# Patient Record
Sex: FEMALE | Race: WHITE | NOT HISPANIC OR LATINO
[De-identification: names, ages, dates, MRNs, and addresses within clinical notes are randomized per-mention and may not be internally consistent; named-entity substitution may affect disease eponyms.]

---

## 2017-01-05 ENCOUNTER — RX RENEWAL (OUTPATIENT)
Age: 72
End: 2017-01-05

## 2017-03-24 ENCOUNTER — APPOINTMENT (OUTPATIENT)
Dept: DERMATOLOGY | Facility: CLINIC | Age: 72
End: 2017-03-24
Payer: MEDICARE

## 2017-03-24 DIAGNOSIS — Z85.828 PERSONAL HISTORY OF OTHER MALIGNANT NEOPLASM OF SKIN: ICD-10-CM

## 2017-03-24 PROCEDURE — 99213 OFFICE O/P EST LOW 20 MIN: CPT | Mod: 25

## 2017-03-24 PROCEDURE — 17000 DESTRUCT PREMALG LESION: CPT | Mod: 59

## 2017-03-24 PROCEDURE — 11101 BIOPSY SKIN SUBQ&/MUCOUS MEMBRANE EA ADDL LESN: CPT

## 2017-03-24 PROCEDURE — 11100 BX SKIN SUBCUTANEOUS&/MUCOUS MEMBRANE 1 LESION: CPT | Mod: 59

## 2017-03-28 LAB — CORE LAB BIOPSY: NORMAL

## 2017-03-29 ENCOUNTER — APPOINTMENT (OUTPATIENT)
Dept: DERMATOLOGY | Facility: CLINIC | Age: 72
End: 2017-03-29

## 2017-03-30 ENCOUNTER — RECORD ABSTRACTING (OUTPATIENT)
Age: 72
End: 2017-03-30

## 2017-03-30 DIAGNOSIS — Z84.1 FAMILY HISTORY OF DISORDERS OF KIDNEY AND URETER: ICD-10-CM

## 2017-03-30 DIAGNOSIS — Z83.3 FAMILY HISTORY OF DIABETES MELLITUS: ICD-10-CM

## 2017-03-30 DIAGNOSIS — Z82.49 FAMILY HISTORY OF ISCHEMIC HEART DISEASE AND OTHER DISEASES OF THE CIRCULATORY SYSTEM: ICD-10-CM

## 2017-03-30 DIAGNOSIS — Z80.0 FAMILY HISTORY OF MALIGNANT NEOPLASM OF DIGESTIVE ORGANS: ICD-10-CM

## 2017-03-30 DIAGNOSIS — Z82.0 FAMILY HISTORY OF EPILEPSY AND OTHER DISEASES OF THE NERVOUS SYSTEM: ICD-10-CM

## 2017-04-12 ENCOUNTER — APPOINTMENT (OUTPATIENT)
Dept: INTERNAL MEDICINE | Facility: CLINIC | Age: 72
End: 2017-04-12

## 2017-04-25 ENCOUNTER — APPOINTMENT (OUTPATIENT)
Dept: DERMATOLOGY | Facility: CLINIC | Age: 72
End: 2017-04-25

## 2017-04-25 RX ORDER — CONJUGATED ESTROGENS 0.62 MG/G
0.62 CREAM VAGINAL
Refills: 0 | Status: DISCONTINUED | COMMUNITY
End: 2017-04-25

## 2017-04-28 ENCOUNTER — RX RENEWAL (OUTPATIENT)
Age: 72
End: 2017-04-28

## 2017-05-02 ENCOUNTER — APPOINTMENT (OUTPATIENT)
Dept: DERMATOLOGY | Facility: CLINIC | Age: 72
End: 2017-05-02
Payer: MEDICARE

## 2017-05-02 PROCEDURE — 17272 DSTR MAL LES S/N/H/F/G 1.1-2: CPT

## 2017-05-05 ENCOUNTER — NON-APPOINTMENT (OUTPATIENT)
Age: 72
End: 2017-05-05

## 2017-05-05 ENCOUNTER — APPOINTMENT (OUTPATIENT)
Dept: INTERNAL MEDICINE | Facility: CLINIC | Age: 72
End: 2017-05-05

## 2017-05-05 VITALS
TEMPERATURE: 97.7 F | RESPIRATION RATE: 18 BRPM | WEIGHT: 220 LBS | OXYGEN SATURATION: 94 % | BODY MASS INDEX: 33.34 KG/M2 | DIASTOLIC BLOOD PRESSURE: 86 MMHG | HEART RATE: 76 BPM | SYSTOLIC BLOOD PRESSURE: 138 MMHG | HEIGHT: 68 IN

## 2017-06-02 ENCOUNTER — MEDICATION RENEWAL (OUTPATIENT)
Age: 72
End: 2017-06-02

## 2017-06-28 ENCOUNTER — APPOINTMENT (OUTPATIENT)
Dept: UROLOGY | Facility: CLINIC | Age: 72
End: 2017-06-28

## 2017-06-28 RX ORDER — FLUTICASONE PROPIONATE 44 UG/1
44 AEROSOL, METERED RESPIRATORY (INHALATION)
Refills: 0 | Status: DISCONTINUED | COMMUNITY
End: 2017-06-28

## 2017-06-29 LAB
BILIRUB UR QL STRIP: NORMAL
GLUCOSE UR-MCNC: NORMAL
HCG UR QL: 0.2 EU/DL
HGB UR QL STRIP.AUTO: NORMAL
KETONES UR-MCNC: NORMAL
LEUKOCYTE ESTERASE UR QL STRIP: NORMAL
NITRITE UR QL STRIP: NORMAL
PH UR STRIP: 5.5
PROT UR STRIP-MCNC: NORMAL
SP GR UR STRIP: 1.01

## 2017-07-18 ENCOUNTER — APPOINTMENT (OUTPATIENT)
Dept: DERMATOLOGY | Facility: CLINIC | Age: 72
End: 2017-07-18

## 2017-07-24 ENCOUNTER — MESSAGE (OUTPATIENT)
Age: 72
End: 2017-07-24

## 2017-07-24 LAB — BACTERIA UR CULT: ABNORMAL

## 2017-07-25 ENCOUNTER — OUTPATIENT (OUTPATIENT)
Dept: OUTPATIENT SERVICES | Facility: HOSPITAL | Age: 72
LOS: 1 days | Discharge: HOME | End: 2017-07-25
Payer: MEDICARE

## 2017-07-25 VITALS
OXYGEN SATURATION: 97 % | HEART RATE: 90 BPM | TEMPERATURE: 98 F | DIASTOLIC BLOOD PRESSURE: 64 MMHG | RESPIRATION RATE: 18 BRPM | HEIGHT: 67 IN | WEIGHT: 218.04 LBS | SYSTOLIC BLOOD PRESSURE: 139 MMHG

## 2017-07-25 DIAGNOSIS — M48.06 SPINAL STENOSIS, LUMBAR REGION: ICD-10-CM

## 2017-07-25 DIAGNOSIS — M43.16 SPONDYLOLISTHESIS, LUMBAR REGION: ICD-10-CM

## 2017-07-25 DIAGNOSIS — E11.65 TYPE 2 DIABETES MELLITUS WITH HYPERGLYCEMIA: ICD-10-CM

## 2017-07-25 DIAGNOSIS — N39.0 URINARY TRACT INFECTION, SITE NOT SPECIFIED: ICD-10-CM

## 2017-07-25 DIAGNOSIS — Z01.818 ENCOUNTER FOR OTHER PREPROCEDURAL EXAMINATION: ICD-10-CM

## 2017-07-25 DIAGNOSIS — M51.36 OTHER INTERVERTEBRAL DISC DEGENERATION, LUMBAR REGION: ICD-10-CM

## 2017-07-25 DIAGNOSIS — M41.86 OTHER FORMS OF SCOLIOSIS, LUMBAR REGION: ICD-10-CM

## 2017-07-25 DIAGNOSIS — M54.32 SCIATICA, LEFT SIDE: ICD-10-CM

## 2017-07-25 DIAGNOSIS — J98.11 ATELECTASIS: ICD-10-CM

## 2017-07-25 DIAGNOSIS — D62 ACUTE POSTHEMORRHAGIC ANEMIA: ICD-10-CM

## 2017-07-25 LAB
ANION GAP SERPL CALC-SCNC: 9 MMOL/L — SIGNIFICANT CHANGE UP (ref 5–17)
APPEARANCE UR: CLEAR — SIGNIFICANT CHANGE UP
APTT BLD: 26.8 SEC — LOW (ref 27.5–37.4)
BACTERIA # UR AUTO: (no result)
BASOPHILS # BLD AUTO: 0.1 K/UL — SIGNIFICANT CHANGE UP (ref 0–0.2)
BASOPHILS NFR BLD AUTO: 1 % — SIGNIFICANT CHANGE UP (ref 0–2)
BILIRUB UR-MCNC: NEGATIVE — SIGNIFICANT CHANGE UP
BLD GP AB SCN SERPL QL: SIGNIFICANT CHANGE UP
BUN SERPL-MCNC: 11 MG/DL — SIGNIFICANT CHANGE UP (ref 7–23)
CALCIUM SERPL-MCNC: 9.1 MG/DL — SIGNIFICANT CHANGE UP (ref 8.5–10.1)
CHLORIDE SERPL-SCNC: 102 MMOL/L — SIGNIFICANT CHANGE UP (ref 96–108)
CO2 SERPL-SCNC: 26 MMOL/L — SIGNIFICANT CHANGE UP (ref 22–31)
COLOR SPEC: YELLOW — SIGNIFICANT CHANGE UP
CREAT SERPL-MCNC: 0.62 MG/DL — SIGNIFICANT CHANGE UP (ref 0.5–1.3)
DIFF PNL FLD: NEGATIVE — SIGNIFICANT CHANGE UP
EOSINOPHIL # BLD AUTO: 0.2 K/UL — SIGNIFICANT CHANGE UP (ref 0–0.5)
EOSINOPHIL NFR BLD AUTO: 1.5 % — SIGNIFICANT CHANGE UP (ref 0–6)
EPI CELLS # UR: (no result)
GLUCOSE SERPL-MCNC: 188 MG/DL — HIGH (ref 70–99)
GLUCOSE UR QL: NEGATIVE MG/DL — SIGNIFICANT CHANGE UP
HCT VFR BLD CALC: 40.1 % — SIGNIFICANT CHANGE UP (ref 34.5–45)
HGB BLD-MCNC: 13.8 G/DL — SIGNIFICANT CHANGE UP (ref 11.5–15.5)
INR BLD: 0.96 RATIO — SIGNIFICANT CHANGE UP (ref 0.88–1.16)
KETONES UR-MCNC: NEGATIVE — SIGNIFICANT CHANGE UP
LEUKOCYTE ESTERASE UR-ACNC: (no result)
LYMPHOCYTES # BLD AUTO: 3.8 K/UL — HIGH (ref 1–3.3)
LYMPHOCYTES # BLD AUTO: 37.4 % — SIGNIFICANT CHANGE UP (ref 13–44)
MCHC RBC-ENTMCNC: 30.6 PG — SIGNIFICANT CHANGE UP (ref 27–34)
MCHC RBC-ENTMCNC: 34.5 GM/DL — SIGNIFICANT CHANGE UP (ref 32–36)
MCV RBC AUTO: 88.5 FL — SIGNIFICANT CHANGE UP (ref 80–100)
MONOCYTES # BLD AUTO: 1 K/UL — HIGH (ref 0–0.9)
MONOCYTES NFR BLD AUTO: 9.8 % — SIGNIFICANT CHANGE UP (ref 2–14)
NEUTROPHILS # BLD AUTO: 5.1 K/UL — SIGNIFICANT CHANGE UP (ref 1.8–7.4)
NEUTROPHILS NFR BLD AUTO: 50.3 % — SIGNIFICANT CHANGE UP (ref 43–77)
NITRITE UR-MCNC: NEGATIVE — SIGNIFICANT CHANGE UP
PH UR: 6 — SIGNIFICANT CHANGE UP (ref 5–8)
PLATELET # BLD AUTO: 199 K/UL — SIGNIFICANT CHANGE UP (ref 150–400)
POTASSIUM SERPL-MCNC: 4 MMOL/L — SIGNIFICANT CHANGE UP (ref 3.5–5.3)
POTASSIUM SERPL-SCNC: 4 MMOL/L — SIGNIFICANT CHANGE UP (ref 3.5–5.3)
PROT UR-MCNC: NEGATIVE MG/DL — SIGNIFICANT CHANGE UP
PROTHROM AB SERPL-ACNC: 10.4 SEC — SIGNIFICANT CHANGE UP (ref 9.8–12.7)
RBC # BLD: 4.53 M/UL — SIGNIFICANT CHANGE UP (ref 3.8–5.2)
RBC # FLD: 12.1 % — SIGNIFICANT CHANGE UP (ref 10.3–14.5)
RBC CASTS # UR COMP ASSIST: SIGNIFICANT CHANGE UP /HPF (ref 0–4)
SODIUM SERPL-SCNC: 137 MMOL/L — SIGNIFICANT CHANGE UP (ref 135–145)
SP GR SPEC: 1.01 — SIGNIFICANT CHANGE UP (ref 1.01–1.02)
TYPE + AB SCN PNL BLD: SIGNIFICANT CHANGE UP
UROBILINOGEN FLD QL: NEGATIVE MG/DL — SIGNIFICANT CHANGE UP
WBC # BLD: 10.2 K/UL — SIGNIFICANT CHANGE UP (ref 3.8–10.5)
WBC # FLD AUTO: 10.2 K/UL — SIGNIFICANT CHANGE UP (ref 3.8–10.5)
WBC UR QL: SIGNIFICANT CHANGE UP

## 2017-07-25 PROCEDURE — 93010 ELECTROCARDIOGRAM REPORT: CPT

## 2017-07-25 PROCEDURE — 71020: CPT | Mod: 26

## 2017-07-25 NOTE — H&P PST ADULT - PSH
Dental disorder  Dental surgery 12/2012, patient reports that upper portion is glued in at this time, plan is to have dental implants placed.  S/P bladder repair  bladder lift 2/2013  S/P knee surgery  arthroscopy bilateral 2007 and 2010  Skin cancer  to right temple 2012, removed, history of basal cell x 2

## 2017-07-25 NOTE — H&P PST ADULT - HISTORY OF PRESENT ILLNESS
72 years old female with a history of lower back pain. "for more than 20 years". She had several spinal injections. Constant aching pain to lower back across entire lower back but worst to the left. Admits to radiation of pain to left lower extremity. Admits to paresthesia to left lower extremity.  Patient had a fall 7/25/2017. Now admits to right lower extremity pain. Denies swelling. Denies changes in bowel or urinary habits.

## 2017-07-25 NOTE — H&P PST ADULT - ASSESSMENT
72 years old female present to PST prior to L3- 72 years old female present to PST prior to L3- S1, possible L2-S1, posterior fusion, instrumentation L5-S1 PLIF with Dr. Roshni Christianson.  Plan   1. NPO after midnight  2. Take the following medications with sips of water on the day of procedure: Nitrofurantoin, Levothyroxine. Use Spiriva  3. Use E-Z sponge as directed  4. Use Mupirocin as directed.  5. Drink a quart of extra  fluids the day before your surgery.  6 Medical clearance with Dr. Amaya,Cardiac clearance with Dr. Ibrahim and pulmonary clearance with DR. Dowd  7. CBC, BMP, PT/PTT/INR, Urinalysis, Type and Screen, MRSA sent to lab  8. EKG and CXR done

## 2017-07-25 NOTE — H&P PST ADULT - PMH
Acid reflux    Asthma  history of x 20 years ago, resolved does not use inhaler  Back pain    Basal cell carcinoma  removed from face  DDD (degenerative disc disease), lumbar    Diabetes mellitus  diet controlled  Diverticulosis of intestine without bleeding, unspecified intestinal tract location    Elevated pancreatic enzyme  patient reports elevated lab 6/2013. Gastroenterologist aware  Gall stones  gall bladder removed  History of cataract  extracted from right and left  Hypothyroidism    Pulmonary emphysema, unspecified emphysema type    Spinal stenosis of lumbar region    Urinary tract infection with hematuria, site unspecified  Chronic. Presently on Nitrofurantoin. Started 7/24/2017  Urinary urgency    Varicose veins of both lower extremities  surgery Acid reflux    Asthma  history of x 20 years ago, resolved does not use inhaler  Back pain    Basal cell carcinoma  removed from face  DDD (degenerative disc disease), lumbar    Diabetes mellitus  diet controlled  Diverticulosis of intestine without bleeding, unspecified intestinal tract location    Elevated pancreatic enzyme  patient reports elevated lab 6/2013. Gastroenterologist aware  Fall, initial encounter  7/24/2017.  right lower extremity pain  Gall stones  gall bladder removed  History of cataract  extracted from right and left  Hypothyroidism    Pulmonary emphysema, unspecified emphysema type    Spinal stenosis of lumbar region    Urinary tract infection with hematuria, site unspecified  Chronic. Presently on Nitrofurantoin. Started 7/24/2017  Urinary urgency    Varicose veins of both lower extremities  surgery

## 2017-07-26 LAB
MRSA PCR RESULT.: SIGNIFICANT CHANGE UP
S AUREUS DNA NOSE QL NAA+PROBE: SIGNIFICANT CHANGE UP

## 2017-07-27 ENCOUNTER — APPOINTMENT (OUTPATIENT)
Dept: INTERNAL MEDICINE | Facility: CLINIC | Age: 72
End: 2017-07-27

## 2017-07-28 ENCOUNTER — OTHER (OUTPATIENT)
Age: 72
End: 2017-07-28

## 2017-07-28 LAB
GLUCOSE SERPL-MCNC: 188
INR PPP: 0.96
PT BLD: 10.4

## 2017-08-01 ENCOUNTER — APPOINTMENT (OUTPATIENT)
Dept: INTERNAL MEDICINE | Facility: CLINIC | Age: 72
End: 2017-08-01
Payer: MEDICARE

## 2017-08-01 ENCOUNTER — NON-APPOINTMENT (OUTPATIENT)
Age: 72
End: 2017-08-01

## 2017-08-01 VITALS
HEIGHT: 68 IN | HEART RATE: 98 BPM | TEMPERATURE: 98.7 F | RESPIRATION RATE: 16 BRPM | DIASTOLIC BLOOD PRESSURE: 70 MMHG | BODY MASS INDEX: 33.34 KG/M2 | OXYGEN SATURATION: 96 % | WEIGHT: 220 LBS | SYSTOLIC BLOOD PRESSURE: 120 MMHG

## 2017-08-01 PROCEDURE — 94729 DIFFUSING CAPACITY: CPT

## 2017-08-01 PROCEDURE — 94060 EVALUATION OF WHEEZING: CPT

## 2017-08-01 PROCEDURE — 94727 GAS DIL/WSHOT DETER LNG VOL: CPT

## 2017-08-01 PROCEDURE — 99214 OFFICE O/P EST MOD 30 MIN: CPT | Mod: 25

## 2017-08-01 RX ORDER — HYDROMORPHONE HYDROCHLORIDE 2 MG/ML
0.5 INJECTION INTRAMUSCULAR; INTRAVENOUS; SUBCUTANEOUS
Qty: 0 | Refills: 0 | Status: DISCONTINUED | OUTPATIENT
Start: 2017-08-02 | End: 2017-08-04

## 2017-08-01 RX ORDER — HYDROMORPHONE HYDROCHLORIDE 2 MG/ML
30 INJECTION INTRAMUSCULAR; INTRAVENOUS; SUBCUTANEOUS
Qty: 0 | Refills: 0 | Status: DISCONTINUED | OUTPATIENT
Start: 2017-08-02 | End: 2017-08-04

## 2017-08-01 RX ORDER — ONDANSETRON 8 MG/1
4 TABLET, FILM COATED ORAL EVERY 6 HOURS
Qty: 0 | Refills: 0 | Status: DISCONTINUED | OUTPATIENT
Start: 2017-08-02 | End: 2017-08-07

## 2017-08-01 RX ORDER — NALOXONE HYDROCHLORIDE 4 MG/.1ML
0.1 SPRAY NASAL
Qty: 0 | Refills: 0 | Status: DISCONTINUED | OUTPATIENT
Start: 2017-08-02 | End: 2017-08-07

## 2017-08-02 ENCOUNTER — INPATIENT (INPATIENT)
Facility: HOSPITAL | Age: 72
LOS: 4 days | Discharge: NURSING, REHAB OR LONG-TERM CARE | End: 2017-08-07
Attending: ORTHOPAEDIC SURGERY | Admitting: ORTHOPAEDIC SURGERY
Payer: MEDICARE

## 2017-08-02 VITALS
SYSTOLIC BLOOD PRESSURE: 146 MMHG | HEART RATE: 86 BPM | TEMPERATURE: 99 F | DIASTOLIC BLOOD PRESSURE: 76 MMHG | OXYGEN SATURATION: 96 % | WEIGHT: 220.46 LBS | HEIGHT: 68 IN | RESPIRATION RATE: 16 BRPM

## 2017-08-02 DIAGNOSIS — E11.9 TYPE 2 DIABETES MELLITUS WITHOUT COMPLICATIONS: ICD-10-CM

## 2017-08-02 DIAGNOSIS — R09.02 HYPOXEMIA: ICD-10-CM

## 2017-08-02 DIAGNOSIS — J43.9 EMPHYSEMA, UNSPECIFIED: ICD-10-CM

## 2017-08-02 DIAGNOSIS — Z98.890 OTHER SPECIFIED POSTPROCEDURAL STATES: ICD-10-CM

## 2017-08-02 DIAGNOSIS — E03.9 HYPOTHYROIDISM, UNSPECIFIED: ICD-10-CM

## 2017-08-02 LAB
ANION GAP SERPL CALC-SCNC: 10 MMOL/L — SIGNIFICANT CHANGE UP (ref 5–17)
BASOPHILS # BLD AUTO: 0.1 K/UL — SIGNIFICANT CHANGE UP (ref 0–0.2)
BASOPHILS NFR BLD AUTO: 0.6 % — SIGNIFICANT CHANGE UP (ref 0–2)
BUN SERPL-MCNC: 12 MG/DL — SIGNIFICANT CHANGE UP (ref 7–23)
CALCIUM SERPL-MCNC: 8.2 MG/DL — LOW (ref 8.5–10.1)
CHLORIDE SERPL-SCNC: 109 MMOL/L — HIGH (ref 96–108)
CO2 SERPL-SCNC: 21 MMOL/L — LOW (ref 22–31)
CREAT SERPL-MCNC: 0.63 MG/DL — SIGNIFICANT CHANGE UP (ref 0.5–1.3)
EOSINOPHIL # BLD AUTO: 0 K/UL — SIGNIFICANT CHANGE UP (ref 0–0.5)
EOSINOPHIL NFR BLD AUTO: 0.2 % — SIGNIFICANT CHANGE UP (ref 0–6)
GLUCOSE SERPL-MCNC: 174 MG/DL — HIGH (ref 70–99)
HCT VFR BLD CALC: 37.9 % — SIGNIFICANT CHANGE UP (ref 34.5–45)
HGB BLD-MCNC: 12.8 G/DL — SIGNIFICANT CHANGE UP (ref 11.5–15.5)
LYMPHOCYTES # BLD AUTO: 1.7 K/UL — SIGNIFICANT CHANGE UP (ref 1–3.3)
LYMPHOCYTES # BLD AUTO: 17.6 % — SIGNIFICANT CHANGE UP (ref 13–44)
MCHC RBC-ENTMCNC: 30.5 PG — SIGNIFICANT CHANGE UP (ref 27–34)
MCHC RBC-ENTMCNC: 33.8 GM/DL — SIGNIFICANT CHANGE UP (ref 32–36)
MCV RBC AUTO: 90.1 FL — SIGNIFICANT CHANGE UP (ref 80–100)
MONOCYTES # BLD AUTO: 0.2 K/UL — SIGNIFICANT CHANGE UP (ref 0–0.9)
MONOCYTES NFR BLD AUTO: 2.2 % — SIGNIFICANT CHANGE UP (ref 2–14)
NEUTROPHILS # BLD AUTO: 7.5 K/UL — HIGH (ref 1.8–7.4)
NEUTROPHILS NFR BLD AUTO: 79.4 % — HIGH (ref 43–77)
PLATELET # BLD AUTO: 266 K/UL — SIGNIFICANT CHANGE UP (ref 150–400)
POTASSIUM SERPL-MCNC: 4.2 MMOL/L — SIGNIFICANT CHANGE UP (ref 3.5–5.3)
POTASSIUM SERPL-SCNC: 4.2 MMOL/L — SIGNIFICANT CHANGE UP (ref 3.5–5.3)
RBC # BLD: 4.21 M/UL — SIGNIFICANT CHANGE UP (ref 3.8–5.2)
RBC # FLD: 13.7 % — SIGNIFICANT CHANGE UP (ref 10.3–14.5)
SODIUM SERPL-SCNC: 140 MMOL/L — SIGNIFICANT CHANGE UP (ref 135–145)
WBC # BLD: 9.4 K/UL — SIGNIFICANT CHANGE UP (ref 3.8–10.5)
WBC # FLD AUTO: 9.4 K/UL — SIGNIFICANT CHANGE UP (ref 3.8–10.5)

## 2017-08-02 RX ORDER — DOCUSATE SODIUM 100 MG
100 CAPSULE ORAL THREE TIMES A DAY
Qty: 0 | Refills: 0 | Status: DISCONTINUED | OUTPATIENT
Start: 2017-08-02 | End: 2017-08-07

## 2017-08-02 RX ORDER — LEVOTHYROXINE SODIUM 125 MCG
112 TABLET ORAL DAILY
Qty: 0 | Refills: 0 | Status: DISCONTINUED | OUTPATIENT
Start: 2017-08-02 | End: 2017-08-07

## 2017-08-02 RX ORDER — SODIUM CHLORIDE 9 MG/ML
1000 INJECTION, SOLUTION INTRAVENOUS
Qty: 0 | Refills: 0 | Status: DISCONTINUED | OUTPATIENT
Start: 2017-08-02 | End: 2017-08-07

## 2017-08-02 RX ORDER — MAGNESIUM HYDROXIDE 400 MG/1
30 TABLET, CHEWABLE ORAL EVERY 12 HOURS
Qty: 0 | Refills: 0 | Status: DISCONTINUED | OUTPATIENT
Start: 2017-08-02 | End: 2017-08-07

## 2017-08-02 RX ORDER — PANTOPRAZOLE SODIUM 20 MG/1
40 TABLET, DELAYED RELEASE ORAL
Qty: 0 | Refills: 0 | Status: DISCONTINUED | OUTPATIENT
Start: 2017-08-02 | End: 2017-08-07

## 2017-08-02 RX ORDER — ONDANSETRON 8 MG/1
4 TABLET, FILM COATED ORAL EVERY 6 HOURS
Qty: 0 | Refills: 0 | Status: DISCONTINUED | OUTPATIENT
Start: 2017-08-02 | End: 2017-08-07

## 2017-08-02 RX ORDER — DEXTROSE 50 % IN WATER 50 %
1 SYRINGE (ML) INTRAVENOUS ONCE
Qty: 0 | Refills: 0 | Status: DISCONTINUED | OUTPATIENT
Start: 2017-08-02 | End: 2017-08-07

## 2017-08-02 RX ORDER — TIOTROPIUM BROMIDE 18 UG/1
1 CAPSULE ORAL; RESPIRATORY (INHALATION) DAILY
Qty: 0 | Refills: 0 | Status: DISCONTINUED | OUTPATIENT
Start: 2017-08-02 | End: 2017-08-07

## 2017-08-02 RX ORDER — INSULIN LISPRO 100/ML
VIAL (ML) SUBCUTANEOUS AT BEDTIME
Qty: 0 | Refills: 0 | Status: DISCONTINUED | OUTPATIENT
Start: 2017-08-02 | End: 2017-08-07

## 2017-08-02 RX ORDER — DEXTROSE 50 % IN WATER 50 %
25 SYRINGE (ML) INTRAVENOUS ONCE
Qty: 0 | Refills: 0 | Status: DISCONTINUED | OUTPATIENT
Start: 2017-08-02 | End: 2017-08-07

## 2017-08-02 RX ORDER — SENNA PLUS 8.6 MG/1
2 TABLET ORAL AT BEDTIME
Qty: 0 | Refills: 0 | Status: DISCONTINUED | OUTPATIENT
Start: 2017-08-02 | End: 2017-08-07

## 2017-08-02 RX ORDER — GLUCAGON INJECTION, SOLUTION 0.5 MG/.1ML
1 INJECTION, SOLUTION SUBCUTANEOUS ONCE
Qty: 0 | Refills: 0 | Status: DISCONTINUED | OUTPATIENT
Start: 2017-08-02 | End: 2017-08-07

## 2017-08-02 RX ORDER — SODIUM CHLORIDE 9 MG/ML
1000 INJECTION, SOLUTION INTRAVENOUS
Qty: 0 | Refills: 0 | Status: DISCONTINUED | OUTPATIENT
Start: 2017-08-02 | End: 2017-08-05

## 2017-08-02 RX ORDER — SODIUM CHLORIDE 9 MG/ML
1000 INJECTION, SOLUTION INTRAVENOUS
Qty: 0 | Refills: 0 | Status: DISCONTINUED | OUTPATIENT
Start: 2017-08-02 | End: 2017-08-02

## 2017-08-02 RX ORDER — CEFAZOLIN SODIUM 1 G
2000 VIAL (EA) INJECTION ONCE
Qty: 0 | Refills: 0 | Status: COMPLETED | OUTPATIENT
Start: 2017-08-02 | End: 2017-08-02

## 2017-08-02 RX ORDER — PROCHLORPERAZINE MALEATE 5 MG
10 TABLET ORAL ONCE
Qty: 0 | Refills: 0 | Status: COMPLETED | OUTPATIENT
Start: 2017-08-02 | End: 2017-08-02

## 2017-08-02 RX ORDER — INSULIN LISPRO 100/ML
VIAL (ML) SUBCUTANEOUS
Qty: 0 | Refills: 0 | Status: DISCONTINUED | OUTPATIENT
Start: 2017-08-02 | End: 2017-08-07

## 2017-08-02 RX ORDER — DEXTROSE 50 % IN WATER 50 %
12.5 SYRINGE (ML) INTRAVENOUS ONCE
Qty: 0 | Refills: 0 | Status: DISCONTINUED | OUTPATIENT
Start: 2017-08-02 | End: 2017-08-07

## 2017-08-02 RX ADMIN — Medication 100 MILLIGRAM(S): at 23:57

## 2017-08-02 RX ADMIN — Medication 10 MILLIGRAM(S): at 22:12

## 2017-08-02 RX ADMIN — HYDROMORPHONE HYDROCHLORIDE 30 MILLILITER(S): 2 INJECTION INTRAMUSCULAR; INTRAVENOUS; SUBCUTANEOUS at 17:32

## 2017-08-02 RX ADMIN — SODIUM CHLORIDE 100 MILLILITER(S): 9 INJECTION, SOLUTION INTRAVENOUS at 17:35

## 2017-08-02 RX ADMIN — ONDANSETRON 4 MILLIGRAM(S): 8 TABLET, FILM COATED ORAL at 21:08

## 2017-08-02 NOTE — PATIENT PROFILE ADULT. - PMH
Acid reflux    Asthma  history of x 20 years ago, resolved does not use inhaler  Back pain    Basal cell carcinoma  removed from face  DDD (degenerative disc disease), lumbar    Diabetes mellitus  diet controlled  Diverticulosis of intestine without bleeding, unspecified intestinal tract location    Elevated pancreatic enzyme  patient reports elevated lab 6/2013. Gastroenterologist aware  Fall, initial encounter  7/24/2017.  right lower extremity pain  Gall stones  gall bladder removed  History of cataract  extracted from right and left  Hypothyroidism    Pulmonary emphysema, unspecified emphysema type    Spinal stenosis of lumbar region    Urinary tract infection with hematuria, site unspecified  Chronic. Presently on Nitrofurantoin. Started 7/24/2017  Urinary urgency    Varicose veins of both lower extremities  surgery

## 2017-08-02 NOTE — BRIEF OPERATIVE NOTE - POST-OP DX
Lumbar stenosis with neurogenic claudication  08/02/2017  L2-S1 with degen scoliosis, spondylolisthesis  Active  Roshni Christianson

## 2017-08-02 NOTE — CONSULT NOTE ADULT - SUBJECTIVE AND OBJECTIVE BOX
HPI: 71 y/o female with COPD, Diabetes, GERD, hypothyroidism s/p lumbar spine surgery for lumbar stenosis. Medicine consult requested for post op medical management.  Of note, pt had a slip and fall in her kitchen a few days ago -- is bruised on her right LE.    17: out of surgery in PACU; having issues with desaturations earlier but now better; seems to desat once she dozes off to sleep. No cp,sob, n/v/f/c; reports some back pain      PAST MEDICAL & SURGICAL HISTORY:  Fall, initial encounter: 2017.  right lower extremity pain  Pulmonary emphysema, unspecified emphysema type  Basal cell carcinoma: removed from face  DDD (degenerative disc disease), lumbar  Spinal stenosis of lumbar region  Urinary urgency  Urinary tract infection with hematuria, site unspecified: Chronic. Presently on Nitrofurantoin. Started 2017  Gall stones: gall bladder removed  Diverticulosis of intestine without bleeding, unspecified intestinal tract location  Varicose veins of both lower extremities: surgery  History of cataract: extracted from right and left  Elevated pancreatic enzyme: patient reports elevated lab 2013. Gastroenterologist aware  Asthma: history of x 20 years ago, resolved does not use inhaler  Back pain  Diabetes mellitus: diet controlled  Hypothyroidism  Acid reflux  Skin cancer: to right temple , removed, history of basal cell x 2  S/P knee surgery: arthroscopy bilateral  and   Dental disorder: Dental surgery 2012, patient reports that upper portion is glued in at this time, plan is to have dental implants placed.  S/P bladder repair: bladder lift 2013      FAMILY HISTORY:     Family history of stroke (Mother)  Family history of heart disease (Mother)  Family history of pancreatic cancer (Father)      SOCIAL HISTORY:  former  smoker, occasional alcohol, no drugs    REVIEW OF SYSTEMS:   All 10 systems reviewed in detailed and found to be negative with the exception of what has already been described above    MEDICATIONS  (STANDING):  HYDROmorphone PCA (1 mG/mL) 30 milliLiter(s) PCA Continuous PCA Continuous  lactated ringers. 1000 milliLiter(s) (100 mL/Hr) IV Continuous <Continuous>  insulin lispro (HumaLOG) corrective regimen sliding scale   SubCutaneous three times a day before meals  insulin lispro (HumaLOG) corrective regimen sliding scale   SubCutaneous at bedtime  dextrose 5%. 1000 milliLiter(s) (50 mL/Hr) IV Continuous <Continuous>  dextrose 50% Injectable 12.5 Gram(s) IV Push once  dextrose 50% Injectable 25 Gram(s) IV Push once  dextrose 50% Injectable 25 Gram(s) IV Push once    MEDICATIONS  (PRN):  HYDROmorphone PCA (1 mG/mL) Rescue Clinician Bolus 0.5 milliGRAM(s) IV Push every 15 minutes PRN for Pain Scale GREATER THAN 6  naloxone Injectable 0.1 milliGRAM(s) IV Push every 3 minutes PRN For ANY of the following changes in patient status:  A. RR LESS THAN 10 breaths per minute, B. Oxygen saturation LESS THAN 90%, C. Sedation score of 6  ondansetron Injectable 4 milliGRAM(s) IV Push every 6 hours PRN Nausea  dextrose Gel 1 Dose(s) Oral once PRN Blood Glucose LESS THAN 70 milliGRAM(s)/deciliter  glucagon  Injectable 1 milliGRAM(s) IntraMuscular once PRN Glucose LESS THAN 70 milligrams/deciliter      Allergies    No Known Allergies    Intolerances          PHYSICAL EXAM:    Vital Signs Last 24 Hrs  T(C): 36.8 (02 Aug 2017 17:05), Max: 37.1 (02 Aug 2017 08:57)  T(F): 98.2 (02 Aug 2017 17:05), Max: 98.7 (02 Aug 2017 08:57)  HR: 79 (02 Aug 2017 19:15) (79 - 95)  BP: 111/55 (02 Aug 2017 19:15) (99/61 - 146/76)  BP(mean): --  RR: 15 (02 Aug 2017 19:15) (10 - 20)  SpO2: 96% (02 Aug 2017 19:15) (88% - 98%)    GEN: A and O, NAD, mood stable  HEENT:  NC/AT, EOMI, no oropharyngeal lesions    NECK:   supple    CV:  +S1, +S2, regular, no murmurs or rubs    RESP:   lungs clear to auscultation bilaterally, no wheezing, rales, rhonchi, good air entry bilaterally, DECREASED BS ANA    GI:  abdomen soft, non-tender, non-distended, normal BS, no abdominal masses, no palpable masses    BACK: DRESSING C/D/I WITH HEMOVAC IN PLACE  RLE POSTERIOR REGION WITH ECCHYMOSIS    RECTAL:  not examined    :  POS GUADARRAMA   MSK:   normal muscle tone, no atrophy, no rigidity, no contractions    EXT:   no clubbing, no cyanosis, no edema, no calf pain, swelling or erythema, MOVING ALL EXTREMITIES    VASCULAR:  pulses equal and symmetric in the upper and lower extremities    NEURO:  AAOX3, no focal neurological deficits, follows all commands, able to move extremities spontaneously    SKIN:  no ulcers, lesions or rashes    LABS/IMAGIN.8   9.4   )-----------( 266      ( 02 Aug 2017 17:51 )             37.9     08-02    140  |  109<H>  |  12  ----------------------------<  174<H>  4.2   |  21<L>  |  0.63    Ca    8.2<L>      02 Aug 2017 17:51        EKG: JONH@86BPM

## 2017-08-02 NOTE — CONSULT NOTE ADULT - PROBLEM SELECTOR RECOMMENDATION 2
LIKELY PAIN MEDICATION RELATED VERSUS POSSIBLE SLEEP APNEA  WILL BE ON A MONITORED UNIT  WEANED OFF VENTIMASK AND STABLE ON NASAL CANNULA FOR NOW

## 2017-08-02 NOTE — PATIENT PROFILE ADULT. - FAMILY HISTORY
Father  Still living? No  Family history of pancreatic cancer, Age at diagnosis: Age Unknown     Mother  Still living? No  Family history of heart disease, Age at diagnosis: Age Unknown  Family history of stroke, Age at diagnosis: Age Unknown

## 2017-08-02 NOTE — CONSULT NOTE ADULT - PROBLEM SELECTOR RECOMMENDATION 9
POD# 0  PAIN CONTROL WITH PCA  MONITOR CLOSELY IN STEPDOWN  MONITOR HEMOVAC OUTPUT  INCENTIVE SPIROMETRY  DVT PROPHY - VENODYNES ONLY IN THE SETTING OF RECENT SPINE SURGERY AND DRAIN IN PLACE

## 2017-08-02 NOTE — BRIEF OPERATIVE NOTE - PROCEDURE
Fusion or laminectomy of lumbar spine by posterior approach using [employer] system  08/02/2017  L2-S1 laminectomy, posterior lateral fusion with instrumentation, local bone, BMP x2  Active  LMERMEL

## 2017-08-03 DIAGNOSIS — D50.0 IRON DEFICIENCY ANEMIA SECONDARY TO BLOOD LOSS (CHRONIC): ICD-10-CM

## 2017-08-03 LAB
ANION GAP SERPL CALC-SCNC: 8 MMOL/L — SIGNIFICANT CHANGE UP (ref 5–17)
BASOPHILS # BLD AUTO: 0 K/UL — SIGNIFICANT CHANGE UP (ref 0–0.2)
BASOPHILS NFR BLD AUTO: 0.3 % — SIGNIFICANT CHANGE UP (ref 0–2)
BUN SERPL-MCNC: 12 MG/DL — SIGNIFICANT CHANGE UP (ref 7–23)
CALCIUM SERPL-MCNC: 8 MG/DL — LOW (ref 8.5–10.1)
CHLORIDE SERPL-SCNC: 107 MMOL/L — SIGNIFICANT CHANGE UP (ref 96–108)
CO2 SERPL-SCNC: 27 MMOL/L — SIGNIFICANT CHANGE UP (ref 22–31)
CREAT SERPL-MCNC: 0.55 MG/DL — SIGNIFICANT CHANGE UP (ref 0.5–1.3)
EOSINOPHIL # BLD AUTO: 0 K/UL — SIGNIFICANT CHANGE UP (ref 0–0.5)
EOSINOPHIL NFR BLD AUTO: 0.1 % — SIGNIFICANT CHANGE UP (ref 0–6)
GLUCOSE SERPL-MCNC: 170 MG/DL — HIGH (ref 70–99)
HBA1C BLD-MCNC: 7.3 % — HIGH (ref 4–5.6)
HCT VFR BLD CALC: 31.6 % — LOW (ref 34.5–45)
HGB BLD-MCNC: 11 G/DL — LOW (ref 11.5–15.5)
LYMPHOCYTES # BLD AUTO: 2.3 K/UL — SIGNIFICANT CHANGE UP (ref 1–3.3)
LYMPHOCYTES # BLD AUTO: 25.5 % — SIGNIFICANT CHANGE UP (ref 13–44)
MCHC RBC-ENTMCNC: 31.2 PG — SIGNIFICANT CHANGE UP (ref 27–34)
MCHC RBC-ENTMCNC: 34.8 GM/DL — SIGNIFICANT CHANGE UP (ref 32–36)
MCV RBC AUTO: 89.5 FL — SIGNIFICANT CHANGE UP (ref 80–100)
MONOCYTES # BLD AUTO: 1 K/UL — HIGH (ref 0–0.9)
MONOCYTES NFR BLD AUTO: 11.5 % — SIGNIFICANT CHANGE UP (ref 2–14)
NEUTROPHILS # BLD AUTO: 5.7 K/UL — SIGNIFICANT CHANGE UP (ref 1.8–7.4)
NEUTROPHILS NFR BLD AUTO: 62.6 % — SIGNIFICANT CHANGE UP (ref 43–77)
PLATELET # BLD AUTO: 296 K/UL — SIGNIFICANT CHANGE UP (ref 150–400)
POTASSIUM SERPL-MCNC: 4.2 MMOL/L — SIGNIFICANT CHANGE UP (ref 3.5–5.3)
POTASSIUM SERPL-SCNC: 4.2 MMOL/L — SIGNIFICANT CHANGE UP (ref 3.5–5.3)
RBC # BLD: 3.52 M/UL — LOW (ref 3.8–5.2)
RBC # FLD: 13.2 % — SIGNIFICANT CHANGE UP (ref 10.3–14.5)
SODIUM SERPL-SCNC: 142 MMOL/L — SIGNIFICANT CHANGE UP (ref 135–145)
WBC # BLD: 9.1 K/UL — SIGNIFICANT CHANGE UP (ref 3.8–10.5)
WBC # FLD AUTO: 9.1 K/UL — SIGNIFICANT CHANGE UP (ref 3.8–10.5)

## 2017-08-03 RX ADMIN — Medication 100 MILLIGRAM(S): at 22:27

## 2017-08-03 RX ADMIN — Medication 2: at 12:22

## 2017-08-03 RX ADMIN — Medication 100 MILLIGRAM(S): at 05:23

## 2017-08-03 RX ADMIN — Medication 2: at 17:25

## 2017-08-03 RX ADMIN — SENNA PLUS 2 TABLET(S): 8.6 TABLET ORAL at 22:27

## 2017-08-03 RX ADMIN — PANTOPRAZOLE SODIUM 40 MILLIGRAM(S): 20 TABLET, DELAYED RELEASE ORAL at 05:23

## 2017-08-03 RX ADMIN — SODIUM CHLORIDE 125 MILLILITER(S): 9 INJECTION, SOLUTION INTRAVENOUS at 03:01

## 2017-08-03 RX ADMIN — Medication 112 MICROGRAM(S): at 05:23

## 2017-08-03 NOTE — PHYSICAL THERAPY INITIAL EVALUATION ADULT - ADDITIONAL COMMENTS
Has not ANA LILIA. ~ 14 steps in home with railing. Has no ANA LILIA. ~ 14 steps in home with railing.

## 2017-08-03 NOTE — PROGRESS NOTE ADULT - PROBLEM SELECTOR PLAN 2
LIKELY PAIN MEDICATION RELATED VERSUS POSSIBLE SLEEP APNEA  SATS STABLE OVERNIGHT  CONT TO MONITOR  CONT NC FOR NOW

## 2017-08-03 NOTE — PHYSICAL THERAPY INITIAL EVALUATION ADULT - PERTINENT HX OF CURRENT PROBLEM, REHAB EVAL
Pt s/p sx as above but has been desatting following. Pt now in SD/ICU. Pt s/p sx as above but has been desatting following. Pt now in SD/ICU. Had a recent fall prior to sx with bruising to right LE.

## 2017-08-03 NOTE — PROVIDER CONTACT NOTE (CHANGE IN STATUS NOTIFICATION) - SITUATION
Pt c/o right leg posterior pain down leg and requests that someone come to " check it out" to see what is causing this pain. Daughter and boyfriend want to know why she is having this pain

## 2017-08-03 NOTE — PHYSICAL THERAPY INITIAL EVALUATION ADULT - ACTIVE RANGE OF MOTION EXAMINATION, REHAB EVAL
Right hip flexion ~ 90 degrees. Right knee flex/ext ~ 30-~WFL. ROM assessed in seated position./no Active ROM deficits were identified/deficits as listed below

## 2017-08-03 NOTE — PROGRESS NOTE ADULT - SUBJECTIVE AND OBJECTIVE BOX
POD#1. Pt seen resting on chair with family at bedside. Pt c/o incisional site pain and pain of the RLE. Pt s/p fall at kitchen x few days ago with bruising and swelling noted of the RLE. She was seen in office and sent to her PCP/Cardiologist where she had chest pain at the time. Pt had echocardiogram (wnl) and US of b/l lower extremities (negative). She states she has no new changes in regards to RLE pain. She denies LLE pain. PCA is helpful with tolerating pain. Mendoza noted. Tolerating current diet.    PE  Gen appearance: NAD  Motor strength: 5/5 of the LLE. 5-/5 of right hamstring and quad 2/2 pain of the right hamstring.   Sensation:intact  No calf tenderness bilaterally  Incisional site with dry and clean dressing. drain 305cc over 24 hours.   RLE bruising noted.     Plan  Afebrile, BP:109/59  RR:10 managed per Medicine.  Keep PCA, mendoza, and drain POD#1. Pt seen resting on chair with family at bedside. Pt c/o incisional site pain and pain of the RLE. Pt s/p fall at kitchen x few days ago with bruising and swelling noted of the RLE. She was seen in office and sent to her PCP/Cardiologist where she had chest pain at the time. Pt had echocardiogram (wnl) and US of b/l lower extremities (negative). She states she has no new changes in regards to RLE pain. She denies LLE pain. PCA is helpful with tolerating pain. Mendoza noted. Tolerating current diet.    PE  Gen appearance: NAD  Motor strength: 5/5 of the LLE. 5-/5 of right hamstring due to pain of the RLE and quad 2/2 pain of the right hamstring.   Sensation: intact  No calf tenderness bilaterally  Incisional site with dry and clean dressing. Drain 305cc over 24 hours.   RLE bruising noted.     Plan  Afebrile, BP:109/59  RR:10 evaluate and managed per Medicine.  Keep PCA, mendoza, and drain  Mobilize with physical therapy and incentive spirometer encouraged.   Discussed with Dr. Fontenot.

## 2017-08-03 NOTE — PROGRESS NOTE ADULT - SUBJECTIVE AND OBJECTIVE BOX
HPI: 71 y/o female with COPD, Diabetes, GERD, hypothyroidism s/p lumbar spine surgery for lumbar stenosis. Medicine consult requested for post op medical management.  Of note, pt had a slip and fall in her kitchen a few days ago -- is bruised on her right LE.    17: out of surgery in PACU; having issues with desaturations earlier but now better; seems to desat once she dozes off to sleep. No cp,sob, n/v/f/c; reports some back pain  8/3/17: Did well overnight, no desaturations, slept well, pain is controlled with PCA; no cp, sob, n/v/f/c; had nausea las tnight now resolved        REVIEW OF SYSTEMS:   All 10 systems reviewed in detailed and found to be negative with the exception of what has already been described above      PHYSICAL EXAM:    Vital Signs Last 24 Hrs  T(C): 35.9 (03 Aug 2017 09:00), Max: 36.9 (02 Aug 2017 19:30)  T(F): 96.7 (03 Aug 2017 09:00), Max: 98.5 (02 Aug 2017 19:30)  HR: 71 (03 Aug 2017 06:00) (61 - 95)  BP: 109/59 (03 Aug 2017 06:00) (95/61 - 142/58)  BP(mean): 68 (03 Aug 2017 06:00) (67 - 79)  RR: 10 (03 Aug 2017 06:00) (7 - 20)  SpO2: 98% (03 Aug 2017 06:00) (88% - 99%)      GEN: A and O, NAD, mood stable  HEENT:  NC/AT, EOMI    NECK:   supple    CV:  +S1, +S2, regular, no murmurs or rubs    RESP:   lungs clear to auscultation bilaterally, no wheezing, rales, rhonchi, good air entry bilaterally, DECREASED BS ANA    GI:  abdomen soft, non-tender, non-distended, normal BS, no abdominal masses, no palpable masses    BACK: DRESSING C/D/I WITH HEMOVAC IN PLACE  RLE POSTERIOR REGION WITH ECCHYMOSIS    RECTAL:  not examined    :  POS GUADARRAMA   MSK:   normal muscle tone, no atrophy, no rigidity, no contractions    EXT:   no clubbing, no cyanosis, no edema, no calf pain, swelling or erythema, MOVING ALL EXTREMITIES    VASCULAR:  pulses equal and symmetric in the upper and lower extremities    NEURO:  AAOX3, no focal neurological deficits, follows all commands, able to move extremities spontaneously    SKIN:  no ulcers, lesions or rashes    LABS/IMAGIN.0   9.1   )-----------( 296      ( 03 Aug 2017 06:26 )             31.6     08-03    142  |  107  |  12  ----------------------------<  170<H>  4.2   |  27  |  0.55    Ca    8.0<L>      03 Aug 2017 06:26        MEDICATIONS  (STANDING):  HYDROmorphone PCA (1 mG/mL) 30 milliLiter(s) PCA Continuous PCA Continuous  tiotropium 18 MICROgram(s) Capsule 1 Capsule(s) Inhalation daily  pantoprazole    Tablet 40 milliGRAM(s) Oral before breakfast  levothyroxine 112 MICROGram(s) Oral daily  lactated ringers. 1000 milliLiter(s) (125 mL/Hr) IV Continuous <Continuous>  docusate sodium 100 milliGRAM(s) Oral three times a day  senna 2 Tablet(s) Oral at bedtime  insulin lispro (HumaLOG) corrective regimen sliding scale   SubCutaneous three times a day before meals  insulin lispro (HumaLOG) corrective regimen sliding scale   SubCutaneous at bedtime  dextrose 5%. 1000 milliLiter(s) (50 mL/Hr) IV Continuous <Continuous>  dextrose 50% Injectable 12.5 Gram(s) IV Push once  dextrose 50% Injectable 25 Gram(s) IV Push once  dextrose 50% Injectable 25 Gram(s) IV Push once    MEDICATIONS  (PRN):  HYDROmorphone PCA (1 mG/mL) Rescue Clinician Bolus 0.5 milliGRAM(s) IV Push every 15 minutes PRN for Pain Scale GREATER THAN 6  naloxone Injectable 0.1 milliGRAM(s) IV Push every 3 minutes PRN For ANY of the following changes in patient status:  A. RR LESS THAN 10 breaths per minute, B. Oxygen saturation LESS THAN 90%, C. Sedation score of 6  ondansetron Injectable 4 milliGRAM(s) IV Push every 6 hours PRN Nausea  aluminum hydroxide/magnesium hydroxide/simethicone Suspension 30 milliLiter(s) Oral every 12 hours PRN Indigestion  ondansetron Injectable 4 milliGRAM(s) IV Push every 6 hours PRN Nausea  magnesium hydroxide Suspension 30 milliLiter(s) Oral every 12 hours PRN Constipation  dextrose Gel 1 Dose(s) Oral once PRN Blood Glucose LESS THAN 70 milliGRAM(s)/deciliter  glucagon  Injectable 1 milliGRAM(s) IntraMuscular once PRN Glucose LESS THAN 70 milligrams/deciliter

## 2017-08-04 DIAGNOSIS — R50.82 POSTPROCEDURAL FEVER: ICD-10-CM

## 2017-08-04 LAB
ANION GAP SERPL CALC-SCNC: 6 MMOL/L — SIGNIFICANT CHANGE UP (ref 5–17)
BASOPHILS # BLD AUTO: 0.1 K/UL — SIGNIFICANT CHANGE UP (ref 0–0.2)
BASOPHILS NFR BLD AUTO: 0.9 % — SIGNIFICANT CHANGE UP (ref 0–2)
BUN SERPL-MCNC: 6 MG/DL — LOW (ref 7–23)
CALCIUM SERPL-MCNC: 8.1 MG/DL — LOW (ref 8.5–10.1)
CHLORIDE SERPL-SCNC: 100 MMOL/L — SIGNIFICANT CHANGE UP (ref 96–108)
CO2 SERPL-SCNC: 29 MMOL/L — SIGNIFICANT CHANGE UP (ref 22–31)
CREAT SERPL-MCNC: 0.56 MG/DL — SIGNIFICANT CHANGE UP (ref 0.5–1.3)
EOSINOPHIL # BLD AUTO: 0.1 K/UL — SIGNIFICANT CHANGE UP (ref 0–0.5)
EOSINOPHIL NFR BLD AUTO: 0.7 % — SIGNIFICANT CHANGE UP (ref 0–6)
GLUCOSE SERPL-MCNC: 156 MG/DL — HIGH (ref 70–99)
HCT VFR BLD CALC: 29.9 % — LOW (ref 34.5–45)
HGB BLD-MCNC: 10.1 G/DL — LOW (ref 11.5–15.5)
LYMPHOCYTES # BLD AUTO: 3.6 K/UL — HIGH (ref 1–3.3)
LYMPHOCYTES # BLD AUTO: 36 % — SIGNIFICANT CHANGE UP (ref 13–44)
MCHC RBC-ENTMCNC: 30.5 PG — SIGNIFICANT CHANGE UP (ref 27–34)
MCHC RBC-ENTMCNC: 33.9 GM/DL — SIGNIFICANT CHANGE UP (ref 32–36)
MCV RBC AUTO: 90.1 FL — SIGNIFICANT CHANGE UP (ref 80–100)
MONOCYTES # BLD AUTO: 1.1 K/UL — HIGH (ref 0–0.9)
MONOCYTES NFR BLD AUTO: 10.5 % — SIGNIFICANT CHANGE UP (ref 2–14)
NEUTROPHILS # BLD AUTO: 5.2 K/UL — SIGNIFICANT CHANGE UP (ref 1.8–7.4)
NEUTROPHILS NFR BLD AUTO: 51.9 % — SIGNIFICANT CHANGE UP (ref 43–77)
PLATELET # BLD AUTO: 270 K/UL — SIGNIFICANT CHANGE UP (ref 150–400)
POTASSIUM SERPL-MCNC: 3.9 MMOL/L — SIGNIFICANT CHANGE UP (ref 3.5–5.3)
POTASSIUM SERPL-SCNC: 3.9 MMOL/L — SIGNIFICANT CHANGE UP (ref 3.5–5.3)
RBC # BLD: 3.32 M/UL — LOW (ref 3.8–5.2)
RBC # FLD: 13.9 % — SIGNIFICANT CHANGE UP (ref 10.3–14.5)
SODIUM SERPL-SCNC: 135 MMOL/L — SIGNIFICANT CHANGE UP (ref 135–145)
WBC # BLD: 10 K/UL — SIGNIFICANT CHANGE UP (ref 3.8–10.5)
WBC # FLD AUTO: 10 K/UL — SIGNIFICANT CHANGE UP (ref 3.8–10.5)

## 2017-08-04 PROCEDURE — 93970 EXTREMITY STUDY: CPT | Mod: 26

## 2017-08-04 RX ORDER — ACETAMINOPHEN 500 MG
650 TABLET ORAL EVERY 6 HOURS
Qty: 0 | Refills: 0 | Status: DISCONTINUED | OUTPATIENT
Start: 2017-08-04 | End: 2017-08-07

## 2017-08-04 RX ORDER — OXYCODONE HYDROCHLORIDE 5 MG/1
5 TABLET ORAL EVERY 4 HOURS
Qty: 0 | Refills: 0 | Status: DISCONTINUED | OUTPATIENT
Start: 2017-08-04 | End: 2017-08-07

## 2017-08-04 RX ORDER — OXYCODONE HYDROCHLORIDE 5 MG/1
10 TABLET ORAL EVERY 6 HOURS
Qty: 0 | Refills: 0 | Status: DISCONTINUED | OUTPATIENT
Start: 2017-08-04 | End: 2017-08-07

## 2017-08-04 RX ORDER — HYDROMORPHONE HYDROCHLORIDE 2 MG/ML
2 INJECTION INTRAMUSCULAR; INTRAVENOUS; SUBCUTANEOUS EVERY 4 HOURS
Qty: 0 | Refills: 0 | Status: DISCONTINUED | OUTPATIENT
Start: 2017-08-04 | End: 2017-08-07

## 2017-08-04 RX ADMIN — Medication 1: at 14:26

## 2017-08-04 RX ADMIN — ONDANSETRON 4 MILLIGRAM(S): 8 TABLET, FILM COATED ORAL at 21:53

## 2017-08-04 RX ADMIN — SODIUM CHLORIDE 125 MILLILITER(S): 9 INJECTION, SOLUTION INTRAVENOUS at 22:38

## 2017-08-04 RX ADMIN — SODIUM CHLORIDE 125 MILLILITER(S): 9 INJECTION, SOLUTION INTRAVENOUS at 03:01

## 2017-08-04 RX ADMIN — Medication 112 MICROGRAM(S): at 06:23

## 2017-08-04 RX ADMIN — Medication 650 MILLIGRAM(S): at 07:12

## 2017-08-04 RX ADMIN — HYDROMORPHONE HYDROCHLORIDE 2 MILLIGRAM(S): 2 INJECTION INTRAMUSCULAR; INTRAVENOUS; SUBCUTANEOUS at 22:59

## 2017-08-04 RX ADMIN — Medication: at 07:45

## 2017-08-04 RX ADMIN — PANTOPRAZOLE SODIUM 40 MILLIGRAM(S): 20 TABLET, DELAYED RELEASE ORAL at 06:22

## 2017-08-04 RX ADMIN — HYDROMORPHONE HYDROCHLORIDE 2 MILLIGRAM(S): 2 INJECTION INTRAMUSCULAR; INTRAVENOUS; SUBCUTANEOUS at 23:30

## 2017-08-04 RX ADMIN — TIOTROPIUM BROMIDE 1 CAPSULE(S): 18 CAPSULE ORAL; RESPIRATORY (INHALATION) at 09:22

## 2017-08-04 RX ADMIN — Medication 100 MILLIGRAM(S): at 06:22

## 2017-08-04 RX ADMIN — SODIUM CHLORIDE 125 MILLILITER(S): 9 INJECTION, SOLUTION INTRAVENOUS at 13:09

## 2017-08-04 RX ADMIN — Medication 100 MILLIGRAM(S): at 18:02

## 2017-08-04 NOTE — PROGRESS NOTE ADULT - PROBLEM SELECTOR PLAN 5
STABLE NO ACUTE BRONCHOSPASM  WILL MONITOR AND ADD NEBS IF NEEDED.
STABLE NO ACUTE BRONCHOSPASM  WILL MONITOR AND ADD NEBS IF NEEDED.

## 2017-08-04 NOTE — PROGRESS NOTE ADULT - PROBLEM SELECTOR PLAN 2
LIKELY PAIN MEDICATION RELATED VERSUS POSSIBLE SLEEP APNEA  SATS STABLE OVERNIGHT  CONT TO MONITOR  MAY BE RELATED  CONT NC FOR NOW

## 2017-08-04 NOTE — PROGRESS NOTE ADULT - SUBJECTIVE AND OBJECTIVE BOX
POD#2. Pt seen resting in bed. Pt states she feels "slightly" better in comparison to yesterday. She has incisional site pain and pain of the RLE (unchanged). [place] in place. PCA helpful with management of pain.     PE  Gen appearance: NAD  Motor strength: 5/5 of the LLE. 5-5 of the right quad otherwise 5/5 of b/l EHL, ant tib, gastroc.  Sensation: intact  Bruising noted on the RLE.  Incisional site with mild serous drainage on dressing drain:465cc  Mild calf tenderness bilaterally    Plan  Tmax:102.4, HR:104  WBC:10, H/H: 10.1/29.9  Mobilize with physical therapy and encouraged incentive spirometer. POD#2. Pt seen resting in bed. Pt states she feels "slightly" better in comparison to yesterday. She has incisional site pain and pain of the RLE (unchanged). Mendoza in place. PCA helpful with management of pain.     PE  Gen appearance: NAD  Motor strength: 5/5 of the LLE. 5-5 of the right quad otherwise 5/5 of b/l EHL, ant tib, gastroc.  Sensation: intact  Bruising noted on the RLE.  Incisional site with mild serous drainage on dressing drain:465cc  Mild calf tenderness bilaterally    Plan  Tmax over 24 hours:102.4---continue to monitor  HR:104--manage per Medicine  WBC:10, H/H: 10.1/29.9  Mobilize with physical therapy and encouraged incentive spirometer.   DC PCA and mendoza. Transition to oral and SubQ meds.   B/l dopplers of b/l lower extremities.   Episode of SPO2: 88. Evaluate per Medicine.   Discussed with Dr. Christianson.

## 2017-08-04 NOTE — PROGRESS NOTE ADULT - PROBLEM SELECTOR PLAN 3
HOLD ORAL AGENTS AND PLACE ON LOW DOSE ISS  WILL MONITOR BGMS.  HYPERGLYCEMIC EARLIER BUT BETTER NOW - CONT ISS
HOLD ORAL AGENTS AND PLACE ON LOW DOSE ISS  WILL MONITOR BGMS.  HYPERGLYCEMIC BUT NOT FEDE FULL PO YET - JAYAA GEORGE ANGUIANO, WILL NOT INCREASE SCALE JUST YET TO AVOID HYPOGLYCEMIA

## 2017-08-04 NOTE — PROGRESS NOTE ADULT - SUBJECTIVE AND OBJECTIVE BOX
HPI: 73 y/o female with COPD, Diabetes, GERD, hypothyroidism s/p lumbar spine surgery for lumbar stenosis. Medicine consult requested for post op medical management.  Of note, pt had a slip and fall in her kitchen a few days ago -- is bruised on her right LE.    8/2/17: out of surgery in PACU; having issues with desaturations earlier but now better; seems to desat once she dozes off to sleep. No cp,sob, n/v/f/c; reports some back pain  8/3/17: Did well overnight, no desaturations, slept well, pain is controlled with PCA; no cp, sob, n/v/f/c; had nausea las tnight now resolved  8/4/17: Fever early this am -- went down with tylenol; no cp, sob, denies any cough; mendoza still in; not doing IS as much; ambulated yest and pain is controlled with PCA      REVIEW OF SYSTEMS:   All 10 systems reviewed in detailed and found to be negative with the exception of what has already been described above      PHYSICAL EXAM:    Vital Signs Last 24 Hrs  T(C): 38.1 (04 Aug 2017 05:46), Max: 39.1 (04 Aug 2017 05:23)  T(F): 100.5 (04 Aug 2017 05:46), Max: 102.4 (04 Aug 2017 05:23)  HR: 104 (04 Aug 2017 06:00) (76 - 107)  BP: 119/53 (04 Aug 2017 06:00) (92/45 - 122/52)  BP(mean): 68 (04 Aug 2017 06:00) (53 - 92)  RR: 18 (04 Aug 2017 06:00) (11 - 21)  SpO2: 94% (04 Aug 2017 06:00) (88% - 99%)    GEN: A and O, NAD, mood stable  HEENT:  NC/AT, EOMI    NECK:   supple    CV:  +S1, +S2, regular, no murmurs or rubs    RESP:   lungs clear to auscultation bilaterally, no wheezing, rales, rhonchi, good air entry bilaterally, DECREASED BS ANA, NO WHEEZES    GI:  abdomen soft, non-tender, non-distended, normal BS, no abdominal masses, no palpable masses    BACK: DRESSING C/D/I WITH HEMOVAC IN PLACE  RLE POSTERIOR REGION WITH ECCHYMOSIS    RECTAL:  not examined    :  POS MENDOZA   MSK:   normal muscle tone, no atrophy, no rigidity, no contractions    EXT:   no clubbing, no cyanosis, no edema, no calf pain, swelling or erythema, MOVING ALL EXTREMITIES    VASCULAR:  pulses equal and symmetric in the upper and lower extremities    NEURO:  AAOX3, no focal neurological deficits, follows all commands, able to move extremities spontaneously    SKIN:  no ulcers, lesions or rashes    LABS/IMAGING:                              10.1   10.0  )-----------( 270      ( 04 Aug 2017 05:48 )             29.9     08-04    135  |  100  |  6<L>  ----------------------------<  156<H>  3.9   |  29  |  0.56    Ca    8.1<L>      04 Aug 2017 05:48        MEDICATIONS  (STANDING):  HYDROmorphone PCA (1 mG/mL) 30 milliLiter(s) PCA Continuous PCA Continuous  tiotropium 18 MICROgram(s) Capsule 1 Capsule(s) Inhalation daily  pantoprazole    Tablet 40 milliGRAM(s) Oral before breakfast  levothyroxine 112 MICROGram(s) Oral daily  lactated ringers. 1000 milliLiter(s) (125 mL/Hr) IV Continuous <Continuous>  docusate sodium 100 milliGRAM(s) Oral three times a day  senna 2 Tablet(s) Oral at bedtime  insulin lispro (HumaLOG) corrective regimen sliding scale   SubCutaneous three times a day before meals  insulin lispro (HumaLOG) corrective regimen sliding scale   SubCutaneous at bedtime  dextrose 5%. 1000 milliLiter(s) (50 mL/Hr) IV Continuous <Continuous>  dextrose 50% Injectable 12.5 Gram(s) IV Push once  dextrose 50% Injectable 25 Gram(s) IV Push once  dextrose 50% Injectable 25 Gram(s) IV Push once    MEDICATIONS  (PRN):  HYDROmorphone PCA (1 mG/mL) Rescue Clinician Bolus 0.5 milliGRAM(s) IV Push every 15 minutes PRN for Pain Scale GREATER THAN 6  naloxone Injectable 0.1 milliGRAM(s) IV Push every 3 minutes PRN For ANY of the following changes in patient status:  A. RR LESS THAN 10 breaths per minute, B. Oxygen saturation LESS THAN 90%, C. Sedation score of 6  ondansetron Injectable 4 milliGRAM(s) IV Push every 6 hours PRN Nausea  aluminum hydroxide/magnesium hydroxide/simethicone Suspension 30 milliLiter(s) Oral every 12 hours PRN Indigestion  ondansetron Injectable 4 milliGRAM(s) IV Push every 6 hours PRN Nausea  magnesium hydroxide Suspension 30 milliLiter(s) Oral every 12 hours PRN Constipation  dextrose Gel 1 Dose(s) Oral once PRN Blood Glucose LESS THAN 70 milliGRAM(s)/deciliter  glucagon  Injectable 1 milliGRAM(s) IntraMuscular once PRN Glucose LESS THAN 70 milligrams/deciliter  acetaminophen   Tablet 650 milliGRAM(s) Oral every 6 hours PRN For Temp greater than 38.5 C (101.3 F)

## 2017-08-04 NOTE — PROGRESS NOTE ADULT - PROBLEM SELECTOR PLAN 1
POD# 2  DOING WELL  PAIN CONTROL WITH PCA  MONITOR CLOSELY IN STEPDOWN  MONITOR HEMOVAC OUTPUT  CARRIE AVILES  PHYSICAL THERAPY  INCENTIVE SPIROMETRY  DVT PROPHY - VENODYNES ONLY IN THE SETTING OF RECENT SPINE SURGERY AND DRAIN IN PLACE.
POD# 1  DOING WELL  PAIN CONTROL WITH PCA  MONITOR CLOSELY IN STEPDOWN  MONITOR HEMOVAC OUTPUT  CARRIE GUADARRAMA WHEN OK WITH SPINE  PHYSICAL THERAPY  INCENTIVE SPIROMETRY  DVT PROPHY - VENODYNES ONLY IN THE SETTING OF RECENT SPINE SURGERY AND DRAIN IN PLACE.

## 2017-08-04 NOTE — CDI QUERY NOTE - NSCDIOTHERTXTBX_GEN_ALL_CORE_HH
Patient presents with Hgb 12.8, and decreases to 10.1.  As per your documentation "anemia due to blood loss."  Please specify the acuity of anemia due to blood loss in your notes:  A. acute blood loss anemia  B. acute on chronic blood loss anemia  C. other

## 2017-08-05 LAB
ANION GAP SERPL CALC-SCNC: 7 MMOL/L — SIGNIFICANT CHANGE UP (ref 5–17)
APPEARANCE UR: (no result)
BACTERIA # UR AUTO: (no result)
BASOPHILS # BLD AUTO: 0.1 K/UL — SIGNIFICANT CHANGE UP (ref 0–0.2)
BASOPHILS NFR BLD AUTO: 0.9 % — SIGNIFICANT CHANGE UP (ref 0–2)
BILIRUB UR-MCNC: NEGATIVE — SIGNIFICANT CHANGE UP
BUN SERPL-MCNC: 4 MG/DL — LOW (ref 7–23)
CALCIUM SERPL-MCNC: 8 MG/DL — LOW (ref 8.5–10.1)
CHLORIDE SERPL-SCNC: 102 MMOL/L — SIGNIFICANT CHANGE UP (ref 96–108)
CO2 SERPL-SCNC: 27 MMOL/L — SIGNIFICANT CHANGE UP (ref 22–31)
COLOR SPEC: YELLOW — SIGNIFICANT CHANGE UP
CREAT SERPL-MCNC: 0.29 MG/DL — LOW (ref 0.5–1.3)
DIFF PNL FLD: (no result)
EOSINOPHIL # BLD AUTO: 0.1 K/UL — SIGNIFICANT CHANGE UP (ref 0–0.5)
EOSINOPHIL NFR BLD AUTO: 0.7 % — SIGNIFICANT CHANGE UP (ref 0–6)
EPI CELLS # UR: SIGNIFICANT CHANGE UP
GLUCOSE SERPL-MCNC: 149 MG/DL — HIGH (ref 70–99)
GLUCOSE UR QL: NEGATIVE MG/DL — SIGNIFICANT CHANGE UP
HCT VFR BLD CALC: 27.2 % — LOW (ref 34.5–45)
HGB BLD-MCNC: 9.5 G/DL — LOW (ref 11.5–15.5)
KETONES UR-MCNC: NEGATIVE — SIGNIFICANT CHANGE UP
LEUKOCYTE ESTERASE UR-ACNC: (no result)
LYMPHOCYTES # BLD AUTO: 2.8 K/UL — SIGNIFICANT CHANGE UP (ref 1–3.3)
LYMPHOCYTES # BLD AUTO: 33.3 % — SIGNIFICANT CHANGE UP (ref 13–44)
MCHC RBC-ENTMCNC: 30.8 PG — SIGNIFICANT CHANGE UP (ref 27–34)
MCHC RBC-ENTMCNC: 34.8 GM/DL — SIGNIFICANT CHANGE UP (ref 32–36)
MCV RBC AUTO: 88.4 FL — SIGNIFICANT CHANGE UP (ref 80–100)
MONOCYTES # BLD AUTO: 0.8 K/UL — SIGNIFICANT CHANGE UP (ref 0–0.9)
MONOCYTES NFR BLD AUTO: 9.8 % — SIGNIFICANT CHANGE UP (ref 2–14)
NEUTROPHILS # BLD AUTO: 4.6 K/UL — SIGNIFICANT CHANGE UP (ref 1.8–7.4)
NEUTROPHILS NFR BLD AUTO: 55.2 % — SIGNIFICANT CHANGE UP (ref 43–77)
NITRITE UR-MCNC: POSITIVE
PH UR: 6.5 — SIGNIFICANT CHANGE UP (ref 5–8)
PLATELET # BLD AUTO: 273 K/UL — SIGNIFICANT CHANGE UP (ref 150–400)
POTASSIUM SERPL-MCNC: 3.4 MMOL/L — LOW (ref 3.5–5.3)
POTASSIUM SERPL-SCNC: 3.4 MMOL/L — LOW (ref 3.5–5.3)
PROT UR-MCNC: 30 MG/DL
RBC # BLD: 3.08 M/UL — LOW (ref 3.8–5.2)
RBC # FLD: 13.1 % — SIGNIFICANT CHANGE UP (ref 10.3–14.5)
RBC CASTS # UR COMP ASSIST: (no result) /HPF (ref 0–4)
SODIUM SERPL-SCNC: 136 MMOL/L — SIGNIFICANT CHANGE UP (ref 135–145)
SP GR SPEC: 1 — LOW (ref 1.01–1.02)
UROBILINOGEN FLD QL: NEGATIVE MG/DL — SIGNIFICANT CHANGE UP
WBC # BLD: 8.4 K/UL — SIGNIFICANT CHANGE UP (ref 3.8–10.5)
WBC # FLD AUTO: 8.4 K/UL — SIGNIFICANT CHANGE UP (ref 3.8–10.5)
WBC UR QL: >50

## 2017-08-05 PROCEDURE — 71275 CT ANGIOGRAPHY CHEST: CPT | Mod: 26

## 2017-08-05 RX ORDER — PROCHLORPERAZINE MALEATE 5 MG
10 TABLET ORAL EVERY 8 HOURS
Qty: 0 | Refills: 0 | Status: DISCONTINUED | OUTPATIENT
Start: 2017-08-05 | End: 2017-08-07

## 2017-08-05 RX ORDER — POTASSIUM CHLORIDE 20 MEQ
40 PACKET (EA) ORAL ONCE
Qty: 0 | Refills: 0 | Status: COMPLETED | OUTPATIENT
Start: 2017-08-05 | End: 2017-08-05

## 2017-08-05 RX ADMIN — HYDROMORPHONE HYDROCHLORIDE 2 MILLIGRAM(S): 2 INJECTION INTRAMUSCULAR; INTRAVENOUS; SUBCUTANEOUS at 21:00

## 2017-08-05 RX ADMIN — Medication 40 MILLIEQUIVALENT(S): at 15:10

## 2017-08-05 RX ADMIN — Medication 112 MICROGRAM(S): at 06:02

## 2017-08-05 RX ADMIN — HYDROMORPHONE HYDROCHLORIDE 2 MILLIGRAM(S): 2 INJECTION INTRAMUSCULAR; INTRAVENOUS; SUBCUTANEOUS at 10:45

## 2017-08-05 RX ADMIN — Medication 10 MILLIGRAM(S): at 20:06

## 2017-08-05 RX ADMIN — OXYCODONE HYDROCHLORIDE 10 MILLIGRAM(S): 5 TABLET ORAL at 16:00

## 2017-08-05 RX ADMIN — OXYCODONE HYDROCHLORIDE 10 MILLIGRAM(S): 5 TABLET ORAL at 15:11

## 2017-08-05 RX ADMIN — Medication 100 MILLIGRAM(S): at 06:01

## 2017-08-05 RX ADMIN — HYDROMORPHONE HYDROCHLORIDE 2 MILLIGRAM(S): 2 INJECTION INTRAMUSCULAR; INTRAVENOUS; SUBCUTANEOUS at 09:50

## 2017-08-05 RX ADMIN — Medication 1: at 14:33

## 2017-08-05 RX ADMIN — OXYCODONE HYDROCHLORIDE 5 MILLIGRAM(S): 5 TABLET ORAL at 09:30

## 2017-08-05 RX ADMIN — OXYCODONE HYDROCHLORIDE 5 MILLIGRAM(S): 5 TABLET ORAL at 05:00

## 2017-08-05 RX ADMIN — Medication 100 MILLIGRAM(S): at 15:10

## 2017-08-05 RX ADMIN — ONDANSETRON 4 MILLIGRAM(S): 8 TABLET, FILM COATED ORAL at 18:04

## 2017-08-05 RX ADMIN — TIOTROPIUM BROMIDE 1 CAPSULE(S): 18 CAPSULE ORAL; RESPIRATORY (INHALATION) at 15:05

## 2017-08-05 RX ADMIN — SENNA PLUS 2 TABLET(S): 8.6 TABLET ORAL at 22:10

## 2017-08-05 RX ADMIN — Medication 1: at 18:01

## 2017-08-05 RX ADMIN — SODIUM CHLORIDE 125 MILLILITER(S): 9 INJECTION, SOLUTION INTRAVENOUS at 05:33

## 2017-08-05 RX ADMIN — OXYCODONE HYDROCHLORIDE 5 MILLIGRAM(S): 5 TABLET ORAL at 04:13

## 2017-08-05 RX ADMIN — Medication 100 MILLIGRAM(S): at 22:10

## 2017-08-05 RX ADMIN — OXYCODONE HYDROCHLORIDE 5 MILLIGRAM(S): 5 TABLET ORAL at 07:59

## 2017-08-05 RX ADMIN — HYDROMORPHONE HYDROCHLORIDE 2 MILLIGRAM(S): 2 INJECTION INTRAMUSCULAR; INTRAVENOUS; SUBCUTANEOUS at 20:32

## 2017-08-05 RX ADMIN — PANTOPRAZOLE SODIUM 40 MILLIGRAM(S): 20 TABLET, DELAYED RELEASE ORAL at 06:01

## 2017-08-05 NOTE — PROGRESS NOTE ADULT - SUBJECTIVE AND OBJECTIVE BOX
Pt seen and examined, chart reviewed.  Pt still having intermittent fevers and transient hypoxia.  No CP or SOB, c/o LBP from surgery.    Vital Signs Last 24 Hrs  T(C): 36.3 (05 Aug 2017 08:52), Max: 38.6 (04 Aug 2017 19:17)  T(F): 97.4 (05 Aug 2017 08:52), Max: 101.4 (04 Aug 2017 19:17)  HR: 89 (05 Aug 2017 09:00) (79 - 107)  BP: 111/51 (05 Aug 2017 09:00) (104/55 - 137/65)  BP(mean): 65 (05 Aug 2017 09:00) (55 - 91)  RR: 19 (05 Aug 2017 09:00) (12 - 22)  SpO2: 97% (05 Aug 2017 09:00) (81% - 98%)    Daily     Daily     I&O's Detail    04 Aug 2017 07:01  -  05 Aug 2017 07:00  --------------------------------------------------------  IN:    lactated ringers.: 1500 mL  Total IN: 1500 mL    OUT:    Accordian: 300 mL    Indwelling Catheter - Urethral: 700 mL    Intermittent Catheterization - Urethral: 1425 mL    Voided: 500 mL  Total OUT: 2925 mL    Total NET: -1425 mL      05 Aug 2017 07:01  -  05 Aug 2017 10:34  --------------------------------------------------------  IN:  Total IN: 0 mL    OUT:    Voided: 600 mL  Total OUT: 600 mL    Total NET: -600 mL          CAPILLARY BLOOD GLUCOSE  180 (04 Aug 2017 23:20)  139 (04 Aug 2017 17:54)                                          9.5    8.4   )-----------( 273      ( 05 Aug 2017 06:08 )             27.2       08-05    136  |  102  |  4<L>  ----------------------------<  149<H>  3.4<L>   |  27  |  0.29<L>    Ca    8.0<L>      05 Aug 2017 06:08                        MEDICATIONS  (STANDING):  tiotropium 18 MICROgram(s) Capsule 1 Capsule(s) Inhalation daily  pantoprazole    Tablet 40 milliGRAM(s) Oral before breakfast  levothyroxine 112 MICROGram(s) Oral daily  docusate sodium 100 milliGRAM(s) Oral three times a day  senna 2 Tablet(s) Oral at bedtime  insulin lispro (HumaLOG) corrective regimen sliding scale   SubCutaneous three times a day before meals  insulin lispro (HumaLOG) corrective regimen sliding scale   SubCutaneous at bedtime  dextrose 5%. 1000 milliLiter(s) (50 mL/Hr) IV Continuous <Continuous>  dextrose 50% Injectable 12.5 Gram(s) IV Push once  dextrose 50% Injectable 25 Gram(s) IV Push once  dextrose 50% Injectable 25 Gram(s) IV Push once    MEDICATIONS  (PRN):  naloxone Injectable 0.1 milliGRAM(s) IV Push every 3 minutes PRN For ANY of the following changes in patient status:  A. RR LESS THAN 10 breaths per minute, B. Oxygen saturation LESS THAN 90%, C. Sedation score of 6  ondansetron Injectable 4 milliGRAM(s) IV Push every 6 hours PRN Nausea  aluminum hydroxide/magnesium hydroxide/simethicone Suspension 30 milliLiter(s) Oral every 12 hours PRN Indigestion  ondansetron Injectable 4 milliGRAM(s) IV Push every 6 hours PRN Nausea  magnesium hydroxide Suspension 30 milliLiter(s) Oral every 12 hours PRN Constipation  dextrose Gel 1 Dose(s) Oral once PRN Blood Glucose LESS THAN 70 milliGRAM(s)/deciliter  glucagon  Injectable 1 milliGRAM(s) IntraMuscular once PRN Glucose LESS THAN 70 milligrams/deciliter  acetaminophen   Tablet 650 milliGRAM(s) Oral every 6 hours PRN For Temp greater than 38.5 C (101.3 F)  oxyCODONE    IR 5 milliGRAM(s) Oral every 4 hours PRN Mild Pain (1 - 3)  oxyCODONE    IR 10 milliGRAM(s) Oral every 6 hours PRN Moderate Pain (4 - 6)  HYDROmorphone  Injectable 2 milliGRAM(s) SubCutaneous every 4 hours PRN Breakthrough pain

## 2017-08-05 NOTE — PROGRESS NOTE ADULT - SUBJECTIVE AND OBJECTIVE BOX
[facility]                                                           Orthopedic Spine Progress Note      POST OPERATIVE DAY #: 3  STATUS POST: s/p L2-S1 laminectomy/fusion    Pre-Op Dx: Lumbar stenosis with neurogenic claudication    Post-Op Dx:  Lumbar stenosis with neurogenic claudication    SUBJECTIVE: Patient seen and examined, awake/alert, was OOB in chair and took few steps around room w/PT, Dopplers neg b/l, still with intermittent fevers (Tmax 101.4) and transient hypoxia - seen By Dr. Fountain earlier who ordered CTA, blood cultures, was straight-cath'd yesterday, voided minimally today and is straight-cath'd again now, K+ 3.4 - replaced    Current Pain Management:  [ ] PCA   [x] Po Analgesics [x] IM /IV Anagesics     Vital Signs Last 24 Hrs  T(C): 36.3 (05 Aug 2017 08:52), Max: 38.6 (04 Aug 2017 19:17)  T(F): 97.4 (05 Aug 2017 08:52), Max: 101.4 (04 Aug 2017 19:17)  HR: 88 (05 Aug 2017 12:00) (79 - 107)  BP: 119/65 (05 Aug 2017 12:00) (94/41 - 137/65)  BP(mean): 75 (05 Aug 2017 12:00) (54 - 91)  RR: 15 (05 Aug 2017 12:00) (11 - 21)  SpO2: 94% (05 Aug 2017 12:00) (90% - 98%)  I&O's Detail    04 Aug 2017 07:01  -  05 Aug 2017 07:00  --------------------------------------------------------  IN:    lactated ringers.: 1500 mL  Total IN: 1500 mL    OUT:    Accordian: 300 mL    Indwelling Catheter - Urethral: 700 mL    Intermittent Catheterization - Urethral: 1425 mL    Voided: 500 mL  Total OUT: 2925 mL    Total NET: -1425 mL      05 Aug 2017 07:01  -  05 Aug 2017 13:30  --------------------------------------------------------  IN:  Total IN: 0 mL    OUT:    Accordian: 5 mL    Voided: 650 mL  Total OUT: 655 mL    Total NET: -655 mL          OBJECTIVE:       Wound /Dressing: wound clean, dry, intact - dressing changed, drain 40cc - removed  Cervical ROM: normal  Lumbar: ROM: not tested  Neurological: A/O x 3              Sensation: [x] intact to light touch  [ ] decreased:          Motor exam: [x]                 [x] Lower ext.     Hip Flx  Hip Ext   Hip Abd  Hip Add   Quad   Hamstrg   TA       EHL      GS                                 R        5/5        5/5          5/5         5/5        5-/5        5/5        5/5       5/5      5/5                                 L         5/5        5/5          5/5         5/5        5/5        5/5        5/5       5/5      5/5                                                        [x] Vascular: normal           Tension Signs: none          Long Tract Findings: none    RADIOLOGY & ADDITIONAL STUDIES:                                               LABS:                        9.5    8.4   )-----------( 273      ( 05 Aug 2017 06:08 )             27.2     08-05    136  |  102  |  4<L>  ----------------------------<  149<H>  3.4<L>   |  27  |  0.29<L>    Ca    8.0<L>      05 Aug 2017 06:08          Blood Culture: pending  Wound Culture: n/a

## 2017-08-06 RX ADMIN — OXYCODONE HYDROCHLORIDE 10 MILLIGRAM(S): 5 TABLET ORAL at 02:23

## 2017-08-06 RX ADMIN — TIOTROPIUM BROMIDE 1 CAPSULE(S): 18 CAPSULE ORAL; RESPIRATORY (INHALATION) at 08:09

## 2017-08-06 RX ADMIN — OXYCODONE HYDROCHLORIDE 10 MILLIGRAM(S): 5 TABLET ORAL at 03:00

## 2017-08-06 RX ADMIN — PANTOPRAZOLE SODIUM 40 MILLIGRAM(S): 20 TABLET, DELAYED RELEASE ORAL at 06:18

## 2017-08-06 RX ADMIN — HYDROMORPHONE HYDROCHLORIDE 2 MILLIGRAM(S): 2 INJECTION INTRAMUSCULAR; INTRAVENOUS; SUBCUTANEOUS at 13:46

## 2017-08-06 RX ADMIN — HYDROMORPHONE HYDROCHLORIDE 2 MILLIGRAM(S): 2 INJECTION INTRAMUSCULAR; INTRAVENOUS; SUBCUTANEOUS at 07:00

## 2017-08-06 RX ADMIN — Medication 112 MICROGRAM(S): at 05:26

## 2017-08-06 RX ADMIN — HYDROMORPHONE HYDROCHLORIDE 2 MILLIGRAM(S): 2 INJECTION INTRAMUSCULAR; INTRAVENOUS; SUBCUTANEOUS at 17:35

## 2017-08-06 RX ADMIN — Medication 100 MILLIGRAM(S): at 05:27

## 2017-08-06 RX ADMIN — Medication 1: at 12:10

## 2017-08-06 RX ADMIN — Medication 100 MILLIGRAM(S): at 13:46

## 2017-08-06 RX ADMIN — Medication 100 MILLIGRAM(S): at 21:59

## 2017-08-06 RX ADMIN — HYDROMORPHONE HYDROCHLORIDE 2 MILLIGRAM(S): 2 INJECTION INTRAMUSCULAR; INTRAVENOUS; SUBCUTANEOUS at 21:59

## 2017-08-06 RX ADMIN — MAGNESIUM HYDROXIDE 30 MILLILITER(S): 400 TABLET, CHEWABLE ORAL at 10:50

## 2017-08-06 RX ADMIN — Medication 650 MILLIGRAM(S): at 16:48

## 2017-08-06 RX ADMIN — HYDROMORPHONE HYDROCHLORIDE 2 MILLIGRAM(S): 2 INJECTION INTRAMUSCULAR; INTRAVENOUS; SUBCUTANEOUS at 06:18

## 2017-08-06 RX ADMIN — SENNA PLUS 2 TABLET(S): 8.6 TABLET ORAL at 21:58

## 2017-08-06 RX ADMIN — HYDROMORPHONE HYDROCHLORIDE 2 MILLIGRAM(S): 2 INJECTION INTRAMUSCULAR; INTRAVENOUS; SUBCUTANEOUS at 12:09

## 2017-08-06 RX ADMIN — Medication 1: at 16:49

## 2017-08-06 NOTE — PROGRESS NOTE ADULT - SUBJECTIVE AND OBJECTIVE BOX
Pt looks and feels better, no further fevers.  No CP or SOB.    Vital Signs Last 24 Hrs  T(C): 36.9 (06 Aug 2017 06:09), Max: 37.9 (05 Aug 2017 19:29)  T(F): 98.4 (06 Aug 2017 06:09), Max: 100.2 (05 Aug 2017 19:29)  HR: 97 (06 Aug 2017 09:01) (72 - 100)  BP: 118/61 (06 Aug 2017 09:01) (94/55 - 136/55)  BP(mean): 74 (06 Aug 2017 09:) (55 - 82)  RR: 15 (06 Aug 2017 09:) (11 - 22)  SpO2: 96% (06 Aug 2017 09:) (85% - 98%)    Daily     Daily Weight in k (06 Aug 2017 06:09)    I&O's Detail    05 Aug 2017 07:01  -  06 Aug 2017 07:00  --------------------------------------------------------  IN:  Total IN: 0 mL    OUT:    Accordian: 5 mL    Intermittent Catheterization - Urethral: 300 mL    Voided: 2975 mL  Total OUT: 3280 mL    Total NET: -3280 mL          CAPILLARY BLOOD GLUCOSE  138 (06 Aug 2017 08:00)  158 (05 Aug 2017 22:23)  159 (05 Aug 2017 17:30)                                          9.5    8.4   )-----------( 273      ( 05 Aug 2017 06:08 )             27.2       -    136  |  102  |  4<L>  ----------------------------<  149<H>  3.4<L>   |  27  |  0.29<L>    Ca    8.0<L>      05 Aug 2017 06:08                Urinalysis Basic - ( 05 Aug 2017 13:30 )    Color: Yellow / Appearance: x / S.005 / pH: x  Gluc: x / Ketone: Negative  / Bili: Negative / Urobili: Negative mg/dL   Blood: x / Protein: 30 mg/dL / Nitrite: Positive   Leuk Esterase: Moderate / RBC: 11-25 /HPF / WBC >50   Sq Epi: x / Non Sq Epi: Few / Bacteria: Many            MEDICATIONS  (STANDING):  tiotropium 18 MICROgram(s) Capsule 1 Capsule(s) Inhalation daily  pantoprazole    Tablet 40 milliGRAM(s) Oral before breakfast  levothyroxine 112 MICROGram(s) Oral daily  docusate sodium 100 milliGRAM(s) Oral three times a day  senna 2 Tablet(s) Oral at bedtime  insulin lispro (HumaLOG) corrective regimen sliding scale   SubCutaneous three times a day before meals  insulin lispro (HumaLOG) corrective regimen sliding scale   SubCutaneous at bedtime  dextrose 5%. 1000 milliLiter(s) (50 mL/Hr) IV Continuous <Continuous>  dextrose 50% Injectable 12.5 Gram(s) IV Push once  dextrose 50% Injectable 25 Gram(s) IV Push once  dextrose 50% Injectable 25 Gram(s) IV Push once    MEDICATIONS  (PRN):  naloxone Injectable 0.1 milliGRAM(s) IV Push every 3 minutes PRN For ANY of the following changes in patient status:  A. RR LESS THAN 10 breaths per minute, B. Oxygen saturation LESS THAN 90%, C. Sedation score of 6  ondansetron Injectable 4 milliGRAM(s) IV Push every 6 hours PRN Nausea  aluminum hydroxide/magnesium hydroxide/simethicone Suspension 30 milliLiter(s) Oral every 12 hours PRN Indigestion  ondansetron Injectable 4 milliGRAM(s) IV Push every 6 hours PRN Nausea  magnesium hydroxide Suspension 30 milliLiter(s) Oral every 12 hours PRN Constipation  dextrose Gel 1 Dose(s) Oral once PRN Blood Glucose LESS THAN 70 milliGRAM(s)/deciliter  glucagon  Injectable 1 milliGRAM(s) IntraMuscular once PRN Glucose LESS THAN 70 milligrams/deciliter  acetaminophen   Tablet 650 milliGRAM(s) Oral every 6 hours PRN For Temp greater than 38.5 C (101.3 F)  oxyCODONE    IR 5 milliGRAM(s) Oral every 4 hours PRN Mild Pain (1 - 3)  oxyCODONE    IR 10 milliGRAM(s) Oral every 6 hours PRN Moderate Pain (4 - 6)  HYDROmorphone  Injectable 2 milliGRAM(s) SubCutaneous every 4 hours PRN Breakthrough pain  prochlorperazine   Injectable 10 milliGRAM(s) IV Push every 8 hours PRN nausea

## 2017-08-06 NOTE — PROGRESS NOTE ADULT - RS GEN PE MLT RESP DETAILS PC
diminished breath sounds, L/diminished breath sounds, R/breath sounds equal
diminished breath sounds, L/breath sounds equal/diminished breath sounds, R

## 2017-08-06 NOTE — PROGRESS NOTE ADULT - SUBJECTIVE AND OBJECTIVE BOX
[facility]                                                           Orthopedic Spine Progress Note      POST OPERATIVE DAY #: 4  STATUS POST: L2-S1 laminectomy/fusion    Pre-Op Dx: Lumbar stenosis with neurogenic claudication    Post-Op Dx:  Lumbar stenosis with neurogenic claudication    SUBJECTIVE: Patient seen and examined, awake/alert, OOB in chair, ambulated w/PT earlier, CTA was negative, feeling better today    Current Pain Management:  [ ] PCA   [x] Po Analgesics [x] IM /IV Anagesics     Vital Signs Last 24 Hrs  T(C): 36.7 (06 Aug 2017 08:13), Max: 37.9 (05 Aug 2017 19:29)  T(F): 98 (06 Aug 2017 08:13), Max: 100.2 (05 Aug 2017 19:29)  HR: 97 (06 Aug 2017 09:01) (72 - 100)  BP: 118/61 (06 Aug 2017 09:01) (94/55 - 136/55)  BP(mean): 74 (06 Aug 2017 09:01) (55 - 82)  RR: 15 (06 Aug 2017 09:01) (12 - 22)  SpO2: 96% (06 Aug 2017 09:01) (85% - 98%)  I&O's Detail    05 Aug 2017 07:01  -  06 Aug 2017 07:00  --------------------------------------------------------  IN:  Total IN: 0 mL    OUT:    Accordian: 5 mL    Intermittent Catheterization - Urethral: 300 mL    Voided: 2975 mL  Total OUT: 3280 mL    Total NET: -3280 mL          OBJECTIVE:       Wound /Dressing: mild to moderate drainage on dressing - changed  Cervical ROM: normal  Lumbar: ROM: not tested  Neurological: A/O x 3              Sensation: [x] intact to light touch  [ ] decreased:          Motor exam: [x]                 [x] Lower ext.     Hip Flx  Hip Ext   Hip Abd  Hip Add Quad   Hamstrg   TA       EHL      GS                                 R        5/5        5/5        5/5           5/5      5-/5       5/5        5/5       5/5       5/5                                 L         5/5        5/5        5/5           5/5      5/5        5/5        5/5       5/5       5/5                                                        [x] Vascular: intact           Tension Signs: none          Long Tract Findings: none     RADIOLOGY & ADDITIONAL STUDIES:                                               LABS:                        9.5    8.4   )-----------( 273      ( 05 Aug 2017 06:08 )             27.2     08-    136  |  102  |  4<L>  ----------------------------<  149<H>  3.4<L>   |  27  |  0.29<L>    Ca    8.0<L>      05 Aug 2017 06:08        Urinalysis Basic - ( 05 Aug 2017 13:30 )    Color: Yellow / Appearance: x / S.005 / pH: x  Gluc: x / Ketone: Negative  / Bili: Negative / Urobili: Negative mg/dL   Blood: x / Protein: 30 mg/dL / Nitrite: Positive   Leuk Esterase: Moderate / RBC: 11-25 /HPF / WBC >50   Sq Epi: x / Non Sq Epi: Few / Bacteria: Many      Blood Culture: NGTD  Wound Culture: n/a [facility]                                                           Orthopedic Spine Progress Note      POST OPERATIVE DAY #: 4  STATUS POST: L2-S1 laminectomy/fusion    Pre-Op Dx: Lumbar stenosis with neurogenic claudication    Post-Op Dx:  Lumbar stenosis with neurogenic claudication    SUBJECTIVE: Patient seen and examined, awake/alert, OOB in chair, ambulated w/PT earlier, CTA was negative, feeling better today, voiding (no longer needing straight cath), +flatus, no BM, +leuk esterase on UTI but WBC wnl, labs already ordered for tomorrow am    Current Pain Management:  [ ] PCA   [x] Po Analgesics [x] IM /IV Anagesics     Vital Signs Last 24 Hrs  T(C): 36.7 (06 Aug 2017 08:13), Max: 37.9 (05 Aug 2017 19:29)  T(F): 98 (06 Aug 2017 08:13), Max: 100.2 (05 Aug 2017 19:29)  HR: 97 (06 Aug 2017 09:01) (72 - 100)  BP: 118/61 (06 Aug 2017 09:01) (94/55 - 136/55)  BP(mean): 74 (06 Aug 2017 09:01) (55 - 82)  RR: 15 (06 Aug 2017 09:01) (12 - 22)  SpO2: 96% (06 Aug 2017 09:01) (85% - 98%)  I&O's Detail    05 Aug 2017 07:01  -  06 Aug 2017 07:00  --------------------------------------------------------  IN:  Total IN: 0 mL    OUT:    Accordian: 5 mL    Intermittent Catheterization - Urethral: 300 mL    Voided: 2975 mL  Total OUT: 3280 mL    Total NET: -3280 mL          OBJECTIVE:       Wound /Dressing: mild to moderate drainage on dressing - changed  Cervical ROM: normal  Lumbar: ROM: not tested  Neurological: A/O x 3              Sensation: [x] intact to light touch  [ ] decreased:          Motor exam: [x]                 [x] Lower ext.     Hip Flx  Hip Ext   Hip Abd  Hip Add Quad   Hamstrg   TA       EHL      GS                                 R        5/5        5/5        5/5           5/5      5-/5       5/5        5/5       5/5       5/5                                 L         5/5        5/5        5/5           5/5      5/5        5/5        5/5       5/5       5/5                                                        [x] Vascular: intact           Tension Signs: none          Long Tract Findings: none     RADIOLOGY & ADDITIONAL STUDIES:                                               LABS:                        9.5    8.4   )-----------( 273      ( 05 Aug 2017 06:08 )             27.2     08-05    136  |  102  |  4<L>  ----------------------------<  149<H>  3.4<L>   |  27  |  0.29<L>    Ca    8.0<L>      05 Aug 2017 06:08        Urinalysis Basic - ( 05 Aug 2017 13:30 )    Color: Yellow / Appearance: x / S.005 / pH: x  Gluc: x / Ketone: Negative  / Bili: Negative / Urobili: Negative mg/dL   Blood: x / Protein: 30 mg/dL / Nitrite: Positive   Leuk Esterase: Moderate / RBC: 11-25 /HPF / WBC >50   Sq Epi: x / Non Sq Epi: Few / Bacteria: Many      Blood Culture: NGTD  Wound Culture: n/a

## 2017-08-07 ENCOUNTER — TRANSCRIPTION ENCOUNTER (OUTPATIENT)
Age: 72
End: 2017-08-07

## 2017-08-07 VITALS — TEMPERATURE: 99 F

## 2017-08-07 LAB
ANION GAP SERPL CALC-SCNC: 5 MMOL/L — SIGNIFICANT CHANGE UP (ref 5–17)
BUN SERPL-MCNC: 6 MG/DL — LOW (ref 7–23)
CALCIUM SERPL-MCNC: 8.2 MG/DL — LOW (ref 8.5–10.1)
CHLORIDE SERPL-SCNC: 102 MMOL/L — SIGNIFICANT CHANGE UP (ref 96–108)
CO2 SERPL-SCNC: 31 MMOL/L — SIGNIFICANT CHANGE UP (ref 22–31)
CREAT SERPL-MCNC: 0.33 MG/DL — LOW (ref 0.5–1.3)
GLUCOSE SERPL-MCNC: 133 MG/DL — HIGH (ref 70–99)
HCT VFR BLD CALC: 26.7 % — LOW (ref 34.5–45)
HGB BLD-MCNC: 9.1 G/DL — LOW (ref 11.5–15.5)
MCHC RBC-ENTMCNC: 30.3 PG — SIGNIFICANT CHANGE UP (ref 27–34)
MCHC RBC-ENTMCNC: 34 GM/DL — SIGNIFICANT CHANGE UP (ref 32–36)
MCV RBC AUTO: 89.1 FL — SIGNIFICANT CHANGE UP (ref 80–100)
PLATELET # BLD AUTO: 346 K/UL — SIGNIFICANT CHANGE UP (ref 150–400)
POTASSIUM SERPL-MCNC: 3.9 MMOL/L — SIGNIFICANT CHANGE UP (ref 3.5–5.3)
POTASSIUM SERPL-SCNC: 3.9 MMOL/L — SIGNIFICANT CHANGE UP (ref 3.5–5.3)
RBC # BLD: 3 M/UL — LOW (ref 3.8–5.2)
RBC # FLD: 13.6 % — SIGNIFICANT CHANGE UP (ref 10.3–14.5)
SODIUM SERPL-SCNC: 138 MMOL/L — SIGNIFICANT CHANGE UP (ref 135–145)
WBC # BLD: 7.1 K/UL — SIGNIFICANT CHANGE UP (ref 3.8–10.5)
WBC # FLD AUTO: 7.1 K/UL — SIGNIFICANT CHANGE UP (ref 3.8–10.5)

## 2017-08-07 RX ORDER — ACETAMINOPHEN 500 MG
2 TABLET ORAL
Qty: 0 | Refills: 0 | COMMUNITY
Start: 2017-08-07

## 2017-08-07 RX ORDER — CEFUROXIME AXETIL 250 MG
1 TABLET ORAL
Qty: 0 | Refills: 0 | COMMUNITY
Start: 2017-08-07

## 2017-08-07 RX ORDER — CEFUROXIME AXETIL 250 MG
250 TABLET ORAL EVERY 12 HOURS
Qty: 0 | Refills: 0 | Status: DISCONTINUED | OUTPATIENT
Start: 2017-08-07 | End: 2017-08-07

## 2017-08-07 RX ORDER — METHENAMINE MANDELATE 1 G
1 TABLET ORAL
Qty: 0 | Refills: 0 | COMMUNITY

## 2017-08-07 RX ORDER — DOCUSATE SODIUM 100 MG
1 CAPSULE ORAL
Qty: 0 | Refills: 0 | COMMUNITY
Start: 2017-08-07

## 2017-08-07 RX ADMIN — HYDROMORPHONE HYDROCHLORIDE 2 MILLIGRAM(S): 2 INJECTION INTRAMUSCULAR; INTRAVENOUS; SUBCUTANEOUS at 02:08

## 2017-08-07 RX ADMIN — OXYCODONE HYDROCHLORIDE 10 MILLIGRAM(S): 5 TABLET ORAL at 04:54

## 2017-08-07 RX ADMIN — TIOTROPIUM BROMIDE 1 CAPSULE(S): 18 CAPSULE ORAL; RESPIRATORY (INHALATION) at 07:39

## 2017-08-07 RX ADMIN — HYDROMORPHONE HYDROCHLORIDE 2 MILLIGRAM(S): 2 INJECTION INTRAMUSCULAR; INTRAVENOUS; SUBCUTANEOUS at 09:45

## 2017-08-07 RX ADMIN — HYDROMORPHONE HYDROCHLORIDE 2 MILLIGRAM(S): 2 INJECTION INTRAMUSCULAR; INTRAVENOUS; SUBCUTANEOUS at 04:56

## 2017-08-07 RX ADMIN — HYDROMORPHONE HYDROCHLORIDE 2 MILLIGRAM(S): 2 INJECTION INTRAMUSCULAR; INTRAVENOUS; SUBCUTANEOUS at 14:11

## 2017-08-07 RX ADMIN — PANTOPRAZOLE SODIUM 40 MILLIGRAM(S): 20 TABLET, DELAYED RELEASE ORAL at 09:30

## 2017-08-07 RX ADMIN — Medication 112 MICROGRAM(S): at 05:12

## 2017-08-07 RX ADMIN — OXYCODONE HYDROCHLORIDE 10 MILLIGRAM(S): 5 TABLET ORAL at 06:22

## 2017-08-07 RX ADMIN — Medication 250 MILLIGRAM(S): at 09:31

## 2017-08-07 RX ADMIN — Medication 100 MILLIGRAM(S): at 05:12

## 2017-08-07 RX ADMIN — Medication 10 MILLIGRAM(S): at 09:17

## 2017-08-07 RX ADMIN — HYDROMORPHONE HYDROCHLORIDE 2 MILLIGRAM(S): 2 INJECTION INTRAMUSCULAR; INTRAVENOUS; SUBCUTANEOUS at 09:17

## 2017-08-07 NOTE — DISCHARGE NOTE ADULT - CARE PROVIDER_API CALL
Roshni Christianson), Orthopaedic Surgery  [address] [place] [address]  Phone: [phone]  Fax: [phone]

## 2017-08-07 NOTE — DIETITIAN INITIAL EVALUATION ADULT. - PERTINENT MEDS FT
Ceftin, Compazine, Tylenol, Dilaudid, Oxycodone, Colace, Humalog, synthroiod, Narcan, Zofran, Protonix, Senna, Spiriva,

## 2017-08-07 NOTE — PROGRESS NOTE ADULT - SUBJECTIVE AND OBJECTIVE BOX
Patient resting comfortably. NAD    PE dressing c/d/i  neuro exam unchanged   no calf tenderness b/l LE

## 2017-08-07 NOTE — PROGRESS NOTE ADULT - SUBJECTIVE AND OBJECTIVE BOX
17: No cp, sob n/v/f/c; pos back pain -- doesnt want to do physical therapy today.        ROS: as per HPI otherwise all other systems reviewed and are negative    PHYSICAL EXAM  Vital Signs Last 24 Hrs  T(C): 36.8 (07 Aug 2017 09:16), Max: 37.3 (06 Aug 2017 14:34)  T(F): 98.3 (07 Aug 2017 09:16), Max: 99.1 (06 Aug 2017 14:34)  HR: 72 (07 Aug 2017 08:00) (68 - 96)  BP: 114/67 (07 Aug 2017 08:00) (89/42 - 132/66)  BP(mean): 77 (07 Aug 2017 08:00) (52 - 108)  RR: 14 (07 Aug 2017 08:00) (12 - 22)  SpO2: 96% (07 Aug 2017 08:00) (73% - 98%)    GEN: A and O, NAD, mood stable  HEENT:   nc/at, EOMI, no oropharyngeal lesions, erythema, exudates, oral thrush    NECK:   supple    CV:  +S1, +S2, regular, no murmurs or rubs    RESP:   lungs clear to auscultation bilaterally, no wheezing, rales, rhonchi, good air entry bilaterally, decreased bs mimi    GI:  abdomen soft, non-tender, non-distended, normal BS,  no abdominal masses, no palpable masses    BACK: DRESSING C/D/I, HEMOVAC OUT    RECTAL:  not examined    :  not examined    MSK:   normal muscle tone, no atrophy, no rigidity, no contractions    EXT:   no clubbing, no cyanosis, no edema, no calf pain, swelling or erythema, MOVING ALL EXTREMITIES    VASCULAR:  pulses equal and symmetric in the upper and lower extremities    NEURO:  AAOX3, no focal neurological deficits, follows all commands, able to move extremities spontaneously    SKIN:  no ulcers, lesions or rashes    LABS                            9.1    7.1   )-----------( 346      ( 07 Aug 2017 06:16 )             26.7     08-07    138  |  102  |  6<L>  ----------------------------<  133<H>  3.9   |  31  |  0.33<L>    Ca    8.2<L>      07 Aug 2017 06:16              Urinalysis Basic - ( 05 Aug 2017 13:30 )    Color: Yellow / Appearance: x / S.005 / pH: x  Gluc: x / Ketone: Negative  / Bili: Negative / Urobili: Negative mg/dL   Blood: x / Protein: 30 mg/dL / Nitrite: Positive   Leuk Esterase: Moderate / RBC: 11-25 /HPF / WBC >50   Sq Epi: x / Non Sq Epi: Few / Bacteria: Many        MEDICATIONS  (STANDING):  tiotropium 18 MICROgram(s) Capsule 1 Capsule(s) Inhalation daily  pantoprazole    Tablet 40 milliGRAM(s) Oral before breakfast  levothyroxine 112 MICROGram(s) Oral daily  docusate sodium 100 milliGRAM(s) Oral three times a day  senna 2 Tablet(s) Oral at bedtime  insulin lispro (HumaLOG) corrective regimen sliding scale   SubCutaneous three times a day before meals  insulin lispro (HumaLOG) corrective regimen sliding scale   SubCutaneous at bedtime  dextrose 5%. 1000 milliLiter(s) (50 mL/Hr) IV Continuous <Continuous>  dextrose 50% Injectable 12.5 Gram(s) IV Push once  dextrose 50% Injectable 25 Gram(s) IV Push once  dextrose 50% Injectable 25 Gram(s) IV Push once  cefuroxime   Tablet 250 milliGRAM(s) Oral every 12 hours    MEDICATIONS  (PRN):  naloxone Injectable 0.1 milliGRAM(s) IV Push every 3 minutes PRN For ANY of the following changes in patient status:  A. RR LESS THAN 10 breaths per minute, B. Oxygen saturation LESS THAN 90%, C. Sedation score of 6  ondansetron Injectable 4 milliGRAM(s) IV Push every 6 hours PRN Nausea  aluminum hydroxide/magnesium hydroxide/simethicone Suspension 30 milliLiter(s) Oral every 12 hours PRN Indigestion  ondansetron Injectable 4 milliGRAM(s) IV Push every 6 hours PRN Nausea  magnesium hydroxide Suspension 30 milliLiter(s) Oral every 12 hours PRN Constipation  dextrose Gel 1 Dose(s) Oral once PRN Blood Glucose LESS THAN 70 milliGRAM(s)/deciliter  glucagon  Injectable 1 milliGRAM(s) IntraMuscular once PRN Glucose LESS THAN 70 milligrams/deciliter  acetaminophen   Tablet 650 milliGRAM(s) Oral every 6 hours PRN For Temp greater than 38.5 C (101.3 F)  oxyCODONE    IR 5 milliGRAM(s) Oral every 4 hours PRN Mild Pain (1 - 3)  oxyCODONE    IR 10 milliGRAM(s) Oral every 6 hours PRN Moderate Pain (4 - 6)  HYDROmorphone  Injectable 2 milliGRAM(s) SubCutaneous every 4 hours PRN Breakthrough pain  prochlorperazine   Injectable 10 milliGRAM(s) IV Push every 8 hours PRN nausea

## 2017-08-07 NOTE — DISCHARGE NOTE ADULT - MEDICATION SUMMARY - MEDICATIONS TO STOP TAKING
I will STOP taking the medications listed below when I get home from the hospital:    nitrofurantoin macrocrystals 100 mg oral capsule  -- 1 cap(s) by mouth 2 times a day

## 2017-08-07 NOTE — PROGRESS NOTE ADULT - ASSESSMENT
Pt is a 71 y/o obese female with COPD, type II diabetes, GERD, hypothyroidism, lumbar spinal stenosis s/p L2-S1 laminectomy with fusion.  Post-op hospital course complicated by acute blood loss anemia, fever and intermittent hypoxia.     * Lumbar stenosis-s/p L2-S1 spinal fusion with laminectomy.  Continue pain control, PT, encourage use of incentive spirometry.  * Hypoxia-? underlying YULY in setting of COPD and atelectasis? fluid overload.  However given recent fall at home PTA need to r/o PE.  Dopplers negative, order CTA chest for further eval and to r/o pneumonia or other parenchymal disease.  Continue O2 and monitor in SDU, stop IVF.  * Acute Blood Loss Anemia-surgical loss, monitor  * Fever-? atelectasis ? PE, infection less likely but cannot r/o.  Order cults, f/u CT chest, encourage use of incentive spirometry and monitor for now since she is non-toxic.  * Type II Diabetes for now  * Hypokalemia-supplement po potassium.  * Disp-OOB, PT, d/c IVF  * Comm- d/w pt, RN staff
71 Y/O FEMALE WITH THE ABOVE MED HX S/P LUMBAR LAMI/FUSION
73 Y/O FEMALE WITH THE ABOVE MED HX S/P LUMBAR LAMI/FUSION
A/P: s/p L2-S1 laminectomy/fusion - stable  1.  Continue PT/mobilization   2.  Transfer to   3.  Care management for discharge planning/rehab placement
A/P: s/p L2-S1 laminectomy/fusion - stable  1.  Continue PT/mobilization  2.  Medical care as per Dr. Fountain
Pt is a 71 y/o obese female with COPD, type II diabetes, GERD, hypothyroidism, lumbar spinal stenosis s/p L2-S1 laminectomy with fusion.  Post-op hospital course complicated by acute blood loss anemia, fever and intermittent hypoxia.     * Lumbar stenosis-s/p L2-S1 spinal fusion with laminectomy.  Continue pain control, PT, encourage use of incentive spirometry.  * Hypoxia-likely due to underlying YULY in setting of COPD and atelectasis.  CT chest negative for PE, dpplers also negative.  Continue O2 as needed, encouraged use of incentive spirometry. Pt advised to  have an outpt sleep study  * Acute Blood Loss Anemia-surgical loss, monitor, h/h stable  * Fever-most likely atelectasis, no PE, CHF or pneumonia.   Continue to encourage use of incentive spirometry and monitor for now since she is non-toxic. UA pos for wbc, bnitrite and leuk esterase; started on ceftin for treatment of probably UTI. urine culture pending still  * Type II Diabetes for now, bgms stable  * Disp-OOB, PT, may transfer to floors.  Likely rehab today  * Comm- d/w pt and  RN staff
s/p laminectomy/fusion: stable. continue to mobilize.
Pt is a 73 y/o obese female with COPD, type II diabetes, GERD, hypothyroidism, lumbar spinal stenosis s/p L2-S1 laminectomy with fusion.  Post-op hospital course complicated by acute blood loss anemia, fever and intermittent hypoxia.     * Lumbar stenosis-s/p L2-S1 spinal fusion with laminectomy.  Continue pain control, PT, encourage use of incentive spirometry.  * Hypoxia-likely due to underlying YULY in setting of COPD and atelectasis.  CT chest negative for PE, dpplers also negative.  Continue O2 as needed, encouraged use of incentive spirometry.  * Acute Blood Loss Anemia-surgical loss, monitor, repeat labs in am  * Fever-most likely atelectasis, no PE, CHF or pneumonia.   Continue to encourage use of incentive spirometry and monitor for now since she is non-toxic.  * Type II Diabetes for now  * Disp-OOB, PT, may transfer to floors.  D/c planning for possible rehab  * Comm- d/w pt and family in details, RN staff

## 2017-08-07 NOTE — PROGRESS NOTE ADULT - PROVIDER SPECIALTY LIST ADULT
Internal Medicine
Orthopedics
Internal Medicine
Orthopedics

## 2017-08-07 NOTE — DISCHARGE NOTE ADULT - MEDICATION SUMMARY - MEDICATIONS TO TAKE
I will START or STAY ON the medications listed below when I get home from the hospital:    acetaminophen 325 mg oral tablet  -- 2 tab(s) by mouth every 6 hours, As needed, For Temp greater than 38.5 C (101.3 F)  -- Indication: For mild althea    aluminum hydroxide-magnesium hydroxide 200 mg-200 mg/5 mL oral suspension  -- 30 milliliter(s) by mouth every 12 hours, As needed, Indigestion  -- Indication: For Dyspepsia    Spiriva 18 mcg inhalation capsule  -- 1 cap(s) inhaled once a day  -- Indication: For on home therapy    cefuroxime 250 mg oral tablet  -- 1 tab(s) by mouth every 12 hours  -- Indication: For UTI    docusate sodium 100 mg oral capsule  -- 1 cap(s) by mouth 3 times a day  -- Indication: For constipation    pantoprazole 40 mg oral delayed release tablet  -- 1 tab(s) by mouth once a day  -- Indication: For Prophylaxis    levothyroxine 112 mcg (0.112 mg) oral tablet  -- 1 tab(s) by mouth once a day  -- Indication: For Home therapy

## 2017-08-07 NOTE — DISCHARGE NOTE ADULT - PATIENT PORTAL LINK FT
“You can access the Follow[employer] Patient Portal, offered by [facility], by registering with the following website: [email or web address]”

## 2017-08-07 NOTE — DIETITIAN INITIAL EVALUATION ADULT. - OTHER INFO
Pt seen for length of stay. Pt c/o +n/C no v/d. Pt reports no c/s issues. Pt wt has been stable over the past months. Pt reports no BM since admission.

## 2017-08-07 NOTE — DISCHARGE NOTE ADULT - HOSPITAL COURSE
patient underwent surgery. medicine consult obtained. progressive mobilization. cleared for d/c on 8/7/17. will be d/c'd on abx for possible UTI. continue pain medication as outpatient to rehab

## 2017-08-07 NOTE — DISCHARGE NOTE ADULT - CARE PLAN
Principal Discharge DX:	Spinal stenosis of lumbar region  Goal:	increase activity and ambulation training  Instructions for follow-up, activity and diet:	f/u with Dr. Christianson in 1 week

## 2017-08-10 DIAGNOSIS — Z87.891 PERSONAL HISTORY OF NICOTINE DEPENDENCE: ICD-10-CM

## 2017-08-10 DIAGNOSIS — M43.16 SPONDYLOLISTHESIS, LUMBAR REGION: ICD-10-CM

## 2017-08-10 DIAGNOSIS — Z85.828 PERSONAL HISTORY OF OTHER MALIGNANT NEOPLASM OF SKIN: ICD-10-CM

## 2017-08-10 DIAGNOSIS — E11.65 TYPE 2 DIABETES MELLITUS WITH HYPERGLYCEMIA: ICD-10-CM

## 2017-08-10 DIAGNOSIS — M54.10 RADICULOPATHY, SITE UNSPECIFIED: ICD-10-CM

## 2017-08-10 DIAGNOSIS — E66.9 OBESITY, UNSPECIFIED: ICD-10-CM

## 2017-08-10 DIAGNOSIS — E03.9 HYPOTHYROIDISM, UNSPECIFIED: ICD-10-CM

## 2017-08-10 DIAGNOSIS — E87.6 HYPOKALEMIA: ICD-10-CM

## 2017-08-10 DIAGNOSIS — N39.0 URINARY TRACT INFECTION, SITE NOT SPECIFIED: ICD-10-CM

## 2017-08-10 DIAGNOSIS — T39.95XA ADVERSE EFFECT OF UNSPECIFIED NONOPIOID ANALGESIC, ANTIPYRETIC AND ANTIRHEUMATIC, INITIAL ENCOUNTER: ICD-10-CM

## 2017-08-10 DIAGNOSIS — M41.86 OTHER FORMS OF SCOLIOSIS, LUMBAR REGION: ICD-10-CM

## 2017-08-10 DIAGNOSIS — M51.36 OTHER INTERVERTEBRAL DISC DEGENERATION, LUMBAR REGION: ICD-10-CM

## 2017-08-10 DIAGNOSIS — K21.9 GASTRO-ESOPHAGEAL REFLUX DISEASE WITHOUT ESOPHAGITIS: ICD-10-CM

## 2017-08-10 DIAGNOSIS — J43.9 EMPHYSEMA, UNSPECIFIED: ICD-10-CM

## 2017-08-10 DIAGNOSIS — M48.06 SPINAL STENOSIS, LUMBAR REGION: ICD-10-CM

## 2017-08-10 DIAGNOSIS — J98.11 ATELECTASIS: ICD-10-CM

## 2017-08-10 DIAGNOSIS — G47.33 OBSTRUCTIVE SLEEP APNEA (ADULT) (PEDIATRIC): ICD-10-CM

## 2017-08-10 DIAGNOSIS — Z90.49 ACQUIRED ABSENCE OF OTHER SPECIFIED PARTS OF DIGESTIVE TRACT: ICD-10-CM

## 2017-08-10 DIAGNOSIS — D62 ACUTE POSTHEMORRHAGIC ANEMIA: ICD-10-CM

## 2017-08-10 DIAGNOSIS — G89.29 OTHER CHRONIC PAIN: ICD-10-CM

## 2017-08-10 DIAGNOSIS — R09.02 HYPOXEMIA: ICD-10-CM

## 2017-08-10 LAB
CULTURE RESULTS: SIGNIFICANT CHANGE UP
SPECIMEN SOURCE: SIGNIFICANT CHANGE UP

## 2017-08-30 ENCOUNTER — INPATIENT (INPATIENT)
Facility: HOSPITAL | Age: 72
LOS: 0 days | Discharge: HOME HEALTH | End: 2017-08-31
Attending: FAMILY MEDICINE | Admitting: FAMILY MEDICINE
Payer: MEDICARE

## 2017-08-30 VITALS — WEIGHT: 214.95 LBS | HEIGHT: 69 IN

## 2017-08-30 DIAGNOSIS — J93.9 PNEUMOTHORAX, UNSPECIFIED: ICD-10-CM

## 2017-08-30 DIAGNOSIS — Z98.890 OTHER SPECIFIED POSTPROCEDURAL STATES: Chronic | ICD-10-CM

## 2017-08-30 DIAGNOSIS — J44.9 CHRONIC OBSTRUCTIVE PULMONARY DISEASE, UNSPECIFIED: ICD-10-CM

## 2017-08-30 DIAGNOSIS — E03.9 HYPOTHYROIDISM, UNSPECIFIED: ICD-10-CM

## 2017-08-30 LAB
ALBUMIN SERPL ELPH-MCNC: 3.2 G/DL — LOW (ref 3.3–5)
ALP SERPL-CCNC: 102 U/L — SIGNIFICANT CHANGE UP (ref 40–120)
ALT FLD-CCNC: 17 U/L — SIGNIFICANT CHANGE UP (ref 12–78)
ANION GAP SERPL CALC-SCNC: 9 MMOL/L — SIGNIFICANT CHANGE UP (ref 5–17)
APTT BLD: 27.7 SEC — SIGNIFICANT CHANGE UP (ref 27.5–37.4)
AST SERPL-CCNC: 29 U/L — SIGNIFICANT CHANGE UP (ref 15–37)
BASOPHILS # BLD AUTO: 0.1 K/UL — SIGNIFICANT CHANGE UP (ref 0–0.2)
BILIRUB SERPL-MCNC: 0.3 MG/DL — SIGNIFICANT CHANGE UP (ref 0.2–1.2)
BLD GP AB SCN SERPL QL: SIGNIFICANT CHANGE UP
BUN SERPL-MCNC: 9 MG/DL — SIGNIFICANT CHANGE UP (ref 7–23)
CALCIUM SERPL-MCNC: 9.3 MG/DL — SIGNIFICANT CHANGE UP (ref 8.5–10.1)
CHLORIDE SERPL-SCNC: 105 MMOL/L — SIGNIFICANT CHANGE UP (ref 96–108)
CO2 SERPL-SCNC: 24 MMOL/L — SIGNIFICANT CHANGE UP (ref 22–31)
CREAT SERPL-MCNC: 0.58 MG/DL — SIGNIFICANT CHANGE UP (ref 0.5–1.3)
EOSINOPHIL # BLD AUTO: 0.2 K/UL — SIGNIFICANT CHANGE UP (ref 0–0.5)
EOSINOPHIL NFR BLD AUTO: 1 % — SIGNIFICANT CHANGE UP (ref 0–6)
GLUCOSE SERPL-MCNC: 94 MG/DL — SIGNIFICANT CHANGE UP (ref 70–99)
HCT VFR BLD CALC: 37.3 % — SIGNIFICANT CHANGE UP (ref 34.5–45)
HGB BLD-MCNC: 12.4 G/DL — SIGNIFICANT CHANGE UP (ref 11.5–15.5)
INR BLD: 1.11 RATIO — SIGNIFICANT CHANGE UP (ref 0.88–1.16)
LYMPHOCYTES # BLD AUTO: 49 % — HIGH (ref 13–44)
LYMPHOCYTES # BLD AUTO: 5.2 K/UL — HIGH (ref 1–3.3)
MACROCYTES BLD QL: SLIGHT — SIGNIFICANT CHANGE UP
MANUAL DIF COMMENT BLD-IMP: SIGNIFICANT CHANGE UP
MCHC RBC-ENTMCNC: 29.7 PG — SIGNIFICANT CHANGE UP (ref 27–34)
MCHC RBC-ENTMCNC: 33.3 GM/DL — SIGNIFICANT CHANGE UP (ref 32–36)
MCV RBC AUTO: 89.1 FL — SIGNIFICANT CHANGE UP (ref 80–100)
METAMYELOCYTES # FLD: 1 % — HIGH (ref 0–0)
MONOCYTES # BLD AUTO: 1 K/UL — HIGH (ref 0–0.9)
MONOCYTES NFR BLD AUTO: 3 % — SIGNIFICANT CHANGE UP (ref 2–14)
NEUTROPHILS # BLD AUTO: 5.2 K/UL — SIGNIFICANT CHANGE UP (ref 1.8–7.4)
NEUTROPHILS NFR BLD AUTO: 45 % — SIGNIFICANT CHANGE UP (ref 43–77)
NEUTS BAND # BLD: 1 % — SIGNIFICANT CHANGE UP (ref 0–8)
PLAT MORPH BLD: NORMAL — SIGNIFICANT CHANGE UP
PLATELET # BLD AUTO: 421 K/UL — HIGH (ref 150–400)
POIKILOCYTOSIS BLD QL AUTO: SLIGHT — SIGNIFICANT CHANGE UP
POTASSIUM SERPL-MCNC: 4.1 MMOL/L — SIGNIFICANT CHANGE UP (ref 3.5–5.3)
POTASSIUM SERPL-SCNC: 4.1 MMOL/L — SIGNIFICANT CHANGE UP (ref 3.5–5.3)
PROT SERPL-MCNC: 7.5 GM/DL — SIGNIFICANT CHANGE UP (ref 6–8.3)
PROTHROM AB SERPL-ACNC: 12 SEC — SIGNIFICANT CHANGE UP (ref 9.8–12.7)
RBC # BLD: 4.19 M/UL — SIGNIFICANT CHANGE UP (ref 3.8–5.2)
RBC # FLD: 13.7 % — SIGNIFICANT CHANGE UP (ref 10.3–14.5)
RBC BLD AUTO: SIGNIFICANT CHANGE UP
SODIUM SERPL-SCNC: 138 MMOL/L — SIGNIFICANT CHANGE UP (ref 135–145)
TYPE + AB SCN PNL BLD: SIGNIFICANT CHANGE UP
WBC # BLD: 11.7 K/UL — HIGH (ref 3.8–10.5)
WBC # FLD AUTO: 11.7 K/UL — HIGH (ref 3.8–10.5)

## 2017-08-30 PROCEDURE — 71010: CPT | Mod: 26,59

## 2017-08-30 PROCEDURE — 99232 SBSQ HOSP IP/OBS MODERATE 35: CPT

## 2017-08-30 PROCEDURE — 93010 ELECTROCARDIOGRAM REPORT: CPT

## 2017-08-30 PROCEDURE — 71020: CPT | Mod: 26

## 2017-08-30 PROCEDURE — 99285 EMERGENCY DEPT VISIT HI MDM: CPT

## 2017-08-30 RX ORDER — ONDANSETRON 8 MG/1
4 TABLET, FILM COATED ORAL ONCE
Qty: 0 | Refills: 0 | Status: COMPLETED | OUTPATIENT
Start: 2017-08-30 | End: 2017-08-30

## 2017-08-30 RX ORDER — LEVOTHYROXINE SODIUM 125 MCG
112 TABLET ORAL DAILY
Qty: 0 | Refills: 0 | Status: DISCONTINUED | OUTPATIENT
Start: 2017-08-30 | End: 2017-08-31

## 2017-08-30 RX ORDER — ACETAMINOPHEN 500 MG
650 TABLET ORAL EVERY 6 HOURS
Qty: 0 | Refills: 0 | Status: DISCONTINUED | OUTPATIENT
Start: 2017-08-30 | End: 2017-08-31

## 2017-08-30 RX ORDER — HEPARIN SODIUM 5000 [USP'U]/ML
5000 INJECTION INTRAVENOUS; SUBCUTANEOUS EVERY 8 HOURS
Qty: 0 | Refills: 0 | Status: DISCONTINUED | OUTPATIENT
Start: 2017-08-30 | End: 2017-08-31

## 2017-08-30 RX ORDER — PANTOPRAZOLE SODIUM 20 MG/1
40 TABLET, DELAYED RELEASE ORAL
Qty: 0 | Refills: 0 | Status: DISCONTINUED | OUTPATIENT
Start: 2017-08-30 | End: 2017-08-31

## 2017-08-30 RX ORDER — SODIUM CHLORIDE 9 MG/ML
3 INJECTION INTRAMUSCULAR; INTRAVENOUS; SUBCUTANEOUS EVERY 8 HOURS
Qty: 0 | Refills: 0 | Status: DISCONTINUED | OUTPATIENT
Start: 2017-08-30 | End: 2017-08-31

## 2017-08-30 RX ORDER — ONDANSETRON 8 MG/1
4 TABLET, FILM COATED ORAL EVERY 6 HOURS
Qty: 0 | Refills: 0 | Status: DISCONTINUED | OUTPATIENT
Start: 2017-08-30 | End: 2017-08-31

## 2017-08-30 RX ORDER — DOCUSATE SODIUM 100 MG
100 CAPSULE ORAL THREE TIMES A DAY
Qty: 0 | Refills: 0 | Status: DISCONTINUED | OUTPATIENT
Start: 2017-08-30 | End: 2017-08-31

## 2017-08-30 RX ORDER — TIOTROPIUM BROMIDE 18 UG/1
1 CAPSULE ORAL; RESPIRATORY (INHALATION) DAILY
Qty: 0 | Refills: 0 | Status: DISCONTINUED | OUTPATIENT
Start: 2017-08-30 | End: 2017-08-31

## 2017-08-30 RX ORDER — MORPHINE SULFATE 50 MG/1
2 CAPSULE, EXTENDED RELEASE ORAL ONCE
Qty: 0 | Refills: 0 | Status: DISCONTINUED | OUTPATIENT
Start: 2017-08-30 | End: 2017-08-30

## 2017-08-30 RX ORDER — GABAPENTIN 400 MG/1
300 CAPSULE ORAL THREE TIMES A DAY
Qty: 0 | Refills: 0 | Status: DISCONTINUED | OUTPATIENT
Start: 2017-08-30 | End: 2017-08-31

## 2017-08-30 RX ORDER — OXYCODONE AND ACETAMINOPHEN 5; 325 MG/1; MG/1
1 TABLET ORAL EVERY 6 HOURS
Qty: 0 | Refills: 0 | Status: DISCONTINUED | OUTPATIENT
Start: 2017-08-30 | End: 2017-08-31

## 2017-08-30 RX ADMIN — SODIUM CHLORIDE 3 MILLILITER(S): 9 INJECTION INTRAMUSCULAR; INTRAVENOUS; SUBCUTANEOUS at 21:50

## 2017-08-30 RX ADMIN — HEPARIN SODIUM 5000 UNIT(S): 5000 INJECTION INTRAVENOUS; SUBCUTANEOUS at 23:01

## 2017-08-30 RX ADMIN — MORPHINE SULFATE 2 MILLIGRAM(S): 50 CAPSULE, EXTENDED RELEASE ORAL at 19:22

## 2017-08-30 RX ADMIN — MORPHINE SULFATE 2 MILLIGRAM(S): 50 CAPSULE, EXTENDED RELEASE ORAL at 19:55

## 2017-08-30 RX ADMIN — ONDANSETRON 4 MILLIGRAM(S): 8 TABLET, FILM COATED ORAL at 19:22

## 2017-08-30 RX ADMIN — GABAPENTIN 300 MILLIGRAM(S): 400 CAPSULE ORAL at 23:51

## 2017-08-30 NOTE — ED PROVIDER NOTE - MUSCULOSKELETAL, MLM
Spine appears normal, range of motion is not limited, no muscle or joint tenderness. Bilat SLR 30 degrees.

## 2017-08-30 NOTE — ED PROVIDER NOTE - MEDICAL DECISION MAKING DETAILS
71 y/o F c/o gradual onset SOB, being sent in due to PTX with plans to receive CXR, labs, CBC, [facility] 71 y/o F c/o gradual onset SOB, being sent in due to PTX with plans to receive CXR, labs, CBC, consult [facility] office for further eval and tx.   Radiology to review today's outpt CTA chest to confirm Dx.  IR or Thoracic Sx if needed for chest tube/catheter.

## 2017-08-30 NOTE — H&P ADULT - PSH
Dental disorder  Dental surgery 12/2012, patient reports that upper portion is glued in at this time, plan is to have dental implants placed.  H/O lumbosacral spine surgery    S/P bladder repair  bladder lift 2/2013  S/P knee surgery  arthroscopy bilateral 2007 and 2010  Skin cancer  to right temple 2012, removed, history of basal cell x 2

## 2017-08-30 NOTE — H&P ADULT - NSHPLABSRESULTS_GEN_ALL_CORE
12.4   11.7  )-----------( 421      ( 30 Aug 2017 15:13 )             37.3     08-30    138  |  105  |  9   ----------------------------<  94  4.1   |  24  |  0.58    Ca    9.3      30 Aug 2017 15:13    TPro  7.5  /  Alb  3.2<L>  /  TBili  0.3  /  DBili  x   /  AST  29  /  ALT  17  /  AlkPhos  102  08-30        LIVER FUNCTIONS - ( 30 Aug 2017 15:13 )  Alb: 3.2 g/dL / Pro: 7.5 gm/dL / ALK PHOS: 102 U/L / ALT: 17 U/L / AST: 29 U/L / GGT: x           PT/INR - ( 30 Aug 2017 15:13 )   PT: 12.0 sec;   INR: 1.11 ratio         PTT - ( 30 Aug 2017 15:13 )  PTT:27.7 sec none

## 2017-08-30 NOTE — ED PROVIDER NOTE - OBJECTIVE STATEMENT
71 y/o F with a PMHx of lumbar spinal fusion and laminectomy by Meghan presents to the ED being sent in by PMD to be admitted to [facility] for possible PTX. Pt states that she has been experiencing SOB for the past x2 nights. Pt states that she received imaging with found PTX at 10-15%. Pt currently calm, experiencing mild SOB here in ED and denies CP, HA, fever, cough, chills or any other acute c/o at this time. PMD Love. mAandeep Dowd. 71 y/o F with a PMHx of lumbar spinal fusion and laminectomy by Meghan presents to the ED being sent in by PMD to be admitted to [facility] for possible PTX. Pt states that she has been experiencing SOB for the past x2 nights. Pt states that she received imaging with found R PTX at 10-15%. Pt currently calm, experiencing mild SOB here in ED and denies CP, HA, fever, cough, chills or any other acute c/o at this time. PMD Love. Amandeep Dowd.

## 2017-08-30 NOTE — ED PROVIDER NOTE - PROGRESS NOTE DETAILS
ED attending Contino spoke with Noemí who is aware of pt case and agrees to have outpatient CTchest read by radiology to confirm PTX. Dr. Israel:  No official report on CTA chest;  Radiology to upload CD & review, contact me w/ reading. ED attending contacted by [facility] radiology confirming R PTX at approx 25% with plans to contact IR. ED attending contacted by Dr Wooten confirming R posterior lateral PTX at approx 25% with plans to contact IR. Dr. Israel:  IR closed for the day, on call IR attdg advised 1st to d/w Thoracic attdg on call.  Case d/w Thoracic: he will review CTA chest & call back. ED attending Dr. Israel updated pt of test and lab results. Reevaluated pt. SOB improving. Informed pt of admission, pt agrees. Pt states she has been on pain meds Q6H for recent back surgery. I, Laureen Anderson, am scribing for and in the presence of Dr. Israel. Dr. Israel:  Received callback from DR. Gastelum/ Thoracic Sx, who reviewed outside CTA chest images:  he does NOT advise any chest tube/ catheter placed at this time, agrees pt TBA Med., observed/ monitored, suppl. O2, repeat CXR in AM, will follow.

## 2017-08-30 NOTE — ED ADULT NURSE NOTE - OBJECTIVE STATEMENT
Pt c/o SOB, pt to ED post CT scan showed pneumothorax, pt states she is post op laminectomy from Aug 2nd.

## 2017-08-30 NOTE — H&P ADULT - PROBLEM SELECTOR PLAN 1
Dr Gastelum Thoracic surgeon contacted in ED as per Dr Israel.   Advised NO chest tube at this time.   Monitor is Surgical step down. Continuous pulse oximetry  Repeat CXR in AM (PA/Lat)  Reconsult thoracic overnight for any clinical status change   CTA chest from [facility] awaiting official report from them and from [facility] radiology who reviewed the images in house and confirmed pneumothorax.

## 2017-08-30 NOTE — ED PROVIDER NOTE - DIAGNOSIS COUNSELING, MDM
conducted a detailed discussion... I had a detailed discussion with the patient and daughter regarding the historical points, exam findings, and any diagnostic results supporting the admit diagnosis.

## 2017-08-30 NOTE — ED ADULT TRIAGE NOTE - WEIGHT IN LBS
Post-Care Instructions: I reviewed with the patient in detail post-care instructions. Patient is to wear sunprotection, and avoid picking at any of the treated lesions. Pt may apply Vaseline to crusted or scabbing areas. Duration Of Freeze Thaw-Cycle (Seconds): 15 Consent: The patient's consent was obtained including but not limited to risks of crusting, scabbing, blistering, scarring, darker or lighter pigmentary change, recurrence, incomplete removal and infection. Number Of Freeze-Thaw Cycles: 1 freeze-thaw cycle Detail Level: Detailed Render Post-Care Instructions In Note?: no 214.9

## 2017-08-30 NOTE — PATIENT PROFILE ADULT. - HARM RISK FACTORS
Refill Request Filled:     Metoprolol 50 mg tablets  #180 with 0 refills   Take 1 tablet by mouth twice daily.   Last Refill: 7/29/16  Last Office Visit: 1/31/17  Labs: BMP 1/31/17    Prescription sent to the pharmacy.          yes

## 2017-08-30 NOTE — ED PROVIDER NOTE - CARE PLAN
Principal Discharge DX:	Pneumothorax on right  Secondary Diagnosis:	COPD (chronic obstructive pulmonary disease)

## 2017-08-30 NOTE — ED ADULT NURSE NOTE - CHPI ED SYMPTOMS NEG
no hemoptysis/no cough/no edema/no headache/no fever/no chest pain/no body aches/no diaphoresis/no chills

## 2017-08-30 NOTE — ED STATDOCS - PROGRESS NOTE DETAILS
Nghia Chin on behalf of Attending Dr. Tejada. 73 y/o F with PTX sent in to the ED for admission to see Dr. Ryan.

## 2017-08-31 ENCOUNTER — TRANSCRIPTION ENCOUNTER (OUTPATIENT)
Age: 72
End: 2017-08-31

## 2017-08-31 VITALS — OXYGEN SATURATION: 93 %

## 2017-08-31 DIAGNOSIS — Z29.9 ENCOUNTER FOR PROPHYLACTIC MEASURES, UNSPECIFIED: ICD-10-CM

## 2017-08-31 DIAGNOSIS — J44.9 CHRONIC OBSTRUCTIVE PULMONARY DISEASE, UNSPECIFIED: ICD-10-CM

## 2017-08-31 DIAGNOSIS — J45.909 UNSPECIFIED ASTHMA, UNCOMPLICATED: ICD-10-CM

## 2017-08-31 DIAGNOSIS — M51.36 OTHER INTERVERTEBRAL DISC DEGENERATION, LUMBAR REGION: ICD-10-CM

## 2017-08-31 PROCEDURE — 99222 1ST HOSP IP/OBS MODERATE 55: CPT

## 2017-08-31 PROCEDURE — 71010: CPT | Mod: 26

## 2017-08-31 RX ADMIN — Medication 100 MILLIGRAM(S): at 15:04

## 2017-08-31 RX ADMIN — OXYCODONE AND ACETAMINOPHEN 1 TABLET(S): 5; 325 TABLET ORAL at 01:09

## 2017-08-31 RX ADMIN — SODIUM CHLORIDE 3 MILLILITER(S): 9 INJECTION INTRAMUSCULAR; INTRAVENOUS; SUBCUTANEOUS at 14:11

## 2017-08-31 RX ADMIN — HEPARIN SODIUM 5000 UNIT(S): 5000 INJECTION INTRAVENOUS; SUBCUTANEOUS at 15:04

## 2017-08-31 RX ADMIN — OXYCODONE AND ACETAMINOPHEN 1 TABLET(S): 5; 325 TABLET ORAL at 02:11

## 2017-08-31 RX ADMIN — PANTOPRAZOLE SODIUM 40 MILLIGRAM(S): 20 TABLET, DELAYED RELEASE ORAL at 05:20

## 2017-08-31 RX ADMIN — HEPARIN SODIUM 5000 UNIT(S): 5000 INJECTION INTRAVENOUS; SUBCUTANEOUS at 05:17

## 2017-08-31 RX ADMIN — OXYCODONE AND ACETAMINOPHEN 1 TABLET(S): 5; 325 TABLET ORAL at 15:03

## 2017-08-31 RX ADMIN — Medication 100 MILLIGRAM(S): at 05:18

## 2017-08-31 RX ADMIN — OXYCODONE AND ACETAMINOPHEN 1 TABLET(S): 5; 325 TABLET ORAL at 16:00

## 2017-08-31 RX ADMIN — GABAPENTIN 300 MILLIGRAM(S): 400 CAPSULE ORAL at 05:20

## 2017-08-31 RX ADMIN — Medication 112 MICROGRAM(S): at 05:18

## 2017-08-31 RX ADMIN — GABAPENTIN 300 MILLIGRAM(S): 400 CAPSULE ORAL at 15:03

## 2017-08-31 RX ADMIN — SODIUM CHLORIDE 3 MILLILITER(S): 9 INJECTION INTRAMUSCULAR; INTRAVENOUS; SUBCUTANEOUS at 05:18

## 2017-08-31 NOTE — DISCHARGE NOTE ADULT - NS TRANSFER PATIENT BELONGINGS
Cell Phone/PDA (specify)/Clothing/2 pocketbooks, earrings (patient wearing), 2 lumbar back braces/Other belongings

## 2017-08-31 NOTE — DISCHARGE NOTE ADULT - HOSPITAL COURSE
73 yo female with PMH of DM2, Hypothyroidism, COPD and recent spine surgery 8/2 presents to ED with SOB. Pt was found to have a RUL PTX on CT scan on 8/5. She then developed increasing SOB 3 days ago and had subsequent CT chest which showed an increasing right PTX.    Pt was seen by pulmonary Dr. Dowd and also by cardio thoracic Surgery Kamille Tsai. No interventions were necessary. Pt is stable to discharge home. If develops SOB or increasing pain advised to return to ED.

## 2017-08-31 NOTE — DISCHARGE NOTE ADULT - PLAN OF CARE
normal breathing continue incentive spirometry, f/u with PCP in 3-4 days, if develops SOB or increasing pain return to ED immediately continue spiriva continue synthroid

## 2017-08-31 NOTE — PHYSICAL THERAPY INITIAL EVALUATION ADULT - GENERAL OBSERVATIONS, REHAB EVAL
O2 2L/min nc; IV; flowtrons; HM; BP cuff; pulse oxym; pt rec'd in bed supine; denied pain; HR 67; /55; O2 Sat 95%; RR 17

## 2017-08-31 NOTE — CONSULT NOTE ADULT - SUBJECTIVE AND OBJECTIVE BOX
HPI: 71 yo female w hx COPD, DM, hypothyroid. Patient had spine surgery on 8/2. Postop Mrs. Leon developed sob and had a CT scan chest which showed a RUL "bullae" which was a loculated RUL pneumothorax. Patient was sent to rehab and developed sob 3 days ago. She was seen in PMD's office yesterday and sent for repeat CTA which showed increase in size of RUL loculated pneumothorax. Currently she is in no distress. She denies chest pain or palpitations.        PAST MEDICAL & SURGICAL HISTORY:  Pulmonary emphysema, unspecified emphysema type  Basal cell carcinoma: removed from face  DDD (degenerative disc disease), lumbar  Spinal stenosis of lumbar region  Urinary urgency  Gall stones: gall bladder removed  Diverticulosis of intestine without bleeding, unspecified intestinal tract location  Varicose veins of both lower extremities: surgery  History of cataract: extracted from right and left  Elevated pancreatic enzyme: patient reports elevated lab 6/2013. Gastroenterologist aware  Asthma: history of x 20 years ago, resolved does not use inhaler  Back pain  Diabetes mellitus: diet controlled  Hypothyroidism  Acid reflux  H/O lumbosacral spine surgery  Skin cancer: to right temple 2012, removed, history of basal cell x 2  S/P knee surgery: arthroscopy bilateral 2007 and 2010  Dental disorder: Dental surgery 12/2012, patient reports that upper portion is glued in at this time, plan is to have dental implants placed.  S/P bladder repair: bladder lift 2/2013      MEDICATIONS  (STANDING):  heparin  Injectable 5000 Unit(s) SubCutaneous every 8 hours  sodium chloride 0.9% lock flush 3 milliLiter(s) IV Push every 8 hours  tiotropium 18 MICROgram(s) Capsule 1 Capsule(s) Inhalation daily  levothyroxine 112 MICROGram(s) Oral daily  pantoprazole    Tablet 40 milliGRAM(s) Oral before breakfast  docusate sodium 100 milliGRAM(s) Oral three times a day  gabapentin 300 milliGRAM(s) Oral three times a day    MEDICATIONS  (PRN):  ondansetron Injectable 4 milliGRAM(s) IV Push every 6 hours PRN Nausea  acetaminophen   Tablet 650 milliGRAM(s) Oral every 6 hours PRN For Temp greater than 38.5 C (101.3 F)  oxyCODONE    5 mG/acetaminophen 325 mG 1 Tablet(s) Oral every 6 hours PRN Severe Pain (7 - 10)      Allergies    No Known Allergies    Intolerances        SOCIAL HISTORY: Denies tobacco, etoh abuse or illicit drug use    FAMILY HISTORY:  Family history of stroke (Mother)  Family history of heart disease (Mother)  Family history of pancreatic cancer (Father)      Vital Signs Last 24 Hrs  T(C): 36.4 (31 Aug 2017 05:45), Max: 36.8 (30 Aug 2017 14:55)  T(F): 97.5 (31 Aug 2017 05:45), Max: 98.2 (30 Aug 2017 14:55)  HR: 59 (31 Aug 2017 08:00) (59 - 96)  BP: 107/56 (31 Aug 2017 08:00) (104/44 - 163/70)  BP(mean): 68 (31 Aug 2017 08:00) (58 - 88)  RR: 17 (31 Aug 2017 08:00) (10 - 22)  SpO2: 96% (31 Aug 2017 08:00) (89% - 98%)    REVIEW OF SYSTEMS:    CONSTITUTIONAL:  As per HPI.  SKIN: no rashes  HEENT:  Eyes:  No diplopia or blurred vision. ENT:  No earache, sore throat or runny nose.  CARDIOVASCULAR:  No pressure, squeezing, tightness, heaviness or aching about the chest, neck, axilla or epigastrium.  RESPIRATORY:  No cough, shortness of breath, PND or orthopnea.  GASTROINTESTINAL:  No nausea, vomiting or diarrhea.  GENITOURINARY:  No dysuria, frequency or urgency.  MUSCULOSKELETAL:  As per HPI.  SKIN:  No change in skin, hair or nails.  NEUROLOGIC:  No paresthesias, fasciculations, seizures or weakness.  PSYCHIATRIC:  No disorder of thought or mood.  ENDOCRINE:  No heat or cold intolerance, polyuria or polydipsia.  HEMATOLOGICAL:  No easy bruising or bleedings:  .     PHYSICAL EXAMINATION:    GENERAL APPEARANCE:  Pt. is not currently dyspneic, in no distress. Pt. is alert, oriented, and pleasant.  HEENT:  Pupils are normal and react normally. No icterus. Mucous membranes well colored.  NECK:  Supple. No lymphadenopathy. Jugular venous pressure not elevated. Carotids equal.   HEART:   The cardiac impulse has a normal quality. Regular. Normal S1 and S2. There are no murmurs, rubs or gallops noted  CHEST:  mildly decreased BS right side.  ABDOMEN:  Soft and nontender.   EXTREMITIES:  There is no cyanosis, clubbing or edema.   SKIN:  No rash or significant lesions are noted.  CNS: AAO x 3    LABS:                        11.4   6.6   )-----------( 365      ( 31 Aug 2017 06:24 )             34.7     08-31    139  |  105  |  9   ----------------------------<  127<H>  3.6   |  26  |  0.56    Ca    8.9      31 Aug 2017 06:24    TPro  7.5  /  Alb  3.2<L>  /  TBili  0.3  /  DBili  x   /  AST  29  /  ALT  17  /  AlkPhos  102  08-30    LIVER FUNCTIONS - ( 30 Aug 2017 15:13 )  Alb: 3.2 g/dL / Pro: 7.5 gm/dL / ALK PHOS: 102 U/L / ALT: 17 U/L / AST: 29 U/L / GGT: x           PT/INR - ( 30 Aug 2017 15:13 )   PT: 12.0 sec;   INR: 1.11 ratio         PTT - ( 30 Aug 2017 15:13 )  PTT:27.7 sec            RADIOLOGY & ADDITIONAL STUDIES:

## 2017-08-31 NOTE — PROGRESS NOTE ADULT - PROBLEM SELECTOR PLAN 1
- CTA chest reviewed  - pulm consult appreciated  - pending CT surgery consult  - O2 supplementation  - monitor pulse ox - CTA chest reviewed  - repeat CXR now  - pulm consult appreciated  - pending CT surgery consult  - O2 supplementation  - monitor pulse ox

## 2017-08-31 NOTE — DISCHARGE NOTE ADULT - PATIENT PORTAL LINK FT
“You can access the Follow[employer] Patient Portal, offered by [facility], by registering with the following website: [email or web address]”

## 2017-08-31 NOTE — DISCHARGE NOTE ADULT - CARE PROVIDERS DIRECT ADDRESSES
[email or web address]

## 2017-08-31 NOTE — DISCHARGE NOTE ADULT - CARE PROVIDER_API CALL
Romero Aleman (), Internal Medicine  [address]  [address]  [place] [address]  Phone: [phone]  Fax: [phone]    James Dowd), Internal Medicine; Pulmonary Disease  [address] [place] [address]  Phone: [phone]  Fax: [phone]

## 2017-08-31 NOTE — CONSULT NOTE ADULT - SUBJECTIVE AND OBJECTIVE BOX
71 yo female w hx COPD, DM, hypothyroid. Patient had spine surgery on 8/2. Postop Mrs. Leon developed sob and had a CT scan chest which showed bullous disease, and question of right pneumothorax, repeat Ct showed slight increase in pneumothorax so pt was admitted for evaluation.  She is seen at [facility], sitting in chair, comfortable.  O2sats 92% on RA.  She is conversing comfortable, denies SOB, denies dyspnea.        PAST MEDICAL & SURGICAL HISTORY:  Pulmonary emphysema, unspecified emphysema type  Basal cell carcinoma: removed from face  DDD (degenerative disc disease), lumbar  Spinal stenosis of lumbar region  Urinary urgency  Gall stones: gall bladder removed  Diverticulosis of intestine without bleeding, unspecified intestinal tract location  Varicose veins of both lower extremities: surgery  History of cataract: extracted from right and left  Elevated pancreatic enzyme: patient reports elevated lab 6/2013. Gastroenterologist aware  Asthma: history of x 20 years ago, resolved does not use inhaler  Back pain  Diabetes mellitus: diet controlled  Hypothyroidism  Acid reflux  H/O lumbosacral spine surgery  Skin cancer: to right temple 2012, removed, history of basal cell x 2  S/P knee surgery: arthroscopy bilateral 2007 and 2010  Dental disorder: Dental surgery 12/2012, patient reports that upper portion is glued in at this time, plan is to have dental implants placed.  S/P bladder repair: bladder lift 2/2013      REVIEW OF SYSTEMS  Negative other than those stated in HPI      MEDICATIONS  (STANDING):  heparin  Injectable 5000 Unit(s) SubCutaneous every 8 hours  sodium chloride 0.9% lock flush 3 milliLiter(s) IV Push every 8 hours  tiotropium 18 MICROgram(s) Capsule 1 Capsule(s) Inhalation daily  levothyroxine 112 MICROGram(s) Oral daily  pantoprazole    Tablet 40 milliGRAM(s) Oral before breakfast  docusate sodium 100 milliGRAM(s) Oral three times a day  gabapentin 300 milliGRAM(s) Oral three times a day    MEDICATIONS  (PRN):  ondansetron Injectable 4 milliGRAM(s) IV Push every 6 hours PRN Nausea  acetaminophen   Tablet 650 milliGRAM(s) Oral every 6 hours PRN For Temp greater than 38.5 C (101.3 F)  oxyCODONE    5 mG/acetaminophen 325 mG 1 Tablet(s) Oral every 6 hours PRN Severe Pain (7 - 10)      Allergies    No Known Allergies    SOCIAL HISTORY:Previous 18packe year smoker quit 30 years ago    FAMILY HISTORY:  Family history of stroke (Mother)  Family history of heart disease (Mother)  Family history of pancreatic cancer (Father)      Vital Signs Last 24 Hrs  T(C): 37.1 (31 Aug 2017 08:22), Max: 37.1 (31 Aug 2017 08:22)  T(F): 98.8 (31 Aug 2017 08:22), Max: 98.8 (31 Aug 2017 08:22)  HR: 71 (31 Aug 2017 14:00) (59 - 96)  BP: 127/64 (31 Aug 2017 14:00) (104/44 - 163/70)  BP(mean): 78 (31 Aug 2017 14:00) (58 - 99)  RR: 15 (31 Aug 2017 14:00) (10 - 22)  SpO2: 91% (31 Aug 2017 14:00) (89% - 98%)    PHYSICAL EXAM:  Constitutional:NAD  Eyes:EOMI  ENMT:Nares patent, throat clear  Neck:No LAD  Respiratory:CTA  Cardiovascular:S1, S2  Gastrointestinal:Soft, NT  Extremities:No edema  Vascular:Peripheral pulses palable  Neurological:a&Ox3  Skin:No rashes  Lymph Nodes:No LAD  Musculoskeletal:FROM BLUE and BLLE            LABS:                        11.4   6.6   )-----------( 365      ( 31 Aug 2017 06:24 )             34.7     08-31    139  |  105  |  9   ----------------------------<  127<H>  3.6   |  26  |  0.56    Ca    8.9      31 Aug 2017 06:24    TPro  7.5  /  Alb  3.2<L>  /  TBili  0.3  /  DBili  x   /  AST  29  /  ALT  17  /  AlkPhos  102  08-30    PT/INR - ( 30 Aug 2017 15:13 )   PT: 12.0 sec;   INR: 1.11 ratio         PTT - ( 30 Aug 2017 15:13 )  PTT:27.7 sec      RADIOLOGY & ADDITIONAL STUDIES:  < from: Xray Chest 1 View AP/PA. (08.31.17 @ 10:34) >  EXAM:  CHEST SINGLE VIEW FRONTAL                            PROCEDURE DATE:  08/31/2017          INTERPRETATION:  History: Follow-up loculated pneumothorax    Chest:  one view.      Comparison: CT dated 8/30/2017 and chest x-ray dated 8/30/2017    AP radiograph of the chest demonstrates multiple bullae in the lung   apices. The loculated RIGHT-sided pneumothorax along the major fissure is   not well-visualized on this AP radiograph. The cardiac silhouette is   normal in size. Osseous structuresare intact.    Impression:multiple bullae in the lung apices. The loculated RIGHT-sided   pneumothorax along the major fissure is not well-visualized on this AP   radiograph.                   BETY SANTILLAN M.D., ATTENDING RADIOLOGIST  This documenthas been electronically signed. Aug 31 2017 11:13AM        < end of copied text >  < from: CT Angio Chest PE Protocol w/ IV Cont (08.05.17 @ 12:30) >  EXAM:  CT ANGIO CHEST PE PROTOCOL IC                            PROCEDURE DATE:  08/05/2017          INTERPRETATION:  .    CLINICAL INFORMATION: Fever and hypoxia.    TECHNIQUE: Helical axial images were obtained of the chest and upper   abdomen using CT angiographic protocol. 90 mls of Omnipaque-350  was   administered intravenously without complication and 10 mls were   discarded. Sagittal and coronal reformatted images were obtained from the   source data. Axial MIP reconstructed images wereobtained from the source   data and were also reviewed.    COMPARISON: No prior CT angiogram examinations of the chest are available   for comparison.    FINDINGS: Examination of the pulmonary arteries demonstrates an optimal   contrast bolus. No filling defects are notable within the pulmonary   arterial vessels to suggest pulmonary embolism. The main pulmonary artery   is normal in caliber.    The heart size is borderline enlarged. The pericardium appears   unremarkable. There are atherosclerotic calcifications of the aorta and   branch arterial vessels. The imaged portions of the aorta are normal in   caliber.    There is no axillary, mediastinal, or hilar lymphadenopathy.    The central airways are patent. Multiple bullae are notable throughout   the bilateral upper lobes along with blebs. A fairly large air cyst/bulla   is notable within the right upper lobe with mild wall thickening   posteriorly. There are trace bilateral pleural effusions with adjacent   atelectasis.    The patient is status post cholecystectomy. The other visualized upper   abdominal organs appear unremarkable.    There are mild multilevel degenerative changes of the thoracic spine.    IMPRESSION: No pulmonary embolism.    Other findings, as detailed in the body the report.                ELLIOTT CARMICHAEL   This document has been electronically signed. Aug  5 2017 12:41PM        < end of copied text >

## 2017-08-31 NOTE — DISCHARGE NOTE ADULT - MEDICATION SUMMARY - MEDICATIONS TO TAKE
I will START or STAY ON the medications listed below when I get home from the hospital:    acetaminophen 325 mg oral tablet  -- 2 tab(s) by mouth every 6 hours, As needed, For Temp greater than 38.5 C (101.3 F)  -- Indication: For Pain    Spiriva 18 mcg inhalation capsule  -- 1 cap(s) inhaled once a day  -- Indication: For COPD (chronic obstructive pulmonary disease)    docusate sodium 100 mg oral capsule  -- 1 cap(s) by mouth 3 times a day  -- Indication: For COnstipation    pantoprazole 40 mg oral delayed release tablet  -- 1 tab(s) by mouth once a day  -- Indication: For gerd    levothyroxine 112 mcg (0.112 mg) oral tablet  -- 1 tab(s) by mouth once a day  -- Indication: For Hypothyroidism, unspecified type

## 2017-08-31 NOTE — DISCHARGE NOTE ADULT - CARE PLAN
Principal Discharge DX:	Pneumothorax on right  Goal:	normal breathing  Instructions for follow-up, activity and diet:	continue incentive spirometry, f/u with PCP in 3-4 days, if develops SOB or increasing pain return to ED immediately  Secondary Diagnosis:	COPD (chronic obstructive pulmonary disease)  Instructions for follow-up, activity and diet:	continue spiriva  Secondary Diagnosis:	Hypothyroidism, unspecified type  Instructions for follow-up, activity and diet:	continue synthroid

## 2017-08-31 NOTE — CONSULT NOTE ADULT - ASSESSMENT
BL bullous lung disease, small right loculated pneumothorax, VSS, CXR stable.  No intervention is recommended.  Cleared from thoracic standpoint for discharge home  If acute SOB or chest pain develop got to ER, risk of development of large pneumo secondary to bullous disease discussed with patient.  Follow up with Dr. Dowd as well as Dr. Álvarez as outpt.    Case discussed with dr. Álvarez and Dr. Gastelum
Cont O2  on spiriva  thoracic eval re chest tube placement  no steroids  dvt proph

## 2017-08-31 NOTE — PROGRESS NOTE ADULT - SUBJECTIVE AND OBJECTIVE BOX
HPI:  73 yo female with PMH of DM2, Hypothyroidism, COPD and recent spine surgery 8/2 presents to ED with SOB. Pt was found to have a RUL PTX on CT scan on 8/5. She then developed increasing SOB 3 days ago and had subsequent CT chest which showed an increasing right PTX.     8/31: Currently pt resting comfortably. Denies SOB or chest pain. Wants to eat. No complaints.     REVIEW OF SYSTEMS:  General: NAD, hemodynamically stable, (-)  fever, (-) chills, (-) weakness  HEENT:  Eyes:  No visual loss, blurred vision, double vision or yellow sclerae. Ears, Nose, Throat:  No hearing loss, sneezing, congestion, runny nose or sore throat.  SKIN:  No rash or itching.  CARDIOVASCULAR:  No chest pain, chest pressure or chest discomfort. No palpitations or edema.  RESPIRATORY:  (+)SOB  GASTROINTESTINAL:  No anorexia, nausea, vomiting or diarrhea. No abdominal pain or blood.  NEUROLOGICAL:  No headache, dizziness, syncope, paralysis, ataxia, numbness or tingling in the extremities. No change in bowel or bladder control.  MUSCULOSKELETAL:  No muscle, back pain, joint pain or stiffness.  HEMATOLOGIC:  No anemia, bleeding or bruising.  LYMPHATICS:  No enlarged nodes. No history of splenectomy.  ENDOCRINOLOGIC:  No reports of sweating, cold or heat intolerance. No polyuria or polydipsia.  ALLERGIES:  No history of asthma, hives, eczema or rhinitis.    Vital Signs Last 24 Hrs  T(C): 36.4 (31 Aug 2017 05:45), Max: 36.8 (30 Aug 2017 14:55)  T(F): 97.5 (31 Aug 2017 05:45), Max: 98.2 (30 Aug 2017 14:55)  HR: 70 (31 Aug 2017 09:00) (59 - 96)  BP: 114/64 (31 Aug 2017 09:00) (104/44 - 163/70)  BP(mean): 76 (31 Aug 2017 09:00) (58 - 88)  RR: 12 (31 Aug 2017 09:00) (10 - 22)  SpO2: 90% (31 Aug 2017 09:00) (89% - 98%)    PHYSICAL EXAM:      Constitutional: NAD, well-groomed, well-developed  HEENT: PERRLA, EOMI, Normal Hearing  Neck: No LAD, No JVD  Back: Normal spine flexure, No CVA tenderness  Cardiovascular: S1 and S2, RRR, no M/G/R  Respiratory: decreased on right side  Gastrointestinal: BS+, soft, NT/ND  Extremities: No peripheral edema  Vascular: 2+ peripheral pulses  Neurological: A/O x 3, no focal deficits  Psychiatric: Normal mood, normal affect  Skin: No rashes    Labs                              11.4   6.6   )-----------( 365      ( 31 Aug 2017 06:24 )             34.7     31 Aug 2017 06:24    139    |  105    |  9      ----------------------------<  127    3.6     |  26     |  0.56     Ca    8.9        31 Aug 2017 06:24    TPro  7.5    /  Alb  3.2    /  TBili  0.3    /  DBili  x      /  AST  29     /  ALT  17     /  AlkPhos  102    30 Aug 2017 15:13    PT/INR - ( 30 Aug 2017 15:13 )   PT: 12.0 sec;   INR: 1.11 ratio         PTT - ( 30 Aug 2017 15:13 )  PTT:27.7 sec  CAPILLARY BLOOD GLUCOSE        LIVER FUNCTIONS - ( 30 Aug 2017 15:13 )  Alb: 3.2 g/dL / Pro: 7.5 gm/dL / ALK PHOS: 102 U/L / ALT: 17 U/L / AST: 29 U/L / GGT: x               MEDICATIONS  (STANDING):  heparin  Injectable 5000 Unit(s) SubCutaneous every 8 hours  sodium chloride 0.9% lock flush 3 milliLiter(s) IV Push every 8 hours  tiotropium 18 MICROgram(s) Capsule 1 Capsule(s) Inhalation daily  levothyroxine 112 MICROGram(s) Oral daily  pantoprazole    Tablet 40 milliGRAM(s) Oral before breakfast  docusate sodium 100 milliGRAM(s) Oral three times a day  gabapentin 300 milliGRAM(s) Oral three times a day    MEDICATIONS  (PRN):  ondansetron Injectable 4 milliGRAM(s) IV Push every 6 hours PRN Nausea  acetaminophen   Tablet 650 milliGRAM(s) Oral every 6 hours PRN For Temp greater than 38.5 C (101.3 F)  oxyCODONE    5 mG/acetaminophen 325 mG 1 Tablet(s) Oral every 6 hours PRN Severe Pain (7 - 10)

## 2017-09-01 ENCOUNTER — APPOINTMENT (OUTPATIENT)
Dept: INTERNAL MEDICINE | Facility: CLINIC | Age: 72
End: 2017-09-01
Payer: MEDICARE

## 2017-09-01 ENCOUNTER — NON-APPOINTMENT (OUTPATIENT)
Age: 72
End: 2017-09-01

## 2017-09-01 VITALS
HEART RATE: 92 BPM | TEMPERATURE: 98.2 F | WEIGHT: 214.99 LBS | DIASTOLIC BLOOD PRESSURE: 80 MMHG | SYSTOLIC BLOOD PRESSURE: 130 MMHG | HEIGHT: 68 IN | BODY MASS INDEX: 32.58 KG/M2 | OXYGEN SATURATION: 95 % | RESPIRATION RATE: 18 BRPM

## 2017-09-01 DIAGNOSIS — R39.9 UNSPECIFIED SYMPTOMS AND SIGNS INVOLVING THE GENITOURINARY SYSTEM: ICD-10-CM

## 2017-09-01 PROCEDURE — 99214 OFFICE O/P EST MOD 30 MIN: CPT | Mod: 25

## 2017-09-01 PROCEDURE — 94060 EVALUATION OF WHEEZING: CPT
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
## 2017-09-08 DIAGNOSIS — E11.9 TYPE 2 DIABETES MELLITUS WITHOUT COMPLICATIONS: ICD-10-CM

## 2017-09-08 DIAGNOSIS — E03.9 HYPOTHYROIDISM, UNSPECIFIED: ICD-10-CM

## 2017-09-08 DIAGNOSIS — R55 SYNCOPE AND COLLAPSE: ICD-10-CM

## 2017-09-08 DIAGNOSIS — J45.909 UNSPECIFIED ASTHMA, UNCOMPLICATED: ICD-10-CM

## 2017-09-08 DIAGNOSIS — Z85.828 PERSONAL HISTORY OF OTHER MALIGNANT NEOPLASM OF SKIN: ICD-10-CM

## 2017-09-08 DIAGNOSIS — K21.9 GASTRO-ESOPHAGEAL REFLUX DISEASE WITHOUT ESOPHAGITIS: ICD-10-CM

## 2017-09-08 DIAGNOSIS — J44.9 CHRONIC OBSTRUCTIVE PULMONARY DISEASE, UNSPECIFIED: ICD-10-CM

## 2017-09-08 DIAGNOSIS — N39.0 URINARY TRACT INFECTION, SITE NOT SPECIFIED: ICD-10-CM
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
## 2017-09-08 DIAGNOSIS — J95.811 POSTPROCEDURAL PNEUMOTHORAX: ICD-10-CM

## 2017-09-08 DIAGNOSIS — M51.36 OTHER INTERVERTEBRAL DISC DEGENERATION, LUMBAR REGION: ICD-10-CM

## 2017-09-14 ENCOUNTER — FORM ENCOUNTER (OUTPATIENT)
Age: 72
End: 2017-09-14

## 2017-09-15 ENCOUNTER — OUTPATIENT (OUTPATIENT)
Dept: OUTPATIENT SERVICES | Facility: HOSPITAL | Age: 72
LOS: 1 days | End: 2017-09-15
Payer: MEDICARE

## 2017-09-15 ENCOUNTER — APPOINTMENT (OUTPATIENT)
Dept: CT IMAGING | Facility: CLINIC | Age: 72
End: 2017-09-15
Payer: MEDICARE

## 2017-09-15 DIAGNOSIS — Z98.890 OTHER SPECIFIED POSTPROCEDURAL STATES: Chronic | ICD-10-CM

## 2017-09-15 DIAGNOSIS — Z00.8 ENCOUNTER FOR OTHER GENERAL EXAMINATION: ICD-10-CM

## 2017-09-15 PROCEDURE — 71250 CT THORAX DX C-: CPT | Mod: 26

## 2017-09-15 PROCEDURE — 71250 CT THORAX DX C-: CPT

## 2017-09-20 ENCOUNTER — INPATIENT (INPATIENT)
Facility: HOSPITAL | Age: 72
LOS: 1 days | Discharge: HOME HEALTH | End: 2017-09-22
Attending: INTERNAL MEDICINE | Admitting: INTERNAL MEDICINE
Payer: MEDICARE

## 2017-09-20 VITALS — WEIGHT: 212.08 LBS

## 2017-09-20 DIAGNOSIS — Z98.890 OTHER SPECIFIED POSTPROCEDURAL STATES: Chronic | ICD-10-CM

## 2017-09-20 LAB
ALBUMIN SERPL ELPH-MCNC: 3.2 G/DL — LOW (ref 3.3–5)
ALP SERPL-CCNC: 107 U/L — SIGNIFICANT CHANGE UP (ref 40–120)
ALT FLD-CCNC: 17 U/L — SIGNIFICANT CHANGE UP (ref 12–78)
ANION GAP SERPL CALC-SCNC: 8 MMOL/L — SIGNIFICANT CHANGE UP (ref 5–17)
APTT BLD: 26.9 SEC — LOW (ref 27.5–37.4)
AST SERPL-CCNC: 17 U/L — SIGNIFICANT CHANGE UP (ref 15–37)
BASOPHILS # BLD AUTO: 0.1 K/UL — SIGNIFICANT CHANGE UP (ref 0–0.2)
BASOPHILS NFR BLD AUTO: 1.3 % — SIGNIFICANT CHANGE UP (ref 0–2)
BILIRUB SERPL-MCNC: 0.4 MG/DL — SIGNIFICANT CHANGE UP (ref 0.2–1.2)
BLD GP AB SCN SERPL QL: SIGNIFICANT CHANGE UP
BUN SERPL-MCNC: 10 MG/DL — SIGNIFICANT CHANGE UP (ref 7–23)
CALCIUM SERPL-MCNC: 9.1 MG/DL — SIGNIFICANT CHANGE UP (ref 8.5–10.1)
CHLORIDE SERPL-SCNC: 108 MMOL/L — SIGNIFICANT CHANGE UP (ref 96–108)
CO2 SERPL-SCNC: 24 MMOL/L — SIGNIFICANT CHANGE UP (ref 22–31)
CREAT SERPL-MCNC: 0.71 MG/DL — SIGNIFICANT CHANGE UP (ref 0.5–1.3)
EOSINOPHIL # BLD AUTO: 0.2 K/UL — SIGNIFICANT CHANGE UP (ref 0–0.5)
EOSINOPHIL NFR BLD AUTO: 2.3 % — SIGNIFICANT CHANGE UP (ref 0–6)
GLUCOSE SERPL-MCNC: 138 MG/DL — HIGH (ref 70–99)
HCT VFR BLD CALC: 37.1 % — SIGNIFICANT CHANGE UP (ref 34.5–45)
HGB BLD-MCNC: 12.2 G/DL — SIGNIFICANT CHANGE UP (ref 11.5–15.5)
INR BLD: 1.05 RATIO — SIGNIFICANT CHANGE UP (ref 0.88–1.16)
LYMPHOCYTES # BLD AUTO: 3 K/UL — SIGNIFICANT CHANGE UP (ref 1–3.3)
LYMPHOCYTES # BLD AUTO: 37.8 % — SIGNIFICANT CHANGE UP (ref 13–44)
MCHC RBC-ENTMCNC: 28.9 PG — SIGNIFICANT CHANGE UP (ref 27–34)
MCHC RBC-ENTMCNC: 32.8 GM/DL — SIGNIFICANT CHANGE UP (ref 32–36)
MCV RBC AUTO: 88 FL — SIGNIFICANT CHANGE UP (ref 80–100)
MONOCYTES # BLD AUTO: 0.7 K/UL — SIGNIFICANT CHANGE UP (ref 0–0.9)
MONOCYTES NFR BLD AUTO: 9.5 % — SIGNIFICANT CHANGE UP (ref 2–14)
NEUTROPHILS # BLD AUTO: 3.9 K/UL — SIGNIFICANT CHANGE UP (ref 1.8–7.4)
NEUTROPHILS NFR BLD AUTO: 49 % — SIGNIFICANT CHANGE UP (ref 43–77)
PLATELET # BLD AUTO: 280 K/UL — SIGNIFICANT CHANGE UP (ref 150–400)
POTASSIUM SERPL-MCNC: 3.8 MMOL/L — SIGNIFICANT CHANGE UP (ref 3.5–5.3)
POTASSIUM SERPL-SCNC: 3.8 MMOL/L — SIGNIFICANT CHANGE UP (ref 3.5–5.3)
PROT SERPL-MCNC: 7.2 GM/DL — SIGNIFICANT CHANGE UP (ref 6–8.3)
PROTHROM AB SERPL-ACNC: 11.4 SEC — SIGNIFICANT CHANGE UP (ref 9.8–12.7)
RBC # BLD: 4.22 M/UL — SIGNIFICANT CHANGE UP (ref 3.8–5.2)
RBC # FLD: 13.4 % — SIGNIFICANT CHANGE UP (ref 10.3–14.5)
SODIUM SERPL-SCNC: 140 MMOL/L — SIGNIFICANT CHANGE UP (ref 135–145)
TYPE + AB SCN PNL BLD: SIGNIFICANT CHANGE UP
WBC # BLD: 7.9 K/UL — SIGNIFICANT CHANGE UP (ref 3.8–10.5)
WBC # FLD AUTO: 7.9 K/UL — SIGNIFICANT CHANGE UP (ref 3.8–10.5)

## 2017-09-20 PROCEDURE — 71250 CT THORAX DX C-: CPT | Mod: 26

## 2017-09-20 PROCEDURE — 99285 EMERGENCY DEPT VISIT HI MDM: CPT

## 2017-09-20 PROCEDURE — 71020: CPT | Mod: 26

## 2017-09-20 PROCEDURE — 93010 ELECTROCARDIOGRAM REPORT: CPT

## 2017-09-20 PROCEDURE — 99233 SBSQ HOSP IP/OBS HIGH 50: CPT

## 2017-09-20 RX ORDER — TRAZODONE HCL 50 MG
50 TABLET ORAL AT BEDTIME
Qty: 0 | Refills: 0 | Status: DISCONTINUED | OUTPATIENT
Start: 2017-09-20 | End: 2017-09-22

## 2017-09-20 RX ORDER — TIOTROPIUM BROMIDE 18 UG/1
1 CAPSULE ORAL; RESPIRATORY (INHALATION) DAILY
Qty: 0 | Refills: 0 | Status: DISCONTINUED | OUTPATIENT
Start: 2017-09-20 | End: 2017-09-22

## 2017-09-20 RX ORDER — GABAPENTIN 400 MG/1
100 CAPSULE ORAL THREE TIMES A DAY
Qty: 0 | Refills: 0 | Status: DISCONTINUED | OUTPATIENT
Start: 2017-09-20 | End: 2017-09-22

## 2017-09-20 RX ORDER — DOCUSATE SODIUM 100 MG
100 CAPSULE ORAL THREE TIMES A DAY
Qty: 0 | Refills: 0 | Status: DISCONTINUED | OUTPATIENT
Start: 2017-09-20 | End: 2017-09-22

## 2017-09-20 RX ORDER — OXYCODONE AND ACETAMINOPHEN 5; 325 MG/1; MG/1
1 TABLET ORAL EVERY 6 HOURS
Qty: 0 | Refills: 0 | Status: DISCONTINUED | OUTPATIENT
Start: 2017-09-20 | End: 2017-09-22

## 2017-09-20 RX ORDER — INFLUENZA VIRUS VACCINE 15; 15; 15; 15 UG/.5ML; UG/.5ML; UG/.5ML; UG/.5ML
0.5 SUSPENSION INTRAMUSCULAR ONCE
Qty: 0 | Refills: 0 | Status: DISCONTINUED | OUTPATIENT
Start: 2017-09-20 | End: 2017-09-22

## 2017-09-20 RX ORDER — LEVOTHYROXINE SODIUM 125 MCG
112 TABLET ORAL DAILY
Qty: 0 | Refills: 0 | Status: DISCONTINUED | OUTPATIENT
Start: 2017-09-20 | End: 2017-09-22

## 2017-09-20 RX ADMIN — OXYCODONE AND ACETAMINOPHEN 1 TABLET(S): 5; 325 TABLET ORAL at 17:38

## 2017-09-20 RX ADMIN — GABAPENTIN 100 MILLIGRAM(S): 400 CAPSULE ORAL at 21:36

## 2017-09-20 RX ADMIN — Medication 100 MILLIGRAM(S): at 21:36

## 2017-09-20 RX ADMIN — Medication 100 MILLIGRAM(S): at 15:46

## 2017-09-20 RX ADMIN — Medication 112 MICROGRAM(S): at 15:46

## 2017-09-20 NOTE — CONSULT NOTE ADULT - SUBJECTIVE AND OBJECTIVE BOX
72 o female w/ PMHx Bolus emphysema who is recently s/p lower spine fusion (8/17) who had incidental finding of R PTX shortly after surgery. Pt had PTX managed conservatively and was readmitted for worsening SOB 2 weeks ago, she discharged without interventions to follow up as an outpatient. Pt presents today with worsening SOB and possible increase size in PTX.   Currently pt states that SOB is worse while ambulating. She denies any chest pain, productive cough, nausea, vomiting, syncope.     ICU Vital Signs Last 24 Hrs  T(C): 37.6 (20 Sep 2017 08:10), Max: 37.6 (20 Sep 2017 08:10)  T(F): 99.7 (20 Sep 2017 08:10), Max: 99.7 (20 Sep 2017 08:10)  HR: --  BP: 116/63 (20 Sep 2017 08:10) (116/63 - 116/63)  BP(mean): --  ABP: --  ABP(mean): --  RR: 18 (20 Sep 2017 08:10) (18 - 18)  SpO2: 94% (20 Sep 2017 08:10) (94% - 94%)                          12.2   7.9   )-----------( 280      ( 20 Sep 2017 09:05 )             37.1   09-20    140  |  108  |  10  ----------------------------<  138<H>  3.8   |  24  |  0.71    Ca    9.1      20 Sep 2017 09:05    TPro  7.2  /  Alb  3.2<L>  /  TBili  0.4  /  DBili  x   /  AST  17  /  ALT  17  /  AlkPhos  107  09-20    a/p:  71 yo female w/ extensive bullous disease previously admitted for PTX monitoring  - Pt is hemodynamically stable, sat 94% on 2l  - CT w/ mod/loculated PTX vs. large bolus   - Cont w/ conservative management  - Medicine consults   - CXR tomorrow  - ambulate as tolerated   - Will continue to follow

## 2017-09-20 NOTE — ED ADULT TRIAGE NOTE - CHIEF COMPLAINT QUOTE
Pt presents to ED c/o difficulty breathing. Pt reports hx of Emphesema sent by Dr Dowd. Pt reports sx on 8/2/17 where "they may have collapsed a lung during anesthesia".

## 2017-09-20 NOTE — ED PROVIDER NOTE - PROGRESS NOTE DETAILS
MD Viky PGY-4: Discussed case with Dr. Dickinson of IR, Dr. Álvarez of CT surgery and PMD Dr. Garcia. Patient will be admitted. Attending Yovany: PE: AAOx3 NAD, Cardiac S1S2 no mrg, Resp CTAB, Abd soft NTND, Neuro no focal deficits  Agree with resident plan - adm for IR vs CT surgery palcement of pigtail/chest tube. Pt stable in ED, sat at baseline, no resp distress, speaking full sentences

## 2017-09-20 NOTE — H&P ADULT - HISTORY OF PRESENT ILLNESS
73 yo female with PMH of DM2, Hypothyroidism, COPD and recent spine surgery 8/2 presents to ED with SOB. Pt was found to have a RUL PTX on CT scan on 8/5. She then developed increasing SOB 3 days ago and had subsequent CT chest which showed an increasing right PTX.    Pt was seen by pulmonary Dr. Dowd and also by cardio thoracic Surgery Kamille Tsai. No interventions were necessary. Pt is stable to discharge home. If develops SOB or increasing pain advised to return to ED.     · HPI Objective Statement: 73 yo F w/ hx of emphysema and known right apical pneumothorax presents for SOB. Patient is s/p laminectomy 1 month prior and at that time developed apical ptx which was being observed, repeat CT scan 5 days prior showed increase in size. Patient reports +SOB consistent with her ptx. Denies chest pain, fevers, productive cough, numbness/tingling, weakness. Baseline saturations 92-94% 73 yo female with PMH of DM2, Hypothyroidism, COPD and recent spine surgery 8/2 presents to ED with SOB. Pt was found to have a RUL PTX on CT scan on 8/5 after prolonged anesthesia/intubation for spinal surgery. She was discharged home with conservative instructions, but represented to the ED 2 weeks ago after outpatient CT chest showed increasing PTX. She was hospitalized and evaluated by pulmonology and cardiothoracic surgery and was found not to need further intervention and was discharged. She presents today with persistent shortness of breath. Dyspnea unchanged with activity and occurs at rest, worse at night and lying flat. Of note, she did see her spine surgeon yesterday and was given soma to help with sleep. She reports no chest pain, but baseline back pain for which she takes a percocet daily.     Upon ED evaluation, pt was able to maintain O2 saturation at rest. CT chest showed stable loculated PTX (per CTS read, official read pending). Medicine was consulted for evaluation and admission. 71 yo female with PMH of DM2, Hypothyroidism, COPD and recent spine surgery 8/2 presents to ED with SOB. Pt was found to have a RUL PTX on CT scan on 8/5 after prolonged anesthesia/intubation for spinal surgery. She was discharged home with conservative instructions, but represented to the ED 2 weeks ago after outpatient CT chest showed increasing PTX. She was hospitalized and evaluated by pulmonology and cardiothoracic surgery and was found not to need further intervention and was discharged. She presents today with persistent shortness of breath. Dyspnea unchanged with activity and occurs at rest, worse at night and lying flat. Of note, she did see her spine surgeon yesterday and was given soma to help with sleep. She reports no chest pain, but baseline back pain for which she takes a percocet daily.     Upon ED evaluation, pt was able to maintain O2 saturation at rest on supplemental O2, but did desaturate to 86% on room air per nursing. CT chest showed stable loculated PTX (per CTS read, official read pending). Medicine was consulted for evaluation and admission.

## 2017-09-20 NOTE — ED PROVIDER NOTE - OBJECTIVE STATEMENT
73 yo F w/ hx of emphysema and known right apical pneumothorax presents for SOB. Patient is s/p laminectomy 1 month prior and at that time developed apical ptx which was being observed, repeat CT scan 5 days prior showed increase in size. Patient reports +SOB consistent with her ptx. Denies chest pain, fevers, productive cough, numbness/tingling, weakness. Baseline saturations 92-94%

## 2017-09-20 NOTE — H&P ADULT - ASSESSMENT
73 yo female with PMH of DM2, Hypothyroidism, COPD and recent spine surgery 8/2 presents to ED with SOB. Pt was found to have a RUL PTX on CT scan on 8/5 after prolonged anesthesia/intubation for spinal surgery. She was discharged home with conservative instructions, but represented to the ED 2 weeks ago after outpatient CT chest showed increasing PTX. She was hospitalized and evaluated by pulmonology and cardiothoracic surgery and was found not to need further intervention and was discharged. She presents today with persistent shortness of breath. Dyspnea unchanged with activity and occurs at rest, worse at night and lying flat. Of note, she did see her spine surgeon yesterday and was given soma to help with sleep. She reports no chest pain, but baseline back pain for which she takes a percocet daily.     Upon ED evaluation, pt was able to maintain O2 saturation at rest. CT chest showed stable loculated PTX (per CTS read, official read pending). Medicine was consulted for evaluation and admission.     *nonresolving PTX due to ruptured bleb with intubation s/p spine surgery with known emphysematous COPD  -Await final CT chest read, but per PA for CTS, loculated and stable  -Pt stable on RA  -Continue spiriva  -Remote tobacco  -Await CTS, pulm consults, but likely need intervention as this is patients 3rd encounter for PTX    *Spinal stenosis s/p intervention 1 month ago  -OOB  -Continue home percocet  -soma PTA but concern given age and dependence risk, will hold for now    *DM type II without complication  -A1C 7.3 in August  -diet controlled PTA  -add sliding scale insulin if BMP glucose >200    *hypothyroidism  -continue synthroid    Await CTS definitive evaluation for discharge planning

## 2017-09-20 NOTE — ED PROVIDER NOTE - MEDICAL DECISION MAKING DETAILS
71 yo F w/ emphysema and known ptx reported to be increased on outpatient CT, currently in NAD, breathing comfortably, ambulatory, O2 at baseline, labs, xray, ekg, admit for IR pigtail placement.

## 2017-09-20 NOTE — CONSULT NOTE ADULT - ADDITIONAL PE
Gen: Lying in bed, no acute distress, Aox3  Pulm: Decreased b/s on R vs. L  Card: s1/s2 rrr no murmur  Abd: large body habitus, + bs, non tender  LE: Evidence of venous stasis, non-tender

## 2017-09-20 NOTE — ED PROVIDER NOTE - ATTENDING CONTRIBUTION TO CARE
I, James Pedroza MD, personally saw the patient with resident.  I have personally performed a face to face diagnostic evaluation on this patient.  I have reviewed the resident note and agree with the history, exam, and plan of care, except as noted.

## 2017-09-20 NOTE — ED PROVIDER NOTE - PMH
Acid reflux    Asthma  history of x 20 years ago, resolved does not use inhaler  Back pain    Basal cell carcinoma  removed from face  DDD (degenerative disc disease), lumbar    Diabetes mellitus  diet controlled  Diverticulosis of intestine without bleeding, unspecified intestinal tract location    Elevated pancreatic enzyme  patient reports elevated lab 6/2013. Gastroenterologist aware  Gall stones  gall bladder removed  History of cataract  extracted from right and left  Hypothyroidism    Pulmonary emphysema, unspecified emphysema type    Spinal stenosis of lumbar region    Urinary urgency    Varicose veins of both lower extremities  surgery

## 2017-09-20 NOTE — H&P ADULT - NSHPPHYSICALEXAM_GEN_ALL_CORE
PHYSICAL EXAM:    Constitutional: NAD, awake and alert, well-developed  HEENT: PERR, EOMI, Normal Hearing, MMM  Neck: Soft and supple, No LAD, No JVD  Respiratory: Breath sounds are clear bilaterally, decreased R  Cardiovascular: S1 and S2, regular rate and rhythm, no Murmurs, gallops or rubs  Gastrointestinal: Bowel Sounds present, soft, nontender, nondistended, no guarding, no rebound  Extremities: No peripheral edema  Vascular: 2+ peripheral pulses  Neurological: A/O x 3, no focal deficits  Musculoskeletal: moves all extrem  Skin: No rashes

## 2017-09-21 LAB
ANION GAP SERPL CALC-SCNC: 9 MMOL/L — SIGNIFICANT CHANGE UP (ref 5–17)
BUN SERPL-MCNC: 10 MG/DL — SIGNIFICANT CHANGE UP (ref 7–23)
CALCIUM SERPL-MCNC: 9.1 MG/DL — SIGNIFICANT CHANGE UP (ref 8.5–10.1)
CHLORIDE SERPL-SCNC: 106 MMOL/L — SIGNIFICANT CHANGE UP (ref 96–108)
CO2 SERPL-SCNC: 24 MMOL/L — SIGNIFICANT CHANGE UP (ref 22–31)
CREAT SERPL-MCNC: 0.63 MG/DL — SIGNIFICANT CHANGE UP (ref 0.5–1.3)
GLUCOSE SERPL-MCNC: 120 MG/DL — HIGH (ref 70–99)
HCT VFR BLD CALC: 37.7 % — SIGNIFICANT CHANGE UP (ref 34.5–45)
HGB BLD-MCNC: 12.4 G/DL — SIGNIFICANT CHANGE UP (ref 11.5–15.5)
MCHC RBC-ENTMCNC: 29 PG — SIGNIFICANT CHANGE UP (ref 27–34)
MCHC RBC-ENTMCNC: 32.8 GM/DL — SIGNIFICANT CHANGE UP (ref 32–36)
MCV RBC AUTO: 88.6 FL — SIGNIFICANT CHANGE UP (ref 80–100)
PLATELET # BLD AUTO: 284 K/UL — SIGNIFICANT CHANGE UP (ref 150–400)
POTASSIUM SERPL-MCNC: 4.5 MMOL/L — SIGNIFICANT CHANGE UP (ref 3.5–5.3)
POTASSIUM SERPL-SCNC: 4.5 MMOL/L — SIGNIFICANT CHANGE UP (ref 3.5–5.3)
RBC # BLD: 4.25 M/UL — SIGNIFICANT CHANGE UP (ref 3.8–5.2)
RBC # FLD: 13.7 % — SIGNIFICANT CHANGE UP (ref 10.3–14.5)
SODIUM SERPL-SCNC: 139 MMOL/L — SIGNIFICANT CHANGE UP (ref 135–145)
WBC # BLD: 9.3 K/UL — SIGNIFICANT CHANGE UP (ref 3.8–10.5)
WBC # FLD AUTO: 9.3 K/UL — SIGNIFICANT CHANGE UP (ref 3.8–10.5)

## 2017-09-21 PROCEDURE — 71010: CPT | Mod: 26

## 2017-09-21 PROCEDURE — 99232 SBSQ HOSP IP/OBS MODERATE 35: CPT

## 2017-09-21 RX ADMIN — Medication 112 MICROGRAM(S): at 05:28

## 2017-09-21 RX ADMIN — GABAPENTIN 100 MILLIGRAM(S): 400 CAPSULE ORAL at 21:02

## 2017-09-21 RX ADMIN — GABAPENTIN 100 MILLIGRAM(S): 400 CAPSULE ORAL at 13:15

## 2017-09-21 RX ADMIN — OXYCODONE AND ACETAMINOPHEN 1 TABLET(S): 5; 325 TABLET ORAL at 11:17

## 2017-09-21 RX ADMIN — GABAPENTIN 100 MILLIGRAM(S): 400 CAPSULE ORAL at 05:28

## 2017-09-21 RX ADMIN — OXYCODONE AND ACETAMINOPHEN 1 TABLET(S): 5; 325 TABLET ORAL at 19:37

## 2017-09-21 RX ADMIN — Medication 100 MILLIGRAM(S): at 05:28

## 2017-09-21 NOTE — PROGRESS NOTE ADULT - SUBJECTIVE AND OBJECTIVE BOX
PCP- DR Dowd    CC- SOB    HPI:  71 yo female with PMH of DM2, Hypothyroidism, COPD and recent spine surgery 8/2 presents to ED with SOB. Pt was found to have a RUL PTX on CT scan on 8/5 after prolonged anesthesia/intubation for spinal surgery. She was discharged home with conservative instructions, but represented to the ED 2 weeks ago after outpatient CT chest showed increasing PTX. She was hospitalized and evaluated by pulmonology and cardiothoracic surgery and was found not to need further intervention and was discharged. She presents today with persistent shortness of breath. Dyspnea unchanged with activity and occurs at rest, worse at night and lying flat. Of note, she did see her spine surgeon yesterday and was given soma to help with sleep. She reports no chest pain, but baseline back pain for which she takes a percocet daily.   Upon ED evaluation, pt was able to maintain O2 saturation at rest on supplemental O2, but did desaturate to 86% on room air per nursing. CT chest showed stable loculated PTX (per CTS read, official read pending). Medicine was consulted for evaluation and admission. (20 Sep 2017 10:40)    9/21/17- feels comfortable today    Review of system- All 10 systems reviewed and is as per HPI otherwise negative.     T(C): 36.8 (09-21-17 @ 11:01), Max: 36.9 (09-20-17 @ 17:49)  HR: 76 (09-21-17 @ 11:01) (76 - 85)  BP: 135/68 (09-21-17 @ 11:01) (116/60 - 137/60)  RR: 16 (09-21-17 @ 11:01) (16 - 18)  SpO2: 96% (09-21-17 @ 11:01) (95% - 97%)  Wt(kg): --    LABS:                        12.4   9.3   )-----------( 284      ( 21 Sep 2017 06:22 )             37.7     09-20    140  |  108  |  10  ----------------------------<  138<H>  3.8   |  24  |  0.71    Ca    9.1      20 Sep 2017 09:05    TPro  7.2  /  Alb  3.2<L>  /  TBili  0.4  /  DBili  x   /  AST  17  /  ALT  17  /  AlkPhos  107  09-20  PT/INR - ( 20 Sep 2017 09:05 )   PT: 11.4 sec;   INR: 1.05 ratio    PTT - ( 20 Sep 2017 09:05 )  PTT:26.9 sec    RADIOLOGY & ADDITIONAL TESTS:  EXAM:  CHEST SINGLE VIEW FRONTAL                        PROCEDURE DATE:  09/21/2017    INTERPRETATION:  Clinical information: Bullous disease. Shortness of   breath. History of collapsed right lung.    AP view of the chest from 0922 hours:     COMPARISON:  September 20, 2017    The cardiac, hilar and mediastinal contours are unremarkable. A bleb   measuring approximately 5.3 x 3.2 cm is noted at the right apex,   unchanged. There is additional lucency in the right upper lung compatible   with emphysema. There is no focal lung consolidation. The pneumothorax   described on CT from one day ago could not be visualized.    IMPRESSION:  No change as compared with September 20, 2017. Bullous emphysematous   changes.    PHYSICAL EXAM:  GENERAL: NAD, well-groomed, well-developed  HEAD:  Atraumatic, Normocephalic  EYES: EOMI, PERRLA, conjunctiva and sclera clear  HEENT: Moist mucous membranes  NECK: Supple, No JVD  NERVOUS SYSTEM:  Alert & Oriented X3, Motor Strength 5/5 B/L upper and lower extremities; DTRs 2+ intact and symmetric  CHEST/LUNG: Decrease air movement  HEART: Regular rate and rhythm; No murmurs, rubs, or gallops  ABDOMEN: Soft, Nontender, Nondistended; Bowel sounds present  GENITOURINARY- Voiding, no palpable bladder  EXTREMITIES:  2+ Peripheral Pulses, No clubbing, cyanosis, or edema  MUSCULOSKELTAL- No muscle tenderness, Muscle tone normal, No joint tenderness, no Joint swelling, Joint range of motion-normal  SKIN-no rash, no lesion  CNS- alert, oriented X3, non focal       influenza   Vaccine 0.5 milliLiter(s) IntraMuscular once  tiotropium 18 MICROgram(s) Capsule 1 Capsule(s) Inhalation daily  docusate sodium 100 milliGRAM(s) Oral three times a day  levothyroxine 112 MICROGram(s) Oral daily  oxyCODONE    5 mG/acetaminophen 325 mG 1 Tablet(s) Oral every 6 hours PRN  traZODone 50 milliGRAM(s) Oral at bedtime PRN  gabapentin 100 milliGRAM(s) Oral three times a day    Assessment/Plan  71 yo female with PMH of DM2, Hypothyroidism, COPD and recent spine surgery 8/2 presents to ED with SOB. Pt was found to have a RUL PTX on CT scan on 8/5 after prolonged anesthesia/intubation for spinal surgery. She was discharged home with conservative instructions, but represented to the ED 2 weeks ago after outpatient CT chest showed increasing PTX. She was hospitalized and evaluated by pulmonology and cardiothoracic surgery and was found not to need further intervention and was discharged. She presents today with persistent shortness of breath. Dyspnea unchanged with activity and occurs at rest, worse at night and lying flat. Of note, she did see her spine surgeon yesterday and was given soma to help with sleep. She reports no chest pain, but baseline back pain for which she takes a percocet daily.   Upon ED evaluation, pt was able to maintain O2 saturation at rest. CT chest showed stable loculated PTX (per CTS read, official read pending). Medicine was consulted for evaluation and admission.     #nonresolving PTX vs bullous disease  CTS eval appreciated  Serial CXR's  No surgical intervention recommended  Frederic Dowd    #Spinal stenosis s/p intervention 1 month ago  -OOB  -Continue home percocet  -soma PTA but concern given age and dependence risk, will hold for now    #DM type II without complication  -A1C 7.3 in August  -diet controlled PTA  -add sliding scale insulin if BMP glucose >200    #hypothyroidism  -continue synthroid    #Dispo- pulm eval, poss home in am
Pt seen, says she feel better since admission, RODRIGUEZ has improved.    MEDICATIONS  (STANDING):  influenza   Vaccine 0.5 milliLiter(s) IntraMuscular once  tiotropium 18 MICROgram(s) Capsule 1 Capsule(s) Inhalation daily  docusate sodium 100 milliGRAM(s) Oral three times a day  levothyroxine 112 MICROGram(s) Oral daily  gabapentin 100 milliGRAM(s) Oral three times a day    MEDICATIONS  (PRN):  oxyCODONE    5 mG/acetaminophen 325 mG 1 Tablet(s) Oral every 6 hours PRN Moderate Pain (4 - 6)  traZODone 50 milliGRAM(s) Oral at bedtime PRN insomnia      Allergies    No Known Allergies      Vital Signs Last 24 Hrs  T(C): 36.8 (21 Sep 2017 11:01), Max: 36.9 (20 Sep 2017 17:49)  T(F): 98.3 (21 Sep 2017 11:01), Max: 98.5 (20 Sep 2017 23:47)  HR: 76 (21 Sep 2017 11:01) (76 - 85)  BP: 135/68 (21 Sep 2017 11:01) (116/60 - 137/60)  BP(mean): --  RR: 16 (21 Sep 2017 11:01) (16 - 18)  SpO2: 96% (21 Sep 2017 11:01) (95% - 97%)    General: Comfortable  Cardiovascular: S1, S2  Respiratory: CTA      LABS:                        12.4   9.3   )-----------( 284      ( 21 Sep 2017 06:22 )             37.7     09-20    140  |  108  |  10  ----------------------------<  138<H>  3.8   |  24  |  0.71    Ca    9.1      20 Sep 2017 09:05    TPro  7.2  /  Alb  3.2<L>  /  TBili  0.4  /  DBili  x   /  AST  17  /  ALT  17  /  AlkPhos  107  09-20    PT/INR - ( 20 Sep 2017 09:05 )   PT: 11.4 sec;   INR: 1.05 ratio         PTT - ( 20 Sep 2017 09:05 )  PTT:26.9 sec      RADIOLOGY & ADDITIONAL TESTS:  < from: Xray Chest 1 View AP/PA. (09.21.17 @ 10:06) >  EXAM:  CHEST SINGLE VIEW FRONTAL                            PROCEDURE DATE:  09/21/2017          INTERPRETATION:  Clinical information: Bullous disease. Shortness of   breath. History of collapsed right lung.    AP view of the chest from 0922 hours:     COMPARISON:  September 20, 2017    The cardiac, hilar and mediastinal contours are unremarkable. A bleb   measuring approximately 5.3 x 3.2 cm is noted at the right apex,   unchanged. There is additional lucency in the right upper lung compatible   with emphysema. There is no focal lung consolidation. The pneumothorax   described on CT from one day ago could not be visualized.    IMPRESSION:    No change as compared with September 20, 2017. Bullous emphysematous   changes.                JUSTYN RG   This document has been electronically signed. Sep 21 2017 10:34AM        < end of copied text >

## 2017-09-22 ENCOUNTER — TRANSCRIPTION ENCOUNTER (OUTPATIENT)
Age: 72
End: 2017-09-22

## 2017-09-22 VITALS
RESPIRATION RATE: 16 BRPM | HEART RATE: 68 BPM | TEMPERATURE: 98 F | DIASTOLIC BLOOD PRESSURE: 62 MMHG | SYSTOLIC BLOOD PRESSURE: 123 MMHG | OXYGEN SATURATION: 93 %

## 2017-09-22 DIAGNOSIS — E11.9 TYPE 2 DIABETES MELLITUS WITHOUT COMPLICATIONS: ICD-10-CM

## 2017-09-22 DIAGNOSIS — I83.93 ASYMPTOMATIC VARICOSE VEINS OF BILATERAL LOWER EXTREMITIES: ICD-10-CM

## 2017-09-22 DIAGNOSIS — J43.9 EMPHYSEMA, UNSPECIFIED: ICD-10-CM

## 2017-09-22 DIAGNOSIS — J45.909 UNSPECIFIED ASTHMA, UNCOMPLICATED: ICD-10-CM

## 2017-09-22 PROCEDURE — 71010: CPT | Mod: 26

## 2017-09-22 RX ORDER — LEVOTHYROXINE SODIUM 125 MCG
1 TABLET ORAL
Qty: 0 | Refills: 0 | DISCHARGE
Start: 2017-09-22

## 2017-09-22 RX ORDER — DOCUSATE SODIUM 100 MG
1 CAPSULE ORAL
Qty: 0 | Refills: 0 | DISCHARGE
Start: 2017-09-22

## 2017-09-22 RX ORDER — CARISOPRODOL 250 MG
400 TABLET ORAL
Qty: 0 | Refills: 0 | COMMUNITY

## 2017-09-22 RX ORDER — TIOTROPIUM BROMIDE 18 UG/1
1 CAPSULE ORAL; RESPIRATORY (INHALATION)
Qty: 0 | Refills: 0 | COMMUNITY

## 2017-09-22 RX ORDER — TIOTROPIUM BROMIDE 18 UG/1
1 CAPSULE ORAL; RESPIRATORY (INHALATION)
Qty: 0 | Refills: 0 | DISCHARGE
Start: 2017-09-22

## 2017-09-22 RX ORDER — LEVOTHYROXINE SODIUM 125 MCG
1 TABLET ORAL
Qty: 0 | Refills: 0 | COMMUNITY

## 2017-09-22 RX ORDER — GABAPENTIN 400 MG/1
1 CAPSULE ORAL
Qty: 60 | Refills: 0
Start: 2017-09-22 | End: 2017-10-12

## 2017-09-22 RX ADMIN — GABAPENTIN 100 MILLIGRAM(S): 400 CAPSULE ORAL at 05:58

## 2017-09-22 RX ADMIN — GABAPENTIN 100 MILLIGRAM(S): 400 CAPSULE ORAL at 14:12

## 2017-09-22 RX ADMIN — OXYCODONE AND ACETAMINOPHEN 1 TABLET(S): 5; 325 TABLET ORAL at 11:30

## 2017-09-22 RX ADMIN — OXYCODONE AND ACETAMINOPHEN 1 TABLET(S): 5; 325 TABLET ORAL at 10:25

## 2017-09-22 RX ADMIN — TIOTROPIUM BROMIDE 1 CAPSULE(S): 18 CAPSULE ORAL; RESPIRATORY (INHALATION) at 07:52

## 2017-09-22 RX ADMIN — Medication 112 MICROGRAM(S): at 05:58

## 2017-09-22 NOTE — DISCHARGE NOTE ADULT - PATIENT PORTAL LINK FT
“You can access the [facility] Patient Portal, offered by [facility], by registering with the following website: [email or web address]”

## 2017-09-22 NOTE — DISCHARGE NOTE ADULT - CARE PLAN
Principal Discharge DX:	COPD (chronic obstructive pulmonary disease) with emphysema  Goal:	outpatient f/u  Instructions for follow-up, activity and diet:	as above

## 2017-09-22 NOTE — CONSULT NOTE ADULT - ASSESSMENT
discussed with Dr. Álvarez from Thoracic surgery. Most likely has enlarged bullae and not PTX. Can discharge if no change in CXR. Will f/u as outpatient.

## 2017-09-22 NOTE — CONSULT NOTE ADULT - SUBJECTIVE AND OBJECTIVE BOX
HPI:  71 yo female with PMH of DM2, Hypothyroidism, COPD and recent spine surgery 8/2 presents to ED with SOB. Pt was found to have a RUL PTX on CT scan on 8/5 after prolonged anesthesia/intubation for spinal surgery. She was discharged home with conservative instructions, but represented to the ED 2 weeks ago after outpatient CT chest showed increasing PTX. She was hospitalized and evaluated by pulmonology and cardiothoracic surgery and was found not to need further intervention and was discharged. She presents today with persistent shortness of breath. Dyspnea unchanged with activity and occurs at rest, worse at night and lying flat. CT scan reviewed with Dr. Álvarez. Most likely enlarged bullae and not PTX. Patient is asymptomatic now.           PAST MEDICAL & SURGICAL HISTORY:  Pulmonary emphysema, unspecified emphysema type  Basal cell carcinoma: removed from face  DDD (degenerative disc disease), lumbar  Spinal stenosis of lumbar region  Urinary urgency  Gall stones: gall bladder removed  Diverticulosis of intestine without bleeding, unspecified intestinal tract location  Varicose veins of both lower extremities: surgery  History of cataract: extracted from right and left  Elevated pancreatic enzyme: patient reports elevated lab 6/2013. Gastroenterologist aware  Asthma: history of x 20 years ago, resolved does not use inhaler  Back pain  Diabetes mellitus: diet controlled  Hypothyroidism  Acid reflux  H/O lumbosacral spine surgery  Skin cancer: to right temple 2012, removed, history of basal cell x 2  S/P knee surgery: arthroscopy bilateral 2007 and 2010  Dental disorder: Dental surgery 12/2012, patient reports that upper portion is glued in at this time, plan is to have dental implants placed.  S/P bladder repair: bladder lift 2/2013      MEDICATIONS  (STANDING):  influenza   Vaccine 0.5 milliLiter(s) IntraMuscular once  tiotropium 18 MICROgram(s) Capsule 1 Capsule(s) Inhalation daily  docusate sodium 100 milliGRAM(s) Oral three times a day  levothyroxine 112 MICROGram(s) Oral daily  gabapentin 100 milliGRAM(s) Oral three times a day    MEDICATIONS  (PRN):  oxyCODONE    5 mG/acetaminophen 325 mG 1 Tablet(s) Oral every 6 hours PRN Moderate Pain (4 - 6)  traZODone 50 milliGRAM(s) Oral at bedtime PRN insomnia      Allergies    No Known Allergies    Intolerances        SOCIAL HISTORY: Denies tobacco, etoh abuse or illicit drug use    FAMILY HISTORY:  Family history of stroke (Mother)  Family history of heart disease (Mother)  Family history of pancreatic cancer (Father)      Vital Signs Last 24 Hrs  T(C): 36.5 (22 Sep 2017 06:02), Max: 37.2 (21 Sep 2017 23:32)  T(F): 97.7 (22 Sep 2017 06:02), Max: 98.9 (21 Sep 2017 23:32)  HR: 80 (22 Sep 2017 07:54) (70 - 81)  BP: 136/66 (22 Sep 2017 06:02) (123/58 - 136/66)  BP(mean): --  RR: 16 (22 Sep 2017 06:02) (16 - 16)  SpO2: 97% (22 Sep 2017 07:54) (94% - 99%)    REVIEW OF SYSTEMS:    CONSTITUTIONAL:  As per HPI.  SKIN: no rashes  HEENT:  Eyes:  No diplopia or blurred vision. ENT:  No earache, sore throat or runny nose.  CARDIOVASCULAR:  No pressure, squeezing, tightness, heaviness or aching about the chest, neck, axilla or epigastrium.  RESPIRATORY:  No cough, shortness of breath, PND or orthopnea.  GASTROINTESTINAL:  No nausea, vomiting or diarrhea.  GENITOURINARY:  No dysuria, frequency or urgency.  MUSCULOSKELETAL:  As per HPI.  SKIN:  No change in skin, hair or nails.  NEUROLOGIC:  No paresthesias, fasciculations, seizures or weakness.  PSYCHIATRIC:  No disorder of thought or mood.  ENDOCRINE:  No heat or cold intolerance, polyuria or polydipsia.  HEMATOLOGICAL:  No easy bruising or bleedings:  .     PHYSICAL EXAMINATION:    GENERAL APPEARANCE:  Pt. is not currently dyspneic, in no distress. Pt. is alert, oriented, and pleasant.  HEENT:  Pupils are normal and react normally. No icterus. Mucous membranes well colored.  NECK:  Supple. No lymphadenopathy. Jugular venous pressure not elevated. Carotids equal.   HEART:   The cardiac impulse has a normal quality. Regular. Normal S1 and S2. There are no murmurs, rubs or gallops noted  CHEST:  Chest is clear to auscultation. Normal respiratory effort.  ABDOMEN:  Soft and nontender.   EXTREMITIES:  There is no cyanosis, clubbing or edema.   SKIN:  No rash or significant lesions are noted.    LABS:                        12.4   9.3   )-----------( 284      ( 21 Sep 2017 06:22 )             37.7     09-21    139  |  106  |  10  ----------------------------<  120<H>  4.5   |  24  |  0.63    Ca    9.1      21 Sep 2017 06:22                    RADIOLOGY & ADDITIONAL STUDIES:

## 2017-09-22 NOTE — DISCHARGE NOTE ADULT - HOSPITAL COURSE
PCP- DR Dowd  CC- Patient is a 72y old  Female who presents with a chief complaint of increasing PTX (20 Sep 2017 10:40)  HPI:  71 yo female with PMH of DM2, Hypothyroidism, COPD and recent spine surgery 8/2 presents to ED with SOB. Pt was found to have a RUL PTX on CT scan on 8/5 after prolonged anesthesia/intubation for spinal surgery. She was discharged home with conservative instructions, but represented to the ED 2 weeks ago after outpatient CT chest showed increasing PTX. She was hospitalized and evaluated by pulmonology and cardiothoracic surgery and was found not to need further intervention and was discharged. She presents today with persistent shortness of breath. Dyspnea unchanged with activity and occurs at rest, worse at night and lying flat. Of note, she did see her spine surgeon yesterday and was given soma to help with sleep. She reports no chest pain, but baseline back pain for which she takes a percocet daily.   Upon ED evaluation, pt was able to maintain O2 saturation at rest on supplemental O2, but did desaturate to 86% on room air per nursing. CT chest showed stable loculated PTX (per CTS read, official read pending). Medicine was consulted for evaluation and admission. (20 Sep 2017 10:40)  Hospital course- uneventful. Seen by CTS team and pulmonologist- stable for DC home  Review of system- All 10 systems reviewed and is as per HPI otherwise negative.   T(C): 36.6 (09-22-17 @ 11:29), Max: 37.2 (09-21-17 @ 23:32)  HR: 68 (09-22-17 @ 11:29) (68 - 81)  BP: 123/62 (09-22-17 @ 11:29) (123/58 - 136/66)  RR: 16 (09-22-17 @ 11:29) (16 - 16)  SpO2: 93% (09-22-17 @ 11:29) (93% - 99%)  Wt(kg): --  LABS:                        12.4   9.3   )-----------( 284      ( 21 Sep 2017 06:22 )             37.7   09-21    139  |  106  |  10  ----------------------------<  120<H>  4.5   |  24  |  0.63    Ca    9.1      21 Sep 2017 06:22    PHYSICAL EXAM:  GENERAL: NAD, well-groomed, well-developed  HEAD:  Atraumatic, Normocephalic  EYES: EOMI, PERRLA, conjunctiva and sclera clear  HEENT: Moist mucous membranes  NECK: Supple, No JVD  NERVOUS SYSTEM:  Alert & Oriented X3, Motor Strength 5/5 B/L upper and lower extremities; DTRs 2+ intact and symmetric  CHEST/LUNG: Clear to auscultation bilaterally; No rales, rhonchi, wheezing, or rubs  HEART: Regular rate and rhythm; No murmurs, rubs, or gallops  ABDOMEN: Soft, Nontender, Nondistended; Bowel sounds present  GENITOURINARY- Voiding, no palpable bladder  EXTREMITIES:  2+ Peripheral Pulses, No clubbing, cyanosis, or edema  MUSCULOSKELTAL- No muscle tenderness, Muscle tone normal, No joint tenderness, no Joint swelling, Joint range of motion-normal  SKIN-no rash, no lesion  CNS- alert, oriented X3, non focal     Assessment/Plan  #SOB likely 2 to emphysema, no exacerbation  CXR findings likely 2 to stable bulla there. Does not meet criteria for home O2 as pulse ox 93% on RA  Outpatient f/u with DR Dowd- d/w on the phone, cleared for discharge  #COPD/emphysema- stable, chronic  #Hypothyroidism- stable  #Dispo- home today

## 2017-09-22 NOTE — DISCHARGE NOTE ADULT - MEDICATION SUMMARY - MEDICATIONS TO STOP TAKING
I will STOP taking the medications listed below when I get home from the hospital:    Soma  -- 400 milligram(s) by mouth once a day (at bedtime)

## 2017-09-22 NOTE — DISCHARGE NOTE ADULT - CARE PROVIDER_API CALL
James Dowd), Internal Medicine; Pulmonary Disease  [address] [place] [address]  Phone: [phone]  Fax: [phone]    Saravanan Álvarez), Thoracic Surgery  [address] [place] [address]  Phone: [phone]  Fax: [phone]

## 2017-09-22 NOTE — DISCHARGE NOTE ADULT - CARE PROVIDERS DIRECT ADDRESSES
[email or web address]

## 2017-09-22 NOTE — DISCHARGE NOTE ADULT - MEDICATION SUMMARY - MEDICATIONS TO TAKE
I will START or STAY ON the medications listed below when I get home from the hospital:    gabapentin 100 mg oral capsule  -- 1 cap(s) by mouth 3 times a day  -- Indication: For neuropathy    tiotropium 18 mcg inhalation capsule  -- 1 cap(s) inhaled once a day  -- Indication: For COPD (chronic obstructive pulmonary disease) with emphysema    docusate sodium 100 mg oral capsule  -- 1 cap(s) by mouth 3 times a day  -- Indication: For Bowel regimen    pantoprazole 40 mg oral delayed release tablet  -- 1 tab(s) by mouth once a day  -- Indication: For GERD    levothyroxine 112 mcg (0.112 mg) oral tablet  -- 1 tab(s) by mouth once a day  -- Indication: For hypothyroidism

## 2017-09-26 DIAGNOSIS — J45.909 UNSPECIFIED ASTHMA, UNCOMPLICATED: ICD-10-CM

## 2017-09-26 DIAGNOSIS — E11.9 TYPE 2 DIABETES MELLITUS WITHOUT COMPLICATIONS: ICD-10-CM

## 2017-09-26 DIAGNOSIS — J93.9 PNEUMOTHORAX, UNSPECIFIED: ICD-10-CM

## 2017-09-26 DIAGNOSIS — Z87.891 PERSONAL HISTORY OF NICOTINE DEPENDENCE: ICD-10-CM

## 2017-09-26 DIAGNOSIS — J43.9 EMPHYSEMA, UNSPECIFIED: ICD-10-CM

## 2017-09-26 DIAGNOSIS — K21.9 GASTRO-ESOPHAGEAL REFLUX DISEASE WITHOUT ESOPHAGITIS: ICD-10-CM

## 2017-09-26 DIAGNOSIS — E03.9 HYPOTHYROIDISM, UNSPECIFIED: ICD-10-CM

## 2017-09-29 ENCOUNTER — APPOINTMENT (OUTPATIENT)
Dept: INTERNAL MEDICINE | Facility: CLINIC | Age: 72
End: 2017-09-29
Payer: MEDICARE

## 2017-09-29 VITALS
BODY MASS INDEX: 32.43 KG/M2 | TEMPERATURE: 98.3 F | SYSTOLIC BLOOD PRESSURE: 136 MMHG | HEIGHT: 68 IN | WEIGHT: 214 LBS | OXYGEN SATURATION: 95 % | HEART RATE: 86 BPM | DIASTOLIC BLOOD PRESSURE: 80 MMHG | RESPIRATION RATE: 18 BRPM

## 2017-09-29 PROCEDURE — 94727 GAS DIL/WSHOT DETER LNG VOL: CPT

## 2017-09-29 PROCEDURE — 94060 EVALUATION OF WHEEZING: CPT

## 2017-09-29 PROCEDURE — 94729 DIFFUSING CAPACITY: CPT

## 2017-09-29 PROCEDURE — ZZZZZ: CPT

## 2017-09-29 PROCEDURE — 99214 OFFICE O/P EST MOD 30 MIN: CPT | Mod: 25

## 2017-11-01 ENCOUNTER — APPOINTMENT (OUTPATIENT)
Dept: INTERNAL MEDICINE | Facility: CLINIC | Age: 72
End: 2017-11-01

## 2017-11-02 ENCOUNTER — APPOINTMENT (OUTPATIENT)
Dept: INTERNAL MEDICINE | Facility: CLINIC | Age: 72
End: 2017-11-02

## 2017-11-27 ENCOUNTER — RX RENEWAL (OUTPATIENT)
Age: 72
End: 2017-11-27

## 2017-11-29 ENCOUNTER — APPOINTMENT (OUTPATIENT)
Dept: DERMATOLOGY | Facility: CLINIC | Age: 72
End: 2017-11-29

## 2017-12-04 ENCOUNTER — FORM ENCOUNTER (OUTPATIENT)
Age: 72
End: 2017-12-04

## 2017-12-05 ENCOUNTER — APPOINTMENT (OUTPATIENT)
Dept: CT IMAGING | Facility: CLINIC | Age: 72
End: 2017-12-05
Payer: MEDICARE

## 2017-12-05 ENCOUNTER — OUTPATIENT (OUTPATIENT)
Dept: OUTPATIENT SERVICES | Facility: HOSPITAL | Age: 72
LOS: 1 days | End: 2017-12-05
Payer: MEDICARE

## 2017-12-05 DIAGNOSIS — J93.9 PNEUMOTHORAX, UNSPECIFIED: ICD-10-CM

## 2017-12-05 DIAGNOSIS — Z98.890 OTHER SPECIFIED POSTPROCEDURAL STATES: Chronic | ICD-10-CM

## 2017-12-05 PROCEDURE — 71250 CT THORAX DX C-: CPT

## 2017-12-05 PROCEDURE — 71250 CT THORAX DX C-: CPT | Mod: 26

## 2017-12-11 ENCOUNTER — APPOINTMENT (OUTPATIENT)
Dept: INTERNAL MEDICINE | Facility: CLINIC | Age: 72
End: 2017-12-11
Payer: MEDICARE

## 2017-12-11 VITALS
BODY MASS INDEX: 31.83 KG/M2 | TEMPERATURE: 97.7 F | RESPIRATION RATE: 16 BRPM | OXYGEN SATURATION: 95 % | SYSTOLIC BLOOD PRESSURE: 120 MMHG | HEIGHT: 68 IN | HEART RATE: 77 BPM | WEIGHT: 210 LBS | DIASTOLIC BLOOD PRESSURE: 80 MMHG

## 2017-12-11 PROCEDURE — 94060 EVALUATION OF WHEEZING: CPT

## 2017-12-11 PROCEDURE — 99214 OFFICE O/P EST MOD 30 MIN: CPT | Mod: 25

## 2017-12-11 PROCEDURE — 94727 GAS DIL/WSHOT DETER LNG VOL: CPT

## 2017-12-11 PROCEDURE — 94729 DIFFUSING CAPACITY: CPT

## 2017-12-13 ENCOUNTER — APPOINTMENT (OUTPATIENT)
Dept: UROLOGY | Facility: CLINIC | Age: 72
End: 2017-12-13

## 2018-05-24 ENCOUNTER — RX RENEWAL (OUTPATIENT)
Age: 73
End: 2018-05-24

## 2018-06-21 ENCOUNTER — NON-APPOINTMENT (OUTPATIENT)
Age: 73
End: 2018-06-21

## 2018-06-21 ENCOUNTER — APPOINTMENT (OUTPATIENT)
Dept: INTERNAL MEDICINE | Facility: CLINIC | Age: 73
End: 2018-06-21
Payer: MEDICARE

## 2018-06-21 VITALS
WEIGHT: 204 LBS | BODY MASS INDEX: 30.92 KG/M2 | HEART RATE: 76 BPM | HEIGHT: 68 IN | OXYGEN SATURATION: 97 % | RESPIRATION RATE: 16 BRPM | SYSTOLIC BLOOD PRESSURE: 132 MMHG | TEMPERATURE: 98.3 F | DIASTOLIC BLOOD PRESSURE: 74 MMHG

## 2018-06-21 DIAGNOSIS — J93.9 PNEUMOTHORAX, UNSPECIFIED: ICD-10-CM

## 2018-06-21 PROCEDURE — 94060 EVALUATION OF WHEEZING: CPT

## 2018-06-21 PROCEDURE — 99214 OFFICE O/P EST MOD 30 MIN: CPT | Mod: 25

## 2018-06-21 RX ORDER — CARISOPRODOL 350 MG/1
350 TABLET ORAL
Qty: 30 | Refills: 0 | Status: DISCONTINUED | COMMUNITY
Start: 2017-09-19 | End: 2018-06-21

## 2018-06-21 RX ORDER — METHENAMINE HIPPURATE 1 G/1
1 TABLET ORAL
Qty: 60 | Refills: 6 | Status: DISCONTINUED | COMMUNITY
Start: 2018-05-24 | End: 2018-06-21

## 2018-06-21 RX ORDER — VALACYCLOVIR 1 G/1
1 TABLET, FILM COATED ORAL
Qty: 21 | Refills: 0 | Status: DISCONTINUED | COMMUNITY
Start: 2017-12-22 | End: 2018-06-21

## 2018-06-21 RX ORDER — METHYLPREDNISOLONE 4 MG/1
4 TABLET ORAL
Qty: 21 | Refills: 0 | Status: DISCONTINUED | COMMUNITY
Start: 2017-12-22 | End: 2018-06-21

## 2018-06-21 RX ORDER — CIPROFLOXACIN HYDROCHLORIDE 250 MG/1
250 TABLET, FILM COATED ORAL
Qty: 10 | Refills: 0 | Status: DISCONTINUED | COMMUNITY
Start: 2018-01-19 | End: 2018-06-21
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
## 2018-07-16 PROBLEM — N39.0 URINARY TRACT INFECTION, SITE NOT SPECIFIED: Chronic | Status: INACTIVE | Noted: 2017-07-25 | Resolved: 2017-08-30

## 2018-07-27 PROBLEM — Z85.828 HISTORY OF BASAL CELL CARCINOMA: Status: RESOLVED | Noted: 2017-03-24 | Resolved: 2018-07-27

## 2018-11-30 ENCOUNTER — RX RENEWAL (OUTPATIENT)
Age: 73
End: 2018-11-30

## 2018-12-14 ENCOUNTER — MEDICATION RENEWAL (OUTPATIENT)
Age: 73
End: 2018-12-14

## 2018-12-14 PROBLEM — I83.93 ASYMPTOMATIC VARICOSE VEINS OF BILATERAL LOWER EXTREMITIES: Chronic | Status: ACTIVE | Noted: 2017-07-25

## 2018-12-14 PROBLEM — M48.06 SPINAL STENOSIS, LUMBAR REGION: Chronic | Status: ACTIVE | Noted: 2017-07-25

## 2018-12-14 PROBLEM — M51.36 OTHER INTERVERTEBRAL DISC DEGENERATION, LUMBAR REGION: Chronic | Status: ACTIVE | Noted: 2017-07-25

## 2018-12-14 PROBLEM — K80.20 CALCULUS OF GALLBLADDER WITHOUT CHOLECYSTITIS WITHOUT OBSTRUCTION: Chronic | Status: ACTIVE | Noted: 2017-07-25

## 2018-12-14 PROBLEM — K57.90 DIVERTICULOSIS OF INTESTINE, PART UNSPECIFIED, WITHOUT PERFORATION OR ABSCESS WITHOUT BLEEDING: Chronic | Status: ACTIVE | Noted: 2017-07-25

## 2018-12-14 PROBLEM — C44.91 BASAL CELL CARCINOMA OF SKIN, UNSPECIFIED: Chronic | Status: ACTIVE | Noted: 2017-07-25

## 2018-12-14 PROBLEM — R39.15 URGENCY OF URINATION: Chronic | Status: ACTIVE | Noted: 2017-07-25

## 2018-12-14 PROBLEM — Z86.69 PERSONAL HISTORY OF OTHER DISEASES OF THE NERVOUS SYSTEM AND SENSE ORGANS: Chronic | Status: ACTIVE | Noted: 2017-07-25

## 2018-12-27 ENCOUNTER — APPOINTMENT (OUTPATIENT)
Dept: INTERNAL MEDICINE | Facility: CLINIC | Age: 73
End: 2018-12-27
Payer: MEDICARE

## 2018-12-27 VITALS
OXYGEN SATURATION: 95 % | BODY MASS INDEX: 31.22 KG/M2 | RESPIRATION RATE: 16 BRPM | HEART RATE: 87 BPM | WEIGHT: 206 LBS | HEIGHT: 68 IN | TEMPERATURE: 98.1 F | SYSTOLIC BLOOD PRESSURE: 122 MMHG | DIASTOLIC BLOOD PRESSURE: 70 MMHG

## 2018-12-27 PROCEDURE — 94729 DIFFUSING CAPACITY: CPT

## 2018-12-27 PROCEDURE — 94727 GAS DIL/WSHOT DETER LNG VOL: CPT

## 2018-12-27 PROCEDURE — 94060 EVALUATION OF WHEEZING: CPT

## 2018-12-27 PROCEDURE — 99214 OFFICE O/P EST MOD 30 MIN: CPT | Mod: 25

## 2019-03-26 ENCOUNTER — APPOINTMENT (OUTPATIENT)
Dept: UROLOGY | Facility: CLINIC | Age: 74
End: 2019-03-26
Payer: MEDICARE

## 2019-03-26 PROCEDURE — 51798 US URINE CAPACITY MEASURE: CPT

## 2019-03-26 PROCEDURE — 99214 OFFICE O/P EST MOD 30 MIN: CPT | Mod: 25

## 2019-03-26 NOTE — PHYSICAL EXAM
[General Appearance - Well Developed] : well developed [General Appearance - Well Nourished] : well nourished [Normal Appearance] : normal appearance [Well Groomed] : well groomed [General Appearance - In No Acute Distress] : no acute distress [] : no respiratory distress [Oriented To Time, Place, And Person] : oriented to person, place, and time [FreeTextEntry1] : urethral hypermobility,cystocele styage 1-2 , neg CST, no rectocele .

## 2019-03-26 NOTE — HISTORY OF PRESENT ILLNESS
[FreeTextEntry1] : \par 74 yo woman with PSH "bladder lift" on 2013 with hx   recurrent UTIs, 3 last year and 1 this year.She had stopped Hiprex since May 2018 , continued   with Premarin and probiotics  and reports 2  UTI since  may 2018 .Reports foul odor, increased frequency and increased urgency .She recently took Bactrim X 3 weeks in mid feb for a UTI, while in [place] ,no labs with pt, will fax us same .UDS done at [facility] demonstrated normal custodial with normal compliance. Voiding phase did not demonstrate SAMPSON.Last Renal US 2016 - no stones or mass or hydronephrosis ,  ml.\par Voids q 3-4 h day time, frequently double voids  and nocturia  2 , rare UUI , more at night .Fluids : 48 oz water, 1 cup of coffee.  Denies any irritative or obstructive  voiding symptoms. Denies prolapse sensation. Denies constipation.\par Today we will send UA and cx .\par PVR -174 ml\par \par \par \par

## 2019-03-26 NOTE — ASSESSMENT
[FreeTextEntry1] : \par \par Impression/plan: 73   y.o woman with PMH of recurrent UTI ,incomplete bladder emptying , 2 UTI since stopping Hipprex , doing well with Premarin and probiotics.Cystocele stage 1-2 , neg CST , recommend pessary \par 1.Trial of Pessary for cystocele stage 1-2 .\par 2.Continue ,Premarin and probiotics.\par 3.Urine for UA and cx -call  for results. \par 4.RTO in 6  months.

## 2019-03-27 LAB
APPEARANCE: CLEAR
BACTERIA: NEGATIVE
BILIRUBIN URINE: NEGATIVE
BLOOD URINE: NEGATIVE
COLOR: NORMAL
GLUCOSE QUALITATIVE U: NEGATIVE
HYALINE CASTS: 0 /LPF
KETONES URINE: NEGATIVE
LEUKOCYTE ESTERASE URINE: NEGATIVE
MICROSCOPIC-UA: NORMAL
NITRITE URINE: NEGATIVE
PH URINE: 6.5
PROTEIN URINE: NEGATIVE
RED BLOOD CELLS URINE: 1 /HPF
SPECIFIC GRAVITY URINE: 1.01
SQUAMOUS EPITHELIAL CELLS: 1 /HPF
UROBILINOGEN URINE: NORMAL
WHITE BLOOD CELLS URINE: 0 /HPF

## 2019-03-28 LAB — BACTERIA UR CULT: NORMAL

## 2019-04-03 ENCOUNTER — APPOINTMENT (OUTPATIENT)
Dept: ORTHOPEDIC SURGERY | Facility: CLINIC | Age: 74
End: 2019-04-03

## 2019-04-22 ENCOUNTER — APPOINTMENT (OUTPATIENT)
Dept: DERMATOLOGY | Facility: CLINIC | Age: 74
End: 2019-04-22
Payer: MEDICARE

## 2019-04-22 VITALS — HEIGHT: 68 IN | WEIGHT: 205 LBS | BODY MASS INDEX: 31.07 KG/M2

## 2019-04-22 PROCEDURE — 17000 DESTRUCT PREMALG LESION: CPT | Mod: 59

## 2019-04-22 PROCEDURE — 99213 OFFICE O/P EST LOW 20 MIN: CPT | Mod: 25

## 2019-04-22 PROCEDURE — 17110 DESTRUCTION B9 LES UP TO 14: CPT

## 2019-04-22 NOTE — HISTORY OF PRESENT ILLNESS
[FreeTextEntry1] : Patient presents for skin examination. [de-identified] : Notes lesions of the left lateral breast and bilateral neck.  Irritated, growth and itching.  No self tx.

## 2019-04-22 NOTE — ASSESSMENT
[FreeTextEntry1] : A complete skin examination was performed.  There is no evidence of skin cancer.  We discussed the importance of photoprotection, including the use of hats, protective clothing and sunscreens with an SPF of at least 30.  Sun avoidance was also discussed.\par \par Discussed IPL for the face.\par Cosmetic at [facility]/tx.\par Risks/benefits discussed.

## 2019-04-22 NOTE — PHYSICAL EXAM
[Alert] : alert [Well Nourished] : well nourished [Oriented x 3] : ~L oriented x 3 [Eyelids] : Eyelids [Full Body Skin Exam Performed] : performed [Ears] : Ears [Lips] : Lips [FreeTextEntry3] : A full skin exam was performed including the scalp, face, neck, chest, abdomen, back, buttocks, upper extremities and lower extremities.  The patient declined examination of the breasts and genitalia.  \par The exam did not reveal any evidence of skin cancer, showing only the following benign growths:\par [employer] pigmented nevi.\par Seborrheic keratoses.\par Lentigines.\par \par Keratotic papule of the left upper lip.\par \par Greasy tan papules with erythematous base - left lateral breast, left and right neck.\par \par  [Neck] : Neck

## 2019-05-07 ENCOUNTER — RX RENEWAL (OUTPATIENT)
Age: 74
End: 2019-05-07

## 2019-05-07 ENCOUNTER — APPOINTMENT (OUTPATIENT)
Dept: ORTHOPEDIC SURGERY | Facility: CLINIC | Age: 74
End: 2019-05-07
Payer: MEDICARE

## 2019-05-07 VITALS
BODY MASS INDEX: 32.65 KG/M2 | WEIGHT: 208 LBS | SYSTOLIC BLOOD PRESSURE: 105 MMHG | DIASTOLIC BLOOD PRESSURE: 69 MMHG | HEIGHT: 67 IN | TEMPERATURE: 98.6 F | HEART RATE: 81 BPM

## 2019-05-07 PROCEDURE — 73560 X-RAY EXAM OF KNEE 1 OR 2: CPT | Mod: 50,TC

## 2019-05-07 PROCEDURE — 73565 X-RAY EXAM OF KNEES: CPT | Mod: TC

## 2019-05-07 PROCEDURE — 99214 OFFICE O/P EST MOD 30 MIN: CPT

## 2019-05-10 NOTE — REVIEW OF SYSTEMS
[Joint Pain] : joint pain [Joint Stiffness] : joint stiffness [Joint Swelling] : joint swelling [Depression] : depression [Negative] : Heme/Lymph

## 2019-05-10 NOTE — HISTORY OF PRESENT ILLNESS
[6] : the relief from treatment is 6/10 [8] : the ailment interference is 8/10 [10  (interferes completely)] : the ailment interference is10/10 (interferes completely) [9] : the ailment interference is 9/10 [de-identified] : Maya comes in today with complaints to her bilateral knees.  The patient states the onset/injury occurred 10 years ago.  This injury is not due to an automobile accident.  The patient states the pain is radiating.  The patient describes the pain as dull and achy. The patient states rest makes the symptoms better while stairs make the symptoms worse.    [] : No [de-identified] : Myofascial release [de-identified] : None

## 2019-05-10 NOTE — PHYSICAL EXAM
[de-identified] : Right knee:\par Knee: Range of Motion in Degrees	\par 	                  Claimant:	Normal:	\par Flexion Active	  135 	                135-degrees	\par Flexion Passive	  135	                135-degrees	\par Extension Active	  0-5	                0-5-degrees	\par Extension Passive	  0-5	                0-5-degrees	\par \par No weakness to flexion/extension. No evidence of instability in the AP plane or varus or valgus stress.  Negative  Lachman.  Negative pivot shift.  Negative anterior drawer test.  Negative posterior drawer test.  Negative Marissa.  Negative Apley grind.  No medial or lateral joint line tenderness.  Positive tenderness over the medial and lateral facet of the patella.  Positive patellofemoral crepitations.  No lateral tilting patella.  No patella apprehension.  Positive crepitation in the medial and lateral femoral condyle.  No proximal or distal swelling, edema or tenderness.  No gross motor or sensory deficits. Mild intra-articular swelling.  2+ DP and PT pulses. No varus or valgus malalignment.  Skin is intact.  No rashes, scars or lesions. \par \par \par Left knee:\par Knee: Range of Motion in Degrees	\par 	                  Claimant:	Normal:	\par Flexion Active	  135 	                135-degrees	\par Flexion Passive	  135	                135-degrees	\par Extension Active	  0-5	                0-5-degrees	\par Extension Passive	  0-5	                0-5-degrees	\par \par No weakness to flexion/extension. No evidence of instability in the AP plane or varus or valgus stress.  Negative  Lachman.  Negative pivot shift.  Negative anterior drawer test.  Negative posterior drawer test.  Negative Marissa.  Negative Apley grind.  No medial or lateral joint line tenderness.  Positive tenderness over the medial and lateral facet of the patella.  Positive patellofemoral crepitations.  No lateral tilting patella.  No patella apprehension.  Positive crepitation in the medial and lateral femoral condyle.  No proximal or distal swelling, edema or tenderness.  No gross motor or sensory deficits. Mild intra-articular swelling.  2+ DP and PT pulses. No varus or valgus malalignment.  Skin is intact.  No rashes, scars or lesions.  [de-identified] : Gait: Slightly antalgic to antalgic gait.  Station: Normal  [de-identified] : Appearance: Well-developed, well-nourished female  in no acute distress.  [de-identified] : X-ray examination, one to two views of the right knee, one to two views of the left knee and one view of bilateral knees, reveals bilateral knee OA, bone on bone osteoarthritis, especially in the right compared to the left.

## 2019-05-10 NOTE — DISCUSSION/SUMMARY
[de-identified] : The patient presents with OA of bilateral knees.  We are going to order Durolane for her bilateral knees.  She is advised to return possibly next week to get the injection.  A discussion of total knee replacement was had with the patient.  She is thinking of possibly August.  She will return back to the office to discuss this with Dr. Brink.

## 2019-05-10 NOTE — ADDENDUM
[FreeTextEntry1] : This note was written by Anai Alvarez on 05/10/2019 acting as scribe for Brunilda Burger, SHAYANR/JONATHON, PA.\par

## 2019-05-13 ENCOUNTER — APPOINTMENT (OUTPATIENT)
Dept: ORTHOPEDIC SURGERY | Facility: CLINIC | Age: 74
End: 2019-05-13
Payer: MEDICARE

## 2019-05-13 PROCEDURE — 20610 DRAIN/INJ JOINT/BURSA W/O US: CPT | Mod: 50

## 2019-05-15 NOTE — PROCEDURE
[de-identified] : Reason for Visit: \par MITA RAE comes in today for a Durolane injection to the right and left knee.  \par \par Physical Examination:\par Right Knee:\par Knee: Range of Motion in Degrees  	\par    	                 Claimant:  	Normal:  	\par Flexion Active  	  135     	               135-degrees  	\par Flexion Passive  	  135  	               135-degrees  	\par Extension Active    	  0-5  	               0-5-degrees  	\par Extension Passive    0-5  	               0-5-degrees  \par \par No evidence of instability in the AP plane or varus or valgus stress.  Negative  Lachman. Negative pivot shift.  Negative anterior drawer test.  Negative posterior drawer test. Negative Marissa.  Negative Apley grind.  No medial or lateral joint line tenderness. Positive tenderness over the medial and lateral facet of the patella.  Positive patellofemoral crepitations.  No lateral tilting patella.  No patellar apprehension. Positive crepitation in the medial and lateral femoral condyle.  No proximal or distal swelling, edema or tenderness.  No gross motor or sensory deficits.  Mild intra-articular swelling.  No varus or valgus malalignment.  2+ DP and PT pulses.  Skin is intact.  No rashes, scars or lesions.  \par \par Left Knee:\par Knee: Range of Motion in Degrees  	\par    	                 Claimant:  	Normal:  	\par Flexion Active  	  135     	               135-degrees  	\par Flexion Passive  	  135  	               135-degrees  	\par Extension Active    	  0-5  	               0-5-degrees  	\par Extension Passive    0-5  	               0-5-degrees  \par \par No evidence of instability in the AP plane or varus or valgus stress.  Negative  Lachman. Negative pivot shift.  Negative anterior drawer test.  Negative posterior drawer test. Negative Marissa.  Negative Apley grind.  No medial or lateral joint line tenderness. Positive tenderness over the medial and lateral facet of the patella.  Positive patellofemoral crepitations.  No lateral tilting patella.  No patellar apprehension. Positive crepitation in the medial and lateral femoral condyle.  No proximal or distal swelling, edema or tenderness.  No gross motor or sensory deficits.  Mild intra-articular swelling.  No varus or valgus malalignment.  2+ DP and PT pulses.  Skin is intact.  No rashes, scars or lesions.  \par \par Impression: \par Osteoarthritis of the right knee\par Osteoarthritis of the left knee\par \par Consent:\par The risks and benefits of the procedure were discussed with the patient in detail.  Upon verbal consent of the patient, we proceeded with the Durolane injections as noted below.  \par \par Procedure:\par After sterile prep, the patient underwent a Durolane injection of 60 mg of Sodium Hyaluronate in a 3.0 mL syringe into the right and left knee.  The patient tolerated the procedures well.  There were no complications.  \par \par NDC#:  [address]-9 \par Lot#:    [id number]\par Exp Date:  05/31/21\par \par Plan:\par I have recommended ice and elevation.  The patient will be reassessed in six to eight weeks following this Durolane injection for the right and left knee osteoarthritis.  I had a long discussion with the patient and she wants to proceed with scheduling a right total knee arthroplasty.  All the risks and benefits of the surgery, including, but not limited to, anesthesia, infection, nerve and/or vascular injury, need for further surgery, DVT, PE and perioperative death, were all discussed with the patient at length.  In light of these, the patient wishes to proceed.  The patient will be scheduled at this time. \par

## 2019-05-15 NOTE — ADDENDUM
[FreeTextEntry1] : This note was written by Anibal Phipps on 05/14/2019, acting as a scribe for Klever Brink III, MD

## 2019-05-24 ENCOUNTER — RESULT REVIEW (OUTPATIENT)
Age: 74
End: 2019-05-24

## 2019-05-29 ENCOUNTER — OUTPATIENT (OUTPATIENT)
Dept: OUTPATIENT SERVICES | Facility: HOSPITAL | Age: 74
LOS: 1 days | Discharge: HOME | End: 2019-05-29
Payer: MEDICARE

## 2019-05-29 VITALS
DIASTOLIC BLOOD PRESSURE: 66 MMHG | TEMPERATURE: 98 F | SYSTOLIC BLOOD PRESSURE: 120 MMHG | WEIGHT: 212.08 LBS | HEART RATE: 97 BPM | HEIGHT: 67 IN | OXYGEN SATURATION: 96 % | RESPIRATION RATE: 16 BRPM

## 2019-05-29 DIAGNOSIS — M17.11 UNILATERAL PRIMARY OSTEOARTHRITIS, RIGHT KNEE: ICD-10-CM

## 2019-05-29 DIAGNOSIS — Z98.890 OTHER SPECIFIED POSTPROCEDURAL STATES: Chronic | ICD-10-CM

## 2019-05-29 DIAGNOSIS — Z29.9 ENCOUNTER FOR PROPHYLACTIC MEASURES, UNSPECIFIED: ICD-10-CM

## 2019-05-29 DIAGNOSIS — Z90.49 ACQUIRED ABSENCE OF OTHER SPECIFIED PARTS OF DIGESTIVE TRACT: Chronic | ICD-10-CM

## 2019-05-29 LAB
ANION GAP SERPL CALC-SCNC: 6 MMOL/L — SIGNIFICANT CHANGE UP (ref 5–17)
APPEARANCE UR: CLEAR — SIGNIFICANT CHANGE UP
BASOPHILS # BLD AUTO: 0.07 K/UL — SIGNIFICANT CHANGE UP (ref 0–0.2)
BASOPHILS NFR BLD AUTO: 0.6 % — SIGNIFICANT CHANGE UP (ref 0–2)
BILIRUB UR-MCNC: NEGATIVE — SIGNIFICANT CHANGE UP
BLD GP AB SCN SERPL QL: SIGNIFICANT CHANGE UP
BUN SERPL-MCNC: 16 MG/DL — SIGNIFICANT CHANGE UP (ref 7–23)
CALCIUM SERPL-MCNC: 8.8 MG/DL — SIGNIFICANT CHANGE UP (ref 8.5–10.1)
CHLORIDE SERPL-SCNC: 106 MMOL/L — SIGNIFICANT CHANGE UP (ref 96–108)
CO2 SERPL-SCNC: 25 MMOL/L — SIGNIFICANT CHANGE UP (ref 22–31)
COLOR SPEC: YELLOW — SIGNIFICANT CHANGE UP
CREAT SERPL-MCNC: 0.63 MG/DL — SIGNIFICANT CHANGE UP (ref 0.5–1.3)
DIFF PNL FLD: NEGATIVE — SIGNIFICANT CHANGE UP
EOSINOPHIL # BLD AUTO: 0.19 K/UL — SIGNIFICANT CHANGE UP (ref 0–0.5)
EOSINOPHIL NFR BLD AUTO: 1.5 % — SIGNIFICANT CHANGE UP (ref 0–6)
GLUCOSE SERPL-MCNC: 101 MG/DL — HIGH (ref 70–99)
GLUCOSE UR QL: NEGATIVE MG/DL — SIGNIFICANT CHANGE UP
HCT VFR BLD CALC: 41.7 % — SIGNIFICANT CHANGE UP (ref 34.5–45)
HGB BLD-MCNC: 14.2 G/DL — SIGNIFICANT CHANGE UP (ref 11.5–15.5)
IMM GRANULOCYTES NFR BLD AUTO: 0.2 % — SIGNIFICANT CHANGE UP (ref 0–1.5)
KETONES UR-MCNC: NEGATIVE — SIGNIFICANT CHANGE UP
LEUKOCYTE ESTERASE UR-ACNC: NEGATIVE — SIGNIFICANT CHANGE UP
LYMPHOCYTES # BLD AUTO: 42.9 % — SIGNIFICANT CHANGE UP (ref 13–44)
LYMPHOCYTES # BLD AUTO: 5.28 K/UL — HIGH (ref 1–3.3)
MANUAL SMEAR VERIFICATION: SIGNIFICANT CHANGE UP
MCHC RBC-ENTMCNC: 30.1 PG — SIGNIFICANT CHANGE UP (ref 27–34)
MCHC RBC-ENTMCNC: 34.1 GM/DL — SIGNIFICANT CHANGE UP (ref 32–36)
MCV RBC AUTO: 88.5 FL — SIGNIFICANT CHANGE UP (ref 80–100)
MONOCYTES # BLD AUTO: 0.97 K/UL — HIGH (ref 0–0.9)
MONOCYTES NFR BLD AUTO: 7.9 % — SIGNIFICANT CHANGE UP (ref 2–14)
NEUTROPHILS # BLD AUTO: 5.78 K/UL — SIGNIFICANT CHANGE UP (ref 1.8–7.4)
NEUTROPHILS NFR BLD AUTO: 46.9 % — SIGNIFICANT CHANGE UP (ref 43–77)
NITRITE UR-MCNC: NEGATIVE — SIGNIFICANT CHANGE UP
PH UR: 5 — SIGNIFICANT CHANGE UP (ref 5–8)
PLAT MORPH BLD: NORMAL — SIGNIFICANT CHANGE UP
PLATELET # BLD AUTO: 303 K/UL — SIGNIFICANT CHANGE UP (ref 150–400)
POTASSIUM SERPL-MCNC: 4.1 MMOL/L — SIGNIFICANT CHANGE UP (ref 3.5–5.3)
POTASSIUM SERPL-SCNC: 4.1 MMOL/L — SIGNIFICANT CHANGE UP (ref 3.5–5.3)
PROT UR-MCNC: NEGATIVE MG/DL — SIGNIFICANT CHANGE UP
RBC # BLD: 4.71 M/UL — SIGNIFICANT CHANGE UP (ref 3.8–5.2)
RBC # FLD: 12.9 % — SIGNIFICANT CHANGE UP (ref 10.3–14.5)
RBC BLD AUTO: NORMAL — SIGNIFICANT CHANGE UP
SODIUM SERPL-SCNC: 137 MMOL/L — SIGNIFICANT CHANGE UP (ref 135–145)
SP GR SPEC: 1.01 — SIGNIFICANT CHANGE UP (ref 1.01–1.02)
TYPE + AB SCN PNL BLD: SIGNIFICANT CHANGE UP
UROBILINOGEN FLD QL: NEGATIVE MG/DL — SIGNIFICANT CHANGE UP
WBC # BLD: 12.32 K/UL — HIGH (ref 3.8–10.5)
WBC # FLD AUTO: 12.32 K/UL — HIGH (ref 3.8–10.5)

## 2019-05-29 PROCEDURE — 93010 ELECTROCARDIOGRAM REPORT: CPT

## 2019-05-29 PROCEDURE — 71046 X-RAY EXAM CHEST 2 VIEWS: CPT | Mod: 26

## 2019-05-29 PROCEDURE — 73562 X-RAY EXAM OF KNEE 3: CPT | Mod: 26,RT

## 2019-05-29 NOTE — H&P PST ADULT - NSANTHOSAYNRD_GEN_A_CORE
No. YULY screening performed.  STOP BANG Legend: 0-2 = LOW Risk; 3-4 = INTERMEDIATE Risk; 5-8 = HIGH Risk

## 2019-05-29 NOTE — H&P PST ADULT - HISTORY OF PRESENT ILLNESS
73 year old female with OA bilateral knee c/o knee pain on ROM she presents to PST for Right TKR 73 year old female with OA bilateral knee c/o knee pain on ROM and joint swelling  ; Pt has tried gel injections however pain still persist ; she presents to PST for Right TKR

## 2019-05-29 NOTE — H&P PST ADULT - NSICDXPASTSURGICALHX_GEN_ALL_CORE_FT
PAST SURGICAL HISTORY:  Dental disorder Dental surgery 12/2012, patient reports that upper portion is glued in at this time, plan is to have dental implants placed.    H/O lumbosacral spine surgery fusion    H/O umbilical hernia repair 5/24/19    S/P bladder repair bladder lift 2/2013    S/P cholecystectomy     S/P knee surgery arthroscopy bilateral 2007 and 2010    Skin cancer to right temple 2012, removed, history of basal cell x 2

## 2019-05-29 NOTE — H&P PST ADULT - ANESTHESIA, PREVIOUS REACTION, PROFILE
Pt said " I had air in my lungs a few days  after  back surgery 2017 and they were thinking of putting a tube in my lung"    Pt developed SOB after discharge and was readmitted;/respiratory complications

## 2019-05-29 NOTE — H&P PST ADULT - ASSESSMENT
73 year old female   presents to PST for Right TKR  Plan:  1. NPO post midnight  2. Pt instructed to take the following meds with sip of water: levothyroxine pantoprazole inhaler proair   3. Labs EKG CXR ordered as per surgeon request  4. Medical Evaluation with Dr Ronald Aleman   5. Coag ordered on day of surgery  6. EZ wash instructions given  7. Mupirocin Ointment to be used only with positive C/S      CAPRINI SCORE [CLOT]    AGE RELATED RISK FACTORS                                                       MOBILITY RELATED FACTORS  [ ] Age 41-60 years                                            (1 Point)                  [ ] Bed rest                                                        (1 Point)  [ ] Age: 61-74 years                                           (2 Points)                 [ ] Plaster cast                                                   (2 Points)  [x ] Age= 75 years                                              (3 Points)                 [ ] Bed bound for more than 72 hours                 (2 Points)    DISEASE RELATED RISK FACTORS                                               GENDER SPECIFIC FACTORS  [ ] Edema in the lower extremities                       (1 Point)                  [ ] Pregnancy                                                     (1 Point)  [ ] Varicose veins                                               (1 Point)                  [ ] Post-partum < 6 weeks                                   (1 Point)             [x ] BMI > 25 Kg/m2                                            (1 Point)                  [ ] Hormonal therapy  or oral contraception          (1 Point)                 [ ] Sepsis (in the previous month)                        (1 Point)                  [ ] History of pregnancy complications                 (1 point)  [ ] Pneumonia or serious lung disease                                               [ ] Unexplained or recurrent                     (1 Point)           (in the previous month)                               (1 Point)  [ ] Abnormal pulmonary function test                     (1 Point)                 SURGERY RELATED RISK FACTORS  [ ] Acute myocardial infarction                              (1 Point)                 [ ]  Section                                             (1 Point)  [ ] Congestive heart failure (in the previous month)  (1 Point)               [ ] Minor surgery                                                  (1 Point)   [ ] Inflammatory bowel disease                             (1 Point)                 [ ] Arthroscopic surgery                                        (2 Points)  [ ] Central venous access                                      (2 Points)                [ ] General surgery lasting more than 45 minutes   (2 Points)       [ ] Stroke (in the previous month)                          (5 Points)               [x ] Elective arthroplasty                                         (5 Points)            ( ) presents and past malignancy                           (2 points)                                                                                                         HEMATOLOGY RELATED FACTORS                                                 TRAUMA RELATED RISK FACTORS  [ ] Prior episodes of VTE                                     (3 Points)                 [ ] Fracture of the hip, pelvis, or leg                       (5 Points)  [ ] Positive family history for VTE                         (3 Points)                 [ ] Acute spinal cord injury (in the previous month)  (5 Points)  [ ] Prothrombin 82359 A                                     (3 Points)                 [ ] Paralysis  (less than 1 month)                             (5 Points)  [ ] Factor V Leiden                                             (3 Points)                  [ ] Multiple Trauma within 1 month                        (5 Points)  [ ] Lupus anticoagulants                                     (3 Points)                                                           [ ] Anticardiolipin antibodies                               (3 Points)                                                       [ ] High homocysteine in the blood                      (3 Points)                                             [ ] Other congenital or acquired thrombophilia      (3 Points)                                                [ ] Heparin induced thrombocytopenia                  (3 Points)                                          Total Score [      9    ] 73 year old female   presents to PST for Right TKR  Plan:  1. NPO post midnight  2. Pt instructed to take the following meds with sip of water: levothyroxine pantoprazole inhaler proair   3. Labs EKG CXR ordered as per surgeon request  4. Medical Evaluation with Dr Ronald Aleman   5. Coag ordered on day of surgery  6. EZ wash instructions given  7. Mupirocin Ointment to be used only with positive C/S      CAPRINI SCORE [CLOT]    AGE RELATED RISK FACTORS                                                       MOBILITY RELATED FACTORS  [ ] Age 41-60 years                                            (1 Point)                  [ ] Bed rest                                                        (1 Point)  [ ] Age: 61-74 years                                           (2 Points)                 [ ] Plaster cast                                                   (2 Points)  [x ] Age= 75 years                                              (3 Points)                 [ ] Bed bound for more than 72 hours                 (2 Points)    DISEASE RELATED RISK FACTORS                                               GENDER SPECIFIC FACTORS  [ ] Edema in the lower extremities                       (1 Point)                  [ ] Pregnancy                                                     (1 Point)  [x ] Varicose veins                                               (1 Point)                  [ ] Post-partum < 6 weeks                                   (1 Point)             [x ] BMI > 25 Kg/m2                                            (1 Point)                  [ ] Hormonal therapy  or oral contraception          (1 Point)                 [ ] Sepsis (in the previous month)                        (1 Point)                  [ ] History of pregnancy complications                 (1 point)  [ ] Pneumonia or serious lung disease                                               [ ] Unexplained or recurrent                     (1 Point)           (in the previous month)                               (1 Point)  [ ] Abnormal pulmonary function test                     (1 Point)                 SURGERY RELATED RISK FACTORS  [ ] Acute myocardial infarction                              (1 Point)                 [ ]  Section                                             (1 Point)  [ ] Congestive heart failure (in the previous month)  (1 Point)               [ ] Minor surgery                                                  (1 Point)   [ ] Inflammatory bowel disease                             (1 Point)                 [ ] Arthroscopic surgery                                        (2 Points)  [ ] Central venous access                                      (2 Points)                [ ] General surgery lasting more than 45 minutes   (2 Points)       [ ] Stroke (in the previous month)                          (5 Points)               [x ] Elective arthroplasty                                         (5 Points)            ( ) presents and past malignancy                           (2 points)                                                                                                         HEMATOLOGY RELATED FACTORS                                                 TRAUMA RELATED RISK FACTORS  [ ] Prior episodes of VTE                                     (3 Points)                 [ ] Fracture of the hip, pelvis, or leg                       (5 Points)  [ ] Positive family history for VTE                         (3 Points)                 [ ] Acute spinal cord injury (in the previous month)  (5 Points)  [ ] Prothrombin 94936 A                                     (3 Points)                 [ ] Paralysis  (less than 1 month)                             (5 Points)  [ ] Factor V Leiden                                             (3 Points)                  [ ] Multiple Trauma within 1 month                        (5 Points)  [ ] Lupus anticoagulants                                     (3 Points)                                                           [ ] Anticardiolipin antibodies                               (3 Points)                                                       [ ] High homocysteine in the blood                      (3 Points)                                             [ ] Other congenital or acquired thrombophilia      (3 Points)                                                [ ] Heparin induced thrombocytopenia                  (3 Points)                                          Total Score [      10     ]

## 2019-05-29 NOTE — H&P PST ADULT - NSICDXPASTMEDICALHX_GEN_ALL_CORE_FT
PAST MEDICAL HISTORY:  Acid reflux     Asthma history of x 20 years ago, resolved does not use inhaler    Back pain     Basal cell carcinoma removed from face    DDD (degenerative disc disease), lumbar     Diabetes mellitus diet controlled    Diverticulosis of intestine without bleeding, unspecified intestinal tract location     Elevated pancreatic enzyme patient reports elevated lab 6/2013. Gastroenterologist aware    Frequent UTI 2-3x/year    Gall stones gall bladder removed    History of cataract extracted from right and left    Hypothyroidism     OA (osteoarthritis)     Pulmonary emphysema, unspecified emphysema type 2017 last exacerbation; never intubated    Spinal stenosis of lumbar region     Urinary urgency     Varicose veins of both lower extremities surgery

## 2019-05-29 NOTE — H&P PST ADULT - NSICDXFAMILYHX_GEN_ALL_CORE_FT
FAMILY HISTORY:  Father  Still living? No  Family history of pancreatic cancer, Age at diagnosis: Age Unknown    Mother  Still living? No  Family history of heart disease, Age at diagnosis: Age Unknown  Family history of stroke, Age at diagnosis: Age Unknown

## 2019-05-30 LAB
HBA1C BLD-MCNC: 6.6 % — HIGH (ref 4–5.6)
MRSA PCR RESULT.: SIGNIFICANT CHANGE UP
S AUREUS DNA NOSE QL NAA+PROBE: SIGNIFICANT CHANGE UP

## 2019-05-31 LAB
CULTURE RESULTS: SIGNIFICANT CHANGE UP
SPECIMEN SOURCE: SIGNIFICANT CHANGE UP

## 2019-06-04 ENCOUNTER — OUTPATIENT (OUTPATIENT)
Dept: OUTPATIENT SERVICES | Facility: HOSPITAL | Age: 74
LOS: 1 days | Discharge: HOME | End: 2019-06-04

## 2019-06-04 DIAGNOSIS — Z98.890 OTHER SPECIFIED POSTPROCEDURAL STATES: Chronic | ICD-10-CM

## 2019-06-04 DIAGNOSIS — Z90.49 ACQUIRED ABSENCE OF OTHER SPECIFIED PARTS OF DIGESTIVE TRACT: Chronic | ICD-10-CM

## 2019-06-04 PROBLEM — M19.90 UNSPECIFIED OSTEOARTHRITIS, UNSPECIFIED SITE: Chronic | Status: ACTIVE | Noted: 2019-05-29

## 2019-06-04 PROBLEM — J43.9 EMPHYSEMA, UNSPECIFIED: Chronic | Status: ACTIVE | Noted: 2017-07-25

## 2019-06-04 LAB
ANION GAP SERPL CALC-SCNC: 8 MMOL/L — SIGNIFICANT CHANGE UP (ref 5–17)
APTT BLD: 25.7 SEC — LOW (ref 27.5–36.3)
BASOPHILS # BLD AUTO: 0.06 K/UL — SIGNIFICANT CHANGE UP (ref 0–0.2)
BASOPHILS NFR BLD AUTO: 0.7 % — SIGNIFICANT CHANGE UP (ref 0–2)
BLD GP AB SCN SERPL QL: SIGNIFICANT CHANGE UP
BUN SERPL-MCNC: 14 MG/DL — SIGNIFICANT CHANGE UP (ref 7–23)
CALCIUM SERPL-MCNC: 8.9 MG/DL — SIGNIFICANT CHANGE UP (ref 8.5–10.1)
CHLORIDE SERPL-SCNC: 108 MMOL/L — SIGNIFICANT CHANGE UP (ref 96–108)
CO2 SERPL-SCNC: 23 MMOL/L — SIGNIFICANT CHANGE UP (ref 22–31)
CREAT SERPL-MCNC: 0.75 MG/DL — SIGNIFICANT CHANGE UP (ref 0.5–1.3)
EOSINOPHIL # BLD AUTO: 0.18 K/UL — SIGNIFICANT CHANGE UP (ref 0–0.5)
EOSINOPHIL NFR BLD AUTO: 2 % — SIGNIFICANT CHANGE UP (ref 0–6)
GLUCOSE SERPL-MCNC: 104 MG/DL — HIGH (ref 70–99)
HCT VFR BLD CALC: 39.1 % — SIGNIFICANT CHANGE UP (ref 34.5–45)
HGB BLD-MCNC: 13.1 G/DL — SIGNIFICANT CHANGE UP (ref 11.5–15.5)
IMM GRANULOCYTES NFR BLD AUTO: 0.2 % — SIGNIFICANT CHANGE UP (ref 0–1.5)
INR BLD: 1.03 RATIO — SIGNIFICANT CHANGE UP (ref 0.88–1.16)
LYMPHOCYTES # BLD AUTO: 4.66 K/UL — HIGH (ref 1–3.3)
LYMPHOCYTES # BLD AUTO: 51.5 % — HIGH (ref 13–44)
MCHC RBC-ENTMCNC: 29.9 PG — SIGNIFICANT CHANGE UP (ref 27–34)
MCHC RBC-ENTMCNC: 33.5 GM/DL — SIGNIFICANT CHANGE UP (ref 32–36)
MCV RBC AUTO: 89.3 FL — SIGNIFICANT CHANGE UP (ref 80–100)
MONOCYTES # BLD AUTO: 0.79 K/UL — SIGNIFICANT CHANGE UP (ref 0–0.9)
MONOCYTES NFR BLD AUTO: 8.7 % — SIGNIFICANT CHANGE UP (ref 2–14)
NEUTROPHILS # BLD AUTO: 3.34 K/UL — SIGNIFICANT CHANGE UP (ref 1.8–7.4)
NEUTROPHILS NFR BLD AUTO: 36.9 % — LOW (ref 43–77)
PLATELET # BLD AUTO: 276 K/UL — SIGNIFICANT CHANGE UP (ref 150–400)
POTASSIUM SERPL-MCNC: 3.8 MMOL/L — SIGNIFICANT CHANGE UP (ref 3.5–5.3)
POTASSIUM SERPL-SCNC: 3.8 MMOL/L — SIGNIFICANT CHANGE UP (ref 3.5–5.3)
PROTHROM AB SERPL-ACNC: 11.5 SEC — SIGNIFICANT CHANGE UP (ref 10–12.9)
RBC # BLD: 4.38 M/UL — SIGNIFICANT CHANGE UP (ref 3.8–5.2)
RBC # FLD: 12.7 % — SIGNIFICANT CHANGE UP (ref 10.3–14.5)
SODIUM SERPL-SCNC: 139 MMOL/L — SIGNIFICANT CHANGE UP (ref 135–145)
TYPE + AB SCN PNL BLD: SIGNIFICANT CHANGE UP
WBC # BLD: 9.05 K/UL — SIGNIFICANT CHANGE UP (ref 3.8–10.5)
WBC # FLD AUTO: 9.05 K/UL — SIGNIFICANT CHANGE UP (ref 3.8–10.5)

## 2019-06-05 ENCOUNTER — APPOINTMENT (OUTPATIENT)
Dept: INTERNAL MEDICINE | Facility: CLINIC | Age: 74
End: 2019-06-05
Payer: MEDICARE

## 2019-06-05 ENCOUNTER — NON-APPOINTMENT (OUTPATIENT)
Age: 74
End: 2019-06-05

## 2019-06-05 VITALS
WEIGHT: 212 LBS | HEIGHT: 67 IN | OXYGEN SATURATION: 94 % | SYSTOLIC BLOOD PRESSURE: 126 MMHG | RESPIRATION RATE: 22 BRPM | HEART RATE: 90 BPM | BODY MASS INDEX: 33.27 KG/M2 | DIASTOLIC BLOOD PRESSURE: 88 MMHG | TEMPERATURE: 98.2 F

## 2019-06-05 PROCEDURE — 94060 EVALUATION OF WHEEZING: CPT

## 2019-06-05 PROCEDURE — 99214 OFFICE O/P EST MOD 30 MIN: CPT | Mod: 25

## 2019-06-05 RX ORDER — GABAPENTIN 100 MG/1
100 CAPSULE ORAL
Qty: 60 | Refills: 0 | Status: DISCONTINUED | COMMUNITY
Start: 2017-09-22 | End: 2019-06-05

## 2019-06-05 NOTE — HISTORY OF PRESENT ILLNESS
[FreeTextEntry1] : Mrs. Leon presents for a preoperative pulmonary evaluation. She is accompanied by her daughter. The patient had a ventral hernia repair approximately 2 weeks ago. She is scheduled for right total knee replacement by Dr. Brink in approximately 10 days. She's concerned about undergoing general anesthesia again so soon after her ventral hernia repair. Mrs. Leon does have a previous history of postoperative pneumothorax. She does have mild obstructive lung disease, however, she has bullous disease/blebs on a CT scan of the chest. Patient does have some shortness of breath with exertion. She continues on Spiriva one puff daily.

## 2019-06-05 NOTE — ASSESSMENT
[FreeTextEntry1] : Mrs. Leon presents for a preoperative evaluation for a scheduled right total knee replacement. She recent underwent a ventral hernia repair. Spirometry shows mild restrictive defect. There is no significant change from previous pulmonary function testing. At present, there is no absolute contraindication to knee replacement surgery. She is concerned about undergoing repeat general anesthesia so soon after her previous surgery.

## 2019-06-05 NOTE — PHYSICAL EXAM
[General Appearance - Well Developed] : well developed [Normal Appearance] : normal appearance [Well Groomed] : well groomed [General Appearance - Well Nourished] : well nourished [General Appearance - In No Acute Distress] : no acute distress [No Deformities] : no deformities [Normal Conjunctiva] : the conjunctiva exhibited no abnormalities [Eyelids - No Xanthelasma] : the eyelids demonstrated no xanthelasmas [Normal Oropharynx] : normal oropharynx [Neck Appearance] : the appearance of the neck was normal [Neck Cervical Mass (___cm)] : no neck mass was observed [Thyroid Diffuse Enlargement] : the thyroid was not enlarged [Jugular Venous Distention Increased] : there was no jugular-venous distention [Thyroid Nodule] : there were no palpable thyroid nodules [Heart Rate And Rhythm] : heart rate and rhythm were normal [Murmurs] : no murmurs present [Heart Sounds] : normal S1 and S2 [Respiration, Rhythm And Depth] : normal respiratory rhythm and effort [Auscultation Breath Sounds / Voice Sounds] : lungs were clear to auscultation bilaterally [Exaggerated Use Of Accessory Muscles For Inspiration] : no accessory muscle use [Abdomen Soft] : soft [Abdomen Tenderness] : non-tender [Abnormal Walk] : normal gait [Abdomen Mass (___ Cm)] : no abdominal mass palpated [Gait - Sufficient For Exercise Testing] : the gait was sufficient for exercise testing [Nail Clubbing] : no clubbing of the fingernails [Cyanosis, Localized] : no localized cyanosis [Petechial Hemorrhages (___cm)] : no petechial hemorrhages [Skin Color & Pigmentation] : normal skin color and pigmentation [No Venous Stasis] : no venous stasis [] : no rash [Skin Lesions] : no skin lesions [No Skin Ulcers] : no skin ulcer [No Xanthoma] : no  xanthoma was observed [Deep Tendon Reflexes (DTR)] : deep tendon reflexes were 2+ and symmetric [Sensation] : the sensory exam was normal to light touch and pinprick [No Focal Deficits] : no focal deficits [Oriented To Time, Place, And Person] : oriented to person, place, and time [Impaired Insight] : insight and judgment were intact [Affect] : the affect was normal

## 2019-06-05 NOTE — PROCEDURE
[FreeTextEntry1] : Spirometry pre-and postbronchodilator therapy shows mild to moderate restrictive defect. FEV1 is 1.67 L which is 68% predicted. FEV1 percent is 76%. There is no significant bronchodilator response.

## 2019-06-10 RX ORDER — FENTANYL CITRATE 50 UG/ML
50 INJECTION INTRAVENOUS
Refills: 0 | Status: DISCONTINUED | OUTPATIENT
Start: 2019-06-11 | End: 2019-06-11

## 2019-06-10 RX ORDER — SODIUM CHLORIDE 9 MG/ML
1000 INJECTION, SOLUTION INTRAVENOUS
Refills: 0 | Status: DISCONTINUED | OUTPATIENT
Start: 2019-06-11 | End: 2019-06-11

## 2019-06-10 RX ORDER — ONDANSETRON 8 MG/1
4 TABLET, FILM COATED ORAL ONCE
Refills: 0 | Status: DISCONTINUED | OUTPATIENT
Start: 2019-06-11 | End: 2019-06-11

## 2019-06-10 RX ORDER — SODIUM CHLORIDE 9 MG/ML
3 INJECTION INTRAMUSCULAR; INTRAVENOUS; SUBCUTANEOUS EVERY 8 HOURS
Refills: 0 | Status: DISCONTINUED | OUTPATIENT
Start: 2019-06-11 | End: 2019-06-14

## 2019-06-11 ENCOUNTER — RESULT REVIEW (OUTPATIENT)
Age: 74
End: 2019-06-11

## 2019-06-11 ENCOUNTER — APPOINTMENT (OUTPATIENT)
Dept: ORTHOPEDIC SURGERY | Facility: HOSPITAL | Age: 74
End: 2019-06-11

## 2019-06-11 ENCOUNTER — INPATIENT (INPATIENT)
Facility: HOSPITAL | Age: 74
LOS: 2 days | Discharge: HOME HEALTH | End: 2019-06-14
Attending: ORTHOPAEDIC SURGERY | Admitting: ORTHOPAEDIC SURGERY
Payer: MEDICARE

## 2019-06-11 ENCOUNTER — APPOINTMENT (OUTPATIENT)
Dept: DERMATOLOGY | Facility: CLINIC | Age: 74
End: 2019-06-11

## 2019-06-11 ENCOUNTER — TRANSCRIPTION ENCOUNTER (OUTPATIENT)
Age: 74
End: 2019-06-11

## 2019-06-11 VITALS
HEIGHT: 67 IN | HEART RATE: 78 BPM | TEMPERATURE: 99 F | SYSTOLIC BLOOD PRESSURE: 139 MMHG | OXYGEN SATURATION: 95 % | WEIGHT: 212.08 LBS | RESPIRATION RATE: 16 BRPM | DIASTOLIC BLOOD PRESSURE: 70 MMHG

## 2019-06-11 DIAGNOSIS — Z00.00 ENCOUNTER FOR GENERAL ADULT MEDICAL EXAMINATION WITHOUT ABNORMAL FINDINGS: ICD-10-CM

## 2019-06-11 DIAGNOSIS — Z98.890 OTHER SPECIFIED POSTPROCEDURAL STATES: Chronic | ICD-10-CM

## 2019-06-11 DIAGNOSIS — Z90.49 ACQUIRED ABSENCE OF OTHER SPECIFIED PARTS OF DIGESTIVE TRACT: Chronic | ICD-10-CM

## 2019-06-11 LAB
ANION GAP SERPL CALC-SCNC: 7 MMOL/L — SIGNIFICANT CHANGE UP (ref 5–17)
APTT BLD: 30.3 SEC — SIGNIFICANT CHANGE UP (ref 27.5–36.3)
BUN SERPL-MCNC: 11 MG/DL — SIGNIFICANT CHANGE UP (ref 7–23)
CALCIUM SERPL-MCNC: 8.6 MG/DL — SIGNIFICANT CHANGE UP (ref 8.5–10.1)
CHLORIDE SERPL-SCNC: 110 MMOL/L — HIGH (ref 96–108)
CO2 SERPL-SCNC: 26 MMOL/L — SIGNIFICANT CHANGE UP (ref 22–31)
CREAT SERPL-MCNC: 0.77 MG/DL — SIGNIFICANT CHANGE UP (ref 0.5–1.3)
GLUCOSE BLDC GLUCOMTR-MCNC: 176 MG/DL — HIGH (ref 70–99)
GLUCOSE BLDC GLUCOMTR-MCNC: 178 MG/DL — HIGH (ref 70–99)
GLUCOSE SERPL-MCNC: 137 MG/DL — HIGH (ref 70–99)
HCT VFR BLD CALC: 37.9 % — SIGNIFICANT CHANGE UP (ref 34.5–45)
HGB BLD-MCNC: 12.4 G/DL — SIGNIFICANT CHANGE UP (ref 11.5–15.5)
INR BLD: 1.04 RATIO — SIGNIFICANT CHANGE UP (ref 0.88–1.16)
INR BLD: 1.05 RATIO — SIGNIFICANT CHANGE UP (ref 0.88–1.16)
MCHC RBC-ENTMCNC: 29.7 PG — SIGNIFICANT CHANGE UP (ref 27–34)
MCHC RBC-ENTMCNC: 32.7 GM/DL — SIGNIFICANT CHANGE UP (ref 32–36)
MCV RBC AUTO: 90.9 FL — SIGNIFICANT CHANGE UP (ref 80–100)
PLATELET # BLD AUTO: 261 K/UL — SIGNIFICANT CHANGE UP (ref 150–400)
POTASSIUM SERPL-MCNC: 4 MMOL/L — SIGNIFICANT CHANGE UP (ref 3.5–5.3)
POTASSIUM SERPL-SCNC: 4 MMOL/L — SIGNIFICANT CHANGE UP (ref 3.5–5.3)
PROTHROM AB SERPL-ACNC: 11.6 SEC — SIGNIFICANT CHANGE UP (ref 10–12.9)
PROTHROM AB SERPL-ACNC: 11.7 SEC — SIGNIFICANT CHANGE UP (ref 10–12.9)
RBC # BLD: 4.17 M/UL — SIGNIFICANT CHANGE UP (ref 3.8–5.2)
RBC # FLD: 12.9 % — SIGNIFICANT CHANGE UP (ref 10.3–14.5)
SODIUM SERPL-SCNC: 143 MMOL/L — SIGNIFICANT CHANGE UP (ref 135–145)
WBC # BLD: 8.54 K/UL — SIGNIFICANT CHANGE UP (ref 3.8–10.5)
WBC # FLD AUTO: 8.54 K/UL — SIGNIFICANT CHANGE UP (ref 3.8–10.5)

## 2019-06-11 PROCEDURE — 27447 TOTAL KNEE ARTHROPLASTY: CPT | Mod: RT

## 2019-06-11 PROCEDURE — 99223 1ST HOSP IP/OBS HIGH 75: CPT

## 2019-06-11 PROCEDURE — 27447 TOTAL KNEE ARTHROPLASTY: CPT | Mod: AS,RT

## 2019-06-11 PROCEDURE — 20985 CPTR-ASST DIR MS PX: CPT

## 2019-06-11 PROCEDURE — 73560 X-RAY EXAM OF KNEE 1 OR 2: CPT | Mod: 26,RT

## 2019-06-11 PROCEDURE — 88305 TISSUE EXAM BY PATHOLOGIST: CPT | Mod: 26

## 2019-06-11 PROCEDURE — 20985 CPTR-ASST DIR MS PX: CPT | Mod: AS

## 2019-06-11 RX ORDER — FOLIC ACID 0.8 MG
1 TABLET ORAL DAILY
Refills: 0 | Status: DISCONTINUED | OUTPATIENT
Start: 2019-06-11 | End: 2019-06-14

## 2019-06-11 RX ORDER — PANTOPRAZOLE SODIUM 20 MG/1
40 TABLET, DELAYED RELEASE ORAL ONCE
Refills: 0 | Status: COMPLETED | OUTPATIENT
Start: 2019-06-11 | End: 2019-06-11

## 2019-06-11 RX ORDER — HYDROMORPHONE HYDROCHLORIDE 2 MG/ML
0.5 INJECTION INTRAMUSCULAR; INTRAVENOUS; SUBCUTANEOUS
Refills: 0 | Status: DISCONTINUED | OUTPATIENT
Start: 2019-06-11 | End: 2019-06-14

## 2019-06-11 RX ORDER — FERROUS SULFATE 325(65) MG
325 TABLET ORAL
Refills: 0 | Status: DISCONTINUED | OUTPATIENT
Start: 2019-06-11 | End: 2019-06-14

## 2019-06-11 RX ORDER — DOCUSATE SODIUM 100 MG
100 CAPSULE ORAL THREE TIMES A DAY
Refills: 0 | Status: DISCONTINUED | OUTPATIENT
Start: 2019-06-11 | End: 2019-06-14

## 2019-06-11 RX ORDER — DEXTROSE 50 % IN WATER 50 %
25 SYRINGE (ML) INTRAVENOUS ONCE
Refills: 0 | Status: DISCONTINUED | OUTPATIENT
Start: 2019-06-11 | End: 2019-06-14

## 2019-06-11 RX ORDER — INSULIN LISPRO 100/ML
VIAL (ML) SUBCUTANEOUS
Refills: 0 | Status: DISCONTINUED | OUTPATIENT
Start: 2019-06-11 | End: 2019-06-14

## 2019-06-11 RX ORDER — CEFAZOLIN SODIUM 1 G
2000 VIAL (EA) INJECTION EVERY 8 HOURS
Refills: 0 | Status: COMPLETED | OUTPATIENT
Start: 2019-06-11 | End: 2019-06-12

## 2019-06-11 RX ORDER — PANTOPRAZOLE SODIUM 20 MG/1
40 TABLET, DELAYED RELEASE ORAL
Refills: 0 | Status: DISCONTINUED | OUTPATIENT
Start: 2019-06-11 | End: 2019-06-14

## 2019-06-11 RX ORDER — PANTOPRAZOLE SODIUM 20 MG/1
1 TABLET, DELAYED RELEASE ORAL
Qty: 0 | Refills: 0 | DISCHARGE
Start: 2019-06-11 | End: 2019-07-10

## 2019-06-11 RX ORDER — ACETAMINOPHEN 500 MG
975 TABLET ORAL ONCE
Refills: 0 | Status: COMPLETED | OUTPATIENT
Start: 2019-06-11 | End: 2019-06-11

## 2019-06-11 RX ORDER — WARFARIN SODIUM 2.5 MG/1
5 TABLET ORAL DAILY
Refills: 0 | Status: DISCONTINUED | OUTPATIENT
Start: 2019-06-11 | End: 2019-06-13

## 2019-06-11 RX ORDER — MILK THISTLE 150 MG
0 CAPSULE ORAL
Qty: 0 | Refills: 0 | DISCHARGE

## 2019-06-11 RX ORDER — POLYETHYLENE GLYCOL 3350 17 G/17G
17 POWDER, FOR SOLUTION ORAL DAILY
Refills: 0 | Status: DISCONTINUED | OUTPATIENT
Start: 2019-06-11 | End: 2019-06-14

## 2019-06-11 RX ORDER — DEXTROSE 50 % IN WATER 50 %
12.5 SYRINGE (ML) INTRAVENOUS ONCE
Refills: 0 | Status: DISCONTINUED | OUTPATIENT
Start: 2019-06-11 | End: 2019-06-14

## 2019-06-11 RX ORDER — ALBUTEROL 90 UG/1
2 AEROSOL, METERED ORAL EVERY 6 HOURS
Refills: 0 | Status: DISCONTINUED | OUTPATIENT
Start: 2019-06-11 | End: 2019-06-14

## 2019-06-11 RX ORDER — ACETAMINOPHEN 500 MG
650 TABLET ORAL EVERY 6 HOURS
Refills: 0 | Status: COMPLETED | OUTPATIENT
Start: 2019-06-11 | End: 2019-06-14

## 2019-06-11 RX ORDER — OXYCODONE HYDROCHLORIDE 5 MG/1
5 TABLET ORAL EVERY 4 HOURS
Refills: 0 | Status: DISCONTINUED | OUTPATIENT
Start: 2019-06-11 | End: 2019-06-14

## 2019-06-11 RX ORDER — OXYCODONE HYDROCHLORIDE 5 MG/1
10 TABLET ORAL ONCE
Refills: 0 | Status: DISCONTINUED | OUTPATIENT
Start: 2019-06-11 | End: 2019-06-11

## 2019-06-11 RX ORDER — TIOTROPIUM BROMIDE 18 UG/1
1 CAPSULE ORAL; RESPIRATORY (INHALATION) DAILY
Refills: 0 | Status: DISCONTINUED | OUTPATIENT
Start: 2019-06-11 | End: 2019-06-14

## 2019-06-11 RX ORDER — PANTOPRAZOLE SODIUM 20 MG/1
1 TABLET, DELAYED RELEASE ORAL
Qty: 30 | Refills: 0
Start: 2019-06-11 | End: 2019-07-10

## 2019-06-11 RX ORDER — OXYCODONE HYDROCHLORIDE 5 MG/1
10 TABLET ORAL EVERY 4 HOURS
Refills: 0 | Status: DISCONTINUED | OUTPATIENT
Start: 2019-06-11 | End: 2019-06-14

## 2019-06-11 RX ORDER — GLUCAGON INJECTION, SOLUTION 0.5 MG/.1ML
1 INJECTION, SOLUTION SUBCUTANEOUS ONCE
Refills: 0 | Status: DISCONTINUED | OUTPATIENT
Start: 2019-06-11 | End: 2019-06-14

## 2019-06-11 RX ORDER — SENNA PLUS 8.6 MG/1
2 TABLET ORAL AT BEDTIME
Refills: 0 | Status: DISCONTINUED | OUTPATIENT
Start: 2019-06-11 | End: 2019-06-14

## 2019-06-11 RX ORDER — HYDROMORPHONE HYDROCHLORIDE 2 MG/ML
0.5 INJECTION INTRAMUSCULAR; INTRAVENOUS; SUBCUTANEOUS
Refills: 0 | Status: DISCONTINUED | OUTPATIENT
Start: 2019-06-11 | End: 2019-06-11

## 2019-06-11 RX ORDER — SODIUM CHLORIDE 9 MG/ML
1000 INJECTION, SOLUTION INTRAVENOUS
Refills: 0 | Status: DISCONTINUED | OUTPATIENT
Start: 2019-06-11 | End: 2019-06-14

## 2019-06-11 RX ORDER — ACETAMINOPHEN 500 MG
650 TABLET ORAL EVERY 6 HOURS
Refills: 0 | Status: DISCONTINUED | OUTPATIENT
Start: 2019-06-11 | End: 2019-06-14

## 2019-06-11 RX ORDER — ONDANSETRON 8 MG/1
4 TABLET, FILM COATED ORAL EVERY 6 HOURS
Refills: 0 | Status: DISCONTINUED | OUTPATIENT
Start: 2019-06-11 | End: 2019-06-14

## 2019-06-11 RX ORDER — CELECOXIB 200 MG/1
200 CAPSULE ORAL ONCE
Refills: 0 | Status: COMPLETED | OUTPATIENT
Start: 2019-06-11 | End: 2019-06-11

## 2019-06-11 RX ORDER — ASCORBIC ACID 60 MG
500 TABLET,CHEWABLE ORAL
Refills: 0 | Status: DISCONTINUED | OUTPATIENT
Start: 2019-06-11 | End: 2019-06-14

## 2019-06-11 RX ORDER — HEPARIN SODIUM 5000 [USP'U]/ML
5000 INJECTION INTRAVENOUS; SUBCUTANEOUS EVERY 8 HOURS
Refills: 0 | Status: DISCONTINUED | OUTPATIENT
Start: 2019-06-11 | End: 2019-06-12

## 2019-06-11 RX ORDER — LEVOTHYROXINE SODIUM 125 MCG
125 TABLET ORAL DAILY
Refills: 0 | Status: DISCONTINUED | OUTPATIENT
Start: 2019-06-11 | End: 2019-06-14

## 2019-06-11 RX ORDER — DOCUSATE SODIUM 100 MG
1 CAPSULE ORAL
Qty: 14 | Refills: 0
Start: 2019-06-11 | End: 2019-06-17

## 2019-06-11 RX ORDER — DEXTROSE 50 % IN WATER 50 %
15 SYRINGE (ML) INTRAVENOUS ONCE
Refills: 0 | Status: DISCONTINUED | OUTPATIENT
Start: 2019-06-11 | End: 2019-06-14

## 2019-06-11 RX ORDER — INSULIN LISPRO 100/ML
VIAL (ML) SUBCUTANEOUS AT BEDTIME
Refills: 0 | Status: DISCONTINUED | OUTPATIENT
Start: 2019-06-11 | End: 2019-06-14

## 2019-06-11 RX ADMIN — SODIUM CHLORIDE 75 MILLILITER(S): 9 INJECTION, SOLUTION INTRAVENOUS at 12:10

## 2019-06-11 RX ADMIN — CELECOXIB 200 MILLIGRAM(S): 200 CAPSULE ORAL at 09:05

## 2019-06-11 RX ADMIN — OXYCODONE HYDROCHLORIDE 10 MILLIGRAM(S): 5 TABLET ORAL at 13:20

## 2019-06-11 RX ADMIN — Medication 975 MILLIGRAM(S): at 09:05

## 2019-06-11 RX ADMIN — SODIUM CHLORIDE 3 MILLILITER(S): 9 INJECTION INTRAMUSCULAR; INTRAVENOUS; SUBCUTANEOUS at 21:22

## 2019-06-11 RX ADMIN — Medication 100 MILLIGRAM(S): at 17:19

## 2019-06-11 RX ADMIN — OXYCODONE HYDROCHLORIDE 10 MILLIGRAM(S): 5 TABLET ORAL at 12:50

## 2019-06-11 RX ADMIN — Medication 650 MILLIGRAM(S): at 16:27

## 2019-06-11 RX ADMIN — Medication 1 TABLET(S): at 16:25

## 2019-06-11 RX ADMIN — WARFARIN SODIUM 5 MILLIGRAM(S): 2.5 TABLET ORAL at 21:22

## 2019-06-11 RX ADMIN — OXYCODONE HYDROCHLORIDE 10 MILLIGRAM(S): 5 TABLET ORAL at 20:17

## 2019-06-11 RX ADMIN — Medication 325 MILLIGRAM(S): at 16:25

## 2019-06-11 RX ADMIN — Medication 975 MILLIGRAM(S): at 09:04

## 2019-06-11 RX ADMIN — HEPARIN SODIUM 5000 UNIT(S): 5000 INJECTION INTRAVENOUS; SUBCUTANEOUS at 21:21

## 2019-06-11 RX ADMIN — OXYCODONE HYDROCHLORIDE 10 MILLIGRAM(S): 5 TABLET ORAL at 17:24

## 2019-06-11 RX ADMIN — HYDROMORPHONE HYDROCHLORIDE 0.5 MILLIGRAM(S): 2 INJECTION INTRAMUSCULAR; INTRAVENOUS; SUBCUTANEOUS at 13:18

## 2019-06-11 RX ADMIN — HYDROMORPHONE HYDROCHLORIDE 0.5 MILLIGRAM(S): 2 INJECTION INTRAMUSCULAR; INTRAVENOUS; SUBCUTANEOUS at 12:08

## 2019-06-11 RX ADMIN — Medication 500 MILLIGRAM(S): at 16:26

## 2019-06-11 RX ADMIN — PANTOPRAZOLE SODIUM 40 MILLIGRAM(S): 20 TABLET, DELAYED RELEASE ORAL at 09:05

## 2019-06-11 RX ADMIN — ONDANSETRON 4 MILLIGRAM(S): 8 TABLET, FILM COATED ORAL at 18:25

## 2019-06-11 RX ADMIN — HYDROMORPHONE HYDROCHLORIDE 0.5 MILLIGRAM(S): 2 INJECTION INTRAMUSCULAR; INTRAVENOUS; SUBCUTANEOUS at 12:48

## 2019-06-11 RX ADMIN — Medication 100 MILLIGRAM(S): at 21:22

## 2019-06-11 RX ADMIN — HYDROMORPHONE HYDROCHLORIDE 0.5 MILLIGRAM(S): 2 INJECTION INTRAMUSCULAR; INTRAVENOUS; SUBCUTANEOUS at 13:20

## 2019-06-11 RX ADMIN — OXYCODONE HYDROCHLORIDE 10 MILLIGRAM(S): 5 TABLET ORAL at 21:17

## 2019-06-11 RX ADMIN — OXYCODONE HYDROCHLORIDE 10 MILLIGRAM(S): 5 TABLET ORAL at 16:26

## 2019-06-11 RX ADMIN — Medication 1 MILLIGRAM(S): at 16:25

## 2019-06-11 NOTE — CONSULT NOTE ADULT - SUBJECTIVE AND OBJECTIVE BOX
HPI:      Patient is a 74y old  Female who presents with a chief complaint of total knee (2019 11:26)      Consulted by Dr. Dr. Klever Brink III  for VTE prophylaxis, risk stratification, and anticoagulation management.    PAST MEDICAL & SURGICAL HISTORY:  Frequent UTI: 2-3x/year  OA (osteoarthritis)  Pulmonary emphysema, unspecified emphysema type: 2017 last exacerbation; never intubated  Basal cell carcinoma: removed from face  DDD (degenerative disc disease), lumbar  Spinal stenosis of lumbar region  Urinary urgency  Gall stones: gall bladder removed  Diverticulosis of intestine without bleeding, unspecified intestinal tract location  Varicose veins of both lower extremities: surgery  History of cataract: extracted from right and left  Elevated pancreatic enzyme: patient reports elevated lab 2013. Gastroenterologist aware  Asthma: history of x 20 years ago, resolved does not use inhaler  Back pain  Diabetes mellitus: diet controlled  Hypothyroidism  Acid reflux  S/P cholecystectomy  H/O umbilical hernia repair: 19  H/O lumbosacral spine surgery: fusion  Skin cancer: to right temple , removed, history of basal cell x 2  S/P knee surgery: arthroscopy bilateral  and   Dental disorder: Dental surgery 2012, patient reports that upper portion is glued in at this time, plan is to have dental implants placed.  S/P bladder repair: bladder lift 2013    CAPRINI SCORE  AGE RELATED RISK FACTORS                                                       MOBILITY RELATED FACTORS  [ ] Age 41-60 years                                            (1 Point)                  [ ] Bed rest                                                        (1 Point)  [ ] Age: 61-74 years                                           (2 Points)                [ ] Plaster cast                                                   (2 Points)  [X ] Age= 75 years                                              (3 Points)                 [ ] Bed bound for more than 72 hours                   (2 Points)    DISEASE RELATED RISK FACTORS                                               GENDER SPECIFIC FACTORS  [ ] Edema in the lower extremities                       (1 Point)           [ ] Pregnancy                                                            (1 Point)  [ X] Varicose veins                                               (1 Point)                  [ ] Post-partum < 6 weeks                                      (1 Point)             [X ] BMI > 25 Kg/m2                                            (1 Point)                  [ ] Hormonal therapy or oral contraception       (1 Point)                 [ ] Sepsis (in the previous month)                        (1 Point)             [ ] History of pregnancy complications                (1Point)  [ ] Pneumonia or serious lung disease                                             [ ] Unexplained or recurrent  (=/>3), premature                                 (In the previous month)                               (1 Point)                birth with toxemia or growth-restricted infant (1 Point)  [ ] Abnormal pulmonary function test            (1 Point)                                   SURGERY RELATED RISK FACTORS  [ ] Acute myocardial infarction                       (1 Point)                  [ ]  Section                                         (1 Point)  [ ] Congestive heart failure (in the previous month) (1 Point)   [ ] Minor surgery   lasting <45 minutes       (1 Point)   [ ] Inflammatory bowel disease                             (1 Point)          [ ] Arthroscopic surgery                                  (2 Points)  [ ] Central venous access                                    (2 Points)            [ ] General surgery lasting >45 minutes      (2 Points)       [ ] Stroke (in the previous month)                  (5 Points)            [X ] Elective major lower extremity arthroplasty (5 Points)                                   [  ] Malignancy (present or past include skin melanoma                                          but exclude  basal skin cell)    (2 points)                                      TRAUMA RELATED RISK FACTORS                HEMATOLOGY RELATED FACTORS                                  [ ] Fracture of the hip, pelvis, or leg                       (5 Points)  [ ] Prior episodes of VTE                                     (3 Points)          [ ] Acute spinal cord injury (in the previous month)  (5 Points)  [ ] Positive family history for VTE                         (3 Points)       [ ] Paralysis (less than 1 month)                          (5 Points)  [ ] Prothrombin 19186 A                                      (3 Points)         [ ] Multiple Trauma (within 1month)                 (5Points)                                                                                                                                                                [ ] Factor V Leiden                                          (3 Points)                                OTHER RISK FACTORS                          [ ] Lupus anticoagulants                                     (3 Points)                       [ ] BMI > 40                          (1 Point)                                                         [ ] Anticardiolipin antibodies                                (3 Points)                   [ ] Smoking                              (1Point)                                                [ ] High homocysteine in the blood                      (3 Points)                [  ] Diabetes requiring insulin (1point)                         [ ] Other congenital or acquired thrombophilia       (3 Points)          [  ] Chemotherapy                   (1 Point)  [ ] Heparin induced thrombocytopenia                  (3 Points)             [  ] Blood Transfusion                (1 point)                                                                                                             Total Score [10]                                                                                                                                                                                                                                    IMPROVE Bleeding Risk Score: 1.5    Falls Risk:   High ( X )  Mod (  )  Low (  )    EBL: 150  ml  CR CL: 94.4  6--19 Pt seen in PACU with significant other present.  Discussed her anticoagulation with coumadin over lapping with heparin.  Questions answered.  Risks and benefits discussed.  Will reinforce as needed.      Vital Signs Last 24 Hrs  T(C): 36.7 (2019 14:49), Max: 37.1 (2019 08:26)  T(F): 98.1 (2019 14:49), Max: 98.7 (2019 08:26)  HR: 72 (2019 14:49) (62 - 78)  BP: 124/67 (2019 14:49) (118/60 - 139/73)  BP(mean): --  RR: 13 (2019 14:49) (12 - 16)  SpO2: 96% (2019 14:49) (95% - 99%)    FAMILY HISTORY:  Family history of stroke (Mother)  Family history of heart disease (Mother)  Family history of pancreatic cancer (Father)    Denies any personal or familial history of clotting or bleeding disorders.    Allergies    No Known Allergies    Intolerances        REVIEW OF SYSTEMS    (  )Fever	     (  )Constipation	(  )SOB				(  )Headache	(  )Dysuria  (  )Chills	     (  )Melena	(  )Dyspnea present on exertion	                    (  )Dizziness                    (  )Polyuria  (  )Nausea	     (  )Hematochezia	(  )Cough			                    (  )Syncope   	(  )Hematuria  (  )Vomiting    (  )Chest Pain	(  )Wheezing			( x )Weakness   (x) pain rt knee  (  )Diarrhea     (  )Palpitations	(  )Anorexia			(  )Myalgia    Pertinent positives in HPI and daily subjective.  All other ROS negative.      PHYSICAL EXAM:    Constitutional: Appears Well    Neurological: A& O x 3    Skin: Warm    Respiratory and Thorax: normal effort; Breath sounds: normal; No rales/wheezing/rhonchi  	  Cardiovascular: S1, S2, regular, NMBR	    Gastrointestinal: BS + x 4Q, nontender	    Genitourinary:  Bladder nondistended, nontender    Musculoskeletal:   General Right:   no muscle/joint tenderness,   normal tone, no joint swelling,   ROM: limited	    General Left:   no muscle/joint tenderness,   normal tone, no joint swelling,   ROM: limited      Knee:  Right: Incision: ; Dressing CDI;   Lower extrems:   Right: no calf tenderness              negative rita's sign               + pedal pulses    Left:   no calf tenderness              negative rita's sign               + pedal pulses                          12.4   8.54  )-----------( 261      ( 2019 12:27 )             37.9       06-11    143  |  110<H>  |  11  ----------------------------<  137<H>  4.0   |  26  |  0.77    Ca    8.6      2019 12:27        PT/INR - ( 2019 12:27 )   PT: 11.7 sec;   INR: 1.05 ratio         PTT - ( 2019 08:42 )  PTT:30.3 sec				    MEDICATIONS  (STANDING):  acetaminophen   Tablet .. 650 milliGRAM(s) Oral every 6 hours  ascorbic acid 500 milliGRAM(s) Oral two times a day  ceFAZolin   IVPB 2000 milliGRAM(s) IV Intermittent every 8 hours  dextrose 5%. 1000 milliLiter(s) IV Continuous <Continuous>  dextrose 50% Injectable 12.5 Gram(s) IV Push once  dextrose 50% Injectable 25 Gram(s) IV Push once  dextrose 50% Injectable 25 Gram(s) IV Push once  docusate sodium 100 milliGRAM(s) Oral three times a day  ferrous    sulfate 325 milliGRAM(s) Oral three times a day with meals  folic acid 1 milliGRAM(s) Oral daily  heparin  Injectable 5000 Unit(s) SubCutaneous every 8 hours  insulin lispro (HumaLOG) corrective regimen sliding scale   SubCutaneous three times a day before meals  insulin lispro (HumaLOG) corrective regimen sliding scale   SubCutaneous at bedtime  lactated ringers. 1000 milliLiter(s) IV Continuous <Continuous>  levothyroxine 125 MICROGram(s) Oral daily  multivitamin 1 Tablet(s) Oral daily  pantoprazole    Tablet 40 milliGRAM(s) Oral before breakfast  polyethylene glycol 3350 17 Gram(s) Oral daily  sodium chloride 0.9% lock flush 3 milliLiter(s) IV Push every 8 hours  tiotropium 18 MICROgram(s) Capsule 1 Capsule(s) Inhalation daily  warfarin 5 milliGRAM(s) Oral daily            DVT Prophylaxis:  LMWH                   (  )  Heparin SQ           (x  )  Coumadin             (x  )  Xarelto                  (  )  Eliquis                   (  )  Venodynes           ( x )  Ambulation          (x  )  UFH                      x (  )  Contraindicated  (  )  EC Aspirin             (  )

## 2019-06-11 NOTE — CONSULT NOTE ADULT - SUBJECTIVE AND OBJECTIVE BOX
c/c: right knee pain    HPI: 74F, pmh of OA, hypothyroidism, Asthma, diverticulosis, dm-diet controlled, emphysema who is admitted for right knee replacement after failed conservative measures. Hospitalist service consulted for medical comangement. Pt seen and examined on 2N. Feels well. Able to void. pain controlled. no sob/chest pain.    ROS; all 10 systems reviewed and is as above otherwise negative.    PMH; as above  PSH; lumbosacral fusion, umbilical hernia repair, bladder lift , cholecystectomy  F/H: father-pancreatic ca  Social; occasona etoh, TOB: 26 pack year history, quit 1977  Allergies: NKDA  Home meds: see med rec.     Vital Signs Last 24 Hrs  T(C): 36.7 (11 Jun 2019 15:48), Max: 37.1 (11 Jun 2019 08:26)  T(F): 98 (11 Jun 2019 15:48), Max: 98.7 (11 Jun 2019 08:26)  HR: 78 (11 Jun 2019 15:48) (62 - 78)  BP: 112/53 (11 Jun 2019 15:48) (112/53 - 139/73)  RR: 16 (11 Jun 2019 15:48) (12 - 16)  SpO2: 96% (11 Jun 2019 15:48) (95% - 99%)    PHYSICAL EXAM:    GENERAL: Comfortable, no acute distress   HEAD:  Normocephalic, atraumatic  EYES: EOMI, PERRLA  HEENT: Moist mucous membranes  NECK: Supple, No JVD  NERVOUS SYSTEM:  Alert & Oriented X3, non focal  CHEST/LUNG: Clear to auscultation bilaterally  HEART: Regular rate and rhythm  ABDOMEN: Soft, Nontender, Nondistended, Bowel sounds present  GENITOURINARY: Voiding, no palpable bladder  EXTREMITIES:   No clubbing, cyanosis, or edema  MUSCULOSKELETAL-right knee in ace wrap  SKIN-no rash      LABS:                        12.4   8.54  )-----------( 261      ( 11 Jun 2019 12:27 )             37.9     06-11    143  |  110<H>  |  11  ----------------------------<  137<H>  4.0   |  26  |  0.77    Ca    8.6      11 Jun 2019 12:27      PT/INR - ( 11 Jun 2019 12:27 )   PT: 11.7 sec;   INR: 1.05 ratio         PTT - ( 11 Jun 2019 08:42 )  PTT:30.3 sec      MEDICATIONS  (STANDING):  acetaminophen   Tablet .. 650 milliGRAM(s) Oral every 6 hours  ascorbic acid 500 milliGRAM(s) Oral two times a day  ceFAZolin   IVPB 2000 milliGRAM(s) IV Intermittent every 8 hours  dextrose 5%. 1000 milliLiter(s) (50 mL/Hr) IV Continuous <Continuous>  dextrose 50% Injectable 12.5 Gram(s) IV Push once  dextrose 50% Injectable 25 Gram(s) IV Push once  dextrose 50% Injectable 25 Gram(s) IV Push once  docusate sodium 100 milliGRAM(s) Oral three times a day  ferrous    sulfate 325 milliGRAM(s) Oral three times a day with meals  folic acid 1 milliGRAM(s) Oral daily  heparin  Injectable 5000 Unit(s) SubCutaneous every 8 hours  insulin lispro (HumaLOG) corrective regimen sliding scale   SubCutaneous three times a day before meals  insulin lispro (HumaLOG) corrective regimen sliding scale   SubCutaneous at bedtime  lactated ringers. 1000 milliLiter(s) (75 mL/Hr) IV Continuous <Continuous>  levothyroxine 125 MICROGram(s) Oral daily  multivitamin 1 Tablet(s) Oral daily  pantoprazole    Tablet 40 milliGRAM(s) Oral before breakfast  polyethylene glycol 3350 17 Gram(s) Oral daily  sodium chloride 0.9% lock flush 3 milliLiter(s) IV Push every 8 hours  tiotropium 18 MICROgram(s) Capsule 1 Capsule(s) Inhalation daily  warfarin 5 milliGRAM(s) Oral daily    MEDICATIONS  (PRN):  acetaminophen   Tablet .. 650 milliGRAM(s) Oral every 6 hours PRN Temp greater or equal to 38C (100.4F)  ALBUTerol    90 MICROgram(s) HFA Inhaler 2 Puff(s) Inhalation every 6 hours PRN Shortness of Breath and/or Wheezing  aluminum hydroxide/magnesium hydroxide/simethicone Suspension 30 milliLiter(s) Oral four times a day PRN Indigestion  dextrose 40% Gel 15 Gram(s) Oral once PRN Blood Glucose LESS THAN 70 milliGRAM(s)/deciliter  glucagon  Injectable 1 milliGRAM(s) IntraMuscular once PRN Glucose LESS THAN 70 milligrams/deciliter  HYDROmorphone  Injectable 0.5 milliGRAM(s) IV Push every 3 hours PRN Severe Pain (7 - 10)  ondansetron Injectable 4 milliGRAM(s) IV Push every 6 hours PRN Nausea and/or Vomiting  oxyCODONE    IR 5 milliGRAM(s) Oral every 4 hours PRN Mild Pain (1 - 3)  oxyCODONE    IR 10 milliGRAM(s) Oral every 4 hours PRN Moderate Pain (4 - 6)  senna 2 Tablet(s) Oral at bedtime PRN Constipation    ASSESSMENT AND PLAN:  74f PMH AS ABOVE A/W:    1. OA right knee s/p elective right knee replacement:  -POD#0  -pain control  -physical therapy  -incentive spirometery  -bowel regimen  -monitor h/h    2. Asthma:  -stable.   -continue home meds.    3. Hypothyroid:  -synthroid    4. DVT px    Thank you, will follow c/c: right knee pain    HPI: 74F, pmh of OA, hypothyroidism, Asthma, diverticulosis, dm-diet controlled, emphysema who is admitted for right knee replacement after failed conservative measures. Hospitalist service consulted for medical comangement. Pt seen and examined on 2N.  ambulated with physical therapy and sat in chair for a while. Ate dinner. Now feeling nauseous. No pain right knee at present. was able to void.    ROS; all 10 systems reviewed and is as above otherwise negative.    PMH; as above  PSH; lumbosacral fusion, umbilical hernia repair, bladder lift , cholecystectomy  F/H: father-pancreatic ca  Social; occasona etoh, TOB: 26 pack year history, quit 1977  Allergies: NKDA  Home meds: see med rec.     Vital Signs Last 24 Hrs  T(C): 36.7 (11 Jun 2019 15:48), Max: 37.1 (11 Jun 2019 08:26)  T(F): 98 (11 Jun 2019 15:48), Max: 98.7 (11 Jun 2019 08:26)  HR: 78 (11 Jun 2019 15:48) (62 - 78)  BP: 112/53 (11 Jun 2019 15:48) (112/53 - 139/73)  RR: 16 (11 Jun 2019 15:48) (12 - 16)  SpO2: 96% (11 Jun 2019 15:48) (95% - 99%)    PHYSICAL EXAM:    GENERAL: Comfortable, no acute distress   HEAD:  Normocephalic, atraumatic  EYES: EOMI, PERRLA  HEENT: Moist mucous membranes  NECK: Supple, No JVD  NERVOUS SYSTEM:  Alert & Oriented X3, non focal  CHEST/LUNG: Clear to auscultation bilaterally  HEART: Regular rate and rhythm  ABDOMEN: Soft, Nontender, Nondistended, Bowel sounds present  GENITOURINARY: Voiding, no palpable bladder  EXTREMITIES:   No clubbing, cyanosis, or edema  MUSCULOSKELETAL-right knee aquaceal+  SKIN-no rash      LABS:                        12.4   8.54  )-----------( 261      ( 11 Jun 2019 12:27 )             37.9     06-11    143  |  110<H>  |  11  ----------------------------<  137<H>  4.0   |  26  |  0.77    Ca    8.6      11 Jun 2019 12:27      PT/INR - ( 11 Jun 2019 12:27 )   PT: 11.7 sec;   INR: 1.05 ratio         PTT - ( 11 Jun 2019 08:42 )  PTT:30.3 sec      MEDICATIONS  (STANDING):  acetaminophen   Tablet .. 650 milliGRAM(s) Oral every 6 hours  ascorbic acid 500 milliGRAM(s) Oral two times a day  ceFAZolin   IVPB 2000 milliGRAM(s) IV Intermittent every 8 hours  dextrose 5%. 1000 milliLiter(s) (50 mL/Hr) IV Continuous <Continuous>  dextrose 50% Injectable 12.5 Gram(s) IV Push once  dextrose 50% Injectable 25 Gram(s) IV Push once  dextrose 50% Injectable 25 Gram(s) IV Push once  docusate sodium 100 milliGRAM(s) Oral three times a day  ferrous    sulfate 325 milliGRAM(s) Oral three times a day with meals  folic acid 1 milliGRAM(s) Oral daily  heparin  Injectable 5000 Unit(s) SubCutaneous every 8 hours  insulin lispro (HumaLOG) corrective regimen sliding scale   SubCutaneous three times a day before meals  insulin lispro (HumaLOG) corrective regimen sliding scale   SubCutaneous at bedtime  lactated ringers. 1000 milliLiter(s) (75 mL/Hr) IV Continuous <Continuous>  levothyroxine 125 MICROGram(s) Oral daily  multivitamin 1 Tablet(s) Oral daily  pantoprazole    Tablet 40 milliGRAM(s) Oral before breakfast  polyethylene glycol 3350 17 Gram(s) Oral daily  sodium chloride 0.9% lock flush 3 milliLiter(s) IV Push every 8 hours  tiotropium 18 MICROgram(s) Capsule 1 Capsule(s) Inhalation daily  warfarin 5 milliGRAM(s) Oral daily    MEDICATIONS  (PRN):  acetaminophen   Tablet .. 650 milliGRAM(s) Oral every 6 hours PRN Temp greater or equal to 38C (100.4F)  ALBUTerol    90 MICROgram(s) HFA Inhaler 2 Puff(s) Inhalation every 6 hours PRN Shortness of Breath and/or Wheezing  aluminum hydroxide/magnesium hydroxide/simethicone Suspension 30 milliLiter(s) Oral four times a day PRN Indigestion  dextrose 40% Gel 15 Gram(s) Oral once PRN Blood Glucose LESS THAN 70 milliGRAM(s)/deciliter  glucagon  Injectable 1 milliGRAM(s) IntraMuscular once PRN Glucose LESS THAN 70 milligrams/deciliter  HYDROmorphone  Injectable 0.5 milliGRAM(s) IV Push every 3 hours PRN Severe Pain (7 - 10)  ondansetron Injectable 4 milliGRAM(s) IV Push every 6 hours PRN Nausea and/or Vomiting  oxyCODONE    IR 5 milliGRAM(s) Oral every 4 hours PRN Mild Pain (1 - 3)  oxyCODONE    IR 10 milliGRAM(s) Oral every 4 hours PRN Moderate Pain (4 - 6)  senna 2 Tablet(s) Oral at bedtime PRN Constipation    ASSESSMENT AND PLAN:  74f PMH AS ABOVE A/W:    1. OA right knee s/p elective right knee replacement:  -POD#0  -pain control  -physical therapy  -incentive spirometery  -bowel regimen  -monitor h/h    2. Asthma:  -stable.   -continue home meds.    3. Hypothyroid:  -synthroid    4. DVT px    Thank you, will follow c/c: right knee pain    HPI: 74F, pmh of OA, hypothyroidism, Asthma, diverticulosis, dm-diet controlled, emphysema who is admitted for right knee replacement after failed conservative measures. Hospitalist service consulted for medical comangement. Pt seen and examined on 2N.  ambulated with physical therapy and sat in chair for a while. Ate dinner. Now feeling nauseous. No pain right knee at present. was able to void.    ROS; all 10 systems reviewed and is as above otherwise negative.    PMH; as above  PSH; lumbosacral fusion, umbilical hernia repair, bladder lift , cholecystectomy  F/H: father-pancreatic ca  Social; occasona etoh, TOB: 26 pack year history, quit 1977  Allergies: NKDA  Home meds: see med rec.     Vital Signs Last 24 Hrs  T(C): 36.7 (11 Jun 2019 15:48), Max: 37.1 (11 Jun 2019 08:26)  T(F): 98 (11 Jun 2019 15:48), Max: 98.7 (11 Jun 2019 08:26)  HR: 78 (11 Jun 2019 15:48) (62 - 78)  BP: 112/53 (11 Jun 2019 15:48) (112/53 - 139/73)  RR: 16 (11 Jun 2019 15:48) (12 - 16)  SpO2: 96% (11 Jun 2019 15:48) (95% - 99%)    PHYSICAL EXAM:    GENERAL: Comfortable, no acute distress   HEAD:  Normocephalic, atraumatic  EYES: EOMI, PERRLA  HEENT: Moist mucous membranes  NECK: Supple, No JVD  NERVOUS SYSTEM:  Alert & Oriented X3, non focal  CHEST/LUNG: Clear to auscultation bilaterally  HEART: Regular rate and rhythm  ABDOMEN: Soft, Nontender, Nondistended, Bowel sounds present  GENITOURINARY: Voiding, no palpable bladder  EXTREMITIES:   No clubbing, cyanosis, or edema  MUSCULOSKELETAL-right knee aquaceal+  SKIN-no rash      LABS:                        12.4   8.54  )-----------( 261      ( 11 Jun 2019 12:27 )             37.9     06-11    143  |  110<H>  |  11  ----------------------------<  137<H>  4.0   |  26  |  0.77    Ca    8.6      11 Jun 2019 12:27      PT/INR - ( 11 Jun 2019 12:27 )   PT: 11.7 sec;   INR: 1.05 ratio         PTT - ( 11 Jun 2019 08:42 )  PTT:30.3 sec      MEDICATIONS  (STANDING):  acetaminophen   Tablet .. 650 milliGRAM(s) Oral every 6 hours  ascorbic acid 500 milliGRAM(s) Oral two times a day  ceFAZolin   IVPB 2000 milliGRAM(s) IV Intermittent every 8 hours  dextrose 5%. 1000 milliLiter(s) (50 mL/Hr) IV Continuous <Continuous>  dextrose 50% Injectable 12.5 Gram(s) IV Push once  dextrose 50% Injectable 25 Gram(s) IV Push once  dextrose 50% Injectable 25 Gram(s) IV Push once  docusate sodium 100 milliGRAM(s) Oral three times a day  ferrous    sulfate 325 milliGRAM(s) Oral three times a day with meals  folic acid 1 milliGRAM(s) Oral daily  heparin  Injectable 5000 Unit(s) SubCutaneous every 8 hours  insulin lispro (HumaLOG) corrective regimen sliding scale   SubCutaneous three times a day before meals  insulin lispro (HumaLOG) corrective regimen sliding scale   SubCutaneous at bedtime  lactated ringers. 1000 milliLiter(s) (75 mL/Hr) IV Continuous <Continuous>  levothyroxine 125 MICROGram(s) Oral daily  multivitamin 1 Tablet(s) Oral daily  pantoprazole    Tablet 40 milliGRAM(s) Oral before breakfast  polyethylene glycol 3350 17 Gram(s) Oral daily  sodium chloride 0.9% lock flush 3 milliLiter(s) IV Push every 8 hours  tiotropium 18 MICROgram(s) Capsule 1 Capsule(s) Inhalation daily  warfarin 5 milliGRAM(s) Oral daily    MEDICATIONS  (PRN):  acetaminophen   Tablet .. 650 milliGRAM(s) Oral every 6 hours PRN Temp greater or equal to 38C (100.4F)  ALBUTerol    90 MICROgram(s) HFA Inhaler 2 Puff(s) Inhalation every 6 hours PRN Shortness of Breath and/or Wheezing  aluminum hydroxide/magnesium hydroxide/simethicone Suspension 30 milliLiter(s) Oral four times a day PRN Indigestion  dextrose 40% Gel 15 Gram(s) Oral once PRN Blood Glucose LESS THAN 70 milliGRAM(s)/deciliter  glucagon  Injectable 1 milliGRAM(s) IntraMuscular once PRN Glucose LESS THAN 70 milligrams/deciliter  HYDROmorphone  Injectable 0.5 milliGRAM(s) IV Push every 3 hours PRN Severe Pain (7 - 10)  ondansetron Injectable 4 milliGRAM(s) IV Push every 6 hours PRN Nausea and/or Vomiting  oxyCODONE    IR 5 milliGRAM(s) Oral every 4 hours PRN Mild Pain (1 - 3)  oxyCODONE    IR 10 milliGRAM(s) Oral every 4 hours PRN Moderate Pain (4 - 6)  senna 2 Tablet(s) Oral at bedtime PRN Constipation    ASSESSMENT AND PLAN:  74f PMH AS ABOVE A/W:    1. OA right knee s/p elective right knee replacement:  -POD#0  -pain control  -physical therapy  -incentive spirometery  -bowel regimen  -monitor h/h    2. Asthma:  -stable.   -continue home meds.    3. Hypothyroid:  -synthroid    4. DMII:  -ss    5. Emphysema:  -stable    6. DVT px    Thank you, will follow

## 2019-06-11 NOTE — PHYSICAL THERAPY INITIAL EVALUATION ADULT - DIAGNOSIS, PT EVAL
s/p R TKA - RLE foot drop: ACE removed, Knee flexed up. Possible etiologies including periarticular vs stretch neuropraxia discussed w pt and dtr at bedside; Will order AFO in AM if persists

## 2019-06-11 NOTE — PHYSICAL THERAPY INITIAL EVALUATION ADULT - GENERAL OBSERVATIONS, REHAB EVAL
Pt rec'd supine in bed with right knee flexed 2/2 post sx drop foot, pt with no complaints, cooperative with PT.

## 2019-06-11 NOTE — PROGRESS NOTE ADULT - ASSESSMENT
A/P:  Stable POD 0 RIGHT Total Knee Arthroplasty for valgus knee  -RLE foot drop: ACE removed, Knee flexed up. Possible etiologies including periarticular vs stretch neuropraxia discussed w pt and dtr at bedside; Will order AFO in AM if persists  -Analgesia  -Ppx ABX  -DVT PE ppx  -Re-assess   -As discussed with and directed by Dr. Brink at 0469

## 2019-06-11 NOTE — DISCHARGE NOTE PROVIDER - NSDCHHASSISTDEVIC_GEN_ALL_CORE_FT
Ataxic gait attributed to acute pain after lower extremity total joint arthroplasty. Ataxic gait secondary to recent Right Total Knee Arthroplasty

## 2019-06-11 NOTE — CONSULT NOTE ADULT - ASSESSMENT
This is a 74 year old female with hx of osteoarthritis, now s/p right tkr.  Pt has high thrombosis risk and requires anticoagulation prophylaxis.    Plan:  Coumadin 5 mg PO daily startin-11-19  x 4 weeks total adjust dose per INR  Heparin 5,000 units SQ Q8hour  Daily PT/INR  Daily CBC/BMP  Enc ambulation  Venodynes  thanks for consult will f/u

## 2019-06-11 NOTE — DISCHARGE NOTE PROVIDER - NSDCACTIVITY_GEN_ALL_CORE
Walking - Indoors allowed/Showering allowed/Stairs allowed/No heavy lifting/straining/Walking - Outdoors allowed

## 2019-06-11 NOTE — DISCHARGE NOTE PROVIDER - HOSPITAL COURSE
MD at bedside.   H&P:    Pt is a74y Female PAST MEDICAL & SURGICAL HISTORY:    Frequent UTI: 2-3x/year    OA (osteoarthritis)    Pulmonary emphysema, unspecified emphysema type: 2017 last exacerbation; never intubated    Basal cell carcinoma: removed from face    DDD (degenerative disc disease), lumbar    Spinal stenosis of lumbar region    Urinary urgency    Gall stones: gall bladder removed    Diverticulosis of intestine without bleeding, unspecified intestinal tract location    Varicose veins of both lower extremities: surgery    History of cataract: extracted from right and left    Elevated pancreatic enzyme: patient reports elevated lab 6/2013. Gastroenterologist aware    Asthma: history of x 20 years ago, resolved does not use inhaler    Back pain    Diabetes mellitus: diet controlled    Hypothyroidism    Acid reflux    S/P cholecystectomy    H/O umbilical hernia repair: 5/24/19    H/O lumbosacral spine surgery: fusion    Skin cancer: to right temple 2012, removed, history of basal cell x 2    S/P knee surgery: arthroscopy bilateral 2007 and 2010    Dental disorder: Dental surgery 12/2012, patient reports that upper portion is glued in at this time, plan is to have dental implants placed.    S/P bladder repair: bladder lift 2/2013             Now s/p Total Knee Arthroplasty. Pt is afebrile with stable vital signs. Pain is controlled. Alert and Oriented. Exam reveals intact EHL FHL TA GS, +DP. Dressing is clean and dry with a new bandage on.        Hospital Course:    Patient presented to [facility] medically cleared for elective Knee Replacement Surgery, having failed outpatient conservative management. Prophylactic antibiotics were started before the procedure and continued for 24 hours. They were admitted after surgery to the orthopedic floor.   There were no complications during the hospital stay. All home medications were continued.         Routine consults were obtained from the Anticoagulation Team for DVT/PE prophylaxis, from Physical Therapy for twice daily PT, and from the Hospitalist for Medical Co-management. Patient was placed on anticoagulation.  Pertinent home medications were continued.  Daily labs were followed.          On POD 0 pt was stable overnight. No major event POD1-2. Pt received twice daily PT and a new dressing was applied prior to discharge. The plan is for DC to home with home PT** or to Rehab for ongoing PT**.  The orthopedic Attending is aware and agrees. H&P:    Pt is a 74y Female     PAST MEDICAL & SURGICAL HISTORY:    Frequent UTI: 2-3x/year    OA (osteoarthritis)    Pulmonary emphysema, unspecified emphysema type: 2017 last exacerbation; never intubated    Basal cell carcinoma: removed from face    DDD (degenerative disc disease), lumbar    Spinal stenosis of lumbar region    Urinary urgency    Gall stones: gall bladder removed    Diverticulosis of intestine without bleeding, unspecified intestinal tract location    Varicose veins of both lower extremities: surgery    History of cataract: extracted from right and left    Elevated pancreatic enzyme: patient reports elevated lab 6/2013. Gastroenterologist aware    Asthma: history of x 20 years ago, resolved does not use inhaler    Back pain    Diabetes mellitus: diet controlled    Hypothyroidism    Acid reflux    S/P cholecystectomy    H/O umbilical hernia repair: 5/24/19    H/O lumbosacral spine surgery: fusion    Skin cancer: to right temple 2012, removed, history of basal cell x 2    S/P knee surgery: arthroscopy bilateral 2007 and 2010    Dental disorder: Dental surgery 12/2012, patient reports that upper portion is glued in at this time, plan is to have dental implants placed.    S/P bladder repair: bladder lift 2/2013         Now s/p Right Total Knee Arthroplasty. Pt is afebrile with stable vital signs. Pain is controlled. Alert and Oriented. Exam reveals intact EHL FHL TA GS, +DP. Dressing is clean and dry with a new bandage on.        Vital Signs ****    Labs ****        Hospital Course:    Patient presented to [facility] medically cleared for elective Right Total Knee Replacement Surgery, having failed outpatient conservative management. Prophylactic antibiotics were started before the procedure and continued for 24 hours. They were admitted after surgery to the orthopedic floor. There were no complications during the hospital stay. All home medications were continued.         Routine consults were obtained from the Anticoagulation Team for DVT/PE prophylaxis, from Physical Therapy for twice daily PT, and from the Hospitalist for Medical Co-management. Patient was placed on anticoagulation.  Pertinent home medications were continued.  Daily labs were followed.          On POD 0 pt was stable overnight. No major event POD1-2. Pt received twice daily PT and a new dressing was applied prior to discharge. The plan is for DC to home with home PT.  The orthopedic Attending is aware and agrees. H&P:    Pt is a 74y Female     PAST MEDICAL & SURGICAL HISTORY:    Frequent UTI: 2-3x/year    OA (osteoarthritis)    Pulmonary emphysema, unspecified emphysema type: 2017 last exacerbation; never intubated    Basal cell carcinoma: removed from face    DDD (degenerative disc disease), lumbar    Spinal stenosis of lumbar region    Urinary urgency    Gall stones: gall bladder removed    Diverticulosis of intestine without bleeding, unspecified intestinal tract location    Varicose veins of both lower extremities: surgery    History of cataract: extracted from right and left    Elevated pancreatic enzyme: patient reports elevated lab 6/2013. Gastroenterologist aware    Asthma: history of x 20 years ago, resolved does not use inhaler    Back pain    Diabetes mellitus: diet controlled    Hypothyroidism    Acid reflux    S/P cholecystectomy    H/O umbilical hernia repair: 5/24/19    H/O lumbosacral spine surgery: fusion    Skin cancer: to right temple 2012, removed, history of basal cell x 2    S/P knee surgery: arthroscopy bilateral 2007 and 2010    Dental disorder: Dental surgery 12/2012, patient reports that upper portion is glued in at this time, plan is to have dental implants placed.    S/P bladder repair: bladder lift 2/2013         Now s/p Right Total Knee Arthroplasty. Pt is afebrile with stable vital signs. Pain is controlled. Alert and Oriented. Exam reveals intact EHL FHL TA GS, +DP. Dressing is clean and dry with a new bandage on.        Vital Signs Vital Signs Last 24 Hrs    T(C): 37.3 (14 Jun 2019 05:30), Max: 37.3 (14 Jun 2019 05:30)    T(F): 99.1 (14 Jun 2019 05:30), Max: 99.1 (14 Jun 2019 05:30)    HR: 80 (14 Jun 2019 05:30) (66 - 84)    BP: 110/50 (14 Jun 2019 05:30) (110/50 - 135/48)    BP(mean): --    RR: 18 (14 Jun 2019 05:30) (18 - 18)    SpO2: 92% (14 Jun 2019 05:30) (92% - 98%)        LABS:                            10.8     8.45  )-----------( 230      ( 13 Jun 2019 08:12 )               32.8         13 Jun 2019 08:12        137    |  105    |  12       ----------------------------<  148      4.0     |  24     |  0.72         Ca    8.6        13 Jun 2019 08:12            PT/INR - ( 13 Jun 2019 08:12 )   PT: 19.2 sec;   INR: 1.70 ratio                       Hospital Course:    Patient presented to [facility] medically cleared for elective Right Total Knee Replacement Surgery, having failed outpatient conservative management. Prophylactic antibiotics were started before the procedure and continued for 24 hours. They were admitted after surgery to the orthopedic floor. There were no complications during the hospital stay. All home medications were continued.         Routine consults were obtained from the Anticoagulation Team for DVT/PE prophylaxis, from Physical Therapy for twice daily PT, and from the Hospitalist for Medical Co-management. Patient was placed on anticoagulation.  Pertinent home medications were continued.  Daily labs were followed.          On POD 0 pt was stable overnight. No major event POD1-2. Pt received twice daily PT and a new dressing was applied prior to discharge. The plan is for DC to home with home PT.  The orthopedic Attending is aware and agrees.

## 2019-06-11 NOTE — DISCHARGE NOTE PROVIDER - CARE PROVIDER_API CALL
Klever Brink)  Orthopaedic Surgery  [address] [place] [address]  Phone: [phone]  Fax: [phone]  Follow Up Time:

## 2019-06-11 NOTE — PHYSICAL THERAPY INITIAL EVALUATION ADULT - RANGE OF MOTION EXAMINATION, REHAB EVAL
bilateral upper extremity ROM was WFL (within functional limits)/bilateral lower extremity ROM was WFL (within functional limits)/R knee 0-90 deg

## 2019-06-11 NOTE — DISCHARGE NOTE PROVIDER - NSDCFUADDINST_GEN_ALL_CORE_FT
Discharge Instructions for Total Knee Arthroplasty    1.  Diet: Resume previous diet  2. Activity: WBAT, Rolling walker, Daily PT. Gentle ROM 0-full as tolerated. Walk plenty.  Keep a bump (rolled towel or blanket) under your heel to keep leg straight while in bed or chair for 2 weeks.  3. Call with: fever over 101, wound redness, drainage or open area, calf pain/calf swelling  4. Wound Care: Remove old and place new Aquacel  bandage to Knee in 7 days. RN to Remove Staples Post Op Day #14 (6/25/19) so long as wound is healed, no drainage or open area. OK to Shower with Aquacel; Must be and Aquacel bandage to shower.  Avoid direct water beating on bandage.  Continue ICE packs to knee. No bandage needed after staple removal.  5. DVT PE Prophylaxis: Managed by Anticoag Team. See Anticoagulation Instructions. See Med Rec.  6. Continue Protonix daily while on Anticoagulant. an eRx has been sent to your pharmacy.  7. Labs: Check H&H weekly while on Anticoagulation. Check INR if on Coumadin.  8. Follow Up: Dr. Brink in 21 days (1 week after staples removed);  Call to schedule.   9. Pain medications:  If going home, eRX sent to your pharmacy for .   10.***Please Call the office if any of you medications are not covered under your insurance , especially if it is a BLOOD CLOT PREVENTION/anticoagulant medication. Discharge Instructions for Right Total Knee Arthroplasty    1.  Diet: Resume previous diet  2.  Activity: WBAT, Rolling walker, Daily PT. Gentle ROM 0-full as tolerated. Walk plenty. Keep a bump (rolled towel or blanket) under your heel to keep leg straight while in bed or chair for 2 weeks.  3. Call with: fever over 101, wound redness, drainage or open area, calf pain/calf swelling  4. Wound Care: Remove old and place new Aquacel  bandage to Knee in 7 days. RN to Remove Staples Post Op Day #14 (6/25/19) so long as wound is healed, no drainage or open area. OK to Shower with Aquacel; Must be and Aquacel bandage to shower.  Avoid direct water beating on bandage.  Continue ICE packs to knee. No bandage needed after staple removal.  5. DVT PE Prophylaxis: Managed by Anticoag Team. See Anticoagulation Instructions. See Med Rec.  6. Continue Protonix daily while on Anticoagulant. an eRx has been sent to your pharmacy.  7. Labs: Check H&H weekly while on Anticoagulation. Check INR if on Coumadin.  8. Follow Up: Dr. Brink in 21 days (1 week after staples removed);  Call to schedule.   9. Pain medications:  If going home, eRX sent to your pharmacy for .   10.***Please Call the office if any of you medications are not covered under your insurance, especially if it is a BLOOD CLOT PREVENTION/anticoagulant medication. Discharge Instructions for Right Total Knee Arthroplasty    1.  Diet: Resume previous diet  2.  Activity: WBAT, Rolling walker, Daily PT. Gentle ROM 0-full as tolerated. Walk plenty. Keep a bump (rolled towel or blanket) under your heel to keep leg straight while in bed or chair for 2 weeks.  3. Call with: fever over 101, wound redness, drainage or open area, calf pain/calf swelling  4. Wound Care: Remove old and place new Aquacel  bandage to Knee in 7 days. RN to Remove Staples Post Op Day #14 (6/25/19) so long as wound is healed, no drainage or open area. OK to Shower with Aquacel; Must be and Aquacel bandage to shower.  Avoid direct water beating on bandage.  Continue ICE packs to knee. No bandage needed after staple removal.  5. DVT PE Prophylaxis: Managed by Anticoag Team. See Anticoagulation Instructions. See Med Rec.  6. Continue Protonix daily while on Anticoagulant. an eRx has been sent to your pharmacy.  7. Labs: Check H&H weekly while on Anticoagulation. Check INR if on Coumadin.  8. Follow Up: Dr. Brink in 7-10 days (1 week after staples removed);  Call to schedule.   9. Pain medications:  If going home, eRX sent to your pharmacy for .   10. Please take Antibiotics as prescribed for R Lef Cellulitis. Scripts sent to pharmacy  10.***Please Call the office if any of you medications are not covered under your insurance, especially if it is a BLOOD CLOT PREVENTION/anticoagulant medication.

## 2019-06-11 NOTE — DISCHARGE NOTE PROVIDER - NSDCCPGOAL_GEN_ALL_CORE_FT
To get better and follow your care plan as instructed. Improved pain, Improved function, Return to ADLs

## 2019-06-12 ENCOUNTER — APPOINTMENT (OUTPATIENT)
Dept: INTERNAL MEDICINE | Facility: CLINIC | Age: 74
End: 2019-06-12

## 2019-06-12 LAB
ANION GAP SERPL CALC-SCNC: 7 MMOL/L — SIGNIFICANT CHANGE UP (ref 5–17)
BUN SERPL-MCNC: 12 MG/DL — SIGNIFICANT CHANGE UP (ref 7–23)
CALCIUM SERPL-MCNC: 8.1 MG/DL — LOW (ref 8.5–10.1)
CHLORIDE SERPL-SCNC: 101 MMOL/L — SIGNIFICANT CHANGE UP (ref 96–108)
CO2 SERPL-SCNC: 25 MMOL/L — SIGNIFICANT CHANGE UP (ref 22–31)
CREAT SERPL-MCNC: 0.71 MG/DL — SIGNIFICANT CHANGE UP (ref 0.5–1.3)
GLUCOSE BLDC GLUCOMTR-MCNC: 125 MG/DL — HIGH (ref 70–99)
GLUCOSE BLDC GLUCOMTR-MCNC: 152 MG/DL — HIGH (ref 70–99)
GLUCOSE BLDC GLUCOMTR-MCNC: 158 MG/DL — HIGH (ref 70–99)
GLUCOSE BLDC GLUCOMTR-MCNC: 170 MG/DL — HIGH (ref 70–99)
GLUCOSE SERPL-MCNC: 140 MG/DL — HIGH (ref 70–99)
HCT VFR BLD CALC: 31.6 % — LOW (ref 34.5–45)
HGB BLD-MCNC: 10.4 G/DL — LOW (ref 11.5–15.5)
INR BLD: 1.15 RATIO — SIGNIFICANT CHANGE UP (ref 0.88–1.16)
MCHC RBC-ENTMCNC: 29.8 PG — SIGNIFICANT CHANGE UP (ref 27–34)
MCHC RBC-ENTMCNC: 32.9 GM/DL — SIGNIFICANT CHANGE UP (ref 32–36)
MCV RBC AUTO: 90.5 FL — SIGNIFICANT CHANGE UP (ref 80–100)
PLATELET # BLD AUTO: 225 K/UL — SIGNIFICANT CHANGE UP (ref 150–400)
POTASSIUM SERPL-MCNC: 3.9 MMOL/L — SIGNIFICANT CHANGE UP (ref 3.5–5.3)
POTASSIUM SERPL-SCNC: 3.9 MMOL/L — SIGNIFICANT CHANGE UP (ref 3.5–5.3)
PROTHROM AB SERPL-ACNC: 12.8 SEC — SIGNIFICANT CHANGE UP (ref 10–12.9)
RBC # BLD: 3.49 M/UL — LOW (ref 3.8–5.2)
RBC # FLD: 12.8 % — SIGNIFICANT CHANGE UP (ref 10.3–14.5)
SODIUM SERPL-SCNC: 133 MMOL/L — LOW (ref 135–145)
WBC # BLD: 11.6 K/UL — HIGH (ref 3.8–10.5)
WBC # FLD AUTO: 11.6 K/UL — HIGH (ref 3.8–10.5)

## 2019-06-12 PROCEDURE — 99232 SBSQ HOSP IP/OBS MODERATE 35: CPT

## 2019-06-12 RX ORDER — ESTROGENS, CONJUGATED 0.625 MG/G
30 CREAM WITH APPLICATOR VAGINAL DAILY
Refills: 0 | Status: DISCONTINUED | OUTPATIENT
Start: 2019-06-12 | End: 2019-06-14

## 2019-06-12 RX ORDER — ENOXAPARIN SODIUM 100 MG/ML
30 INJECTION SUBCUTANEOUS
Refills: 0 | Status: DISCONTINUED | OUTPATIENT
Start: 2019-06-12 | End: 2019-06-14

## 2019-06-12 RX ORDER — WARFARIN SODIUM 2.5 MG/1
1 TABLET ORAL
Qty: 30 | Refills: 1
Start: 2019-06-12

## 2019-06-12 RX ORDER — ENOXAPARIN SODIUM 100 MG/ML
30 INJECTION SUBCUTANEOUS
Qty: 10 | Refills: 1
Start: 2019-06-12 | End: 2019-06-21

## 2019-06-12 RX ORDER — ENOXAPARIN SODIUM 100 MG/ML
30 INJECTION SUBCUTANEOUS
Qty: 0 | Refills: 0 | DISCHARGE
Start: 2019-06-12

## 2019-06-12 RX ORDER — WARFARIN SODIUM 2.5 MG/1
1 TABLET ORAL
Qty: 0 | Refills: 0 | DISCHARGE
Start: 2019-06-12

## 2019-06-12 RX ADMIN — Medication 650 MILLIGRAM(S): at 12:30

## 2019-06-12 RX ADMIN — Medication 650 MILLIGRAM(S): at 05:44

## 2019-06-12 RX ADMIN — Medication 1 MILLIGRAM(S): at 12:34

## 2019-06-12 RX ADMIN — Medication 650 MILLIGRAM(S): at 01:09

## 2019-06-12 RX ADMIN — OXYCODONE HYDROCHLORIDE 10 MILLIGRAM(S): 5 TABLET ORAL at 16:47

## 2019-06-12 RX ADMIN — PANTOPRAZOLE SODIUM 40 MILLIGRAM(S): 20 TABLET, DELAYED RELEASE ORAL at 05:45

## 2019-06-12 RX ADMIN — Medication 650 MILLIGRAM(S): at 05:46

## 2019-06-12 RX ADMIN — Medication 500 MILLIGRAM(S): at 17:32

## 2019-06-12 RX ADMIN — OXYCODONE HYDROCHLORIDE 10 MILLIGRAM(S): 5 TABLET ORAL at 17:33

## 2019-06-12 RX ADMIN — HEPARIN SODIUM 5000 UNIT(S): 5000 INJECTION INTRAVENOUS; SUBCUTANEOUS at 05:44

## 2019-06-12 RX ADMIN — ENOXAPARIN SODIUM 30 MILLIGRAM(S): 100 INJECTION SUBCUTANEOUS at 17:31

## 2019-06-12 RX ADMIN — Medication 1 TABLET(S): at 12:35

## 2019-06-12 RX ADMIN — Medication 100 MILLIGRAM(S): at 21:06

## 2019-06-12 RX ADMIN — POLYETHYLENE GLYCOL 3350 17 GRAM(S): 17 POWDER, FOR SOLUTION ORAL at 12:35

## 2019-06-12 RX ADMIN — Medication 125 MICROGRAM(S): at 05:44

## 2019-06-12 RX ADMIN — Medication 650 MILLIGRAM(S): at 23:08

## 2019-06-12 RX ADMIN — TIOTROPIUM BROMIDE 1 CAPSULE(S): 18 CAPSULE ORAL; RESPIRATORY (INHALATION) at 09:38

## 2019-06-12 RX ADMIN — Medication 100 MILLIGRAM(S): at 13:45

## 2019-06-12 RX ADMIN — Medication 500 MILLIGRAM(S): at 05:44

## 2019-06-12 RX ADMIN — OXYCODONE HYDROCHLORIDE 10 MILLIGRAM(S): 5 TABLET ORAL at 22:03

## 2019-06-12 RX ADMIN — OXYCODONE HYDROCHLORIDE 10 MILLIGRAM(S): 5 TABLET ORAL at 12:41

## 2019-06-12 RX ADMIN — OXYCODONE HYDROCHLORIDE 10 MILLIGRAM(S): 5 TABLET ORAL at 21:06

## 2019-06-12 RX ADMIN — Medication 100 MILLIGRAM(S): at 01:08

## 2019-06-12 RX ADMIN — Medication 325 MILLIGRAM(S): at 12:34

## 2019-06-12 RX ADMIN — Medication 325 MILLIGRAM(S): at 10:04

## 2019-06-12 RX ADMIN — OXYCODONE HYDROCHLORIDE 10 MILLIGRAM(S): 5 TABLET ORAL at 13:10

## 2019-06-12 RX ADMIN — SODIUM CHLORIDE 3 MILLILITER(S): 9 INJECTION INTRAMUSCULAR; INTRAVENOUS; SUBCUTANEOUS at 05:46

## 2019-06-12 RX ADMIN — Medication 325 MILLIGRAM(S): at 17:32

## 2019-06-12 RX ADMIN — Medication 650 MILLIGRAM(S): at 12:34

## 2019-06-12 RX ADMIN — Medication 650 MILLIGRAM(S): at 23:07

## 2019-06-12 RX ADMIN — OXYCODONE HYDROCHLORIDE 10 MILLIGRAM(S): 5 TABLET ORAL at 06:34

## 2019-06-12 RX ADMIN — Medication 650 MILLIGRAM(S): at 17:32

## 2019-06-12 RX ADMIN — WARFARIN SODIUM 5 MILLIGRAM(S): 2.5 TABLET ORAL at 21:06

## 2019-06-12 RX ADMIN — SODIUM CHLORIDE 3 MILLILITER(S): 9 INJECTION INTRAMUSCULAR; INTRAVENOUS; SUBCUTANEOUS at 13:45

## 2019-06-12 RX ADMIN — Medication 100 MILLIGRAM(S): at 05:44

## 2019-06-12 RX ADMIN — SODIUM CHLORIDE 3 MILLILITER(S): 9 INJECTION INTRAMUSCULAR; INTRAVENOUS; SUBCUTANEOUS at 21:08

## 2019-06-12 RX ADMIN — OXYCODONE HYDROCHLORIDE 10 MILLIGRAM(S): 5 TABLET ORAL at 05:43

## 2019-06-12 RX ADMIN — Medication 1: at 12:33

## 2019-06-12 NOTE — PROGRESS NOTE ADULT - ASSESSMENT
This is a 74 year old female with hx of osteoarthritis, now s/p right tkr.  Pt has high thrombosis risk and requires anticoagulation prophylaxis.    Plan:  ::Coumadin 5 mg PO daily startin-11-19  x 4 weeks total adjust dose per INR  ::Dc Heparin 5,000 units SQ Q8hour  ::Start Lovenox 30 mg SQ Q12hr, then continue until INR 2.0 or >  ::Daily PT/INR  ::Daily CBC/BMP  ::Enc ambulation  ::Venodynes    Dispo: Home    Will continue to follow.

## 2019-06-12 NOTE — PROGRESS NOTE ADULT - ASSESSMENT
A/P:  Stable POD 1 Right Total Knee Arthroplasty.      EHL/TA function this am significantly improved, 4+/5 motor strength with near complete resolution of weakness   -Keep knee in flexion with pillows until out of bed this am  -Will FU today regarding RLE motor strength   -Foot pump RLE  -Analgesia  -Ppx ABX  -DVT PE ppx, Per AC team   -OOB with PT  -DC planning, Home   -will d/w attending and advise if plan changes.

## 2019-06-13 LAB
ANION GAP SERPL CALC-SCNC: 8 MMOL/L — SIGNIFICANT CHANGE UP (ref 5–17)
BUN SERPL-MCNC: 12 MG/DL — SIGNIFICANT CHANGE UP (ref 7–23)
CALCIUM SERPL-MCNC: 8.6 MG/DL — SIGNIFICANT CHANGE UP (ref 8.5–10.1)
CHLORIDE SERPL-SCNC: 105 MMOL/L — SIGNIFICANT CHANGE UP (ref 96–108)
CO2 SERPL-SCNC: 24 MMOL/L — SIGNIFICANT CHANGE UP (ref 22–31)
CREAT SERPL-MCNC: 0.72 MG/DL — SIGNIFICANT CHANGE UP (ref 0.5–1.3)
GLUCOSE BLDC GLUCOMTR-MCNC: 129 MG/DL — HIGH (ref 70–99)
GLUCOSE BLDC GLUCOMTR-MCNC: 159 MG/DL — HIGH (ref 70–99)
GLUCOSE BLDC GLUCOMTR-MCNC: 165 MG/DL — HIGH (ref 70–99)
GLUCOSE BLDC GLUCOMTR-MCNC: 215 MG/DL — HIGH (ref 70–99)
GLUCOSE SERPL-MCNC: 148 MG/DL — HIGH (ref 70–99)
HCT VFR BLD CALC: 32.8 % — LOW (ref 34.5–45)
HGB BLD-MCNC: 10.8 G/DL — LOW (ref 11.5–15.5)
INR BLD: 1.7 RATIO — HIGH (ref 0.88–1.16)
MCHC RBC-ENTMCNC: 30 PG — SIGNIFICANT CHANGE UP (ref 27–34)
MCHC RBC-ENTMCNC: 32.9 GM/DL — SIGNIFICANT CHANGE UP (ref 32–36)
MCV RBC AUTO: 91.1 FL — SIGNIFICANT CHANGE UP (ref 80–100)
PLATELET # BLD AUTO: 230 K/UL — SIGNIFICANT CHANGE UP (ref 150–400)
POTASSIUM SERPL-MCNC: 4 MMOL/L — SIGNIFICANT CHANGE UP (ref 3.5–5.3)
POTASSIUM SERPL-SCNC: 4 MMOL/L — SIGNIFICANT CHANGE UP (ref 3.5–5.3)
PROTHROM AB SERPL-ACNC: 19.2 SEC — HIGH (ref 10–12.9)
RBC # BLD: 3.6 M/UL — LOW (ref 3.8–5.2)
RBC # FLD: 12.9 % — SIGNIFICANT CHANGE UP (ref 10.3–14.5)
SODIUM SERPL-SCNC: 137 MMOL/L — SIGNIFICANT CHANGE UP (ref 135–145)
SURGICAL PATHOLOGY STUDY: SIGNIFICANT CHANGE UP
WBC # BLD: 8.45 K/UL — SIGNIFICANT CHANGE UP (ref 3.8–10.5)
WBC # FLD AUTO: 8.45 K/UL — SIGNIFICANT CHANGE UP (ref 3.8–10.5)

## 2019-06-13 PROCEDURE — 99232 SBSQ HOSP IP/OBS MODERATE 35: CPT

## 2019-06-13 RX ORDER — WARFARIN SODIUM 2.5 MG/1
2.5 TABLET ORAL DAILY
Refills: 0 | Status: DISCONTINUED | OUTPATIENT
Start: 2019-06-13 | End: 2019-06-14

## 2019-06-13 RX ORDER — PANTOPRAZOLE SODIUM 20 MG/1
1 TABLET, DELAYED RELEASE ORAL
Qty: 0 | Refills: 0 | DISCHARGE

## 2019-06-13 RX ORDER — MAGNESIUM HYDROXIDE 400 MG/1
30 TABLET, CHEWABLE ORAL
Refills: 0 | Status: DISCONTINUED | OUTPATIENT
Start: 2019-06-13 | End: 2019-06-14

## 2019-06-13 RX ADMIN — Medication 500 MILLIGRAM(S): at 17:30

## 2019-06-13 RX ADMIN — Medication 650 MILLIGRAM(S): at 17:30

## 2019-06-13 RX ADMIN — OXYCODONE HYDROCHLORIDE 10 MILLIGRAM(S): 5 TABLET ORAL at 05:20

## 2019-06-13 RX ADMIN — Medication 650 MILLIGRAM(S): at 12:18

## 2019-06-13 RX ADMIN — Medication 325 MILLIGRAM(S): at 17:30

## 2019-06-13 RX ADMIN — Medication 30 GRAM(S): at 12:18

## 2019-06-13 RX ADMIN — POLYETHYLENE GLYCOL 3350 17 GRAM(S): 17 POWDER, FOR SOLUTION ORAL at 12:17

## 2019-06-13 RX ADMIN — Medication 325 MILLIGRAM(S): at 12:17

## 2019-06-13 RX ADMIN — OXYCODONE HYDROCHLORIDE 10 MILLIGRAM(S): 5 TABLET ORAL at 06:14

## 2019-06-13 RX ADMIN — Medication 1 MILLIGRAM(S): at 12:17

## 2019-06-13 RX ADMIN — Medication 325 MILLIGRAM(S): at 08:08

## 2019-06-13 RX ADMIN — TIOTROPIUM BROMIDE 1 CAPSULE(S): 18 CAPSULE ORAL; RESPIRATORY (INHALATION) at 07:57

## 2019-06-13 RX ADMIN — SENNA PLUS 2 TABLET(S): 8.6 TABLET ORAL at 22:20

## 2019-06-13 RX ADMIN — Medication 100 MILLIGRAM(S): at 12:17

## 2019-06-13 RX ADMIN — OXYCODONE HYDROCHLORIDE 10 MILLIGRAM(S): 5 TABLET ORAL at 22:51

## 2019-06-13 RX ADMIN — Medication 1: at 08:08

## 2019-06-13 RX ADMIN — Medication 650 MILLIGRAM(S): at 05:17

## 2019-06-13 RX ADMIN — Medication 650 MILLIGRAM(S): at 23:51

## 2019-06-13 RX ADMIN — ENOXAPARIN SODIUM 30 MILLIGRAM(S): 100 INJECTION SUBCUTANEOUS at 05:17

## 2019-06-13 RX ADMIN — Medication 100 MILLIGRAM(S): at 05:17

## 2019-06-13 RX ADMIN — HYDROMORPHONE HYDROCHLORIDE 0.5 MILLIGRAM(S): 2 INJECTION INTRAMUSCULAR; INTRAVENOUS; SUBCUTANEOUS at 01:14

## 2019-06-13 RX ADMIN — Medication 500 MILLIGRAM(S): at 05:16

## 2019-06-13 RX ADMIN — WARFARIN SODIUM 2.5 MILLIGRAM(S): 2.5 TABLET ORAL at 22:20

## 2019-06-13 RX ADMIN — SODIUM CHLORIDE 3 MILLILITER(S): 9 INJECTION INTRAMUSCULAR; INTRAVENOUS; SUBCUTANEOUS at 05:23

## 2019-06-13 RX ADMIN — Medication 2: at 12:18

## 2019-06-13 RX ADMIN — OXYCODONE HYDROCHLORIDE 10 MILLIGRAM(S): 5 TABLET ORAL at 10:55

## 2019-06-13 RX ADMIN — HYDROMORPHONE HYDROCHLORIDE 0.5 MILLIGRAM(S): 2 INJECTION INTRAMUSCULAR; INTRAVENOUS; SUBCUTANEOUS at 00:44

## 2019-06-13 RX ADMIN — ENOXAPARIN SODIUM 30 MILLIGRAM(S): 100 INJECTION SUBCUTANEOUS at 17:30

## 2019-06-13 RX ADMIN — OXYCODONE HYDROCHLORIDE 10 MILLIGRAM(S): 5 TABLET ORAL at 22:21

## 2019-06-13 RX ADMIN — Medication 650 MILLIGRAM(S): at 23:50

## 2019-06-13 RX ADMIN — Medication 1 TABLET(S): at 12:17

## 2019-06-13 RX ADMIN — OXYCODONE HYDROCHLORIDE 10 MILLIGRAM(S): 5 TABLET ORAL at 10:25

## 2019-06-13 RX ADMIN — MAGNESIUM HYDROXIDE 30 MILLILITER(S): 400 TABLET, CHEWABLE ORAL at 17:30

## 2019-06-13 RX ADMIN — Medication 650 MILLIGRAM(S): at 05:23

## 2019-06-13 RX ADMIN — Medication 100 MILLIGRAM(S): at 22:20

## 2019-06-13 RX ADMIN — SODIUM CHLORIDE 3 MILLILITER(S): 9 INJECTION INTRAMUSCULAR; INTRAVENOUS; SUBCUTANEOUS at 12:17

## 2019-06-13 RX ADMIN — SODIUM CHLORIDE 3 MILLILITER(S): 9 INJECTION INTRAMUSCULAR; INTRAVENOUS; SUBCUTANEOUS at 23:51

## 2019-06-13 RX ADMIN — Medication 125 MICROGRAM(S): at 05:16

## 2019-06-13 RX ADMIN — PANTOPRAZOLE SODIUM 40 MILLIGRAM(S): 20 TABLET, DELAYED RELEASE ORAL at 05:16

## 2019-06-13 RX ADMIN — Medication 10 MILLIGRAM(S): at 22:22

## 2019-06-13 NOTE — PROGRESS NOTE ADULT - ASSESSMENT
A/P:  Stable POD 2 Right Total Knee Arthroplasty.    RLE foot drop resolved 6/12  -Foot pump RLE  -Analgesia  -DVT PE ppx, Per AC team , Heparing was DC'd, Pt on Warfarin and Lovenox   -OOB with PT  -FU AM Labs, Monitor HH   -DC planning, Plan for Home Tomorrow 6/14  -will d/w attending and advise if plan changes.

## 2019-06-13 NOTE — PROGRESS NOTE ADULT - ASSESSMENT
This is a 74 year old female with hx of osteoarthritis, now s/p right tkr.  Pt has high thrombosis risk and requires anticoagulation prophylaxis.    Plan:  ::decrease Coumadin to 2.5 5 mg PO daily started on  6-11-19  x 4 weeks total adjust dose per INR  ::cont Lovenox 30 mg SQ Q12hr, then continue until INR 2.0 or >  ::Daily PT/INR  ::Daily CBC/BMP  ::Enc ambulation  ::Venodynes    Dispo: Home    Will continue to follow.

## 2019-06-14 ENCOUNTER — TRANSCRIPTION ENCOUNTER (OUTPATIENT)
Age: 74
End: 2019-06-14

## 2019-06-14 VITALS
SYSTOLIC BLOOD PRESSURE: 127 MMHG | TEMPERATURE: 99 F | OXYGEN SATURATION: 93 % | RESPIRATION RATE: 18 BRPM | DIASTOLIC BLOOD PRESSURE: 55 MMHG | HEART RATE: 84 BPM

## 2019-06-14 LAB
ANION GAP SERPL CALC-SCNC: 8 MMOL/L — SIGNIFICANT CHANGE UP (ref 5–17)
BUN SERPL-MCNC: 6 MG/DL — LOW (ref 7–23)
CALCIUM SERPL-MCNC: 8.7 MG/DL — SIGNIFICANT CHANGE UP (ref 8.5–10.1)
CHLORIDE SERPL-SCNC: 102 MMOL/L — SIGNIFICANT CHANGE UP (ref 96–108)
CO2 SERPL-SCNC: 25 MMOL/L — SIGNIFICANT CHANGE UP (ref 22–31)
CREAT SERPL-MCNC: 0.9 MG/DL — SIGNIFICANT CHANGE UP (ref 0.5–1.3)
GLUCOSE BLDC GLUCOMTR-MCNC: 121 MG/DL — HIGH (ref 70–99)
GLUCOSE BLDC GLUCOMTR-MCNC: 156 MG/DL — HIGH (ref 70–99)
GLUCOSE SERPL-MCNC: 206 MG/DL — HIGH (ref 70–99)
HCT VFR BLD CALC: 33 % — LOW (ref 34.5–45)
HGB BLD-MCNC: 10.8 G/DL — LOW (ref 11.5–15.5)
INR BLD: 2.5 RATIO — HIGH (ref 0.88–1.16)
MCHC RBC-ENTMCNC: 30 PG — SIGNIFICANT CHANGE UP (ref 27–34)
MCHC RBC-ENTMCNC: 32.7 GM/DL — SIGNIFICANT CHANGE UP (ref 32–36)
MCV RBC AUTO: 91.7 FL — SIGNIFICANT CHANGE UP (ref 80–100)
PLATELET # BLD AUTO: 260 K/UL — SIGNIFICANT CHANGE UP (ref 150–400)
POTASSIUM SERPL-MCNC: 4.1 MMOL/L — SIGNIFICANT CHANGE UP (ref 3.5–5.3)
POTASSIUM SERPL-SCNC: 4.1 MMOL/L — SIGNIFICANT CHANGE UP (ref 3.5–5.3)
PROTHROM AB SERPL-ACNC: 28.6 SEC — HIGH (ref 10–12.9)
RBC # BLD: 3.6 M/UL — LOW (ref 3.8–5.2)
RBC # FLD: 13.3 % — SIGNIFICANT CHANGE UP (ref 10.3–14.5)
SODIUM SERPL-SCNC: 135 MMOL/L — SIGNIFICANT CHANGE UP (ref 135–145)
WBC # BLD: 11.42 K/UL — HIGH (ref 3.8–10.5)
WBC # FLD AUTO: 11.42 K/UL — HIGH (ref 3.8–10.5)

## 2019-06-14 PROCEDURE — 99232 SBSQ HOSP IP/OBS MODERATE 35: CPT

## 2019-06-14 RX ORDER — CEFUROXIME AXETIL 250 MG
1 TABLET ORAL
Qty: 20 | Refills: 0
Start: 2019-06-14 | End: 2019-06-23

## 2019-06-14 RX ORDER — CEFTRIAXONE 500 MG/1
2000 INJECTION, POWDER, FOR SOLUTION INTRAMUSCULAR; INTRAVENOUS ONCE
Refills: 0 | Status: COMPLETED | OUTPATIENT
Start: 2019-06-14 | End: 2019-06-14

## 2019-06-14 RX ORDER — WARFARIN SODIUM 2.5 MG/1
2 TABLET ORAL DAILY
Refills: 0 | Status: DISCONTINUED | OUTPATIENT
Start: 2019-06-15 | End: 2019-06-14

## 2019-06-14 RX ORDER — WARFARIN SODIUM 2.5 MG/1
1 TABLET ORAL
Qty: 30 | Refills: 0
Start: 2019-06-14 | End: 2019-07-13

## 2019-06-14 RX ADMIN — Medication 100 MILLIGRAM(S): at 05:57

## 2019-06-14 RX ADMIN — CEFTRIAXONE 2000 MILLIGRAM(S): 500 INJECTION, POWDER, FOR SOLUTION INTRAMUSCULAR; INTRAVENOUS at 13:32

## 2019-06-14 RX ADMIN — Medication 650 MILLIGRAM(S): at 11:47

## 2019-06-14 RX ADMIN — Medication 30 GRAM(S): at 11:48

## 2019-06-14 RX ADMIN — Medication 125 MICROGRAM(S): at 05:57

## 2019-06-14 RX ADMIN — Medication 1: at 08:05

## 2019-06-14 RX ADMIN — Medication 325 MILLIGRAM(S): at 08:05

## 2019-06-14 RX ADMIN — Medication 1 TABLET(S): at 11:47

## 2019-06-14 RX ADMIN — ENOXAPARIN SODIUM 30 MILLIGRAM(S): 100 INJECTION SUBCUTANEOUS at 05:56

## 2019-06-14 RX ADMIN — Medication 650 MILLIGRAM(S): at 05:59

## 2019-06-14 RX ADMIN — Medication 325 MILLIGRAM(S): at 11:47

## 2019-06-14 RX ADMIN — OXYCODONE HYDROCHLORIDE 10 MILLIGRAM(S): 5 TABLET ORAL at 10:36

## 2019-06-14 RX ADMIN — Medication 650 MILLIGRAM(S): at 05:58

## 2019-06-14 RX ADMIN — TIOTROPIUM BROMIDE 1 CAPSULE(S): 18 CAPSULE ORAL; RESPIRATORY (INHALATION) at 07:52

## 2019-06-14 RX ADMIN — Medication 1 MILLIGRAM(S): at 11:47

## 2019-06-14 RX ADMIN — SODIUM CHLORIDE 3 MILLILITER(S): 9 INJECTION INTRAMUSCULAR; INTRAVENOUS; SUBCUTANEOUS at 05:52

## 2019-06-14 RX ADMIN — OXYCODONE HYDROCHLORIDE 10 MILLIGRAM(S): 5 TABLET ORAL at 05:56

## 2019-06-14 RX ADMIN — Medication 500 MILLIGRAM(S): at 05:59

## 2019-06-14 RX ADMIN — OXYCODONE HYDROCHLORIDE 10 MILLIGRAM(S): 5 TABLET ORAL at 06:26

## 2019-06-14 RX ADMIN — SODIUM CHLORIDE 3 MILLILITER(S): 9 INJECTION INTRAMUSCULAR; INTRAVENOUS; SUBCUTANEOUS at 13:32

## 2019-06-14 RX ADMIN — PANTOPRAZOLE SODIUM 40 MILLIGRAM(S): 20 TABLET, DELAYED RELEASE ORAL at 05:58

## 2019-06-14 NOTE — CONSULT NOTE ADULT - SUBJECTIVE AND OBJECTIVE BOX
Patient is a 74y old  Female who presents with a chief complaint of tkr     HPI:  75 y/o Female with h/o OA, hypothyroidism, asthma, diverticulosis, DM-diet controlled, emphysema was admitted on 06/11 for an elective right knee replacement after failed conservative measures. She underwent a right TKR without complications. Postoperative she was noted with mild right knee erythema and edema. Also reported with leukocytosis.  Concern about an infectious process was raised.      PMH; as above  PSH; lumbosacral fusion, umbilical hernia repair, bladder lift , cholecystectomy  Meds: per reconciliation sheet, noted below  MEDICATIONS  (STANDING):  acetaminophen   Tablet .. 650 milliGRAM(s) Oral every 6 hours  ascorbic acid 500 milliGRAM(s) Oral two times a day  dextrose 5%. 1000 milliLiter(s) (50 mL/Hr) IV Continuous <Continuous>  dextrose 50% Injectable 12.5 Gram(s) IV Push once  dextrose 50% Injectable 25 Gram(s) IV Push once  dextrose 50% Injectable 25 Gram(s) IV Push once  docusate sodium 100 milliGRAM(s) Oral three times a day  estrogens  Cream 30 Gram(s) Vaginal daily  ferrous    sulfate 325 milliGRAM(s) Oral three times a day with meals  folic acid 1 milliGRAM(s) Oral daily  insulin lispro (HumaLOG) corrective regimen sliding scale   SubCutaneous three times a day before meals  insulin lispro (HumaLOG) corrective regimen sliding scale   SubCutaneous at bedtime  lactated ringers. 1000 milliLiter(s) (75 mL/Hr) IV Continuous <Continuous>  levothyroxine 125 MICROGram(s) Oral daily  multivitamin 1 Tablet(s) Oral daily  pantoprazole    Tablet 40 milliGRAM(s) Oral before breakfast  polyethylene glycol 3350 17 Gram(s) Oral daily  sodium chloride 0.9% lock flush 3 milliLiter(s) IV Push every 8 hours  tiotropium 18 MICROgram(s) Capsule 1 Capsule(s) Inhalation daily    MEDICATIONS  (PRN):  acetaminophen   Tablet .. 650 milliGRAM(s) Oral every 6 hours PRN Temp greater or equal to 38C (100.4F)  ALBUTerol    90 MICROgram(s) HFA Inhaler 2 Puff(s) Inhalation every 6 hours PRN Shortness of Breath and/or Wheezing  aluminum hydroxide/magnesium hydroxide/simethicone Suspension 30 milliLiter(s) Oral four times a day PRN Indigestion  bisacodyl Suppository 10 milliGRAM(s) Rectal daily PRN If no bowel movement by postoperative day #2  dextrose 40% Gel 15 Gram(s) Oral once PRN Blood Glucose LESS THAN 70 milliGRAM(s)/deciliter  glucagon  Injectable 1 milliGRAM(s) IntraMuscular once PRN Glucose LESS THAN 70 milligrams/deciliter  HYDROmorphone  Injectable 0.5 milliGRAM(s) IV Push every 3 hours PRN Severe Pain (7 - 10)  magnesium hydroxide Suspension 30 milliLiter(s) Oral two times a day PRN Constipation  ondansetron Injectable 4 milliGRAM(s) IV Push every 6 hours PRN Nausea and/or Vomiting  oxyCODONE    IR 5 milliGRAM(s) Oral every 4 hours PRN Mild Pain (1 - 3)  oxyCODONE    IR 10 milliGRAM(s) Oral every 4 hours PRN Moderate Pain (4 - 6)  senna 2 Tablet(s) Oral at bedtime PRN Constipation    Allergies    No Known Allergies    Intolerances      Social: smoking history: 26 pack year history, quit 1977, no alcohol, no illegal drugs; no recent travel, no exposure to TB  FAMILY HISTORY:  Family history of stroke (Mother)  Family history of heart disease (Mother)  Family history of pancreatic cancer (Father)    no history of premature cardiovascular disease in first degree relatives    ROS: the patient denies fever, no chills, no HA, no seizures, no dizziness, no sore throat, no nasal congestion, no blurry vision, no CP, no palpitations, no SOB, no cough, no abdominal pain, no diarrhea, no N/V, no dysuria, no leg pain, no claudication, no rash, no joint aches, no rectal pain or bleeding, no night sweats  All other systems reviewed and are negative    Vital Signs Last 24 Hrs  T(C): 37.3 (14 Jun 2019 05:30), Max: 37.3 (14 Jun 2019 05:30)  T(F): 99.1 (14 Jun 2019 05:30), Max: 99.1 (14 Jun 2019 05:30)  HR: 84 (14 Jun 2019 07:55) (80 - 85)  BP: 110/50 (14 Jun 2019 05:30) (110/50 - 125/46)  BP(mean): --  RR: 18 (14 Jun 2019 05:30) (18 - 18)  SpO2: 92% (14 Jun 2019 05:30) (92% - 96%)  Daily     Daily     PE:    Constitutional: frail looking  HEENT: NC/AT, EOMI, PERRLA, conjunctivae clear; ears and nose atraumatic; pharynx benign  Neck: supple; thyroid not palpable  Back: no tenderness  Respiratory: respiratory effort normal; clear to auscultation  Cardiovascular: S1S2 regular, no murmurs  Abdomen: soft, not tender, not distended, positive BS; no liver or spleen organomegaly  Genitourinary: no suprapubic tenderness  Lymphatic: no LN palpable  Musculoskeletal: no muscle tenderness, no joint swelling or tenderness  Extremities: no pedal edema  Neurological/ Psychiatric: AxOx3, judgement and insight normal; moving all extremities  Skin: no rashes; no palpable lesions    Labs: all available labs reviewed                        10.8   11.42 )-----------( 260      ( 14 Jun 2019 08:51 )             33.0     06-14    135  |  102  |  6<L>  ----------------------------<  206<H>  4.1   |  25  |  0.90    Ca    8.7      14 Jun 2019 08:51        Radiology: all available radiological tests reviewed        Advanced directives addressed: full resuscitation Patient is a 74y old  Female who presents with a chief complaint of tkr     HPI:  73 y/o Female with h/o OA, hypothyroidism, asthma, diverticulosis, DM-diet controlled, emphysema was admitted on 06/11 for an elective right knee replacement after failed conservative measures. She underwent a right TKR without complications. Postoperative she was noted with mild right knee erythema and edema. Also reported with leukocytosis.  Concern about an infectious process was raised.      PMH; as above  PSH; lumbosacral fusion, umbilical hernia repair, bladder lift , cholecystectomy  Meds: per reconciliation sheet, noted below  MEDICATIONS  (STANDING):  acetaminophen   Tablet .. 650 milliGRAM(s) Oral every 6 hours  ascorbic acid 500 milliGRAM(s) Oral two times a day  dextrose 5%. 1000 milliLiter(s) (50 mL/Hr) IV Continuous <Continuous>  dextrose 50% Injectable 12.5 Gram(s) IV Push once  dextrose 50% Injectable 25 Gram(s) IV Push once  dextrose 50% Injectable 25 Gram(s) IV Push once  docusate sodium 100 milliGRAM(s) Oral three times a day  estrogens  Cream 30 Gram(s) Vaginal daily  ferrous    sulfate 325 milliGRAM(s) Oral three times a day with meals  folic acid 1 milliGRAM(s) Oral daily  insulin lispro (HumaLOG) corrective regimen sliding scale   SubCutaneous three times a day before meals  insulin lispro (HumaLOG) corrective regimen sliding scale   SubCutaneous at bedtime  lactated ringers. 1000 milliLiter(s) (75 mL/Hr) IV Continuous <Continuous>  levothyroxine 125 MICROGram(s) Oral daily  multivitamin 1 Tablet(s) Oral daily  pantoprazole    Tablet 40 milliGRAM(s) Oral before breakfast  polyethylene glycol 3350 17 Gram(s) Oral daily  sodium chloride 0.9% lock flush 3 milliLiter(s) IV Push every 8 hours  tiotropium 18 MICROgram(s) Capsule 1 Capsule(s) Inhalation daily    MEDICATIONS  (PRN):  acetaminophen   Tablet .. 650 milliGRAM(s) Oral every 6 hours PRN Temp greater or equal to 38C (100.4F)  ALBUTerol    90 MICROgram(s) HFA Inhaler 2 Puff(s) Inhalation every 6 hours PRN Shortness of Breath and/or Wheezing  aluminum hydroxide/magnesium hydroxide/simethicone Suspension 30 milliLiter(s) Oral four times a day PRN Indigestion  bisacodyl Suppository 10 milliGRAM(s) Rectal daily PRN If no bowel movement by postoperative day #2  dextrose 40% Gel 15 Gram(s) Oral once PRN Blood Glucose LESS THAN 70 milliGRAM(s)/deciliter  glucagon  Injectable 1 milliGRAM(s) IntraMuscular once PRN Glucose LESS THAN 70 milligrams/deciliter  HYDROmorphone  Injectable 0.5 milliGRAM(s) IV Push every 3 hours PRN Severe Pain (7 - 10)  magnesium hydroxide Suspension 30 milliLiter(s) Oral two times a day PRN Constipation  ondansetron Injectable 4 milliGRAM(s) IV Push every 6 hours PRN Nausea and/or Vomiting  oxyCODONE    IR 5 milliGRAM(s) Oral every 4 hours PRN Mild Pain (1 - 3)  oxyCODONE    IR 10 milliGRAM(s) Oral every 4 hours PRN Moderate Pain (4 - 6)  senna 2 Tablet(s) Oral at bedtime PRN Constipation    Allergies    No Known Allergies    Intolerances      Social: smoking history: 26 pack year history, quit 1977, no alcohol, no illegal drugs; no recent travel, no exposure to TB  FAMILY HISTORY:  Family history of stroke (Mother)  Family history of heart disease (Mother)  Family history of pancreatic cancer (Father)    no history of premature cardiovascular disease in first degree relatives    ROS: the patient denies fever, no chills, no HA, no seizures, no dizziness, no sore throat, no nasal congestion, no blurry vision, no CP, no palpitations, no SOB, no cough, no abdominal pain, no diarrhea, no N/V, no dysuria, no leg pain, no claudication, no rash, no joint aches, no rectal pain or bleeding, no night sweats  All other systems reviewed and are negative    Vital Signs Last 24 Hrs  T(C): 37.3 (14 Jun 2019 05:30), Max: 37.3 (14 Jun 2019 05:30)  T(F): 99.1 (14 Jun 2019 05:30), Max: 99.1 (14 Jun 2019 05:30)  HR: 84 (14 Jun 2019 07:55) (80 - 85)  BP: 110/50 (14 Jun 2019 05:30) (110/50 - 125/46)  BP(mean): --  RR: 18 (14 Jun 2019 05:30) (18 - 18)  SpO2: 92% (14 Jun 2019 05:30) (92% - 96%)  Daily     Daily     PE:    Constitutional: frail looking  HEENT: NC/AT, EOMI, PERRLA, conjunctivae clear; ears and nose atraumatic; pharynx benign  Neck: supple; thyroid not palpable  Back: no tenderness  Respiratory: respiratory effort normal; clear to auscultation  Cardiovascular: S1S2 regular, no murmurs  Abdomen: soft, not tender, not distended, positive BS; no liver or spleen organomegaly  Genitourinary: no suprapubic tenderness  Lymphatic: no LN palpable  Musculoskeletal: no muscle tenderness, no joint swelling or tenderness  Extremities: 2+ right pedal edema  postoperative knee surgical scar is clean  Right leg edema  Right medial thigh and calf large patch of erythema, edema and tenderness; no open wounds; no drainage  Neurological/ Psychiatric: AxOx3, judgement and insight normal; moving all extremities  Skin: no rashes; no palpable lesions    Labs: all available labs reviewed                        10.8   11.42 )-----------( 260      ( 14 Jun 2019 08:51 )             33.0     06-14    135  |  102  |  6<L>  ----------------------------<  206<H>  4.1   |  25  |  0.90    Ca    8.7      14 Jun 2019 08:51        Radiology: all available radiological tests reviewed        Advanced directives addressed: full resuscitation

## 2019-06-14 NOTE — PROGRESS NOTE ADULT - PROVIDER SPECIALTY LIST ADULT
Anesthesia
Anticoag Management
Hospitalist
Hospitalist
Orthopedics
Hospitalist

## 2019-06-14 NOTE — PROGRESS NOTE ADULT - SUBJECTIVE AND OBJECTIVE BOX
c/c: right knee pain    HPI: 74F, pmh of OA, hypothyroidism, Asthma, diverticulosis, dm-diet controlled, emphysema who is admitted for right knee replacement after failed conservative measures. Hospitalist service consulted for medical comanagement.     6/13: pt seen and examined this am. Felt ok. Right knee pain controlled. No sob/chest pain. tolerating po.   No difficulty voiding.    ROS; all 10 systems reviewed and is as above otherwise negative.    Vital Signs Last 24 Hrs  T(C): 37 (12 Jun 2019 23:30), Max: 37 (12 Jun 2019 23:30)  T(F): 98.6 (12 Jun 2019 23:30), Max: 98.6 (12 Jun 2019 23:30)  HR: 66 (13 Jun 2019 07:59) (64 - 77)  BP: 116/56 (12 Jun 2019 23:30) (96/62 - 116/56)  RR: 16 (12 Jun 2019 23:30) (16 - 16)  SpO2: 94% (13 Jun 2019 07:59) (94% - 96%)    PHYSICAL EXAM:    GENERAL: Comfortable, no acute distress   HEAD:  Normocephalic, atraumatic  EYES: EOMI, PERRLA  HEENT: Moist mucous membranes  NECK: Supple, No JVD  NERVOUS SYSTEM:  Alert & Oriented X3, non focal  CHEST/LUNG: Clear to auscultation bilaterally  HEART: Regular rate and rhythm  ABDOMEN: Soft, Nontender, Nondistended, Bowel sounds present  GENITOURINARY: Voiding, no palpable bladder  EXTREMITIES:   No clubbing, cyanosis, or edema  MUSCULOSKELETAL-right knee aquacel+  SKIN-no rash    LABS:                        10.8   8.45  )-----------( 230      ( 13 Jun 2019 08:12 )             32.8     06-13    137  |  105  |  12  ----------------------------<  148<H>  4.0   |  24  |  0.72    Ca    8.6      13 Jun 2019 08:12      PT/INR - ( 13 Jun 2019 08:12 )   PT: 19.2 sec;   INR: 1.70 ratio       MEDICATIONS  (STANDING):  acetaminophen   Tablet .. 650 milliGRAM(s) Oral every 6 hours  ascorbic acid 500 milliGRAM(s) Oral two times a day  dextrose 5%. 1000 milliLiter(s) (50 mL/Hr) IV Continuous <Continuous>  dextrose 50% Injectable 12.5 Gram(s) IV Push once  dextrose 50% Injectable 25 Gram(s) IV Push once  dextrose 50% Injectable 25 Gram(s) IV Push once  docusate sodium 100 milliGRAM(s) Oral three times a day  enoxaparin Injectable 30 milliGRAM(s) SubCutaneous two times a day  estrogens  Cream 30 Gram(s) Vaginal daily  ferrous    sulfate 325 milliGRAM(s) Oral three times a day with meals  folic acid 1 milliGRAM(s) Oral daily  insulin lispro (HumaLOG) corrective regimen sliding scale   SubCutaneous three times a day before meals  insulin lispro (HumaLOG) corrective regimen sliding scale   SubCutaneous at bedtime  lactated ringers. 1000 milliLiter(s) (75 mL/Hr) IV Continuous <Continuous>  levothyroxine 125 MICROGram(s) Oral daily  multivitamin 1 Tablet(s) Oral daily  pantoprazole    Tablet 40 milliGRAM(s) Oral before breakfast  polyethylene glycol 3350 17 Gram(s) Oral daily  sodium chloride 0.9% lock flush 3 milliLiter(s) IV Push every 8 hours  tiotropium 18 MICROgram(s) Capsule 1 Capsule(s) Inhalation daily  warfarin 2.5 milliGRAM(s) Oral daily    MEDICATIONS  (PRN):  acetaminophen   Tablet .. 650 milliGRAM(s) Oral every 6 hours PRN Temp greater or equal to 38C (100.4F)  ALBUTerol    90 MICROgram(s) HFA Inhaler 2 Puff(s) Inhalation every 6 hours PRN Shortness of Breath and/or Wheezing  aluminum hydroxide/magnesium hydroxide/simethicone Suspension 30 milliLiter(s) Oral four times a day PRN Indigestion  bisacodyl Suppository 10 milliGRAM(s) Rectal daily PRN If no bowel movement by postoperative day #2  dextrose 40% Gel 15 Gram(s) Oral once PRN Blood Glucose LESS THAN 70 milliGRAM(s)/deciliter  glucagon  Injectable 1 milliGRAM(s) IntraMuscular once PRN Glucose LESS THAN 70 milligrams/deciliter  HYDROmorphone  Injectable 0.5 milliGRAM(s) IV Push every 3 hours PRN Severe Pain (7 - 10)  ondansetron Injectable 4 milliGRAM(s) IV Push every 6 hours PRN Nausea and/or Vomiting  oxyCODONE    IR 5 milliGRAM(s) Oral every 4 hours PRN Mild Pain (1 - 3)  oxyCODONE    IR 10 milliGRAM(s) Oral every 4 hours PRN Moderate Pain (4 - 6)  senna 2 Tablet(s) Oral at bedtime PRN Constipation  ASSESSMENT AND PLAN:  74f PMH AS ABOVE A/W:    1. OA right knee s/p elective right knee replacement:  -POD#2  -pain control  -physical therapy  -incentive spirometery  -bowel regimen  -monitor h/h    2. Acute blood loss anemia:  -likely related to OR.   -no need for transfusion at this time.    3. Asthma:  -stable.   -continue home meds.    4. Hypothyroid:  -synthroid    5. DMII:  -ss    6. Emphysema:  -stable    7. DVT px
HPI:  Patient is a 74y old  Female who presents with a chief complaint of total knee (2019 11:26)      Consulted by Dr. Dr. Klever Brink III  for VTE prophylaxis, risk stratification, and anticoagulation management.    PAST MEDICAL & SURGICAL HISTORY:  Frequent UTI: 2-3x/year  OA (osteoarthritis)  Pulmonary emphysema, unspecified emphysema type: 2017 last exacerbation; never intubated  Basal cell carcinoma: removed from face  DDD (degenerative disc disease), lumbar  Spinal stenosis of lumbar region  Urinary urgency  Gall stones: gall bladder removed  Diverticulosis of intestine without bleeding, unspecified intestinal tract location  Varicose veins of both lower extremities: surgery  History of cataract: extracted from right and left  Elevated pancreatic enzyme: patient reports elevated lab 2013. Gastroenterologist aware  Asthma: history of x 20 years ago, resolved does not use inhaler  Back pain  Diabetes mellitus: diet controlled  Hypothyroidism  Acid reflux  S/P cholecystectomy  H/O umbilical hernia repair: 19  H/O lumbosacral spine surgery: fusion  Skin cancer: to right temple , removed, history of basal cell x 2  S/P knee surgery: arthroscopy bilateral  and   Dental disorder: Dental surgery 2012, patient reports that upper portion is glued in at this time, plan is to have dental implants placed.  S/P bladder repair: bladder lift 2013    CAPRINI SCORE  AGE RELATED RISK FACTORS                                                       MOBILITY RELATED FACTORS  [ ] Age 41-60 years                                            (1 Point)                  [ ] Bed rest                                                        (1 Point)  [ ] Age: 61-74 years                                           (2 Points)                [ ] Plaster cast                                                   (2 Points)  [X ] Age= 75 years                                              (3 Points)                 [ ] Bed bound for more than 72 hours                   (2 Points)    DISEASE RELATED RISK FACTORS                                               GENDER SPECIFIC FACTORS  [ ] Edema in the lower extremities                       (1 Point)           [ ] Pregnancy                                                            (1 Point)  [ X] Varicose veins                                               (1 Point)                  [ ] Post-partum < 6 weeks                                      (1 Point)             [X ] BMI > 25 Kg/m2                                            (1 Point)                  [ ] Hormonal therapy or oral contraception       (1 Point)                 [ ] Sepsis (in the previous month)                        (1 Point)             [ ] History of pregnancy complications                (1Point)  [ ] Pneumonia or serious lung disease                                             [ ] Unexplained or recurrent  (=/>3), premature                                 (In the previous month)                               (1 Point)                birth with toxemia or growth-restricted infant (1 Point)  [ ] Abnormal pulmonary function test            (1 Point)                                   SURGERY RELATED RISK FACTORS  [ ] Acute myocardial infarction                       (1 Point)                  [ ]  Section                                         (1 Point)  [ ] Congestive heart failure (in the previous month) (1 Point)   [ ] Minor surgery   lasting <45 minutes       (1 Point)   [ ] Inflammatory bowel disease                             (1 Point)          [ ] Arthroscopic surgery                                  (2 Points)  [ ] Central venous access                                    (2 Points)            [ ] General surgery lasting >45 minutes      (2 Points)       [ ] Stroke (in the previous month)                  (5 Points)            [X ] Elective major lower extremity arthroplasty (5 Points)                                   [  ] Malignancy (present or past include skin melanoma                                          but exclude  basal skin cell)    (2 points)                                      TRAUMA RELATED RISK FACTORS                HEMATOLOGY RELATED FACTORS                                  [ ] Fracture of the hip, pelvis, or leg                       (5 Points)  [ ] Prior episodes of VTE                                     (3 Points)          [ ] Acute spinal cord injury (in the previous month)  (5 Points)  [ ] Positive family history for VTE                         (3 Points)       [ ] Paralysis (less than 1 month)                          (5 Points)  [ ] Prothrombin 08933 A                                      (3 Points)         [ ] Multiple Trauma (within 1month)                 (5Points)                                                                                                                                                                [ ] Factor V Leiden                                          (3 Points)                                OTHER RISK FACTORS                          [ ] Lupus anticoagulants                                     (3 Points)                       [ ] BMI > 40                          (1 Point)                                                         [ ] Anticardiolipin antibodies                                (3 Points)                   [ ] Smoking                              (1Point)                                                [ ] High homocysteine in the blood                      (3 Points)                [  ] Diabetes requiring insulin (1point)                         [ ] Other congenital or acquired thrombophilia       (3 Points)          [  ] Chemotherapy                   (1 Point)  [ ] Heparin induced thrombocytopenia                  (3 Points)             [  ] Blood Transfusion                (1 point)                                                                                                             Total Score [10]                                                                                                                                                                                                                                    IMPROVE Bleeding Risk Score: 1.5    Falls Risk:   High ( X )  Mod (  )  Low (  )    EBL: 150  ml  CR CL: 94.4  19 Pt seen in PACU with significant other present.  Discussed her anticoagulation with coumadin over lapping with heparin.  Questions answered.  Risks and benefits discussed.  Will reinforce as needed.    19: Patient seen at bedside, explained coumadin and Lovenox overlap and provided with education regarding side effects of coumadin as well as s/s of clot. Patient verbalized understanding through teach back.  19 Pt seen at bedside on [facility]. Pt states she had a rough night  and is weak.  Pt states she will go home tomorrow.  Discussed her coumadin and answered questions.  Vital Signs Last 24 Hrs  T(C): 36.3 (19 @ 10:51), Max: 37 (19 @ 23:30)  T(F): 97.3 (19 @ 10:51), Max: 98.6 (19 @ 23:30)  HR: 84 (19 @ 10:51) (64 - 84)  BP: 135/48 (19 @ 10:51) (101/49 - 135/48)  BP(mean): --  RR: 18 (19 @ 10:51) (16 - 18)  SpO2: 98% (19 @ 10:51) (94% - 98%)    FAMILY HISTORY:  Family history of stroke (Mother)  Family history of heart disease (Mother)  Family history of pancreatic cancer (Father)    Denies any personal or familial history of clotting or bleeding disorders.    Allergies    No Known Allergies    Intolerances        REVIEW OF SYSTEMS    (  )Fever	     (  )Constipation	(  )SOB				(  )Headache	(  )Dysuria  (  )Chills	     (  )Melena	(  )Dyspnea present on exertion	                    (  )Dizziness                    (  )Polyuria  (  )Nausea	     (  )Hematochezia	(  )Cough			                    (  )Syncope   	(  )Hematuria  (  )Vomiting    (  )Chest Pain	(  )Wheezing			( x )Weakness   (x) pain rt knee  (  )Diarrhea     (  )Palpitations	(  )Anorexia			(  )Myalgia    Pertinent positives in HPI and daily subjective.  All other ROS negative.      PHYSICAL EXAM:    Constitutional: Appears Well    Neurological: A& O x 3    Skin: Warm    Respiratory and Thorax: normal effort; Breath sounds: normal; No rales/wheezing/rhonchi  	  Cardiovascular: S1, S2, regular, NMBR	    Gastrointestinal: BS + x 4Q, nontender	    Genitourinary:  Bladder nondistended, nontender    Musculoskeletal:   General Right:   no muscle/joint tenderness,   normal tone, no joint swelling,   ROM: limited	    General Left:   no muscle/joint tenderness,   normal tone, no joint swelling,   ROM: limited      Knee:  Right: Incision: ; Dressing CDI;   Lower extrems:   Right: no calf tenderness              negative rita's sign               + pedal pulses    Left:   no calf tenderness              negative rita's sign               + pedal pulses                           10.8   8.45  )-----------( 230      ( 2019 08:12 )             32.8       06-13    137  |  105  |  12  ----------------------------<  148<H>  4.0   |  24  |  0.72    Ca    8.6      2019 08:12                            10.4   11.60 )-----------( 225      ( 2019 07:46 )             31.6       06-12    133<L>  |  101  |  12  ----------------------------<  140<H>  3.9   |  25  |  0.71    Ca    8.1<L>      2019 07:46               PTT - ( 2019 08:42 )  PTT:30.3 sec                      12.4   8.54  )-----------( 261      ( 2019 12:27 )             37.9       06-11    143  |  110<H>  |  11  ----------------------------<  137<H>  4.0   |  26  |  0.77    Ca    8.6      2019 12:27      PT/INR - ( 2019 08:12 )   PT: 19.2 sec;   INR: 1.70 ratio  PT/INR - ( 2019 07:46 )   PT: 12.8 sec;   INR: 1.15 ratio    PT/INR - ( 2019 12:27 )   PT: 11.7 sec;   INR: 1.05 ratio         PTT - ( 2019 08:42 )  PTT:30.3 sec				    MEDICATIONS  (STANDING):  acetaminophen   Tablet .. 650 milliGRAM(s) Oral every 6 hours  ascorbic acid 500 milliGRAM(s) Oral two times a day  dextrose 5%. 1000 milliLiter(s) IV Continuous <Continuous>  dextrose 50% Injectable 12.5 Gram(s) IV Push once  dextrose 50% Injectable 25 Gram(s) IV Push once  dextrose 50% Injectable 25 Gram(s) IV Push once  docusate sodium 100 milliGRAM(s) Oral three times a day  enoxaparin Injectable 30 milliGRAM(s) SubCutaneous two times a day  estrogens  Cream 30 Gram(s) Vaginal daily  ferrous    sulfate 325 milliGRAM(s) Oral three times a day with meals  folic acid 1 milliGRAM(s) Oral daily  insulin lispro (HumaLOG) corrective regimen sliding scale   SubCutaneous three times a day before meals  insulin lispro (HumaLOG) corrective regimen sliding scale   SubCutaneous at bedtime  lactated ringers. 1000 milliLiter(s) IV Continuous <Continuous>  levothyroxine 125 MICROGram(s) Oral daily  multivitamin 1 Tablet(s) Oral daily  pantoprazole    Tablet 40 milliGRAM(s) Oral before breakfast  polyethylene glycol 3350 17 Gram(s) Oral daily  sodium chloride 0.9% lock flush 3 milliLiter(s) IV Push every 8 hours  tiotropium 18 MICROgram(s) Capsule 1 Capsule(s) Inhalation daily  warfarin 2.5 milliGRAM(s) Oral daily              DVT Prophylaxis:  LMWH                   (x  )  Heparin SQ           (  )  Coumadin             (x  )  Xarelto                  (  )  Eliquis                   (  )  Venodynes           ( x )  Ambulation          (x  )  UFH                      x (  )  Contraindicated  (  )  EC Aspirin             (  )
HPI:  Patient is a 74y old  Female who presents with a chief complaint of total knee (2019 11:26)      Consulted by Dr. Dr. Klever Brink III  for VTE prophylaxis, risk stratification, and anticoagulation management.    PAST MEDICAL & SURGICAL HISTORY:  Frequent UTI: 2-3x/year  OA (osteoarthritis)  Pulmonary emphysema, unspecified emphysema type: 2017 last exacerbation; never intubated  Basal cell carcinoma: removed from face  DDD (degenerative disc disease), lumbar  Spinal stenosis of lumbar region  Urinary urgency  Gall stones: gall bladder removed  Diverticulosis of intestine without bleeding, unspecified intestinal tract location  Varicose veins of both lower extremities: surgery  History of cataract: extracted from right and left  Elevated pancreatic enzyme: patient reports elevated lab 2013. Gastroenterologist aware  Asthma: history of x 20 years ago, resolved does not use inhaler  Back pain  Diabetes mellitus: diet controlled  Hypothyroidism  Acid reflux  S/P cholecystectomy  H/O umbilical hernia repair: 19  H/O lumbosacral spine surgery: fusion  Skin cancer: to right temple , removed, history of basal cell x 2  S/P knee surgery: arthroscopy bilateral  and   Dental disorder: Dental surgery 2012, patient reports that upper portion is glued in at this time, plan is to have dental implants placed.  S/P bladder repair: bladder lift 2013    CAPRINI SCORE  AGE RELATED RISK FACTORS                                                       MOBILITY RELATED FACTORS  [ ] Age 41-60 years                                            (1 Point)                  [ ] Bed rest                                                        (1 Point)  [ ] Age: 61-74 years                                           (2 Points)                [ ] Plaster cast                                                   (2 Points)  [X ] Age= 75 years                                              (3 Points)                 [ ] Bed bound for more than 72 hours                   (2 Points)    DISEASE RELATED RISK FACTORS                                               GENDER SPECIFIC FACTORS  [ ] Edema in the lower extremities                       (1 Point)           [ ] Pregnancy                                                            (1 Point)  [ X] Varicose veins                                               (1 Point)                  [ ] Post-partum < 6 weeks                                      (1 Point)             [X ] BMI > 25 Kg/m2                                            (1 Point)                  [ ] Hormonal therapy or oral contraception       (1 Point)                 [ ] Sepsis (in the previous month)                        (1 Point)             [ ] History of pregnancy complications                (1Point)  [ ] Pneumonia or serious lung disease                                             [ ] Unexplained or recurrent  (=/>3), premature                                 (In the previous month)                               (1 Point)                birth with toxemia or growth-restricted infant (1 Point)  [ ] Abnormal pulmonary function test            (1 Point)                                   SURGERY RELATED RISK FACTORS  [ ] Acute myocardial infarction                       (1 Point)                  [ ]  Section                                         (1 Point)  [ ] Congestive heart failure (in the previous month) (1 Point)   [ ] Minor surgery   lasting <45 minutes       (1 Point)   [ ] Inflammatory bowel disease                             (1 Point)          [ ] Arthroscopic surgery                                  (2 Points)  [ ] Central venous access                                    (2 Points)            [ ] General surgery lasting >45 minutes      (2 Points)       [ ] Stroke (in the previous month)                  (5 Points)            [X ] Elective major lower extremity arthroplasty (5 Points)                                   [  ] Malignancy (present or past include skin melanoma                                          but exclude  basal skin cell)    (2 points)                                      TRAUMA RELATED RISK FACTORS                HEMATOLOGY RELATED FACTORS                                  [ ] Fracture of the hip, pelvis, or leg                       (5 Points)  [ ] Prior episodes of VTE                                     (3 Points)          [ ] Acute spinal cord injury (in the previous month)  (5 Points)  [ ] Positive family history for VTE                         (3 Points)       [ ] Paralysis (less than 1 month)                          (5 Points)  [ ] Prothrombin 31273 A                                      (3 Points)         [ ] Multiple Trauma (within 1month)                 (5Points)                                                                                                                                                                [ ] Factor V Leiden                                          (3 Points)                                OTHER RISK FACTORS                          [ ] Lupus anticoagulants                                     (3 Points)                       [ ] BMI > 40                          (1 Point)                                                         [ ] Anticardiolipin antibodies                                (3 Points)                   [ ] Smoking                              (1Point)                                                [ ] High homocysteine in the blood                      (3 Points)                [  ] Diabetes requiring insulin (1point)                         [ ] Other congenital or acquired thrombophilia       (3 Points)          [  ] Chemotherapy                   (1 Point)  [ ] Heparin induced thrombocytopenia                  (3 Points)             [  ] Blood Transfusion                (1 point)                                                                                                             Total Score [10]                                                                                                                                                                                                                                    IMPROVE Bleeding Risk Score: 1.5    Falls Risk:   High ( X )  Mod (  )  Low (  )    EBL: 150  ml  CR CL: 94.4  19 Pt seen in PACU with significant other present.  Discussed her anticoagulation with coumadin over lapping with heparin.  Questions answered.  Risks and benefits discussed.  Will reinforce as needed.    19: Patient seen at bedside, explained coumadin and Lovenox overlap and provided with education regarding side effects of coumadin as well as s/s of clot. Patient verbalized understanding through teach back.    Vital Signs Last 24 Hrs  T(C): 36.8 (19 @ 03:32), Max: 36.9 (19 @ 23:40)  T(F): 98.2 (19 @ 03:32), Max: 98.5 (19 @ 23:40)  HR: 60 (19 @ 09:35) (59 - 80)  BP: 103/47 (19 @ 03:32) (92/46 - 139/73)  BP(mean): --  RR: 16 (19 @ 03:32) (12 - 16)  SpO2: 97% (19 @ 03:32) (93% - 99%)    FAMILY HISTORY:  Family history of stroke (Mother)  Family history of heart disease (Mother)  Family history of pancreatic cancer (Father)    Denies any personal or familial history of clotting or bleeding disorders.    Allergies    No Known Allergies    Intolerances        REVIEW OF SYSTEMS    (  )Fever	     (  )Constipation	(  )SOB				(  )Headache	(  )Dysuria  (  )Chills	     (  )Melena	(  )Dyspnea present on exertion	                    (  )Dizziness                    (  )Polyuria  (  )Nausea	     (  )Hematochezia	(  )Cough			                    (  )Syncope   	(  )Hematuria  (  )Vomiting    (  )Chest Pain	(  )Wheezing			( x )Weakness   (x) pain rt knee  (  )Diarrhea     (  )Palpitations	(  )Anorexia			(  )Myalgia    Pertinent positives in HPI and daily subjective.  All other ROS negative.      PHYSICAL EXAM:    Constitutional: Appears Well    Neurological: A& O x 3    Skin: Warm    Respiratory and Thorax: normal effort; Breath sounds: normal; No rales/wheezing/rhonchi  	  Cardiovascular: S1, S2, regular, NMBR	    Gastrointestinal: BS + x 4Q, nontender	    Genitourinary:  Bladder nondistended, nontender    Musculoskeletal:   General Right:   no muscle/joint tenderness,   normal tone, no joint swelling,   ROM: limited	    General Left:   no muscle/joint tenderness,   normal tone, no joint swelling,   ROM: limited      Knee:  Right: Incision: ; Dressing CDI;   Lower extrems:   Right: no calf tenderness              negative rita's sign               + pedal pulses    Left:   no calf tenderness              negative rita's sign               + pedal pulses                              10.4   11.60 )-----------( 225      ( 2019 07:46 )             31.6       06-12    133<L>  |  101  |  12  ----------------------------<  140<H>  3.9   |  25  |  0.71    Ca    8.1<L>      2019 07:46        PT/INR - ( 2019 07:46 )   PT: 12.8 sec;   INR: 1.15 ratio         PTT - ( 2019 08:42 )  PTT:30.3 sec                      12.4   8.54  )-----------( 261      ( 2019 12:27 )             37.9       06-11    143  |  110<H>  |  11  ----------------------------<  137<H>  4.0   |  26  |  0.77    Ca    8.6      2019 12:27        PT/INR - ( 2019 12:27 )   PT: 11.7 sec;   INR: 1.05 ratio         PTT - ( 2019 08:42 )  PTT:30.3 sec				    MEDICATIONS  (STANDING):  acetaminophen   Tablet .. 650 milliGRAM(s) Oral every 6 hours  ascorbic acid 500 milliGRAM(s) Oral two times a day  dextrose 5%. 1000 milliLiter(s) IV Continuous <Continuous>  dextrose 50% Injectable 12.5 Gram(s) IV Push once  dextrose 50% Injectable 25 Gram(s) IV Push once  dextrose 50% Injectable 25 Gram(s) IV Push once  docusate sodium 100 milliGRAM(s) Oral three times a day  enoxaparin Injectable 30 milliGRAM(s) SubCutaneous two times a day  ferrous    sulfate 325 milliGRAM(s) Oral three times a day with meals  folic acid 1 milliGRAM(s) Oral daily  insulin lispro (HumaLOG) corrective regimen sliding scale   SubCutaneous three times a day before meals  insulin lispro (HumaLOG) corrective regimen sliding scale   SubCutaneous at bedtime  lactated ringers. 1000 milliLiter(s) IV Continuous <Continuous>  levothyroxine 125 MICROGram(s) Oral daily  multivitamin 1 Tablet(s) Oral daily  pantoprazole    Tablet 40 milliGRAM(s) Oral before breakfast  polyethylene glycol 3350 17 Gram(s) Oral daily  sodium chloride 0.9% lock flush 3 milliLiter(s) IV Push every 8 hours  tiotropium 18 MICROgram(s) Capsule 1 Capsule(s) Inhalation daily  warfarin 5 milliGRAM(s) Oral daily            DVT Prophylaxis:  LMWH                   (x  )  Heparin SQ           (  )  Coumadin             (x  )  Xarelto                  (  )  Eliquis                   (  )  Venodynes           ( x )  Ambulation          (x  )  UFH                      x (  )  Contraindicated  (  )  EC Aspirin             (  )
HPI:  Patient is a 74y old  Female who presents with a chief complaint of total knee (2019 11:26)    Consulted by Dr. Dr. Klever Brink III  for VTE prophylaxis, risk stratification, and anticoagulation management.    PAST MEDICAL & SURGICAL HISTORY:  Frequent UTI: 2-3x/year  OA (osteoarthritis)  Pulmonary emphysema, unspecified emphysema type: 2017 last exacerbation; never intubated  Basal cell carcinoma: removed from face  DDD (degenerative disc disease), lumbar  Spinal stenosis of lumbar region  Urinary urgency  Gall stones: gall bladder removed  Diverticulosis of intestine without bleeding, unspecified intestinal tract location  Varicose veins of both lower extremities: surgery  History of cataract: extracted from right and left  Elevated pancreatic enzyme: patient reports elevated lab 2013. Gastroenterologist aware  Asthma: history of x 20 years ago, resolved does not use inhaler  Back pain  Diabetes mellitus: diet controlled  Hypothyroidism  Acid reflux  S/P cholecystectomy  H/O umbilical hernia repair: 19  H/O lumbosacral spine surgery: fusion  Skin cancer: to right temple , removed, history of basal cell x 2  S/P knee surgery: arthroscopy bilateral  and   Dental disorder: Dental surgery 2012, patient reports that upper portion is glued in at this time, plan is to have dental implants placed.  S/P bladder repair: bladder lift 2013    CAPRINI SCORE  AGE RELATED RISK FACTORS                                                       MOBILITY RELATED FACTORS  [ ] Age 41-60 years                                            (1 Point)                  [ ] Bed rest                                                        (1 Point)  [ ] Age: 61-74 years                                           (2 Points)                [ ] Plaster cast                                                   (2 Points)  [X ] Age= 75 years                                              (3 Points)                 [ ] Bed bound for more than 72 hours                   (2 Points)    DISEASE RELATED RISK FACTORS                                               GENDER SPECIFIC FACTORS  [ ] Edema in the lower extremities                       (1 Point)           [ ] Pregnancy                                                            (1 Point)  [ X] Varicose veins                                               (1 Point)                  [ ] Post-partum < 6 weeks                                      (1 Point)             [X ] BMI > 25 Kg/m2                                            (1 Point)                  [ ] Hormonal therapy or oral contraception       (1 Point)                 [ ] Sepsis (in the previous month)                        (1 Point)             [ ] History of pregnancy complications                (1Point)  [ ] Pneumonia or serious lung disease                                             [ ] Unexplained or recurrent  (=/>3), premature                                 (In the previous month)                               (1 Point)                birth with toxemia or growth-restricted infant (1 Point)  [ ] Abnormal pulmonary function test            (1 Point)                                   SURGERY RELATED RISK FACTORS  [ ] Acute myocardial infarction                       (1 Point)                  [ ]  Section                                         (1 Point)  [ ] Congestive heart failure (in the previous month) (1 Point)   [ ] Minor surgery   lasting <45 minutes       (1 Point)   [ ] Inflammatory bowel disease                             (1 Point)          [ ] Arthroscopic surgery                                  (2 Points)  [ ] Central venous access                                    (2 Points)            [ ] General surgery lasting >45 minutes      (2 Points)       [ ] Stroke (in the previous month)                  (5 Points)            [X ] Elective major lower extremity arthroplasty (5 Points)                                   [  ] Malignancy (present or past include skin melanoma                                          but exclude  basal skin cell)    (2 points)                                      TRAUMA RELATED RISK FACTORS                HEMATOLOGY RELATED FACTORS                                  [ ] Fracture of the hip, pelvis, or leg                       (5 Points)  [ ] Prior episodes of VTE                                     (3 Points)          [ ] Acute spinal cord injury (in the previous month)  (5 Points)  [ ] Positive family history for VTE                         (3 Points)       [ ] Paralysis (less than 1 month)                          (5 Points)  [ ] Prothrombin 10944 A                                      (3 Points)         [ ] Multiple Trauma (within 1month)                 (5Points)                                                                                                                                                                [ ] Factor V Leiden                                          (3 Points)                                OTHER RISK FACTORS                          [ ] Lupus anticoagulants                                     (3 Points)                       [ ] BMI > 40                          (1 Point)                                                         [ ] Anticardiolipin antibodies                                (3 Points)                   [ ] Smoking                              (1Point)                                                [ ] High homocysteine in the blood                      (3 Points)                [  ] Diabetes requiring insulin (1point)                         [ ] Other congenital or acquired thrombophilia       (3 Points)          [  ] Chemotherapy                   (1 Point)  [ ] Heparin induced thrombocytopenia                  (3 Points)             [  ] Blood Transfusion                (1 point)                                                                                                             Total Score [10]                                                                                                                                                                                                                                    IMPROVE Bleeding Risk Score: 1.5    Falls Risk:   High ( X )  Mod (  )  Low (  )    EBL: 150  ml  CR CL: 94.4      19 Pt seen in PACU with significant other present.  Discussed her anticoagulation with coumadin over lapping with heparin.  Questions answered.  Risks and benefits discussed.  Will reinforce as needed.    19: Patient seen at bedside, explained coumadin and Lovenox overlap and provided with education regarding side effects of coumadin as well as s/s of clot. Patient verbalized understanding through teach back.  19 Pt seen at bedside on [facility]. Pt states she had a rough night  and is weak.  Pt states she will go home tomorrow.  Discussed her coumadin and answered questions.  19: Patient seen at bedside, provided with discharge education and paperwork. Explained INR 2.5 from 1.7. To hold tonight's dose. Discussed s/s of VTE and dietary influences on Coumadin. Patient able to verbalize understanding through teach back method.      Vital Signs Last 24 Hrs  T(C): 37.4 (19 @ 11:30), Max: 37.4 (19 @ 11:30)  T(F): 99.3 (19 @ 11:30), Max: 99.3 (19 @ 11:30)  HR: 84 (19 @ 11:30) (80 - 85)  BP: 127/55 (19 @ 11:30) (110/50 - 127/55)  BP(mean): --  RR: 18 (19 @ 11:30) (18 - 18)  SpO2: 93% (19 @ 11:30) (92% - 96%)    FAMILY HISTORY:  Family history of stroke (Mother)  Family history of heart disease (Mother)  Family history of pancreatic cancer (Father)    Denies any personal or familial history of clotting or bleeding disorders.    Allergies    No Known Allergies    Intolerances    REVIEW OF SYSTEMS    (  )Fever	     (  )Constipation	(  )SOB				(  )Headache	(  )Dysuria  (  )Chills	     (  )Melena	(  )Dyspnea present on exertion	                    (  )Dizziness                    (  )Polyuria  (  )Nausea	     (  )Hematochezia	(  )Cough			                    (  )Syncope   	(  )Hematuria  (  )Vomiting    (  )Chest Pain	(  )Wheezing			( x )Weakness   (x) pain rt knee  (  )Diarrhea     (  )Palpitations	(  )Anorexia			(  )Myalgia    Pertinent positives in HPI and daily subjective.  All other ROS negative.      PHYSICAL EXAM:    Constitutional: Appears Well    Neurological: A& O x 3    Skin: Warm    Respiratory and Thorax: normal effort; Breath sounds: normal; No rales/wheezing/rhonchi  	  Cardiovascular: S1, S2, regular, NMBR	    Gastrointestinal: BS + x 4Q, nontender	    Genitourinary:  Bladder nondistended, nontender    Musculoskeletal:   General Right:   no muscle/joint tenderness,   normal tone, no joint swelling,   ROM: limited	    General Left:   no muscle/joint tenderness,   normal tone, no joint swelling,   ROM: limited      Knee:  Right: Incision: ; Dressing CDI;   Lower extrems:   Right: no calf tenderness              negative rita's sign               + pedal pulses    Left:   no calf tenderness              negative rtia's sign               + pedal pulses                          10.8   11.42 )-----------( 260      ( 2019 08:51 )             33.0       06-14    135  |  102  |  6<L>  ----------------------------<  206<H>  4.1   |  25  |  0.90    Ca    8.7      2019 08:51        PT/INR - ( 2019 08:51 )   PT: 28.6 sec;   INR: 2.50 ratio                                    10.8   8.45  )-----------( 230      ( 2019 08:12 )             32.8       06-13    137  |  105  |  12  ----------------------------<  148<H>  4.0   |  24  |  0.72    Ca    8.6      2019 08:12                            10.4   11.60 )-----------( 225      ( 2019 07:46 )             31.6       06-12    133<L>  |  101  |  12  ----------------------------<  140<H>  3.9   |  25  |  0.71    Ca    8.1<L>      2019 07:46               PTT - ( 2019 08:42 )  PTT:30.3 sec                      12.4   8.54  )-----------( 261      ( 2019 12:27 )             37.9       06-11    143  |  110<H>  |  11  ----------------------------<  137<H>  4.0   |  26  |  0.77    Ca    8.6      2019 12:27    PT/INR - ( 2019 08:51 )   PT: 28.6 sec;   INR: 2.50 ratio    PT/INR - ( 2019 08:12 )   PT: 19.2 sec;   INR: 1.70 ratio  PT/INR - ( 2019 07:46 )   PT: 12.8 sec;   INR: 1.15 ratio    PT/INR - ( 2019 12:27 )   PT: 11.7 sec;   INR: 1.05 ratio         PTT - ( 2019 08:42 )  PTT:30.3 sec				    MEDICATIONS  (STANDING):  acetaminophen   Tablet .. 650 milliGRAM(s) Oral every 6 hours  ascorbic acid 500 milliGRAM(s) Oral two times a day  dextrose 5%. 1000 milliLiter(s) IV Continuous <Continuous>  dextrose 50% Injectable 12.5 Gram(s) IV Push once  dextrose 50% Injectable 25 Gram(s) IV Push once  dextrose 50% Injectable 25 Gram(s) IV Push once  docusate sodium 100 milliGRAM(s) Oral three times a day  enoxaparin Injectable 30 milliGRAM(s) SubCutaneous two times a day  estrogens  Cream 30 Gram(s) Vaginal daily  ferrous    sulfate 325 milliGRAM(s) Oral three times a day with meals  folic acid 1 milliGRAM(s) Oral daily  insulin lispro (HumaLOG) corrective regimen sliding scale   SubCutaneous three times a day before meals  insulin lispro (HumaLOG) corrective regimen sliding scale   SubCutaneous at bedtime  lactated ringers. 1000 milliLiter(s) IV Continuous <Continuous>  levothyroxine 125 MICROGram(s) Oral daily  multivitamin 1 Tablet(s) Oral daily  pantoprazole    Tablet 40 milliGRAM(s) Oral before breakfast  polyethylene glycol 3350 17 Gram(s) Oral daily  sodium chloride 0.9% lock flush 3 milliLiter(s) IV Push every 8 hours  tiotropium 18 MICROgram(s) Capsule 1 Capsule(s) Inhalation daily  warfarin 2.5 milliGRAM(s) Oral daily              DVT Prophylaxis:  LMWH                   (x  )  Heparin SQ           (  )  Coumadin             (x  )  Xarelto                  (  )  Eliquis                   (  )  Venodynes           ( x )  Ambulation          (x  )  UFH                      x (  )  Contraindicated  (  )  EC Aspirin             (  )
Orthopedics Post-op Check    POD 0 Total Knee Arthroplasty  Pain is controlled. Pt feeling well. No nausea or vomiting.     Vital Signs Last 24 Hrs  T(C): 36.7 (06-11-19 @ 15:48), Max: 37.1 (06-11-19 @ 08:26)  T(F): 98 (06-11-19 @ 15:48), Max: 98.7 (06-11-19 @ 08:26)  HR: 78 (06-11-19 @ 15:48) (62 - 78)  BP: 112/53 (06-11-19 @ 15:48) (112/53 - 139/73)  BP(mean): --  RR: 16 (06-11-19 @ 15:48) (12 - 16)  SpO2: 96% (06-11-19 @ 15:48) (95% - 99%)                        12.4   8.54  )-----------( 261      ( 11 Jun 2019 12:27 )             37.9     11 Jun 2019 12:27    143    |  110    |  11     ----------------------------<  137    4.0     |  26     |  0.77     Ca    8.6        11 Jun 2019 12:27      PT/INR - ( 11 Jun 2019 12:27 )   PT: 11.7 sec;   INR: 1.05 ratio         PTT - ( 11 Jun 2019 08:42 )  PTT:30.3 sec    Exam:  NAD AAOx3  Dressing clean and dry  +FHL GS  EHL weak, TA absent  SILT toes 1-5  +DP  Calf Soft NT  -OOB PT
POD 0 Right Total Knee Arthroplasty  Pain is well controlled. Pt feeling well, but still unable to fully fire EHL and TA. No nausea or vomiting.     Exam:  NAD AAOx3  Dressing clean and dry  +FHL GS, weak TA and EHL   SILT toes 1-5  +DP  Calf Soft NT    A/P:  Stable POD 0 Right Total Knee Arthroplasty. EHL function improving, but still weak. TA now firing weakly as well.   -Keep knee in flexion with pillows beneath knee at all times  -Foot pump RLE  -Analgesia  -Ppx ABX  -DVT PE ppx  -OOB with PT
Patient seen and examined at bedside this am. Pain is well controlled. Patient able to move right foot at her baseline. No nausea or vomiting. Vitals stable this am. Denies fever, chills, numbness/tingling of the RLE. No other complaints at this time.       Vital Signs Last 24 Hrs  T(C): 37 (12 Jun 2019 23:30), Max: 37 (12 Jun 2019 23:30)  T(F): 98.6 (12 Jun 2019 23:30), Max: 98.6 (12 Jun 2019 23:30)  HR: 66 (12 Jun 2019 23:30) (60 - 77)  BP: 116/56 (12 Jun 2019 23:30) (96/62 - 116/56)  BP(mean): --  RR: 16 (12 Jun 2019 23:30) (16 - 16)  SpO2: 94% (12 Jun 2019 23:30) (94% - 96%)    PE:    GEN: NAD       RLE:     Dressing clean and dry  FHL/GSC 5/5, EHL/TA 5/5   SILT toes 1-5  +DP  Calf Soft NT
Patient seen and examined at bedside this am. Pain is well controlled. Pt reports improvement of moving her right foot up and down this am. No nausea or vomiting. Vitals stable this am. Denies fever, chills, numbness/tingling of the RLE. No other complaints at this time.       Vital Signs Last 24 Hrs  T(C): 36.8 (12 Jun 2019 03:32), Max: 37.1 (11 Jun 2019 08:26)  T(F): 98.2 (12 Jun 2019 03:32), Max: 98.7 (11 Jun 2019 08:26)  HR: 59 (12 Jun 2019 03:32) (59 - 80)  BP: 103/47 (12 Jun 2019 03:32) (92/46 - 139/73)  BP(mean): --  RR: 16 (12 Jun 2019 03:32) (12 - 16)  SpO2: 97% (12 Jun 2019 03:32) (93% - 99%)    PE:    GEN: NAD       RLE:     Dressing clean and dry  FHL/GSC 5/5, EHL/TA 4+/5   SILT toes 1-5  +DP  Calf Soft NT
Pt S/E at bedside, no acute events overnight, pain controlled    Vital Signs Last 24 Hrs  T(C): 37.3 (14 Jun 2019 05:30), Max: 37.3 (14 Jun 2019 05:30)  T(F): 99.1 (14 Jun 2019 05:30), Max: 99.1 (14 Jun 2019 05:30)  HR: 80 (14 Jun 2019 05:30) (66 - 84)  BP: 110/50 (14 Jun 2019 05:30) (110/50 - 135/48)  BP(mean): --  RR: 18 (14 Jun 2019 05:30) (18 - 18)  SpO2: 92% (14 Jun 2019 05:30) (92% - 98%)    Gen: NAD    Right Lower Extremity:  Incision well appearing, no erythema/drainage  +EHL/FHL/TA/GS  SILT L3-S1  +DP/PT Pulses  Compartments soft  No calf TTP B/L
Right Adductor Canal Block Note:  Time out performed, sterile prep with chlorhexidine and drape, ultrasound-guided, 21G 4" stimuplex needle, good visualization of needle and nerve at all times, 20cc of 0.25% Ropivacaine injected easily, no heme after aspirating every 5cc, no intraneural injection, no paresthesia.  Procedure well tolerated without complications. Block performed as per surgeon's request.
c/c: right knee pain    HPI: 74F, pmh of OA, hypothyroidism, Asthma, diverticulosis, dm-diet controlled, emphysema who is admitted for right knee replacement after failed conservative measures. Hospitalist service consulted for medical comanagement.     6/12: pt seen and examined this am. Felt better. Pain controlled. no further nausea. Tolerating po. Ambulated with physical therapy.      ROS; all 10 systems reviewed and is as above otherwise negative.    Vital Signs Last 24 Hrs  T(C): 36.6 (12 Jun 2019 11:30), Max: 36.9 (11 Jun 2019 23:40)  T(F): 97.8 (12 Jun 2019 11:30), Max: 98.5 (11 Jun 2019 23:40)  HR: 77 (12 Jun 2019 11:30) (59 - 80)  BP: 96/62 (12 Jun 2019 11:30) (92/46 - 112/53)  RR: 16 (12 Jun 2019 11:30) (16 - 16)  SpO2: 96% (12 Jun 2019 11:30) (93% - 97%)    PHYSICAL EXAM:    GENERAL: Comfortable, no acute distress   HEAD:  Normocephalic, atraumatic  EYES: EOMI, PERRLA  HEENT: Moist mucous membranes  NECK: Supple, No JVD  NERVOUS SYSTEM:  Alert & Oriented X3, non focal  CHEST/LUNG: Clear to auscultation bilaterally  HEART: Regular rate and rhythm  ABDOMEN: Soft, Nontender, Nondistended, Bowel sounds present  GENITOURINARY: Voiding, no palpable bladder  EXTREMITIES:   No clubbing, cyanosis, or edema  MUSCULOSKELETAL-right knee aquaseal+  SKIN-no rash  LABS:                        10.4   11.60 )-----------( 225      ( 12 Jun 2019 07:46 )             31.6     06-12    133<L>  |  101  |  12  ----------------------------<  140<H>  3.9   |  25  |  0.71    Ca    8.1<L>      12 Jun 2019 07:46      PT/INR - ( 12 Jun 2019 07:46 )   PT: 12.8 sec;   INR: 1.15 ratio         PTT - ( 11 Jun 2019 08:42 )  PTT:30.3 sec        MEDICATIONS  (STANDING):  acetaminophen   Tablet .. 650 milliGRAM(s) Oral every 6 hours  ascorbic acid 500 milliGRAM(s) Oral two times a day  ceFAZolin   IVPB 2000 milliGRAM(s) IV Intermittent every 8 hours  dextrose 5%. 1000 milliLiter(s) (50 mL/Hr) IV Continuous <Continuous>  dextrose 50% Injectable 12.5 Gram(s) IV Push once  dextrose 50% Injectable 25 Gram(s) IV Push once  dextrose 50% Injectable 25 Gram(s) IV Push once  docusate sodium 100 milliGRAM(s) Oral three times a day  ferrous    sulfate 325 milliGRAM(s) Oral three times a day with meals  folic acid 1 milliGRAM(s) Oral daily  heparin  Injectable 5000 Unit(s) SubCutaneous every 8 hours  insulin lispro (HumaLOG) corrective regimen sliding scale   SubCutaneous three times a day before meals  insulin lispro (HumaLOG) corrective regimen sliding scale   SubCutaneous at bedtime  lactated ringers. 1000 milliLiter(s) (75 mL/Hr) IV Continuous <Continuous>  levothyroxine 125 MICROGram(s) Oral daily  multivitamin 1 Tablet(s) Oral daily  pantoprazole    Tablet 40 milliGRAM(s) Oral before breakfast  polyethylene glycol 3350 17 Gram(s) Oral daily  sodium chloride 0.9% lock flush 3 milliLiter(s) IV Push every 8 hours  tiotropium 18 MICROgram(s) Capsule 1 Capsule(s) Inhalation daily  warfarin 5 milliGRAM(s) Oral daily    MEDICATIONS  (PRN):  acetaminophen   Tablet .. 650 milliGRAM(s) Oral every 6 hours PRN Temp greater or equal to 38C (100.4F)  ALBUTerol    90 MICROgram(s) HFA Inhaler 2 Puff(s) Inhalation every 6 hours PRN Shortness of Breath and/or Wheezing  aluminum hydroxide/magnesium hydroxide/simethicone Suspension 30 milliLiter(s) Oral four times a day PRN Indigestion  dextrose 40% Gel 15 Gram(s) Oral once PRN Blood Glucose LESS THAN 70 milliGRAM(s)/deciliter  glucagon  Injectable 1 milliGRAM(s) IntraMuscular once PRN Glucose LESS THAN 70 milligrams/deciliter  HYDROmorphone  Injectable 0.5 milliGRAM(s) IV Push every 3 hours PRN Severe Pain (7 - 10)  ondansetron Injectable 4 milliGRAM(s) IV Push every 6 hours PRN Nausea and/or Vomiting  oxyCODONE    IR 5 milliGRAM(s) Oral every 4 hours PRN Mild Pain (1 - 3)  oxyCODONE    IR 10 milliGRAM(s) Oral every 4 hours PRN Moderate Pain (4 - 6)  senna 2 Tablet(s) Oral at bedtime PRN Constipation    ASSESSMENT AND PLAN:  74f PMH AS ABOVE A/W:    1. OA right knee s/p elective right knee replacement:  -POD#1  -pain control  -physical therapy  -incentive spirometery  -bowel regimen  -monitor h/h    2. Acute blood loss anemia:  -likely related to OR.   -no need for transfusion at this time.    3. Asthma:  -stable.   -continue home meds.    4. Hypothyroid:  -synthroid    5. DMII:  -ss    6. Emphysema:  -stable    7. DVT px
c/c: right knee pain    HPI: 74F, pmh of OA, hypothyroidism, Asthma, diverticulosis, dm-diet controlled, emphysema who is admitted for right knee replacement after failed conservative measures. Hospitalist service consulted for medical comanagement.     6/14: pt seen and examined this am. Has erythema/warmth of her right leg. Low grade temps. No n/v/d. Tolerating po.    ROS; all 10 systems reviewed and is as above otherwise negative.    Vital Signs Last 24 Hrs  T(C): 37.4 (14 Jun 2019 11:30), Max: 37.4 (14 Jun 2019 11:30)  T(F): 99.3 (14 Jun 2019 11:30), Max: 99.3 (14 Jun 2019 11:30)  HR: 84 (14 Jun 2019 11:30) (80 - 85)  BP: 127/55 (14 Jun 2019 11:30) (110/50 - 127/55)  RR: 18 (14 Jun 2019 11:30) (18 - 18)  SpO2: 93% (14 Jun 2019 11:30) (92% - 96%)    PHYSICAL EXAM:    GENERAL: Comfortable, no acute distress   HEAD:  Normocephalic, atraumatic  EYES: EOMI, PERRLA  HEENT: Moist mucous membranes  NECK: Supple, No JVD  NERVOUS SYSTEM:  Alert & Oriented X3, non focal  CHEST/LUNG: Clear to auscultation bilaterally  HEART: Regular rate and rhythm  ABDOMEN: Soft, Nontender, Nondistended, Bowel sounds present  GENITOURINARY: Voiding, no palpable bladder  EXTREMITIES:   No clubbing, cyanosis, or edema  MUSCULOSKELETAL-right knee aquacel+ Surrounding erythema/warmth RLE noted.  SKIN-see msk  LABS:                        10.8   11.42 )-----------( 260      ( 14 Jun 2019 08:51 )             33.0     06-14    135  |  102  |  6<L>  ----------------------------<  206<H>  4.1   |  25  |  0.90    Ca    8.7      14 Jun 2019 08:51      PT/INR - ( 14 Jun 2019 08:51 )   PT: 28.6 sec;   INR: 2.50 ratio         MEDICATIONS  (STANDING):  acetaminophen   Tablet .. 650 milliGRAM(s) Oral every 6 hours  ascorbic acid 500 milliGRAM(s) Oral two times a day  dextrose 5%. 1000 milliLiter(s) (50 mL/Hr) IV Continuous <Continuous>  dextrose 50% Injectable 12.5 Gram(s) IV Push once  dextrose 50% Injectable 25 Gram(s) IV Push once  dextrose 50% Injectable 25 Gram(s) IV Push once  docusate sodium 100 milliGRAM(s) Oral three times a day  enoxaparin Injectable 30 milliGRAM(s) SubCutaneous two times a day  estrogens  Cream 30 Gram(s) Vaginal daily  ferrous    sulfate 325 milliGRAM(s) Oral three times a day with meals  folic acid 1 milliGRAM(s) Oral daily  insulin lispro (HumaLOG) corrective regimen sliding scale   SubCutaneous three times a day before meals  insulin lispro (HumaLOG) corrective regimen sliding scale   SubCutaneous at bedtime  lactated ringers. 1000 milliLiter(s) (75 mL/Hr) IV Continuous <Continuous>  levothyroxine 125 MICROGram(s) Oral daily  multivitamin 1 Tablet(s) Oral daily  pantoprazole    Tablet 40 milliGRAM(s) Oral before breakfast  polyethylene glycol 3350 17 Gram(s) Oral daily  sodium chloride 0.9% lock flush 3 milliLiter(s) IV Push every 8 hours  tiotropium 18 MICROgram(s) Capsule 1 Capsule(s) Inhalation daily  warfarin 2.5 milliGRAM(s) Oral daily    MEDICATIONS  (PRN):  acetaminophen   Tablet .. 650 milliGRAM(s) Oral every 6 hours PRN Temp greater or equal to 38C (100.4F)  ALBUTerol    90 MICROgram(s) HFA Inhaler 2 Puff(s) Inhalation every 6 hours PRN Shortness of Breath and/or Wheezing  aluminum hydroxide/magnesium hydroxide/simethicone Suspension 30 milliLiter(s) Oral four times a day PRN Indigestion  bisacodyl Suppository 10 milliGRAM(s) Rectal daily PRN If no bowel movement by postoperative day #2  dextrose 40% Gel 15 Gram(s) Oral once PRN Blood Glucose LESS THAN 70 milliGRAM(s)/deciliter  glucagon  Injectable 1 milliGRAM(s) IntraMuscular once PRN Glucose LESS THAN 70 milligrams/deciliter  HYDROmorphone  Injectable 0.5 milliGRAM(s) IV Push every 3 hours PRN Severe Pain (7 - 10)  ondansetron Injectable 4 milliGRAM(s) IV Push every 6 hours PRN Nausea and/or Vomiting  oxyCODONE    IR 5 milliGRAM(s) Oral every 4 hours PRN Mild Pain (1 - 3)  oxyCODONE    IR 10 milliGRAM(s) Oral every 4 hours PRN Moderate Pain (4 - 6)  senna 2 Tablet(s) Oral at bedtime PRN Constipation  ASSESSMENT AND PLAN:  74f PMH AS ABOVE A/W:    1. OA right knee s/p elective right knee replacement:  -POD#3  -pain control  -physical therapy  -incentive spirometery  -bowel regimen  -monitor h/h    2. Cellulitis RLE:  -ID eval appreciated.   -rocephin X 1  -to dc on po ceftin X 10 days.    3. Acute blood loss anemia:  -likely related to OR.   -no need for transfusion at this time.    4. Asthma:  -stable.   -continue home meds.    5. Hypothyroid:  -synthroid    6. DMII:  -ss    7. Emphysema:  -stable    8. DVT px    d/w RN/ID, LEft message for ortho

## 2019-06-14 NOTE — PROGRESS NOTE ADULT - ASSESSMENT
This is a 74 year old female with hx of osteoarthritis, now s/p right tkr.  Pt has high thrombosis risk and requires anticoagulation prophylaxis.    Plan:  ::Hold Coumadin today, then Coumadin 2 mg PO daily x total of 4 weeks.  ::CARRIE Lovenox 30 mg SQ Q 12hr  ::Daily PT/INR  ::Daily CBC/BMP  ::Enc ambulation  ::Venodynes    Dispo: Home    Will continue to follow.

## 2019-06-14 NOTE — DISCHARGE NOTE NURSING/CASE MANAGEMENT/SOCIAL WORK - NSDCPEPTCOWAR_GEN_ALL_CORE
Coumadin/Warfarin - Dietary Advice/Coumadin/Warfarin - Follow up monitoring/Coumadin/Warfarin - Compliance/Coumadin/Warfarin - Potential for adverse drug reactions and interactions

## 2019-06-14 NOTE — CONSULT NOTE ADULT - ASSESSMENT
73 y/o Female with h/o OA, hypothyroidism, asthma, diverticulosis, DM-diet controlled, emphysema was admitted on 06/11 for an elective right knee replacement after failed conservative measures. She underwent a right TKR without complications. Postoperative she was noted with mild right knee erythema and edema. Also reported with leukocytosis.  Concern about an infectious process was raised. 75 y/o Female with h/o OA, hypothyroidism, asthma, diverticulosis, DM-diet controlled, emphysema was admitted on 06/11 for an elective right knee replacement after failed conservative measures. She underwent a right TKR without complications. Postoperative she was noted with mild right knee erythema and edema. Also reported with leukocytosis.  Concern about an infectious process was raised.    1. Probable RLE cellulitis. Right OA s/p TKR.  -postoperative knee wound is clean; doubt wound infection  -noted with medial thigh large patch of erythema highly suspicious of cellulitis  -give ceftriaxone 2 gm IV x 1, then ceftin 500 mg PO q12h for 10 more days  -reason for abx use and side effects reviewed with patient; monitor BMP  -local wound care per surgery  -old chart reviewed to assess prior cultures  -f/u with Dr. Brink in 7-10 days  -monitor temps  -f/u CBC  -supportive care  2. Other issues:   -care per medicine

## 2019-06-17 ENCOUNTER — APPOINTMENT (OUTPATIENT)
Dept: CARDIOLOGY | Facility: CLINIC | Age: 74
End: 2019-06-17
Payer: MEDICARE

## 2019-06-17 PROCEDURE — 93793 ANTICOAG MGMT PT WARFARIN: CPT

## 2019-06-17 PROCEDURE — 85610 PROTHROMBIN TIME: CPT | Mod: QW

## 2019-06-17 RX ORDER — SODIUM HYALURONATE INTRA-ARTICULAR SOLN PREF SYR 25 MG/2.5ML 25/2.5 MG/ML
25 SOLUTION PREFILLED SYRINGE INTRAARTICULAR DAILY
Qty: 1 | Refills: 0 | Status: DISCONTINUED | OUTPATIENT
Start: 2019-05-07 | End: 2019-06-17

## 2019-06-17 RX ORDER — HYALURONATE SODIUM, STABILIZED 60 MG/3 ML
60 SYRINGE (ML) INTRAARTICULAR
Qty: 2 | Refills: 0 | Status: DISCONTINUED | OUTPATIENT
Start: 2019-05-07 | End: 2019-06-17

## 2019-06-19 ENCOUNTER — APPOINTMENT (OUTPATIENT)
Age: 74
End: 2019-06-19
Payer: MEDICARE

## 2019-06-19 VITALS
DIASTOLIC BLOOD PRESSURE: 70 MMHG | OXYGEN SATURATION: 97 % | RESPIRATION RATE: 20 BRPM | SYSTOLIC BLOOD PRESSURE: 138 MMHG | HEART RATE: 67 BPM

## 2019-06-19 PROCEDURE — 99024 POSTOP FOLLOW-UP VISIT: CPT

## 2019-06-19 RX ORDER — NITROFURANTOIN (MONOHYDRATE/MACROCRYSTALS) 25; 75 MG/1; MG/1
100 CAPSULE ORAL
Qty: 20 | Refills: 0 | Status: DISCONTINUED | COMMUNITY
Start: 2019-02-13

## 2019-06-19 RX ORDER — BENZOCAINE 20 G/100G
100 GEL, DENTIFRICE ORAL
Qty: 14 | Refills: 0 | Status: DISCONTINUED | COMMUNITY
Start: 2019-06-11

## 2019-06-19 NOTE — PLAN
[FreeTextEntry1] : Complete full course of Ceftin.\par Continue with Coumadin for DVT prophylaxis.\par Continue with home physical therapy.\par Reinforced ice and elevation.\par Follow up with Dr. Brink post op week three.\par Continue all other home medications.\par \par Reinforced TCM program, contact information and encouraged patient to call with any symptoms or concerns.

## 2019-06-19 NOTE — HISTORY OF PRESENT ILLNESS
[FreeTextEntry1] : right knee replacement [de-identified] : 74 year old female s/p right knee replacement with Dr. Brink on 6/11/19 post operative course notable for likely RLE cellulitis, seen by Dr. Alvarado of ID and advised Ceftin 500mg BID for 10 days.  Patient discharged home with [facility] at home on 6/14/19.  Patient has been progressing well with physical therapy and is walking well with a cane.  Patient seen at home for follow up.  Upon arrival patient is A&Ox3, in no distress.  Denies any CP, palpitations, SOB, dizziness, lightheadedness, calf pain or tenderness, n/v/d/c.

## 2019-06-19 NOTE — REVIEW OF SYSTEMS
[Chest Pain] : no chest pain [Palpitations] : no palpitations [Leg Claudication] : no leg claudication [Lower Ext Edema] : lower extremity edema [Negative] : Heme/Lymph [FreeTextEntry5] : RLe

## 2019-06-19 NOTE — PHYSICAL EXAM
[No Acute Distress] : no acute distress [Well Nourished] : well nourished [Well Developed] : well developed [Well-Appearing] : well-appearing [No Respiratory Distress] : no respiratory distress  [Clear to Auscultation] : lungs were clear to auscultation bilaterally [No Accessory Muscle Use] : no accessory muscle use [Normal Rate] : normal rate  [Regular Rhythm] : with a regular rhythm [Normal S1, S2] : normal S1 and S2 [No Murmur] : no murmur heard [Pedal Pulses Present] : the pedal pulses are present [Soft] : abdomen soft [Non-distended] : non-distended [Non Tender] : non-tender [Normal Bowel Sounds] : normal bowel sounds [Grossly Normal Strength/Tone] : grossly normal strength/tone [No Rash] : no rash [Coordination Grossly Intact] : coordination grossly intact [No Focal Deficits] : no focal deficits [Normal Affect] : the affect was normal [Alert and Oriented x3] : oriented to person, place, and time [Normal Insight/Judgement] : insight and judgment were intact [de-identified] : +1 RLE edema, no warmth or erythema, negative rita's sign [de-identified] : RLE with ecchymosis, no erythema

## 2019-06-20 DIAGNOSIS — E11.9 TYPE 2 DIABETES MELLITUS WITHOUT COMPLICATIONS: ICD-10-CM

## 2019-06-20 DIAGNOSIS — E03.9 HYPOTHYROIDISM, UNSPECIFIED: ICD-10-CM

## 2019-06-20 DIAGNOSIS — J45.909 UNSPECIFIED ASTHMA, UNCOMPLICATED: ICD-10-CM

## 2019-06-20 DIAGNOSIS — D72.829 ELEVATED WHITE BLOOD CELL COUNT, UNSPECIFIED: ICD-10-CM

## 2019-06-20 DIAGNOSIS — L03.115 CELLULITIS OF RIGHT LOWER LIMB: ICD-10-CM

## 2019-06-20 DIAGNOSIS — M51.36 OTHER INTERVERTEBRAL DISC DEGENERATION, LUMBAR REGION: ICD-10-CM

## 2019-06-20 DIAGNOSIS — D62 ACUTE POSTHEMORRHAGIC ANEMIA: ICD-10-CM

## 2019-06-20 DIAGNOSIS — J43.9 EMPHYSEMA, UNSPECIFIED: ICD-10-CM

## 2019-06-20 DIAGNOSIS — M17.11 UNILATERAL PRIMARY OSTEOARTHRITIS, RIGHT KNEE: ICD-10-CM

## 2019-06-20 DIAGNOSIS — M21.371 FOOT DROP, RIGHT FOOT: ICD-10-CM

## 2019-06-21 ENCOUNTER — APPOINTMENT (OUTPATIENT)
Dept: CARDIOLOGY | Facility: CLINIC | Age: 74
End: 2019-06-21

## 2019-06-21 ENCOUNTER — APPOINTMENT (OUTPATIENT)
Dept: CARDIOLOGY | Facility: CLINIC | Age: 74
End: 2019-06-21
Payer: MEDICARE

## 2019-06-21 PROCEDURE — 93793 ANTICOAG MGMT PT WARFARIN: CPT

## 2019-06-21 PROCEDURE — 85610 PROTHROMBIN TIME: CPT | Mod: QW

## 2019-06-24 ENCOUNTER — APPOINTMENT (OUTPATIENT)
Dept: CARDIOLOGY | Facility: CLINIC | Age: 74
End: 2019-06-24
Payer: MEDICARE

## 2019-06-24 PROCEDURE — 85610 PROTHROMBIN TIME: CPT | Mod: QW

## 2019-06-24 PROCEDURE — 93793 ANTICOAG MGMT PT WARFARIN: CPT

## 2019-06-28 ENCOUNTER — APPOINTMENT (OUTPATIENT)
Dept: CARDIOLOGY | Facility: CLINIC | Age: 74
End: 2019-06-28
Payer: MEDICARE

## 2019-06-28 PROCEDURE — 85610 PROTHROMBIN TIME: CPT | Mod: QW

## 2019-06-28 PROCEDURE — 93793 ANTICOAG MGMT PT WARFARIN: CPT

## 2019-07-01 ENCOUNTER — APPOINTMENT (OUTPATIENT)
Dept: ORTHOPEDIC SURGERY | Facility: CLINIC | Age: 74
End: 2019-07-01
Payer: MEDICARE

## 2019-07-01 VITALS
TEMPERATURE: 98.1 F | HEIGHT: 67 IN | WEIGHT: 212 LBS | SYSTOLIC BLOOD PRESSURE: 130 MMHG | BODY MASS INDEX: 33.27 KG/M2 | HEART RATE: 76 BPM | DIASTOLIC BLOOD PRESSURE: 74 MMHG

## 2019-07-01 PROCEDURE — 99024 POSTOP FOLLOW-UP VISIT: CPT

## 2019-07-01 PROCEDURE — 73560 X-RAY EXAM OF KNEE 1 OR 2: CPT | Mod: RT

## 2019-07-02 ENCOUNTER — MEDICATION RENEWAL (OUTPATIENT)
Age: 74
End: 2019-07-02

## 2019-07-02 ENCOUNTER — APPOINTMENT (OUTPATIENT)
Dept: CARDIOLOGY | Facility: CLINIC | Age: 74
End: 2019-07-02
Payer: MEDICARE

## 2019-07-02 ENCOUNTER — MESSAGE (OUTPATIENT)
Age: 74
End: 2019-07-02

## 2019-07-02 PROCEDURE — 93793 ANTICOAG MGMT PT WARFARIN: CPT

## 2019-07-02 PROCEDURE — 85610 PROTHROMBIN TIME: CPT | Mod: QW

## 2019-07-05 ENCOUNTER — APPOINTMENT (OUTPATIENT)
Dept: CARDIOLOGY | Facility: CLINIC | Age: 74
End: 2019-07-05

## 2019-07-08 ENCOUNTER — APPOINTMENT (OUTPATIENT)
Dept: CARDIOLOGY | Facility: CLINIC | Age: 74
End: 2019-07-08
Payer: MEDICARE

## 2019-07-08 PROCEDURE — 85610 PROTHROMBIN TIME: CPT | Mod: QW

## 2019-07-08 PROCEDURE — 93793 ANTICOAG MGMT PT WARFARIN: CPT

## 2019-07-11 ENCOUNTER — APPOINTMENT (OUTPATIENT)
Dept: ORTHOPEDIC SURGERY | Facility: CLINIC | Age: 74
End: 2019-07-11
Payer: MEDICARE

## 2019-07-11 VITALS
HEART RATE: 77 BPM | TEMPERATURE: 98.5 F | SYSTOLIC BLOOD PRESSURE: 116 MMHG | BODY MASS INDEX: 33.27 KG/M2 | DIASTOLIC BLOOD PRESSURE: 67 MMHG | HEIGHT: 67 IN | WEIGHT: 212 LBS

## 2019-07-11 PROCEDURE — 73560 X-RAY EXAM OF KNEE 1 OR 2: CPT | Mod: TC,RT

## 2019-07-11 PROCEDURE — 99024 POSTOP FOLLOW-UP VISIT: CPT

## 2019-07-11 NOTE — HISTORY OF PRESENT ILLNESS
[de-identified] : Postop Right Knee [de-identified] : The patient comes in today status post right total knee arthroplasty on 06/11/19.  She states she is feeling good.   [de-identified] : Right Knee: Range of Motion in Degrees	\par 	                  Claimant:	Normal:	\par Flexion Active	  115 	                135-degrees	\par Flexion Passive	  115	                135-degrees	\par Extension Active	   10	                0-5-degrees	\par Extension Passive	   10	                0-5-degrees	\par \par Wound is healing well with no signs of infection.\par  [de-identified] : Radiographs, one to two views of the right knee, show excellent position of the implant.  [de-identified] : Status post right total knee arthroplasty  [de-identified] : The patient is doing well status post right total knee arthroplasty.  At this time, I will start the patient on outpatient physical therapy.  She will be reassessed in 4-6 weeks.

## 2019-07-11 NOTE — ADDENDUM
[FreeTextEntry1] : This note was written by Shae Hamilton on 07/08/2019 acting as scribe for Klever Brink III, MD

## 2019-07-15 ENCOUNTER — APPOINTMENT (OUTPATIENT)
Dept: ORTHOPEDIC SURGERY | Facility: CLINIC | Age: 74
End: 2019-07-15
Payer: MEDICARE

## 2019-07-15 VITALS
SYSTOLIC BLOOD PRESSURE: 129 MMHG | HEIGHT: 67 IN | BODY MASS INDEX: 33.27 KG/M2 | HEART RATE: 71 BPM | DIASTOLIC BLOOD PRESSURE: 74 MMHG | WEIGHT: 212 LBS

## 2019-07-15 PROCEDURE — 73560 X-RAY EXAM OF KNEE 1 OR 2: CPT | Mod: RT

## 2019-07-15 PROCEDURE — 99024 POSTOP FOLLOW-UP VISIT: CPT

## 2019-07-16 NOTE — ADDENDUM
[FreeTextEntry1] : This note was written by Anibal Phipps on 07/16/2019, acting as a scribe for KAIT THOMPSON, ELENA/JONATHON PA

## 2019-07-16 NOTE — HISTORY OF PRESENT ILLNESS
[de-identified] : Right Knee [de-identified] : The patient comes in today status post twisting her leg.  She states she wanted to make sure everything was okay with her right knee arthroplasty. [de-identified] : Right Knee: Range of Motion in Degrees	\par 	 Claimant:	Normal:	\par Flexion Active	 90	 135-degrees	\par Flexion Passive	 90	 135-degrees	\par Extension Active	 0-5	 0-5-degrees	\par Extension Passive	 0-5	 0-5-degrees	\par \par The incision looks clean, dry and intact.  She does have some swelling to the knee, but just from the twisting. [de-identified] : Radiographs, one to two views of the right knee, reveal adequate alignment of the arthroplasty. [de-identified] : Status post right knee arthroplasty with right knee pain [de-identified] : At this time, due to right knee pain status post right knee arthroplasty, the patient was advised to return to the office in 2 weeks if she is not getting better.  She is to continue on her regular regimen.

## 2019-07-18 NOTE — DISCUSSION/SUMMARY
[de-identified] : At this time, the patient is status post right total knee arthroplasty.  Due to mild strain of the right knee, I instructed her in getting back to physical therapy.  She will be reassessed in four to six weeks.

## 2019-07-18 NOTE — ADDENDUM
[FreeTextEntry1] : This note was written by Shanthi Braun on 07/18/2019 acting as a scribe for MARY JEFFRIES

## 2019-07-18 NOTE — HISTORY OF PRESENT ILLNESS
[de-identified] : The patient comes in today for her right knee status post right total knee arthroplasty.  She had seen Brunilda.  She had a slip, but no fall and she was concerned and she wanted to get her knee checked out.

## 2019-07-18 NOTE — PHYSICAL EXAM
[de-identified] : Right Knee:\par Range of Motion in Degrees	\par 	                  Claimant:	Normal:	\par Flexion Active	  135 	                135-degrees	\par Flexion Passive	  135	                135-degrees	\par Extension Active	  0-5	                0-5-degrees	\par Extension Passive	  0-5	                0-5-degrees	\par \par The wound is well healed.  There is no sign of infection.  No instability.\par  [de-identified] : Ambulating with a slightly antalgic to antalgic gait.  Station:  Normal.  [de-identified] : Appearance:  Well-developed, well-nourished female in no acute distress.\par  [de-identified] : Review of radiographs of the right knee show excellent position of the implant.

## 2019-08-12 ENCOUNTER — APPOINTMENT (OUTPATIENT)
Dept: ORTHOPEDIC SURGERY | Facility: CLINIC | Age: 74
End: 2019-08-12
Payer: MEDICARE

## 2019-08-12 VITALS
WEIGHT: 204 LBS | HEIGHT: 67 IN | BODY MASS INDEX: 32.02 KG/M2 | HEART RATE: 71 BPM | SYSTOLIC BLOOD PRESSURE: 112 MMHG | DIASTOLIC BLOOD PRESSURE: 74 MMHG | TEMPERATURE: 98.2 F

## 2019-08-12 PROCEDURE — 73560 X-RAY EXAM OF KNEE 1 OR 2: CPT | Mod: RT

## 2019-08-12 PROCEDURE — 99024 POSTOP FOLLOW-UP VISIT: CPT

## 2019-08-15 NOTE — HISTORY OF PRESENT ILLNESS
[de-identified] : Postop Right Knee [de-identified] : The patient comes in today status post right total knee arthroplasty on 06/11/19.  Overall, the patient states she is feeling markedly improved. [de-identified] : Right Knee: Range of Motion in Degrees	\par 	                  Claimant:	Normal:	\par Flexion Active	  130 	                135-degrees	\par Flexion Passive	  130	                135-degrees	\par Extension Active	  0	                0-5-degrees	\par Extension Passive	  0	                0-5-degrees	\par \par No instability.  Wounds are well-healed with no signs of infection.  \par   [de-identified] : Radiographs, one to two views of the right knee, show excellent position of the implant. [de-identified] : Status post right total knee arthroplasty  [de-identified] : The patient is status post right total knee arthroplasty.  At this time, I instructed the patient on home therapeutic modalities.  She will be reassessed on an as needed basis.

## 2019-08-15 NOTE — ADDENDUM
[FreeTextEntry1] : This note was written by Shae Hamilton on 08/15/2019 acting as scribe for Klever Brink III, MD

## 2019-10-03 ENCOUNTER — APPOINTMENT (OUTPATIENT)
Dept: ORTHOPEDIC SURGERY | Facility: CLINIC | Age: 74
End: 2019-10-03
Payer: MEDICARE

## 2019-10-03 VITALS — HEART RATE: 101 BPM | DIASTOLIC BLOOD PRESSURE: 77 MMHG | TEMPERATURE: 98.1 F | SYSTOLIC BLOOD PRESSURE: 126 MMHG

## 2019-10-03 VITALS — HEIGHT: 67 IN | WEIGHT: 199 LBS | BODY MASS INDEX: 31.23 KG/M2

## 2019-10-03 PROCEDURE — 73560 X-RAY EXAM OF KNEE 1 OR 2: CPT | Mod: 50

## 2019-10-03 PROCEDURE — 99213 OFFICE O/P EST LOW 20 MIN: CPT

## 2019-10-03 PROCEDURE — 73565 X-RAY EXAM OF KNEES: CPT

## 2019-10-11 NOTE — HISTORY OF PRESENT ILLNESS
[de-identified] : The patient comes in today for her left knee.  She states her right knee is feeling great.  She is having increasing complaints of pain in her left knee.

## 2019-10-11 NOTE — ADDENDUM
[FreeTextEntry1] : This note was written by Shanthi Braun on 10/09/2019 acting as a scribe for MARY JEFFRIES

## 2019-10-11 NOTE — DISCUSSION/SUMMARY
[de-identified] : At this time, the patient is status post right total knee.  Due to osteoarthritis of the left knee, I recommended she undergo a left total knee arthroplasty.  All the risks and benefits of the surgery, including, but not limited to, anesthesia, infection, nerve and/or vascular injury, need for further surgery, DVT, PE and perioperative death,  were all discussed with the patient at length. I reviewed the plan of care as well as a model of a total knee implant equivalent to the one that will be used for their total knee joint replacement. The patient agreed to the plan of care as well as the use of implants for their knee total joint replacement. In light of these, patient wishes to proceed.  Patient will be scheduled at this time. \par \par

## 2019-10-11 NOTE — PHYSICAL EXAM
[de-identified] : Right Knee: \par Range of Motion in Degrees	\par 	                  Claimant:	Normal:	\par Flexion Active	  135 	                135-degrees	\par Flexion Passive	  135	                135-degrees	\par Extension Active	  0-5	                0-5-degrees	\par Extension Passive	  0-5	                0-5-degrees	\par \par No weakness to flexion/extension.  No evidence of instability in the AP plane or varus or valgus stress.  Negative  Lachman.  Negative pivot shift.  Negative anterior drawer test.  Negative posterior drawer test.  Negative Marissa.  Negative Apley grind.  No medial or lateral joint line tenderness.  No tenderness over the medial and lateral facet of the patella.  No patellofemoral crepitations.  No lateral tilting patella.  No patellar apprehension.  No crepitation in the medial and lateral femoral condyle.  No proximal or distal swelling, edema or tenderness.  No gross motor or sensory deficits.  No intra-articular swelling.  2+ DP and PT pulses. No varus or valgus malalignment.  Well-healed scar.\par \par Left Knee: \par Range of Motion in Degrees	\par 	                  Claimant:	Normal:	\par Flexion Active	  120 	                135-degrees	\par Flexion Passive	  120	                135-degrees	\par Extension Active	   10	                0-5-degrees	\par Extension Passive	   10	                0-5-degrees	\par \par No weakness to flexion/extension.  No evidence of instability in the AP plane or varus or valgus stress.  Negative  Lachman.  Negative pivot shift.  Negative anterior drawer test.  Negative posterior drawer test.  Negative Marissa.  Negative Apley grind.  No medial or lateral joint line tenderness.  Positive tenderness over the medial and lateral facet of the patella.  Positive patellofemoral crepitations.  No lateral tilting patella.  No patella apprehension.  Positive crepitation in the medial and lateral femoral condyle.  No proximal or distal swelling, edema or tenderness.  No gross motor or sensory deficits.  Mild valgus alignment.  2+ DP and PT pulses.  No varus or valgus malalignment.  Skin is intact.  No rashes, scars or lesions.    \par   [de-identified] : Ambulating with a slightly antalgic to antalgic gait.  Station:  Normal.  [de-identified] : Appearance:  Well-developed, well-nourished female in no acute distress.\par \par  [de-identified] : Radiographs, one-two views of the right knee, one-two views of the left knee and one AP Standing, show severe arthritis and valgus alignment.

## 2019-11-04 ENCOUNTER — OUTPATIENT (OUTPATIENT)
Dept: OUTPATIENT SERVICES | Facility: HOSPITAL | Age: 74
LOS: 1 days | End: 2019-11-04
Payer: MEDICARE

## 2019-11-04 VITALS
SYSTOLIC BLOOD PRESSURE: 129 MMHG | TEMPERATURE: 98 F | WEIGHT: 207.01 LBS | HEIGHT: 68 IN | OXYGEN SATURATION: 95 % | HEART RATE: 75 BPM | DIASTOLIC BLOOD PRESSURE: 75 MMHG | RESPIRATION RATE: 16 BRPM

## 2019-11-04 DIAGNOSIS — Z98.890 OTHER SPECIFIED POSTPROCEDURAL STATES: Chronic | ICD-10-CM

## 2019-11-04 DIAGNOSIS — M17.12 UNILATERAL PRIMARY OSTEOARTHRITIS, LEFT KNEE: ICD-10-CM

## 2019-11-04 DIAGNOSIS — Z90.49 ACQUIRED ABSENCE OF OTHER SPECIFIED PARTS OF DIGESTIVE TRACT: Chronic | ICD-10-CM

## 2019-11-04 DIAGNOSIS — Z01.818 ENCOUNTER FOR OTHER PREPROCEDURAL EXAMINATION: ICD-10-CM

## 2019-11-04 LAB
ANION GAP SERPL CALC-SCNC: 6 MMOL/L — SIGNIFICANT CHANGE UP (ref 5–17)
APTT BLD: 27.9 SEC — SIGNIFICANT CHANGE UP (ref 27.5–36.3)
BASOPHILS # BLD AUTO: 0.07 K/UL — SIGNIFICANT CHANGE UP (ref 0–0.2)
BASOPHILS NFR BLD AUTO: 0.9 % — SIGNIFICANT CHANGE UP (ref 0–2)
BUN SERPL-MCNC: 18 MG/DL — SIGNIFICANT CHANGE UP (ref 7–23)
CALCIUM SERPL-MCNC: 8.7 MG/DL — SIGNIFICANT CHANGE UP (ref 8.5–10.1)
CHLORIDE SERPL-SCNC: 108 MMOL/L — SIGNIFICANT CHANGE UP (ref 96–108)
CO2 SERPL-SCNC: 25 MMOL/L — SIGNIFICANT CHANGE UP (ref 22–31)
CREAT SERPL-MCNC: 0.77 MG/DL — SIGNIFICANT CHANGE UP (ref 0.5–1.3)
EOSINOPHIL # BLD AUTO: 0.18 K/UL — SIGNIFICANT CHANGE UP (ref 0–0.5)
EOSINOPHIL NFR BLD AUTO: 2.2 % — SIGNIFICANT CHANGE UP (ref 0–6)
GLUCOSE SERPL-MCNC: 137 MG/DL — HIGH (ref 70–99)
HBA1C BLD-MCNC: 6.9 % — HIGH (ref 4–5.6)
HCT VFR BLD CALC: 40.6 % — SIGNIFICANT CHANGE UP (ref 34.5–45)
HGB BLD-MCNC: 13.5 G/DL — SIGNIFICANT CHANGE UP (ref 11.5–15.5)
IMM GRANULOCYTES NFR BLD AUTO: 0.1 % — SIGNIFICANT CHANGE UP (ref 0–1.5)
INR BLD: 1.07 RATIO — SIGNIFICANT CHANGE UP (ref 0.88–1.16)
LYMPHOCYTES # BLD AUTO: 3.85 K/UL — HIGH (ref 1–3.3)
LYMPHOCYTES # BLD AUTO: 47.9 % — HIGH (ref 13–44)
MCHC RBC-ENTMCNC: 29 PG — SIGNIFICANT CHANGE UP (ref 27–34)
MCHC RBC-ENTMCNC: 33.3 GM/DL — SIGNIFICANT CHANGE UP (ref 32–36)
MCV RBC AUTO: 87.1 FL — SIGNIFICANT CHANGE UP (ref 80–100)
MONOCYTES # BLD AUTO: 0.7 K/UL — SIGNIFICANT CHANGE UP (ref 0–0.9)
MONOCYTES NFR BLD AUTO: 8.7 % — SIGNIFICANT CHANGE UP (ref 2–14)
MRSA PCR RESULT.: SIGNIFICANT CHANGE UP
NEUTROPHILS # BLD AUTO: 3.22 K/UL — SIGNIFICANT CHANGE UP (ref 1.8–7.4)
NEUTROPHILS NFR BLD AUTO: 40.2 % — LOW (ref 43–77)
PLATELET # BLD AUTO: 287 K/UL — SIGNIFICANT CHANGE UP (ref 150–400)
POTASSIUM SERPL-MCNC: 4 MMOL/L — SIGNIFICANT CHANGE UP (ref 3.5–5.3)
POTASSIUM SERPL-SCNC: 4 MMOL/L — SIGNIFICANT CHANGE UP (ref 3.5–5.3)
PROTHROM AB SERPL-ACNC: 11.9 SEC — SIGNIFICANT CHANGE UP (ref 10–12.9)
RBC # BLD: 4.66 M/UL — SIGNIFICANT CHANGE UP (ref 3.8–5.2)
RBC # FLD: 13.8 % — SIGNIFICANT CHANGE UP (ref 10.3–14.5)
S AUREUS DNA NOSE QL NAA+PROBE: DETECTED
SODIUM SERPL-SCNC: 139 MMOL/L — SIGNIFICANT CHANGE UP (ref 135–145)
WBC # BLD: 8.03 K/UL — SIGNIFICANT CHANGE UP (ref 3.8–10.5)
WBC # FLD AUTO: 8.03 K/UL — SIGNIFICANT CHANGE UP (ref 3.8–10.5)

## 2019-11-04 PROCEDURE — 93005 ELECTROCARDIOGRAM TRACING: CPT

## 2019-11-04 PROCEDURE — 93010 ELECTROCARDIOGRAM REPORT: CPT

## 2019-11-04 PROCEDURE — 73562 X-RAY EXAM OF KNEE 3: CPT | Mod: 26,LT

## 2019-11-04 PROCEDURE — 73562 X-RAY EXAM OF KNEE 3: CPT | Mod: LT

## 2019-11-04 PROCEDURE — 83036 HEMOGLOBIN GLYCOSYLATED A1C: CPT

## 2019-11-04 PROCEDURE — 36415 COLL VENOUS BLD VENIPUNCTURE: CPT

## 2019-11-04 PROCEDURE — G0463: CPT | Mod: 25

## 2019-11-04 PROCEDURE — 86901 BLOOD TYPING SEROLOGIC RH(D): CPT

## 2019-11-04 PROCEDURE — 85610 PROTHROMBIN TIME: CPT

## 2019-11-04 PROCEDURE — 87640 STAPH A DNA AMP PROBE: CPT

## 2019-11-04 PROCEDURE — 86900 BLOOD TYPING SEROLOGIC ABO: CPT

## 2019-11-04 PROCEDURE — 80048 BASIC METABOLIC PNL TOTAL CA: CPT

## 2019-11-04 PROCEDURE — 86850 RBC ANTIBODY SCREEN: CPT

## 2019-11-04 PROCEDURE — 85730 THROMBOPLASTIN TIME PARTIAL: CPT

## 2019-11-04 PROCEDURE — 85025 COMPLETE CBC W/AUTO DIFF WBC: CPT

## 2019-11-04 NOTE — H&P PST ADULT - NSICDXPASTMEDICALHX_GEN_ALL_CORE_FT
PAST MEDICAL HISTORY:  Acid reflux     Asthma history of x 20 years ago, resolved does not use inhaler    Back pain     Basal cell carcinoma removed from face    DDD (degenerative disc disease), lumbar     Diabetes mellitus diet controlled    Diverticulosis of intestine without bleeding, unspecified intestinal tract location     Elevated pancreatic enzyme patient reports elevated lab 6/2013. Gastroenterologist aware    Frequent UTI 2-3x/year; had an episode 5 weeks ago was treated denies dysuria    Gall stones gall bladder removed    History of cataract extracted from right and left    Hypothyroidism     OA (osteoarthritis)     Pulmonary emphysema, unspecified emphysema type 2017 last exacerbation; never intubated    Spinal stenosis of lumbar region     Urinary urgency     Varicose veins of both lower extremities surgery

## 2019-11-04 NOTE — H&P PST ADULT - ASSESSMENT
73 year old female   presents to PST for Right TKR  Plan:  1. NPO post midnight  2. Pt instructed to take the following meds with sip of water: levothyroxine pantoprazole spiriva  proair   3. Labs EKG CXR ordered as per surgeon request  4. Medical Evaluation with Dr Ronald Aleman   5. Mupirocin Ointment to be used only with positive C/S  6. EZ wash instructions given        CAPRINI SCORE [CLOT]    AGE RELATED RISK FACTORS                                                       MOBILITY RELATED FACTORS  [ ] Age 41-60 years                                            (1 Point)                  [ ] Bed rest                                                        (1 Point)  [ ] Age: 61-74 years                                           (2 Points)                 [ ] Plaster cast                                                   (2 Points)  [x ] Age= 75 years                                              (3 Points)                 [ ] Bed bound for more than 72 hours                 (2 Points)    DISEASE RELATED RISK FACTORS                                               GENDER SPECIFIC FACTORS  [ ] Edema in the lower extremities                       (1 Point)                  [ ] Pregnancy                                                     (1 Point)  [x ] Varicose veins                                               (1 Point)                  [ ] Post-partum < 6 weeks                                   (1 Point)             [x ] BMI > 25 Kg/m2                                            (1 Point)                  [ ] Hormonal therapy  or oral contraception          (1 Point)                 [ ] Sepsis (in the previous month)                        (1 Point)                  [ ] History of pregnancy complications                 (1 point)  [ ] Pneumonia or serious lung disease                                               [ ] Unexplained or recurrent                     (1 Point)           (in the previous month)                               (1 Point)  [ ] Abnormal pulmonary function test                     (1 Point)                 SURGERY RELATED RISK FACTORS  [ ] Acute myocardial infarction                              (1 Point)                 [ ]  Section                                             (1 Point)  [ ] Congestive heart failure (in the previous month)  (1 Point)               [ ] Minor surgery                                                  (1 Point)   [ ] Inflammatory bowel disease                             (1 Point)                 [ ] Arthroscopic surgery                                        (2 Points)  [ ] Central venous access                                      (2 Points)                [ ] General surgery lasting more than 45 minutes   (2 Points)       [ ] Stroke (in the previous month)                          (5 Points)               [x ] Elective arthroplasty                                         (5 Points)            ( ) presents and past malignancy                           (2 points)                                                                                                         HEMATOLOGY RELATED FACTORS                                                 TRAUMA RELATED RISK FACTORS  [ ] Prior episodes of VTE                                     (3 Points)                 [ ] Fracture of the hip, pelvis, or leg                       (5 Points)  [ ] Positive family history for VTE                         (3 Points)                 [ ] Acute spinal cord injury (in the previous month)  (5 Points)  [ ] Prothrombin 73128 A                                     (3 Points)                 [ ] Paralysis  (less than 1 month)                             (5 Points)  [ ] Factor V Leiden                                             (3 Points)                  [ ] Multiple Trauma within 1 month                        (5 Points)  [ ] Lupus anticoagulants                                     (3 Points)                                                           [ ] Anticardiolipin antibodies                               (3 Points)                                                       [ ] High homocysteine in the blood                      (3 Points)                                             [ ] Other congenital or acquired thrombophilia      (3 Points)                                                [ ] Heparin induced thrombocytopenia                  (3 Points)                                          Total Score [      10     ] nursing home

## 2019-11-04 NOTE — H&P PST ADULT - HISTORY OF PRESENT ILLNESS
74  year old female with OA bilateral knee c/o knee pain on ROM and joint swelling  ; Pt has tried gel injections however pain still persist ; she presents to PST for Left  TKR

## 2019-11-04 NOTE — H&P PST ADULT - NSICDXPASTSURGICALHX_GEN_ALL_CORE_FT
PAST SURGICAL HISTORY:  Dental disorder Dental surgery 12/2012, patient reports that upper portion is glued in at this time, plan is to have dental implants placed.    H/O lumbosacral spine surgery fusion 2017    H/O umbilical hernia repair 5/24/19    S/P bladder repair bladder lift 2/2013    S/P cholecystectomy 2012    S/P knee surgery arthroscopy bilateral 2007 and 2010    Skin cancer to right temple 2012, removed, history of basal cell x 2

## 2019-11-04 NOTE — H&P PST ADULT - ANESTHESIA, PREVIOUS REACTION, PROFILE
respiratory complications/Pt said " I had air in my lungs a few days  after  back surgery 2017 and they were thinking of putting a tube in my lung"    Pt developed SOB after discharge and was readmitted;

## 2019-11-04 NOTE — H&P PST ADULT - BP NONINVASIVE MEAN (MM HG)
"Occupational Therapy]    Date:  05/09/2017  Start Time:  1408  Stop Time:  1505    TIMED  Procedure Time Min.   TherEx (2) Start:  Stop: 25   Manual (1) Start:  Stop: 22   TherAct (1) Start:  Stop: 20   Total Timed Minutes:  57  Total Timed Units:  4  Total Untimed Units:  0  Charges Billed/# of units:  4    Progress/Current Status    Subjective:     Patient ID: Santino Pompa is a 22 y.o. female.  Diagnosis:   1. Elbow stiffness, right     2. Stiffness of joint of right forearm     3. Wrist stiffness, right     4. Impaired mobility and ADLs     5. Difficulty participating in leisure activities       Pain: 0 /10 at rest.  Pt states she is having pain, but doesn't appear to be in pain at present. Mom reports pt to be having pain during stretching, none at rest.  Mom also reports that they are using brace for crawling, but that pt is resisting putting weight through the right arm.     Objective:     Pt arrives in resting splint, which has migrated distally and is not holding hand in correct position. Edema glove and supination strap are not in place.  T/f to mat w/ mod A  Sitting EOM, perf'd extensive gentle manual tx for mobilization of metacarpals, carpals and forearm to facilitate wrist extension and supination. Mobilization as well to "roll" out thumb CMC.  ROM for fingers to flexion as tolerated. Pt very sensitive and pulling away. OT ed mom to continue w/ HEP, be gentle in ROM. She v/u.  ROM to wrist flex/ext, forearm pron/sup after mobilization  WB through the R UE during forward and L UE contralateral reaching to encourage WB in prep for crawling. Max support to posterior elbow during WB.  Added foam to resting splint at thenar prominence to improve positioning of thumb CMC and decrease pronation in splint.  Mom assisted with t/f back to w/c.    Assessment:     Pt w/ increased tolerance for WB after mobilization of wrist, thumb CMC and forearm. Pt can continue to benefit from skilled OT to improve " resting position of hand and forearm and to improve PROM in the wrist/hand & forearm and to improve her tolerance for WB through the R UE for increased ability to crawl for return to previous routines and leisure activities.    Patient Education/Response:     As above.    Plans and Goals:     Continue mobilization and weight bearing to prepare for crawling. Progress resting splint as tolerated.    Arlene Mak, JACK, LOTR  5/9/2017       93

## 2019-11-04 NOTE — H&P PST ADULT - NEGATIVE GENERAL GENITOURINARY SYMPTOMS
no urine discoloration/no incontinence/no flank pain L/no gas in urine/normal urinary frequency/no urinary hesitancy/no renal colic/no dysuria/no hematuria/no flank pain R/no bladder infections

## 2019-11-05 DIAGNOSIS — M17.12 UNILATERAL PRIMARY OSTEOARTHRITIS, LEFT KNEE: ICD-10-CM

## 2019-11-05 DIAGNOSIS — Z01.818 ENCOUNTER FOR OTHER PREPROCEDURAL EXAMINATION: ICD-10-CM

## 2019-11-07 NOTE — CHART NOTE - NSCHARTNOTEFT_GEN_A_CORE
Patient + MSSA instructed patient to use mupirocin ointment; faxed positive c/s to surgeon ; verified instructions by teachback

## 2019-11-12 ENCOUNTER — APPOINTMENT (OUTPATIENT)
Dept: ORTHOPEDIC SURGERY | Facility: HOSPITAL | Age: 74
End: 2019-11-12

## 2019-11-12 ENCOUNTER — TRANSCRIPTION ENCOUNTER (OUTPATIENT)
Age: 74
End: 2019-11-12

## 2019-11-12 ENCOUNTER — RESULT REVIEW (OUTPATIENT)
Age: 74
End: 2019-11-12

## 2019-11-12 ENCOUNTER — INPATIENT (INPATIENT)
Facility: HOSPITAL | Age: 74
LOS: 2 days | Discharge: HOME HEALTH | DRG: 470 | End: 2019-11-15
Attending: ORTHOPAEDIC SURGERY | Admitting: ORTHOPAEDIC SURGERY
Payer: MEDICARE

## 2019-11-12 VITALS
WEIGHT: 207.01 LBS | HEIGHT: 68 IN | HEART RATE: 95 BPM | SYSTOLIC BLOOD PRESSURE: 129 MMHG | RESPIRATION RATE: 16 BRPM | OXYGEN SATURATION: 97 % | TEMPERATURE: 98 F | DIASTOLIC BLOOD PRESSURE: 77 MMHG

## 2019-11-12 DIAGNOSIS — Z98.890 OTHER SPECIFIED POSTPROCEDURAL STATES: Chronic | ICD-10-CM

## 2019-11-12 DIAGNOSIS — Z90.49 ACQUIRED ABSENCE OF OTHER SPECIFIED PARTS OF DIGESTIVE TRACT: Chronic | ICD-10-CM

## 2019-11-12 DIAGNOSIS — M17.12 UNILATERAL PRIMARY OSTEOARTHRITIS, LEFT KNEE: ICD-10-CM

## 2019-11-12 LAB
ANION GAP SERPL CALC-SCNC: 8 MMOL/L — SIGNIFICANT CHANGE UP (ref 5–17)
BUN SERPL-MCNC: 14 MG/DL — SIGNIFICANT CHANGE UP (ref 7–23)
CALCIUM SERPL-MCNC: 8.7 MG/DL — SIGNIFICANT CHANGE UP (ref 8.5–10.1)
CHLORIDE SERPL-SCNC: 108 MMOL/L — SIGNIFICANT CHANGE UP (ref 96–108)
CO2 SERPL-SCNC: 24 MMOL/L — SIGNIFICANT CHANGE UP (ref 22–31)
CREAT SERPL-MCNC: 0.84 MG/DL — SIGNIFICANT CHANGE UP (ref 0.5–1.3)
GLUCOSE SERPL-MCNC: 143 MG/DL — HIGH (ref 70–99)
HCT VFR BLD CALC: 38.7 % — SIGNIFICANT CHANGE UP (ref 34.5–45)
HGB BLD-MCNC: 12.8 G/DL — SIGNIFICANT CHANGE UP (ref 11.5–15.5)
MCHC RBC-ENTMCNC: 29.4 PG — SIGNIFICANT CHANGE UP (ref 27–34)
MCHC RBC-ENTMCNC: 33.1 GM/DL — SIGNIFICANT CHANGE UP (ref 32–36)
MCV RBC AUTO: 89 FL — SIGNIFICANT CHANGE UP (ref 80–100)
PLATELET # BLD AUTO: 274 K/UL — SIGNIFICANT CHANGE UP (ref 150–400)
POTASSIUM SERPL-MCNC: 4 MMOL/L — SIGNIFICANT CHANGE UP (ref 3.5–5.3)
POTASSIUM SERPL-SCNC: 4 MMOL/L — SIGNIFICANT CHANGE UP (ref 3.5–5.3)
RBC # BLD: 4.35 M/UL — SIGNIFICANT CHANGE UP (ref 3.8–5.2)
RBC # FLD: 14 % — SIGNIFICANT CHANGE UP (ref 10.3–14.5)
SODIUM SERPL-SCNC: 140 MMOL/L — SIGNIFICANT CHANGE UP (ref 135–145)
WBC # BLD: 11.2 K/UL — HIGH (ref 3.8–10.5)
WBC # FLD AUTO: 11.2 K/UL — HIGH (ref 3.8–10.5)

## 2019-11-12 PROCEDURE — C1776: CPT

## 2019-11-12 PROCEDURE — 73560 X-RAY EXAM OF KNEE 1 OR 2: CPT | Mod: LT

## 2019-11-12 PROCEDURE — 73560 X-RAY EXAM OF KNEE 1 OR 2: CPT | Mod: 26,LT

## 2019-11-12 PROCEDURE — 20985 CPTR-ASST DIR MS PX: CPT | Mod: LT

## 2019-11-12 PROCEDURE — 88305 TISSUE EXAM BY PATHOLOGIST: CPT

## 2019-11-12 PROCEDURE — 85610 PROTHROMBIN TIME: CPT

## 2019-11-12 PROCEDURE — 36415 COLL VENOUS BLD VENIPUNCTURE: CPT

## 2019-11-12 PROCEDURE — C1713: CPT

## 2019-11-12 PROCEDURE — 94640 AIRWAY INHALATION TREATMENT: CPT

## 2019-11-12 PROCEDURE — 97162 PT EVAL MOD COMPLEX 30 MIN: CPT | Mod: GP

## 2019-11-12 PROCEDURE — 99024 POSTOP FOLLOW-UP VISIT: CPT

## 2019-11-12 PROCEDURE — 88305 TISSUE EXAM BY PATHOLOGIST: CPT | Mod: 26

## 2019-11-12 PROCEDURE — 80048 BASIC METABOLIC PNL TOTAL CA: CPT

## 2019-11-12 PROCEDURE — 85027 COMPLETE CBC AUTOMATED: CPT

## 2019-11-12 PROCEDURE — 97116 GAIT TRAINING THERAPY: CPT | Mod: GP

## 2019-11-12 PROCEDURE — 97530 THERAPEUTIC ACTIVITIES: CPT | Mod: GP

## 2019-11-12 PROCEDURE — 99223 1ST HOSP IP/OBS HIGH 75: CPT

## 2019-11-12 PROCEDURE — 27447 TOTAL KNEE ARTHROPLASTY: CPT | Mod: LT

## 2019-11-12 RX ORDER — MAGNESIUM HYDROXIDE 400 MG/1
30 TABLET, CHEWABLE ORAL DAILY
Refills: 0 | Status: DISCONTINUED | OUTPATIENT
Start: 2019-11-12 | End: 2019-11-15

## 2019-11-12 RX ORDER — PANTOPRAZOLE SODIUM 20 MG/1
40 TABLET, DELAYED RELEASE ORAL
Refills: 0 | Status: DISCONTINUED | OUTPATIENT
Start: 2019-11-12 | End: 2019-11-12

## 2019-11-12 RX ORDER — ACETAMINOPHEN 500 MG
650 TABLET ORAL EVERY 6 HOURS
Refills: 0 | Status: DISCONTINUED | OUTPATIENT
Start: 2019-11-12 | End: 2019-11-13

## 2019-11-12 RX ORDER — TIOTROPIUM BROMIDE 18 UG/1
0 CAPSULE ORAL; RESPIRATORY (INHALATION)
Qty: 0 | Refills: 0 | DISCHARGE

## 2019-11-12 RX ORDER — PANTOPRAZOLE SODIUM 20 MG/1
1 TABLET, DELAYED RELEASE ORAL
Qty: 30 | Refills: 0
Start: 2019-11-12 | End: 2019-12-11

## 2019-11-12 RX ORDER — OXYCODONE HYDROCHLORIDE 5 MG/1
5 TABLET ORAL EVERY 4 HOURS
Refills: 0 | Status: DISCONTINUED | OUTPATIENT
Start: 2019-11-12 | End: 2019-11-15

## 2019-11-12 RX ORDER — PANTOPRAZOLE SODIUM 20 MG/1
40 TABLET, DELAYED RELEASE ORAL ONCE
Refills: 0 | Status: COMPLETED | OUTPATIENT
Start: 2019-11-12 | End: 2019-11-12

## 2019-11-12 RX ORDER — SODIUM CHLORIDE 9 MG/ML
3 INJECTION INTRAMUSCULAR; INTRAVENOUS; SUBCUTANEOUS EVERY 8 HOURS
Refills: 0 | Status: DISCONTINUED | OUTPATIENT
Start: 2019-11-12 | End: 2019-11-12

## 2019-11-12 RX ORDER — ALBUTEROL 90 UG/1
2 AEROSOL, METERED ORAL EVERY 6 HOURS
Refills: 0 | Status: DISCONTINUED | OUTPATIENT
Start: 2019-11-12 | End: 2019-11-15

## 2019-11-12 RX ORDER — FENTANYL CITRATE 50 UG/ML
50 INJECTION INTRAVENOUS
Refills: 0 | Status: DISCONTINUED | OUTPATIENT
Start: 2019-11-12 | End: 2019-11-12

## 2019-11-12 RX ORDER — DOCUSATE SODIUM 100 MG
1 CAPSULE ORAL
Qty: 14 | Refills: 0
Start: 2019-11-12 | End: 2019-11-18

## 2019-11-12 RX ORDER — POLYETHYLENE GLYCOL 3350 17 G/17G
17 POWDER, FOR SOLUTION ORAL DAILY
Refills: 0 | Status: DISCONTINUED | OUTPATIENT
Start: 2019-11-12 | End: 2019-11-15

## 2019-11-12 RX ORDER — ONDANSETRON 8 MG/1
4 TABLET, FILM COATED ORAL EVERY 6 HOURS
Refills: 0 | Status: DISCONTINUED | OUTPATIENT
Start: 2019-11-12 | End: 2019-11-12

## 2019-11-12 RX ORDER — ACETAMINOPHEN 500 MG
1000 TABLET ORAL ONCE
Refills: 0 | Status: COMPLETED | OUTPATIENT
Start: 2019-11-12 | End: 2019-11-12

## 2019-11-12 RX ORDER — WARFARIN SODIUM 2.5 MG/1
5 TABLET ORAL DAILY
Refills: 0 | Status: DISCONTINUED | OUTPATIENT
Start: 2019-11-12 | End: 2019-11-14

## 2019-11-12 RX ORDER — OXYCODONE HYDROCHLORIDE 5 MG/1
10 TABLET ORAL EVERY 4 HOURS
Refills: 0 | Status: DISCONTINUED | OUTPATIENT
Start: 2019-11-12 | End: 2019-11-15

## 2019-11-12 RX ORDER — FAMOTIDINE 10 MG/ML
20 INJECTION INTRAVENOUS ONCE
Refills: 0 | Status: COMPLETED | OUTPATIENT
Start: 2019-11-12 | End: 2019-11-12

## 2019-11-12 RX ORDER — MORPHINE SULFATE 50 MG/1
2 CAPSULE, EXTENDED RELEASE ORAL EVERY 4 HOURS
Refills: 0 | Status: DISCONTINUED | OUTPATIENT
Start: 2019-11-12 | End: 2019-11-15

## 2019-11-12 RX ORDER — ONDANSETRON 8 MG/1
4 TABLET, FILM COATED ORAL EVERY 6 HOURS
Refills: 0 | Status: DISCONTINUED | OUTPATIENT
Start: 2019-11-12 | End: 2019-11-15

## 2019-11-12 RX ORDER — SODIUM CHLORIDE 9 MG/ML
1000 INJECTION, SOLUTION INTRAVENOUS
Refills: 0 | Status: DISCONTINUED | OUTPATIENT
Start: 2019-11-12 | End: 2019-11-15

## 2019-11-12 RX ORDER — LEVOTHYROXINE SODIUM 125 MCG
125 TABLET ORAL DAILY
Refills: 0 | Status: DISCONTINUED | OUTPATIENT
Start: 2019-11-12 | End: 2019-11-15

## 2019-11-12 RX ORDER — HYDROMORPHONE HYDROCHLORIDE 2 MG/ML
0.5 INJECTION INTRAMUSCULAR; INTRAVENOUS; SUBCUTANEOUS
Refills: 0 | Status: DISCONTINUED | OUTPATIENT
Start: 2019-11-12 | End: 2019-11-12

## 2019-11-12 RX ORDER — CEFAZOLIN SODIUM 1 G
2000 VIAL (EA) INJECTION EVERY 8 HOURS
Refills: 0 | Status: COMPLETED | OUTPATIENT
Start: 2019-11-12 | End: 2019-11-13

## 2019-11-12 RX ORDER — ACETAMINOPHEN 500 MG
975 TABLET ORAL ONCE
Refills: 0 | Status: COMPLETED | OUTPATIENT
Start: 2019-11-12 | End: 2019-11-12

## 2019-11-12 RX ORDER — OXYCODONE HYDROCHLORIDE 5 MG/1
5 TABLET ORAL ONCE
Refills: 0 | Status: DISCONTINUED | OUTPATIENT
Start: 2019-11-12 | End: 2019-11-12

## 2019-11-12 RX ORDER — TIOTROPIUM BROMIDE 18 UG/1
1 CAPSULE ORAL; RESPIRATORY (INHALATION) DAILY
Refills: 0 | Status: DISCONTINUED | OUTPATIENT
Start: 2019-11-12 | End: 2019-11-15

## 2019-11-12 RX ORDER — PANTOPRAZOLE SODIUM 20 MG/1
40 TABLET, DELAYED RELEASE ORAL
Refills: 0 | Status: DISCONTINUED | OUTPATIENT
Start: 2019-11-12 | End: 2019-11-15

## 2019-11-12 RX ORDER — SODIUM CHLORIDE 9 MG/ML
1000 INJECTION, SOLUTION INTRAVENOUS
Refills: 0 | Status: DISCONTINUED | OUTPATIENT
Start: 2019-11-12 | End: 2019-11-12

## 2019-11-12 RX ORDER — SENNA PLUS 8.6 MG/1
2 TABLET ORAL AT BEDTIME
Refills: 0 | Status: DISCONTINUED | OUTPATIENT
Start: 2019-11-12 | End: 2019-11-15

## 2019-11-12 RX ORDER — HEPARIN SODIUM 5000 [USP'U]/ML
5000 INJECTION INTRAVENOUS; SUBCUTANEOUS EVERY 8 HOURS
Refills: 0 | Status: COMPLETED | OUTPATIENT
Start: 2019-11-12 | End: 2019-11-13

## 2019-11-12 RX ADMIN — HYDROMORPHONE HYDROCHLORIDE 0.5 MILLIGRAM(S): 2 INJECTION INTRAMUSCULAR; INTRAVENOUS; SUBCUTANEOUS at 11:50

## 2019-11-12 RX ADMIN — Medication 100 MILLIGRAM(S): at 17:28

## 2019-11-12 RX ADMIN — Medication 975 MILLIGRAM(S): at 08:45

## 2019-11-12 RX ADMIN — FENTANYL CITRATE 50 MICROGRAM(S): 50 INJECTION INTRAVENOUS at 11:44

## 2019-11-12 RX ADMIN — HYDROMORPHONE HYDROCHLORIDE 0.5 MILLIGRAM(S): 2 INJECTION INTRAMUSCULAR; INTRAVENOUS; SUBCUTANEOUS at 12:48

## 2019-11-12 RX ADMIN — Medication 975 MILLIGRAM(S): at 08:44

## 2019-11-12 RX ADMIN — HYDROMORPHONE HYDROCHLORIDE 0.5 MILLIGRAM(S): 2 INJECTION INTRAMUSCULAR; INTRAVENOUS; SUBCUTANEOUS at 12:11

## 2019-11-12 RX ADMIN — OXYCODONE HYDROCHLORIDE 10 MILLIGRAM(S): 5 TABLET ORAL at 17:27

## 2019-11-12 RX ADMIN — HYDROMORPHONE HYDROCHLORIDE 0.5 MILLIGRAM(S): 2 INJECTION INTRAMUSCULAR; INTRAVENOUS; SUBCUTANEOUS at 12:20

## 2019-11-12 RX ADMIN — SODIUM CHLORIDE 70 MILLILITER(S): 9 INJECTION, SOLUTION INTRAVENOUS at 14:40

## 2019-11-12 RX ADMIN — HYDROMORPHONE HYDROCHLORIDE 0.5 MILLIGRAM(S): 2 INJECTION INTRAMUSCULAR; INTRAVENOUS; SUBCUTANEOUS at 13:00

## 2019-11-12 RX ADMIN — OXYCODONE HYDROCHLORIDE 5 MILLIGRAM(S): 5 TABLET ORAL at 13:30

## 2019-11-12 RX ADMIN — PANTOPRAZOLE SODIUM 40 MILLIGRAM(S): 20 TABLET, DELAYED RELEASE ORAL at 08:44

## 2019-11-12 RX ADMIN — OXYCODONE HYDROCHLORIDE 5 MILLIGRAM(S): 5 TABLET ORAL at 13:25

## 2019-11-12 RX ADMIN — Medication 1000 MILLIGRAM(S): at 14:28

## 2019-11-12 RX ADMIN — MORPHINE SULFATE 2 MILLIGRAM(S): 50 CAPSULE, EXTENDED RELEASE ORAL at 16:40

## 2019-11-12 RX ADMIN — OXYCODONE HYDROCHLORIDE 5 MILLIGRAM(S): 5 TABLET ORAL at 11:33

## 2019-11-12 RX ADMIN — HYDROMORPHONE HYDROCHLORIDE 0.5 MILLIGRAM(S): 2 INJECTION INTRAMUSCULAR; INTRAVENOUS; SUBCUTANEOUS at 12:44

## 2019-11-12 RX ADMIN — HYDROMORPHONE HYDROCHLORIDE 0.5 MILLIGRAM(S): 2 INJECTION INTRAMUSCULAR; INTRAVENOUS; SUBCUTANEOUS at 13:15

## 2019-11-12 RX ADMIN — FENTANYL CITRATE 50 MICROGRAM(S): 50 INJECTION INTRAVENOUS at 11:32

## 2019-11-12 RX ADMIN — OXYCODONE HYDROCHLORIDE 10 MILLIGRAM(S): 5 TABLET ORAL at 22:06

## 2019-11-12 RX ADMIN — Medication 400 MILLIGRAM(S): at 14:15

## 2019-11-12 RX ADMIN — FAMOTIDINE 20 MILLIGRAM(S): 10 INJECTION INTRAVENOUS at 08:44

## 2019-11-12 RX ADMIN — FENTANYL CITRATE 50 MICROGRAM(S): 50 INJECTION INTRAVENOUS at 12:11

## 2019-11-12 RX ADMIN — WARFARIN SODIUM 5 MILLIGRAM(S): 2.5 TABLET ORAL at 22:06

## 2019-11-12 RX ADMIN — OXYCODONE HYDROCHLORIDE 5 MILLIGRAM(S): 5 TABLET ORAL at 13:15

## 2019-11-12 RX ADMIN — OXYCODONE HYDROCHLORIDE 10 MILLIGRAM(S): 5 TABLET ORAL at 18:15

## 2019-11-12 RX ADMIN — HEPARIN SODIUM 5000 UNIT(S): 5000 INJECTION INTRAVENOUS; SUBCUTANEOUS at 22:06

## 2019-11-12 RX ADMIN — OXYCODONE HYDROCHLORIDE 10 MILLIGRAM(S): 5 TABLET ORAL at 22:36

## 2019-11-12 RX ADMIN — MORPHINE SULFATE 2 MILLIGRAM(S): 50 CAPSULE, EXTENDED RELEASE ORAL at 16:54

## 2019-11-12 NOTE — CONSULT NOTE ADULT - SUBJECTIVE AND OBJECTIVE BOX
HPI:      Patient is a 74y old  Female who presents with a chief complaint of inc left knee pain, h/o OA, Now S/P L TKA (2019 13:05)      Consulted by Dr. Brink    for VTE prophylaxis, risk stratification, and anticoagulation management.    PAST MEDICAL & SURGICAL HISTORY:  Frequent UTI: 2-3x/year; had an episode 5 weeks ago was treated denies dysuria  OA (osteoarthritis)  Pulmonary emphysema, unspecified emphysema type: 2017 last exacerbation; never intubated  Basal cell carcinoma: removed from face  DDD (degenerative disc disease), lumbar  Spinal stenosis of lumbar region  Urinary urgency  Gall stones: gall bladder removed  Diverticulosis of intestine without bleeding, unspecified intestinal tract location  Varicose veins of both lower extremities: surgery  History of cataract: extracted from right and left  Elevated pancreatic enzyme: patient reports elevated lab 2013. Gastroenterologist aware  Asthma: history of x 20 years ago, resolved does not use inhaler  Back pain  Diabetes mellitus: diet controlled  Hypothyroidism  Acid reflux  S/P cholecystectomy:   H/O umbilical hernia repair: 19  H/O lumbosacral spine surgery: fusion   Skin cancer: to right temple , removed, history of basal cell x 2  S/P knee surgery: arthroscopy bilateral  and   Dental disorder: Dental surgery 2012, patient reports that upper portion is glued in at this time, plan is to have dental implants placed.  S/P bladder repair: bladder lift 2013          CrCl: 84    Caprini VTE Risk Score: CAPRINI SCORE  AGE RELATED RISK FACTORS                                                       MOBILITY RELATED FACTORS  [ ] Age 41-60 years                                            (1 Point)                  [ ] Bed rest/restricted mobility         (1 Point)  [ ] Age: 61-74 years                                           (2 Points)                [ ] Plaster cast                                                   (2 Points)  [x ] Age= 75 years                                              (3 Points)                 [ ] Bed bound for more than 72 hours                   (2 Points)    DISEASE RELATED RISK FACTORS                                               GENDER SPECIFIC FACTORS  [ ] Edema in the lower extremities                       (1 Point)           [ ] Pregnancy                                                            (1 Point)  [x ] Varicose veins                                               (1 Point)                  [ ] Post-partum < 6 weeks                                      (1 Point)             [ ] BMI > 25 Kg/m2                                            (1 Point)                  [x ] Hormonal therapy or oral contraception       (1 Point)                 [ ] Sepsis (in the previous month)                        (1 Point)             [ ] History of pregnancy complications                (1Point)  [ ] Pneumonia or serious lung disease                                             [ ] Unexplained or recurrent  (>/= 3), premature                                 (In the previous month)                               (1 Point)                birth with toxemia or growth-restricted infant (1 Point)  [ ] Abnormal pulmonary function test            (1 Point)                                   SURGERY RELATED RISK FACTORS  [ ] Acute myocardial infarction                       (1 Point)                  [ ]  Section                                         (1 Point)  [ ] Congestive heart failure (in the previous month) (1 Point)   [ ] Minor surgery   lasting <45 minutes       (1 Point)   [ ] Inflammatory bowel disease                             (1 Point)          [ ] Arthroscopic surgery                                  (2 Points)  [ ] Central venous access                                    (2 Points)            [ ] General surgery lasting >45 minutes      (2 Points)       [ ] Stroke (in the previous month)                  (5 Points)            [x ] Elective major lower extremity arthroplasty (5 Points)                                   [  ] Malignancy (present or past include skin melanoma                                          but exclude  basal skin cell)    (2 points)                                      TRAUMA RELATED RISK FACTORS                HEMATOLOGY RELATED FACTORS                                  [ ] Fracture of the hip, pelvis, or leg                       (5 Points)  [ ] Prior episodes of VTE                                     (3 Points)          [ ] Acute spinal cord injury (in the previous month)  (5 Points)  [ ] Positive family history for VTE                         (3 Points)       [ ] Paralysis (less than 1 month)                          (5 Points)  [ ] Prothrombin 09539 A                                      (3 Points)         [ ] Multiple Trauma (within 1month)                 (5Points)                                                                                                                                                                [ ] Factor V Leiden                                          (3 Points)                                OTHER RISK FACTORS                          [ ] Lupus anticoagulants                                     (3 Points)                       [ ] BMI > 40                          (1 Point)                                                         [ ] Anticardiolipin antibodies                                (3 Points)                   [ ] Smoking                              (1Point)                                                [ ] High homocysteine in the blood                      (3 Points)                [  ] Diabetes requiring insulin (1point)                         [ ] Other congenital or acquired thrombophilia       (3 Points)          [  ] Chemotherapy                   (1 Point)  [ ] Heparin induced thrombocytopenia                  (3 Points)             [  ] Blood Transfusion                (1 point)                                                                                                             Total Score [  11        ]                                                                                                                                                                                                                                                                                                                                                          IMPROVE Bleeding Risk Score: 1.5        Time In:   Time Out:     Falls Risk:   High (x  )  Mod (  )  Low (  )      FAMILY HISTORY:  Family history of stroke (Mother)  Family history of heart disease (Mother)  Family history of pancreatic cancer (Father)    Denies any personal or familial history of clotting or bleeding disorders.    Allergies    No Known Allergies    Intolerances        REVIEW OF SYSTEMS    (  )Fever	     (  )Constipation	(  )SOB				(  )Headache	(  )Dysuria  (  )Chills	     (  )Melena	(  )Dyspnea present on exertion	                    (  )Dizziness                    (  )Polyuria  (  )Nausea	     (  )Hematochezia	(  )Cough			                    (  )Syncope   	(  )Hematuria  (  )Vomiting    (  )Chest Pain	(  )Wheezing			(  )Weakness  (  )Diarrhea     (  )Palpitations	(  )Anorexia			( x )joint pain    All  other review of systems negative: Yes    Vital Signs Last 24 Hrs  T(C): 36.2 (19 @ 13:52), Max: 37 (19 @ 13:00)  T(F): 97.2 (19 @ 13:52), Max: 98.6 (19 @ 13:00)  HR: 64 (19 @ 13:52) (64 - 95)  BP: 113/56 (19 @ 13:52) (113/56 - 146/71)  BP(mean): --  RR: 16 (19 @ 13:52) (12 - 18)  SpO2: 98% (19 @ 13:52) (96% - 99%)      PHYSICAL EXAM:    Constitutional: Appears Well    Neurological: A& O x 3    Skin: Warm    Respiratory and Thorax: normal effort; Breath sounds: normal; No rales/wheezing/rhonchi  	  Cardiovascular: S1, S2, regular, NMBR	    Gastrointestinal: BS + x 4Q, nontender	    Genitourinary:  Bladder nondistended, nontender    Musculoskeletal:   General Right:   no muscle/joint tenderness,   normal tone, no joint swelling,   ROM: full	    General Left:   + muscle/joint tenderness,   normal tone, no joint swelling,   ROM: limited         Knee:              Left: Incision: ; Dressing CDI;       Lower extrems:   Right: no calf tenderness              negative rita's sign               + pedal pulses    Left:   no calf tenderness              negative rita's sign               + pedal pulses                          12.8   11.20 )-----------( 274      ( 2019 11:46 )             38.7       11-    140  |  108  |  14  ----------------------------<  143<H>  4.0   |  24  |  0.84    Ca    8.7      2019 11:46        	MEDICATIONS  (STANDING):  ceFAZolin   IVPB 2000 milliGRAM(s) IV Intermittent every 8 hours  heparin  Injectable 5000 Unit(s) SubCutaneous every 8 hours  lactated ringers. 1000 milliLiter(s) IV Continuous <Continuous>  levothyroxine  Oral Tab/Cap - Peds 125 MICROGram(s) Oral daily  pantoprazole    Tablet 40 milliGRAM(s) Oral before breakfast  pantoprazole    Tablet 40 milliGRAM(s) Oral before breakfast  polyethylene glycol 3350 17 Gram(s) Oral daily  tiotropium 18 MICROgram(s) Capsule 1 Capsule(s) Inhalation daily  warfarin 5 milliGRAM(s) Oral daily      DVT Prophylaxis:  LMWH                   (  )  Heparin SQ           ( x )  Coumadin             (x  )  Xarelto                  (  )  Eliquis                   (  )  Venodynes           ( x )  Ambulation          (x  )  UFH                       (  )  Contraindicated  (  )  EC ASPIRIN       (  )

## 2019-11-12 NOTE — PHYSICAL THERAPY INITIAL EVALUATION ADULT - LIVES WITH, PROFILE
Pt. lives with  in a two story house, 1 step to enter without railings, 16 steps to second floor bedroom and full bath with tub shower./spouse

## 2019-11-12 NOTE — PHYSICAL THERAPY INITIAL EVALUATION ADULT - ADDITIONAL COMMENTS
Pt. was I in all functional mobility immediately prior to this current hospital course. Pt. reports after previous R TKA she used a rolling walker and then progressed to a straight cane, and eventually to no AD for ambulation. Pt. would like to get back to gardening.

## 2019-11-12 NOTE — PHYSICAL THERAPY INITIAL EVALUATION ADULT - ACTIVE RANGE OF MOTION EXAMINATION, REHAB EVAL
Right LE Active ROM was WFL (within functional limits)/bilateral upper extremity Active ROM was WFL (within functional limits)/R knee flexion ~0-115

## 2019-11-12 NOTE — DISCHARGE NOTE PROVIDER - CARE PROVIDER_API CALL
Klever Brink)  Orthopaedic Surgery  [address] [place] [address]  Phone: [phone]  Fax: [phone]  Follow Up Time:

## 2019-11-12 NOTE — CONSULT NOTE ADULT - ASSESSMENT
A:  75 yo female S/P left TKR with high vte risk due to age, surgery, INC BMI and restricted mobility.  Consulted by dr Brink for DVT/PE prophylaxis, risk stratification and Anticoagulation Management        P: Discussed the necessity of VTE prophylaxis with coumadin. Educated patient on INR, value, meaning, and effects medications and foods may have on it. Will further reinforce coumadin education and follow up on outpatient basis.     Plan:  Coumadin 5 mg PO daily x 4 weeks total adjust dose per INR, start tonight 2200  Heparin 5,000 units SQ Q 8 hour until INR > 2.0 x 2 days, start tonight 2200  Daily PT/INR  Daily CBC/BMP  Enc ambulation  Venodynes    Thank you for this consult, will continue to follow.

## 2019-11-12 NOTE — PROGRESS NOTE ADULT - ASSESSMENT
A/P:  Stable POD 0 LEFT Total Knee Arthroplasty  -Analgesia  -Ppx ABX  -DVT PE ppx  -Incentive spirometry  -OOB PT

## 2019-11-12 NOTE — PHYSICAL THERAPY INITIAL EVALUATION ADULT - GAIT DEVIATIONS NOTED, PT EVAL
decreased step length/decreased stride length/decreased weight-shifting ability/Pt. ambulates with a slow, antalgic gait and requires VCs for sequencing with RW for safety/decreased jan/increased time in double stance

## 2019-11-12 NOTE — DISCHARGE NOTE PROVIDER - HOSPITAL COURSE
H&P:    Pt is a 74y Female     PAST MEDICAL & SURGICAL HISTORY:    Frequent UTI: 2-3x/year; had an episode 5 weeks ago was treated denies dysuria    OA (osteoarthritis)    Pulmonary emphysema, unspecified emphysema type: 2017 last exacerbation; never intubated    Basal cell carcinoma: removed from face    DDD (degenerative disc disease), lumbar    Spinal stenosis of lumbar region    Urinary urgency    Gall stones: gall bladder removed    Diverticulosis of intestine without bleeding, unspecified intestinal tract location    Varicose veins of both lower extremities: surgery    History of cataract: extracted from right and left    Elevated pancreatic enzyme: patient reports elevated lab 6/2013. Gastroenterologist aware    Asthma: history of x 20 years ago, resolved does not use inhaler    Back pain    Diabetes mellitus: diet controlled    Hypothyroidism    Acid reflux    S/P cholecystectomy: 2012    H/O umbilical hernia repair: 5/24/19    H/O lumbosacral spine surgery: fusion 2017    Skin cancer: to right temple 2012, removed, history of basal cell x 2    S/P knee surgery: arthroscopy bilateral 2007 and 2010    Dental disorder: Dental surgery 12/2012, patient reports that upper portion is glued in at this time, plan is to have dental implants placed.    S/P bladder repair: bladder lift 2/2013             Now s/p Left Total Knee Arthroplasty. Pt is afebrile with stable vital signs. Pain is controlled. Alert and Oriented. Exam reveals intact EHL FHL TA GS, +DP. Dressing is clean and dry with a new bandage on.        VITALS**    LABS**        Hospital Course:    Patient presented to [facility] medically cleared for elective Left Total Knee Replacement Surgery, having failed outpatient conservative management. Prophylactic antibiotics were started before the procedure and continued for 24 hours. They were admitted after surgery to the orthopedic floor. There were no complications during the hospital stay. All home medications were continued.         **Pt received **U PRBC post op for Acute Blood Loss Anemia.        Routine consults were obtained from the Anticoagulation Team for DVT/PE prophylaxis, from Physical Therapy for twice daily PT, and from the Hospitalist for Medical Co-management. Patient was placed on Xarelto vs Coumadin and SC heparin until therapeutic vs ECASA 325 BID *** for anticoagulation. Pertinent home medications were continued. Daily labs were followed.          On POD 0 pt was stable overnight. No major event POD1-2. Pt received twice daily PT and a new dressing was applied prior to discharge. The plan is for DC to home with home PT** or to Rehab for ongoing PT**. The orthopedic Attending is aware and agrees. H&P:    Pt is a 74y Female     PAST MEDICAL & SURGICAL HISTORY:    Frequent UTI: 2-3x/year; had an episode 5 weeks ago was treated denies dysuria    OA (osteoarthritis)    Pulmonary emphysema, unspecified emphysema type: 2017 last exacerbation; never intubated    Basal cell carcinoma: removed from face    DDD (degenerative disc disease), lumbar    Spinal stenosis of lumbar region    Urinary urgency    Gall stones: gall bladder removed    Diverticulosis of intestine without bleeding, unspecified intestinal tract location    Varicose veins of both lower extremities: surgery    History of cataract: extracted from right and left    Elevated pancreatic enzyme: patient reports elevated lab 6/2013. Gastroenterologist aware    Asthma: history of x 20 years ago, resolved does not use inhaler    Back pain    Diabetes mellitus: diet controlled    Hypothyroidism    Acid reflux    S/P cholecystectomy: 2012    H/O umbilical hernia repair: 5/24/19    H/O lumbosacral spine surgery: fusion 2017    Skin cancer: to right temple 2012, removed, history of basal cell x 2    S/P knee surgery: arthroscopy bilateral 2007 and 2010    Dental disorder: Dental surgery 12/2012, patient reports that upper portion is glued in at this time, plan is to have dental implants placed.    S/P bladder repair: bladder lift 2/2013         Now s/p Left Total Knee Arthroplasty. Pt is afebrile with stable vital signs. Pain is controlled. Alert and Oriented. Exam reveals intact EHL FHL TA GS, +DP. Dressing is clean and dry with a new bandage on.        VITALS**    LABS**        Hospital Course:    Patient presented to [facility] medically cleared for elective Left Total Knee Replacement Surgery, having failed outpatient conservative management. Prophylactic antibiotics were started before the procedure and continued for 24 hours. They were admitted after surgery to the orthopedic floor. There were no complications during the hospital stay. All home medications were continued.         **Pt received **U PRBC post op for Acute Blood Loss Anemia.        Routine consults were obtained from the Anticoagulation Team for DVT/PE prophylaxis, from Physical Therapy for twice daily PT, and from the Hospitalist for Medical Co-management. Patient was placed Coumadin and SC Heparin bridge until therapeutic for anticoagulation; SC Heparin to be switched to SC Lovenox on discharge home. Pertinent home medications were continued. Daily labs were followed.          On POD 0 pt was stable overnight. No major event POD1-2. Pt received twice daily PT and a new dressing was applied prior to discharge. The plan is for DC to home with home PT. The orthopedic Attending is aware and agrees. H&P:    Pt is a 74y Female     PAST MEDICAL & SURGICAL HISTORY:    Frequent UTI: 2-3x/year; had an episode 5 weeks ago was treated denies dysuria    OA (osteoarthritis)    Pulmonary emphysema, unspecified emphysema type: 2017 last exacerbation; never intubated    Basal cell carcinoma: removed from face    DDD (degenerative disc disease), lumbar    Spinal stenosis of lumbar region    Urinary urgency    Gall stones: gall bladder removed    Diverticulosis of intestine without bleeding, unspecified intestinal tract location    Varicose veins of both lower extremities: surgery    History of cataract: extracted from right and left    Elevated pancreatic enzyme: patient reports elevated lab 6/2013. Gastroenterologist aware    Asthma: history of x 20 years ago, resolved does not use inhaler    Back pain    Diabetes mellitus: diet controlled    Hypothyroidism    Acid reflux    S/P cholecystectomy: 2012    H/O umbilical hernia repair: 5/24/19    H/O lumbosacral spine surgery: fusion 2017    Skin cancer: to right temple 2012, removed, history of basal cell x 2    S/P knee surgery: arthroscopy bilateral 2007 and 2010    Dental disorder: Dental surgery 12/2012, patient reports that upper portion is glued in at this time, plan is to have dental implants placed.    S/P bladder repair: bladder lift 2/2013         Now s/p Left Total Knee Arthroplasty. Pt is afebrile with stable vital signs. Pain is controlled. Alert and Oriented. Exam reveals intact EHL FHL TA GS, +DP. Dressing is clean and dry with a new bandage on.        Vital Signs Last 24 Hrs    T(C): 37.2 (15 Nov 2019 05:15), Max: 37.6 (14 Nov 2019 23:46)    T(F): 98.9 (15 Nov 2019 05:15), Max: 99.7 (14 Nov 2019 23:46)    HR: 81 (15 Nov 2019 05:15) (74 - 85)    BP: 116/53 (15 Nov 2019 05:15) (116/49 - 126/45)    BP(mean): --    RR: 17 (15 Nov 2019 05:15) (17 - 18)    SpO2: 95% (15 Nov 2019 05:15) (93% - 95%)                                10.1     8.15  )-----------( 165      ( 14 Nov 2019 06:35 )               31.0         11-14        139  |  108  |  14    ----------------------------<  149<H>    4.3   |  24  |  0.66        Ca    8.5      14 Nov 2019 06:35        Hospital Course:    Patient presented to [facility] medically cleared for elective Left Total Knee Replacement Surgery, having failed outpatient conservative management. Prophylactic antibiotics were started before the procedure and continued for 24 hours. They were admitted after surgery to the orthopedic floor. There were no complications during the hospital stay. All home medications were continued.         Routine consults were obtained from the Anticoagulation Team for DVT/PE prophylaxis, from Physical Therapy for twice daily PT, and from the Hospitalist for Medical Co-management. Patient was placed Coumadin and SC Heparin bridge until therapeutic for anticoagulation; SC Heparin to be switched to SC Lovenox on discharge home. Pertinent home medications were continued. Daily labs were followed.          On POD 0 pt was stable overnight. No major event POD1-2. Pt received twice daily PT and a new dressing was applied prior to discharge. The plan is for DC to home with home PT. The orthopedic Attending is aware and agrees.

## 2019-11-12 NOTE — CONSULT NOTE ADULT - SUBJECTIVE AND OBJECTIVE BOX
c/c: left knee pain    HPI: 74F, pmh of OA, hypothyroidism, Asthma, diverticulosis, dm-diet controlled, emphysema, gerd, spinal stenosis s/p surgery, OA  who is admitted for left knee replacement after failed conservative measures. Hospitalist service consulted for medical comanagement. Pt seen and examined on 2N.  Felt well. No pain at present. Was able to take a few steps with physical therapy. Hasn't voided or eaten yet.       ROS; all 10 systems reviewed and is as above otherwise negative.    PMH; as above  PSH; lumbosacral fusion, umbilical hernia repair, bladder lift , cholecystectomy, b/l knee arthroscopy, right knee replacment  F/H: father-pancreatic ca  Social; occasonal etoh, TOB: 26 pack year history, quit 1977  Allergies: NKDA  Home meds: see med rec.     Vital Signs Last 24 Hrs  T(C): 36.8 (12 Nov 2019 16:56), Max: 37 (12 Nov 2019 13:00)  T(F): 98.2 (12 Nov 2019 16:56), Max: 98.6 (12 Nov 2019 13:00)  HR: 94 (12 Nov 2019 16:56) (64 - 95)  BP: 112/60 (12 Nov 2019 16:56) (112/60 - 146/71)  RR: 16 (12 Nov 2019 16:56) (12 - 18)  SpO2: 92% (12 Nov 2019 16:56) (92% - 99%)    PHYSICAL EXAM:    GENERAL: Comfortable, no acute distress   HEAD:  Normocephalic, atraumatic  EYES: EOMI, PERRLA  HEENT: Moist mucous membranes  NECK: Supple, No JVD  NERVOUS SYSTEM:  Alert & Oriented X3, non focal  CHEST/LUNG: Clear to auscultation bilaterally  HEART: Regular rate and rhythm  ABDOMEN: Soft, Nontender, Nondistended, Bowel sounds present  GENITOURINARY: Voiding, no palpable bladder  EXTREMITIES:   No clubbing, cyanosis, or edema  MUSCULOSKELETAL-left knee in ace-wrap  SKIN-no rash    LABS:                        12.8   11.20 )-----------( 274      ( 12 Nov 2019 11:46 )             38.7     11-12    140  |  108  |  14  ----------------------------<  143<H>  4.0   |  24  |  0.84    Ca    8.7      12 Nov 2019 11:46    MEDICATIONS  (STANDING):  ceFAZolin   IVPB 2000 milliGRAM(s) IV Intermittent every 8 hours  heparin  Injectable 5000 Unit(s) SubCutaneous every 8 hours  lactated ringers. 1000 milliLiter(s) (70 mL/Hr) IV Continuous <Continuous>  levothyroxine  Oral Tab/Cap - Peds 125 MICROGram(s) Oral daily  pantoprazole    Tablet 40 milliGRAM(s) Oral before breakfast  polyethylene glycol 3350 17 Gram(s) Oral daily  tiotropium 18 MICROgram(s) Capsule 1 Capsule(s) Inhalation daily  warfarin 5 milliGRAM(s) Oral daily    MEDICATIONS  (PRN):  acetaminophen   Tablet .. 650 milliGRAM(s) Oral every 6 hours PRN Temp greater or equal to 38C (100.4F)  ALBUTerol    90 MICROgram(s) HFA Inhaler 2 Puff(s) Inhalation every 6 hours PRN Shortness of Breath and/or Wheezing  aluminum hydroxide/magnesium hydroxide/simethicone Suspension 30 milliLiter(s) Oral four times a day PRN Indigestion  magnesium hydroxide Suspension 30 milliLiter(s) Oral daily PRN Constipation  morphine  - Injectable 2 milliGRAM(s) IV Push every 4 hours PRN Severe Pain (7 - 10)  ondansetron Injectable 4 milliGRAM(s) IV Push every 6 hours PRN Nausea and/or Vomiting  oxyCODONE    IR 5 milliGRAM(s) Oral every 4 hours PRN Mild Pain (1 - 3)  oxyCODONE    IR 10 milliGRAM(s) Oral every 4 hours PRN Moderate Pain (4 - 6)  senna 2 Tablet(s) Oral at bedtime PRN Constipation    ASSESSMENT AND PLAN:  74f PMH AS ABOVE A/W:    1. OA left knee s/p elective left knee replacement:  -POD#0  -pain control  -physical therapy  -incentive spirometry  -bowel regimen  -monitor h/h    2. Asthma:  -stable.   -continue home meds.    3. Hypothyroid:  -synthroid    4. DMII:  -ss    5. Emphysema:  -spriva  -no s/o exacerbation    6. GERD:  -ppi    7. DVT px    Thank you, will follow

## 2019-11-12 NOTE — PHYSICAL THERAPY INITIAL EVALUATION ADULT - RANGE OF MOTION EXAMINATION, REHAB EVAL
Right LE ROM was WFL (within functional limits)/R knee flexion ~0-115/bilateral upper extremity ROM was WFL (within functional limits)

## 2019-11-12 NOTE — DISCHARGE NOTE PROVIDER - NSDCMRMEDTOKEN_GEN_ALL_CORE_FT
levothyroxine 125 mcg (0.125 mg) oral capsule: 1 cap(s) orally once a day  pantoprazole 40 mg oral delayed release tablet: 1 tab(s) orally once a day, stomach protection  Premarin Vaginal: 4-5 x /week   ProAir HFA 90 mcg/inh inhalation aerosol: 2 puff(s) inhaled 4 times a day, As Needed  Spiriva Respimat: 2 puffs evening -pt will bring Colace 100 mg oral capsule: 1 cap(s) orally 2 times a day  enoxaparin 30 mg/0.3 mL injectable solution: Inject syringe into abdomen twice daily, 12 hours apart, as directed by [facility] [phone] MDD:60mg  levothyroxine 125 mcg (0.125 mg) oral capsule: 1 cap(s) orally once a day  MiraLax oral powder for reconstitution: 17 gram(s) orally once a day  as needed for   oxycodone-acetaminophen 5 mg-325 mg oral tablet: 1 tab(s) orally every 4 to 6 hours, As Needed for severe pain MDD:6 tablets   pantoprazole 40 mg oral delayed release tablet: 1 tab(s) orally once a day, stomach protection  Premarin Vaginal: 4-5 x /week   ProAir HFA 90 mcg/inh inhalation aerosol: 2 puff(s) inhaled 4 times a day, As Needed  Spiriva Respimat: inhaled once a day -pt will bring  18mcg  Tylenol Extra Strength 500 mg oral tablet: 2 tab(s) orally 3 times a day   warfarin 5 mg oral tablet: 1-1.5 tabs by mouth once daily with dinner as directed by [facility] [phone] Colace 100 mg oral capsule: 1 cap(s) orally 2 times a day  Coumadin 3 mg oral tablet: 1 tab(s) orally once a day   enoxaparin 30 mg/0.3 mL injectable solution: Inject syringe into abdomen twice daily, 12 hours apart, as directed by [facility] [phone] MDD:60mg  levothyroxine 125 mcg (0.125 mg) oral capsule: 1 cap(s) orally once a day  MiraLax oral powder for reconstitution: 17 gram(s) orally once a day  as needed for   oxycodone-acetaminophen 5 mg-325 mg oral tablet: 1 tab(s) orally every 4 to 6 hours, As Needed for severe pain MDD:6 tablets   pantoprazole 40 mg oral delayed release tablet: 1 tab(s) orally once a day, stomach protection  Premarin Vaginal: 4-5 x /week   ProAir HFA 90 mcg/inh inhalation aerosol: 2 puff(s) inhaled 4 times a day, As Needed  Spiriva Respimat: inhaled once a day -pt will bring  18mcg  Tylenol Extra Strength 500 mg oral tablet: 2 tab(s) orally 3 times a day

## 2019-11-12 NOTE — PROGRESS NOTE ADULT - SUBJECTIVE AND OBJECTIVE BOX
Orthopedics Post-op Check    POD 0 LEFT Total Knee Arthroplasty (general)  Pain is controlled in PACU after IV pain meds. No nausea or vomiting. Moving toes fine.    Vital Signs Last 24 Hrs  T(C): 37 (11-12-19 @ 13:00), Max: 37 (11-12-19 @ 13:00)  T(F): 98.6 (11-12-19 @ 13:00), Max: 98.6 (11-12-19 @ 13:00)  HR: 70 (11-12-19 @ 13:00) (70 - 95)  BP: 121/59 (11-12-19 @ 12:45) (116/79 - 146/71)  BP(mean): --  RR: 14 (11-12-19 @ 13:00) (12 - 18)  SpO2: 98% (11-12-19 @ 13:00) (96% - 99%)                        12.8   11.20 )-----------( 274      ( 12 Nov 2019 11:46 )             38.7     12 Nov 2019 11:46    140    |  108    |  14     ----------------------------<  143    4.0     |  24     |  0.84     Ca    8.7        12 Nov 2019 11:46          Exam:  NAD AAOx3  Dressing clean and dry  +EHL FHL TA GS symmetric bilaterally  SILT toes 1-5  +DP  Calf Soft NT

## 2019-11-12 NOTE — CHART NOTE - NSCHARTNOTEFT_GEN_A_CORE
Ortho called for saturated dressing at 1900. Pt seen bedside resting comfortably in NAD. Small amount of blood on ACE wrap leaking through Aquacel dressing. Aquacel dressing was reinforced with ABDs and new ACE bandages. +EHL FHL TA GS, SILT toes 1-5, +DP, Calf Soft NT, SCDs on bilaterally.

## 2019-11-12 NOTE — DISCHARGE NOTE PROVIDER - NSDCFUSCHEDAPPT_GEN_ALL_CORE_FT
MITA RAE ; 11/12/2019 ; HNT PreAdmits  MITA RAE ; 12/16/2019 ; NPP OrthoSur[address] MITA RAE ; 12/16/2019 ; NPP OrthoSurg [address] [facility] MITA RAE ; 12/16/2019 ; NPP OrthoSurg [address] [facility]

## 2019-11-12 NOTE — DISCHARGE NOTE PROVIDER - NSDCFUADDINST_GEN_ALL_CORE_FT
Discharge Instructions for Left Total Knee Arthroplasty    1.  Diet: Resume previous diet  2. Activity: WBAT, Rolling walker, Daily PT. Gentle ROM 0-full as tolerated. Walk plenty.  Keep a bump (rolled towel or blanket) under your heel to keep leg straight while in bed or chair for 2 weeks.  3. Call with: fever over 101, wound redness, drainage or open area, calf pain/calf swelling  4. Wound Care: Remove old and place new Aquacel  bandage to Knee in 7 days. RN to Remove Staples Post Op Day #14 (11/26/19) so long as wound is healed, no drainage or open area. OK to Shower with Aquacel; Must be and Aquacel bandage to shower.  Avoid direct water beating on bandage.  Continue ICE packs to knee. No bandage needed after staple removal.  5. DVT PE Prophylaxis: Managed by Anticoag Team. See Anticoagulation Instructions. See Med Rec.  6. Continue Protonix daily while on Anticoagulant. an eRx has been sent to your pharmacy.  7. Labs: Check H&H weekly while on Anticoagulation. Check INR if on Coumadin.  8. Follow Up: Dr. Brink in 21 days (1 week after staples removed);  Call to schedule.   9. Pain medications:  If going home, eRX sent to your pharmacy for .   10.***Please Call the office if any of you medications are not covered under your insurance, especially if it is a BLOOD CLOT PREVENTION/anticoagulant medication. Discharge Instructions for Left Total Knee Arthroplasty    1.  Diet: Resume previous diet  2. Activity: WBAT, Rolling walker, Daily PT. Gentle ROM 0-full as tolerated. Walk plenty.  Keep a bump (rolled towel or blanket) under your heel to keep leg straight while in bed or chair for 2 weeks.  3. Call with: fever over 101, wound redness, drainage or open area, calf pain/calf swelling  4. Wound Care: Remove old and place new Aquacel  bandage to Knee in 7 days. RN to Remove Staples Post Op Day #14 (11/26/19) so long as wound is healed, no drainage or open area. OK to Shower with Aquacel; Must be and Aquacel bandage to shower.  Avoid direct water beating on bandage.  No bandage needed after staple removal. Additionally PLEASE CHANGE BANDAGE AS NEEDED also in the event of leakage or saturation.   5. DVT PE Prophylaxis: Managed by Anticoag Team. See Anticoagulation Instructions. See Med Rec.  6. Continue Protonix daily while on Anticoagulant. an eRx has been sent to your pharmacy.  7. Labs: Check H&H weekly while on Anticoagulation. Check INR if on Coumadin.  8. Follow Up: Dr. Brink in 21 days (1 week after staples removed);  Call to schedule.   9. Pain medications:  If going home, eRX sent to your pharmacy for .   10.***Please Call the office if any of you medications are not covered under your insurance, especially if it is a BLOOD CLOT PREVENTION/anticoagulant medication.  11. Continue ICE packs to knee for 2 weeks.

## 2019-11-13 ENCOUNTER — TRANSCRIPTION ENCOUNTER (OUTPATIENT)
Age: 74
End: 2019-11-13

## 2019-11-13 LAB
ANION GAP SERPL CALC-SCNC: 9 MMOL/L — SIGNIFICANT CHANGE UP (ref 5–17)
BUN SERPL-MCNC: 15 MG/DL — SIGNIFICANT CHANGE UP (ref 7–23)
CALCIUM SERPL-MCNC: 8.1 MG/DL — LOW (ref 8.5–10.1)
CHLORIDE SERPL-SCNC: 101 MMOL/L — SIGNIFICANT CHANGE UP (ref 96–108)
CO2 SERPL-SCNC: 24 MMOL/L — SIGNIFICANT CHANGE UP (ref 22–31)
CREAT SERPL-MCNC: 0.8 MG/DL — SIGNIFICANT CHANGE UP (ref 0.5–1.3)
GLUCOSE SERPL-MCNC: 139 MG/DL — HIGH (ref 70–99)
HCT VFR BLD CALC: 28.7 % — LOW (ref 34.5–45)
HGB BLD-MCNC: 9.7 G/DL — LOW (ref 11.5–15.5)
INR BLD: 1.13 RATIO — SIGNIFICANT CHANGE UP (ref 0.88–1.16)
MCHC RBC-ENTMCNC: 29.8 PG — SIGNIFICANT CHANGE UP (ref 27–34)
MCHC RBC-ENTMCNC: 33.8 GM/DL — SIGNIFICANT CHANGE UP (ref 32–36)
MCV RBC AUTO: 88 FL — SIGNIFICANT CHANGE UP (ref 80–100)
PLATELET # BLD AUTO: 231 K/UL — SIGNIFICANT CHANGE UP (ref 150–400)
POTASSIUM SERPL-MCNC: 4.3 MMOL/L — SIGNIFICANT CHANGE UP (ref 3.5–5.3)
POTASSIUM SERPL-SCNC: 4.3 MMOL/L — SIGNIFICANT CHANGE UP (ref 3.5–5.3)
PROTHROM AB SERPL-ACNC: 12.6 SEC — SIGNIFICANT CHANGE UP (ref 10–12.9)
RBC # BLD: 3.26 M/UL — LOW (ref 3.8–5.2)
RBC # FLD: 14.1 % — SIGNIFICANT CHANGE UP (ref 10.3–14.5)
SODIUM SERPL-SCNC: 134 MMOL/L — LOW (ref 135–145)
WBC # BLD: 13.73 K/UL — HIGH (ref 3.8–10.5)
WBC # FLD AUTO: 13.73 K/UL — HIGH (ref 3.8–10.5)

## 2019-11-13 PROCEDURE — 99232 SBSQ HOSP IP/OBS MODERATE 35: CPT

## 2019-11-13 RX ORDER — WARFARIN SODIUM 2.5 MG/1
1 TABLET ORAL
Qty: 40 | Refills: 1
Start: 2019-11-13 | End: 2020-01-11

## 2019-11-13 RX ORDER — CELECOXIB 200 MG/1
200 CAPSULE ORAL
Refills: 0 | Status: DISCONTINUED | OUTPATIENT
Start: 2019-11-13 | End: 2019-11-15

## 2019-11-13 RX ORDER — ENOXAPARIN SODIUM 100 MG/ML
30 INJECTION SUBCUTANEOUS
Qty: 10 | Refills: 1
Start: 2019-11-13 | End: 2019-11-22

## 2019-11-13 RX ORDER — ENOXAPARIN SODIUM 100 MG/ML
30 INJECTION SUBCUTANEOUS
Refills: 0 | Status: DISCONTINUED | OUTPATIENT
Start: 2019-11-14 | End: 2019-11-15

## 2019-11-13 RX ORDER — ACETAMINOPHEN 500 MG
975 TABLET ORAL EVERY 6 HOURS
Refills: 0 | Status: DISCONTINUED | OUTPATIENT
Start: 2019-11-13 | End: 2019-11-15

## 2019-11-13 RX ADMIN — OXYCODONE HYDROCHLORIDE 10 MILLIGRAM(S): 5 TABLET ORAL at 19:30

## 2019-11-13 RX ADMIN — OXYCODONE HYDROCHLORIDE 10 MILLIGRAM(S): 5 TABLET ORAL at 02:01

## 2019-11-13 RX ADMIN — OXYCODONE HYDROCHLORIDE 10 MILLIGRAM(S): 5 TABLET ORAL at 06:04

## 2019-11-13 RX ADMIN — WARFARIN SODIUM 5 MILLIGRAM(S): 2.5 TABLET ORAL at 22:09

## 2019-11-13 RX ADMIN — SENNA PLUS 2 TABLET(S): 8.6 TABLET ORAL at 02:03

## 2019-11-13 RX ADMIN — CELECOXIB 200 MILLIGRAM(S): 200 CAPSULE ORAL at 13:13

## 2019-11-13 RX ADMIN — Medication 975 MILLIGRAM(S): at 20:00

## 2019-11-13 RX ADMIN — Medication 975 MILLIGRAM(S): at 23:47

## 2019-11-13 RX ADMIN — HEPARIN SODIUM 5000 UNIT(S): 5000 INJECTION INTRAVENOUS; SUBCUTANEOUS at 22:09

## 2019-11-13 RX ADMIN — HEPARIN SODIUM 5000 UNIT(S): 5000 INJECTION INTRAVENOUS; SUBCUTANEOUS at 06:04

## 2019-11-13 RX ADMIN — OXYCODONE HYDROCHLORIDE 10 MILLIGRAM(S): 5 TABLET ORAL at 11:00

## 2019-11-13 RX ADMIN — Medication 975 MILLIGRAM(S): at 19:29

## 2019-11-13 RX ADMIN — MORPHINE SULFATE 2 MILLIGRAM(S): 50 CAPSULE, EXTENDED RELEASE ORAL at 13:30

## 2019-11-13 RX ADMIN — OXYCODONE HYDROCHLORIDE 10 MILLIGRAM(S): 5 TABLET ORAL at 02:31

## 2019-11-13 RX ADMIN — MORPHINE SULFATE 2 MILLIGRAM(S): 50 CAPSULE, EXTENDED RELEASE ORAL at 13:14

## 2019-11-13 RX ADMIN — OXYCODONE HYDROCHLORIDE 10 MILLIGRAM(S): 5 TABLET ORAL at 10:04

## 2019-11-13 RX ADMIN — SENNA PLUS 2 TABLET(S): 8.6 TABLET ORAL at 23:49

## 2019-11-13 RX ADMIN — OXYCODONE HYDROCHLORIDE 10 MILLIGRAM(S): 5 TABLET ORAL at 23:47

## 2019-11-13 RX ADMIN — HEPARIN SODIUM 5000 UNIT(S): 5000 INJECTION INTRAVENOUS; SUBCUTANEOUS at 16:03

## 2019-11-13 RX ADMIN — Medication 100 MILLIGRAM(S): at 01:31

## 2019-11-13 RX ADMIN — CELECOXIB 200 MILLIGRAM(S): 200 CAPSULE ORAL at 22:09

## 2019-11-13 RX ADMIN — POLYETHYLENE GLYCOL 3350 17 GRAM(S): 17 POWDER, FOR SOLUTION ORAL at 13:14

## 2019-11-13 RX ADMIN — Medication 125 MICROGRAM(S): at 06:04

## 2019-11-13 RX ADMIN — OXYCODONE HYDROCHLORIDE 10 MILLIGRAM(S): 5 TABLET ORAL at 06:34

## 2019-11-13 RX ADMIN — PANTOPRAZOLE SODIUM 40 MILLIGRAM(S): 20 TABLET, DELAYED RELEASE ORAL at 06:04

## 2019-11-13 RX ADMIN — TIOTROPIUM BROMIDE 1 CAPSULE(S): 18 CAPSULE ORAL; RESPIRATORY (INHALATION) at 08:03

## 2019-11-13 RX ADMIN — CELECOXIB 200 MILLIGRAM(S): 200 CAPSULE ORAL at 22:10

## 2019-11-13 RX ADMIN — Medication 975 MILLIGRAM(S): at 23:49

## 2019-11-13 RX ADMIN — OXYCODONE HYDROCHLORIDE 10 MILLIGRAM(S): 5 TABLET ORAL at 20:00

## 2019-11-13 NOTE — DISCHARGE NOTE NURSING/CASE MANAGEMENT/SOCIAL WORK - PATIENT PORTAL LINK FT
You can access the FollowMyHealth Patient Portal offered by [facility] by registering at the following website: [email or web address]. By joining [facility]’s FollowMyHealth portal, you will also be able to view your health information using other applications (apps) compatible with our system.

## 2019-11-13 NOTE — PROGRESS NOTE ADULT - SUBJECTIVE AND OBJECTIVE BOX
Orthopedics      Patient seen and examined at bedside. Feeling well. Pain controlled. No n/v. No acute events overnight.    Vital Signs Last 24 Hrs  T(C): 36.8 (11-13-19 @ 05:12), Max: 37 (11-12-19 @ 13:00)  T(F): 98.2 (11-13-19 @ 05:12), Max: 98.6 (11-12-19 @ 13:00)  HR: 74 (11-13-19 @ 05:12) (64 - 95)  BP: 93/49 (11-13-19 @ 05:12) (93/49 - 146/71)  BP(mean): --  RR: 16 (11-13-19 @ 05:12) (12 - 18)  SpO2: 92% (11-13-19 @ 05:12) (92% - 99%)                        12.8   11.20 )-----------( 274      ( 12 Nov 2019 11:46 )             38.7     12 Nov 2019 11:46    140    |  108    |  14     ----------------------------<  143    4.0     |  24     |  0.84     Ca    8.7        12 Nov 2019 11:46          Exam:  Gen: NAD, resting comfortably  LLE:  Dressing c/d/i  NTTP calves b/l  +EHL/FHL/TA/GS  SILT L2-S1  Compartments soft and compressible  2+ Pulses palpable      A/P: 74yF POD 1 s/p L TKA  -FU AM labs  -Pain control	  -WBAT LLE  -DVT ppx  -Incentive Spirometry  -Elevation to extremity  -Medical management appreciated

## 2019-11-13 NOTE — PROGRESS NOTE ADULT - SUBJECTIVE AND OBJECTIVE BOX
HPI:  Patient is a 74y old  Female who presents with a chief complaint of inc left knee pain, h/o OA, Now S/P L TKA (2019 13:05)    Consulted by Dr. Brink for VTE prophylaxis, risk stratification, and anticoagulation management.    PAST MEDICAL & SURGICAL HISTORY:  Frequent UTI: 2-3x/year; had an episode 5 weeks ago was treated denies dysuria  OA (osteoarthritis)  Pulmonary emphysema, unspecified emphysema type: 2017 last exacerbation; never intubated  Basal cell carcinoma: removed from face  DDD (degenerative disc disease), lumbar  Spinal stenosis of lumbar region  Urinary urgency  Gall stones: gall bladder removed  Diverticulosis of intestine without bleeding, unspecified intestinal tract location  Varicose veins of both lower extremities: surgery  History of cataract: extracted from right and left  Elevated pancreatic enzyme: patient reports elevated lab 2013. Gastroenterologist aware  Asthma: history of x 20 years ago, resolved does not use inhaler  Back pain  Diabetes mellitus: diet controlled  Hypothyroidism  Acid reflux  S/P cholecystectomy:   H/O umbilical hernia repair: 19  H/O lumbosacral spine surgery: fusion   Skin cancer: to right temple , removed, history of basal cell x 2  S/P knee surgery: arthroscopy bilateral  and   Dental disorder: Dental surgery 2012, patient reports that upper portion is glued in at this time, plan is to have dental implants placed.  S/P bladder repair: bladder lift 2013          CrCl: 84    Caprini VTE Risk Score: CAPRINI SCORE  AGE RELATED RISK FACTORS                                                       MOBILITY RELATED FACTORS  [ ] Age 41-60 years                                            (1 Point)                  [ ] Bed rest/restricted mobility         (1 Point)  [ ] Age: 61-74 years                                           (2 Points)                [ ] Plaster cast                                                   (2 Points)  [x ] Age= 75 years                                              (3 Points)                 [ ] Bed bound for more than 72 hours                   (2 Points)    DISEASE RELATED RISK FACTORS                                               GENDER SPECIFIC FACTORS  [ ] Edema in the lower extremities                       (1 Point)           [ ] Pregnancy                                                            (1 Point)  [x ] Varicose veins                                               (1 Point)                  [ ] Post-partum < 6 weeks                                      (1 Point)             [ ] BMI > 25 Kg/m2                                            (1 Point)                  [x ] Hormonal therapy or oral contraception       (1 Point)                 [ ] Sepsis (in the previous month)                        (1 Point)             [ ] History of pregnancy complications                (1Point)  [ ] Pneumonia or serious lung disease                                             [ ] Unexplained or recurrent  (>/= 3), premature                                 (In the previous month)                               (1 Point)                birth with toxemia or growth-restricted infant (1 Point)  [x ] Abnormal pulmonary function test            (1 Point)                                   SURGERY RELATED RISK FACTORS  [ ] Acute myocardial infarction                       (1 Point)                  [ ]  Section                                         (1 Point)  [ ] Congestive heart failure (in the previous month) (1 Point)   [ ] Minor surgery   lasting <45 minutes       (1 Point)   [ ] Inflammatory bowel disease                             (1 Point)          [ ] Arthroscopic surgery                                  (2 Points)  [ ] Central venous access                                    (2 Points)            [ ] General surgery lasting >45 minutes      (2 Points)       [ ] Stroke (in the previous month)                  (5 Points)            [x ] Elective major lower extremity arthroplasty (5 Points)                                   [  ] Malignancy (present or past include skin melanoma                                          but exclude  basal skin cell)    (2 points)                                      TRAUMA RELATED RISK FACTORS                HEMATOLOGY RELATED FACTORS                                  [ ] Fracture of the hip, pelvis, or leg                       (5 Points)  [ ] Prior episodes of VTE                                     (3 Points)          [ ] Acute spinal cord injury (in the previous month)  (5 Points)  [ ] Positive family history for VTE                         (3 Points)       [ ] Paralysis (less than 1 month)                          (5 Points)  [ ] Prothrombin 56303 A                                      (3 Points)         [ ] Multiple Trauma (within 1month)                 (5Points)                                                                                                                                                                [ ] Factor V Leiden                                          (3 Points)                                OTHER RISK FACTORS                          [ ] Lupus anticoagulants                                     (3 Points)                       [ ] BMI > 40                          (1 Point)                                                         [ ] Anticardiolipin antibodies                                (3 Points)                   [ ] Smoking                              (1Point)                                                [ ] High homocysteine in the blood                      (3 Points)                [  ] Diabetes requiring insulin (1point)                         [ ] Other congenital or acquired thrombophilia       (3 Points)          [  ] Chemotherapy                   (1 Point)  [ ] Heparin induced thrombocytopenia                  (3 Points)             [  ] Blood Transfusion                (1 point)                                                                                                             Total Score [  12        ]                                                                                                                                                                                                                                                                                                                                                    IMPROVE Bleeding Risk Score: 1.5      Falls Risk:   High (x  )  Mod (  )  Low (  )    : Patient seen at bedside working with PT, using walker to walk into room. Planning on going home. Informed of INR and coumadin dose and INR goal. Patient verbalized understanding    FAMILY HISTORY:  Family history of stroke (Mother)  Family history of heart disease (Mother)  Family history of pancreatic cancer (Father)    Denies any personal or familial history of clotting or bleeding disorders.    Allergies    No Known Allergies    Intolerances      REVIEW OF SYSTEMS    (  )Fever	     (  )Constipation	(  )SOB				(  )Headache	(  )Dysuria  (  )Chills	     (  )Melena	(  )Dyspnea present on exertion	                    (  )Dizziness                    (  )Polyuria  (  )Nausea	     (  )Hematochezia	(  )Cough			                    (  )Syncope   	(  )Hematuria  (  )Vomiting    (  )Chest Pain	(  )Wheezing			(  )Weakness  (  )Diarrhea     (  )Palpitations	(  )Anorexia			( x )joint pain    All  other review of systems negative: Yes    Vital Signs Last 24 Hrs  T(C): 36.5 (19 @ 10:48), Max: 37 (19 @ 13:00)  T(F): 97.7 (19 @ 10:48), Max: 98.6 (19 @ 13:00)  HR: 69 (19 @ 10:48) (64 - 79)  BP: 104/49 (19 @ 10:48) (93/49 - 121/59)  BP(mean): --  RR: 15 (19 @ 10:48) (12 - 16)  SpO2: 94% (19 @ 10:48) (92% - 98%)      PHYSICAL EXAM:    Constitutional: Appears Well    Neurological: A& O x 3    Skin: Warm    Respiratory and Thorax: normal effort; Breath sounds: normal; No rales/wheezing/rhonchi  	  Cardiovascular: S1, S2, regular, NMBR	    Gastrointestinal: BS + x 4Q, nontender	    Genitourinary:  Bladder nondistended, nontender    Musculoskeletal:   General Right:   no muscle/joint tenderness,   normal tone, no joint swelling,   ROM: full	    General Left:   + muscle/joint tenderness,   normal tone, no joint swelling,   ROM: limited         Knee:              Left: Incision: ; Dressing CDI;       Lower extrems:   Right: no calf tenderness              negative rita's sign               + pedal pulses    Left:   no calf tenderness              negative rita's sign               + pedal pulses                        9.7    13.73 )-----------( 231      ( 2019 07:17 )             28.7           134<L>  |  101  |  15  ----------------------------<  139<H>  4.3   |  24  |  0.80    Ca    8.1<L>      2019 07:17        PT/INR - ( 2019 07:17 )   PT: 12.6 sec;   INR: 1.13 ratio                                   12.8   11.20 )-----------( 274      ( 2019 11:46 )             38.7           140  |  108  |  14  ----------------------------<  143<H>  4.0   |  24  |  0.84    Ca    8.7      2019 11:46      MEDICATIONS  (STANDING):  heparin  Injectable 5000 Unit(s) SubCutaneous every 8 hours  lactated ringers. 1000 milliLiter(s) IV Continuous <Continuous>  levothyroxine  Oral Tab/Cap - Peds 125 MICROGram(s) Oral daily  pantoprazole    Tablet 40 milliGRAM(s) Oral before breakfast  polyethylene glycol 3350 17 Gram(s) Oral daily  tiotropium 18 MICROgram(s) Capsule 1 Capsule(s) Inhalation daily  warfarin 5 milliGRAM(s) Oral daily      DVT Prophylaxis:  LMWH                   (  )  Heparin SQ           ( x )  Coumadin             (x  )  Xarelto                  (  )  Eliquis                   (  )  Venodynes           ( x )  Ambulation          (x  )  UFH                       (  )  Contraindicated  (  )  EC ASPIRIN       (  )

## 2019-11-13 NOTE — PROGRESS NOTE ADULT - SUBJECTIVE AND OBJECTIVE BOX
c/c: left knee pain    HPI: 74F, pmh of OA, hypothyroidism, Asthma, diverticulosis, dm-diet controlled, emphysema, gerd, spinal stenosis s/p surgery, OA  who is admitted for left knee replacement after failed conservative measures. Hospitalist service consulted for medical comanagement.    11/13: pt seen and examined this am. Doing well. no acute overnight events. no sob/chest pain. no f/c/r. no n/v/d. Ambulated with physical therapy.     ROS; all 10 systems reviewed and is as above otherwise negative.    Vital Signs Last 24 Hrs  T(C): 36.5 (13 Nov 2019 10:48), Max: 36.9 (12 Nov 2019 22:05)  T(F): 97.7 (13 Nov 2019 10:48), Max: 98.5 (12 Nov 2019 22:05)  HR: 69 (13 Nov 2019 10:48) (64 - 79)  BP: 104/49 (13 Nov 2019 10:48) (93/49 - 113/56)  RR: 15 (13 Nov 2019 10:48) (15 - 16)  SpO2: 94% (13 Nov 2019 10:48) (92% - 98%)    PHYSICAL EXAM:    GENERAL: Comfortable, no acute distress   HEAD:  Normocephalic, atraumatic  EYES: EOMI, PERRLA  HEENT: Moist mucous membranes  NECK: Supple, No JVD  NERVOUS SYSTEM:  Alert & Oriented X3, non focal  CHEST/LUNG: Clear to auscultation bilaterally  HEART: Regular rate and rhythm  ABDOMEN: Soft, Nontender, Nondistended, Bowel sounds present  GENITOURINARY: Voiding, no palpable bladder  EXTREMITIES:   No clubbing, cyanosis, or edema  MUSCULOSKELETAL-left knee in ace-wrap  SKIN-no rash    LABS:                        9.7    13.73 )-----------( 231      ( 13 Nov 2019 07:17 )             28.7     11-13    134<L>  |  101  |  15  ----------------------------<  139<H>  4.3   |  24  |  0.80    Ca    8.1<L>      13 Nov 2019 07:17      PT/INR - ( 13 Nov 2019 07:17 )   PT: 12.6 sec;   INR: 1.13 ratio           MEDICATIONS  (STANDING):  acetaminophen   Tablet .. 975 milliGRAM(s) Oral every 6 hours  celecoxib 200 milliGRAM(s) Oral two times a day  heparin  Injectable 5000 Unit(s) SubCutaneous every 8 hours  lactated ringers. 1000 milliLiter(s) (70 mL/Hr) IV Continuous <Continuous>  levothyroxine  Oral Tab/Cap - Peds 125 MICROGram(s) Oral daily  pantoprazole    Tablet 40 milliGRAM(s) Oral before breakfast  polyethylene glycol 3350 17 Gram(s) Oral daily  tiotropium 18 MICROgram(s) Capsule 1 Capsule(s) Inhalation daily  warfarin 5 milliGRAM(s) Oral daily    MEDICATIONS  (PRN):  ALBUTerol    90 MICROgram(s) HFA Inhaler 2 Puff(s) Inhalation every 6 hours PRN Shortness of Breath and/or Wheezing  aluminum hydroxide/magnesium hydroxide/simethicone Suspension 30 milliLiter(s) Oral four times a day PRN Indigestion  magnesium hydroxide Suspension 30 milliLiter(s) Oral daily PRN Constipation  morphine  - Injectable 2 milliGRAM(s) IV Push every 4 hours PRN Severe Pain (7 - 10)  ondansetron Injectable 4 milliGRAM(s) IV Push every 6 hours PRN Nausea and/or Vomiting  oxyCODONE    IR 5 milliGRAM(s) Oral every 4 hours PRN Mild Pain (1 - 3)  oxyCODONE    IR 10 milliGRAM(s) Oral every 4 hours PRN Moderate Pain (4 - 6)  senna 2 Tablet(s) Oral at bedtime PRN Constipation    ASSESSMENT AND PLAN:  74f PMH AS ABOVE A/W:    1. OA left knee s/p elective left knee replacement:  -POD#1  -pain control  -physical therapy  -incentive spirometry  -bowel regimen    2. Acute blood loss anemia:  -d/t surgery  -no need for transfusion at this time  -monitor h/h    3. Asthma:  -stable.   -continue home meds.    4. Hypothyroid:  -synthroid    5. DMII:  -ss    6. Emphysema:  -spiriva  -no s/o exacerbation    7. GERD:  -ppi    8. DVT px  -hep sq/warfarin

## 2019-11-13 NOTE — PROGRESS NOTE ADULT - ASSESSMENT
A:  73 yo female S/P left TKR with high vte risk due to age, surgery, INC BMI and restricted mobility.  Consulted by dr Brink for DVT/PE prophylaxis, risk stratification and Anticoagulation Management    Discussed the necessity of VTE prophylaxis with coumadin. Educated patient on INR, value, meaning, and effects medications and foods may have on it. Will further reinforce coumadin education and follow up on outpatient basis.     Plan:  ::Coumadin 5 mg PO daily x 4 weeks total adjust dose per INR  ::Heparin 5,000 units SQ Q 8 hour. last dose tonight then  ::Start Lovenox 30mg SQ Q12hr and continue until INR > 2.0 x 2 days  ::Daily PT/INR  ::Daily CBC/BMP  ::Enc ambulation  ::Venodynes    Will continue to follow.    Dispo: Home  Scripts: sent to pharmacy

## 2019-11-14 LAB
ANION GAP SERPL CALC-SCNC: 7 MMOL/L — SIGNIFICANT CHANGE UP (ref 5–17)
BUN SERPL-MCNC: 14 MG/DL — SIGNIFICANT CHANGE UP (ref 7–23)
CALCIUM SERPL-MCNC: 8.5 MG/DL — SIGNIFICANT CHANGE UP (ref 8.5–10.1)
CHLORIDE SERPL-SCNC: 108 MMOL/L — SIGNIFICANT CHANGE UP (ref 96–108)
CO2 SERPL-SCNC: 24 MMOL/L — SIGNIFICANT CHANGE UP (ref 22–31)
CREAT SERPL-MCNC: 0.66 MG/DL — SIGNIFICANT CHANGE UP (ref 0.5–1.3)
GLUCOSE SERPL-MCNC: 149 MG/DL — HIGH (ref 70–99)
HCT VFR BLD CALC: 31 % — LOW (ref 34.5–45)
HGB BLD-MCNC: 10.1 G/DL — LOW (ref 11.5–15.5)
INR BLD: 1.69 RATIO — HIGH (ref 0.88–1.16)
MCHC RBC-ENTMCNC: 29.8 PG — SIGNIFICANT CHANGE UP (ref 27–34)
MCHC RBC-ENTMCNC: 32.6 GM/DL — SIGNIFICANT CHANGE UP (ref 32–36)
MCV RBC AUTO: 91.4 FL — SIGNIFICANT CHANGE UP (ref 80–100)
PLATELET # BLD AUTO: 165 K/UL — SIGNIFICANT CHANGE UP (ref 150–400)
POTASSIUM SERPL-MCNC: 4.3 MMOL/L — SIGNIFICANT CHANGE UP (ref 3.5–5.3)
POTASSIUM SERPL-SCNC: 4.3 MMOL/L — SIGNIFICANT CHANGE UP (ref 3.5–5.3)
PROTHROM AB SERPL-ACNC: 19.1 SEC — HIGH (ref 10–12.9)
RBC # BLD: 3.39 M/UL — LOW (ref 3.8–5.2)
RBC # FLD: 14.2 % — SIGNIFICANT CHANGE UP (ref 10.3–14.5)
SODIUM SERPL-SCNC: 139 MMOL/L — SIGNIFICANT CHANGE UP (ref 135–145)
WBC # BLD: 8.15 K/UL — SIGNIFICANT CHANGE UP (ref 3.8–10.5)
WBC # FLD AUTO: 8.15 K/UL — SIGNIFICANT CHANGE UP (ref 3.8–10.5)

## 2019-11-14 PROCEDURE — 99231 SBSQ HOSP IP/OBS SF/LOW 25: CPT

## 2019-11-14 RX ORDER — POLYETHYLENE GLYCOL 3350 17 G/17G
17 POWDER, FOR SOLUTION ORAL
Qty: 1 | Refills: 0
Start: 2019-11-14 | End: 2019-11-27

## 2019-11-14 RX ORDER — ACETAMINOPHEN 500 MG
2 TABLET ORAL
Qty: 84 | Refills: 0
Start: 2019-11-14 | End: 2019-11-27

## 2019-11-14 RX ORDER — WARFARIN SODIUM 2.5 MG/1
1 TABLET ORAL
Qty: 30 | Refills: 0
Start: 2019-11-14 | End: 2019-12-13

## 2019-11-14 RX ORDER — WARFARIN SODIUM 2.5 MG/1
3 TABLET ORAL DAILY
Refills: 0 | Status: DISCONTINUED | OUTPATIENT
Start: 2019-11-14 | End: 2019-11-15

## 2019-11-14 RX ADMIN — OXYCODONE HYDROCHLORIDE 10 MILLIGRAM(S): 5 TABLET ORAL at 20:52

## 2019-11-14 RX ADMIN — OXYCODONE HYDROCHLORIDE 10 MILLIGRAM(S): 5 TABLET ORAL at 06:07

## 2019-11-14 RX ADMIN — ENOXAPARIN SODIUM 30 MILLIGRAM(S): 100 INJECTION SUBCUTANEOUS at 17:09

## 2019-11-14 RX ADMIN — TIOTROPIUM BROMIDE 1 CAPSULE(S): 18 CAPSULE ORAL; RESPIRATORY (INHALATION) at 07:22

## 2019-11-14 RX ADMIN — POLYETHYLENE GLYCOL 3350 17 GRAM(S): 17 POWDER, FOR SOLUTION ORAL at 10:17

## 2019-11-14 RX ADMIN — Medication 975 MILLIGRAM(S): at 11:05

## 2019-11-14 RX ADMIN — Medication 975 MILLIGRAM(S): at 17:27

## 2019-11-14 RX ADMIN — Medication 125 MICROGRAM(S): at 06:08

## 2019-11-14 RX ADMIN — Medication 975 MILLIGRAM(S): at 23:15

## 2019-11-14 RX ADMIN — WARFARIN SODIUM 3 MILLIGRAM(S): 2.5 TABLET ORAL at 21:39

## 2019-11-14 RX ADMIN — CELECOXIB 200 MILLIGRAM(S): 200 CAPSULE ORAL at 21:40

## 2019-11-14 RX ADMIN — Medication 30 MILLILITER(S): at 23:15

## 2019-11-14 RX ADMIN — OXYCODONE HYDROCHLORIDE 10 MILLIGRAM(S): 5 TABLET ORAL at 00:17

## 2019-11-14 RX ADMIN — Medication 975 MILLIGRAM(S): at 17:08

## 2019-11-14 RX ADMIN — CELECOXIB 200 MILLIGRAM(S): 200 CAPSULE ORAL at 10:18

## 2019-11-14 RX ADMIN — ENOXAPARIN SODIUM 30 MILLIGRAM(S): 100 INJECTION SUBCUTANEOUS at 06:06

## 2019-11-14 RX ADMIN — Medication 975 MILLIGRAM(S): at 06:08

## 2019-11-14 RX ADMIN — OXYCODONE HYDROCHLORIDE 10 MILLIGRAM(S): 5 TABLET ORAL at 14:23

## 2019-11-14 RX ADMIN — CELECOXIB 200 MILLIGRAM(S): 200 CAPSULE ORAL at 21:39

## 2019-11-14 RX ADMIN — OXYCODONE HYDROCHLORIDE 10 MILLIGRAM(S): 5 TABLET ORAL at 14:55

## 2019-11-14 RX ADMIN — PANTOPRAZOLE SODIUM 40 MILLIGRAM(S): 20 TABLET, DELAYED RELEASE ORAL at 06:06

## 2019-11-14 RX ADMIN — Medication 975 MILLIGRAM(S): at 06:09

## 2019-11-14 RX ADMIN — OXYCODONE HYDROCHLORIDE 10 MILLIGRAM(S): 5 TABLET ORAL at 10:17

## 2019-11-14 RX ADMIN — CELECOXIB 200 MILLIGRAM(S): 200 CAPSULE ORAL at 10:17

## 2019-11-14 RX ADMIN — OXYCODONE HYDROCHLORIDE 10 MILLIGRAM(S): 5 TABLET ORAL at 10:47

## 2019-11-14 RX ADMIN — OXYCODONE HYDROCHLORIDE 10 MILLIGRAM(S): 5 TABLET ORAL at 06:37

## 2019-11-14 RX ADMIN — OXYCODONE HYDROCHLORIDE 10 MILLIGRAM(S): 5 TABLET ORAL at 20:22

## 2019-11-14 NOTE — PROGRESS NOTE ADULT - SUBJECTIVE AND OBJECTIVE BOX
c/c: left knee pain    HPI: 74F, pmh of OA, hypothyroidism, Asthma, diverticulosis, dm-diet controlled, emphysema, gerd, spinal stenosis s/p surgery, OA  who is admitted for left knee replacement after failed conservative measures. Hospitalist service consulted for medical comanagement.    11/14: pt seen and examined this am. Felt well. Starting to have some pain left knee. No sob/chest pain. no difficulty voiding.    ROS; all 10 systems reviewed and is as above otherwise negative.    Vital Signs Last 24 Hrs  T(C): 36.7 (14 Nov 2019 11:29), Max: 37.2 (13 Nov 2019 17:04)  T(F): 98.1 (14 Nov 2019 11:29), Max: 98.9 (13 Nov 2019 17:04)  HR: 74 (14 Nov 2019 11:29) (74 - 85)  BP: 126/45 (14 Nov 2019 11:29) (106/45 - 126/45)  RR: 18 (14 Nov 2019 11:29) (16 - 18)  SpO2: 93% (14 Nov 2019 11:29) (92% - 98%)    PHYSICAL EXAM:    GENERAL: Comfortable, no acute distress   HEAD:  Normocephalic, atraumatic  EYES: EOMI, PERRLA  HEENT: Moist mucous membranes  NECK: Supple, No JVD  NERVOUS SYSTEM:  Alert & Oriented X3, non focal  CHEST/LUNG: Clear to auscultation bilaterally  HEART: Regular rate and rhythm  ABDOMEN: Soft, Nontender, Nondistended, Bowel sounds present  GENITOURINARY: Voiding, no palpable bladder  EXTREMITIES:   No clubbing, cyanosis, or edema  MUSCULOSKELETAL-left knee in ace-wrap  SKIN-no rash    LABS:                        10.1   8.15  )-----------( 165      ( 14 Nov 2019 06:35 )             31.0     11-14    139  |  108  |  14  ----------------------------<  149<H>  4.3   |  24  |  0.66    Ca    8.5      14 Nov 2019 06:35      PT/INR - ( 14 Nov 2019 06:35 )   PT: 19.1 sec;   INR: 1.69 ratio        MEDICATIONS  (STANDING):  acetaminophen   Tablet .. 975 milliGRAM(s) Oral every 6 hours  celecoxib 200 milliGRAM(s) Oral two times a day  enoxaparin Injectable 30 milliGRAM(s) SubCutaneous two times a day  lactated ringers. 1000 milliLiter(s) (70 mL/Hr) IV Continuous <Continuous>  levothyroxine  Oral Tab/Cap - Peds 125 MICROGram(s) Oral daily  pantoprazole    Tablet 40 milliGRAM(s) Oral before breakfast  polyethylene glycol 3350 17 Gram(s) Oral daily  tiotropium 18 MICROgram(s) Capsule 1 Capsule(s) Inhalation daily  warfarin 3 milliGRAM(s) Oral daily    MEDICATIONS  (PRN):  ALBUTerol    90 MICROgram(s) HFA Inhaler 2 Puff(s) Inhalation every 6 hours PRN Shortness of Breath and/or Wheezing  aluminum hydroxide/magnesium hydroxide/simethicone Suspension 30 milliLiter(s) Oral four times a day PRN Indigestion  bisacodyl Suppository 10 milliGRAM(s) Rectal daily PRN If no bowel movement by postoperative day #2  magnesium hydroxide Suspension 30 milliLiter(s) Oral daily PRN Constipation  morphine  - Injectable 2 milliGRAM(s) IV Push every 4 hours PRN Severe Pain (7 - 10)  ondansetron Injectable 4 milliGRAM(s) IV Push every 6 hours PRN Nausea and/or Vomiting  oxyCODONE    IR 5 milliGRAM(s) Oral every 4 hours PRN Mild Pain (1 - 3)  oxyCODONE    IR 10 milliGRAM(s) Oral every 4 hours PRN Moderate Pain (4 - 6)  senna 2 Tablet(s) Oral at bedtime PRN Constipation      ASSESSMENT AND PLAN:  74f PMH AS ABOVE A/W:    1. OA left knee s/p elective left knee replacement:  -POD#2  -pain control  -physical therapy  -incentive spirometry  -bowel regimen    2. Acute blood loss anemia:  -d/t surgery  -no need for transfusion at this time  -monitor h/h    3. Asthma:  -stable.   -continue home meds.    4. Hypothyroid:  -synthroid    5. DMII:  -ss    6. Emphysema:  -spiriva  -no s/o exacerbation    7. GERD:  -ppi    8. DVT px  -hep sq/warfarin    9. Dispo:  home tomorrow

## 2019-11-14 NOTE — PROGRESS NOTE ADULT - ASSESSMENT
A:  73 yo female S/P left TKR with high vte risk due to age, surgery, INC BMI and restricted mobility.  Consulted by dr Brink for DVT/PE prophylaxis, risk stratification and Anticoagulation Management    Discussed the necessity of VTE prophylaxis with coumadin. Educated patient on INR, value, meaning, and effects medications and foods may have on it. Will further reinforce coumadin education and follow up on outpatient basis.     Plan:  INR from 1.13---->1.69  delta INR .59  ::dec Coumadin 3 mg PO daily x 4 weeks total adjust dose per INR, out pt pharmacy made aware of change  ::cont Lovenox 30mg SQ Q12hr and continue until INR > 2.0 x 2 days  ::Daily PT/INR  ::Daily CBC/BMP  ::Enc ambulation  ::Venodynes    Will continue to follow.    Dispo: Home  Scripts: sent to pharmacy

## 2019-11-14 NOTE — PROGRESS NOTE ADULT - SUBJECTIVE AND OBJECTIVE BOX
HPI:  Patient is a 74y old  Female who presents with a chief complaint of inc left knee pain, h/o OA, Now S/P L TKA (2019 13:05)    Consulted by Dr. Brink for VTE prophylaxis, risk stratification, and anticoagulation management.    PAST MEDICAL & SURGICAL HISTORY:  Frequent UTI: 2-3x/year; had an episode 5 weeks ago was treated denies dysuria  OA (osteoarthritis)  Pulmonary emphysema, unspecified emphysema type: 2017 last exacerbation; never intubated  Basal cell carcinoma: removed from face  DDD (degenerative disc disease), lumbar  Spinal stenosis of lumbar region  Urinary urgency  Gall stones: gall bladder removed  Diverticulosis of intestine without bleeding, unspecified intestinal tract location  Varicose veins of both lower extremities: surgery  History of cataract: extracted from right and left  Elevated pancreatic enzyme: patient reports elevated lab 2013. Gastroenterologist aware  Asthma: history of x 20 years ago, resolved does not use inhaler  Back pain  Diabetes mellitus: diet controlled  Hypothyroidism  Acid reflux  S/P cholecystectomy:   H/O umbilical hernia repair: 19  H/O lumbosacral spine surgery: fusion   Skin cancer: to right temple , removed, history of basal cell x 2  S/P knee surgery: arthroscopy bilateral  and   Dental disorder: Dental surgery 2012, patient reports that upper portion is glued in at this time, plan is to have dental implants placed.  S/P bladder repair: bladder lift 2013          CrCl: 84    Caprini VTE Risk Score: CAPRINI SCORE  AGE RELATED RISK FACTORS                                                       MOBILITY RELATED FACTORS  [ ] Age 41-60 years                                            (1 Point)                  [ ] Bed rest/restricted mobility         (1 Point)  [ ] Age: 61-74 years                                           (2 Points)                [ ] Plaster cast                                                   (2 Points)  [x ] Age= 75 years                                              (3 Points)                 [ ] Bed bound for more than 72 hours                   (2 Points)    DISEASE RELATED RISK FACTORS                                               GENDER SPECIFIC FACTORS  [ ] Edema in the lower extremities                       (1 Point)           [ ] Pregnancy                                                            (1 Point)  [x ] Varicose veins                                               (1 Point)                  [ ] Post-partum < 6 weeks                                      (1 Point)             [ ] BMI > 25 Kg/m2                                            (1 Point)                  [x ] Hormonal therapy or oral contraception       (1 Point)                 [ ] Sepsis (in the previous month)                        (1 Point)             [ ] History of pregnancy complications                (1Point)  [ ] Pneumonia or serious lung disease                                             [ ] Unexplained or recurrent  (>/= 3), premature                                 (In the previous month)                               (1 Point)                birth with toxemia or growth-restricted infant (1 Point)  [x ] Abnormal pulmonary function test            (1 Point)                                   SURGERY RELATED RISK FACTORS  [ ] Acute myocardial infarction                       (1 Point)                  [ ]  Section                                         (1 Point)  [ ] Congestive heart failure (in the previous month) (1 Point)   [ ] Minor surgery   lasting <45 minutes       (1 Point)   [ ] Inflammatory bowel disease                             (1 Point)          [ ] Arthroscopic surgery                                  (2 Points)  [ ] Central venous access                                    (2 Points)            [ ] General surgery lasting >45 minutes      (2 Points)       [ ] Stroke (in the previous month)                  (5 Points)            [x ] Elective major lower extremity arthroplasty (5 Points)                                   [  ] Malignancy (present or past include skin melanoma                                          but exclude  basal skin cell)    (2 points)                                      TRAUMA RELATED RISK FACTORS                HEMATOLOGY RELATED FACTORS                                  [ ] Fracture of the hip, pelvis, or leg                       (5 Points)  [ ] Prior episodes of VTE                                     (3 Points)          [ ] Acute spinal cord injury (in the previous month)  (5 Points)  [ ] Positive family history for VTE                         (3 Points)       [ ] Paralysis (less than 1 month)                          (5 Points)  [ ] Prothrombin 98109 A                                      (3 Points)         [ ] Multiple Trauma (within 1month)                 (5Points)                                                                                                                                                                [ ] Factor V Leiden                                          (3 Points)                                OTHER RISK FACTORS                          [ ] Lupus anticoagulants                                     (3 Points)                       [ ] BMI > 40                          (1 Point)                                                         [ ] Anticardiolipin antibodies                                (3 Points)                   [ ] Smoking                              (1Point)                                                [ ] High homocysteine in the blood                      (3 Points)                [  ] Diabetes requiring insulin (1point)                         [ ] Other congenital or acquired thrombophilia       (3 Points)          [  ] Chemotherapy                   (1 Point)  [ ] Heparin induced thrombocytopenia                  (3 Points)             [  ] Blood Transfusion                (1 point)                                                                                                             Total Score [  12        ]                                                                                                                                                                                                                                                                                                                                                    IMPROVE Bleeding Risk Score: 1.5      Falls Risk:   High (x  )  Mod (  )  Low (  )    : Patient seen at bedside working with PT, using walker to walk into room. Planning on going home. Informed of INR and coumadin dose and INR goal. Patient verbalized understanding    : seen at bedside, no concerns    FAMILY HISTORY:  Family history of stroke (Mother)  Family history of heart disease (Mother)  Family history of pancreatic cancer (Father)    Denies any personal or familial history of clotting or bleeding disorders.    Allergies    No Known Allergies    Intolerances      REVIEW OF SYSTEMS    (  )Fever	     (  )Constipation	(  )SOB				(  )Headache	(  )Dysuria  (  )Chills	     (  )Melena	(  )Dyspnea present on exertion	                    (  )Dizziness                    (  )Polyuria  (  )Nausea	     (  )Hematochezia	(  )Cough			                    (  )Syncope   	(  )Hematuria  (  )Vomiting    (  )Chest Pain	(  )Wheezing			(  )Weakness  (  )Diarrhea     (  )Palpitations	(  )Anorexia			( x )joint pain    All  other review of systems negative: Yes    Vital Signs Last 24 Hrs  T(C): 36.7 (19 @ 11:29), Max: 37.2 (19 @ 17:04)  T(F): 98.1 (19 @ 11:29), Max: 98.9 (19 @ 17:04)  HR: 74 (19 @ :) (74 - 85)  BP: 126/45 (19 @ 11:29) (106/45 - 126/45)  BP(mean): --  RR: 18 (19 @ 11:29) (16 - 18)  SpO2: 93% (19 @ 11:29) (92% - 98%)    PHYSICAL EXAM:    Constitutional: Appears Well    Neurological: A& O x 3    Skin: Warm    Respiratory and Thorax: normal effort; Breath sounds: normal; No rales/wheezing/rhonchi  	  Cardiovascular: S1, S2, regular, NMBR	    Gastrointestinal: BS + x 4Q, nontender	    Genitourinary:  Bladder nondistended, nontender    Musculoskeletal:   General Right:   no muscle/joint tenderness,   normal tone, no joint swelling,   ROM: full	    General Left:   + muscle/joint tenderness,   normal tone, no joint swelling,   ROM: limited         Knee:              Left: Incision: ; Dressing CDI;       Lower extrems:   Right: no calf tenderness              negative rita's sign               + pedal pulses    Left:   no calf tenderness              negative rita's sign               + pedal pulses                                     10.1   8.15  )-----------( 165      ( 2019 06:35 )             31.0           139  |  108  |  14  ----------------------------<  149<H>  4.3   |  24  |  0.66    Ca    8.5      2019 06:35                                9.7    13.73 )-----------( 231      ( 2019 07:17 )             28.7       11-13    134<L>  |  101  |  15  ----------------------------<  139<H>  4.3   |  24  |  0.80    Ca    8.1<L>      2019 07:17        PT/INR - ( 2019 07:17 )   PT: 12.6 sec;   INR: 1.13 ratio                                   12.8   11.20 )-----------( 274      ( 2019 11:46 )             38.7       11-12    140  |  108  |  14  ----------------------------<  143<H>  4.0   |  24  |  0.84    Ca    8.7      2019 11:46            PT/INR - ( 2019 06:35 )   PT: 19.1 sec;   INR: 1.69 ratio          MEDICATIONS  (STANDING):  acetaminophen   Tablet .. 975 milliGRAM(s) Oral every 6 hours  celecoxib 200 milliGRAM(s) Oral two times a day  enoxaparin Injectable 30 milliGRAM(s) SubCutaneous two times a day  lactated ringers. 1000 milliLiter(s) IV Continuous <Continuous>  levothyroxine  Oral Tab/Cap - Peds 125 MICROGram(s) Oral daily  pantoprazole    Tablet 40 milliGRAM(s) Oral before breakfast  polyethylene glycol 3350 17 Gram(s) Oral daily  tiotropium 18 MICROgram(s) Capsule 1 Capsule(s) Inhalation daily  warfarin 3 milliGRAM(s) Oral daily    DVT Prophylaxis:  LMWH                   (  )  Heparin SQ           ( x )  Coumadin             (x  )  Xarelto                  (  )  Eliquis                   (  )  Venodynes           ( x )  Ambulation          (x  )  UFH                       (  )  Contraindicated  (  )  EC ASPIRIN       (  )

## 2019-11-14 NOTE — PROGRESS NOTE ADULT - SUBJECTIVE AND OBJECTIVE BOX
Orthopedics      Patient seen and examined at bedside. Feeling well. Pain controlled. No n/v. No acute events overnight.    Vital Signs Last 24 Hrs  T(C): 36.9 (13 Nov 2019 23:25), Max: 37.2 (13 Nov 2019 17:04)  T(F): 98.4 (13 Nov 2019 23:25), Max: 98.9 (13 Nov 2019 17:04)  HR: 83 (13 Nov 2019 23:25) (69 - 83)  BP: 113/48 (13 Nov 2019 23:25) (104/49 - 113/48)  BP(mean): --  RR: 17 (13 Nov 2019 23:25) (15 - 17)  SpO2: 97% (13 Nov 2019 23:25) (92% - 98%)                          12.8   11.20 )-----------( 274      ( 12 Nov 2019 11:46 )             38.7     12 Nov 2019 11:46    140    |  108    |  14     ----------------------------<  143    4.0     |  24     |  0.84     Ca    8.7        12 Nov 2019 11:46          Exam:  Gen: NAD, resting comfortably  LLE:  Dressing c/d/i  NTTP calves b/l  +EHL/FHL/TA/GS  SILT L2-S1  Compartments soft and compressible  2+ Pulses palpable      A/P: 74yF POD 2 s/p L TKA    -FU AM labs  -Pain control	  -WBAT LLE  -DVT ppx  -Incentive Spirometry  -Elevation to extremity  -Medical management appreciated

## 2019-11-14 NOTE — PHARMACOTHERAPY INTERVENTION NOTE - COMMENTS
Recommended reducing warfarin dose from 5 mg to 3 mg QD to prevent bleeding due to delta INR of 0.56.

## 2019-11-15 VITALS
SYSTOLIC BLOOD PRESSURE: 118 MMHG | DIASTOLIC BLOOD PRESSURE: 54 MMHG | OXYGEN SATURATION: 95 % | TEMPERATURE: 98 F | RESPIRATION RATE: 17 BRPM | HEART RATE: 79 BPM

## 2019-11-15 LAB
ANION GAP SERPL CALC-SCNC: 3 MMOL/L — LOW (ref 5–17)
BUN SERPL-MCNC: 12 MG/DL — SIGNIFICANT CHANGE UP (ref 7–23)
CALCIUM SERPL-MCNC: 8.6 MG/DL — SIGNIFICANT CHANGE UP (ref 8.5–10.1)
CHLORIDE SERPL-SCNC: 107 MMOL/L — SIGNIFICANT CHANGE UP (ref 96–108)
CO2 SERPL-SCNC: 29 MMOL/L — SIGNIFICANT CHANGE UP (ref 22–31)
CREAT SERPL-MCNC: 0.61 MG/DL — SIGNIFICANT CHANGE UP (ref 0.5–1.3)
GLUCOSE SERPL-MCNC: 143 MG/DL — HIGH (ref 70–99)
HCT VFR BLD CALC: 28 % — LOW (ref 34.5–45)
HGB BLD-MCNC: 9.3 G/DL — LOW (ref 11.5–15.5)
INR BLD: 3.48 RATIO — HIGH (ref 0.88–1.16)
MCHC RBC-ENTMCNC: 29.4 PG — SIGNIFICANT CHANGE UP (ref 27–34)
MCHC RBC-ENTMCNC: 33.2 GM/DL — SIGNIFICANT CHANGE UP (ref 32–36)
MCV RBC AUTO: 88.6 FL — SIGNIFICANT CHANGE UP (ref 80–100)
PLATELET # BLD AUTO: 225 K/UL — SIGNIFICANT CHANGE UP (ref 150–400)
POTASSIUM SERPL-MCNC: 4.5 MMOL/L — SIGNIFICANT CHANGE UP (ref 3.5–5.3)
POTASSIUM SERPL-SCNC: 4.5 MMOL/L — SIGNIFICANT CHANGE UP (ref 3.5–5.3)
PROTHROM AB SERPL-ACNC: 40.1 SEC — HIGH (ref 10–12.9)
RBC # BLD: 3.16 M/UL — LOW (ref 3.8–5.2)
RBC # FLD: 14.3 % — SIGNIFICANT CHANGE UP (ref 10.3–14.5)
SODIUM SERPL-SCNC: 139 MMOL/L — SIGNIFICANT CHANGE UP (ref 135–145)
WBC # BLD: 9.79 K/UL — SIGNIFICANT CHANGE UP (ref 3.8–10.5)
WBC # FLD AUTO: 9.79 K/UL — SIGNIFICANT CHANGE UP (ref 3.8–10.5)

## 2019-11-15 PROCEDURE — 99232 SBSQ HOSP IP/OBS MODERATE 35: CPT

## 2019-11-15 RX ORDER — WARFARIN SODIUM 2.5 MG/1
1 TABLET ORAL
Qty: 30 | Refills: 0
Start: 2019-11-15 | End: 2019-12-14

## 2019-11-15 RX ADMIN — OXYCODONE HYDROCHLORIDE 10 MILLIGRAM(S): 5 TABLET ORAL at 06:23

## 2019-11-15 RX ADMIN — OXYCODONE HYDROCHLORIDE 10 MILLIGRAM(S): 5 TABLET ORAL at 11:40

## 2019-11-15 RX ADMIN — Medication 975 MILLIGRAM(S): at 06:24

## 2019-11-15 RX ADMIN — OXYCODONE HYDROCHLORIDE 10 MILLIGRAM(S): 5 TABLET ORAL at 01:00

## 2019-11-15 RX ADMIN — POLYETHYLENE GLYCOL 3350 17 GRAM(S): 17 POWDER, FOR SOLUTION ORAL at 11:40

## 2019-11-15 RX ADMIN — TIOTROPIUM BROMIDE 1 CAPSULE(S): 18 CAPSULE ORAL; RESPIRATORY (INHALATION) at 08:55

## 2019-11-15 RX ADMIN — OXYCODONE HYDROCHLORIDE 10 MILLIGRAM(S): 5 TABLET ORAL at 12:30

## 2019-11-15 RX ADMIN — OXYCODONE HYDROCHLORIDE 10 MILLIGRAM(S): 5 TABLET ORAL at 06:53

## 2019-11-15 RX ADMIN — Medication 975 MILLIGRAM(S): at 11:40

## 2019-11-15 RX ADMIN — PANTOPRAZOLE SODIUM 40 MILLIGRAM(S): 20 TABLET, DELAYED RELEASE ORAL at 06:23

## 2019-11-15 RX ADMIN — CELECOXIB 200 MILLIGRAM(S): 200 CAPSULE ORAL at 09:52

## 2019-11-15 RX ADMIN — Medication 975 MILLIGRAM(S): at 06:23

## 2019-11-15 RX ADMIN — ENOXAPARIN SODIUM 30 MILLIGRAM(S): 100 INJECTION SUBCUTANEOUS at 06:22

## 2019-11-15 RX ADMIN — Medication 125 MICROGRAM(S): at 06:23

## 2019-11-15 RX ADMIN — OXYCODONE HYDROCHLORIDE 10 MILLIGRAM(S): 5 TABLET ORAL at 00:30

## 2019-11-15 NOTE — PROGRESS NOTE ADULT - ASSESSMENT
A:  75 yo female S/P left TKR with high vte risk due to age, surgery, INC BMI and restricted mobility.  Consulted by dr Brink for DVT/PE prophylaxis, risk stratification and Anticoagulation Management    Discussed the necessity of VTE prophylaxis with coumadin. Educated patient on INR, value, meaning, and effects medications and foods may have on it. Will further reinforce coumadin education and follow up on outpatient basis.     Plan:  INR from 3.48 from---->1.69: will hold x two doses once home- instructions given  ::Hold coumadin then starting 11/17 Coumadin 3 mg PO daily x 4 weeks total adjust dose per INR  ::DC lovenox  ::Daily PT/INR  ::Daily CBC/BMP  ::Enc ambulation  ::Venmanuel    Will continue to follow.    Dispo: Home  Scripts: sent to pharmacy

## 2019-11-15 NOTE — PROGRESS NOTE ADULT - SUBJECTIVE AND OBJECTIVE BOX
Orthopedics      Patient seen and examined at bedside. Feeling well. Pain controlled. No n/v. No acute events overnight.    Vital Signs Last 24 Hrs  T(C): 37.2 (15 Nov 2019 05:15), Max: 37.6 (14 Nov 2019 23:46)  T(F): 98.9 (15 Nov 2019 05:15), Max: 99.7 (14 Nov 2019 23:46)  HR: 81 (15 Nov 2019 05:15) (74 - 85)  BP: 116/53 (15 Nov 2019 05:15) (116/49 - 126/45)  BP(mean): --  RR: 17 (15 Nov 2019 05:15) (17 - 18)  SpO2: 95% (15 Nov 2019 05:15) (93% - 95%)          Exam:  Gen: NAD, resting comfortably  LLE:  Dressing c/d/i--dressing changed today  NTTP calves b/l  +EHL/FHL/TA/GS  SILT L2-S1  Compartments soft and compressible  2+ Pulses palpable      A/P: 74yF POD 3 s/p L TKA    -FU AM labs  -Pain control	  -WBAT LLE  -DVT ppx  -Dressing changed today  -Incentive Spirometry  -Elevation to extremity  -Medical management appreciated  -Discharge home today

## 2019-11-19 ENCOUNTER — APPOINTMENT (OUTPATIENT)
Dept: CARDIOLOGY | Facility: CLINIC | Age: 74
End: 2019-11-19
Payer: MEDICARE

## 2019-11-19 ENCOUNTER — RESULT REVIEW (OUTPATIENT)
Age: 74
End: 2019-11-19

## 2019-11-19 DIAGNOSIS — K21.9 GASTRO-ESOPHAGEAL REFLUX DISEASE WITHOUT ESOPHAGITIS: ICD-10-CM

## 2019-11-19 DIAGNOSIS — D62 ACUTE POSTHEMORRHAGIC ANEMIA: ICD-10-CM

## 2019-11-19 DIAGNOSIS — Z79.899 OTHER LONG TERM (CURRENT) DRUG THERAPY: ICD-10-CM

## 2019-11-19 DIAGNOSIS — J45.909 UNSPECIFIED ASTHMA, UNCOMPLICATED: ICD-10-CM

## 2019-11-19 DIAGNOSIS — E11.9 TYPE 2 DIABETES MELLITUS WITHOUT COMPLICATIONS: ICD-10-CM

## 2019-11-19 DIAGNOSIS — M17.12 UNILATERAL PRIMARY OSTEOARTHRITIS, LEFT KNEE: ICD-10-CM

## 2019-11-19 DIAGNOSIS — E03.9 HYPOTHYROIDISM, UNSPECIFIED: ICD-10-CM

## 2019-11-19 DIAGNOSIS — J43.9 EMPHYSEMA, UNSPECIFIED: ICD-10-CM

## 2019-11-19 PROBLEM — N39.0 URINARY TRACT INFECTION, SITE NOT SPECIFIED: Chronic | Status: ACTIVE | Noted: 2019-05-29

## 2019-11-19 PROCEDURE — 85610 PROTHROMBIN TIME: CPT | Mod: QW

## 2019-11-19 PROCEDURE — 93793 ANTICOAG MGMT PT WARFARIN: CPT

## 2019-11-20 NOTE — CHART NOTE - NSCHARTNOTEFT_GEN_A_CORE
called and spoke to pt [id number] on 11/20 not to take tylenol along with percocet. States she has not been taking any extra tylenol.

## 2019-11-21 ENCOUNTER — RESULT REVIEW (OUTPATIENT)
Age: 74
End: 2019-11-21

## 2019-11-21 ENCOUNTER — APPOINTMENT (OUTPATIENT)
Dept: CARDIOLOGY | Facility: CLINIC | Age: 74
End: 2019-11-21
Payer: MEDICARE

## 2019-11-21 PROCEDURE — 85610 PROTHROMBIN TIME: CPT | Mod: QW

## 2019-11-21 PROCEDURE — 93793 ANTICOAG MGMT PT WARFARIN: CPT

## 2019-11-22 ENCOUNTER — RX RENEWAL (OUTPATIENT)
Age: 74
End: 2019-11-22

## 2019-11-22 LAB
INR PPP: 2.4
INR PPP: 3.47
PT BLD: 27.4
PT BLD: 39.7

## 2019-11-25 ENCOUNTER — RESULT REVIEW (OUTPATIENT)
Age: 74
End: 2019-11-25

## 2019-11-25 ENCOUNTER — APPOINTMENT (OUTPATIENT)
Dept: CARDIOLOGY | Facility: CLINIC | Age: 74
End: 2019-11-25
Payer: MEDICARE

## 2019-11-25 LAB
INR PPP: 1.22
PT BLD: 13.8

## 2019-11-25 PROCEDURE — 85610 PROTHROMBIN TIME: CPT | Mod: QW

## 2019-11-25 PROCEDURE — 93793 ANTICOAG MGMT PT WARFARIN: CPT

## 2019-11-27 NOTE — ED PROVIDER NOTE - PMH
[FreeTextEntry1] : checked sensitivities - both organisms are sensitive to cipro \par \par start cipro 500mg po bid for 14 days \par \par Ordered chest PT physiotherapy  and home health aid\par \par start valacyclovir 1,000 mg po bid for 5 to 7  days\par \par venipuncture performed with  herpes serologies  Acid reflux    Asthma  history of x 20 years ago, resolved does not use inhaler  Back pain    Basal cell carcinoma  removed from face  DDD (degenerative disc disease), lumbar    Diabetes mellitus  diet controlled  Diverticulosis of intestine without bleeding, unspecified intestinal tract location    Elevated pancreatic enzyme  patient reports elevated lab 6/2013. Gastroenterologist aware  Fall, initial encounter  7/24/2017.  right lower extremity pain  Gall stones  gall bladder removed  History of cataract  extracted from right and left  Hypothyroidism    Pulmonary emphysema, unspecified emphysema type    Spinal stenosis of lumbar region    Urinary tract infection with hematuria, site unspecified  Chronic. Presently on Nitrofurantoin. Started 7/24/2017  Urinary urgency    Varicose veins of both lower extremities  surgery

## 2019-11-29 ENCOUNTER — APPOINTMENT (OUTPATIENT)
Dept: CARDIOLOGY | Facility: CLINIC | Age: 74
End: 2019-11-29
Payer: MEDICARE

## 2019-11-29 PROCEDURE — 93793 ANTICOAG MGMT PT WARFARIN: CPT

## 2019-11-29 PROCEDURE — 85610 PROTHROMBIN TIME: CPT | Mod: QW

## 2019-12-02 ENCOUNTER — APPOINTMENT (OUTPATIENT)
Dept: ORTHOPEDIC SURGERY | Facility: CLINIC | Age: 74
End: 2019-12-02
Payer: MEDICARE

## 2019-12-02 VITALS
SYSTOLIC BLOOD PRESSURE: 115 MMHG | DIASTOLIC BLOOD PRESSURE: 79 MMHG | BODY MASS INDEX: 30.92 KG/M2 | WEIGHT: 204 LBS | HEART RATE: 87 BPM | HEIGHT: 68 IN | TEMPERATURE: 98.3 F

## 2019-12-02 PROCEDURE — 99024 POSTOP FOLLOW-UP VISIT: CPT

## 2019-12-02 PROCEDURE — 73560 X-RAY EXAM OF KNEE 1 OR 2: CPT | Mod: LT

## 2019-12-04 ENCOUNTER — RESULT REVIEW (OUTPATIENT)
Age: 74
End: 2019-12-04

## 2019-12-04 ENCOUNTER — APPOINTMENT (OUTPATIENT)
Dept: CARDIOLOGY | Facility: CLINIC | Age: 74
End: 2019-12-04
Payer: MEDICARE

## 2019-12-04 LAB
INR PPP: 1.77
PT BLD: 20.1

## 2019-12-04 PROCEDURE — 85610 PROTHROMBIN TIME: CPT | Mod: QW

## 2019-12-04 PROCEDURE — 93793 ANTICOAG MGMT PT WARFARIN: CPT

## 2019-12-05 NOTE — ADDENDUM
[FreeTextEntry1] : This note was written by Erika Alex on 12/04/2019 acting as scribe for Klever Brink III, MD

## 2019-12-05 NOTE — HISTORY OF PRESENT ILLNESS
[de-identified] : Postop left knee [de-identified] : MITA RAE is a 74-year-old female who comes in today.  She is just shy of three weeks status post a left total knee.  She states she is feeling great.   [de-identified] : Left Knee: \par Knee: Range of Motion in Degrees	\par 	                  Claimant/Normal:\par 		\par Flexion Active            95                        135-degrees\par Flexion Passive	  95	                135-degrees	\par Extension Active	  0	                0-5-degrees	\par Extension Passive	  0	                0-5-degrees	\par \par Wound is healing with no sign of infection.  No instability. [de-identified] : Status post left total knee arthroplasty  [de-identified] : Radiographs, two views of the left knee, show excellent position of the implant.  [de-identified] : Patient is doing well status post left total knee arthroplasty.  At this time, she will be started on outpatient therapy.  She will be reassessed in four to six weeks.

## 2019-12-16 ENCOUNTER — APPOINTMENT (OUTPATIENT)
Dept: ORTHOPEDIC SURGERY | Facility: CLINIC | Age: 74
End: 2019-12-16

## 2019-12-17 ENCOUNTER — RX RENEWAL (OUTPATIENT)
Age: 74
End: 2019-12-17

## 2019-12-20 ENCOUNTER — RX RENEWAL (OUTPATIENT)
Age: 74
End: 2019-12-20

## 2020-01-15 ENCOUNTER — APPOINTMENT (OUTPATIENT)
Dept: ORTHOPEDIC SURGERY | Facility: CLINIC | Age: 75
End: 2020-01-15
Payer: MEDICARE

## 2020-01-15 VITALS
HEIGHT: 68 IN | DIASTOLIC BLOOD PRESSURE: 76 MMHG | TEMPERATURE: 98 F | WEIGHT: 198 LBS | BODY MASS INDEX: 30.01 KG/M2 | SYSTOLIC BLOOD PRESSURE: 141 MMHG | HEART RATE: 82 BPM

## 2020-01-15 PROCEDURE — 73560 X-RAY EXAM OF KNEE 1 OR 2: CPT | Mod: LT

## 2020-01-15 PROCEDURE — 99024 POSTOP FOLLOW-UP VISIT: CPT

## 2020-01-15 NOTE — PATIENT PROFILE ADULT. - PRO INTERPRETER NEED 2
01/14/20 [facility] spoke with Dr. Smith office staff regarding Mr. Nova decline for OPCM services and faxed a decline notification letter.  
English

## 2020-01-23 NOTE — ADDENDUM
[FreeTextEntry1] : This note was written by Shanthi Braun on 01/22/2020 acting as a scribe for MARY JEFFRIES III, MD

## 2020-01-23 NOTE — HISTORY OF PRESENT ILLNESS
[de-identified] : Post op Left Knee [de-identified] : The patient comes in today stating she feels great.   [de-identified] : Knee: Range of Motion in Degrees	\par 	                  Claimant:	Normal:	\par Flexion Active	  120 	                135-degrees	\par Flexion Passive	  120	                135-degrees	\par Extension Active	   5	                0-5-degrees	\par Extension Passive	   5	                0-5-degrees	\par \par No instability. [de-identified] : Radiographs, two views of the left knee, shows excellent position of the implant. [de-identified] : Status post left total knee arthroplasty. [de-identified] : The patient is doing well status post left total knee arthroplasty.  She will continue therapy and will be reassessed in two to three months.

## 2020-01-28 ENCOUNTER — RESULT CHARGE (OUTPATIENT)
Age: 75
End: 2020-01-28

## 2020-01-29 ENCOUNTER — NON-APPOINTMENT (OUTPATIENT)
Age: 75
End: 2020-01-29

## 2020-01-29 ENCOUNTER — APPOINTMENT (OUTPATIENT)
Dept: INTERNAL MEDICINE | Facility: CLINIC | Age: 75
End: 2020-01-29
Payer: MEDICARE

## 2020-01-29 VITALS
BODY MASS INDEX: 30.46 KG/M2 | OXYGEN SATURATION: 95 % | HEIGHT: 68 IN | RESPIRATION RATE: 22 BRPM | TEMPERATURE: 99 F | HEART RATE: 96 BPM | WEIGHT: 201 LBS | DIASTOLIC BLOOD PRESSURE: 88 MMHG | SYSTOLIC BLOOD PRESSURE: 140 MMHG

## 2020-01-29 PROCEDURE — 99214 OFFICE O/P EST MOD 30 MIN: CPT | Mod: 25

## 2020-01-29 PROCEDURE — 94060 EVALUATION OF WHEEZING: CPT

## 2020-01-29 RX ORDER — OXYCODONE AND ACETAMINOPHEN 5; 325 MG/1; MG/1
5-325 TABLET ORAL
Qty: 42 | Refills: 0 | Status: DISCONTINUED | COMMUNITY
Start: 2019-12-02 | End: 2020-01-29

## 2020-01-29 RX ORDER — OXYCODONE HYDROCHLORIDE AND ACETAMINOPHEN 5; 325 MG/1; MG/1
5-325 TABLET ORAL
Refills: 0 | Status: DISCONTINUED | COMMUNITY
Start: 2019-06-17 | End: 2020-01-29

## 2020-01-29 RX ORDER — OXYCODONE AND ACETAMINOPHEN 5; 325 MG/1; MG/1
5-325 TABLET ORAL
Qty: 40 | Refills: 0 | Status: DISCONTINUED | COMMUNITY
Start: 2019-12-17 | End: 2020-01-29

## 2020-01-29 RX ORDER — HYDROCODONE BITARTRATE AND ACETAMINOPHEN 5; 325 MG/1; MG/1
5-325 TABLET ORAL
Qty: 40 | Refills: 0 | Status: DISCONTINUED | COMMUNITY
Start: 2019-11-22 | End: 2020-01-29

## 2020-01-29 RX ORDER — ACETAMINOPHEN AND CODEINE 300; 30 MG/1; MG/1
300-30 TABLET ORAL 3 TIMES DAILY
Qty: 60 | Refills: 0 | Status: DISCONTINUED | COMMUNITY
Start: 2019-08-12 | End: 2020-01-29

## 2020-01-29 RX ORDER — OXYCODONE AND ACETAMINOPHEN 5; 325 MG/1; MG/1
5-325 TABLET ORAL
Qty: 30 | Refills: 0 | Status: DISCONTINUED | COMMUNITY
Start: 2019-07-15 | End: 2020-01-29

## 2020-01-29 RX ORDER — OXYCODONE AND ACETAMINOPHEN 5; 325 MG/1; MG/1
5-325 TABLET ORAL
Qty: 40 | Refills: 0 | Status: DISCONTINUED | COMMUNITY
Start: 2019-07-05 | End: 2020-01-29

## 2020-01-29 RX ORDER — OXYCODONE AND ACETAMINOPHEN 5; 325 MG/1; MG/1
5-325 TABLET ORAL
Qty: 42 | Refills: 0 | Status: DISCONTINUED | COMMUNITY
Start: 2019-06-24 | End: 2020-01-29

## 2020-01-29 RX ORDER — WARFARIN SODIUM 2 MG/1
2 TABLET ORAL DAILY
Refills: 0 | Status: DISCONTINUED | COMMUNITY
Start: 2019-06-17 | End: 2020-01-29

## 2020-01-29 NOTE — PHYSICAL EXAM
[No Acute Distress] : no acute distress [Normal Appearance] : normal appearance [Normal Oropharynx] : normal oropharynx [Normal Rate/Rhythm] : normal rate/rhythm [No Neck Mass] : no neck mass [No Murmurs] : no murmurs [Normal S1, S2] : normal s1, s2 [No Resp Distress] : no resp distress [Clear to Auscultation Bilaterally] : clear to auscultation bilaterally [No Abnormalities] : no abnormalities [No Clubbing] : no clubbing [Benign] : benign [Normal Gait] : normal gait [No Cyanosis] : no cyanosis [No Edema] : no edema [FROM] : FROM [Normal Color/ Pigmentation] : normal color/ pigmentation [No Focal Deficits] : no focal deficits [Normal Affect] : normal affect [Oriented x3] : oriented x3

## 2020-01-29 NOTE — ASSESSMENT
[FreeTextEntry1] : Mrs. Leon presents for followup evaluation. She has a history of COPD and is currently on Spiriva therapy. She is well controlled. There's been no significant change in spirometry. Patient will continue pulmonary rehabilitation 2 times a week. Followup in 6 months with complete pulmonary function tests.

## 2020-01-29 NOTE — PROCEDURE
[FreeTextEntry1] : Spirometry pre-and postbronchodilator therapy shows moderate obstructive lung disease. FEV1 is 1.61 L which is 66% predicted. FEV1/FVC ratio is 70%.

## 2020-01-29 NOTE — HISTORY OF PRESENT ILLNESS
[TextBox_4] : Mrs. Leon presents for a followup evaluation. She is feeling well. The patient continues on Spiriva one puff daily. She is doing pulmonary rehabilitation 2 times per week. Mrs. Leon denies any nocturnal symptoms of cough or shortness of breath.

## 2020-02-10 ENCOUNTER — FORM ENCOUNTER (OUTPATIENT)
Age: 75
End: 2020-02-10

## 2020-03-03 NOTE — DISCHARGE NOTE NURSING/CASE MANAGEMENT/SOCIAL WORK - NSDPDISTO_GEN_ALL_CORE
Quality 110: Preventive Care And Screening: Influenza Immunization: Influenza Immunization not Administered because Patient Refused.
Quality 130: Documentation Of Current Medications In The Medical Record: Current Medications Documented
Quality 402: Tobacco Use And Help With Quitting Among Adolescents: Patient screened for tobacco and never smoked
Quality 111:Pneumonia Vaccination Status For Older Adults: Pneumococcal Vaccination Previously Received
Quality 131: Pain Assessment And Follow-Up: Pain assessment using a standardized tool is documented as negative, no follow-up plan required
Detail Level: Detailed
Quality 226: Preventive Care And Screening: Tobacco Use: Screening And Cessation Intervention: Patient screened for tobacco use and is an ex/non-smoker
Home with home care

## 2020-03-23 ENCOUNTER — APPOINTMENT (OUTPATIENT)
Dept: ORTHOPEDIC SURGERY | Facility: CLINIC | Age: 75
End: 2020-03-23

## 2020-06-03 ENCOUNTER — APPOINTMENT (OUTPATIENT)
Dept: ORTHOPEDIC SURGERY | Facility: CLINIC | Age: 75
End: 2020-06-03
Payer: MEDICARE

## 2020-06-03 VITALS
DIASTOLIC BLOOD PRESSURE: 78 MMHG | WEIGHT: 201 LBS | HEIGHT: 68 IN | HEART RATE: 78 BPM | BODY MASS INDEX: 30.46 KG/M2 | SYSTOLIC BLOOD PRESSURE: 136 MMHG

## 2020-06-03 PROCEDURE — 99213 OFFICE O/P EST LOW 20 MIN: CPT

## 2020-06-10 NOTE — HISTORY OF PRESENT ILLNESS
[de-identified] : The patient comes in today for her bilateral knees.  She states for about a week to ten days she started having some numbness and tingling in the anterior aspect of her legs, over the knees and down the front of her legs.  She does admit that she is having some increasing back pain.

## 2020-06-10 NOTE — DISCUSSION/SUMMARY
[de-identified] : At this time, the patient is status post right total knee arthroplasty and status post left total knee arthroplasty.   I believe this is more likely related to a lumbar radicular process.  I recommended she be referred to a spine surgeon.

## 2020-06-10 NOTE — PHYSICAL EXAM
[de-identified] : Right Knee: \par Range of Motion in Degrees	\par 	                  Claimant:	Normal:	\par Flexion Active	  120 	                135-degrees	\par Flexion Passive	  120	                135-degrees	\par Extension Active	  0	                0-5-degrees	\par Extension Passive	  0	                0-5-degrees	\par \par Well-healed scars.  No instability.  No effusion. \par \par Left Knee:\par Range of Motion in Degrees	\par 	                  Claimant:	Normal:	\par Flexion Active	  120 	                135-degrees	\par Flexion Passive	  120	                135-degrees	\par Extension Active	  0	                0-5-degrees	\par Extension Passive	  0	                0-5-degrees	\par \par Well-healed scars.  No instability.  No effusion.  \par   [de-identified] : Ambulating with a slightly antalgic to antalgic gait.  Station:  Normal.  [de-identified] : Appearance:  Well-developed, well-nourished female in no acute distress.\par \par

## 2020-06-10 NOTE — ADDENDUM
[FreeTextEntry1] : This note was written by Shanthi Braun on 06/08/2020 acting as a scribe for MARY JEFFRIES III, MD

## 2020-07-17 ENCOUNTER — APPOINTMENT (OUTPATIENT)
Dept: ORTHOPEDIC SURGERY | Facility: CLINIC | Age: 75
End: 2020-07-17
Payer: MEDICARE

## 2020-07-17 VITALS
HEIGHT: 67 IN | TEMPERATURE: 97.8 F | WEIGHT: 185 LBS | HEART RATE: 66 BPM | DIASTOLIC BLOOD PRESSURE: 74 MMHG | SYSTOLIC BLOOD PRESSURE: 120 MMHG | BODY MASS INDEX: 29.03 KG/M2

## 2020-07-17 PROCEDURE — 73502 X-RAY EXAM HIP UNI 2-3 VIEWS: CPT | Mod: RT

## 2020-07-17 PROCEDURE — 20610 DRAIN/INJ JOINT/BURSA W/O US: CPT | Mod: RT

## 2020-07-17 PROCEDURE — 99214 OFFICE O/P EST MOD 30 MIN: CPT | Mod: 25

## 2020-07-21 NOTE — PHYSICAL EXAM
[de-identified] : Right Calf:\par She does have some swelling to the calf from the ruptured Baker's cyst.\par \par Right Hip: Range of Motion in Degrees:\par 	                                 Claimant:	          Normal:	\par Flexion (Active) 	                 120 	          120-degrees	\par Flexion (Passive)	                 120	          120-degrees	\par Extension (Active)	                 -30	          -30-degrees	\par Extension (Passive)	 -30	          -30-degrees	\par Abduction (Active)	                45-50	          00-84-aetkarv	\par Abduction (Passive)	45-50	          91-49-cuohuej	\par Adduction (Active)                	20-30	          28-06-fpucarh	\par Adduction (Passive)	20-30	          31-93-ahfmcvx	\par Internal Rotation (Active) 	35	          35-degrees	\par Internal Rotation (Passive)	35	          35-degrees	\par External Rotation (Active)	45	          45-degrees	\par External Rotation (Passive)	45	          45-degrees	\par \par No tenderness with internal or external rotation or axial load.  Point tenderness over the greater trochanter with pain with resisted abduction.  Negative Trendelenburg.  No weakness to flexion, extension, abduction or adduction.  No evidence of instability.  No motor or sensory deficits.  2+ DP and PT pulses.  Skin is intact.  No scars, rashes or lesions.  \par \par Left Hip: Range of Motion in Degrees:\par 	                                 Claimant:	   Normal:	\par Flexion (Active) 	                 120 	   120-degrees	\par Flexion (Passive)	                 120	   120-degrees	\par Extension (Active)	                 -30	   -30-degrees	\par Extension (Passive)	 -30	   -30-degrees	\par Abduction (Active)	                 45-50	   40-53-recprak	\par Abduction (Passive)	 45-50	   72-44-pgwheuv	\par Adduction (Active)  	 20-30	   40-83-ocpmtdd	\par Adduction (Passive)	 20-30	   72-79-nonbiqm	\par Internal Rotation (Active) 	 35	   35-degrees	\par Internal Rotation (Passive)	 35	   35-degrees	\par External Rotation (Active)	 45	   45-degrees	\par External Rotation (Passive)	 45	   45-degrees	\par \par No tenderness with internal or external rotation or axial load.  No tenderness to palpation over the greater trochanter.  Negative Trendelenburg.  No tenderness with resisted abduction.  No weakness to flexion, extension, abduction or adduction.  No evidence of instability.  No motor or sensory deficits.  2+ DP and PT pulses.  Skin is intact.  No scars, rashes or lesions.  \par    [de-identified] : Gait and Station:  Ambulating with a slightly antalgic to antalgic gait.  Station:  Normal.  [de-identified] : Appearance:  Well-developed, well-nourished female in no acute distress.\par   [de-identified] : Radiographs, two to three views of the right hip with pelvis, reveal no acute fractures or dislocations.

## 2020-07-21 NOTE — PROCEDURE
[de-identified] : Consent: \par At this time, I have recommended an injection to the right hip.  The risks and benefits of the procedure were discussed with the patient in detail.  Upon verbal consent of the patient, we proceeded with the injection as noted below.  \par \par Procedure:  \par After a sterile prep, the patient underwent an injection of 9 cc of 1% Lidocaine without epinephrine and 1 cc of Kenalog into the right hip.  The patient tolerated the procedure well.  There were no complications.  \par \par

## 2020-07-21 NOTE — DISCUSSION/SUMMARY
[de-identified] : The patient presents with a ruptured Baker's cyst of the right calf and trochanteric bursitis of the right hip.  The patient was given a cortisone injection to the right hip.  She is advised to ice the right hip and return to the office as needed.  She is advised she can use a compression sleeve for the right calf.  \par

## 2020-07-21 NOTE — ADDENDUM
[FreeTextEntry1] : This note was written by Shae Hamilton on 07/21/2020 acting as scribe for Brunilda Burger, OTR/L, PA

## 2020-07-21 NOTE — HISTORY OF PRESENT ILLNESS
[de-identified] : The patient comes in today with complaints of right hip pain.  She states she also had calf pain that she had a Doppler on from another facility, which only revealed that she had a medial right calf ruptured Baker's cyst.

## 2020-07-29 ENCOUNTER — APPOINTMENT (OUTPATIENT)
Dept: INTERNAL MEDICINE | Facility: CLINIC | Age: 75
End: 2020-07-29
Payer: MEDICARE

## 2020-07-29 VITALS
HEIGHT: 67 IN | SYSTOLIC BLOOD PRESSURE: 128 MMHG | OXYGEN SATURATION: 94 % | BODY MASS INDEX: 30.13 KG/M2 | RESPIRATION RATE: 16 BRPM | WEIGHT: 192 LBS | HEART RATE: 57 BPM | DIASTOLIC BLOOD PRESSURE: 76 MMHG

## 2020-07-29 DIAGNOSIS — Z71.89 OTHER SPECIFIED COUNSELING: ICD-10-CM

## 2020-07-29 DIAGNOSIS — Z87.898 PERSONAL HISTORY OF OTHER SPECIFIED CONDITIONS: ICD-10-CM

## 2020-07-29 DIAGNOSIS — J43.9 EMPHYSEMA, UNSPECIFIED: ICD-10-CM

## 2020-07-29 DIAGNOSIS — Z96.651 AFTERCARE FOLLOWING JOINT REPLACEMENT SURGERY: ICD-10-CM

## 2020-07-29 DIAGNOSIS — N39.41 URGE INCONTINENCE: ICD-10-CM

## 2020-07-29 DIAGNOSIS — M25.561 PAIN IN RIGHT KNEE: ICD-10-CM

## 2020-07-29 DIAGNOSIS — Z47.1 AFTERCARE FOLLOWING JOINT REPLACEMENT SURGERY: ICD-10-CM

## 2020-07-29 PROCEDURE — 99214 OFFICE O/P EST MOD 30 MIN: CPT

## 2020-07-29 NOTE — ASSESSMENT
[FreeTextEntry1] : Ms. LeonFor pulmonary followup evaluation.She will continue on current Spiriva one puff daily. Patient is not having any nocturnal symptoms of cough or shortness of breath. she does not require oral steroids. Patient will follow up in 6 was for complete pulmonary function tests.

## 2020-07-29 NOTE — PHYSICAL EXAM
[No Acute Distress] : no acute distress [No Neck Mass] : no neck mass [Normal Appearance] : normal appearance [Normal Oropharynx] : normal oropharynx [No Murmurs] : no murmurs [Normal S1, S2] : normal s1, s2 [Normal Rate/Rhythm] : normal rate/rhythm [No Resp Distress] : no resp distress [Benign] : benign [No Abnormalities] : no abnormalities [Clear to Auscultation Bilaterally] : clear to auscultation bilaterally [Normal Gait] : normal gait [No Clubbing] : no clubbing [No Cyanosis] : no cyanosis [No Edema] : no edema [FROM] : FROM [Normal Color/ Pigmentation] : normal color/ pigmentation [No Focal Deficits] : no focal deficits [Normal Affect] : normal affect [Oriented x3] : oriented x3

## 2020-07-29 NOTE — HISTORY OF PRESENT ILLNESS
[TextBox_4] : Ms. Leon presents for a followup evaluation. She is feeling well. The patient continues on Spiriva one puff daily. She denies any significant shortness of breath. Patient has no nocturnal symptoms of cough or dyspnea.
Jason Petersen), Obstetrics and Gynecology  [address] [place] [address]  Phone: [phone]  Fax: [phone]

## 2020-09-01 NOTE — PATIENT PROFILE ADULT - NSPROPATIENTLACTATING_GEN_A_NUR
"[facility]  Gynecology Consult  Date of Service: 2020  Referring provider: Diamond Burch MD    CC: Postmenopausal bleeding    HPI  Dominik Amaya is a 48 y.o.  postmenopausal female who presents as a referral from Dr. Burch for postmenopausal bleeding.      She states that she thinks that she went through menopause in  or  when she had some heavy bleeding after receiving cardiac stents after a MI.  She states that she did not have any bleeding until approximately 1 year ago.  Since then, she will have bleeding \"every now and then.\"  Her last bleed lasted for 2 days on .    When she asked her cardiologist about it (because he put her on blood thinners), he told her that she was going through menopause.  Her FSH in 2018 was >100.    ROS  Review of Systems   Constitutional: Negative.    HENT: Negative.    Eyes: Negative.    Respiratory: Negative.    Cardiovascular: Negative.    Gastrointestinal: Negative.    Endocrine: Positive for heat intolerance.   Genitourinary: Positive for vaginal discharge.   Musculoskeletal: Negative.    Skin: Negative.    Allergic/Immunologic: Negative.    Neurological: Negative.    Hematological: Negative.    Psychiatric/Behavioral: Negative.      GYN HISTORY  Menarche: age 13  Menopause: age 43  History of STIs: Trichomonas, gonorrhea, genital warts  Last pap smear:   Last Completed Pap Smear       Status Date      PAP SMEAR Done 3/22/2018 LIQUID-BASED PAP SMEAR, SCREENING     Patient has more history with this topic...      Abnormal pap smear history: Remote history, s/p cryotherapy at age 17  Contraception: N/A     OB HISTORY  OB History    Para Term  AB Living   4 4 4     4   SAB TAB Ectopic Molar Multiple Live Births             4      # Outcome Date GA Lbr Mundo/2nd Weight Sex Delivery Anes PTL Lv   4 Term      Vag-Spont   FERNANDO   3 Term      Vag-Spont   FERNANDO   2 Term      Vag-Spont   FERNANDO   1 Term      Vag-Spont   FERNANDO    "   Obstetric Comments   Largest baby 7#1oz     PAST MEDICAL HISTORY  Past Medical History:   Diagnosis Date   • Abnormal Pap smear of cervix    • Coronary atherosclerosis of native coronary artery    • Dyspnea    • Gonorrhea    • History of transfusion    • Hyperlipidemia    • Hypertension    • Myocardial infarction (CMS/HCC)    • Tricuspid valve disorders, specified as nonrheumatic    • Urogenital trichomoniasis      PAST SURGICAL HISTORY  Past Surgical History:   Procedure Laterality Date   • ANKLE SURGERY     • CARDIAC CATHETERIZATION     • CERVIX LESION DESTRUCTION       FAMILY HISTORY  Family History   Problem Relation Age of Onset   • Diabetes Mother    • Hypertension Mother    • Liver disease Father    • Hypertension Father    • Diabetes Father    • Breast cancer Sister    • Diabetes Sister    • Leukemia Sister      SOCIAL HISTORY  Social History     Socioeconomic History   • Marital status:      Spouse name: Not on file   • Number of children: Not on file   • Years of education: Not on file   • Highest education level: Not on file   Tobacco Use   • Smoking status: Former Smoker   • Smokeless tobacco: Never Used   Substance and Sexual Activity   • Alcohol use: Yes     Comment: socially   • Drug use: No   • Sexual activity: Defer     ALLERGIES  Allergies   Allergen Reactions   • Acetaminophen    • Hydrocodone Bitartrate Er    • Oxycodone      HOME MEDICATIONS  Prior to Admission medications    Medication Sig Start Date End Date Taking? Authorizing Provider   amLODIPine (NORVASC) 5 MG tablet Take 1 tablet by mouth Daily. 9/27/19  Yes Shan Baez MD   atorvastatin (LIPITOR) 20 MG tablet Take 1 tablet by mouth Daily. 9/27/19  Yes Shan Baez MD   calcium-vitamin D (OSCAL) 250-125 MG-UNIT per tablet Take  by mouth.   Yes Provider, MD Eneida   carvedilol (COREG) 6.25 MG tablet Take 1 tablet by mouth 2 (Two) Times a Day. 11/22/19  Yes Shan Baez MD   Cholecalciferol (DIALYVITE VITAMIN D3  MAX) 23159 units tablet Take 1 tablet by mouth. 19  Yes Eneida Alamo MD   clopidogrel (PLAVIX) 75 MG tablet Take 1 tablet by mouth Daily. 19  Yes Shan Baez MD   ferrous sulfate 324 (65 FE) MG tablet delayed-release EC tablet Take 324 mg by mouth Daily With Breakfast.   Yes ProviderEneida MD   hydroCHLOROthiazide (HYDRODIURIL) 25 MG tablet Take 1 tablet by mouth Daily. 19 Yes Shan Baez MD   meloxicam (MOBIC) 15 MG tablet TAKE 1 TABLET BY MOUTH DAILY. 19  Yes Ez Hester DPM   mupirocin 2 % ointment Apply  topically to the appropriate area as directed. 3/28/19  Yes ProviderEneida MD     PE  /76 (BP Location: Left arm, Patient Position: Sitting, Cuff Size: Adult)   Wt 81.6 kg (180 lb)   LMP 2020 (Within Days)   BMI 30.90 kg/m²        General: Alert, healthy, no distress, well nourished and well developed.  Neurologic: Alert, oriented to person, place, and time.  Gait normal.  Cranial nerves II-XII grossly intact.  HEENT: Normocephalic, atraumatic.  Extraocular muscles intact, pupils equal and reactive times two.    Neck: Supple, no adenopathy, thyroid normal size, non-tender, without nodularity, trachea mid  Lungs: Normal respiratory effort.  Clear to auscultation bilaterally.  No wheezes, rhonci, or rales.  Heart: Regular rate and rhythm.  No murmer, rub or gallop.  Abdomen: Soft, non-tender, non-distended,no masses, no hepatosplenomegaly, no hernia.  Skin: No rash, no lesions.  Extremities: No cyanosis, clubbing or edema.  PELVIC EXAM: Deferred to next visit    IMPRESSION  Dominik Amaya is a 48 y.o.  presenting with postmenopausal bleeding.  Discussed possible etiologies including atrophy, polyp, hyperplasia, and/or malignancy.    PLAN    1. Postmenopausal bleeding  - US Non-ob Transvaginal; Future   - Will see her after the ultrasound to discuss EMB vs D&C         Thank you for allowing me to participate in the care of this  patient.  Please contact me with any questions or concerns.    This document has been electronically signed by Jane Bruno MD on September 1, 2020 07:50.   no

## 2020-09-16 ENCOUNTER — APPOINTMENT (OUTPATIENT)
Dept: INTERNAL MEDICINE | Facility: CLINIC | Age: 75
End: 2020-09-16
Payer: MEDICARE

## 2020-09-16 VITALS
DIASTOLIC BLOOD PRESSURE: 76 MMHG | WEIGHT: 200 LBS | RESPIRATION RATE: 16 BRPM | BODY MASS INDEX: 31.39 KG/M2 | SYSTOLIC BLOOD PRESSURE: 118 MMHG | TEMPERATURE: 98.4 F | HEART RATE: 84 BPM | HEIGHT: 67 IN | OXYGEN SATURATION: 95 %

## 2020-09-16 DIAGNOSIS — Z51.81 ENCOUNTER FOR THERAPEUTIC DRUG LVL MONITORING: ICD-10-CM

## 2020-09-16 DIAGNOSIS — R06.00 DYSPNEA, UNSPECIFIED: ICD-10-CM

## 2020-09-16 DIAGNOSIS — Z79.01 ENCOUNTER FOR THERAPEUTIC DRUG LVL MONITORING: ICD-10-CM

## 2020-09-16 DIAGNOSIS — Z91.89 OTHER SPECIFIED PERSONAL RISK FACTORS, NOT ELSEWHERE CLASSIFIED: ICD-10-CM

## 2020-09-16 DIAGNOSIS — Z96.651 PRESENCE OF RIGHT ARTIFICIAL KNEE JOINT: ICD-10-CM

## 2020-09-16 PROCEDURE — 99214 OFFICE O/P EST MOD 30 MIN: CPT

## 2020-09-16 NOTE — HISTORY OF PRESENT ILLNESS
[de-identified] : Pt here for followup. She is a 75 yr. old female with a h/o of  osteoarthritis , COPD, T2 DM , chronic low back pain. She c/o of joint pain in her knees , feet , shoulders, hands and lower back . it has been bothering her more over the last weeks. There is no joint swelling, redness or tenderness. She feels it t is likely arthritis, she is s/p bilateral knee replacements 1/2020. She has had lumbar spine surgery years ago with Dr. Christianson. She would like blood work to make sure she is not developing a rheumatology issue. \par she also c/ of bilateral feet numbness and tingling . She used to be on Gabapentin for this , was taken off it by her PCP, Dr. Aleman. She is switching to Dr. Dowd as her PCP recently moved to [place].  [FreeTextEntry1] : F/up

## 2020-09-16 NOTE — REVIEW OF SYSTEMS
[Joint Pain] : joint pain [Negative] : Psychiatric [Fever] : no fever [Chills] : no chills [Fatigue] : no fatigue [Joint Stiffness] : joint stiffness [Joint Swelling] : no joint swelling [Back Pain] : back pain [Muscle Weakness] : no muscle weakness [Muscle Pain] : no muscle pain [FreeTextEntry9] : see HPi  [de-identified] : see HPI

## 2020-09-16 NOTE — PHYSICAL EXAM
[No Acute Distress] : no acute distress [Well Nourished] : well nourished [No Lymphadenopathy] : no lymphadenopathy [Well Developed] : well developed [Supple] : supple [Clear to Auscultation] : lungs were clear to auscultation bilaterally [No Accessory Muscle Use] : no accessory muscle use [Regular Rhythm] : with a regular rhythm [Normal Rate] : normal rate  [Normal S1, S2] : normal S1 and S2 [No Joint Swelling] : no joint swelling [Grossly Normal Strength/Tone] : grossly normal strength/tone [No Focal Deficits] : no focal deficits [Normal Gait] : normal gait [Normal Affect] : the affect was normal [Alert and Oriented x3] : oriented to person, place, and time [Normal Insight/Judgement] : insight and judgment were intact [No Extremity Clubbing/Cyanosis] : no extremity clubbing/cyanosis [Pedal Pulses Present] : the pedal pulses are present [de-identified] : no redness, joint swelling noted

## 2020-09-16 NOTE — ASSESSMENT
[FreeTextEntry1] : comprehensive BW , added ESR. ORA, RF, CRP   \par if BW non contributory and sx;s remain recommend f/up with rheumatology  MD \par referral given for Dr. Floyd, vascular \par f/up once BW resulted and reviewed

## 2020-09-18 DIAGNOSIS — Z87.440 PERSONAL HISTORY OF URINARY (TRACT) INFECTIONS: ICD-10-CM

## 2020-09-18 LAB
24R-OH-CALCIDIOL SERPL-MCNC: 38.7 PG/ML
ALBUMIN SERPL ELPH-MCNC: 4.4 G/DL
ALP BLD-CCNC: 86 U/L
ALT SERPL-CCNC: 12 U/L
ANA SER IF-ACNC: NEGATIVE
ANION GAP SERPL CALC-SCNC: 15 MMOL/L
APPEARANCE: CLEAR
AST SERPL-CCNC: 18 U/L
BACTERIA: NEGATIVE
BASOPHILS # BLD AUTO: 0.08 K/UL
BASOPHILS NFR BLD AUTO: 0.9 %
BILIRUB SERPL-MCNC: 0.3 MG/DL
BILIRUBIN URINE: NEGATIVE
BLOOD URINE: NEGATIVE
BUN SERPL-MCNC: 11 MG/DL
CALCIUM SERPL-MCNC: 9.6 MG/DL
CHLORIDE SERPL-SCNC: 103 MMOL/L
CHOLEST SERPL-MCNC: 181 MG/DL
CHOLEST/HDLC SERPL: 2.8 RATIO
CO2 SERPL-SCNC: 23 MMOL/L
COLOR: NORMAL
CREAT SERPL-MCNC: 0.64 MG/DL
CRP SERPL-MCNC: 0.62 MG/DL
EOSINOPHIL # BLD AUTO: 0.15 K/UL
EOSINOPHIL NFR BLD AUTO: 1.6 %
ERYTHROCYTE [SEDIMENTATION RATE] IN BLOOD BY WESTERGREN METHOD: 50 MM/HR
ESTIMATED AVERAGE GLUCOSE: 157 MG/DL
GLUCOSE QUALITATIVE U: NEGATIVE
GLUCOSE SERPL-MCNC: 120 MG/DL
HBA1C MFR BLD HPLC: 7.1 %
HCT VFR BLD CALC: 41.1 %
HDLC SERPL-MCNC: 64 MG/DL
HGB BLD-MCNC: 13.5 G/DL
HYALINE CASTS: 2 /LPF
IMM GRANULOCYTES NFR BLD AUTO: 0.3 %
IRON SATN MFR SERPL: 19 %
IRON SERPL-MCNC: 69 UG/DL
KETONES URINE: NEGATIVE
LDLC SERPL CALC-MCNC: 100 MG/DL
LEUKOCYTE ESTERASE URINE: NEGATIVE
LYMPHOCYTES # BLD AUTO: 3.6 K/UL
LYMPHOCYTES NFR BLD AUTO: 39.3 %
MAN DIFF?: NORMAL
MCHC RBC-ENTMCNC: 30.2 PG
MCHC RBC-ENTMCNC: 32.8 GM/DL
MCV RBC AUTO: 91.9 FL
MICROSCOPIC-UA: NORMAL
MONOCYTES # BLD AUTO: 0.69 K/UL
MONOCYTES NFR BLD AUTO: 7.5 %
NEUTROPHILS # BLD AUTO: 4.61 K/UL
NEUTROPHILS NFR BLD AUTO: 50.4 %
NITRITE URINE: NEGATIVE
PH URINE: 7
PLATELET # BLD AUTO: 309 K/UL
POTASSIUM SERPL-SCNC: 4.3 MMOL/L
PROT SERPL-MCNC: 7.6 G/DL
PROTEIN URINE: NEGATIVE
RBC # BLD: 4.47 M/UL
RBC # FLD: 13.3 %
RED BLOOD CELLS URINE: 1 /HPF
RHEUMATOID FACT SER QL: <10 IU/ML
SODIUM SERPL-SCNC: 141 MMOL/L
SPECIFIC GRAVITY URINE: 1.01
SQUAMOUS EPITHELIAL CELLS: 2 /HPF
T4 FREE SERPL-MCNC: 1.4 NG/DL
TIBC SERPL-MCNC: 359 UG/DL
TRIGL SERPL-MCNC: 81 MG/DL
TSH SERPL-ACNC: 2.57 UIU/ML
UIBC SERPL-MCNC: 290 UG/DL
UROBILINOGEN URINE: NORMAL
WBC # FLD AUTO: 9.16 K/UL
WHITE BLOOD CELLS URINE: 0 /HPF

## 2020-09-30 ENCOUNTER — APPOINTMENT (OUTPATIENT)
Dept: ORTHOPEDIC SURGERY | Facility: CLINIC | Age: 75
End: 2020-09-30
Payer: MEDICARE

## 2020-09-30 VITALS
BODY MASS INDEX: 31.39 KG/M2 | HEIGHT: 67 IN | DIASTOLIC BLOOD PRESSURE: 77 MMHG | HEART RATE: 77 BPM | TEMPERATURE: 98.3 F | WEIGHT: 200 LBS | SYSTOLIC BLOOD PRESSURE: 123 MMHG

## 2020-09-30 PROCEDURE — 20610 DRAIN/INJ JOINT/BURSA W/O US: CPT | Mod: LT

## 2020-10-01 ENCOUNTER — APPOINTMENT (OUTPATIENT)
Dept: INTERNAL MEDICINE | Facility: CLINIC | Age: 75
End: 2020-10-01

## 2020-10-02 ENCOUNTER — APPOINTMENT (OUTPATIENT)
Dept: ORTHOPEDIC SURGERY | Facility: CLINIC | Age: 75
End: 2020-10-02
Payer: MEDICARE

## 2020-10-02 VITALS
SYSTOLIC BLOOD PRESSURE: 138 MMHG | HEART RATE: 71 BPM | BODY MASS INDEX: 31.39 KG/M2 | DIASTOLIC BLOOD PRESSURE: 82 MMHG | HEIGHT: 67 IN | TEMPERATURE: 97.1 F | WEIGHT: 200 LBS

## 2020-10-02 DIAGNOSIS — M75.42 IMPINGEMENT SYNDROME OF LEFT SHOULDER: ICD-10-CM

## 2020-10-02 PROCEDURE — 20610 DRAIN/INJ JOINT/BURSA W/O US: CPT | Mod: LT

## 2020-10-05 NOTE — ADDENDUM
[FreeTextEntry1] : This note was written by Erika Alex on 10/03/2020 acting as scribe for Klever Brink III, MD

## 2020-10-05 NOTE — PHYSICAL EXAM
[de-identified] : Right Shoulder: \par Range of Motion in Degrees:   	\par    	                        Claimant:          Normal:  	\par Abduction (Active)  	180  	180 degrees  	\par Abduction (Passive)  	180  	180 degrees  	\par Forward elevation (Active):  	180  	180 degrees  	\par Forward elevation (Passive):  180  	180 degrees  	\par External rotation (Active):  	45  	45 degrees  	\par External rotation (Passive):  	45  	45 degrees  	\par Internal rotation (Active):  	L-1  	L-1  	\par Internal rotation (Passive):  	L-1  	L-1  	\par \par No motor weakness to internal rotation, external rotation or abduction in the scapular plane.  Negative crank test.  Negative O’Reynaldo’s test.  Negative Speed’s test. Negative Yergason’s test.  Negative cross arm test.  No tenderness to palpation at the AC joint. Negative Hawkin’s sign.  Negative Neer’s sign.  Negative apprehension. Negative sulcus sign.  No gross neurological or vascular deficits distally.  2+ radial and ulnar pulses.  Skin is intact.  No rashes, scars or lesions.  No extra-articular swelling or tenderness. \par \par Left Shoulder:  \par Range of Motion in Degrees:	\par 	                                  Claimant:	Normal:	\par Abduction (Active)	                  180	               180 degrees	\par Abduction (Passive)	  180	               180 degrees	\par Forward elevation (Active):	  180	               180 degrees	\par Forward elevation (Passive):	  180	               180 degrees	\par External rotation (Active):	   45	               45 degrees	\par External rotation (Passive):	   45	               45 degrees	\par Internal rotation (Active):	   L-1	               L-1	\par Internal rotation (Passive):	   L-1	               L-1	\par  \par No motor weakness to internal rotation, external rotation or abduction in the scapular plane.  Negative crank test.  Negative O’Reynaldo’s test.  Negative Speed’s test. Negative Yergason’s test.  Negative cross arm test.  No tenderness to palpation at the AC joint. Positive Hawkin’s sign.  Positive Neer’s sign. Negative apprehension. Negative sulcus sign.  No gross neurological or vascular deficits distally.  Skin is intact.  No rashes, scars or lesions. 2+ radial and ulnar pulses. No extra-articular swelling or tenderness.\par  [de-identified] : Ambulating with a normal gait.  Station:  Normal.  [de-identified] : General Appearance:  Well-developed, well-nourished female in no acute distress. \par  [de-identified] : Radiographs, two views of the left shoulder, show no obvious osseous abnormality.

## 2020-10-05 NOTE — HISTORY OF PRESENT ILLNESS
[de-identified] : The patient comes in today and complains of pain to her left shoulder.  She also notes numbness and tingling down her arm, to her hand, coming from her neck.

## 2020-10-05 NOTE — DISCUSSION/SUMMARY
[de-identified] : At this time, she will be started on a course of physical therapy and topical anti-inflammatory cream for the left shoulder impingement.  She will be reassessed in two weeks.  Should her primary doctor approve, she will be given a cortisone injection.  As far as the possible radiculopathy, she is being referred back to her spine surgeon, Dr. Christianson.

## 2020-10-06 PROBLEM — M75.42 IMPINGEMENT SYNDROME OF LEFT SHOULDER: Status: ACTIVE | Noted: 2020-09-30

## 2020-10-06 NOTE — PHYSICAL EXAM
[de-identified] : Left shoulder:\par Shoulder: Range of Motion in Degrees:	\par 	                                  Claimant:	Normal:	\par Abduction (Active)	                  180	               180 degrees	\par Abduction (Passive)	  180	               180 degrees	\par Forward elevation (Active):	  180	               180 degrees	\par Forward elevation (Passive):	  180	               180 degrees	\par External rotation (Active):	   45	               45 degrees	\par External rotation (Passive):	   45	               45 degrees	\par Internal rotation (Active):	   L-1	               L-1	\par Internal rotation (Passive):	   L-1	               L-1	\par  \par No motor weakness to internal rotation, external rotation or abduction in the scapular plane.  Negative crank test.  Negative O’Reynaldo’s test.  Negative Speed’s test. Negative Yergason’s test.  Negative cross arm test.  No tenderness to palpation at the AC joint. Positive Hawkin’s sign.  Positive Neer’s sign. Negative apprehension. Negative sulcus sign.  No gross neurological or vascular deficits distally.  Skin is intact.  No rashes, scars or lesions. 2+ radial and ulnar pulses. No extra-articular swelling or tenderness.\par   [de-identified] : Gait: normal    Station: normal  [de-identified] : Appearance: Well-developed, well-nourished female  in no acute distress.

## 2020-10-06 NOTE — HISTORY OF PRESENT ILLNESS
[de-identified] : Maya comes in today for a left shoulder injection.  She was here a few days ago and we did clearance from her primary care (because she is a diabetic) for a left shoulder bursitis and impingement.

## 2020-10-06 NOTE — DISCUSSION/SUMMARY
[de-identified] : The patient presents with left shoulder impingement.  The patient was advised ice and elevation and return back to the office.

## 2020-10-06 NOTE — ADDENDUM
[FreeTextEntry1] : This note was written by Anai Alvarez on 10/06/2020 acting as scribe for Brunilda Burger, SHAYANR/JONATHON, PA.\par

## 2020-10-06 NOTE — PROCEDURE
[de-identified] : Consent: \par At this time, I have recommended an injection to the left shoulder.  The risks and benefits of the procedure were discussed with the patient in detail.  Upon verbal consent of the patient, we proceeded with the injection as noted below.  \par \par Additionally, due to diabetes, the patient has been informed that cortisone may cause a spike in blood sugar.  In light of this, the patient wishes to proceed with the cortisone injection.\par \par Procedure:  \par After a sterile prep, the patient underwent an injection of 9 cc of 1% Lidocaine without epinephrine and 1 cc of Kenalog into the left shoulder.  The patient tolerated the procedure well.  There were no complications.  \par \par

## 2020-10-18 NOTE — PATIENT PROFILE ADULT - NSPROEDAREADYLEARN_GEN_A_NUR
UNTIL YOUR TEST RESULTS OR YOU ARE CLEARED BY PUBLIC HEALTH, YOU ARE EXPECTED TO BE ON FULL SELF QUARANTINE. FOLLOW CDC AND [place] [facility] RECOMMENDATIONS IN THE MEANTIME.     You may use tylenol for body aches and/or fever, decongestant or Neti-pot for congestion, lozenges or sprays for sore throat.     If you have ANY healthcare contacts, including calling 911, it is important that you alert the dispatcher/office/ over phone BEFORE any healthcare provider is potentially exposed without proper protection.      We are monitoring the spread of COVID-19 and are committed to keeping you informed. Use these resources to learn what you can do to keep you and your family safe and healthy.  Please consider sharing with family, friends, and/or any potential exposures who may be symptomatic: \"Call our 24-hour hotline at [phone] if you think you’ve been exposed to COVID-19 or are experiencing fever, cough or shortness of breath. We’ll help you get the care you need.\" There are many options for screening and testing, including drive-up or drive through testing.     If you are having a life threatening emergency, return to the nearest emergency department.    Post Screening Coronavirus Discharge Instructions   Thank you for visiting the Emergency Department today. Your patience is greatly appreciated.   You have been screened by our medical staff who determined that you are safe to go home. We recommend you follow some simple instructions to remain as healthy as possible.   Implement Self Isolation.  Do not leave your house or have visitors.  Please keep at least 6 feet away from any family members in your home.  Wash your hands often and practice good hand hygiene.  Do not share any personal items.  Postpone all non-essential medical appointments until you are out of isolation.  Please make every effort to protect those around you and to reduce the spread of the virus. Be especially careful  when near or around high-risk individuals, which includes the elderly and anyone with preexisting lung disease.     Smoking may increase your chances of developing more severe disease if you contract Coronavirus. If you do smoke, today is the best day of your life to stop.    Symptoms of Coronavirus disease 2019 (COVID-19)  The following symptoms may appear 2-14 days after exposure.   Fever   Cough   Shortness of breath  Prevention   The best way to prevent illness is to avoid being exposed to this virus. We recommend the following precautions   Wash your hands often with soap and water for at least 20 seconds, especially after going to the bathroom; before eating; and after blowing your nose, coughing, or sneezing.    If soap and water are not readily available, use an alcohol-based hand  with at least 60% alcohol. Always wash hands with soap and water if hands are visibly dirty.   Avoid close contact with people who are sick. (stay at least 6 feet away)   Avoid touching your eyes, nose, and mouth.   Stay home when you are sick.   Cover your cough or sneeze with a tissue, then throw the tissue in the trash. If you don’t have a tissue then turn away from people and cough or sneeze into your elbow.   Clean and disinfect frequently touched objects and surfaces using a regular household cleaning spray or wipe.   Stay home as much as possible. Avoid places where there are large groups of people unless absolutely necessary   Recommendations for using a facemask.  o People who are well SHOULD NOT wear a facemask to protect themselves from respiratory diseases, including COVID-19.  o Facemasks should be used by people who show symptoms of COVID-19 to help prevent the spread of the disease to others. The use of facemasks is also crucial for health workers.    Want more information on COVID-19? Please visit the following websites.   Center for Disease Control and Prevention.  https://www.cdc.gov/coronavirus/2019-ncov/index.html   [employer]. https://www.acep.org/by-medical-focus/infectious-diseases/coronavirus/   For any further questions about Covid 19 please call the Covid 19 hot line at 1-844.108.6982 or go to [email or web address]     none

## 2020-10-30 ENCOUNTER — LABORATORY RESULT (OUTPATIENT)
Age: 75
End: 2020-10-30

## 2020-11-03 LAB
ANION GAP SERPL CALC-SCNC: 16 MMOL/L
B BURGDOR IGG+IGM SER QL IB: NORMAL
BUN SERPL-MCNC: 14 MG/DL
CALCIUM SERPL-MCNC: 10.1 MG/DL
CHLORIDE SERPL-SCNC: 101 MMOL/L
CO2 SERPL-SCNC: 24 MMOL/L
CREAT SERPL-MCNC: 0.7 MG/DL
CRP SERPL-MCNC: 0.16 MG/DL
ERYTHROCYTE [SEDIMENTATION RATE] IN BLOOD BY WESTERGREN METHOD: 25 MM/HR
ESTIMATED AVERAGE GLUCOSE: 148 MG/DL
GLUCOSE SERPL-MCNC: 77 MG/DL
HBA1C MFR BLD HPLC: 6.8 %
POTASSIUM SERPL-SCNC: 5.9 MMOL/L
SODIUM SERPL-SCNC: 141 MMOL/L

## 2020-11-03 RX ORDER — METFORMIN HYDROCHLORIDE 500 MG/1
500 TABLET, COATED ORAL
Qty: 30 | Refills: 2 | Status: DISCONTINUED | COMMUNITY
Start: 2020-09-18 | End: 2020-11-03

## 2020-11-17 ENCOUNTER — RX RENEWAL (OUTPATIENT)
Age: 75
End: 2020-11-17

## 2020-11-23 ENCOUNTER — APPOINTMENT (OUTPATIENT)
Dept: DERMATOLOGY | Facility: CLINIC | Age: 75
End: 2020-11-23
Payer: MEDICARE

## 2020-11-23 PROCEDURE — 17000 DESTRUCT PREMALG LESION: CPT

## 2020-11-23 PROCEDURE — 99213 OFFICE O/P EST LOW 20 MIN: CPT | Mod: 25

## 2020-11-23 PROCEDURE — 17003 DESTRUCT PREMALG LES 2-14: CPT

## 2020-11-23 NOTE — PHYSICAL EXAM
[Alert] : alert [Oriented x 3] : ~L oriented x 3 [Well Nourished] : well nourished [Declined] : declined [Eyelids] : Eyelids [Ears] : Ears [Lips] : Lips [FreeTextEntry3] : Keratotic papules, left malar x 2, right nose and left nose.\par \par Lentigines, diffusely on the dorsal hands, v-neck region and face.

## 2020-11-23 NOTE — ASSESSMENT
[FreeTextEntry1] : Lentigines\par Benign.\par Education.\par Discussed cosmetic tx with IPL.  Risks/benefits discussed.\par Txs at [facility]/tx for  1-3+ txs as desired.

## 2020-11-23 NOTE — HISTORY OF PRESENT ILLNESS
[FreeTextEntry1] : Notes left cheek lesion. [de-identified] : Rough lesion, present for months.  Admits to picking off the surface repeatedly.  No bleeding or tenderness.

## 2020-12-29 ENCOUNTER — APPOINTMENT (OUTPATIENT)
Dept: DERMATOLOGY | Facility: CLINIC | Age: 75
End: 2020-12-29
Payer: MEDICARE

## 2020-12-29 LAB — POTASSIUM SERPL-SCNC: 4.6 MMOL/L

## 2020-12-29 PROCEDURE — 11104 PUNCH BX SKIN SINGLE LESION: CPT

## 2020-12-29 PROCEDURE — D0083: CPT

## 2020-12-29 RX ORDER — ESZOPICLONE 3 MG/1
3 TABLET, FILM COATED ORAL
Qty: 30 | Refills: 0 | Status: COMPLETED | COMMUNITY
Start: 2020-07-13

## 2020-12-29 RX ORDER — CYCLOBENZAPRINE HYDROCHLORIDE 10 MG/1
10 TABLET, FILM COATED ORAL
Qty: 90 | Refills: 0 | Status: COMPLETED | COMMUNITY
Start: 2020-07-13

## 2020-12-29 RX ORDER — AMOXICILLIN 500 MG/1
500 CAPSULE ORAL
Qty: 21 | Refills: 0 | Status: COMPLETED | COMMUNITY
Start: 2020-08-31

## 2020-12-29 RX ORDER — AMOXICILLIN 875 MG/1
875 TABLET, FILM COATED ORAL
Qty: 20 | Refills: 0 | Status: COMPLETED | COMMUNITY
Start: 2020-08-22

## 2021-01-05 LAB — CORE LAB BIOPSY: NORMAL

## 2021-01-07 ENCOUNTER — APPOINTMENT (OUTPATIENT)
Dept: DERMATOLOGY | Facility: CLINIC | Age: 76
End: 2021-01-07
Payer: MEDICARE

## 2021-01-07 VITALS — HEIGHT: 67 IN | WEIGHT: 200 LBS | BODY MASS INDEX: 31.39 KG/M2

## 2021-01-07 PROCEDURE — 99213 OFFICE O/P EST LOW 20 MIN: CPT

## 2021-01-07 NOTE — HISTORY OF PRESENT ILLNESS
[FreeTextEntry1] : Suture removal. [de-identified] : Anterior scalp well healed, without evidence of infection.\par Sutures removed.

## 2021-01-07 NOTE — ASSESSMENT
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

## 2021-01-13 ENCOUNTER — NON-APPOINTMENT (OUTPATIENT)
Age: 76
End: 2021-01-13

## 2021-01-13 ENCOUNTER — APPOINTMENT (OUTPATIENT)
Dept: INTERNAL MEDICINE | Facility: CLINIC | Age: 76
End: 2021-01-13
Payer: MEDICARE

## 2021-01-13 PROCEDURE — 99441: CPT | Mod: 95

## 2021-01-15 ENCOUNTER — NON-APPOINTMENT (OUTPATIENT)
Age: 76
End: 2021-01-15

## 2021-01-15 ENCOUNTER — RX RENEWAL (OUTPATIENT)
Age: 76
End: 2021-01-15

## 2021-01-15 LAB — SARS-COV-2 N GENE NPH QL NAA+PROBE: NOT DETECTED

## 2021-01-19 ENCOUNTER — APPOINTMENT (OUTPATIENT)
Dept: INTERNAL MEDICINE | Facility: CLINIC | Age: 76
End: 2021-01-19

## 2021-01-19 DIAGNOSIS — Z41.1 ENCOUNTER FOR COSMETIC SURGERY: ICD-10-CM

## 2021-01-19 DIAGNOSIS — Z20.822 CONTACT WITH AND (SUSPECTED) EXPOSURE TO COVID-19: ICD-10-CM

## 2021-01-20 NOTE — ED ADULT NURSE REASSESSMENT NOTE - NS ED NURSE REASSESS COMMENT FT1
Patient received from SHERITA Williamson. Patient resting comfortably in bed, no signs of distress. Safety and comfort maintained. Will continue to monitor. No

## 2021-01-29 ENCOUNTER — APPOINTMENT (OUTPATIENT)
Dept: INTERNAL MEDICINE | Facility: CLINIC | Age: 76
End: 2021-01-29
Payer: MEDICARE

## 2021-01-29 VITALS
HEIGHT: 68 IN | HEART RATE: 86 BPM | RESPIRATION RATE: 18 BRPM | DIASTOLIC BLOOD PRESSURE: 92 MMHG | TEMPERATURE: 97.3 F | SYSTOLIC BLOOD PRESSURE: 142 MMHG | BODY MASS INDEX: 31.07 KG/M2 | OXYGEN SATURATION: 95 % | WEIGHT: 205 LBS

## 2021-01-29 PROCEDURE — 99214 OFFICE O/P EST MOD 30 MIN: CPT

## 2021-01-29 NOTE — PLAN
[FreeTextEntry1] : Mrs. Leon presents for a followup evaluation.\par \par #1. Patient to continue her current medication regimen which has been reviewed.\par \par #2. She did have an episode of hyperkalemia however repeat basic metabolic profile showed normal potassium. Patient has been given a prescription for comprehensive metabolic panel, hemoglobin A1c level, TSH level and lipid profile to be done prior to her followup appointment.\par \par #3. Mrs. Leon will have complete pulmonary function test prior to her next visit. She would stimulants primary oh one puff daily. She does not require oral steroids.\par \par #4. Followup in 6 months.

## 2021-01-29 NOTE — HISTORY OF PRESENT ILLNESS
[FreeTextEntry1] : Followup [de-identified] : Mrs. Leon presents for a followup evaluation. She is feeling well. Patient denies any chest pain, shortness of breath or palpitations. She has no nocturnal symptoms of cough or dyspnea.

## 2021-02-02 ENCOUNTER — RX RENEWAL (OUTPATIENT)
Age: 76
End: 2021-02-02

## 2021-02-08 ENCOUNTER — RX RENEWAL (OUTPATIENT)
Age: 76
End: 2021-02-08

## 2021-02-23 ENCOUNTER — APPOINTMENT (OUTPATIENT)
Dept: ORTHOPEDIC SURGERY | Facility: CLINIC | Age: 76
End: 2021-02-23
Payer: MEDICARE

## 2021-02-23 VITALS
TEMPERATURE: 97.8 F | HEART RATE: 77 BPM | SYSTOLIC BLOOD PRESSURE: 136 MMHG | BODY MASS INDEX: 31.39 KG/M2 | HEIGHT: 67 IN | WEIGHT: 200 LBS | DIASTOLIC BLOOD PRESSURE: 81 MMHG

## 2021-02-23 PROCEDURE — 73560 X-RAY EXAM OF KNEE 1 OR 2: CPT | Mod: LT

## 2021-02-23 PROCEDURE — 99213 OFFICE O/P EST LOW 20 MIN: CPT

## 2021-02-24 NOTE — ADDENDUM
[FreeTextEntry1] : This note was written by Anibal Phipps on 02/24/2021, acting as a scribe for KAIT THOMPSON, ELENA/L, PA

## 2021-02-24 NOTE — HISTORY OF PRESENT ILLNESS
[de-identified] : The patient comes in today for her left knee.  She is status post left knee replacement.  She states she has some swelling and she wanted to make sure everything was okay.  She states she has been icing the knee.\par

## 2021-02-24 NOTE — PHYSICAL EXAM
[Normal] : Gait: normal [de-identified] : Left Knee: Range of Motion in Degrees\par 	\par 	                  Claimant:    Normal:	\par Flexion Active	    135 	    135-degrees	\par Flexion Passive	    135	    135-degrees	\par Extension Active	    0-5	    0-5-degrees	\par Extension Passive	    0-5	    0-5-degrees	\par \par No pain on palpation.  No calf tenderness.  Good distal pulses.  \par  [de-identified] : Appearance:  Well developed, well-nourished female in no acute distress.\par   [de-identified] : Radiographs, one to two views of the left knee, reveal adequate alignment of the arthroplasty.\par

## 2021-02-24 NOTE — DISCUSSION/SUMMARY
[de-identified] : At this time, since the patient is status post left knee arthroplasty, the patient was advised to return to the office only as needed.\par

## 2021-03-17 NOTE — DISCHARGE NOTE NURSING/CASE MANAGEMENT/SOCIAL WORK - FLU SEASON?
Yes...

## 2021-03-18 LAB
ALBUMIN SERPL ELPH-MCNC: 4.3 G/DL
ALP BLD-CCNC: 83 U/L
ALT SERPL-CCNC: 13 U/L
ANION GAP SERPL CALC-SCNC: 9 MMOL/L
AST SERPL-CCNC: 21 U/L
BILIRUB SERPL-MCNC: 0.4 MG/DL
BUN SERPL-MCNC: 15 MG/DL
CALCIUM SERPL-MCNC: 9.6 MG/DL
CHLORIDE SERPL-SCNC: 103 MMOL/L
CHOLEST SERPL-MCNC: 200 MG/DL
CO2 SERPL-SCNC: 28 MMOL/L
CREAT SERPL-MCNC: 0.74 MG/DL
ESTIMATED AVERAGE GLUCOSE: 140 MG/DL
GLUCOSE SERPL-MCNC: 134 MG/DL
HBA1C MFR BLD HPLC: 6.5 %
HDLC SERPL-MCNC: 60 MG/DL
LDLC SERPL CALC-MCNC: 119 MG/DL
NONHDLC SERPL-MCNC: 140 MG/DL
POTASSIUM SERPL-SCNC: 4.8 MMOL/L
PROT SERPL-MCNC: 7.5 G/DL
SODIUM SERPL-SCNC: 140 MMOL/L
TRIGL SERPL-MCNC: 105 MG/DL
TSH SERPL-ACNC: 5.44 UIU/ML

## 2021-03-19 ENCOUNTER — NON-APPOINTMENT (OUTPATIENT)
Age: 76
End: 2021-03-19

## 2021-05-05 ENCOUNTER — RX RENEWAL (OUTPATIENT)
Age: 76
End: 2021-05-05

## 2021-05-07 ENCOUNTER — APPOINTMENT (OUTPATIENT)
Dept: INTERNAL MEDICINE | Facility: CLINIC | Age: 76
End: 2021-05-07
Payer: MEDICARE

## 2021-05-07 PROCEDURE — 99441: CPT | Mod: 95

## 2021-05-12 ENCOUNTER — NON-APPOINTMENT (OUTPATIENT)
Age: 76
End: 2021-05-12

## 2021-05-17 ENCOUNTER — APPOINTMENT (OUTPATIENT)
Dept: INTERNAL MEDICINE | Facility: CLINIC | Age: 76
End: 2021-05-17
Payer: MEDICARE

## 2021-05-17 VITALS
BODY MASS INDEX: 30.31 KG/M2 | DIASTOLIC BLOOD PRESSURE: 82 MMHG | SYSTOLIC BLOOD PRESSURE: 118 MMHG | HEART RATE: 97 BPM | HEIGHT: 68 IN | RESPIRATION RATE: 18 BRPM | OXYGEN SATURATION: 95 % | TEMPERATURE: 98.4 F | WEIGHT: 200 LBS

## 2021-05-17 DIAGNOSIS — R33.9 RETENTION OF URINE, UNSPECIFIED: ICD-10-CM

## 2021-05-17 DIAGNOSIS — R07.89 OTHER CHEST PAIN: ICD-10-CM

## 2021-05-17 DIAGNOSIS — M70.61 TROCHANTERIC BURSITIS, RIGHT HIP: ICD-10-CM

## 2021-05-17 DIAGNOSIS — Z86.39 PERSONAL HISTORY OF OTHER ENDOCRINE, NUTRITIONAL AND METABOLIC DISEASE: ICD-10-CM

## 2021-05-17 PROCEDURE — 99214 OFFICE O/P EST MOD 30 MIN: CPT

## 2021-05-17 NOTE — PHYSICAL EXAM
[No Acute Distress] : no acute distress [Well Nourished] : well nourished [Well Developed] : well developed [Well-Appearing] : well-appearing [Normal Sclera/Conjunctiva] : normal sclera/conjunctiva [PERRL] : pupils equal round and reactive to light [EOMI] : extraocular movements intact [Normal Outer Ear/Nose] : the outer ears and nose were normal in appearance [Normal Oropharynx] : the oropharynx was normal [No JVD] : no jugular venous distention [No Lymphadenopathy] : no lymphadenopathy [Supple] : supple [Thyroid Normal, No Nodules] : the thyroid was normal and there were no nodules present [No Respiratory Distress] : no respiratory distress  [No Accessory Muscle Use] : no accessory muscle use [Clear to Auscultation] : lungs were clear to auscultation bilaterally [Normal Rate] : normal rate  [Regular Rhythm] : with a regular rhythm [Normal S1, S2] : normal S1 and S2 [No Murmur] : no murmur heard [No Carotid Bruits] : no carotid bruits [No Abdominal Bruit] : a ~M bruit was not heard ~T in the abdomen [No Varicosities] : no varicosities [Pedal Pulses Present] : the pedal pulses are present [No Edema] : there was no peripheral edema [No Palpable Aorta] : no palpable aorta [No Extremity Clubbing/Cyanosis] : no extremity clubbing/cyanosis [Soft] : abdomen soft [Non Tender] : non-tender [Non-distended] : non-distended [No Masses] : no abdominal mass palpated [No HSM] : no HSM [Normal Bowel Sounds] : normal bowel sounds [Normal Posterior Cervical Nodes] : no posterior cervical lymphadenopathy [Normal Anterior Cervical Nodes] : no anterior cervical lymphadenopathy [No CVA Tenderness] : no CVA  tenderness [No Spinal Tenderness] : no spinal tenderness [No Joint Swelling] : no joint swelling [Grossly Normal Strength/Tone] : grossly normal strength/tone [No Rash] : no rash [Coordination Grossly Intact] : coordination grossly intact [No Focal Deficits] : no focal deficits [Normal Gait] : normal gait [Deep Tendon Reflexes (DTR)] : deep tendon reflexes were 2+ and symmetric [Normal Affect] : the affect was normal [Normal Insight/Judgement] : insight and judgment were intact [de-identified] : Tenderness to palpation right ribs in the midaxillary line

## 2021-05-17 NOTE — HISTORY OF PRESENT ILLNESS
[FreeTextEntry1] : Followup [de-identified] : Mrs. Leon presents for evaluation complaining of right-sided chest wall pain. She's had no trauma or fall. She states that the right side of her ribs under the armpit are tender. She has no increased shortness of breath. Mrs. Leon was recently treated for an upper respiratory infection with nurse practitioner with antibiotics and oral steroids.

## 2021-05-17 NOTE — PLAN
[FreeTextEntry1] : Mrs. Leon presents for a followup evaluation. She has some pain on palpation of the ribs in the midaxillary line. There is no history of trauma. Patient is sent for PA and lateral chest x-ray as well as multiple views of the right ribs. She will also increase rosuvastatin from 5 mg per day of 10 mg per day. Patient will repeat lipid profile and liver function tests in 6 weeks. She will continue on current medication regimen.

## 2021-05-19 ENCOUNTER — APPOINTMENT (OUTPATIENT)
Dept: INTERNAL MEDICINE | Facility: CLINIC | Age: 76
End: 2021-05-19
Payer: MEDICARE

## 2021-05-19 ENCOUNTER — NON-APPOINTMENT (OUTPATIENT)
Age: 76
End: 2021-05-19

## 2021-05-19 PROCEDURE — 99441: CPT | Mod: 95

## 2021-05-19 RX ORDER — CEFUROXIME AXETIL 500 MG/1
500 TABLET ORAL
Qty: 14 | Refills: 0 | Status: COMPLETED | COMMUNITY
Start: 2019-06-17 | End: 2021-05-19

## 2021-05-19 RX ORDER — PREDNISONE 20 MG/1
20 TABLET ORAL
Qty: 20 | Refills: 0 | Status: COMPLETED | COMMUNITY
Start: 2021-05-07 | End: 2021-05-19

## 2021-05-25 ENCOUNTER — NON-APPOINTMENT (OUTPATIENT)
Age: 76
End: 2021-05-25

## 2021-06-02 ENCOUNTER — APPOINTMENT (OUTPATIENT)
Dept: INTERNAL MEDICINE | Facility: CLINIC | Age: 76
End: 2021-06-02
Payer: MEDICARE

## 2021-06-02 PROCEDURE — 99441: CPT | Mod: 95

## 2021-06-29 ENCOUNTER — RESULT CHARGE (OUTPATIENT)
Age: 76
End: 2021-06-29

## 2021-06-29 ENCOUNTER — APPOINTMENT (OUTPATIENT)
Dept: INTERNAL MEDICINE | Facility: CLINIC | Age: 76
End: 2021-06-29
Payer: MEDICARE

## 2021-06-29 VITALS
OXYGEN SATURATION: 95 % | SYSTOLIC BLOOD PRESSURE: 128 MMHG | RESPIRATION RATE: 16 BRPM | DIASTOLIC BLOOD PRESSURE: 84 MMHG | BODY MASS INDEX: 30.92 KG/M2 | TEMPERATURE: 98.3 F | WEIGHT: 204 LBS | HEIGHT: 68 IN | HEART RATE: 78 BPM

## 2021-06-29 DIAGNOSIS — Z87.898 PERSONAL HISTORY OF OTHER SPECIFIED CONDITIONS: ICD-10-CM

## 2021-06-29 DIAGNOSIS — R06.00 DYSPNEA, UNSPECIFIED: ICD-10-CM

## 2021-06-29 PROCEDURE — 99214 OFFICE O/P EST MOD 30 MIN: CPT

## 2021-06-29 PROCEDURE — 81002 URINALYSIS NONAUTO W/O SCOPE: CPT

## 2021-06-29 RX ORDER — ROSUVASTATIN CALCIUM 5 MG/1
5 TABLET, FILM COATED ORAL
Qty: 90 | Refills: 1 | Status: DISCONTINUED | COMMUNITY
Start: 2021-03-19 | End: 2021-06-29

## 2021-06-29 NOTE — REVIEW OF SYSTEMS
[Fever] : no fever [Chills] : no chills [Fatigue] : no fatigue [Hematuria] : no hematuria [Frequency] : frequency [Negative] : Neurological [FreeTextEntry8] : see HPI

## 2021-06-29 NOTE — PHYSICAL EXAM
[No Acute Distress] : no acute distress [Well Nourished] : well nourished [Well Developed] : well developed [Supple] : supple [No Respiratory Distress] : no respiratory distress  [No Accessory Muscle Use] : no accessory muscle use [Clear to Auscultation] : lungs were clear to auscultation bilaterally [Normal Rate] : normal rate  [Regular Rhythm] : with a regular rhythm [Normal S1, S2] : normal S1 and S2 [Pedal Pulses Present] : the pedal pulses are present [No Extremity Clubbing/Cyanosis] : no extremity clubbing/cyanosis [Soft] : abdomen soft [Non Tender] : non-tender [Non-distended] : non-distended [Normal Bowel Sounds] : normal bowel sounds [No CVA Tenderness] : no CVA  tenderness [Coordination Grossly Intact] : coordination grossly intact [No Focal Deficits] : no focal deficits [Normal Gait] : normal gait [Normal Affect] : the affect was normal [Alert and Oriented x3] : oriented to person, place, and time [Normal Insight/Judgement] : insight and judgment were intact

## 2021-06-29 NOTE — HISTORY OF PRESENT ILLNESS
[FreeTextEntry8] : Pt presents  to the office today with complaints of urinary frequency  and  cloudy urine ." She feels she  has a UTI. HAs been having symptoms for over 3 days . She has a h/ o of GERD, HLD, hypothyroidism, DM type 2. \par She denies fever, chills,  pelvic pain, CVA tenderness.  Received both COVID vaccines., last Feb 20 , Moderna. \par \par

## 2021-06-29 NOTE — ASSESSMENT
[FreeTextEntry1] : UTI symptoms \par Urine dip + nitrites, blood , leukocytes\par Urine culture sent \par start MAcrobid 100 mg po BID x 7 days \par Pyridium 200 mg po TID x 2 days \par Increase fluid intake \par f/up with culture results \par

## 2021-07-01 ENCOUNTER — OUTPATIENT (OUTPATIENT)
Dept: OUTPATIENT SERVICES | Facility: HOSPITAL | Age: 76
LOS: 1 days | End: 2021-07-01

## 2021-07-01 ENCOUNTER — NON-APPOINTMENT (OUTPATIENT)
Age: 76
End: 2021-07-01

## 2021-07-01 ENCOUNTER — APPOINTMENT (OUTPATIENT)
Dept: ORTHOPEDIC SURGERY | Facility: CLINIC | Age: 76
End: 2021-07-01
Payer: MEDICARE

## 2021-07-01 ENCOUNTER — APPOINTMENT (OUTPATIENT)
Dept: MRI IMAGING | Facility: CLINIC | Age: 76
End: 2021-07-01

## 2021-07-01 VITALS
WEIGHT: 206 LBS | BODY MASS INDEX: 32.33 KG/M2 | SYSTOLIC BLOOD PRESSURE: 111 MMHG | HEART RATE: 90 BPM | DIASTOLIC BLOOD PRESSURE: 58 MMHG | HEIGHT: 67 IN

## 2021-07-01 DIAGNOSIS — Z98.890 OTHER SPECIFIED POSTPROCEDURAL STATES: Chronic | ICD-10-CM

## 2021-07-01 DIAGNOSIS — M25.562 PAIN IN LEFT KNEE: ICD-10-CM

## 2021-07-01 DIAGNOSIS — Z96.652 PRESENCE OF LEFT ARTIFICIAL KNEE JOINT: ICD-10-CM

## 2021-07-01 DIAGNOSIS — Z90.49 ACQUIRED ABSENCE OF OTHER SPECIFIED PARTS OF DIGESTIVE TRACT: Chronic | ICD-10-CM

## 2021-07-01 LAB — BACTERIA UR CULT: NORMAL

## 2021-07-01 PROCEDURE — 73560 X-RAY EXAM OF KNEE 1 OR 2: CPT | Mod: LT

## 2021-07-01 PROCEDURE — 99213 OFFICE O/P EST LOW 20 MIN: CPT

## 2021-07-07 ENCOUNTER — RESULT REVIEW (OUTPATIENT)
Age: 76
End: 2021-07-07

## 2021-07-07 ENCOUNTER — OUTPATIENT (OUTPATIENT)
Dept: OUTPATIENT SERVICES | Facility: HOSPITAL | Age: 76
LOS: 1 days | End: 2021-07-07

## 2021-07-07 ENCOUNTER — APPOINTMENT (OUTPATIENT)
Dept: MRI IMAGING | Facility: CLINIC | Age: 76
End: 2021-07-07
Payer: MEDICARE

## 2021-07-07 DIAGNOSIS — Z90.49 ACQUIRED ABSENCE OF OTHER SPECIFIED PARTS OF DIGESTIVE TRACT: Chronic | ICD-10-CM

## 2021-07-07 DIAGNOSIS — M25.562 PAIN IN LEFT KNEE: ICD-10-CM

## 2021-07-07 DIAGNOSIS — Z98.890 OTHER SPECIFIED POSTPROCEDURAL STATES: Chronic | ICD-10-CM

## 2021-07-07 PROCEDURE — 73721 MRI JNT OF LWR EXTRE W/O DYE: CPT | Mod: 26,LT

## 2021-07-07 NOTE — ADDENDUM
[FreeTextEntry1] : This note was written by Anai Alvarez on 07/06/2021 acting as scribe for Klever Brink III, MD

## 2021-07-07 NOTE — HISTORY OF PRESENT ILLNESS
[de-identified] : Maya comes in today.  She is having some persistent complaints of pain and swelling to the left knee.  She denies any fevers or chills.

## 2021-07-07 NOTE — PHYSICAL EXAM
[de-identified] : Left knee:\par Knee: Range of Motion in Degrees	\par 	                  Claimant:	Normal:	\par Flexion Active	  135 	                135-degrees	\par Flexion Passive	  135	                135-degrees	\par Extension Active	  0-5	                0-5-degrees	\par Extension Passive	  0-5	                0-5-degrees	 \par \par No instability.  Mild to moderate effusion. [de-identified] : Gait: Slightly antalgic to antalgic gait.  Station: Normal  [de-identified] : Appearance: Well-developed, well-nourished female  in no acute distress.  [de-identified] : Radiographs, one to two views of the left knee, now show either a calcification in and around the suprapatellar region and also at the insertion of the patellar tendon, which was not seen on prior x-rays.  The implants look great and show no evidence of loosening.

## 2021-07-07 NOTE — DISCUSSION/SUMMARY
[de-identified] : At this time I recommend a metal reduction MRI of the left knee to rule out loose total knee versus hypertrophic scar tissue.

## 2021-07-10 ENCOUNTER — APPOINTMENT (OUTPATIENT)
Dept: MRI IMAGING | Facility: CLINIC | Age: 76
End: 2021-07-10

## 2021-07-12 ENCOUNTER — APPOINTMENT (OUTPATIENT)
Dept: ORTHOPEDIC SURGERY | Facility: CLINIC | Age: 76
End: 2021-07-12
Payer: MEDICARE

## 2021-07-12 VITALS
HEIGHT: 67 IN | HEART RATE: 82 BPM | SYSTOLIC BLOOD PRESSURE: 147 MMHG | DIASTOLIC BLOOD PRESSURE: 71 MMHG | WEIGHT: 206 LBS | BODY MASS INDEX: 32.33 KG/M2

## 2021-07-12 PROCEDURE — 99213 OFFICE O/P EST LOW 20 MIN: CPT

## 2021-07-14 ENCOUNTER — TRANSCRIPTION ENCOUNTER (OUTPATIENT)
Age: 76
End: 2021-07-14

## 2021-07-16 NOTE — PHYSICAL EXAM
[de-identified] : Left Knee: \par Mild to moderate effusion with peripatellar crepitation.  \par  [de-identified] : Ambulating with a slightly antalgic to antalgic gait.  Station:  Normal.  [de-identified] : General Appearance:  Well-developed, well-nourished female in no acute distress. \par  [de-identified] : Review of the MRI of the left knee shows no evidence of loosening.  It does show effusion.

## 2021-07-16 NOTE — DISCUSSION/SUMMARY
[de-identified] : At this time, I have recommended that she undergo scar debridement for the left knee scar tissue status post a total knee.  All the risks and benefits of the surgery, including, but not limited to, anesthesia, infection, nerve and/or vascular injury and need for further surgery, were all discussed with the patient at length.  In light of these, she wishes to proceed.  She will be scheduled at this time.

## 2021-07-16 NOTE — ADDENDUM
[FreeTextEntry1] : This note was written by Erika Alex on 07/15/2021 acting as scribe for Klever Brink III, MD

## 2021-07-16 NOTE — HISTORY OF PRESENT ILLNESS
[de-identified] : The patient comes in today for her left knee.  We reviewed her MRI, as noted below.

## 2021-07-20 ENCOUNTER — APPOINTMENT (OUTPATIENT)
Dept: DISASTER EMERGENCY | Facility: CLINIC | Age: 76
End: 2021-07-20

## 2021-07-20 ENCOUNTER — NON-APPOINTMENT (OUTPATIENT)
Age: 76
End: 2021-07-20

## 2021-07-20 ENCOUNTER — OUTPATIENT (OUTPATIENT)
Dept: OUTPATIENT SERVICES | Facility: HOSPITAL | Age: 76
LOS: 1 days | End: 2021-07-20
Payer: MEDICARE

## 2021-07-20 VITALS
SYSTOLIC BLOOD PRESSURE: 123 MMHG | WEIGHT: 205.91 LBS | HEIGHT: 68 IN | DIASTOLIC BLOOD PRESSURE: 72 MMHG | OXYGEN SATURATION: 97 % | RESPIRATION RATE: 18 BRPM | HEART RATE: 77 BPM | TEMPERATURE: 98 F

## 2021-07-20 DIAGNOSIS — L90.5 SCAR CONDITIONS AND FIBROSIS OF SKIN: ICD-10-CM

## 2021-07-20 DIAGNOSIS — Z96.659 PRESENCE OF UNSPECIFIED ARTIFICIAL KNEE JOINT: Chronic | ICD-10-CM

## 2021-07-20 DIAGNOSIS — Z98.890 OTHER SPECIFIED POSTPROCEDURAL STATES: Chronic | ICD-10-CM

## 2021-07-20 DIAGNOSIS — Z90.49 ACQUIRED ABSENCE OF OTHER SPECIFIED PARTS OF DIGESTIVE TRACT: Chronic | ICD-10-CM

## 2021-07-20 DIAGNOSIS — Z01.818 ENCOUNTER FOR OTHER PREPROCEDURAL EXAMINATION: ICD-10-CM

## 2021-07-20 LAB
ANION GAP SERPL CALC-SCNC: 7 MMOL/L — SIGNIFICANT CHANGE UP (ref 5–17)
APTT BLD: 29.7 SEC — SIGNIFICANT CHANGE UP (ref 27.5–35.5)
BASOPHILS # BLD AUTO: 0.08 K/UL — SIGNIFICANT CHANGE UP (ref 0–0.2)
BASOPHILS NFR BLD AUTO: 1 % — SIGNIFICANT CHANGE UP (ref 0–2)
BUN SERPL-MCNC: 17 MG/DL — SIGNIFICANT CHANGE UP (ref 7–23)
CALCIUM SERPL-MCNC: 9.1 MG/DL — SIGNIFICANT CHANGE UP (ref 8.5–10.1)
CHLORIDE SERPL-SCNC: 105 MMOL/L — SIGNIFICANT CHANGE UP (ref 96–108)
CO2 SERPL-SCNC: 26 MMOL/L — SIGNIFICANT CHANGE UP (ref 22–31)
CREAT SERPL-MCNC: 0.78 MG/DL — SIGNIFICANT CHANGE UP (ref 0.5–1.3)
EOSINOPHIL # BLD AUTO: 0.24 K/UL — SIGNIFICANT CHANGE UP (ref 0–0.5)
EOSINOPHIL NFR BLD AUTO: 3.1 % — SIGNIFICANT CHANGE UP (ref 0–6)
GLUCOSE SERPL-MCNC: 163 MG/DL — HIGH (ref 70–99)
HCT VFR BLD CALC: 39.3 % — SIGNIFICANT CHANGE UP (ref 34.5–45)
HGB BLD-MCNC: 13 G/DL — SIGNIFICANT CHANGE UP (ref 11.5–15.5)
IMM GRANULOCYTES NFR BLD AUTO: 0.3 % — SIGNIFICANT CHANGE UP (ref 0–1.5)
INR BLD: 1.08 RATIO — SIGNIFICANT CHANGE UP (ref 0.88–1.16)
LYMPHOCYTES # BLD AUTO: 3.44 K/UL — HIGH (ref 1–3.3)
LYMPHOCYTES # BLD AUTO: 45.1 % — HIGH (ref 13–44)
MCHC RBC-ENTMCNC: 29.6 PG — SIGNIFICANT CHANGE UP (ref 27–34)
MCHC RBC-ENTMCNC: 33.1 GM/DL — SIGNIFICANT CHANGE UP (ref 32–36)
MCV RBC AUTO: 89.5 FL — SIGNIFICANT CHANGE UP (ref 80–100)
MONOCYTES # BLD AUTO: 0.75 K/UL — SIGNIFICANT CHANGE UP (ref 0–0.9)
MONOCYTES NFR BLD AUTO: 9.8 % — SIGNIFICANT CHANGE UP (ref 2–14)
NEUTROPHILS # BLD AUTO: 3.09 K/UL — SIGNIFICANT CHANGE UP (ref 1.8–7.4)
NEUTROPHILS NFR BLD AUTO: 40.7 % — LOW (ref 43–77)
PLATELET # BLD AUTO: 302 K/UL — SIGNIFICANT CHANGE UP (ref 150–400)
POTASSIUM SERPL-MCNC: 4.1 MMOL/L — SIGNIFICANT CHANGE UP (ref 3.5–5.3)
POTASSIUM SERPL-SCNC: 4.1 MMOL/L — SIGNIFICANT CHANGE UP (ref 3.5–5.3)
PROTHROM AB SERPL-ACNC: 12.5 SEC — SIGNIFICANT CHANGE UP (ref 10.6–13.6)
RBC # BLD: 4.39 M/UL — SIGNIFICANT CHANGE UP (ref 3.8–5.2)
RBC # FLD: 13 % — SIGNIFICANT CHANGE UP (ref 10.3–14.5)
SARS-COV-2 RNA SPEC QL NAA+PROBE: SIGNIFICANT CHANGE UP
SODIUM SERPL-SCNC: 138 MMOL/L — SIGNIFICANT CHANGE UP (ref 135–145)
WBC # BLD: 7.62 K/UL — SIGNIFICANT CHANGE UP (ref 3.8–10.5)
WBC # FLD AUTO: 7.62 K/UL — SIGNIFICANT CHANGE UP (ref 3.8–10.5)

## 2021-07-20 PROCEDURE — G0463: CPT | Mod: 25

## 2021-07-20 PROCEDURE — U0005: CPT

## 2021-07-20 PROCEDURE — 80048 BASIC METABOLIC PNL TOTAL CA: CPT

## 2021-07-20 PROCEDURE — 93010 ELECTROCARDIOGRAM REPORT: CPT

## 2021-07-20 PROCEDURE — 36415 COLL VENOUS BLD VENIPUNCTURE: CPT

## 2021-07-20 PROCEDURE — U0003: CPT

## 2021-07-20 PROCEDURE — 85730 THROMBOPLASTIN TIME PARTIAL: CPT

## 2021-07-20 PROCEDURE — 85610 PROTHROMBIN TIME: CPT

## 2021-07-20 PROCEDURE — 85025 COMPLETE CBC W/AUTO DIFF WBC: CPT

## 2021-07-20 PROCEDURE — 93005 ELECTROCARDIOGRAM TRACING: CPT

## 2021-07-20 RX ORDER — TIOTROPIUM BROMIDE 18 UG/1
0 CAPSULE ORAL; RESPIRATORY (INHALATION)
Qty: 0 | Refills: 0 | DISCHARGE

## 2021-07-20 RX ORDER — ALBUTEROL 90 UG/1
2 AEROSOL, METERED ORAL
Qty: 0 | Refills: 0 | DISCHARGE

## 2021-07-20 NOTE — H&P PST ADULT - NSICDXPASTMEDICALHX_GEN_ALL_CORE_FT
PAST MEDICAL HISTORY:  Asthma     Back pain     Basal cell carcinoma removed from face    COVID-19 vaccine series completed Moderna - 2nd dose 02/20/2021    DDD (degenerative disc disease), lumbar     Diabetes mellitus diet controlled    Diverticulosis of intestine without bleeding, unspecified intestinal tract location     Elevated pancreatic enzyme patient reports elevated lab 6/2013. Gastroenterologist aware    Emphysema lung     Frequent UTI 2-3x/year; had an episode 5 weeks ago was treated denies dysuria    Gall stones gall bladder removed    GERD (gastroesophageal reflux disease)     History of cataract extracted from right and left    Hypothyroidism     Leg swelling     OA (osteoarthritis)     Pulmonary emphysema, unspecified emphysema type 2017 last exacerbation; never intubated    Spinal stenosis of lumbar region     Urinary urgency     Varicose veins of both lower extremities surgery     PAST MEDICAL HISTORY:  Asthma     Back pain     Basal cell carcinoma removed from face    COVID-19 vaccine series completed Moderna - 2nd dose 02/20/2021    DDD (degenerative disc disease), lumbar     Diabetes mellitus diet controlled    Diverticulosis of intestine without bleeding, unspecified intestinal tract location     Elevated pancreatic enzyme patient reports elevated lab 6/2013. Gastroenterologist aware    Emphysema lung     Frequent UTI 2-3x/year; had an episode 5 weeks ago was treated denies dysuria    Gall stones gall bladder removed    GERD (gastroesophageal reflux disease)     History of cataract extracted from right and left    Hypothyroidism     Knee pain, left     Leg swelling     OA (osteoarthritis)     Pulmonary emphysema, unspecified emphysema type 2017 last exacerbation; never intubated    Scar tissue left knee S/P TKR    Spinal stenosis of lumbar region     Urinary urgency     Varicose veins of both lower extremities surgery

## 2021-07-20 NOTE — H&P PST ADULT - HISTORY OF PRESENT ILLNESS
75 y/o female with complain of left knee pain and swelling for about 3 months now. Pt reports history of left TKR on 11/2019. He takes Meloxicam for pain and swelling with mild relief. She has difficulty with walking and stairs due to left knee pain. She is here for PST for planned Left knee arthroscopy.

## 2021-07-20 NOTE — H&P PST ADULT - NSICDXPASTSURGICALHX_GEN_ALL_CORE_FT
PAST SURGICAL HISTORY:  Dental disorder Dental surgery 12/2012, patient reports that upper portion is glued in at this time, plan is to have dental implants placed.    H/O lumbosacral spine surgery fusion 2017    H/O umbilical hernia repair 5/24/19    S/P bladder repair bladder lift 2/2013    S/P cholecystectomy 2012    S/P colonoscopy benign polyp    S/P knee replacement right 2017, left 11/2019    S/P knee surgery arthroscopy bilateral 2007 and 2010    Skin cancer to right temple 2012, removed, history of basal cell x 2

## 2021-07-20 NOTE — H&P PST ADULT - ASSESSMENT
77 y/o female with complain of left knee pain and swelling for about 3 months now. Pt reports history of left TKR on 11/2019. She is scheduled for Left knee arthroscopy.   Plan  1. Stop all NSAIDS, herbal supplements and vitamins for 7 days.  2. NPO as per ASU instructions.  3. Take the following medications ( Synthroid ) with small sips of water on the morning of your procedure/surgery.  4. Use EZ sponges as directed  5. Labs, EKG as per surgeon. COVID test done today.  6. PMD visit for optimization prior to surgery as per surgeon

## 2021-07-21 ENCOUNTER — APPOINTMENT (OUTPATIENT)
Dept: INTERNAL MEDICINE | Facility: CLINIC | Age: 76
End: 2021-07-21
Payer: MEDICARE

## 2021-07-21 VITALS
BODY MASS INDEX: 32.65 KG/M2 | SYSTOLIC BLOOD PRESSURE: 144 MMHG | OXYGEN SATURATION: 96 % | WEIGHT: 208 LBS | DIASTOLIC BLOOD PRESSURE: 80 MMHG | HEIGHT: 67 IN | HEART RATE: 74 BPM

## 2021-07-21 VITALS — DIASTOLIC BLOOD PRESSURE: 76 MMHG | SYSTOLIC BLOOD PRESSURE: 128 MMHG

## 2021-07-21 DIAGNOSIS — Z87.39 PERSONAL HISTORY OF OTHER DISEASES OF THE MUSCULOSKELETAL SYSTEM AND CONNECTIVE TISSUE: ICD-10-CM

## 2021-07-21 DIAGNOSIS — Z01.818 ENCOUNTER FOR OTHER PREPROCEDURAL EXAMINATION: ICD-10-CM

## 2021-07-21 DIAGNOSIS — R82.90 UNSPECIFIED ABNORMAL FINDINGS IN URINE: ICD-10-CM

## 2021-07-21 DIAGNOSIS — R39.9 UNSPECIFIED SYMPTOMS AND SIGNS INVOLVING THE GENITOURINARY SYSTEM: ICD-10-CM

## 2021-07-21 DIAGNOSIS — L90.5 SCAR CONDITIONS AND FIBROSIS OF SKIN: ICD-10-CM

## 2021-07-21 DIAGNOSIS — R60.0 LOCALIZED EDEMA: ICD-10-CM

## 2021-07-21 PROCEDURE — 99214 OFFICE O/P EST MOD 30 MIN: CPT

## 2021-07-21 RX ORDER — METFORMIN HYDROCHLORIDE 500 MG/1
500 TABLET, COATED ORAL
Qty: 120 | Refills: 1 | Status: DISCONTINUED | COMMUNITY
Start: 2021-02-02 | End: 2021-07-21

## 2021-07-21 RX ORDER — MOMETASONE FUROATE 1 MG/ML
0.1 SOLUTION TOPICAL
Qty: 1 | Refills: 3 | Status: DISCONTINUED | COMMUNITY
Start: 2021-01-12 | End: 2021-07-21

## 2021-07-21 RX ORDER — LORAZEPAM 0.5 MG/1
0.5 TABLET ORAL
Qty: 60 | Refills: 0 | Status: DISCONTINUED | COMMUNITY
Start: 2020-07-13 | End: 2021-07-21

## 2021-07-21 RX ORDER — NITROFURANTOIN (MONOHYDRATE/MACROCRYSTALS) 25; 75 MG/1; MG/1
100 CAPSULE ORAL TWICE DAILY
Qty: 14 | Refills: 0 | Status: DISCONTINUED | COMMUNITY
Start: 2021-06-29 | End: 2021-07-21

## 2021-07-21 RX ORDER — HYDROCORTISONE BUTYRATE 1 MG/ML
0.1 SOLUTION TOPICAL
Qty: 1 | Refills: 2 | Status: DISCONTINUED | COMMUNITY
Start: 2021-01-07 | End: 2021-07-21

## 2021-07-21 RX ORDER — ACETAMINOPHEN AND CODEINE 300; 30 MG/1; MG/1
300-30 TABLET ORAL 3 TIMES DAILY
Qty: 30 | Refills: 0 | Status: DISCONTINUED | COMMUNITY
Start: 2021-01-29 | End: 2021-07-21

## 2021-07-21 RX ORDER — OXYCODONE AND ACETAMINOPHEN 5; 325 MG/1; MG/1
5-325 TABLET ORAL
Qty: 30 | Refills: 0 | Status: DISCONTINUED | COMMUNITY
Start: 2021-07-20 | End: 2021-07-21

## 2021-07-21 RX ORDER — PHENAZOPYRIDINE 200 MG/1
200 TABLET, FILM COATED ORAL 3 TIMES DAILY
Qty: 6 | Refills: 0 | Status: DISCONTINUED | COMMUNITY
Start: 2021-06-29 | End: 2021-07-21

## 2021-07-21 RX ORDER — FUROSEMIDE 40 MG/1
40 TABLET ORAL
Qty: 30 | Refills: 0 | Status: ACTIVE | COMMUNITY
Start: 2021-07-21

## 2021-07-21 NOTE — HISTORY OF PRESENT ILLNESS
[COPD] : COPD [No Adverse Anesthesia Reaction] : no adverse anesthesia reaction in self or family member [Diabetes] : diabetes [(Patient denies any chest pain, claudication, dyspnea on exertion, orthopnea, palpitations or syncope)] : Patient denies any chest pain, claudication, dyspnea on exertion, orthopnea, palpitations or syncope [Aortic Stenosis] : no aortic stenosis [Atrial Fibrillation] : no atrial fibrillation [Coronary Artery Disease] : no coronary artery disease [Recent Myocardial Infarction] : no recent myocardial infarction [Implantable Device/Pacemaker] : no implantable device/pacemaker [Asthma] : no asthma [Sleep Apnea] : no sleep apnea [Smoker] : not a smoker [Chronic Anticoagulation] : no chronic anticoagulation [Chronic Kidney Disease] : no chronic kidney disease [FreeTextEntry1] : left knee arthrocopy, scar debridement  [FreeTextEntry2] : 7/23/21 [FreeTextEntry3] : Dr. Brink  [FreeTextEntry4] : P is a 76 yr. old female with a h/ of COPD, hypothyroidism, HLD, peripheral neuropathy, DM type 2. She presents today for preop evaluation for left knee arthroscopy. She feels well. regarding her pulmonary status , she has been stable on Spiriva 18 mcg 1 puff daily. She has not had to use her rescue inhaler recently. \par She had a recent cardiac workup with [facility]. Per pt, stress test , carotid US  and echo all WNL. \par LAst Hbg AIc- 6.5 % on 3/18/21. \par Denies fever, chills, cough, wheeze, chest pain, shortness of  breath, abdominal pain.  [FreeTextEntry8] : Able to walk 1 flight of steps without difficulty

## 2021-07-21 NOTE — REVIEW OF SYSTEMS
[Negative] : Neurological [Fever] : no fever [Chills] : no chills [Fatigue] : no fatigue [FreeTextEntry9] : see HPi

## 2021-07-21 NOTE — PHYSICAL EXAM
[No Acute Distress] : no acute distress [Well Nourished] : well nourished [Well Developed] : well developed [Normal Oropharynx] : the oropharynx was normal [No JVD] : no jugular venous distention [No Lymphadenopathy] : no lymphadenopathy [Supple] : supple [No Respiratory Distress] : no respiratory distress  [No Accessory Muscle Use] : no accessory muscle use [Clear to Auscultation] : lungs were clear to auscultation bilaterally [Normal Rate] : normal rate  [Regular Rhythm] : with a regular rhythm [Normal S1, S2] : normal S1 and S2 [Pedal Pulses Present] : the pedal pulses are present [Soft] : abdomen soft [Non Tender] : non-tender [Non-distended] : non-distended [Normal Bowel Sounds] : normal bowel sounds [Coordination Grossly Intact] : coordination grossly intact [No Focal Deficits] : no focal deficits [Normal Gait] : normal gait [Deep Tendon Reflexes (DTR)] : deep tendon reflexes were 2+ and symmetric [de-identified] : + 1 pedal edema bilateral  [de-identified] : lleft knee + crepitus , + effusion

## 2021-07-21 NOTE — ASSESSMENT
[Patient Optimized for Surgery] : Patient optimized for surgery [As per surgery] : as per surgery [FreeTextEntry4] : Advised to hold all vitamins, NSAID's including ASA, Motrin, Advil , herbals 7 days prior to surgery. \par \par COVID PCR negative  [FreeTextEntry2] : Close airway monitoring and oxygen saturation is required

## 2021-07-21 NOTE — RESULTS/DATA
[] : results reviewed [de-identified] : RSR 76, No acute St/ t wave changes  [de-identified] : glucose- 163 , non fasting

## 2021-07-23 ENCOUNTER — OUTPATIENT (OUTPATIENT)
Dept: INPATIENT UNIT | Facility: HOSPITAL | Age: 76
LOS: 1 days | Discharge: HOME | End: 2021-07-23
Payer: MEDICARE

## 2021-07-23 ENCOUNTER — RESULT REVIEW (OUTPATIENT)
Age: 76
End: 2021-07-23

## 2021-07-23 ENCOUNTER — APPOINTMENT (OUTPATIENT)
Dept: ORTHOPEDIC SURGERY | Facility: HOSPITAL | Age: 76
End: 2021-07-23
Payer: MEDICARE

## 2021-07-23 VITALS
TEMPERATURE: 97 F | DIASTOLIC BLOOD PRESSURE: 68 MMHG | OXYGEN SATURATION: 94 % | RESPIRATION RATE: 16 BRPM | HEART RATE: 73 BPM | SYSTOLIC BLOOD PRESSURE: 124 MMHG

## 2021-07-23 VITALS
TEMPERATURE: 97 F | HEIGHT: 68 IN | SYSTOLIC BLOOD PRESSURE: 132 MMHG | RESPIRATION RATE: 15 BRPM | HEART RATE: 82 BPM | WEIGHT: 205.91 LBS | OXYGEN SATURATION: 95 % | DIASTOLIC BLOOD PRESSURE: 71 MMHG

## 2021-07-23 DIAGNOSIS — Z98.890 OTHER SPECIFIED POSTPROCEDURAL STATES: Chronic | ICD-10-CM

## 2021-07-23 DIAGNOSIS — L90.5 SCAR CONDITIONS AND FIBROSIS OF SKIN: ICD-10-CM

## 2021-07-23 DIAGNOSIS — Z90.49 ACQUIRED ABSENCE OF OTHER SPECIFIED PARTS OF DIGESTIVE TRACT: Chronic | ICD-10-CM

## 2021-07-23 DIAGNOSIS — Z96.659 PRESENCE OF UNSPECIFIED ARTIFICIAL KNEE JOINT: Chronic | ICD-10-CM

## 2021-07-23 PROCEDURE — 29884 ARTHRS KNEE SURG LYSIS ADS: CPT | Mod: LT

## 2021-07-23 PROCEDURE — 88304 TISSUE EXAM BY PATHOLOGIST: CPT | Mod: 26

## 2021-07-23 PROCEDURE — 88304 TISSUE EXAM BY PATHOLOGIST: CPT

## 2021-07-23 RX ORDER — OXYCODONE HYDROCHLORIDE 5 MG/1
10 TABLET ORAL ONCE
Refills: 0 | Status: DISCONTINUED | OUTPATIENT
Start: 2021-07-23 | End: 2021-07-23

## 2021-07-23 RX ORDER — FENTANYL CITRATE 50 UG/ML
25 INJECTION INTRAVENOUS
Refills: 0 | Status: DISCONTINUED | OUTPATIENT
Start: 2021-07-23 | End: 2021-07-23

## 2021-07-23 RX ORDER — OXYCODONE HYDROCHLORIDE 5 MG/1
5 TABLET ORAL ONCE
Refills: 0 | Status: DISCONTINUED | OUTPATIENT
Start: 2021-07-23 | End: 2021-07-23

## 2021-07-23 RX ORDER — ONDANSETRON 8 MG/1
4 TABLET, FILM COATED ORAL ONCE
Refills: 0 | Status: DISCONTINUED | OUTPATIENT
Start: 2021-07-23 | End: 2021-07-23

## 2021-07-23 RX ORDER — SODIUM CHLORIDE 9 MG/ML
1000 INJECTION, SOLUTION INTRAVENOUS
Refills: 0 | Status: DISCONTINUED | OUTPATIENT
Start: 2021-07-23 | End: 2021-07-23

## 2021-07-23 RX ORDER — HYDROMORPHONE HYDROCHLORIDE 2 MG/ML
0.5 INJECTION INTRAMUSCULAR; INTRAVENOUS; SUBCUTANEOUS
Refills: 0 | Status: DISCONTINUED | OUTPATIENT
Start: 2021-07-23 | End: 2021-07-23

## 2021-07-23 RX ORDER — ACETAMINOPHEN 500 MG
1000 TABLET ORAL ONCE
Refills: 0 | Status: DISCONTINUED | OUTPATIENT
Start: 2021-07-23 | End: 2021-07-23

## 2021-07-23 RX ADMIN — HYDROMORPHONE HYDROCHLORIDE 0.5 MILLIGRAM(S): 2 INJECTION INTRAMUSCULAR; INTRAVENOUS; SUBCUTANEOUS at 10:00

## 2021-07-23 RX ADMIN — OXYCODONE HYDROCHLORIDE 10 MILLIGRAM(S): 5 TABLET ORAL at 10:00

## 2021-07-23 RX ADMIN — HYDROMORPHONE HYDROCHLORIDE 0.5 MILLIGRAM(S): 2 INJECTION INTRAMUSCULAR; INTRAVENOUS; SUBCUTANEOUS at 10:03

## 2021-07-23 RX ADMIN — OXYCODONE HYDROCHLORIDE 10 MILLIGRAM(S): 5 TABLET ORAL at 10:03

## 2021-07-23 NOTE — ASU DISCHARGE PLAN (ADULT/PEDIATRIC) - CARE PROVIDER_API CALL
Klever Brink)  Orthopaedic Surgery  [address] [place] [address]  Phone: [phone]  Fax: [phone]  Follow Up Time:

## 2021-07-23 NOTE — ASU PATIENT PROFILE, ADULT - PMH
Asthma    Back pain    Basal cell carcinoma  removed from face  COVID-19 vaccine series completed  Moderna - 2nd dose 02/20/2021  DDD (degenerative disc disease), lumbar    Diabetes mellitus  diet controlled  Diverticulosis of intestine without bleeding, unspecified intestinal tract location    Elevated pancreatic enzyme  patient reports elevated lab 6/2013. Gastroenterologist aware  Emphysema lung    Frequent UTI  2-3x/year; had an episode 5 weeks ago was treated denies dysuria  Gall stones  gall bladder removed  GERD (gastroesophageal reflux disease)    History of cataract  extracted from right and left  Hypothyroidism    Knee pain, left    Leg swelling    OA (osteoarthritis)    Pulmonary emphysema, unspecified emphysema type  2017 last exacerbation; never intubated  Scar tissue  left knee S/P TKR  Spinal stenosis of lumbar region    Urinary urgency    Varicose veins of both lower extremities  surgery

## 2021-07-23 NOTE — ASU PATIENT PROFILE, ADULT - PSH
Dental disorder  Dental surgery 12/2012, patient reports that upper portion is glued in at this time, plan is to have dental implants placed.  H/O lumbosacral spine surgery  fusion 2017  H/O umbilical hernia repair  5/24/19  S/P bladder repair  bladder lift 2/2013  S/P cholecystectomy  2012  S/P colonoscopy  benign polyp  S/P knee replacement  right 2017, left 11/2019  S/P knee surgery  arthroscopy bilateral 2007 and 2010  Skin cancer  to right temple 2012, removed, history of basal cell x 2

## 2021-07-23 NOTE — ASU DISCHARGE PLAN (ADULT/PEDIATRIC) - ASU DC SPECIAL INSTRUCTIONSFT
Knee Arthroscopy Instructions    1) SWELLING: Your knee will swell over the next 48hours and you can expect pain to get a bit worse. Ice your Knee plenty, continuously if possible. Fill up a plastic bag, then wrap it in a towel or pillow case.     2) ACTIVITY: Elevate your leg above your heart with about 3 pillows when you can, when you are in bed or chair. Otherwise you should be up and about, walking as much as you can tolerate. The more you move the better you will do. Weight bearing as tolerated.    3) BANDAGE: Expect some mild bloody drainage. It is normal. It may soak through the gauze and ACE bandage. This is mostly leftover Saline fluid coming out of your knee from surgery. Just place another Gauze and another ACE over it and wrap it snug but not overly tight. If it bleeds through the second bandage again, call.    4) SHOWER: Remove bandage in 48hrs and place waterproof band aides. You can shower in 48 hours. No bath. Pat your incisions dry. No creams, no lotions.    5) PAIN: Only reason to worry would be if pain got so severe that you cannot feel or wiggle your toes, or if your foot is cold. In this case you need to call or come to the ER. But as long as you can feel and wiggle your toes, you are fine.    6) FOLLOW UP: Call the office to schedule a follow up appointment to be seen in 10-14 days. Your Sutures will be removed at that point.    7) MEDICATION: A pain Rx is in the chart; fill it on your way home. OR was sent electronically to your pharmacy, pick it up on the way home.

## 2021-07-23 NOTE — ASU PATIENT PROFILE, ADULT - TEACHING/LEARNING RELIGIOUS CONSIDERATIONS
Please mail results  Dear  Liza,  Please see results of the chemistry  Liver enzymes normal  Kidney function slightly below normal range  Blood sugar was 175   none

## 2021-07-25 NOTE — ED PROVIDER NOTE - ENMT, MLM
General
Mouth with normal mucosa. Throat has no vesicles, no oropharyngeal exudates and uvula is midline.

## 2021-07-28 DIAGNOSIS — Z87.891 PERSONAL HISTORY OF NICOTINE DEPENDENCE: ICD-10-CM

## 2021-07-28 DIAGNOSIS — J44.9 CHRONIC OBSTRUCTIVE PULMONARY DISEASE, UNSPECIFIED: ICD-10-CM

## 2021-07-28 DIAGNOSIS — M24.662 ANKYLOSIS, LEFT KNEE: ICD-10-CM

## 2021-07-28 DIAGNOSIS — Z88.8 ALLERGY STATUS TO OTHER DRUGS, MEDICAMENTS AND BIOLOGICAL SUBSTANCES STATUS: ICD-10-CM

## 2021-07-28 DIAGNOSIS — K21.9 GASTRO-ESOPHAGEAL REFLUX DISEASE WITHOUT ESOPHAGITIS: ICD-10-CM

## 2021-07-28 DIAGNOSIS — Z98.1 ARTHRODESIS STATUS: ICD-10-CM

## 2021-07-28 DIAGNOSIS — M65.862 OTHER SYNOVITIS AND TENOSYNOVITIS, LEFT LOWER LEG: ICD-10-CM

## 2021-07-28 DIAGNOSIS — E11.42 TYPE 2 DIABETES MELLITUS WITH DIABETIC POLYNEUROPATHY: ICD-10-CM

## 2021-07-28 DIAGNOSIS — E78.5 HYPERLIPIDEMIA, UNSPECIFIED: ICD-10-CM

## 2021-07-28 DIAGNOSIS — E03.9 HYPOTHYROIDISM, UNSPECIFIED: ICD-10-CM

## 2021-07-28 DIAGNOSIS — Z96.652 PRESENCE OF LEFT ARTIFICIAL KNEE JOINT: ICD-10-CM

## 2021-08-04 ENCOUNTER — APPOINTMENT (OUTPATIENT)
Dept: ORTHOPEDIC SURGERY | Facility: CLINIC | Age: 76
End: 2021-08-04
Payer: MEDICARE

## 2021-08-04 VITALS
SYSTOLIC BLOOD PRESSURE: 129 MMHG | HEART RATE: 91 BPM | BODY MASS INDEX: 31.55 KG/M2 | HEIGHT: 67 IN | WEIGHT: 201 LBS | DIASTOLIC BLOOD PRESSURE: 78 MMHG

## 2021-08-04 PROBLEM — M25.562 PAIN IN LEFT KNEE: Chronic | Status: ACTIVE | Noted: 2021-07-20

## 2021-08-04 PROBLEM — J43.9 EMPHYSEMA, UNSPECIFIED: Chronic | Status: ACTIVE | Noted: 2021-07-20

## 2021-08-04 PROBLEM — M79.89 OTHER SPECIFIED SOFT TISSUE DISORDERS: Chronic | Status: ACTIVE | Noted: 2021-07-20

## 2021-08-04 PROBLEM — L90.5 SCAR CONDITIONS AND FIBROSIS OF SKIN: Chronic | Status: ACTIVE | Noted: 2021-07-20

## 2021-08-04 PROBLEM — K21.9 GASTRO-ESOPHAGEAL REFLUX DISEASE WITHOUT ESOPHAGITIS: Chronic | Status: ACTIVE | Noted: 2021-07-20

## 2021-08-04 PROBLEM — Z92.29 PERSONAL HISTORY OF OTHER DRUG THERAPY: Chronic | Status: ACTIVE | Noted: 2021-07-20

## 2021-08-04 PROCEDURE — 99024 POSTOP FOLLOW-UP VISIT: CPT

## 2021-08-05 NOTE — HISTORY OF PRESENT ILLNESS
[de-identified] : Status post left knee arthroscopy [de-identified] : Maya comes in today.  She states her pain is gone. [de-identified] : Left knee: \par The wound is healing well.  No sign of infection.  The sutures are removed.  [de-identified] : Status post left knee arthrscopy.  [de-identified] : The patient is doing well.  At this time start on home therapeutic modalities.  Follow up in four weeks.

## 2021-08-05 NOTE — ADDENDUM
[FreeTextEntry1] : This note was written by Anai Alvaerz on 08/05/2021 acting as scribe for Klever Brink III, MD

## 2021-08-06 ENCOUNTER — RX RENEWAL (OUTPATIENT)
Age: 76
End: 2021-08-06

## 2021-08-26 ENCOUNTER — APPOINTMENT (OUTPATIENT)
Dept: ORTHOPEDIC SURGERY | Facility: CLINIC | Age: 76
End: 2021-08-26
Payer: MEDICARE

## 2021-08-26 VITALS
SYSTOLIC BLOOD PRESSURE: 151 MMHG | DIASTOLIC BLOOD PRESSURE: 87 MMHG | HEART RATE: 77 BPM | WEIGHT: 201 LBS | BODY MASS INDEX: 31.55 KG/M2 | HEIGHT: 67 IN

## 2021-08-26 PROCEDURE — 99024 POSTOP FOLLOW-UP VISIT: CPT

## 2021-09-02 NOTE — ADDENDUM
[FreeTextEntry1] : This note was written by Anibal Phipps on 09/01/2021, acting as a scribe for Klever Brink III, MD

## 2021-09-02 NOTE — HISTORY OF PRESENT ILLNESS
[de-identified] : Postop Left Knee [de-identified] : The patient comes in today for her left knee.  She states she is feeling great and she has minimal swelling, but no pain. [de-identified] : Left Knee: Range of Motion in Degrees\par 	\par 	                  Claimant:    Normal:	\par Flexion Active	    135 	    135-degrees	\par Flexion Passive	    135	    135-degrees	\par Extension Active	    0-5	    0-5-degrees	\par Extension Passive	    0-5	    0-5-degrees	\par \par Well-healed scar.  No instability.  No crepitations.\par  [de-identified] : Status post left knee arthroscopy status post total knee [de-identified] : At this time, since the patient is doing well status post left knee arthroscopy status post total knee, she was instructed on home therapeutic modalities and will follow up on an as needed basis.\par

## 2021-09-09 ENCOUNTER — APPOINTMENT (OUTPATIENT)
Dept: DERMATOLOGY | Facility: CLINIC | Age: 76
End: 2021-09-09
Payer: MEDICARE

## 2021-09-09 DIAGNOSIS — Z00.00 ENCOUNTER FOR GENERAL ADULT MEDICAL EXAMINATION W/OUT ABNORMAL FINDINGS: ICD-10-CM

## 2021-09-09 PROCEDURE — 11102 TANGNTL BX SKIN SINGLE LES: CPT | Mod: 59

## 2021-09-09 PROCEDURE — 17000 DESTRUCT PREMALG LESION: CPT | Mod: 59

## 2021-09-09 PROCEDURE — 99213 OFFICE O/P EST LOW 20 MIN: CPT | Mod: 25

## 2021-09-09 PROCEDURE — 17110 DESTRUCTION B9 LES UP TO 14: CPT

## 2021-09-09 PROCEDURE — 17003 DESTRUCT PREMALG LES 2-14: CPT | Mod: 59

## 2021-09-09 RX ORDER — ALBUTEROL SULFATE 90 UG/1
108 (90 BASE) AEROSOL, METERED RESPIRATORY (INHALATION)
Qty: 1 | Refills: 5 | Status: COMPLETED | COMMUNITY
Start: 2017-07-26 | End: 2021-09-09

## 2021-09-09 RX ORDER — GABAPENTIN 100 MG/1
100 CAPSULE ORAL
Qty: 60 | Refills: 0 | Status: COMPLETED | COMMUNITY
Start: 2021-07-21 | End: 2021-09-09

## 2021-09-09 RX ORDER — PREDNISONE 20 MG/1
20 TABLET ORAL TWICE DAILY
Qty: 12 | Refills: 0 | Status: COMPLETED | COMMUNITY
Start: 2021-04-01 | End: 2021-09-09

## 2021-09-09 NOTE — HISTORY OF PRESENT ILLNESS
[FreeTextEntry1] : Patient presents for skin examination. [de-identified] : Notes irritated lesions of the left frontal hairline and left nose.  No bleeding, no self tx.

## 2021-09-09 NOTE — PHYSICAL EXAM
[Alert] : alert [Oriented x 3] : ~L oriented x 3 [Well Nourished] : well nourished [Full Body Skin Exam Performed] : performed [FreeTextEntry3] : A full skin exam was performed including the scalp, face, neck, chest, abdomen, back, buttocks, upper extremities and lower extremities.  The patient declined examination of the breasts and genitalia.  \par The exam did show the following benign growths:\par [place] pigmented nevi.\par Lentigines.\par \par Greasy tan erythematous papules, left frontal hairline and left nasal bridge, c/w ISK's.\par \par Keratotic papules of the upper lip x 3, c/w AK's.\par \par Irregular hyperpigmented macule, 4 mm, right upper back.

## 2021-09-09 NOTE — ASSESSMENT
[FreeTextEntry1] : A complete skin examination was performed.  We discussed the importance of photoprotection, including the use of hats, protective clothing and sunscreens with an SPF of at least 30.  Sun avoidance was also discussed.  The ABCDE's of melanoma was discussed with the patient.  Regular skin exams are encouraged.\par \par R/o lentigo maligna, right upper back.\par Will call patient with bx results.

## 2021-09-20 LAB — CORE LAB BIOPSY: NORMAL

## 2021-09-23 ENCOUNTER — APPOINTMENT (OUTPATIENT)
Dept: INTERNAL MEDICINE | Facility: CLINIC | Age: 76
End: 2021-09-23
Payer: MEDICARE

## 2021-09-23 VITALS
DIASTOLIC BLOOD PRESSURE: 78 MMHG | BODY MASS INDEX: 34.16 KG/M2 | OXYGEN SATURATION: 94 % | WEIGHT: 205 LBS | HEIGHT: 65 IN | RESPIRATION RATE: 16 BRPM | SYSTOLIC BLOOD PRESSURE: 120 MMHG | HEART RATE: 106 BPM | TEMPERATURE: 98.8 F

## 2021-09-23 DIAGNOSIS — R20.0 ANESTHESIA OF SKIN: ICD-10-CM

## 2021-09-23 DIAGNOSIS — Z01.818 ENCOUNTER FOR OTHER PREPROCEDURAL EXAMINATION: ICD-10-CM

## 2021-09-23 DIAGNOSIS — Z87.898 PERSONAL HISTORY OF OTHER SPECIFIED CONDITIONS: ICD-10-CM

## 2021-09-23 DIAGNOSIS — Z87.39 PERSONAL HISTORY OF OTHER DISEASES OF THE MUSCULOSKELETAL SYSTEM AND CONNECTIVE TISSUE: ICD-10-CM

## 2021-09-23 DIAGNOSIS — Z98.890 OTHER SPECIFIED POSTPROCEDURAL STATES: ICD-10-CM

## 2021-09-23 DIAGNOSIS — Z96.651 PRESENCE OF RIGHT ARTIFICIAL KNEE JOINT: ICD-10-CM

## 2021-09-23 DIAGNOSIS — R20.2 ANESTHESIA OF SKIN: ICD-10-CM

## 2021-09-23 DIAGNOSIS — M25.562 PAIN IN LEFT KNEE: ICD-10-CM

## 2021-09-23 DIAGNOSIS — M66.0 RUPTURE OF POPLITEAL CYST: ICD-10-CM

## 2021-09-23 DIAGNOSIS — L23.7 ALLERGIC CONTACT DERMATITIS DUE TO PLANTS, EXCEPT FOOD: ICD-10-CM

## 2021-09-23 DIAGNOSIS — Z85.828 PERSONAL HISTORY OF OTHER MALIGNANT NEOPLASM OF SKIN: ICD-10-CM

## 2021-09-23 DIAGNOSIS — R07.81 PLEURODYNIA: ICD-10-CM

## 2021-09-23 DIAGNOSIS — R60.0 LOCALIZED EDEMA: ICD-10-CM

## 2021-09-23 DIAGNOSIS — Z86.39 PERSONAL HISTORY OF OTHER ENDOCRINE, NUTRITIONAL AND METABOLIC DISEASE: ICD-10-CM

## 2021-09-23 LAB — SARS-COV-2 N GENE NPH QL NAA+PROBE: NOT DETECTED

## 2021-09-23 PROCEDURE — 94010 BREATHING CAPACITY TEST: CPT

## 2021-09-23 PROCEDURE — 94729 DIFFUSING CAPACITY: CPT

## 2021-09-23 PROCEDURE — 99214 OFFICE O/P EST MOD 30 MIN: CPT | Mod: 25

## 2021-09-23 PROCEDURE — 94727 GAS DIL/WSHOT DETER LNG VOL: CPT

## 2021-09-23 PROCEDURE — ZZZZZ: CPT

## 2021-09-23 NOTE — HISTORY OF PRESENT ILLNESS
[FreeTextEntry1] : followup [de-identified] : Mrs. Leon presents for a followup evaluation. She did develop poison ivy dermatitis and was started on prednisone 20 mg b.i.d. yesterday. Her dermatitis is significantly improved today. Mrs. Leon denies any chest pain, shortness of breath or palpitations. She did have a complete cardiac workup by Dr. Kim.

## 2021-09-23 NOTE — PLAN
[FreeTextEntry1] : Mrs. Leon is us for followup evaluation. She is feeling well. She has provided comprehensive blood profile done by her endocrinologist. Hemoglobin A1c was elevated at 8.2%. She was started on Janumet 100/1000 one tablet b.i.d. and continues on Jardiance. She will continue on Spiriva one puff daily. Complete pulmonic function tests show mild obstructive lung disease. She will followup in 6 months with spirometry pre-and postbronchodilator therapy

## 2021-09-28 ENCOUNTER — APPOINTMENT (OUTPATIENT)
Dept: DERMATOLOGY | Facility: CLINIC | Age: 76
End: 2021-09-28

## 2021-10-20 ENCOUNTER — APPOINTMENT (OUTPATIENT)
Dept: DERMATOLOGY | Facility: CLINIC | Age: 76
End: 2021-10-20
Payer: MEDICARE

## 2021-10-20 PROCEDURE — 13101 CMPLX RPR TRUNK 2.6-7.5 CM: CPT

## 2021-10-20 PROCEDURE — 11603 EXC TR-EXT MAL+MARG 2.1-3 CM: CPT

## 2021-11-03 ENCOUNTER — APPOINTMENT (OUTPATIENT)
Dept: DERMATOLOGY | Facility: CLINIC | Age: 76
End: 2021-11-03

## 2021-11-03 ENCOUNTER — APPOINTMENT (OUTPATIENT)
Dept: DERMATOLOGY | Facility: CLINIC | Age: 76
End: 2021-11-03
Payer: MEDICARE

## 2021-11-03 PROCEDURE — 99024 POSTOP FOLLOW-UP VISIT: CPT

## 2021-11-03 NOTE — HISTORY OF PRESENT ILLNESS
[FreeTextEntry1] : Suture removal. [de-identified] : Right upper back, well healed, without evidence of infection.\par Sutures removed.\par Path still pending - will call patient with results when available.\par f/u in 4 months.

## 2021-11-05 ENCOUNTER — NON-APPOINTMENT (OUTPATIENT)
Age: 76
End: 2021-11-05

## 2021-11-05 ENCOUNTER — INPATIENT (INPATIENT)
Facility: HOSPITAL | Age: 76
LOS: 4 days | Discharge: HOME | DRG: 190 | End: 2021-11-10
Attending: FAMILY MEDICINE | Admitting: INTERNAL MEDICINE
Payer: MEDICARE

## 2021-11-05 VITALS
HEIGHT: 68 IN | OXYGEN SATURATION: 92 % | RESPIRATION RATE: 18 BRPM | TEMPERATURE: 98 F | WEIGHT: 199.96 LBS | DIASTOLIC BLOOD PRESSURE: 86 MMHG | SYSTOLIC BLOOD PRESSURE: 154 MMHG | HEART RATE: 79 BPM

## 2021-11-05 DIAGNOSIS — Z96.659 PRESENCE OF UNSPECIFIED ARTIFICIAL KNEE JOINT: Chronic | ICD-10-CM

## 2021-11-05 DIAGNOSIS — Z90.49 ACQUIRED ABSENCE OF OTHER SPECIFIED PARTS OF DIGESTIVE TRACT: Chronic | ICD-10-CM

## 2021-11-05 DIAGNOSIS — Z98.890 OTHER SPECIFIED POSTPROCEDURAL STATES: Chronic | ICD-10-CM

## 2021-11-05 DIAGNOSIS — R06.02 SHORTNESS OF BREATH: ICD-10-CM

## 2021-11-05 LAB
ALBUMIN SERPL ELPH-MCNC: 3.7 G/DL — SIGNIFICANT CHANGE UP (ref 3.3–5)
ALP SERPL-CCNC: 71 U/L — SIGNIFICANT CHANGE UP (ref 40–120)
ALT FLD-CCNC: 18 U/L — SIGNIFICANT CHANGE UP (ref 12–78)
ANION GAP SERPL CALC-SCNC: 7 MMOL/L — SIGNIFICANT CHANGE UP (ref 5–17)
APTT BLD: 27.3 SEC — LOW (ref 27.5–35.5)
AST SERPL-CCNC: 19 U/L — SIGNIFICANT CHANGE UP (ref 15–37)
BASOPHILS # BLD AUTO: 0.06 K/UL — SIGNIFICANT CHANGE UP (ref 0–0.2)
BASOPHILS NFR BLD AUTO: 0.8 % — SIGNIFICANT CHANGE UP (ref 0–2)
BILIRUB SERPL-MCNC: 0.5 MG/DL — SIGNIFICANT CHANGE UP (ref 0.2–1.2)
BUN SERPL-MCNC: 18 MG/DL — SIGNIFICANT CHANGE UP (ref 7–23)
CALCIUM SERPL-MCNC: 9 MG/DL — SIGNIFICANT CHANGE UP (ref 8.5–10.1)
CHLORIDE SERPL-SCNC: 106 MMOL/L — SIGNIFICANT CHANGE UP (ref 96–108)
CO2 SERPL-SCNC: 28 MMOL/L — SIGNIFICANT CHANGE UP (ref 22–31)
CREAT SERPL-MCNC: 0.9 MG/DL — SIGNIFICANT CHANGE UP (ref 0.5–1.3)
EOSINOPHIL # BLD AUTO: 0.11 K/UL — SIGNIFICANT CHANGE UP (ref 0–0.5)
EOSINOPHIL NFR BLD AUTO: 1.5 % — SIGNIFICANT CHANGE UP (ref 0–6)
GLUCOSE SERPL-MCNC: 144 MG/DL — HIGH (ref 70–99)
HCT VFR BLD CALC: 42.2 % — SIGNIFICANT CHANGE UP (ref 34.5–45)
HGB BLD-MCNC: 13.6 G/DL — SIGNIFICANT CHANGE UP (ref 11.5–15.5)
IMM GRANULOCYTES NFR BLD AUTO: 0 % — SIGNIFICANT CHANGE UP (ref 0–1.5)
INR BLD: 1.1 RATIO — SIGNIFICANT CHANGE UP (ref 0.88–1.16)
LYMPHOCYTES # BLD AUTO: 2.82 K/UL — SIGNIFICANT CHANGE UP (ref 1–3.3)
LYMPHOCYTES # BLD AUTO: 39.1 % — SIGNIFICANT CHANGE UP (ref 13–44)
MCHC RBC-ENTMCNC: 28.9 PG — SIGNIFICANT CHANGE UP (ref 27–34)
MCHC RBC-ENTMCNC: 32.2 GM/DL — SIGNIFICANT CHANGE UP (ref 32–36)
MCV RBC AUTO: 89.8 FL — SIGNIFICANT CHANGE UP (ref 80–100)
MONOCYTES # BLD AUTO: 0.67 K/UL — SIGNIFICANT CHANGE UP (ref 0–0.9)
MONOCYTES NFR BLD AUTO: 9.3 % — SIGNIFICANT CHANGE UP (ref 2–14)
NEUTROPHILS # BLD AUTO: 3.55 K/UL — SIGNIFICANT CHANGE UP (ref 1.8–7.4)
NEUTROPHILS NFR BLD AUTO: 49.3 % — SIGNIFICANT CHANGE UP (ref 43–77)
NT-PROBNP SERPL-SCNC: 154 PG/ML — SIGNIFICANT CHANGE UP (ref 0–450)
PLATELET # BLD AUTO: 256 K/UL — SIGNIFICANT CHANGE UP (ref 150–400)
POTASSIUM SERPL-MCNC: 3.6 MMOL/L — SIGNIFICANT CHANGE UP (ref 3.5–5.3)
POTASSIUM SERPL-SCNC: 3.6 MMOL/L — SIGNIFICANT CHANGE UP (ref 3.5–5.3)
PROT SERPL-MCNC: 7.5 GM/DL — SIGNIFICANT CHANGE UP (ref 6–8.3)
PROTHROM AB SERPL-ACNC: 12.7 SEC — SIGNIFICANT CHANGE UP (ref 10.6–13.6)
RAPID RVP RESULT: SIGNIFICANT CHANGE UP
RBC # BLD: 4.7 M/UL — SIGNIFICANT CHANGE UP (ref 3.8–5.2)
RBC # FLD: 14.5 % — SIGNIFICANT CHANGE UP (ref 10.3–14.5)
SARS-COV-2 RNA SPEC QL NAA+PROBE: SIGNIFICANT CHANGE UP
SODIUM SERPL-SCNC: 141 MMOL/L — SIGNIFICANT CHANGE UP (ref 135–145)
TROPONIN I, HIGH SENSITIVITY RESULT: 11.08 NG/L — SIGNIFICANT CHANGE UP
WBC # BLD: 7.21 K/UL — SIGNIFICANT CHANGE UP (ref 3.8–10.5)
WBC # FLD AUTO: 7.21 K/UL — SIGNIFICANT CHANGE UP (ref 3.8–10.5)

## 2021-11-05 PROCEDURE — 84100 ASSAY OF PHOSPHORUS: CPT

## 2021-11-05 PROCEDURE — 80053 COMPREHEN METABOLIC PANEL: CPT

## 2021-11-05 PROCEDURE — 71045 X-RAY EXAM CHEST 1 VIEW: CPT | Mod: 26,77

## 2021-11-05 PROCEDURE — 83735 ASSAY OF MAGNESIUM: CPT

## 2021-11-05 PROCEDURE — 71045 X-RAY EXAM CHEST 1 VIEW: CPT | Mod: 26

## 2021-11-05 PROCEDURE — 94760 N-INVAS EAR/PLS OXIMETRY 1: CPT

## 2021-11-05 PROCEDURE — 99285 EMERGENCY DEPT VISIT HI MDM: CPT | Mod: CS

## 2021-11-05 PROCEDURE — 99222 1ST HOSP IP/OBS MODERATE 55: CPT

## 2021-11-05 PROCEDURE — 71250 CT THORAX DX C-: CPT

## 2021-11-05 PROCEDURE — 97530 THERAPEUTIC ACTIVITIES: CPT | Mod: GP

## 2021-11-05 PROCEDURE — 97116 GAIT TRAINING THERAPY: CPT | Mod: GP

## 2021-11-05 PROCEDURE — 94640 AIRWAY INHALATION TREATMENT: CPT

## 2021-11-05 PROCEDURE — 83036 HEMOGLOBIN GLYCOSYLATED A1C: CPT

## 2021-11-05 PROCEDURE — 97162 PT EVAL MOD COMPLEX 30 MIN: CPT | Mod: GP

## 2021-11-05 PROCEDURE — 85027 COMPLETE CBC AUTOMATED: CPT

## 2021-11-05 PROCEDURE — 36415 COLL VENOUS BLD VENIPUNCTURE: CPT

## 2021-11-05 PROCEDURE — 71045 X-RAY EXAM CHEST 1 VIEW: CPT

## 2021-11-05 PROCEDURE — 80048 BASIC METABOLIC PNL TOTAL CA: CPT

## 2021-11-05 PROCEDURE — 85610 PROTHROMBIN TIME: CPT

## 2021-11-05 PROCEDURE — 32556 INSERT CATH PLEURA W/O IMAGE: CPT

## 2021-11-05 PROCEDURE — 82962 GLUCOSE BLOOD TEST: CPT

## 2021-11-05 PROCEDURE — 99232 SBSQ HOSP IP/OBS MODERATE 35: CPT | Mod: 25

## 2021-11-05 PROCEDURE — 86769 SARS-COV-2 COVID-19 ANTIBODY: CPT

## 2021-11-05 PROCEDURE — 93010 ELECTROCARDIOGRAM REPORT: CPT

## 2021-11-05 RX ORDER — MORPHINE SULFATE 50 MG/1
4 CAPSULE, EXTENDED RELEASE ORAL ONCE
Refills: 0 | Status: DISCONTINUED | OUTPATIENT
Start: 2021-11-05 | End: 2021-11-05

## 2021-11-05 RX ORDER — TIOTROPIUM BROMIDE 18 UG/1
2 CAPSULE ORAL; RESPIRATORY (INHALATION)
Qty: 0 | Refills: 0 | DISCHARGE

## 2021-11-05 RX ORDER — GABAPENTIN 400 MG/1
1 CAPSULE ORAL
Qty: 0 | Refills: 0 | DISCHARGE

## 2021-11-05 RX ORDER — DEXTROSE 50 % IN WATER 50 %
12.5 SYRINGE (ML) INTRAVENOUS ONCE
Refills: 0 | Status: DISCONTINUED | OUTPATIENT
Start: 2021-11-05 | End: 2021-11-10

## 2021-11-05 RX ORDER — SODIUM CHLORIDE 9 MG/ML
1000 INJECTION, SOLUTION INTRAVENOUS
Refills: 0 | Status: DISCONTINUED | OUTPATIENT
Start: 2021-11-05 | End: 2021-11-10

## 2021-11-05 RX ORDER — IPRATROPIUM/ALBUTEROL SULFATE 18-103MCG
3 AEROSOL WITH ADAPTER (GRAM) INHALATION ONCE
Refills: 0 | Status: COMPLETED | OUTPATIENT
Start: 2021-11-05 | End: 2021-11-05

## 2021-11-05 RX ORDER — TIOTROPIUM BROMIDE 18 UG/1
1 CAPSULE ORAL; RESPIRATORY (INHALATION) DAILY
Refills: 0 | Status: DISCONTINUED | OUTPATIENT
Start: 2021-11-05 | End: 2021-11-10

## 2021-11-05 RX ORDER — GLUCAGON INJECTION, SOLUTION 0.5 MG/.1ML
1 INJECTION, SOLUTION SUBCUTANEOUS ONCE
Refills: 0 | Status: DISCONTINUED | OUTPATIENT
Start: 2021-11-05 | End: 2021-11-10

## 2021-11-05 RX ORDER — ONDANSETRON 8 MG/1
4 TABLET, FILM COATED ORAL EVERY 6 HOURS
Refills: 0 | Status: DISCONTINUED | OUTPATIENT
Start: 2021-11-05 | End: 2021-11-10

## 2021-11-05 RX ORDER — DEXTROSE 50 % IN WATER 50 %
25 SYRINGE (ML) INTRAVENOUS ONCE
Refills: 0 | Status: DISCONTINUED | OUTPATIENT
Start: 2021-11-05 | End: 2021-11-10

## 2021-11-05 RX ORDER — LEVOTHYROXINE SODIUM 125 MCG
137 TABLET ORAL DAILY
Refills: 0 | Status: DISCONTINUED | OUTPATIENT
Start: 2021-11-05 | End: 2021-11-10

## 2021-11-05 RX ORDER — INSULIN LISPRO 100/ML
VIAL (ML) SUBCUTANEOUS
Refills: 0 | Status: DISCONTINUED | OUTPATIENT
Start: 2021-11-05 | End: 2021-11-08

## 2021-11-05 RX ORDER — EMPAGLIFLOZIN 10 MG/1
1 TABLET, FILM COATED ORAL
Qty: 0 | Refills: 0 | DISCHARGE

## 2021-11-05 RX ORDER — ESTROGENS, CONJUGATED 0.625 MG/G
0 CREAM WITH APPLICATOR VAGINAL
Qty: 0 | Refills: 0 | DISCHARGE

## 2021-11-05 RX ORDER — SITAGLIPTIN AND METFORMIN HYDROCHLORIDE 500; 50 MG/1; MG/1
1 TABLET, FILM COATED ORAL
Qty: 0 | Refills: 0 | DISCHARGE

## 2021-11-05 RX ORDER — MORPHINE SULFATE 50 MG/1
4 CAPSULE, EXTENDED RELEASE ORAL EVERY 4 HOURS
Refills: 0 | Status: DISCONTINUED | OUTPATIENT
Start: 2021-11-05 | End: 2021-11-08

## 2021-11-05 RX ORDER — TRAMADOL HYDROCHLORIDE 50 MG/1
50 TABLET ORAL EVERY 4 HOURS
Refills: 0 | Status: DISCONTINUED | OUTPATIENT
Start: 2021-11-05 | End: 2021-11-10

## 2021-11-05 RX ORDER — ALBUTEROL 90 UG/1
2 AEROSOL, METERED ORAL EVERY 6 HOURS
Refills: 0 | Status: DISCONTINUED | OUTPATIENT
Start: 2021-11-05 | End: 2021-11-10

## 2021-11-05 RX ORDER — FUROSEMIDE 40 MG
40 TABLET ORAL DAILY
Refills: 0 | Status: DISCONTINUED | OUTPATIENT
Start: 2021-11-05 | End: 2021-11-10

## 2021-11-05 RX ORDER — DEXTROSE 50 % IN WATER 50 %
15 SYRINGE (ML) INTRAVENOUS ONCE
Refills: 0 | Status: DISCONTINUED | OUTPATIENT
Start: 2021-11-05 | End: 2021-11-10

## 2021-11-05 RX ORDER — MELOXICAM 15 MG/1
1 TABLET ORAL
Qty: 0 | Refills: 0 | DISCHARGE

## 2021-11-05 RX ORDER — BUDESONIDE AND FORMOTEROL FUMARATE DIHYDRATE 160; 4.5 UG/1; UG/1
2 AEROSOL RESPIRATORY (INHALATION)
Refills: 0 | Status: DISCONTINUED | OUTPATIENT
Start: 2021-11-05 | End: 2021-11-10

## 2021-11-05 RX ORDER — ACETAMINOPHEN 500 MG
1000 TABLET ORAL ONCE
Refills: 0 | Status: DISCONTINUED | OUTPATIENT
Start: 2021-11-05 | End: 2021-11-10

## 2021-11-05 RX ORDER — PANTOPRAZOLE SODIUM 20 MG/1
40 TABLET, DELAYED RELEASE ORAL DAILY
Refills: 0 | Status: DISCONTINUED | OUTPATIENT
Start: 2021-11-05 | End: 2021-11-10

## 2021-11-05 RX ORDER — ACETAMINOPHEN 500 MG
1000 TABLET ORAL ONCE
Refills: 0 | Status: COMPLETED | OUTPATIENT
Start: 2021-11-05 | End: 2021-11-05

## 2021-11-05 RX ADMIN — MORPHINE SULFATE 4 MILLIGRAM(S): 50 CAPSULE, EXTENDED RELEASE ORAL at 23:00

## 2021-11-05 RX ADMIN — Medication 400 MILLIGRAM(S): at 17:26

## 2021-11-05 RX ADMIN — MORPHINE SULFATE 4 MILLIGRAM(S): 50 CAPSULE, EXTENDED RELEASE ORAL at 15:23

## 2021-11-05 RX ADMIN — MORPHINE SULFATE 4 MILLIGRAM(S): 50 CAPSULE, EXTENDED RELEASE ORAL at 18:26

## 2021-11-05 RX ADMIN — Medication 3 MILLILITER(S): at 12:37

## 2021-11-05 RX ADMIN — MORPHINE SULFATE 4 MILLIGRAM(S): 50 CAPSULE, EXTENDED RELEASE ORAL at 18:45

## 2021-11-05 RX ADMIN — MORPHINE SULFATE 4 MILLIGRAM(S): 50 CAPSULE, EXTENDED RELEASE ORAL at 14:50

## 2021-11-05 RX ADMIN — MORPHINE SULFATE 4 MILLIGRAM(S): 50 CAPSULE, EXTENDED RELEASE ORAL at 22:31

## 2021-11-05 RX ADMIN — Medication 1000 MILLIGRAM(S): at 18:14

## 2021-11-05 RX ADMIN — Medication 2: at 18:34

## 2021-11-05 RX ADMIN — TRAMADOL HYDROCHLORIDE 50 MILLIGRAM(S): 50 TABLET ORAL at 17:30

## 2021-11-05 RX ADMIN — Medication 125 MILLIGRAM(S): at 12:37

## 2021-11-05 NOTE — ED ADULT NURSE NOTE - NS ED NURSE REPORT GIVEN TO FT
BP controlled at this time  Hold acei in setting of ARF - likely not a good drug to continue if he runs a Cr >2 consistently   Resume lopressor and norvasc as clinical course dictates   Malgorzata, RN

## 2021-11-05 NOTE — H&P ADULT - ASSESSMENT
76 year old female patient with acute respiratory failure      -Admit to Medsurg        # Acute hypoxic respiratory failure  -likely secondary to COPD exacerbation  -on supplemental oxygen  -give albuterol  -give solumedrol  -on Spiriva    # Hypothyroidism  -on synthroid    # GERD  -on protonix    # DM2  -hold oral hypoglycemics  -ISS    #Debility  -PT evaluation    # Advanced Directives  -Full code    # DVT ppx  -give heparin sq 76 year old female patient with acute respiratory failure      -Admit to [facility]        # Acute hypoxic respiratory failure  -DDX: COPD exacerbation, PTX, PNA  -likely secondary to COPD exacerbation and PTX  - CXR read noted for PTX  -CTS consulted for chest tube placement  -on supplemental oxygen  -give albuterol  -give solumedrol  -on Spiriva  -CTS following    # Hypothyroidism  -on synthroid    # GERD  -on protonix    # DM2  -hold oral hypoglycemics  -ISS    #Debility  -PT evaluation    # Advanced Directives  -Full code    # DVT ppx  -give heparin sq

## 2021-11-05 NOTE — H&P ADULT - HISTORY OF PRESENT ILLNESS
76 year old female patient with extensive prior smoking history( 17 year smoker of 1.5 PPD) presented to the ED complaining of a one day history of progressively worsening shortness of breath. States it has become extremely hard to ambulate or stand due to dyspnea.  Denies any associated fever, cough, recent illness known sick contacts, chest pain or palpitations. Patient does endorse mild pleurisy. States her ambulatory pulse oxy reading was 78 percent on room air prior to coming in.      In the ED patient found to have a oxygen saturation of 92 percent on room air.

## 2021-11-05 NOTE — PATIENT PROFILE ADULT - DO YOU FEEL LIKE HURTING YOURSELF OR OTHERS?
Pt was informed he needs a creatinine prior to his CT. He verbalized agreement & understanding.   no

## 2021-11-05 NOTE — ED PROVIDER NOTE - NSICDXPASTMEDICALHX_GEN_ALL_CORE_FT
PAST MEDICAL HISTORY:  Asthma     Back pain     Basal cell carcinoma removed from face    COVID-19 vaccine series completed Moderna - 2nd dose 02/20/2021    DDD (degenerative disc disease), lumbar     Diabetes mellitus diet controlled    Diverticulosis of intestine without bleeding, unspecified intestinal tract location     Elevated pancreatic enzyme patient reports elevated lab 6/2013. Gastroenterologist aware    Emphysema lung     Frequent UTI 2-3x/year; had an episode 5 weeks ago was treated denies dysuria    Gall stones gall bladder removed    GERD (gastroesophageal reflux disease)     History of cataract extracted from right and left    Hypothyroidism     Knee pain, left     Leg swelling     OA (osteoarthritis)     Pulmonary emphysema, unspecified emphysema type 2017 last exacerbation; never intubated    Scar tissue left knee S/P TKR    Spinal stenosis of lumbar region     Urinary urgency     Varicose veins of both lower extremities surgery

## 2021-11-05 NOTE — PHARMACOTHERAPY INTERVENTION NOTE - COMMENTS
Medication History Complete. Medications and allergies reviewed with patient and compared to DrFirst. All medication related questions answered.

## 2021-11-05 NOTE — ED ADULT NURSE NOTE - OBJECTIVE STATEMENT
pt BIBEMS from home for eval of SOB since this AM and chest pain since last night. hx copd, emphysema. not on home 02. pt 93% on RA- non-labored breathing. RODRIGUEZ per patient. denies fevers.

## 2021-11-05 NOTE — H&P ADULT - NSHPPHYSICALEXAM_GEN_ALL_CORE
Vital Signs Last 24 Hrs  T(C): 36.6 (05 Nov 2021 11:21), Max: 36.6 (05 Nov 2021 11:21)  T(F): 97.8 (05 Nov 2021 11:21), Max: 97.8 (05 Nov 2021 11:21)  HR: 79 (05 Nov 2021 11:21) (79 - 79)  BP: 154/86 (05 Nov 2021 11:21) (154/86 - 154/86)  BP(mean): --  RR: 18 (05 Nov 2021 11:21) (18 - 18)  SpO2: 92% (05 Nov 2021 11:21) (92% - 92%)

## 2021-11-05 NOTE — ED ADULT NURSE REASSESSMENT NOTE - NS ED NURSE REASSESS COMMENT FT1
pt resting comfortably. pending bed placement. daughter at bedside. lunch ordered. will continue to monitor.

## 2021-11-05 NOTE — ED ADULT TRIAGE NOTE - HISTORY OF COVID-19 VACCINATION
Yes
Principal Discharge DX:	Right upper quadrant abdominal pain  Secondary Diagnosis:	Hyperbilirubinemia

## 2021-11-05 NOTE — ED ADULT TRIAGE NOTE - CHIEF COMPLAINT QUOTE
Pt BIBA for shortness of breath and chest pain since last night, generalized chest pain. Pt has hx of COPD, not on home oxygen, 92% RA, resp easy and non labored. EKG obtained. Pt BIBA for shortness of breath and chest pain since last night, generalized chest pain. Pt has hx of COPD, not on home oxygen, 92% RA, resp easy and non labored. Pt denies trauma/fall.  EKG obtained.

## 2021-11-05 NOTE — ED PROVIDER NOTE - CLINICAL SUMMARY MEDICAL DECISION MAKING FREE TEXT BOX
Pt with likely COPD exacerbation also possible ACS vs. pleural effusion. Will check ekg, cardiac, chest xray. Will trial nebs and steroids and anticipate admission,

## 2021-11-05 NOTE — CONSULT NOTE ADULT - ASSESSMENT
76 year old female presents with SOB and chest pressure now with a noted large Right side PTX on CXR. Consutled for Pigtail    Plan   s/p Pigtail placement   CXR P  CXR in am  Pain mgmt   admit to medical service   JHONATHAN Coulter  Oriented - self; Oriented - place; Oriented - time

## 2021-11-05 NOTE — H&P ADULT - NSICDXPASTMEDICALHX_GEN_ALL_CORE_FT
PAST MEDICAL HISTORY:  Asthma     Back pain     Basal cell carcinoma removed from face    COPD exacerbation     COVID-19 vaccine series completed Moderna - 2nd dose 02/20/2021    DDD (degenerative disc disease), lumbar     Diabetes mellitus diet controlled    Diverticulosis of intestine without bleeding, unspecified intestinal tract location     Elevated pancreatic enzyme patient reports elevated lab 6/2013. Gastroenterologist aware    Emphysema lung     Frequent UTI 2-3x/year; had an episode 5 weeks ago was treated denies dysuria    Gall stones gall bladder removed    GERD (gastroesophageal reflux disease)     History of cataract extracted from right and left    Hypothyroidism     Knee pain, left     Leg swelling     OA (osteoarthritis)     Pulmonary emphysema, unspecified emphysema type 2017 last exacerbation; never intubated    Scar tissue left knee S/P TKR    Spinal stenosis of lumbar region     Urinary urgency     Varicose veins of both lower extremities surgery

## 2021-11-05 NOTE — ED PROVIDER NOTE - PHYSICAL EXAMINATION
GEN - NAD; well appearing; A+O x3   HEAD - NC/AT     EYES - EOMI, no conjunctival pallor, no scleral icterus  ENT -   mucous membranes  moist , no discharge      NECK - Neck supple  PULM - Diminished breath sounds on right base.   COR -  RRR, S1 S2, no murmurs  ABD - , ND, NT, soft, no guarding, no rebound, no masses    BACK - no CVA tenderness, nontender spine     EXTREMS - no edema, no deformity, warm and well perfused    SKIN - no rash or bruising      NEUROLOGIC - alert, sensation nl, motor 5/5 RUE/LUE/RLE/LLE GEN - NAD; well appearing; A+O x3   HEAD - NC/AT     EYES - EOMI, no conjunctival pallor, no scleral icterus  ENT -   mucous membranes  moist , no discharge      NECK - Neck supple  PULM - Diminished breath sounds on right    COR -  RRR, S1 S2, no murmurs  ABD - , ND, NT, soft, no guarding, no rebound, no masses    BACK - no CVA tenderness, nontender spine     EXTREMS - no edema, no deformity, warm and well perfused    SKIN - no rash or bruising      NEUROLOGIC - alert, sensation nl, motor 5/5 RUE/LUE/RLE/LLE

## 2021-11-05 NOTE — ED ADULT NURSE NOTE - CHIEF COMPLAINT QUOTE
Pt BIBA for shortness of breath and chest pain since last night, generalized chest pain. Pt has hx of COPD, not on home oxygen, 92% RA, resp easy and non labored. EKG obtained.

## 2021-11-05 NOTE — ED PROVIDER NOTE - OBJECTIVE STATEMENT
75 y/o female with PMHx of asthma, lumbar DDD, DM, diverticulosis, GERD, hypothyroidism, spinal stenosis of lumbar region, OA, emphysema presents to the ED c/o shortness of breath and chest pain. Pt has hx of COPD, not on home oxygen, 92% RA, resp easy and non labored. Today, pt went to take a shower and was having SOB and CP preventing her from standing up straight. After shower she wasn't able to catch her breath. She tried Albuterol inhaler with no relief. Denies cough, fever, chills. states she had a full cardiac workup in June including stress, echo and duplex which pt reports was normal.

## 2021-11-06 LAB
A1C WITH ESTIMATED AVERAGE GLUCOSE RESULT: 7 % — HIGH (ref 4–5.6)
ALBUMIN SERPL ELPH-MCNC: 3.2 G/DL — LOW (ref 3.3–5)
ALP SERPL-CCNC: 71 U/L — SIGNIFICANT CHANGE UP (ref 40–120)
ALT FLD-CCNC: 18 U/L — SIGNIFICANT CHANGE UP (ref 12–78)
ANION GAP SERPL CALC-SCNC: 7 MMOL/L — SIGNIFICANT CHANGE UP (ref 5–17)
AST SERPL-CCNC: 14 U/L — LOW (ref 15–37)
BILIRUB SERPL-MCNC: 0.3 MG/DL — SIGNIFICANT CHANGE UP (ref 0.2–1.2)
BUN SERPL-MCNC: 17 MG/DL — SIGNIFICANT CHANGE UP (ref 7–23)
CALCIUM SERPL-MCNC: 8.8 MG/DL — SIGNIFICANT CHANGE UP (ref 8.5–10.1)
CHLORIDE SERPL-SCNC: 105 MMOL/L — SIGNIFICANT CHANGE UP (ref 96–108)
CO2 SERPL-SCNC: 25 MMOL/L — SIGNIFICANT CHANGE UP (ref 22–31)
COVID-19 NUCLEOCAPSID GAM AB INTERP: NEGATIVE — SIGNIFICANT CHANGE UP
COVID-19 NUCLEOCAPSID TOTAL GAM ANTIBODY RESULT: 0.08 INDEX — SIGNIFICANT CHANGE UP
COVID-19 SPIKE DOMAIN AB INTERP: POSITIVE
COVID-19 SPIKE DOMAIN ANTIBODY RESULT: >250 U/ML — HIGH
CREAT SERPL-MCNC: 0.68 MG/DL — SIGNIFICANT CHANGE UP (ref 0.5–1.3)
ESTIMATED AVERAGE GLUCOSE: 154 MG/DL — HIGH (ref 68–114)
GLUCOSE SERPL-MCNC: 188 MG/DL — HIGH (ref 70–99)
HCT VFR BLD CALC: 40.3 % — SIGNIFICANT CHANGE UP (ref 34.5–45)
HGB BLD-MCNC: 13.2 G/DL — SIGNIFICANT CHANGE UP (ref 11.5–15.5)
INR BLD: 1.05 RATIO — SIGNIFICANT CHANGE UP (ref 0.88–1.16)
MAGNESIUM SERPL-MCNC: 2.3 MG/DL — SIGNIFICANT CHANGE UP (ref 1.6–2.6)
MCHC RBC-ENTMCNC: 29.3 PG — SIGNIFICANT CHANGE UP (ref 27–34)
MCHC RBC-ENTMCNC: 32.8 GM/DL — SIGNIFICANT CHANGE UP (ref 32–36)
MCV RBC AUTO: 89.4 FL — SIGNIFICANT CHANGE UP (ref 80–100)
PHOSPHATE SERPL-MCNC: 3.1 MG/DL — SIGNIFICANT CHANGE UP (ref 2.5–4.5)
PLATELET # BLD AUTO: 254 K/UL — SIGNIFICANT CHANGE UP (ref 150–400)
POTASSIUM SERPL-MCNC: 4.2 MMOL/L — SIGNIFICANT CHANGE UP (ref 3.5–5.3)
POTASSIUM SERPL-SCNC: 4.2 MMOL/L — SIGNIFICANT CHANGE UP (ref 3.5–5.3)
PROT SERPL-MCNC: 7.3 GM/DL — SIGNIFICANT CHANGE UP (ref 6–8.3)
PROTHROM AB SERPL-ACNC: 12.3 SEC — SIGNIFICANT CHANGE UP (ref 10.6–13.6)
RBC # BLD: 4.51 M/UL — SIGNIFICANT CHANGE UP (ref 3.8–5.2)
RBC # FLD: 14.6 % — HIGH (ref 10.3–14.5)
SARS-COV-2 IGG+IGM SERPL QL IA: 0.08 INDEX — SIGNIFICANT CHANGE UP
SARS-COV-2 IGG+IGM SERPL QL IA: >250 U/ML — HIGH
SARS-COV-2 IGG+IGM SERPL QL IA: NEGATIVE — SIGNIFICANT CHANGE UP
SARS-COV-2 IGG+IGM SERPL QL IA: POSITIVE
SODIUM SERPL-SCNC: 137 MMOL/L — SIGNIFICANT CHANGE UP (ref 135–145)
WBC # BLD: 10.24 K/UL — SIGNIFICANT CHANGE UP (ref 3.8–10.5)
WBC # FLD AUTO: 10.24 K/UL — SIGNIFICANT CHANGE UP (ref 3.8–10.5)

## 2021-11-06 PROCEDURE — 99231 SBSQ HOSP IP/OBS SF/LOW 25: CPT

## 2021-11-06 PROCEDURE — 71250 CT THORAX DX C-: CPT | Mod: 26

## 2021-11-06 PROCEDURE — 71045 X-RAY EXAM CHEST 1 VIEW: CPT | Mod: 26

## 2021-11-06 PROCEDURE — 99233 SBSQ HOSP IP/OBS HIGH 50: CPT

## 2021-11-06 PROCEDURE — 99291 CRITICAL CARE FIRST HOUR: CPT

## 2021-11-06 RX ORDER — LIDOCAINE 4 G/100G
1 CREAM TOPICAL DAILY
Refills: 0 | Status: DISCONTINUED | OUTPATIENT
Start: 2021-11-06 | End: 2021-11-10

## 2021-11-06 RX ADMIN — PANTOPRAZOLE SODIUM 40 MILLIGRAM(S): 20 TABLET, DELAYED RELEASE ORAL at 09:19

## 2021-11-06 RX ADMIN — MORPHINE SULFATE 4 MILLIGRAM(S): 50 CAPSULE, EXTENDED RELEASE ORAL at 08:55

## 2021-11-06 RX ADMIN — Medication 40 MILLIGRAM(S): at 09:23

## 2021-11-06 RX ADMIN — TRAMADOL HYDROCHLORIDE 50 MILLIGRAM(S): 50 TABLET ORAL at 10:20

## 2021-11-06 RX ADMIN — MORPHINE SULFATE 4 MILLIGRAM(S): 50 CAPSULE, EXTENDED RELEASE ORAL at 02:37

## 2021-11-06 RX ADMIN — LIDOCAINE 1 PATCH: 4 CREAM TOPICAL at 12:50

## 2021-11-06 RX ADMIN — MORPHINE SULFATE 4 MILLIGRAM(S): 50 CAPSULE, EXTENDED RELEASE ORAL at 03:00

## 2021-11-06 RX ADMIN — TRAMADOL HYDROCHLORIDE 50 MILLIGRAM(S): 50 TABLET ORAL at 16:30

## 2021-11-06 RX ADMIN — Medication 1: at 13:03

## 2021-11-06 RX ADMIN — MORPHINE SULFATE 4 MILLIGRAM(S): 50 CAPSULE, EXTENDED RELEASE ORAL at 22:50

## 2021-11-06 RX ADMIN — MORPHINE SULFATE 4 MILLIGRAM(S): 50 CAPSULE, EXTENDED RELEASE ORAL at 13:48

## 2021-11-06 RX ADMIN — Medication 40 MILLIGRAM(S): at 09:18

## 2021-11-06 RX ADMIN — Medication 2: at 09:14

## 2021-11-06 RX ADMIN — MORPHINE SULFATE 4 MILLIGRAM(S): 50 CAPSULE, EXTENDED RELEASE ORAL at 18:47

## 2021-11-06 RX ADMIN — MORPHINE SULFATE 4 MILLIGRAM(S): 50 CAPSULE, EXTENDED RELEASE ORAL at 07:55

## 2021-11-06 RX ADMIN — MORPHINE SULFATE 4 MILLIGRAM(S): 50 CAPSULE, EXTENDED RELEASE ORAL at 12:48

## 2021-11-06 RX ADMIN — TRAMADOL HYDROCHLORIDE 50 MILLIGRAM(S): 50 TABLET ORAL at 09:21

## 2021-11-06 RX ADMIN — TRAMADOL HYDROCHLORIDE 50 MILLIGRAM(S): 50 TABLET ORAL at 17:30

## 2021-11-06 RX ADMIN — LIDOCAINE 1 PATCH: 4 CREAM TOPICAL at 19:30

## 2021-11-06 RX ADMIN — TIOTROPIUM BROMIDE 1 CAPSULE(S): 18 CAPSULE ORAL; RESPIRATORY (INHALATION) at 09:22

## 2021-11-06 RX ADMIN — Medication 137 MICROGRAM(S): at 05:21

## 2021-11-06 RX ADMIN — MORPHINE SULFATE 4 MILLIGRAM(S): 50 CAPSULE, EXTENDED RELEASE ORAL at 17:47

## 2021-11-06 RX ADMIN — MORPHINE SULFATE 4 MILLIGRAM(S): 50 CAPSULE, EXTENDED RELEASE ORAL at 22:20

## 2021-11-06 RX ADMIN — Medication 3: at 17:40

## 2021-11-06 NOTE — PHYSICAL THERAPY INITIAL EVALUATION ADULT - GENERAL OBSERVATIONS, REHAB EVAL
Pt. received in SICU was in the bathroom. Pt. had all SICU monitors, + R side chest tube with drainer, O2 2l/min agreeable to PT.

## 2021-11-06 NOTE — CONSULT NOTE ADULT - SUBJECTIVE AND OBJECTIVE BOX
HISTORY OF PRESENT ILLNESS:     76 year old female patient with extensive prior smoking history( 17 year smoker of 1.5 PPD) presented to the ED complaining of a one day history of progressively worsening shortness of breath. States it has become extremely hard to ambulate or stand due to dyspnea.  Denies any associated fever, cough, recent illness known sick contacts, chest pain or palpitations. Patient does endorse mild pleurisy. States her ambulatory pulse oxy reading was 78 percent on room air prior to coming in. She was found to have pneumothorax, underwent chest tube placement by thoracic surgery.    Today her breathing has significantly improved. She is on NC 4L. She has no chest pain. No wheezing. NO fevers overnight.       REVIEW OF SYSTEMS: as per HPI otherwise negative.       PAST MEDICAL & PAST SURGICAL HISTORY:    Hypothyroidism    Diabetes mellitus  diet controlled    Back pain    Asthma    Elevated pancreatic enzyme  patient reports elevated lab 6/2013. Gastroenterologist aware    History of cataract  extracted from right and left    Varicose veins of both lower extremities  surgery    Diverticulosis of intestine without bleeding, unspecified intestinal tract location    Gall stones  gall bladder removed    Urinary urgency    Spinal stenosis of lumbar region    DDD (degenerative disc disease), lumbar    Basal cell carcinoma  removed from face    Pulmonary emphysema, unspecified emphysema type  2017 last exacerbation; never intubated    OA (osteoarthritis)    Frequent UTI  2-3x/year; had an episode 5 weeks ago was treated denies dysuria    GERD (gastroesophageal reflux disease)    Leg swelling    Emphysema lung    COVID-19 vaccine series completed  Moderna - 2nd dose 02/20/2021    Knee pain, left    Scar tissue  left knee S/P TKR    COPD exacerbation    S/P bladder repair  bladder lift 2/2013    Dental disorder  Dental surgery 12/2012, patient reports that upper portion is glued in at this time, plan is to have dental implants placed.    S/P knee surgery  arthroscopy bilateral 2007 and 2010    Skin cancer  to right temple 2012, removed, history of basal cell x 2    H/O lumbosacral spine surgery  fusion 2017    H/O umbilical hernia repair  5/24/19    S/P cholecystectomy  2012    S/P colonoscopy  benign polyp    S/P knee replacement  right 2017, left 11/2019        SOCIAL HISTORY:   Former smoker, quit 40 years prior, 1.5ppd since age 16    FAMILY HISTORY:  Family history of pancreatic cancer (Father)    Family history of heart disease (Mother)    Family history of stroke (Mother)        MEDICATIONS  (STANDING):  acetaminophen   IVPB .. 1000 milliGRAM(s) IV Intermittent once  ALBUTerol    90 MICROgram(s) HFA Inhaler 2 Puff(s) Inhalation every 6 hours  budesonide 160 MICROgram(s)/formoterol 4.5 MICROgram(s) Inhaler 2 Puff(s) Inhalation two times a day  dextrose 40% Gel 15 Gram(s) Oral once  dextrose 5%. 1000 milliLiter(s) (50 mL/Hr) IV Continuous <Continuous>  dextrose 5%. 1000 milliLiter(s) (100 mL/Hr) IV Continuous <Continuous>  dextrose 50% Injectable 25 Gram(s) IV Push once  dextrose 50% Injectable 12.5 Gram(s) IV Push once  dextrose 50% Injectable 25 Gram(s) IV Push once  furosemide    Tablet 40 milliGRAM(s) Oral daily  glucagon  Injectable 1 milliGRAM(s) IntraMuscular once  insulin lispro (ADMELOG) corrective regimen sliding scale   SubCutaneous three times a day before meals  levothyroxine 137 MICROGram(s) Oral daily  lidocaine   4% Patch 1 Patch Transdermal daily  methylPREDNISolone sodium succinate Injectable 40 milliGRAM(s) IV Push daily  pantoprazole    Tablet 40 milliGRAM(s) Oral daily  tiotropium 18 MICROgram(s) Capsule 1 Capsule(s) Inhalation daily    MEDICATIONS  (PRN):  morphine  - Injectable 4 milliGRAM(s) IV Push every 4 hours PRN Severe Pain (7 - 10)  ondansetron Injectable 4 milliGRAM(s) IV Push every 6 hours PRN Nausea  traMADol 50 milliGRAM(s) Oral every 4 hours PRN Moderate Pain (4 - 6)      Allergies    Breo Ellipta (Rash)    Intolerances        Vital Signs Last 24 Hrs  T(C): 36.8 (06 Nov 2021 09:30), Max: 36.8 (05 Nov 2021 16:37)  T(F): 98.2 (06 Nov 2021 09:30), Max: 98.3 (05 Nov 2021 20:32)  HR: 71 (06 Nov 2021 08:00) (65 - 93)  BP: 117/54 (06 Nov 2021 08:00) (110/59 - 141/88)  BP(mean): 74 (06 Nov 2021 08:00) (74 - 90)  RR: 17 (06 Nov 2021 08:00) (14 - 21)  SpO2: 92% (06 Nov 2021 08:00) (91% - 94%)      PHYSICAL EXAMINATION:    GENERAL APPEARANCE:  No distress. No accessory muscle use. Well appearing.   HEAD: Normocephalic atraumatic   EYES: Pupils are normal. No icterus. Sclera white.   HEENT:  Mucus membranes moist. Oropharynx normal.   NECK:  Supple. No JVD.  No stridor.  HEART:  Normal S1 and S2.   CHEST: breath sounds bilaterally. No wheezing. Chest tube in place connected to atrium.   ABDOMEN:  Soft and nontender. Nondistended.   EXTREMITIES:  There is no cyanosis, clubbing or edema.   SKIN:  No rash or significant lesions are noted. No jaundice.  PSYCH: Normal affect.  NEURO: Alert and oriented. Responds to question appropriate. No focal deficits appreciated.         LABS:                        13.2   10.24 )-----------( 254      ( 06 Nov 2021 07:24 )             40.3     11-06    137  |  105  |  17  ----------------------------<  188<H>  4.2   |  25  |  0.68    Ca    8.8      06 Nov 2021 07:24  Phos  3.1     11-06  Mg     2.3     11-06      RADIOLOGY & ADDITIONAL STUDIES:   *imaging personally reviewed  CT Chest - bullous disease, emphysema, and infiltrates at bases.       
Surgeon: Yfn    Consult requesting by: kade    HISTORY OF PRESENT ILLNESS:  76 year old female patient with extensive prior smoking history( 17 year smoker of 1.5 PPD) presented to the ED complaining of a one day history of progressively worsening shortness of breath. States it has become extremely hard to ambulate or stand due to dyspnea.  Denies any associated fever, cough, recent illness known sick contacts, chest pain or palpitations. Patient does endorse mild pleurisy. States her ambulatory pulse oxy reading was 78 percent on room air prior to coming in.    Pt noted to have a significant RT  PTX on CXR. patient stats that she is a patient of Dr hunter and she has  a history of COPD and blebs    PAST MEDICAL & SURGICAL HISTORY:  Hypothyroidism    Diabetes mellitus  diet controlled    Back pain    Asthma    Elevated pancreatic enzyme  patient reports elevated lab 6/2013. Gastroenterologist aware    History of cataract  extracted from right and left    Varicose veins of both lower extremities  surgery    Diverticulosis of intestine without bleeding, unspecified intestinal tract location    Gall stones  gall bladder removed    Urinary urgency    Spinal stenosis of lumbar region    DDD (degenerative disc disease), lumbar    Basal cell carcinoma  removed from face    Pulmonary emphysema, unspecified emphysema type  2017 last exacerbation; never intubated    OA (osteoarthritis)    Frequent UTI  2-3x/year; had an episode 5 weeks ago was treated denies dysuria    GERD (gastroesophageal reflux disease)    Leg swelling    Emphysema lung    COVID-19 vaccine series completed  Moderna - 2nd dose 02/20/2021    Knee pain, left    Scar tissue  left knee S/P TKR    COPD exacerbation    S/P bladder repair  bladder lift 2/2013    Dental disorder  Dental surgery 12/2012, patient reports that upper portion is glued in at this time, plan is to have dental implants placed.    S/P knee surgery  arthroscopy bilateral 2007 and 2010    Skin cancer  to right temple 2012, removed, history of basal cell x 2    H/O lumbosacral spine surgery  fusion 2017    H/O umbilical hernia repair  5/24/19    S/P cholecystectomy  2012    S/P colonoscopy  benign polyp    S/P knee replacement  right 2017, left 11/2019        MEDICATIONS  (STANDING):  acetaminophen   IVPB .. 1000 milliGRAM(s) IV Intermittent once  ALBUTerol    90 MICROgram(s) HFA Inhaler 2 Puff(s) Inhalation every 6 hours  budesonide 160 MICROgram(s)/formoterol 4.5 MICROgram(s) Inhaler 2 Puff(s) Inhalation two times a day  dextrose 40% Gel 15 Gram(s) Oral once  dextrose 5%. 1000 milliLiter(s) (50 mL/Hr) IV Continuous <Continuous>  dextrose 5%. 1000 milliLiter(s) (100 mL/Hr) IV Continuous <Continuous>  dextrose 50% Injectable 25 Gram(s) IV Push once  dextrose 50% Injectable 12.5 Gram(s) IV Push once  dextrose 50% Injectable 25 Gram(s) IV Push once  furosemide    Tablet 40 milliGRAM(s) Oral daily  glucagon  Injectable 1 milliGRAM(s) IntraMuscular once  insulin lispro (ADMELOG) corrective regimen sliding scale   SubCutaneous three times a day before meals  levothyroxine 137 MICROGram(s) Oral daily  morphine  - Injectable 4 milliGRAM(s) IV Push once  pantoprazole    Tablet 40 milliGRAM(s) Oral daily  tiotropium 18 MICROgram(s) Capsule 1 Capsule(s) Inhalation daily    MEDICATIONS  (PRN):  ondansetron Injectable 4 milliGRAM(s) IV Push every 6 hours PRN Nausea    Antiplatelet therapy:     no                      Last dose/amt:    Allergies    Breo Ellipta (Rash)    Intolerances        SOCIAL HISTORY:  Smoker: [x ] Yes  [ ] No        PACK YEARS:                         WHEN QUIT? 40 yrs ago  ETOH use: [ ] Yes  [ ] No              FREQUENCY / QUANTITY:  Ilicit Drug use:  [ ] Yes  [ ] No  Occupation:  Live with: family   Assisted device use: no     FAMILY HISTORY:  Family history of pancreatic cancer (Father)    Family history of heart disease (Mother)    Family history of stroke (Mother)        Review of Systems  CONSTITUTIONAL:  Fevers[ ] chills[ ] sweats[ ] fatigue[ ] weight loss[ ] weight gain [ ]                                     NEGATIVE [x ]   NEURO:  parathesias[ ] seizures [ ]  syncope [ ]  confusion [ ]                                                                                NEGATIVE[x ]   EYES: glasses[ ]  blurry vision[ ]  discharge[ ] pain[ ] glaucoma [ ]                                                                          NEGATIVE[x ]   ENMT:  difficulty hearing [ ]  vertigo[ ]  dysphagia[ ] epistaxis[ ] recent dental work [ ]                                    NEGATIVE[x ]   CV:  chest pain[x ] palpitations[ ] RODRIGUEZ [ x] diaphoresis [ ]                                                                                           NEGATIVE[ ]   RESPIRATORY:  wheezing[ ] SOBx[ ] cough [ ] sputum[ ] hemoptysis[ ]                                                                  NEGATIVE[ ]   GI:  nausea[ ]  vomiting [ ]  diarrhea[ ] constipation [ ] melena [ ]                                                                      NEGATIVE[x ]   : hematuria[ ]  dysuria[ ] urgency[ ] incontinence[ ]                                                                                            NEGATIVE[x ]   MUSKULOSKELETAL:  arthritis[ ]  joint swelling [ ] muscle weakness [ ]                                                                NEGATIVE[x ]   SKIN/BREAST:  rash[ ] itching [ ]  hair loss[ ] masses[ ]                                                                                              NEGATIVE[ x]   PSYCH:  dementia [ ] depresion [ ] anxiety[ ]                                                                                                               NEGATIVE[x ]   HEME/LYMPH:  bruises easily[ ] enlarged lymph nodes[ ] tender lymph nodes[ ]                                               NEGATIVE[ x]   ENDOCRINE:  cold intolerance[ ] heat intolerance[ ] polydipsia[ ]                                                                          NEGATIVE[x ]     PHYSICAL EXAM  Vital Signs Last 24 Hrs  T(C): 36.6 (05 Nov 2021 11:21), Max: 36.6 (05 Nov 2021 11:21)  T(F): 97.8 (05 Nov 2021 11:21), Max: 97.8 (05 Nov 2021 11:21)  HR: 87 (05 Nov 2021 15:16) (79 - 87)  BP: 126/72 (05 Nov 2021 15:16) (126/72 - 154/86)  BP(mean): --  RR: 20 (05 Nov 2021 15:16) (18 - 20)  SpO2: 94% (05 Nov 2021 15:16) (92% - 94%)    CONSTITUTIONAL:                                                                          WNL[ x Obese   Neuro: WNL[ x] Normal exam oriented to person/place & time with no focal motor or sensory  deficits. Other                     Eyes: WNL[ x  Normal exam of conjunctiva & lids, pupils equally reactive. Other     ENT: WNL[x ]    Normal exam of nasal/oral mucosa with absence of cyanosis. Other  Neck: WNL[x  Normal exam of jugular veins, trachea & thyroid. Other  Chest: WNL[ ] Normal lung exam with good air movement absence of wheezes, rales, or rhonchi: Other        significant decreased on Rt side Left clear                                                                         CV:  Auscultation: normal [x ] S3[ ] S4[ ] Irregular [ ] Rub[ ] Clicks[ ]    Murmurs none:[x ]systolic [ ]  diastolic [ ] holosystolic [ ]  Carotids: No Bruits[x ] Other                 Abdominal Aorta: normal [ ] nonpalpable[ ]Other                                                                                      GI:           WNL[x ] Normal exam of abdomen, liver & spleen with no noted masses or tenderness. Other                                                                                                        Extremities: WNL[ x] Normal no evidence of cyanosis or deformity Edema: none[ ]trace[ ]1+[ ]2+[ ]3+[ ]4+[ ]  Lower Extremity Pulses: Right[ 2+] Left[2+ ]Varicosities[ ]  SKIN :WNL[ x] Normal exam to inspection & palation. Other:                                                          LABS:                        13.6   7.21  )-----------( 256      ( 05 Nov 2021 11:31 )             42.2     11-05    141  |  106  |  18  ----------------------------<  144<H>  3.6   |  28  |  0.90    Ca    9.0      05 Nov 2021 11:31    TPro  7.5  /  Alb  3.7  /  TBili  0.5  /  DBili  x   /  AST  19  /  ALT  18  /  AlkPhos  71  11-05    PT/INR - ( 05 Nov 2021 11:31 )   PT: 12.7 sec;   INR: 1.10 ratio         PTT - ( 05 Nov 2021 11:31 )  PTT:27.3 sec              < from: Xray Chest 1 View- PORTABLE-Urgent (11.05.21 @ 12:48) >  There is a large right-sided pneumothorax measuring up to 6.8 cm in greatest width. There is no cardiomediastinal shift. Left lung is clear without pneumothorax, pleural effusion, or airspace opacity.      Critical value:  I discussed the finding of this report with Dr. Wasserman at 2:21 PM on 11/5/2021.  Critical value policy of the hospital was followed.  Read back and confirmation of receipt of this communication was performed.  This verbal communication supplements the text report of this document.    < end of copied text >

## 2021-11-06 NOTE — PROGRESS NOTE ADULT - SUBJECTIVE AND OBJECTIVE BOX
Subjective: Patient comfortable with no complaints. Primary pneumothorax. No cough, fever, chills, SOB, chest pain, palpitations.     Vital Signs:  Vital Signs Last 24 Hrs  T(C): 36.8 (11-06-21 @ 09:30), Max: 36.8 (11-05-21 @ 16:37)  T(F): 98.2 (11-06-21 @ 09:30), Max: 98.3 (11-05-21 @ 20:32)  HR: 72 (11-06-21 @ 12:00) (65 - 93)  BP: 116/61 (11-06-21 @ 12:00) (110/59 - 141/88)  RR: 21 (11-06-21 @ 12:00) (14 - 21)  SpO2: 91% (11-06-21 @ 12:00) (91% - 94%) on (O2)    I&O's Detail    05 Nov 2021 07:01  -  06 Nov 2021 07:00  --------------------------------------------------------  IN:    IV PiggyBack: 100 mL  Total IN: 100 mL    OUT:    Chest Tube (mL): 20 mL    Voided (mL): 850 mL  Total OUT: 870 mL    Total NET: -770 mL        General: NAD  Neurology: Awake, nonfocal, NUNES x 4  Eyes: Scleras clear, EOMI, Gross vision intact  ENT: Gross hearing intact, grossly patent pharynx, no stridor  Neck: Neck supple, trachea midline, No JVD  Respiratory: CTA B/L, No wheezing, rales, rhonchi  CV: S1S2, no murmurs, rubs or gallops  Abdominal: Soft, NT, ND  Extremities: No edema  Skin: No Rashes, Hematoma, Ecchymosis  Tubes: R PTC to -20 suction, no air leak, 20 cc out    Relevant labs, radiology and Medications reviewed                        13.2   10.24 )-----------( 254      ( 06 Nov 2021 07:24 )             40.3     11-06    137  |  105  |  17  ----------------------------<  188<H>  4.2   |  25  |  0.68    Ca    8.8      06 Nov 2021 07:24  Phos  3.1     11-06  Mg     2.3     11-06    TPro  7.3  /  Alb  3.2<L>  /  TBili  0.3  /  DBili  x   /  AST  14<L>  /  ALT  18  /  AlkPhos  71  11-06    PT/INR - ( 06 Nov 2021 07:24 )   PT: 12.3 sec;   INR: 1.05 ratio         PTT - ( 05 Nov 2021 11:31 )  PTT:27.3 sec  MEDICATIONS  (STANDING):  acetaminophen   IVPB .. 1000 milliGRAM(s) IV Intermittent once  ALBUTerol    90 MICROgram(s) HFA Inhaler 2 Puff(s) Inhalation every 6 hours  budesonide 160 MICROgram(s)/formoterol 4.5 MICROgram(s) Inhaler 2 Puff(s) Inhalation two times a day  dextrose 40% Gel 15 Gram(s) Oral once  dextrose 5%. 1000 milliLiter(s) (50 mL/Hr) IV Continuous <Continuous>  dextrose 5%. 1000 milliLiter(s) (100 mL/Hr) IV Continuous <Continuous>  dextrose 50% Injectable 25 Gram(s) IV Push once  dextrose 50% Injectable 12.5 Gram(s) IV Push once  dextrose 50% Injectable 25 Gram(s) IV Push once  furosemide    Tablet 40 milliGRAM(s) Oral daily  glucagon  Injectable 1 milliGRAM(s) IntraMuscular once  insulin lispro (ADMELOG) corrective regimen sliding scale   SubCutaneous three times a day before meals  levothyroxine 137 MICROGram(s) Oral daily  lidocaine   4% Patch 1 Patch Transdermal daily  methylPREDNISolone sodium succinate Injectable 40 milliGRAM(s) IV Push daily  pantoprazole    Tablet 40 milliGRAM(s) Oral daily  tiotropium 18 MICROgram(s) Capsule 1 Capsule(s) Inhalation daily    MEDICATIONS  (PRN):  morphine  - Injectable 4 milliGRAM(s) IV Push every 4 hours PRN Severe Pain (7 - 10)  ondansetron Injectable 4 milliGRAM(s) IV Push every 6 hours PRN Nausea  traMADol 50 milliGRAM(s) Oral every 4 hours PRN Moderate Pain (4 - 6)        Assessment  76y Female  w/ PAST MEDICAL & SURGICAL HISTORY:  Hypothyroidism    Diabetes mellitus  diet controlled    Back pain    Asthma    Elevated pancreatic enzyme  patient reports elevated lab 6/2013. Gastroenterologist aware    History of cataract  extracted from right and left    Varicose veins of both lower extremities  surgery    Diverticulosis of intestine without bleeding, unspecified intestinal tract location    Gall stones  gall bladder removed    Urinary urgency    Spinal stenosis of lumbar region    DDD (degenerative disc disease), lumbar    Basal cell carcinoma  removed from face    Pulmonary emphysema, unspecified emphysema type  2017 last exacerbation; never intubated    OA (osteoarthritis)    Frequent UTI  2-3x/year; had an episode 5 weeks ago was treated denies dysuria    GERD (gastroesophageal reflux disease)    Leg swelling    Emphysema lung    COVID-19 vaccine series completed  Moderna - 2nd dose 02/20/2021    Knee pain, left    Scar tissue  left knee S/P TKR    COPD exacerbation    S/P bladder repair  bladder lift 2/2013    Dental disorder  Dental surgery 12/2012, patient reports that upper portion is glued in at this time, plan is to have dental implants placed.    S/P knee surgery  arthroscopy bilateral 2007 and 2010    Skin cancer  to right temple 2012, removed, history of basal cell x 2    H/O lumbosacral spine surgery  fusion 2017    H/O umbilical hernia repair  5/24/19    S/P cholecystectomy  2012    S/P colonoscopy  benign polyp    S/P knee replacement  right 2017, left 11/2019    admitted with complaints of Patient is a 76y old  Female who presents with a chief complaint of Acute hypoxic respiratory failure  COPD exacerbation (06 Nov 2021 12:32)

## 2021-11-06 NOTE — PHYSICAL THERAPY INITIAL EVALUATION ADULT - ADDITIONAL COMMENTS
Pt. reported lives with her  has no step to enter inside 7+7 steps + HR. Pt. was Ind with amb and ADL's uses walking stick for long distance amb.

## 2021-11-06 NOTE — PHYSICAL THERAPY INITIAL EVALUATION ADULT - PERTINENT HX OF CURRENT PROBLEM, REHAB EVAL
(0) independent
76 year old female patient with extensive prior smoking history( 17 year smoker of 1.5 PPD) s/p progressively worsening SOB, acute respiratory failure, COPD exacerbation, PTX, PNA, chest x-ray There is a large right-sided pneumothorax

## 2021-11-06 NOTE — PROGRESS NOTE ADULT - ASSESSMENT
76 y.o. female with PMHx of COPD/Emphysema, DMII, Hypothyroidism, Diverticulosis, cataracts, Varicose veins, DDD with LS stenosis, GERD, skin CA p/w acute onset of SOB - diagnosed with spontaneous Right sided PTX and treated with pigtail placement.    1. Spontaneous right sided PTX in the settings of emphysema s/p pigtail placement with lung expansion but suspect there is anterior air with decreased BS   - cont pigtail - management as per CTS, pain control   - IV steroids for underlying COPD   - cont BDs    2. DMII - HISS    3. Hypothyroidism - synthroid    4. GERD - PPI    5. VTE proph - UFH    Discussed with pt and daughter, CTS PA  Time spent 54 min

## 2021-11-06 NOTE — PROGRESS NOTE ADULT - SUBJECTIVE AND OBJECTIVE BOX
CC: Acute hypoxic respiratory failure  COPD exacerbation (05 Nov 2021 15:17)    HPI:76 year old female patient with extensive prior smoking history( 17 year smoker of 1.5 PPD) presented to the ED complaining of a one day history of progressively worsening shortness of breath. States it has become extremely hard to ambulate or stand due to dyspnea.  Denies any associated fever, cough, recent illness known sick contacts, chest pain or palpitations. Patient does endorse mild pleurisy. States her ambulatory pulse oxy reading was 78 percent on room air prior to coming in.    In the ED patient found to have a oxygen saturation of 92 percent on room air. (05 Nov 2021 12:20)    INTERVAL HPI/OVERNIGHT EVENTS: s/p right pigtail with expansion of lung on repeat CXR, c/o pain at CT site, SOB resolved  Other ROS reviewed and neg     Vital Signs Last 24 Hrs  T(C): 36.8 (06 Nov 2021 04:00), Max: 36.8 (05 Nov 2021 16:37)  T(F): 98.2 (06 Nov 2021 04:00), Max: 98.3 (05 Nov 2021 20:32)  HR: 71 (06 Nov 2021 08:00) (65 - 93)  BP: 117/54 (06 Nov 2021 08:00) (110/59 - 154/86)  BP(mean): 74 (06 Nov 2021 08:00) (74 - 90)  RR: 17 (06 Nov 2021 08:00) (14 - 21)  SpO2: 92% (06 Nov 2021 08:00) (91% - 94%)  I&O's Detail    05 Nov 2021 07:01  -  06 Nov 2021 07:00  --------------------------------------------------------  IN:    IV PiggyBack: 100 mL  Total IN: 100 mL    OUT:    Chest Tube (mL): 20 mL    Voided (mL): 850 mL  Total OUT: 870 mL    Total NET: -770 mL                        13.2   10.24 )-----------( 254      ( 06 Nov 2021 07:24 )             40.3     06 Nov 2021 07:24    137    |  105    |  17     ----------------------------<  188    4.2     |  25     |  0.68     Ca    8.8        06 Nov 2021 07:24  Phos  3.1       06 Nov 2021 07:24  Mg     2.3       06 Nov 2021 07:24    TPro  7.3    /  Alb  3.2    /  TBili  0.3    /  DBili  x      /  AST  14     /  ALT  18     /  AlkPhos  71     06 Nov 2021 07:24    PT/INR - ( 06 Nov 2021 07:24 )   PT: 12.3 sec;   INR: 1.05 ratio       PTT - ( 05 Nov 2021 11:31 )  PTT:27.3 sec  CAPILLARY BLOOD GLUCOSE    POCT Blood Glucose.: 295 mg/dL (05 Nov 2021 21:50)  POCT Blood Glucose.: 221 mg/dL (05 Nov 2021 18:31)  POCT Blood Glucose.: 218 mg/dL (05 Nov 2021 17:06)    LIVER FUNCTIONS - ( 06 Nov 2021 07:24 )  Alb: 3.2 g/dL / Pro: 7.3 gm/dL / ALK PHOS: 71 U/L / ALT: 18 U/L / AST: 14 U/L / GGT: x           MEDICATIONS  (STANDING):  acetaminophen   IVPB .. 1000 milliGRAM(s) IV Intermittent once  ALBUTerol    90 MICROgram(s) HFA Inhaler 2 Puff(s) Inhalation every 6 hours  budesonide 160 MICROgram(s)/formoterol 4.5 MICROgram(s) Inhaler 2 Puff(s) Inhalation two times a day  dextrose 40% Gel 15 Gram(s) Oral once  dextrose 5%. 1000 milliLiter(s) (50 mL/Hr) IV Continuous <Continuous>  dextrose 5%. 1000 milliLiter(s) (100 mL/Hr) IV Continuous <Continuous>  dextrose 50% Injectable 25 Gram(s) IV Push once  dextrose 50% Injectable 12.5 Gram(s) IV Push once  dextrose 50% Injectable 25 Gram(s) IV Push once  furosemide    Tablet 40 milliGRAM(s) Oral daily  glucagon  Injectable 1 milliGRAM(s) IntraMuscular once  insulin lispro (ADMELOG) corrective regimen sliding scale   SubCutaneous three times a day before meals  levothyroxine 137 MICROGram(s) Oral daily  methylPREDNISolone sodium succinate Injectable 40 milliGRAM(s) IV Push daily  pantoprazole    Tablet 40 milliGRAM(s) Oral daily  tiotropium 18 MICROgram(s) Capsule 1 Capsule(s) Inhalation daily    MEDICATIONS  (PRN):  morphine  - Injectable 4 milliGRAM(s) IV Push every 4 hours PRN Severe Pain (7 - 10)  ondansetron Injectable 4 milliGRAM(s) IV Push every 6 hours PRN Nausea  traMADol 50 milliGRAM(s) Oral every 4 hours PRN Moderate Pain (4 - 6)    RADIOLOGY & ADDITIONAL TESTS: personally visualized    All previous medical records personally reviewed    PHYSICAL EXAM:    General: female in no acute distress  Eyes: PERRLA, EOMI; conjunctiva and sclera clear  Head: Normocephalic; atraumatic  ENMT: No nasal discharge; airway clear  Neck: Supple; non tender; no masses  Respiratory: decreased BS on Right, CT in place with no drainage  Cardiovascular: S1, S2 reg  Gastrointestinal: Soft non-tender non-distended; Normal bowel sounds  Genitourinary: No costovertebral angle tenderness  Extremities: No clubbing, cyanosis or edema  Vascular: Peripheral pulses palpable 2+ bilaterally  Neurological: Alert and oriented x4  Skin: Warm and dry.  Musculoskeletal: Normal tone, without deformities  Psychiatric: Cooperative and appropriate

## 2021-11-06 NOTE — CONSULT NOTE ADULT - ASSESSMENT
76 year-old woman, presents with shorntess of breath, now with secondary spontaneus pneumothorax s/p chest tube placment.     Problem List:  1. Acute hypoxic resp. failure  2. Pneumothorax  3. Blebs/Emphsema/COPD    Recommendations;  --Chest tube in place, attached to atrium. No air leak noted  --Management as per thoracic surgery  --c/w inhaler regiment.  --Start to wean steroids.     Will follow

## 2021-11-06 NOTE — PROGRESS NOTE ADULT - ASSESSMENT
76 year old female presents with SOB and chest pressure now with a large Right PTX on CXR. S/P right pigtail placement 11/5 with good resolution.     Plan   Pain control  CT Chest today non con (ordered)  Continue with chest tube to -20 suction for at least 24H  OK to ambulate and go to CT on water seal  Will plan to water seal tomorrow  Record output per shift  Daily CXR  D/W Dr. Yfn Lyles PA  Thoracic Surgery   #[id number]

## 2021-11-07 PROCEDURE — 99233 SBSQ HOSP IP/OBS HIGH 50: CPT

## 2021-11-07 PROCEDURE — 71045 X-RAY EXAM CHEST 1 VIEW: CPT | Mod: 26

## 2021-11-07 RX ORDER — POLYETHYLENE GLYCOL 3350 17 G/17G
17 POWDER, FOR SOLUTION ORAL DAILY
Refills: 0 | Status: DISCONTINUED | OUTPATIENT
Start: 2021-11-07 | End: 2021-11-10

## 2021-11-07 RX ADMIN — Medication 40 MILLIGRAM(S): at 08:36

## 2021-11-07 RX ADMIN — LIDOCAINE 1 PATCH: 4 CREAM TOPICAL at 08:37

## 2021-11-07 RX ADMIN — MORPHINE SULFATE 4 MILLIGRAM(S): 50 CAPSULE, EXTENDED RELEASE ORAL at 23:15

## 2021-11-07 RX ADMIN — PANTOPRAZOLE SODIUM 40 MILLIGRAM(S): 20 TABLET, DELAYED RELEASE ORAL at 08:37

## 2021-11-07 RX ADMIN — TRAMADOL HYDROCHLORIDE 50 MILLIGRAM(S): 50 TABLET ORAL at 11:42

## 2021-11-07 RX ADMIN — TRAMADOL HYDROCHLORIDE 50 MILLIGRAM(S): 50 TABLET ORAL at 18:14

## 2021-11-07 RX ADMIN — TRAMADOL HYDROCHLORIDE 50 MILLIGRAM(S): 50 TABLET ORAL at 12:45

## 2021-11-07 RX ADMIN — LIDOCAINE 1 PATCH: 4 CREAM TOPICAL at 00:00

## 2021-11-07 RX ADMIN — POLYETHYLENE GLYCOL 3350 17 GRAM(S): 17 POWDER, FOR SOLUTION ORAL at 21:09

## 2021-11-07 RX ADMIN — MORPHINE SULFATE 4 MILLIGRAM(S): 50 CAPSULE, EXTENDED RELEASE ORAL at 09:34

## 2021-11-07 RX ADMIN — TIOTROPIUM BROMIDE 1 CAPSULE(S): 18 CAPSULE ORAL; RESPIRATORY (INHALATION) at 11:47

## 2021-11-07 RX ADMIN — MORPHINE SULFATE 4 MILLIGRAM(S): 50 CAPSULE, EXTENDED RELEASE ORAL at 18:58

## 2021-11-07 RX ADMIN — Medication 137 MICROGRAM(S): at 05:15

## 2021-11-07 RX ADMIN — MORPHINE SULFATE 4 MILLIGRAM(S): 50 CAPSULE, EXTENDED RELEASE ORAL at 22:52

## 2021-11-07 RX ADMIN — TRAMADOL HYDROCHLORIDE 50 MILLIGRAM(S): 50 TABLET ORAL at 19:00

## 2021-11-07 RX ADMIN — MORPHINE SULFATE 4 MILLIGRAM(S): 50 CAPSULE, EXTENDED RELEASE ORAL at 03:00

## 2021-11-07 RX ADMIN — Medication 3: at 12:56

## 2021-11-07 RX ADMIN — MORPHINE SULFATE 4 MILLIGRAM(S): 50 CAPSULE, EXTENDED RELEASE ORAL at 14:56

## 2021-11-07 RX ADMIN — LIDOCAINE 1 PATCH: 4 CREAM TOPICAL at 19:37

## 2021-11-07 RX ADMIN — MORPHINE SULFATE 4 MILLIGRAM(S): 50 CAPSULE, EXTENDED RELEASE ORAL at 02:30

## 2021-11-07 RX ADMIN — MORPHINE SULFATE 4 MILLIGRAM(S): 50 CAPSULE, EXTENDED RELEASE ORAL at 08:34

## 2021-11-07 RX ADMIN — LIDOCAINE 1 PATCH: 4 CREAM TOPICAL at 21:15

## 2021-11-07 RX ADMIN — MORPHINE SULFATE 4 MILLIGRAM(S): 50 CAPSULE, EXTENDED RELEASE ORAL at 13:56

## 2021-11-07 RX ADMIN — Medication 3: at 18:34

## 2021-11-07 RX ADMIN — MORPHINE SULFATE 4 MILLIGRAM(S): 50 CAPSULE, EXTENDED RELEASE ORAL at 19:30

## 2021-11-07 NOTE — PROGRESS NOTE ADULT - ASSESSMENT
76 y.o. female with PMHx of COPD/Emphysema, DMII, Hypothyroidism, Diverticulosis, cataracts, Varicose veins, DDD with LS stenosis, GERD, skin CA p/w acute onset of SOB - diagnosed with spontaneous Right sided PTX and treated with pigtail placement.    1. Spontaneous right sided PTX in the settings of emphysema s/p pigtail placement with lung expansion with large blebs of air BL on CT chest   - cont pigtail - management as per CTS, pain control   - IV steroids for underlying COPD - change to po taper   - cont BDs    2. DMII - HISS    3. Hypothyroidism - synthroid    4. GERD - PPI    5. VTE proph - UFH    Discussed with pt and daughter, CTS PA  Time spent 44 min

## 2021-11-07 NOTE — PROGRESS NOTE ADULT - ASSESSMENT
76 year-old woman, presents with shorntess of breath, now with pneumothorax s/p chest tube placment.     Problem List:  1. Acute hypoxic resp. failure  2. Pneumothorax - secondary spontaneous   3. Bulllous disease/Emphsema/COPD    Recommendations;  --Chest tube in place, attached to water seal  --CT shows significant bullous disease likely cuase of pneumothorax  --Defer to thoracic surgery regarding management  --c/w inhaler regiment.  --Now on prednisone    Will follow

## 2021-11-07 NOTE — PROGRESS NOTE ADULT - SUBJECTIVE AND OBJECTIVE BOX
Pt seen in fu  AVSS  comfortable  CT minimal output  small leak  Will go to seal today.  Poss dc tube in am

## 2021-11-07 NOTE — PROGRESS NOTE ADULT - SUBJECTIVE AND OBJECTIVE BOX
CC: Acute hypoxic respiratory failure  COPD exacerbation (05 Nov 2021 15:17)    HPI:76 year old female patient with extensive prior smoking history( 17 year smoker of 1.5 PPD) presented to the ED complaining of a one day history of progressively worsening shortness of breath. States it has become extremely hard to ambulate or stand due to dyspnea.  Denies any associated fever, cough, recent illness known sick contacts, chest pain or palpitations. Patient does endorse mild pleurisy. States her ambulatory pulse oxy reading was 78 percent on room air prior to coming in.    In the ED patient found to have a oxygen saturation of 92 percent on room air. (05 Nov 2021 12:20)    INTERVAL HPI/OVERNIGHT EVENTS: s/p right pigtail with expansion of lung on repeat CXR, c/o pain at CT site - controlled, SOB resolved  Other ROS reviewed and neg     Vital Signs Last 24 Hrs  T(C): 36.6 (07 Nov 2021 08:22), Max: 36.9 (06 Nov 2021 14:21)  T(F): 97.8 (07 Nov 2021 08:22), Max: 98.5 (06 Nov 2021 14:21)  HR: 64 (07 Nov 2021 09:00) (51 - 84)  BP: 131/70 (07 Nov 2021 09:00) (106/60 - 140/111)  BP(mean): 88 (07 Nov 2021 09:00) (74 - 122)  RR: 20 (07 Nov 2021 09:00) (12 - 21)  SpO2: 92% (07 Nov 2021 09:00) (91% - 95%)  I&O's Detail    06 Nov 2021 08:01  -  07 Nov 2021 07:00  --------------------------------------------------------  IN:    Oral Fluid: 120 mL  Total IN: 120 mL    OUT:    Voided (mL): 2050 mL  Total OUT: 2050 mL    Total NET: -1930 mL                        13.2   10.24 )-----------( 254      ( 06 Nov 2021 07:24 )             40.3     06 Nov 2021 07:24    137    |  105    |  17     ----------------------------<  188    4.2     |  25     |  0.68     Ca    8.8        06 Nov 2021 07:24  Phos  3.1       06 Nov 2021 07:24  Mg     2.3       06 Nov 2021 07:24    TPro  7.3    /  Alb  3.2    /  TBili  0.3    /  DBili  x      /  AST  14     /  ALT  18     /  AlkPhos  71     06 Nov 2021 07:24    PT/INR - ( 06 Nov 2021 07:24 )   PT: 12.3 sec;   INR: 1.05 ratio       PTT - ( 05 Nov 2021 11:31 )  PTT:27.3 sec  CAPILLARY BLOOD GLUCOSE    POCT Blood Glucose.: 133 mg/dL (07 Nov 2021 08:45)  POCT Blood Glucose.: 235 mg/dL (06 Nov 2021 22:56)  POCT Blood Glucose.: 300 mg/dL (06 Nov 2021 17:38)  POCT Blood Glucose.: 195 mg/dL (06 Nov 2021 12:56)    LIVER FUNCTIONS - ( 06 Nov 2021 07:24 )  Alb: 3.2 g/dL / Pro: 7.3 gm/dL / ALK PHOS: 71 U/L / ALT: 18 U/L / AST: 14 U/L / GGT: x           MEDICATIONS  (STANDING):  acetaminophen   IVPB .. 1000 milliGRAM(s) IV Intermittent once  ALBUTerol    90 MICROgram(s) HFA Inhaler 2 Puff(s) Inhalation every 6 hours  budesonide 160 MICROgram(s)/formoterol 4.5 MICROgram(s) Inhaler 2 Puff(s) Inhalation two times a day  dextrose 40% Gel 15 Gram(s) Oral once  dextrose 5%. 1000 milliLiter(s) (50 mL/Hr) IV Continuous <Continuous>  dextrose 5%. 1000 milliLiter(s) (100 mL/Hr) IV Continuous <Continuous>  dextrose 50% Injectable 25 Gram(s) IV Push once  dextrose 50% Injectable 12.5 Gram(s) IV Push once  dextrose 50% Injectable 25 Gram(s) IV Push once  furosemide    Tablet 40 milliGRAM(s) Oral daily  glucagon  Injectable 1 milliGRAM(s) IntraMuscular once  insulin lispro (ADMELOG) corrective regimen sliding scale   SubCutaneous three times a day before meals  levothyroxine 137 MICROGram(s) Oral daily  lidocaine   4% Patch 1 Patch Transdermal daily  methylPREDNISolone sodium succinate Injectable 40 milliGRAM(s) IV Push daily  pantoprazole    Tablet 40 milliGRAM(s) Oral daily  tiotropium 18 MICROgram(s) Capsule 1 Capsule(s) Inhalation daily    MEDICATIONS  (PRN):  morphine  - Injectable 4 milliGRAM(s) IV Push every 4 hours PRN Severe Pain (7 - 10)  ondansetron Injectable 4 milliGRAM(s) IV Push every 6 hours PRN Nausea  traMADol 50 milliGRAM(s) Oral every 4 hours PRN Moderate Pain (4 - 6)    RADIOLOGY & ADDITIONAL TESTS: personally visualized    All previous medical records personally reviewed    PHYSICAL EXAM:    General: female in no acute distress  Eyes: PERRLA, EOMI; conjunctiva and sclera clear  Head: Normocephalic; atraumatic  ENMT: No nasal discharge; airway clear  Neck: Supple; non tender; no masses  Respiratory: decreased BS on Right, CT in place with no drainage  Cardiovascular: S1, S2 reg  Gastrointestinal: Soft non-tender non-distended; Normal bowel sounds  Genitourinary: No costovertebral angle tenderness  Extremities: No clubbing, cyanosis or edema  Vascular: Peripheral pulses palpable 2+ bilaterally  Neurological: Alert and oriented x4  Skin: Warm and dry.  Musculoskeletal: Normal tone, without deformities  Psychiatric: Cooperative and appropriate

## 2021-11-07 NOTE — PROGRESS NOTE ADULT - SUBJECTIVE AND OBJECTIVE BOX
SUBJECTIVE:    Seen and examined today.  Breathing improved.   No wheezing.  No fevers.  Eating well.  Ambulating     Review of systems otherwise negative        PHYSICAL EXAMINATION:    GENERAL APPEARANCE:  No distress. No accessory muscle use. Well appearing.   HEAD: Normocephalic atraumatic   EYES: Pupils are normal. No icterus. Sclera white.   NECK:  Supple. No JVD.  No stridor.  HEART:  Normal S1 and S2.   CHEST: breath sounds bilaterally. No wheezing. Chest tube in place connected to waterseal  ABDOMEN:  Soft and nontender. Nondistended.   EXTREMITIES:  There is no cyanosis, clubbing or edema.   SKIN:  No rash or significant lesions are noted. No jaundice.  PSYCH: Normal affect.  NEURO: Alert and oriented. Responds to question appropriate. No focal deficits appreciated.         Vital Signs Last 24 Hrs  T(C): 36.6 (07 Nov 2021 08:22), Max: 36.9 (06 Nov 2021 14:21)  T(F): 97.8 (07 Nov 2021 08:22), Max: 98.5 (06 Nov 2021 14:21)  HR: 74 (07 Nov 2021 12:00) (51 - 84)  BP: 130/67 (07 Nov 2021 12:00) (106/60 - 140/111)  BP(mean): 87 (07 Nov 2021 12:00) (74 - 122)  RR: 16 (07 Nov 2021 12:00) (12 - 21)  SpO2: 92% (07 Nov 2021 10:00) (91% - 95%)    MEDICATIONS  (STANDING):  acetaminophen   IVPB .. 1000 milliGRAM(s) IV Intermittent once  ALBUTerol    90 MICROgram(s) HFA Inhaler 2 Puff(s) Inhalation every 6 hours  budesonide 160 MICROgram(s)/formoterol 4.5 MICROgram(s) Inhaler 2 Puff(s) Inhalation two times a day  dextrose 40% Gel 15 Gram(s) Oral once  dextrose 5%. 1000 milliLiter(s) (50 mL/Hr) IV Continuous <Continuous>  dextrose 5%. 1000 milliLiter(s) (100 mL/Hr) IV Continuous <Continuous>  dextrose 50% Injectable 25 Gram(s) IV Push once  dextrose 50% Injectable 12.5 Gram(s) IV Push once  dextrose 50% Injectable 25 Gram(s) IV Push once  furosemide    Tablet 40 milliGRAM(s) Oral daily  glucagon  Injectable 1 milliGRAM(s) IntraMuscular once  insulin lispro (ADMELOG) corrective regimen sliding scale   SubCutaneous three times a day before meals  levothyroxine 137 MICROGram(s) Oral daily  lidocaine   4% Patch 1 Patch Transdermal daily  pantoprazole    Tablet 40 milliGRAM(s) Oral daily  predniSONE   Tablet 40 milliGRAM(s) Oral daily  tiotropium 18 MICROgram(s) Capsule 1 Capsule(s) Inhalation daily    MEDICATIONS  (PRN):  morphine  - Injectable 4 milliGRAM(s) IV Push every 4 hours PRN Severe Pain (7 - 10)  ondansetron Injectable 4 milliGRAM(s) IV Push every 6 hours PRN Nausea  traMADol 50 milliGRAM(s) Oral every 4 hours PRN Moderate Pain (4 - 6)      Allergies    Breo Ellipta (Rash)    Intolerances          LABS:                        13.2   10.24 )-----------( 254      ( 06 Nov 2021 07:24 )             40.3     11-06    137  |  105  |  17  ----------------------------<  188<H>  4.2   |  25  |  0.68        RADIOLOGY & ADDITIONAL STUDIES:   *imaging personally reviewed  IMPRESSION:  Bullous disease involving the upper lobes and right-sided chest tube extending into the dominant bulla. No evidence of pneumothorax.    Bilateral lower lobe atelectasis, right more than left.

## 2021-11-08 LAB
ANION GAP SERPL CALC-SCNC: 7 MMOL/L — SIGNIFICANT CHANGE UP (ref 5–17)
BUN SERPL-MCNC: 18 MG/DL — SIGNIFICANT CHANGE UP (ref 7–23)
CALCIUM SERPL-MCNC: 8.9 MG/DL — SIGNIFICANT CHANGE UP (ref 8.5–10.1)
CHLORIDE SERPL-SCNC: 101 MMOL/L — SIGNIFICANT CHANGE UP (ref 96–108)
CO2 SERPL-SCNC: 28 MMOL/L — SIGNIFICANT CHANGE UP (ref 22–31)
CREAT SERPL-MCNC: 0.78 MG/DL — SIGNIFICANT CHANGE UP (ref 0.5–1.3)
GLUCOSE SERPL-MCNC: 181 MG/DL — HIGH (ref 70–99)
HCT VFR BLD CALC: 39.4 % — SIGNIFICANT CHANGE UP (ref 34.5–45)
HGB BLD-MCNC: 12.6 G/DL — SIGNIFICANT CHANGE UP (ref 11.5–15.5)
MCHC RBC-ENTMCNC: 29 PG — SIGNIFICANT CHANGE UP (ref 27–34)
MCHC RBC-ENTMCNC: 32 GM/DL — SIGNIFICANT CHANGE UP (ref 32–36)
MCV RBC AUTO: 90.6 FL — SIGNIFICANT CHANGE UP (ref 80–100)
PLATELET # BLD AUTO: 245 K/UL — SIGNIFICANT CHANGE UP (ref 150–400)
POTASSIUM SERPL-MCNC: 3.9 MMOL/L — SIGNIFICANT CHANGE UP (ref 3.5–5.3)
POTASSIUM SERPL-SCNC: 3.9 MMOL/L — SIGNIFICANT CHANGE UP (ref 3.5–5.3)
RBC # BLD: 4.35 M/UL — SIGNIFICANT CHANGE UP (ref 3.8–5.2)
RBC # FLD: 14.5 % — SIGNIFICANT CHANGE UP (ref 10.3–14.5)
SODIUM SERPL-SCNC: 136 MMOL/L — SIGNIFICANT CHANGE UP (ref 135–145)
WBC # BLD: 12.73 K/UL — HIGH (ref 3.8–10.5)
WBC # FLD AUTO: 12.73 K/UL — HIGH (ref 3.8–10.5)

## 2021-11-08 PROCEDURE — 71045 X-RAY EXAM CHEST 1 VIEW: CPT | Mod: 26

## 2021-11-08 PROCEDURE — 99233 SBSQ HOSP IP/OBS HIGH 50: CPT

## 2021-11-08 PROCEDURE — 99231 SBSQ HOSP IP/OBS SF/LOW 25: CPT

## 2021-11-08 RX ORDER — ENOXAPARIN SODIUM 100 MG/ML
40 INJECTION SUBCUTANEOUS DAILY
Refills: 0 | Status: DISCONTINUED | OUTPATIENT
Start: 2021-11-08 | End: 2021-11-10

## 2021-11-08 RX ORDER — SENNA PLUS 8.6 MG/1
2 TABLET ORAL AT BEDTIME
Refills: 0 | Status: DISCONTINUED | OUTPATIENT
Start: 2021-11-08 | End: 2021-11-10

## 2021-11-08 RX ORDER — INSULIN LISPRO 100/ML
VIAL (ML) SUBCUTANEOUS
Refills: 0 | Status: DISCONTINUED | OUTPATIENT
Start: 2021-11-08 | End: 2021-11-10

## 2021-11-08 RX ORDER — MORPHINE SULFATE 50 MG/1
4 CAPSULE, EXTENDED RELEASE ORAL EVERY 6 HOURS
Refills: 0 | Status: DISCONTINUED | OUTPATIENT
Start: 2021-11-08 | End: 2021-11-10

## 2021-11-08 RX ADMIN — TIOTROPIUM BROMIDE 1 CAPSULE(S): 18 CAPSULE ORAL; RESPIRATORY (INHALATION) at 08:39

## 2021-11-08 RX ADMIN — TRAMADOL HYDROCHLORIDE 50 MILLIGRAM(S): 50 TABLET ORAL at 15:03

## 2021-11-08 RX ADMIN — Medication 4: at 21:40

## 2021-11-08 RX ADMIN — Medication 1: at 08:14

## 2021-11-08 RX ADMIN — ENOXAPARIN SODIUM 40 MILLIGRAM(S): 100 INJECTION SUBCUTANEOUS at 19:59

## 2021-11-08 RX ADMIN — LIDOCAINE 1 PATCH: 4 CREAM TOPICAL at 11:31

## 2021-11-08 RX ADMIN — Medication 137 MICROGRAM(S): at 05:44

## 2021-11-08 RX ADMIN — SENNA PLUS 2 TABLET(S): 8.6 TABLET ORAL at 21:39

## 2021-11-08 RX ADMIN — MORPHINE SULFATE 4 MILLIGRAM(S): 50 CAPSULE, EXTENDED RELEASE ORAL at 05:47

## 2021-11-08 RX ADMIN — LIDOCAINE 1 PATCH: 4 CREAM TOPICAL at 21:21

## 2021-11-08 RX ADMIN — MORPHINE SULFATE 4 MILLIGRAM(S): 50 CAPSULE, EXTENDED RELEASE ORAL at 06:20

## 2021-11-08 RX ADMIN — MORPHINE SULFATE 4 MILLIGRAM(S): 50 CAPSULE, EXTENDED RELEASE ORAL at 17:31

## 2021-11-08 RX ADMIN — Medication 40 MILLIGRAM(S): at 11:32

## 2021-11-08 RX ADMIN — POLYETHYLENE GLYCOL 3350 17 GRAM(S): 17 POWDER, FOR SOLUTION ORAL at 11:31

## 2021-11-08 RX ADMIN — MORPHINE SULFATE 4 MILLIGRAM(S): 50 CAPSULE, EXTENDED RELEASE ORAL at 22:00

## 2021-11-08 RX ADMIN — MORPHINE SULFATE 4 MILLIGRAM(S): 50 CAPSULE, EXTENDED RELEASE ORAL at 11:37

## 2021-11-08 RX ADMIN — TRAMADOL HYDROCHLORIDE 50 MILLIGRAM(S): 50 TABLET ORAL at 15:18

## 2021-11-08 RX ADMIN — MORPHINE SULFATE 4 MILLIGRAM(S): 50 CAPSULE, EXTENDED RELEASE ORAL at 21:38

## 2021-11-08 RX ADMIN — MORPHINE SULFATE 4 MILLIGRAM(S): 50 CAPSULE, EXTENDED RELEASE ORAL at 11:52

## 2021-11-08 RX ADMIN — PANTOPRAZOLE SODIUM 40 MILLIGRAM(S): 20 TABLET, DELAYED RELEASE ORAL at 11:33

## 2021-11-08 RX ADMIN — MORPHINE SULFATE 4 MILLIGRAM(S): 50 CAPSULE, EXTENDED RELEASE ORAL at 17:16

## 2021-11-08 RX ADMIN — Medication 4: at 17:09

## 2021-11-08 RX ADMIN — Medication 40 MILLIGRAM(S): at 11:33

## 2021-11-08 NOTE — PROGRESS NOTE ADULT - ASSESSMENT
76 year old female presents with SOB and chest pressure now with a large Right PTX on CXR. S/P right pigtail placement 11/5 with good resolution.     Plan   Pain control  Clamp CT today  will obtain CXR this afternoon   If no PTX then will d/c CT today   OK to ambulate and go to CT on water seal  spoke with Dr Dowd.  Plan to f/u next week as an outpatient with a CXR   D/W Dr. Coulter

## 2021-11-08 NOTE — PROGRESS NOTE ADULT - ASSESSMENT
76 y.o. female with PMHx of COPD/Emphysema, DMII, Hypothyroidism, Diverticulosis, cataracts, Varicose veins, DDD with LS stenosis, GERD, skin CA p/w acute onset of SOB - diagnosed with spontaneous Right sided PTX and treated with pigtail placement.    1. Spontaneous right sided PTX in the settings of emphysema s/p pigtail placement with lung expansion with large blebs of air BL on CT chest   - cont pigtail - management as per CTS, pain control   - IV steroids for underlying COPD - change to po taper   - cont BDs    2. DMII - HISS    3. Hypothyroidism - synthroid    4. GERD - PPI    5. VTE proph - UFH    Discussed with pt and daughter, CTS PA  Time spent 44 min 76 y.o. female with PMHx of COPD/Emphysema, DMII, Hypothyroidism, Diverticulosis, cataracts, Varicose veins, DDD with LS stenosis, GERD, skin CA p/w acute onset of SOB - diagnosed with spontaneous Right sided PTX and treated with pigtail placement.    1. Spontaneous right sided PTX in the settings of emphysema s/p pigtail placement with lung expansion with large blebs of air BL on CT chest   - pigtail clamped potentially remove later today   - IV steroids for underlying COPD - changed to po taper   - cont BDs    2. DMII - HISS    3. Hypothyroidism - synthroid    4. GERD - PPI    5. VTE proph - UFH    Discussed with pt and daughter, CTS PA  DC planning in am  Time spent 44 min

## 2021-11-08 NOTE — PROGRESS NOTE ADULT - ASSESSMENT
76 year-old woman, presents with shorntess of breath, now with pneumothorax s/p chest tube placment.     Problem List:  1. Acute hypoxic resp. failure  2. Pneumothorax - secondary spontaneous   3. Bulllous disease/Emphsema/COPD    Recommendations;  --Chest tube in place, clamped  --CT shows significant bullous disease likely cuase of pneumothorax  --Defer to thoracic surgery regarding management  --c/w inhaler regiment.  --Now on prednisone

## 2021-11-08 NOTE — PROGRESS NOTE ADULT - SUBJECTIVE AND OBJECTIVE BOX
CC: Acute hypoxic respiratory failure  COPD exacerbation (05 Nov 2021 15:17)    HPI:76 year old female patient with extensive prior smoking history( 17 year smoker of 1.5 PPD) presented to the ED complaining of a one day history of progressively worsening shortness of breath. States it has become extremely hard to ambulate or stand due to dyspnea.  Denies any associated fever, cough, recent illness known sick contacts, chest pain or palpitations. Patient does endorse mild pleurisy. States her ambulatory pulse oxy reading was 78 percent on room air prior to coming in.    In the ED patient found to have a oxygen saturation of 92 percent on room air. (05 Nov 2021 12:20)    INTERVAL HPI/OVERNIGHT EVENTS: s/p right pigtail with expansion of lung on repeat CXR, c/o pain at CT site - controlled, SOB resolved  Other ROS reviewed and neg     Vital Signs Last 24 Hrs  T(C): 36.6 (07 Nov 2021 08:22), Max: 36.9 (06 Nov 2021 14:21)  T(F): 97.8 (07 Nov 2021 08:22), Max: 98.5 (06 Nov 2021 14:21)  HR: 64 (07 Nov 2021 09:00) (51 - 84)  BP: 131/70 (07 Nov 2021 09:00) (106/60 - 140/111)  BP(mean): 88 (07 Nov 2021 09:00) (74 - 122)  RR: 20 (07 Nov 2021 09:00) (12 - 21)  SpO2: 92% (07 Nov 2021 09:00) (91% - 95%)  I&O's Detail    06 Nov 2021 08:01  -  07 Nov 2021 07:00  --------------------------------------------------------  IN:    Oral Fluid: 120 mL  Total IN: 120 mL    OUT:    Voided (mL): 2050 mL  Total OUT: 2050 mL    Total NET: -1930 mL                        13.2   10.24 )-----------( 254      ( 06 Nov 2021 07:24 )             40.3     06 Nov 2021 07:24    137    |  105    |  17     ----------------------------<  188    4.2     |  25     |  0.68     Ca    8.8        06 Nov 2021 07:24  Phos  3.1       06 Nov 2021 07:24  Mg     2.3       06 Nov 2021 07:24    TPro  7.3    /  Alb  3.2    /  TBili  0.3    /  DBili  x      /  AST  14     /  ALT  18     /  AlkPhos  71     06 Nov 2021 07:24    PT/INR - ( 06 Nov 2021 07:24 )   PT: 12.3 sec;   INR: 1.05 ratio       PTT - ( 05 Nov 2021 11:31 )  PTT:27.3 sec  CAPILLARY BLOOD GLUCOSE    POCT Blood Glucose.: 133 mg/dL (07 Nov 2021 08:45)  POCT Blood Glucose.: 235 mg/dL (06 Nov 2021 22:56)  POCT Blood Glucose.: 300 mg/dL (06 Nov 2021 17:38)  POCT Blood Glucose.: 195 mg/dL (06 Nov 2021 12:56)    LIVER FUNCTIONS - ( 06 Nov 2021 07:24 )  Alb: 3.2 g/dL / Pro: 7.3 gm/dL / ALK PHOS: 71 U/L / ALT: 18 U/L / AST: 14 U/L / GGT: x           MEDICATIONS  (STANDING):  acetaminophen   IVPB .. 1000 milliGRAM(s) IV Intermittent once  ALBUTerol    90 MICROgram(s) HFA Inhaler 2 Puff(s) Inhalation every 6 hours  budesonide 160 MICROgram(s)/formoterol 4.5 MICROgram(s) Inhaler 2 Puff(s) Inhalation two times a day  dextrose 40% Gel 15 Gram(s) Oral once  dextrose 5%. 1000 milliLiter(s) (50 mL/Hr) IV Continuous <Continuous>  dextrose 5%. 1000 milliLiter(s) (100 mL/Hr) IV Continuous <Continuous>  dextrose 50% Injectable 25 Gram(s) IV Push once  dextrose 50% Injectable 12.5 Gram(s) IV Push once  dextrose 50% Injectable 25 Gram(s) IV Push once  furosemide    Tablet 40 milliGRAM(s) Oral daily  glucagon  Injectable 1 milliGRAM(s) IntraMuscular once  insulin lispro (ADMELOG) corrective regimen sliding scale   SubCutaneous three times a day before meals  levothyroxine 137 MICROGram(s) Oral daily  lidocaine   4% Patch 1 Patch Transdermal daily  methylPREDNISolone sodium succinate Injectable 40 milliGRAM(s) IV Push daily  pantoprazole    Tablet 40 milliGRAM(s) Oral daily  tiotropium 18 MICROgram(s) Capsule 1 Capsule(s) Inhalation daily    MEDICATIONS  (PRN):  morphine  - Injectable 4 milliGRAM(s) IV Push every 4 hours PRN Severe Pain (7 - 10)  ondansetron Injectable 4 milliGRAM(s) IV Push every 6 hours PRN Nausea  traMADol 50 milliGRAM(s) Oral every 4 hours PRN Moderate Pain (4 - 6)    RADIOLOGY & ADDITIONAL TESTS: personally visualized    All previous medical records personally reviewed    PHYSICAL EXAM:    General: female in no acute distress  Eyes: PERRLA, EOMI; conjunctiva and sclera clear  Head: Normocephalic; atraumatic  ENMT: No nasal discharge; airway clear  Neck: Supple; non tender; no masses  Respiratory: decreased BS on Right, CT in place with no drainage  Cardiovascular: S1, S2 reg  Gastrointestinal: Soft non-tender non-distended; Normal bowel sounds  Genitourinary: No costovertebral angle tenderness  Extremities: No clubbing, cyanosis or edema  Vascular: Peripheral pulses palpable 2+ bilaterally  Neurological: Alert and oriented x4  Skin: Warm and dry.  Musculoskeletal: Normal tone, without deformities  Psychiatric: Cooperative and appropriate   CC: Acute hypoxic respiratory failure  COPD exacerbation (05 Nov 2021 15:17)    HPI:76 year old female patient with extensive prior smoking history( 17 year smoker of 1.5 PPD) presented to the ED complaining of a one day history of progressively worsening shortness of breath. States it has become extremely hard to ambulate or stand due to dyspnea.  Denies any associated fever, cough, recent illness known sick contacts, chest pain or palpitations. Patient does endorse mild pleurisy. States her ambulatory pulse oxy reading was 78 percent on room air prior to coming in.    In the ED patient found to have a oxygen saturation of 92 percent on room air. (05 Nov 2021 12:20)    INTERVAL HPI/OVERNIGHT EVENTS: right pigtail clamped, repeat CXR,, some chest heaviness after clamping today  Other ROS reviewed and neg     Vital Signs Last 24 Hrs  T(C): 36.9 (08 Nov 2021 04:00), Max: 37.1 (07 Nov 2021 20:51)  T(F): 98.4 (08 Nov 2021 04:00), Max: 98.7 (07 Nov 2021 20:51)  HR: 63 (08 Nov 2021 10:00) (52 - 74)  BP: 108/64 (08 Nov 2021 10:00) (86/56 - 130/67)  BP(mean): 77 (08 Nov 2021 10:00) (66 - 88)  RR: 16 (08 Nov 2021 10:00) (13 - 20)  SpO2: 92% (08 Nov 2021 10:00) (92% - 95%)  I&O's Detail    07 Nov 2021 07:01  -  08 Nov 2021 07:00  --------------------------------------------------------  IN:  Total IN: 0 mL    OUT:    Chest Tube (mL): 19 mL    Voided (mL): 3150 mL  Total OUT: 3169 mL    Total NET: -3169 mL                      12.6   12.73 )-----------( 245      ( 08 Nov 2021 08:49 )             39.4     08 Nov 2021 08:49    136    |  101    |  18     ----------------------------<  181    3.9     |  28     |  0.78     Ca    8.9        08 Nov 2021 08:49    CAPILLARY BLOOD GLUCOSE    POCT Blood Glucose.: 155 mg/dL (08 Nov 2021 07:58)  POCT Blood Glucose.: 233 mg/dL (07 Nov 2021 21:33)  POCT Blood Glucose.: 281 mg/dL (07 Nov 2021 18:33)  POCT Blood Glucose.: 272 mg/dL (07 Nov 2021 12:50)    MEDICATIONS  (STANDING):  acetaminophen   IVPB .. 1000 milliGRAM(s) IV Intermittent once  ALBUTerol    90 MICROgram(s) HFA Inhaler 2 Puff(s) Inhalation every 6 hours  budesonide 160 MICROgram(s)/formoterol 4.5 MICROgram(s) Inhaler 2 Puff(s) Inhalation two times a day  dextrose 40% Gel 15 Gram(s) Oral once  dextrose 5%. 1000 milliLiter(s) (50 mL/Hr) IV Continuous <Continuous>  dextrose 5%. 1000 milliLiter(s) (100 mL/Hr) IV Continuous <Continuous>  dextrose 50% Injectable 25 Gram(s) IV Push once  dextrose 50% Injectable 12.5 Gram(s) IV Push once  dextrose 50% Injectable 25 Gram(s) IV Push once  furosemide    Tablet 40 milliGRAM(s) Oral daily  glucagon  Injectable 1 milliGRAM(s) IntraMuscular once  insulin lispro (ADMELOG) corrective regimen sliding scale   SubCutaneous three times a day before meals  levothyroxine 137 MICROGram(s) Oral daily  lidocaine   4% Patch 1 Patch Transdermal daily  pantoprazole    Tablet 40 milliGRAM(s) Oral daily  polyethylene glycol 3350 17 Gram(s) Oral daily  predniSONE   Tablet 40 milliGRAM(s) Oral daily  tiotropium 18 MICROgram(s) Capsule 1 Capsule(s) Inhalation daily    MEDICATIONS  (PRN):  morphine  - Injectable 4 milliGRAM(s) IV Push every 4 hours PRN Severe Pain (7 - 10)  ondansetron Injectable 4 milliGRAM(s) IV Push every 6 hours PRN Nausea  traMADol 50 milliGRAM(s) Oral every 4 hours PRN Moderate Pain (4 - 6)    RADIOLOGY & ADDITIONAL TESTS: personally visualized    All previous medical records personally reviewed    PHYSICAL EXAM:    General: female in no acute distress  Eyes: PERRLA, EOMI; conjunctiva and sclera clear  Head: Normocephalic; atraumatic  ENMT: No nasal discharge; airway clear  Neck: Supple; non tender; no masses  Respiratory: decreased BS on Right, CT in place clamped  Cardiovascular: S1, S2 reg  Gastrointestinal: Soft non-tender non-distended; Normal bowel sounds  Genitourinary: No costovertebral angle tenderness  Extremities: No clubbing, cyanosis or edema  Vascular: Peripheral pulses palpable 2+ bilaterally  Neurological: Alert and oriented x4  Skin: Warm and dry.  Musculoskeletal: Normal tone, without deformities  Psychiatric: Cooperative and appropriate

## 2021-11-08 NOTE — PROGRESS NOTE ADULT - ASSESSMENT
75 y/o female with pmhx of COPD/emphysema, DM II, hypothyroidism, DDD now with:    1. right ptx  2. constipation  3. hyperglycemia    - patient is on jardiance and janumet at home. added night time sliding scale coverage. she has only received 5 units in last 24 hours, but in setting of steroids and no home meds may need long acting coverage if remains elevated while in hospital. goal < 180.  - added senna to bowel regimen. patient is receiving high amounts of narcotics. 4 mg of morphine every 4 hours. weaned the frequency to every 6 hours. limit opioids as much as possible to improve bowel function  - f/u cxr in am

## 2021-11-08 NOTE — PROGRESS NOTE ADULT - SUBJECTIVE AND OBJECTIVE BOX
Patient is a 76y old  Female who presents with a chief complaint of Acute hypoxic respiratory failure  COPD exacerbation (08 Nov 2021 10:48)      BRIEF HOSPITAL COURSE: 77 y/o female with pmhx of COPD/emphysema, DM II, hypothyroidism, DDD who presented on 11/5 with sob found to have spontaneous right ptx s/p pigtail.    Events last 24 hours: pigtail clamped. called by RN for constipation and hyperglycemia    PAST MEDICAL & SURGICAL HISTORY:  Hypothyroidism    Diabetes mellitus  diet controlled    Back pain    Asthma    Elevated pancreatic enzyme  patient reports elevated lab 6/2013. Gastroenterologist aware    History of cataract  extracted from right and left    Varicose veins of both lower extremities  surgery    Diverticulosis of intestine without bleeding, unspecified intestinal tract location    Gall stones  gall bladder removed    Urinary urgency    Spinal stenosis of lumbar region    DDD (degenerative disc disease), lumbar    Basal cell carcinoma  removed from face    Pulmonary emphysema, unspecified emphysema type  2017 last exacerbation; never intubated    OA (osteoarthritis)    Frequent UTI  2-3x/year; had an episode 5 weeks ago was treated denies dysuria    GERD (gastroesophageal reflux disease)    Leg swelling    Emphysema lung    COVID-19 vaccine series completed  Moderna - 2nd dose 02/20/2021    Knee pain, left    Scar tissue  left knee S/P TKR    COPD exacerbation    S/P bladder repair  bladder lift 2/2013    Dental disorder  Dental surgery 12/2012, patient reports that upper portion is glued in at this time, plan is to have dental implants placed.    S/P knee surgery  arthroscopy bilateral 2007 and 2010    Skin cancer  to right temple 2012, removed, history of basal cell x 2    H/O lumbosacral spine surgery  fusion 2017    H/O umbilical hernia repair  5/24/19    S/P cholecystectomy  2012    S/P colonoscopy  benign polyp    S/P knee replacement  right 2017, left 11/2019        Review of Systems:  CONSTITUTIONAL: No fever, chills, or fatigue  EYES: No eye pain, visual disturbances, or discharge  ENMT:  No difficulty hearing, tinnitus, vertigo; No sinus or throat pain  NECK: No pain or stiffness  RESPIRATORY: No cough, wheezing, chills or hemoptysis; No shortness of breath  CARDIOVASCULAR: No chest pain, palpitations, dizziness, or leg swelling  GASTROINTESTINAL: No abdominal or epigastric pain. No nausea, vomiting, or hematemesis; No diarrhea. + constipation. No melena or hematochezia.  GENITOURINARY: No dysuria, frequency, hematuria, or incontinence  NEUROLOGICAL: No headaches, memory loss, loss of strength, numbness, or tremors  SKIN: No itching, burning, rashes, or lesions   MUSCULOSKELETAL: No joint pain or swelling; No muscle, back, or extremity pain  PSYCHIATRIC: No depression, anxiety, mood swings, or difficulty sleeping      Medications:    furosemide    Tablet 40 milliGRAM(s) Oral daily    ALBUTerol    90 MICROgram(s) HFA Inhaler 2 Puff(s) Inhalation every 6 hours  budesonide 160 MICROgram(s)/formoterol 4.5 MICROgram(s) Inhaler 2 Puff(s) Inhalation two times a day  tiotropium 18 MICROgram(s) Capsule 1 Capsule(s) Inhalation daily    acetaminophen   IVPB .. 1000 milliGRAM(s) IV Intermittent once  morphine  - Injectable 4 milliGRAM(s) IV Push every 6 hours PRN  ondansetron Injectable 4 milliGRAM(s) IV Push every 6 hours PRN  traMADol 50 milliGRAM(s) Oral every 4 hours PRN      enoxaparin Injectable 40 milliGRAM(s) SubCutaneous daily    pantoprazole    Tablet 40 milliGRAM(s) Oral daily  polyethylene glycol 3350 17 Gram(s) Oral daily  senna 2 Tablet(s) Oral at bedtime      dextrose 40% Gel 15 Gram(s) Oral once  dextrose 50% Injectable 25 Gram(s) IV Push once  dextrose 50% Injectable 12.5 Gram(s) IV Push once  dextrose 50% Injectable 25 Gram(s) IV Push once  glucagon  Injectable 1 milliGRAM(s) IntraMuscular once  insulin lispro (ADMELOG) corrective regimen sliding scale   SubCutaneous Before meals and at bedtime  levothyroxine 137 MICROGram(s) Oral daily  predniSONE   Tablet 40 milliGRAM(s) Oral daily    dextrose 5%. 1000 milliLiter(s) IV Continuous <Continuous>  dextrose 5%. 1000 milliLiter(s) IV Continuous <Continuous>      lidocaine   4% Patch 1 Patch Transdermal daily            ICU Vital Signs Last 24 Hrs  T(C): 36.6 (08 Nov 2021 21:21), Max: 37 (08 Nov 2021 00:00)  T(F): 97.9 (08 Nov 2021 21:21), Max: 98.6 (08 Nov 2021 00:00)  HR: 65 (08 Nov 2021 21:00) (52 - 79)  BP: 124/71 (08 Nov 2021 21:00) (86/56 - 125/81)  BP(mean): 87 (08 Nov 2021 21:00) (66 - 93)  ABP: --  ABP(mean): --  RR: 17 (08 Nov 2021 21:00) (13 - 24)  SpO2: 95% (08 Nov 2021 21:00) (91% - 95%)          I&O's Detail    07 Nov 2021 07:01  -  08 Nov 2021 07:00  --------------------------------------------------------  IN:  Total IN: 0 mL    OUT:    Chest Tube (mL): 19 mL    Voided (mL): 3150 mL  Total OUT: 3169 mL    Total NET: -3169 mL      08 Nov 2021 07:01  -  08 Nov 2021 21:29  --------------------------------------------------------  IN:  Total IN: 0 mL    OUT:    Chest Tube (mL): 8 mL    Voided (mL): 1500 mL  Total OUT: 1508 mL    Total NET: -1508 mL            LABS:                        12.6   12.73 )-----------( 245      ( 08 Nov 2021 08:49 )             39.4     11-08    136  |  101  |  18  ----------------------------<  181<H>  3.9   |  28  |  0.78    Ca    8.9      08 Nov 2021 08:49            CAPILLARY BLOOD GLUCOSE      POCT Blood Glucose.: 344 mg/dL (08 Nov 2021 21:06)        CULTURES:  Rapid RVP Result: NotDetec (11-05 @ 11:31)  Culture Results:   No growth to date. (11-05 @ 11:31)      Physical Examination:    General: No acute distress.      HEENT: Pupils equal, reactive to light.  Symmetric.    PULM: Clear to auscultation bilaterally, no significant sputum production    NECK: Supple, no lymphadenopathy, trachea midline    CVS: Regular rate and rhythm, no murmurs, rubs, or gallops    ABD: Soft, nondistended, nontender, normoactive bowel sounds, no masses    EXT: No edema, nontender    SKIN: Warm and well perfused, no rashes noted.    NEURO: Alert, oriented, interactive, nonfocal    DEVICES:     RADIOLOGY: ***    CRITICAL CARE TIME SPENT: ***

## 2021-11-08 NOTE — PROGRESS NOTE ADULT - SUBJECTIVE AND OBJECTIVE BOX
Subjective:  no distress  chest tube clamped    MEDICATIONS  (STANDING):  acetaminophen   IVPB .. 1000 milliGRAM(s) IV Intermittent once  ALBUTerol    90 MICROgram(s) HFA Inhaler 2 Puff(s) Inhalation every 6 hours  budesonide 160 MICROgram(s)/formoterol 4.5 MICROgram(s) Inhaler 2 Puff(s) Inhalation two times a day  dextrose 40% Gel 15 Gram(s) Oral once  dextrose 5%. 1000 milliLiter(s) (50 mL/Hr) IV Continuous <Continuous>  dextrose 5%. 1000 milliLiter(s) (100 mL/Hr) IV Continuous <Continuous>  dextrose 50% Injectable 25 Gram(s) IV Push once  dextrose 50% Injectable 12.5 Gram(s) IV Push once  dextrose 50% Injectable 25 Gram(s) IV Push once  furosemide    Tablet 40 milliGRAM(s) Oral daily  glucagon  Injectable 1 milliGRAM(s) IntraMuscular once  insulin lispro (ADMELOG) corrective regimen sliding scale   SubCutaneous three times a day before meals  levothyroxine 137 MICROGram(s) Oral daily  lidocaine   4% Patch 1 Patch Transdermal daily  pantoprazole    Tablet 40 milliGRAM(s) Oral daily  polyethylene glycol 3350 17 Gram(s) Oral daily  predniSONE   Tablet 40 milliGRAM(s) Oral daily  tiotropium 18 MICROgram(s) Capsule 1 Capsule(s) Inhalation daily    MEDICATIONS  (PRN):  morphine  - Injectable 4 milliGRAM(s) IV Push every 4 hours PRN Severe Pain (7 - 10)  ondansetron Injectable 4 milliGRAM(s) IV Push every 6 hours PRN Nausea  traMADol 50 milliGRAM(s) Oral every 4 hours PRN Moderate Pain (4 - 6)      Allergies    Breo Ellipta (Rash)    Intolerances        REVIEW OF SYSTEMS:    CONSTITUTIONAL:  As per HPI.  HEENT:  Eyes:  No diplopia or blurred vision. ENT:  No earache, sore throat or runny nose.  CARDIOVASCULAR:  No pressure, squeezing, tightness, heaviness or aching about the chest, neck, axilla or epigastrium.  RESPIRATORY:  No cough, shortness of breath, PND or orthopnea.  GASTROINTESTINAL:  No nausea, vomiting or diarrhea.  GENITOURINARY:  No dysuria, frequency or urgency.  MUSCULOSKELETAL:  no joint pain, deformity, tenderness  EXTREMITIES: no clubbing cyanosis,edema  SKIN:  No change in skin, hair or nails.  NEUROLOGIC:  No paresthesias, fasciculations, seizures or weakness.  PSYCHIATRIC:  No disorder of thought or mood.  ENDOCRINE:  No heat or cold intolerance, polyuria or polydipsia.  HEMATOLOGICAL:  No easy bruising or bleedings:    Vital Signs Last 24 Hrs  T(C): 36.9 (08 Nov 2021 04:00), Max: 37.1 (07 Nov 2021 20:51)  T(F): 98.4 (08 Nov 2021 04:00), Max: 98.7 (07 Nov 2021 20:51)  HR: 69 (08 Nov 2021 08:40) (52 - 74)  BP: 86/56 (08 Nov 2021 07:00) (86/56 - 131/70)  BP(mean): 66 (08 Nov 2021 07:00) (66 - 92)  RR: 13 (08 Nov 2021 08:00) (13 - 20)  SpO2: 93% (08 Nov 2021 08:00) (92% - 95%)    PHYSICAL EXAMINATION:  SKIN: no rashes  HEAD: NC/AT  EYES: PERRLA, EOMI  EARS: TM's intact  NOSE: no abnormalities  NECK:  Supple. No lymphadenopathy. Jugular venous pressure not elevated. Carotids equal.   HEART:   The cardiac impulse has a normal quality. Reg., Nl S1 and S2.  There are no murmurs, rubs or gallops noted  CHEST:  Chest is clear to auscultation. Normal respiratory effort.  ABDOMEN:  Soft and nontender.   EXTREMITIES:  no C/C/E  NEURO: AAO x 3, no focal deficts       LABS:                RADIOLOGY & ADDITIONAL TESTS:

## 2021-11-08 NOTE — PROGRESS NOTE ADULT - SUBJECTIVE AND OBJECTIVE BOX
Subjective:  pt in bed NAD no issues overnight     T(C): 36.9 (11-08-21 @ 04:00), Max: 37.1 (11-07-21 @ 20:51)  HR: 63 (11-08-21 @ 10:00) (52 - 74)  BP: 108/64 (11-08-21 @ 10:00) (86/56 - 130/67)  ABP: --  ABP(mean): --  RR: 16 (11-08-21 @ 10:00) (13 - 20)  SpO2: 92% (11-08-21 @ 10:00) (92% - 95%) 2 L NC   Wt(kg): --  CVP(mm Hg): --  CO: --  CI: --  PA: --                                              Tele: SR     CHEST TUBE: 15cc                              OUTPUT:     per 24 hours    AIR LEAKS:  [ ] YES [x ] NO          11-08    136  |  101  |  18  ----------------------------<  181<H>  3.9   |  28  |  0.78    Ca    8.9      08 Nov 2021 08:49                                 12.6   12.73 )-----------( 245      ( 08 Nov 2021 08:49 )             39.4                 CAPILLARY BLOOD GLUCOSE      POCT Blood Glucose.: 155 mg/dL (08 Nov 2021 07:58)  POCT Blood Glucose.: 233 mg/dL (07 Nov 2021 21:33)  POCT Blood Glucose.: 281 mg/dL (07 Nov 2021 18:33)  POCT Blood Glucose.: 272 mg/dL (07 Nov 2021 12:50)           CXR: < from: CT Chest No Cont (11.06.21 @ 11:14) >    LUNGS AND AIRWAYS: Patent central airways.  Evidence of emphysema as well as bullous disease involving the upper lobes bilaterally, right more than left. Right-sided chest tube is identified extending into the dominant bulla. Subsegmental to segmental atelectasis involving the lower lobes, right more than left.  PLEURA: No pleural effusion. No evidence of pneumothorax.  MEDIASTINUM AND ANAHI: No lymphadenopathy.  VESSELS: Coronary artery calcification.  HEART: Heart size is normal. No pericardial effusion.  CHEST WALL AND LOWER NECK: Trace right-sided subcutaneous emphysema.  VISUALIZED UPPER ABDOMEN: Cholecystectomy  BONES: Degenerative changes. Metallic artifact upper lumbar spine related to prior spinal fixation.    IMPRESSION:  Bullous disease involving the upper lobes and right-sided chest tube extending into the dominant bulla. No evidence of pneumothorax.    Bilateral lower lobe atelectasis, right more than left.    < end of copied text >          Exam   Neuro:  Alert Awake  NAD   Pulm: decreased at bases + Rt Pigtail   CV: Bradycardic  S1 S2   Abd: soft   Extremities: warm         Assessment:  76yFemale    with PAST MEDICAL & SURGICAL HISTORY:  Hypothyroidism    Diabetes mellitus  diet controlled    Back pain    Asthma    Elevated pancreatic enzyme  patient reports elevated lab 6/2013. Gastroenterologist aware    History of cataract  extracted from right and left    Varicose veins of both lower extremities  surgery    Diverticulosis of intestine without bleeding, unspecified intestinal tract location    Gall stones  gall bladder removed    Urinary urgency    Spinal stenosis of lumbar region    DDD (degenerative disc disease), lumbar    Basal cell carcinoma  removed from face    Pulmonary emphysema, unspecified emphysema type  2017 last exacerbation; never intubated    OA (osteoarthritis)    Frequent UTI  2-3x/year; had an episode 5 weeks ago was treated denies dysuria    GERD (gastroesophageal reflux disease)    Leg swelling    Emphysema lung    COVID-19 vaccine series completed  Moderna - 2nd dose 02/20/2021    Knee pain, left    Scar tissue  left knee S/P TKR    COPD exacerbation    S/P bladder repair  bladder lift 2/2013    Dental disorder  Dental surgery 12/2012, patient reports that upper portion is glued in at this time, plan is to have dental implants placed.    S/P knee surgery  arthroscopy bilateral 2007 and 2010    Skin cancer  to right temple 2012, removed, history of basal cell x 2    H/O lumbosacral spine surgery  fusion 2017    H/O umbilical hernia repair  5/24/19    S/P cholecystectomy  2012    S/P colonoscopy  benign polyp    S/P knee replacement  right 2017, left 11/2019             Instructed to call immediately if any new distortion, blurring, decreased vision or eye pain.

## 2021-11-09 ENCOUNTER — TRANSCRIPTION ENCOUNTER (OUTPATIENT)
Age: 76
End: 2021-11-09

## 2021-11-09 LAB
ANION GAP SERPL CALC-SCNC: 6 MMOL/L — SIGNIFICANT CHANGE UP (ref 5–17)
BUN SERPL-MCNC: 18 MG/DL — SIGNIFICANT CHANGE UP (ref 7–23)
CALCIUM SERPL-MCNC: 9 MG/DL — SIGNIFICANT CHANGE UP (ref 8.5–10.1)
CHLORIDE SERPL-SCNC: 101 MMOL/L — SIGNIFICANT CHANGE UP (ref 96–108)
CO2 SERPL-SCNC: 29 MMOL/L — SIGNIFICANT CHANGE UP (ref 22–31)
CREAT SERPL-MCNC: 0.62 MG/DL — SIGNIFICANT CHANGE UP (ref 0.5–1.3)
GLUCOSE SERPL-MCNC: 159 MG/DL — HIGH (ref 70–99)
HCT VFR BLD CALC: 39.8 % — SIGNIFICANT CHANGE UP (ref 34.5–45)
HGB BLD-MCNC: 13.2 G/DL — SIGNIFICANT CHANGE UP (ref 11.5–15.5)
MCHC RBC-ENTMCNC: 29.5 PG — SIGNIFICANT CHANGE UP (ref 27–34)
MCHC RBC-ENTMCNC: 33.2 GM/DL — SIGNIFICANT CHANGE UP (ref 32–36)
MCV RBC AUTO: 88.8 FL — SIGNIFICANT CHANGE UP (ref 80–100)
PLATELET # BLD AUTO: 262 K/UL — SIGNIFICANT CHANGE UP (ref 150–400)
POTASSIUM SERPL-MCNC: 3.9 MMOL/L — SIGNIFICANT CHANGE UP (ref 3.5–5.3)
POTASSIUM SERPL-SCNC: 3.9 MMOL/L — SIGNIFICANT CHANGE UP (ref 3.5–5.3)
RBC # BLD: 4.48 M/UL — SIGNIFICANT CHANGE UP (ref 3.8–5.2)
RBC # FLD: 14.3 % — SIGNIFICANT CHANGE UP (ref 10.3–14.5)
SODIUM SERPL-SCNC: 136 MMOL/L — SIGNIFICANT CHANGE UP (ref 135–145)
WBC # BLD: 12 K/UL — HIGH (ref 3.8–10.5)
WBC # FLD AUTO: 12 K/UL — HIGH (ref 3.8–10.5)

## 2021-11-09 PROCEDURE — 71045 X-RAY EXAM CHEST 1 VIEW: CPT | Mod: 26,77

## 2021-11-09 PROCEDURE — 99232 SBSQ HOSP IP/OBS MODERATE 35: CPT

## 2021-11-09 PROCEDURE — 99231 SBSQ HOSP IP/OBS SF/LOW 25: CPT

## 2021-11-09 PROCEDURE — 71045 X-RAY EXAM CHEST 1 VIEW: CPT | Mod: 26

## 2021-11-09 RX ORDER — CX-024414 0.2 MG/ML
0.5 INJECTION, SUSPENSION INTRAMUSCULAR
Qty: 0 | Refills: 0 | DISCHARGE

## 2021-11-09 RX ADMIN — MORPHINE SULFATE 4 MILLIGRAM(S): 50 CAPSULE, EXTENDED RELEASE ORAL at 06:24

## 2021-11-09 RX ADMIN — Medication 4: at 21:55

## 2021-11-09 RX ADMIN — SENNA PLUS 2 TABLET(S): 8.6 TABLET ORAL at 21:55

## 2021-11-09 RX ADMIN — Medication 2: at 10:30

## 2021-11-09 RX ADMIN — MORPHINE SULFATE 4 MILLIGRAM(S): 50 CAPSULE, EXTENDED RELEASE ORAL at 19:57

## 2021-11-09 RX ADMIN — TRAMADOL HYDROCHLORIDE 50 MILLIGRAM(S): 50 TABLET ORAL at 10:07

## 2021-11-09 RX ADMIN — MORPHINE SULFATE 4 MILLIGRAM(S): 50 CAPSULE, EXTENDED RELEASE ORAL at 06:54

## 2021-11-09 RX ADMIN — PANTOPRAZOLE SODIUM 40 MILLIGRAM(S): 20 TABLET, DELAYED RELEASE ORAL at 10:10

## 2021-11-09 RX ADMIN — Medication 137 MICROGRAM(S): at 05:49

## 2021-11-09 RX ADMIN — Medication 5: at 17:31

## 2021-11-09 RX ADMIN — ENOXAPARIN SODIUM 40 MILLIGRAM(S): 100 INJECTION SUBCUTANEOUS at 21:55

## 2021-11-09 RX ADMIN — LIDOCAINE 1 PATCH: 4 CREAM TOPICAL at 00:00

## 2021-11-09 RX ADMIN — Medication 40 MILLIGRAM(S): at 10:10

## 2021-11-09 RX ADMIN — TIOTROPIUM BROMIDE 1 CAPSULE(S): 18 CAPSULE ORAL; RESPIRATORY (INHALATION) at 08:13

## 2021-11-09 RX ADMIN — TRAMADOL HYDROCHLORIDE 50 MILLIGRAM(S): 50 TABLET ORAL at 10:47

## 2021-11-09 RX ADMIN — MORPHINE SULFATE 4 MILLIGRAM(S): 50 CAPSULE, EXTENDED RELEASE ORAL at 12:44

## 2021-11-09 RX ADMIN — Medication 40 MILLIGRAM(S): at 10:09

## 2021-11-09 RX ADMIN — LIDOCAINE 1 PATCH: 4 CREAM TOPICAL at 10:10

## 2021-11-09 RX ADMIN — LIDOCAINE 1 PATCH: 4 CREAM TOPICAL at 22:00

## 2021-11-09 RX ADMIN — LIDOCAINE 1 PATCH: 4 CREAM TOPICAL at 19:22

## 2021-11-09 RX ADMIN — MORPHINE SULFATE 4 MILLIGRAM(S): 50 CAPSULE, EXTENDED RELEASE ORAL at 12:59

## 2021-11-09 RX ADMIN — MORPHINE SULFATE 4 MILLIGRAM(S): 50 CAPSULE, EXTENDED RELEASE ORAL at 20:20

## 2021-11-09 RX ADMIN — POLYETHYLENE GLYCOL 3350 17 GRAM(S): 17 POWDER, FOR SOLUTION ORAL at 10:10

## 2021-11-09 NOTE — PROGRESS NOTE ADULT - ASSESSMENT
76 y.o. female with PMHx of COPD/Emphysema, DMII, Hypothyroidism, Diverticulosis, cataracts, Varicose veins, DDD with LS stenosis, GERD, skin CA p/w acute onset of SOB - diagnosed with spontaneous Right sided PTX and treated with pigtail placement.    1. Spontaneous right sided PTX in the settings of emphysema s/p pigtail placement with lung expansion with large blebs of air BL on CT chest   - plan to remove pigtail today   - po steroids taper   - cont BDs   - may benefit from chemical pleurodesis - decide as outpot    2. DMII - HISS, adjusted due to steroid induced hyperglycemia    3. Hypothyroidism - synthroid    4. GERD - PPI    5. VTE proph - UFH    Discussed with pt and daughter, CTS PA  DC home after O2 requirements evaluated  Time spent 65 min

## 2021-11-09 NOTE — DISCHARGE NOTE PROVIDER - CARE PROVIDER_API CALL
James Dowd)  Internal Medicine; Pulmonary Disease  [address] [address]  Phone: [phone]  Fax: [phone]  Follow Up Time:     Lissy Coulter)  Thoracic and Cardiac Surgery  [address] [place] [address]  Phone: [phone]  Fax: [phone]  Follow Up Time:

## 2021-11-09 NOTE — DISCHARGE NOTE PROVIDER - CARE PROVIDERS DIRECT ADDRESSES
[email or web address]

## 2021-11-09 NOTE — DISCHARGE NOTE PROVIDER - NSDCMRMEDTOKEN_GEN_ALL_CORE_FT
furosemide 40 mg oral tablet: 1 tab(s) orally once a day  gabapentin 100 mg oral capsule: 1 cap(s) orally once a day  gabapentin 100 mg oral capsule: 2 cap(s) orally once a day (at bedtime)  Janumet  mg-1000 mg oral tablet, extended release: 1 tab(s) orally once a day (in the evening)  Jardiance 10 mg oral tablet: 1 tab(s) orally once a day (in the morning)  levothyroxine 137 mcg (0.137 mg) oral tablet: 1 tab(s) orally once a day  pantoprazole 40 mg oral delayed release tablet: 1 tab(s) orally once a day (at bedtime), As Needed  predniSONE 20 mg oral tablet: 1 tab(s) orally once a day  tiotropium 18 mcg inhalation capsule: 1 cap(s) inhaled once a day  Ventolin HFA 90 mcg/inh inhalation aerosol: 2 puff(s) inhaled every 6 hours, As Needed - for shortness of breath and/or wheezing

## 2021-11-09 NOTE — DISCHARGE NOTE PROVIDER - NSDCCPCAREPLAN_GEN_ALL_CORE_FT
PRINCIPAL DISCHARGE DIAGNOSIS  Diagnosis: Pneumothorax  Assessment and Plan of Treatment: s/p chest tube - f/u with CTS and pulmonary for possible elective surgical resectioon of large right sided bullae

## 2021-11-09 NOTE — DISCHARGE NOTE PROVIDER - HOSPITAL COURSE
76 y.o. female with PMHx of COPD/Emphysema, DMII, Hypothyroidism, Diverticulosis, cataracts, Varicose veins, DDD with LS stenosis, GERD, skin CA p/w acute onset of SOB - diagnosed with spontaneous Right sided PTX and treated with pigtail placement.    1. Spontaneous right sided PTX in the settings of emphysema s/p pigtail placement with lung expansion with large blebs of air BL on CT chest - developed sc emphysema, to be removed later today    - consider chemical pleurodesis vs surgical wedge resection    - complete steroids taper    2. DMII - HISS, adjusted due to steroid induced hyperglycemia - resume home regimen    3. Hypothyroidism - synthroid    4. GERD - PPI    Medically stable for DC    Discussed with pt and daughter, CTS PA  DC home after O2 requirements evaluated  Time spent 65 76 y.o. female with PMHx of COPD/Emphysema, DMII, Hypothyroidism, Diverticulosis, cataracts, Varicose veins, DDD with LS stenosis, GERD, skin CA p/w acute onset of SOB - diagnosed with spontaneous Right sided PTX and treated with pigtail placement.    1. Spontaneous right sided PTX in the settings of emphysema s/p pigtail placement with lung expansion with large blebs of air BL on CT chest - developed sc emphysema, removed on 11/9/21 and will need home O2 arranged as her SpO2 was 86% on RA at rest    - need to consider chemical pleurodesis vs surgical wedge resection    - complete steroids taper    2. DMII - HISS, adjusted due to steroid induced hyperglycemia - resume home regimen    3. Hypothyroidism - synthroid    4. GERD - PPI    Medically stable for DC    Discussed with pt and daughter, CTS PA  DC home with O2   Time spent 65 76 y.o. female with PMHx of COPD/Emphysema, DMII, Hypothyroidism, Diverticulosis, cataracts, Varicose veins, DDD with LS stenosis, GERD, skin CA p/w acute onset of SOB - diagnosed with spontaneous Right sided PTX and treated with pigtail placement.    1. Spontaneous right sided PTX in the settings of emphysema s/p pigtail placement with lung expansion with large blebs of air BL on CT chest - developed sc emphysema, removed on 11/9/21 and will need home O2 arranged as her SpO2 was 82% on RA with ambulation and 92% at rest, 96% with O2    - need to consider chemical pleurodesis vs surgical wedge resection    - complete steroids taper    2. DMII - HISS, adjusted due to steroid induced hyperglycemia - resume home regimen    3. Hypothyroidism - synthroid    4. GERD - PPI    Medically stable for DC    Discussed with pt and daughter, CTS PA  DC home with O2   Time spent 65

## 2021-11-09 NOTE — PROGRESS NOTE ADULT - SUBJECTIVE AND OBJECTIVE BOX
CC: Acute hypoxic respiratory failure  COPD exacerbation (05 Nov 2021 15:17)    HPI:76 year old female patient with extensive prior smoking history( 17 year smoker of 1.5 PPD) presented to the ED complaining of a one day history of progressively worsening shortness of breath. States it has become extremely hard to ambulate or stand due to dyspnea.  Denies any associated fever, cough, recent illness known sick contacts, chest pain or palpitations. Patient does endorse mild pleurisy. States her ambulatory pulse oxy reading was 78 percent on room air prior to coming in.    In the ED patient found to have a oxygen saturation of 92 percent on room air. (05 Nov 2021 12:20)    INTERVAL HPI/OVERNIGHT EVENTS: mild hypoxia with exertion, subcutaneous emphysema note  Other ROS reviewed and neg     Vital Signs Last 24 Hrs  T(C): 36.8 (09 Nov 2021 09:23), Max: 36.8 (08 Nov 2021 14:21)  T(F): 98.2 (09 Nov 2021 09:23), Max: 98.3 (08 Nov 2021 14:21)  HR: 58 (09 Nov 2021 10:00) (51 - 79)  BP: 112/62 (09 Nov 2021 10:00) (105/53 - 134/69)  BP(mean): 77 (09 Nov 2021 10:00) (68 - 100)  RR: 15 (09 Nov 2021 10:00) (14 - 24)  SpO2: 96% (09 Nov 2021 10:00) (91% - 96%)  I&O's Detail    08 Nov 2021 07:01  -  09 Nov 2021 07:00  --------------------------------------------------------  IN:  Total IN: 0 mL    OUT:    Chest Tube (mL): 8 mL    Voided (mL): 2950 mL  Total OUT: 2958 mL    Total NET: -2958 ml                     13.2   12.00 )-----------( 262      ( 09 Nov 2021 07:45 )             39.8     09 Nov 2021 07:45    136    |  101    |  18     ----------------------------<  159    3.9     |  29     |  0.62     Ca    9.0        09 Nov 2021 07:45    CAPILLARY BLOOD GLUCOSE    POCT Blood Glucose.: 222 mg/dL (09 Nov 2021 10:04)  POCT Blood Glucose.: 150 mg/dL (09 Nov 2021 07:36)  POCT Blood Glucose.: 344 mg/dL (08 Nov 2021 21:06)  POCT Blood Glucose.: 335 mg/dL (08 Nov 2021 17:07)  POCT Blood Glucose.: 282 mg/dL (08 Nov 2021 15:05)  POCT Blood Glucose.: 137 mg/dL (08 Nov 2021 11:28)    MEDICATIONS  (STANDING):  acetaminophen   IVPB .. 1000 milliGRAM(s) IV Intermittent once  ALBUTerol    90 MICROgram(s) HFA Inhaler 2 Puff(s) Inhalation every 6 hours  budesonide 160 MICROgram(s)/formoterol 4.5 MICROgram(s) Inhaler 2 Puff(s) Inhalation two times a day  dextrose 40% Gel 15 Gram(s) Oral once  dextrose 5%. 1000 milliLiter(s) (50 mL/Hr) IV Continuous <Continuous>  dextrose 5%. 1000 milliLiter(s) (100 mL/Hr) IV Continuous <Continuous>  dextrose 50% Injectable 25 Gram(s) IV Push once  dextrose 50% Injectable 12.5 Gram(s) IV Push once  dextrose 50% Injectable 25 Gram(s) IV Push once  enoxaparin Injectable 40 milliGRAM(s) SubCutaneous daily  furosemide    Tablet 40 milliGRAM(s) Oral daily  glucagon  Injectable 1 milliGRAM(s) IntraMuscular once  insulin lispro (ADMELOG) corrective regimen sliding scale   SubCutaneous Before meals and at bedtime  levothyroxine 137 MICROGram(s) Oral daily  lidocaine   4% Patch 1 Patch Transdermal daily  pantoprazole    Tablet 40 milliGRAM(s) Oral daily  polyethylene glycol 3350 17 Gram(s) Oral daily  senna 2 Tablet(s) Oral at bedtime  tiotropium 18 MICROgram(s) Capsule 1 Capsule(s) Inhalation daily    MEDICATIONS  (PRN):  morphine  - Injectable 4 milliGRAM(s) IV Push every 6 hours PRN Severe Pain (7 - 10)  ondansetron Injectable 4 milliGRAM(s) IV Push every 6 hours PRN Nausea  traMADol 50 milliGRAM(s) Oral every 4 hours PRN Moderate Pain (4 - 6)    RADIOLOGY & ADDITIONAL TESTS: personally visualized    All previous medical records personally reviewed    PHYSICAL EXAM:    General: female in no acute distress  Eyes: PERRLA, EOMI; conjunctiva and sclera clear  Head: Normocephalic; atraumatic  ENMT: No nasal discharge; airway clear  Neck: Supple; non tender; no masses  Respiratory: decreased BS on Right, CT in place, + subcutaneous emphysema  Cardiovascular: S1, S2 reg  Gastrointestinal: Soft non-tender non-distended; Normal bowel sounds  Genitourinary: No costovertebral angle tenderness  Extremities: No clubbing, cyanosis or edema  Vascular: Peripheral pulses palpable 2+ bilaterally  Neurological: Alert and oriented x4  Skin: Warm and dry.  Musculoskeletal: Normal tone, without deformities  Psychiatric: Cooperative and appropriate

## 2021-11-09 NOTE — PROGRESS NOTE ADULT - SUBJECTIVE AND OBJECTIVE BOX
Subjective:  Pt seen, breathing OK. On O2 NC.     Vital Signs:  Vital Signs Last 24 Hrs  T(C): 36.7 (11-09-21 @ 13:08), Max: 36.8 (11-09-21 @ 09:23)  T(F): 98.1 (11-09-21 @ 13:08), Max: 98.2 (11-09-21 @ 09:23)  HR: 72 (11-09-21 @ 14:00) (51 - 73)  BP: 82/35 (11-09-21 @ 14:00) (82/35 - 134/69)  RR: 19 (11-09-21 @ 14:00) (14 - 24)  SpO2: 90% (11-09-21 @ 14:00) (90% - 97%) on (O2)    Telemetry/Alarms:    Relevant labs, radiology and Medications reviewed                        13.2   12.00 )-----------( 262      ( 09 Nov 2021 07:45 )             39.8     11-09    136  |  101  |  18  ----------------------------<  159<H>  3.9   |  29  |  0.62    Ca    9.0      09 Nov 2021 07:45        MEDICATIONS  (STANDING):  acetaminophen   IVPB .. 1000 milliGRAM(s) IV Intermittent once  ALBUTerol    90 MICROgram(s) HFA Inhaler 2 Puff(s) Inhalation every 6 hours  budesonide 160 MICROgram(s)/formoterol 4.5 MICROgram(s) Inhaler 2 Puff(s) Inhalation two times a day  dextrose 40% Gel 15 Gram(s) Oral once  dextrose 5%. 1000 milliLiter(s) (50 mL/Hr) IV Continuous <Continuous>  dextrose 5%. 1000 milliLiter(s) (100 mL/Hr) IV Continuous <Continuous>  dextrose 50% Injectable 25 Gram(s) IV Push once  dextrose 50% Injectable 12.5 Gram(s) IV Push once  dextrose 50% Injectable 25 Gram(s) IV Push once  enoxaparin Injectable 40 milliGRAM(s) SubCutaneous daily  furosemide    Tablet 40 milliGRAM(s) Oral daily  glucagon  Injectable 1 milliGRAM(s) IntraMuscular once  insulin lispro (ADMELOG) corrective regimen sliding scale   SubCutaneous Before meals and at bedtime  levothyroxine 137 MICROGram(s) Oral daily  lidocaine   4% Patch 1 Patch Transdermal daily  pantoprazole    Tablet 40 milliGRAM(s) Oral daily  polyethylene glycol 3350 17 Gram(s) Oral daily  senna 2 Tablet(s) Oral at bedtime  tiotropium 18 MICROgram(s) Capsule 1 Capsule(s) Inhalation daily    MEDICATIONS  (PRN):  morphine  - Injectable 4 milliGRAM(s) IV Push every 6 hours PRN Severe Pain (7 - 10)  ondansetron Injectable 4 milliGRAM(s) IV Push every 6 hours PRN Nausea  traMADol 50 milliGRAM(s) Oral every 4 hours PRN Moderate Pain (4 - 6)      Physical exam  Gen NAD  Neuro AAOx3  Card RRR  Pulm equal  Abd soft, obese  Ext warm, no edema    Tubes: right pigtail w no AL, tidaling, crepitus palpated over chest right side to posterior    I&O's Summary    08 Nov 2021 07:01  -  09 Nov 2021 07:00  --------------------------------------------------------  IN: 0 mL / OUT: 2958 mL / NET: -2958 mL    09 Nov 2021 07:01  -  09 Nov 2021 15:22  --------------------------------------------------------  IN: 0 mL / OUT: 550 mL / NET: -550 mL        Assessment  76y Female  w/ PAST MEDICAL & SURGICAL HISTORY:  Hypothyroidism    Diabetes mellitus  diet controlled    Back pain    Asthma    Elevated pancreatic enzyme  patient reports elevated lab 6/2013. Gastroenterologist aware    History of cataract  extracted from right and left    Varicose veins of both lower extremities  surgery    Diverticulosis of intestine without bleeding, unspecified intestinal tract location    Gall stones  gall bladder removed    Urinary urgency    Spinal stenosis of lumbar region    DDD (degenerative disc disease), lumbar    Basal cell carcinoma  removed from face    Pulmonary emphysema, unspecified emphysema type  2017 last exacerbation; never intubated    OA (osteoarthritis)    Frequent UTI  2-3x/year; had an episode 5 weeks ago was treated denies dysuria    GERD (gastroesophageal reflux disease)    Leg swelling    Emphysema lung    COVID-19 vaccine series completed  Moderna - 2nd dose 02/20/2021    Knee pain, left    Scar tissue  left knee S/P TKR    COPD exacerbation    S/P bladder repair  bladder lift 2/2013    Dental disorder  Dental surgery 12/2012, patient reports that upper portion is glued in at this time, plan is to have dental implants placed.    S/P knee surgery  arthroscopy bilateral 2007 and 2010    Skin cancer  to right temple 2012, removed, history of basal cell x 2    H/O lumbosacral spine surgery  fusion 2017    H/O umbilical hernia repair  5/24/19    S/P cholecystectomy  2012    S/P colonoscopy  benign polyp    S/P knee replacement  right 2017, left 11/2019    admitted with complaints of Patient is a 76y old  Female who presents with a chief complaint of Acute hypoxic respiratory failure  COPD exacerbation (09 Nov 2021 13:16)  .  76 year old female pmhx of COPD/bullaous dz, h/o PTX in past, DM, basal cell CA, ex smoker admitted 11/5 w SOB, with a large Right PTX on CXR. S/P right pigtail placement 11/5 with good resolution.     Chest tube removed during full inspiration, occlusive dressing applied.  CXR post pull pending.  If no PTX, cleared for d/c from thoracic standpoint w f/u Dr. Dowd next week w CXR    d/c instructions:  Leave dressing on for 48 hours after chest tube removed. Remove in shower after has become saturated. May leave open to air.    Shower only, no soaking in tub or pool for 1 month  No flying, scuba diving, heavy lifting (>10 pounds), for 1 month   Ambulate frequently as tolerated. No driving while on pain medicine.  f/u Dr. Coulter as needed, [phone]    Discussed with Cardiothoracic Team at AM rounds.

## 2021-11-10 ENCOUNTER — TRANSCRIPTION ENCOUNTER (OUTPATIENT)
Age: 76
End: 2021-11-10

## 2021-11-10 VITALS
DIASTOLIC BLOOD PRESSURE: 48 MMHG | RESPIRATION RATE: 20 BRPM | SYSTOLIC BLOOD PRESSURE: 116 MMHG | HEART RATE: 78 BPM | OXYGEN SATURATION: 93 %

## 2021-11-10 DIAGNOSIS — R09.02 HYPOXEMIA: ICD-10-CM

## 2021-11-10 DIAGNOSIS — J43.9 EMPHYSEMA, UNSPECIFIED: ICD-10-CM

## 2021-11-10 DIAGNOSIS — J93.9 PNEUMOTHORAX, UNSPECIFIED: ICD-10-CM

## 2021-11-10 DIAGNOSIS — J44.9 CHRONIC OBSTRUCTIVE PULMONARY DISEASE, UNSPECIFIED: ICD-10-CM

## 2021-11-10 PROBLEM — J44.1 CHRONIC OBSTRUCTIVE PULMONARY DISEASE WITH (ACUTE) EXACERBATION: Chronic | Status: ACTIVE | Noted: 2021-11-05

## 2021-11-10 LAB
CULTURE RESULTS: SIGNIFICANT CHANGE UP
SPECIMEN SOURCE: SIGNIFICANT CHANGE UP

## 2021-11-10 PROCEDURE — 99233 SBSQ HOSP IP/OBS HIGH 50: CPT

## 2021-11-10 PROCEDURE — 99231 SBSQ HOSP IP/OBS SF/LOW 25: CPT

## 2021-11-10 PROCEDURE — 99239 HOSP IP/OBS DSCHRG MGMT >30: CPT

## 2021-11-10 PROCEDURE — 71045 X-RAY EXAM CHEST 1 VIEW: CPT | Mod: 26

## 2021-11-10 RX ADMIN — LIDOCAINE 1 PATCH: 4 CREAM TOPICAL at 09:24

## 2021-11-10 RX ADMIN — Medication 2: at 11:57

## 2021-11-10 RX ADMIN — TIOTROPIUM BROMIDE 1 CAPSULE(S): 18 CAPSULE ORAL; RESPIRATORY (INHALATION) at 08:27

## 2021-11-10 RX ADMIN — Medication 137 MICROGRAM(S): at 06:01

## 2021-11-10 RX ADMIN — MORPHINE SULFATE 4 MILLIGRAM(S): 50 CAPSULE, EXTENDED RELEASE ORAL at 06:20

## 2021-11-10 RX ADMIN — MORPHINE SULFATE 4 MILLIGRAM(S): 50 CAPSULE, EXTENDED RELEASE ORAL at 06:00

## 2021-11-10 RX ADMIN — Medication 20 MILLIGRAM(S): at 09:24

## 2021-11-10 RX ADMIN — PANTOPRAZOLE SODIUM 40 MILLIGRAM(S): 20 TABLET, DELAYED RELEASE ORAL at 09:25

## 2021-11-10 RX ADMIN — Medication 40 MILLIGRAM(S): at 09:25

## 2021-11-10 RX ADMIN — POLYETHYLENE GLYCOL 3350 17 GRAM(S): 17 POWDER, FOR SOLUTION ORAL at 09:24

## 2021-11-10 NOTE — PROGRESS NOTE ADULT - SUBJECTIVE AND OBJECTIVE BOX
CC: Acute hypoxic respiratory failure  COPD exacerbation (05 Nov 2021 15:17)    HPI:76 year old female patient with extensive prior smoking history( 17 year smoker of 1.5 PPD) presented to the ED complaining of a one day history of progressively worsening shortness of breath. States it has become extremely hard to ambulate or stand due to dyspnea.  Denies any associated fever, cough, recent illness known sick contacts, chest pain or palpitations. Patient does endorse mild pleurisy. States her ambulatory pulse oxy reading was 78 percent on room air prior to coming in.    In the ED patient found to have a oxygen saturation of 92 percent on room air. (05 Nov 2021 12:20)    INTERVAL HPI/OVERNIGHT EVENTS: CT is out, not in distress  Other ROS reviewed and neg     Vital Signs Last 24 Hrs  T(C): 36.5 (10 Nov 2021 09:01), Max: 36.8 (09 Nov 2021 20:36)  T(F): 97.7 (10 Nov 2021 09:01), Max: 98.2 (09 Nov 2021 20:36)  HR: 61 (10 Nov 2021 12:00) (52 - 73)  BP: 124/53 (10 Nov 2021 12:00) (101/76 - 150/91)  BP(mean): 75 (10 Nov 2021 12:00) (67 - 108)  RR: 16 (10 Nov 2021 12:00) (13 - 22)  SpO2: 94% (10 Nov 2021 12:00) (86% - 96%)  I&O's Detail    09 Nov 2021 07:01  -  10 Nov 2021 07:00  --------------------------------------------------------  IN:  Total IN: 0 mL    OUT:    Voided (mL): 3950 mL  Total OUT: 3950 mL    Total NET: -3950 mL                       13.2   12.00 )-----------( 262      ( 09 Nov 2021 07:45 )             39.8     09 Nov 2021 07:45    136    |  101    |  18     ----------------------------<  159    3.9     |  29     |  0.62     Ca    9.0        09 Nov 2021 07:45    CAPILLARY BLOOD GLUCOSE    POCT Blood Glucose.: 201 mg/dL (10 Nov 2021 11:40)  POCT Blood Glucose.: 120 mg/dL (10 Nov 2021 07:53)  POCT Blood Glucose.: 333 mg/dL (09 Nov 2021 21:54)  POCT Blood Glucose.: 405 mg/dL (09 Nov 2021 21:50)  POCT Blood Glucose.: 363 mg/dL (09 Nov 2021 17:29)    MEDICATIONS  (STANDING):  acetaminophen   IVPB .. 1000 milliGRAM(s) IV Intermittent once  ALBUTerol    90 MICROgram(s) HFA Inhaler 2 Puff(s) Inhalation every 6 hours  budesonide 160 MICROgram(s)/formoterol 4.5 MICROgram(s) Inhaler 2 Puff(s) Inhalation two times a day  dextrose 40% Gel 15 Gram(s) Oral once  dextrose 5%. 1000 milliLiter(s) (50 mL/Hr) IV Continuous <Continuous>  dextrose 5%. 1000 milliLiter(s) (100 mL/Hr) IV Continuous <Continuous>  dextrose 50% Injectable 25 Gram(s) IV Push once  dextrose 50% Injectable 12.5 Gram(s) IV Push once  dextrose 50% Injectable 25 Gram(s) IV Push once  enoxaparin Injectable 40 milliGRAM(s) SubCutaneous daily  furosemide    Tablet 40 milliGRAM(s) Oral daily  glucagon  Injectable 1 milliGRAM(s) IntraMuscular once  insulin lispro (ADMELOG) corrective regimen sliding scale   SubCutaneous Before meals and at bedtime  levothyroxine 137 MICROGram(s) Oral daily  lidocaine   4% Patch 1 Patch Transdermal daily  pantoprazole    Tablet 40 milliGRAM(s) Oral daily  polyethylene glycol 3350 17 Gram(s) Oral daily  predniSONE   Tablet 20 milliGRAM(s) Oral daily  senna 2 Tablet(s) Oral at bedtime  tiotropium 18 MICROgram(s) Capsule 1 Capsule(s) Inhalation daily    MEDICATIONS  (PRN):  ondansetron Injectable 4 milliGRAM(s) IV Push every 6 hours PRN Nausea  traMADol 50 milliGRAM(s) Oral every 4 hours PRN Moderate Pain (4 - 6)    RADIOLOGY & ADDITIONAL TESTS: personally visualized    All previous medical records personally reviewed    PHYSICAL EXAM:    General: female in no acute distress  Eyes: PERRLA, EOMI; conjunctiva and sclera clear  Head: Normocephalic; atraumatic  ENMT: No nasal discharge; airway clear  Neck: Supple; non tender; no masses  Respiratory: decreased BS at bases, right back sq rmphysema  Cardiovascular: S1, S2 reg  Gastrointestinal: Soft non-tender non-distended; Normal bowel sounds  Genitourinary: No costovertebral angle tenderness  Extremities: No clubbing, cyanosis or edema  Vascular: Peripheral pulses palpable 2+ bilaterally  Neurological: Alert and oriented x4  Skin: Warm and dry.  Musculoskeletal: Normal tone, without deformities  Psychiatric: Cooperative and appropriate

## 2021-11-10 NOTE — PROGRESS NOTE ADULT - SUBJECTIVE AND OBJECTIVE BOX
Subjective:  awake and alert  chest tube out    MEDICATIONS  (STANDING):  acetaminophen   IVPB .. 1000 milliGRAM(s) IV Intermittent once  ALBUTerol    90 MICROgram(s) HFA Inhaler 2 Puff(s) Inhalation every 6 hours  budesonide 160 MICROgram(s)/formoterol 4.5 MICROgram(s) Inhaler 2 Puff(s) Inhalation two times a day  dextrose 40% Gel 15 Gram(s) Oral once  dextrose 5%. 1000 milliLiter(s) (50 mL/Hr) IV Continuous <Continuous>  dextrose 5%. 1000 milliLiter(s) (100 mL/Hr) IV Continuous <Continuous>  dextrose 50% Injectable 25 Gram(s) IV Push once  dextrose 50% Injectable 12.5 Gram(s) IV Push once  dextrose 50% Injectable 25 Gram(s) IV Push once  enoxaparin Injectable 40 milliGRAM(s) SubCutaneous daily  furosemide    Tablet 40 milliGRAM(s) Oral daily  glucagon  Injectable 1 milliGRAM(s) IntraMuscular once  insulin lispro (ADMELOG) corrective regimen sliding scale   SubCutaneous Before meals and at bedtime  levothyroxine 137 MICROGram(s) Oral daily  lidocaine   4% Patch 1 Patch Transdermal daily  pantoprazole    Tablet 40 milliGRAM(s) Oral daily  polyethylene glycol 3350 17 Gram(s) Oral daily  predniSONE   Tablet 20 milliGRAM(s) Oral daily  senna 2 Tablet(s) Oral at bedtime  tiotropium 18 MICROgram(s) Capsule 1 Capsule(s) Inhalation daily    MEDICATIONS  (PRN):  ondansetron Injectable 4 milliGRAM(s) IV Push every 6 hours PRN Nausea  traMADol 50 milliGRAM(s) Oral every 4 hours PRN Moderate Pain (4 - 6)      Allergies    Breo Ellipta (Rash)    Intolerances        REVIEW OF SYSTEMS:    CONSTITUTIONAL:  As per HPI.  HEENT:  Eyes:  No diplopia or blurred vision. ENT:  No earache, sore throat or runny nose.  CARDIOVASCULAR:  No pressure, squeezing, tightness, heaviness or aching about the chest, neck, axilla or epigastrium.  RESPIRATORY:  No cough, shortness of breath, PND or orthopnea.  GASTROINTESTINAL:  No nausea, vomiting or diarrhea.  GENITOURINARY:  No dysuria, frequency or urgency.  MUSCULOSKELETAL:  no joint pain, deformity, tenderness  EXTREMITIES: no clubbing cyanosis,edema  SKIN:  No change in skin, hair or nails.  NEUROLOGIC:  No paresthesias, fasciculations, seizures or weakness.  PSYCHIATRIC:  No disorder of thought or mood.  ENDOCRINE:  No heat or cold intolerance, polyuria or polydipsia.  HEMATOLOGICAL:  No easy bruising or bleedings:    Vital Signs Last 24 Hrs  T(C): 36.7 (10 Nov 2021 06:32), Max: 36.8 (09 Nov 2021 09:23)  T(F): 98.1 (10 Nov 2021 06:32), Max: 98.2 (09 Nov 2021 09:23)  HR: 52 (10 Nov 2021 08:00) (52 - 73)  BP: 141/79 (10 Nov 2021 08:00) (101/76 - 141/79)  BP(mean): 97 (10 Nov 2021 08:00) (67 - 97)  RR: 15 (10 Nov 2021 08:00) (13 - 22)  SpO2: 94% (10 Nov 2021 08:00) (86% - 97%)    PHYSICAL EXAMINATION:  SKIN: no rashes  HEAD: NC/AT  EYES: PERRLA, EOMI  EARS: TM's intact  NOSE: no abnormalities  NECK:  Supple. No lymphadenopathy. Jugular venous pressure not elevated. Carotids equal.   HEART:  S1S2 reg  CHEST: bilat ronchi  ABDOMEN:  Soft and nontender.   EXTREMITIES:  no C/C/E  NEURO: AAO x 3, no focal deficts       LABS:                        13.2   12.00 )-----------( 262      ( 09 Nov 2021 07:45 )             39.8     11-09    136  |  101  |  18  ----------------------------<  159<H>  3.9   |  29  |  0.62    Ca    9.0      09 Nov 2021 07:45            RADIOLOGY & ADDITIONAL TESTS:

## 2021-11-10 NOTE — PROGRESS NOTE ADULT - ASSESSMENT
76 y.o. female with PMHx of COPD/Emphysema, DMII, Hypothyroidism, Diverticulosis, cataracts, Varicose veins, DDD with LS stenosis, GERD, skin CA p/w acute onset of SOB - diagnosed with spontaneous Right sided PTX and treated with pigtail placement.    1. Spontaneous right sided PTX in the settings of emphysema s/p pigtail placement with lung expansion with large blebs of air BL on CT chest   - removed pigtail 11/9 - will need home O2 as Spo2 on RA at rest 92%, exertoin 82%, on O2 is 96%   - po steroids taper   - cont BDs   - may benefit from chemical pleurodesis - decide as outpatient    2. DMII - HISS, adjusted due to steroid induced hyperglycemia    3. Hypothyroidism - synthroid    4. GERD - PPI    5. VTE proph - UFH    Discussed with pt and daughter, CTS PA  DC home after O2 requirements evaluated  Time spent 55 min

## 2021-11-10 NOTE — PROGRESS NOTE ADULT - SUBJECTIVE AND OBJECTIVE BOX
Subjective:  Pt seen, eating breakfast. Denies SOB/chest pain.    Vital Signs:  Vital Signs Last 24 Hrs  T(C): 36.5 (11-10-21 @ 09:01), Max: 36.8 (11-09-21 @ 20:36)  T(F): 97.7 (11-10-21 @ 09:01), Max: 98.2 (11-09-21 @ 20:36)  HR: 60 (11-10-21 @ 10:00) (52 - 73)  BP: 150/91 (11-10-21 @ 10:00) (101/76 - 150/91)  RR: 21 (11-10-21 @ 10:00) (13 - 22)  SpO2: 94% (11-10-21 @ 08:00) (86% - 97%) on (O2)    Telemetry/Alarms:    Relevant labs, radiology and Medications reviewed                        13.2   12.00 )-----------( 262      ( 09 Nov 2021 07:45 )             39.8     11-09    136  |  101  |  18  ----------------------------<  159<H>  3.9   |  29  |  0.62    Ca    9.0      09 Nov 2021 07:45        MEDICATIONS  (STANDING):  acetaminophen   IVPB .. 1000 milliGRAM(s) IV Intermittent once  ALBUTerol    90 MICROgram(s) HFA Inhaler 2 Puff(s) Inhalation every 6 hours  budesonide 160 MICROgram(s)/formoterol 4.5 MICROgram(s) Inhaler 2 Puff(s) Inhalation two times a day  dextrose 40% Gel 15 Gram(s) Oral once  dextrose 5%. 1000 milliLiter(s) (50 mL/Hr) IV Continuous <Continuous>  dextrose 5%. 1000 milliLiter(s) (100 mL/Hr) IV Continuous <Continuous>  dextrose 50% Injectable 25 Gram(s) IV Push once  dextrose 50% Injectable 12.5 Gram(s) IV Push once  dextrose 50% Injectable 25 Gram(s) IV Push once  enoxaparin Injectable 40 milliGRAM(s) SubCutaneous daily  furosemide    Tablet 40 milliGRAM(s) Oral daily  glucagon  Injectable 1 milliGRAM(s) IntraMuscular once  insulin lispro (ADMELOG) corrective regimen sliding scale   SubCutaneous Before meals and at bedtime  levothyroxine 137 MICROGram(s) Oral daily  lidocaine   4% Patch 1 Patch Transdermal daily  pantoprazole    Tablet 40 milliGRAM(s) Oral daily  polyethylene glycol 3350 17 Gram(s) Oral daily  predniSONE   Tablet 20 milliGRAM(s) Oral daily  senna 2 Tablet(s) Oral at bedtime  tiotropium 18 MICROgram(s) Capsule 1 Capsule(s) Inhalation daily    MEDICATIONS  (PRN):  ondansetron Injectable 4 milliGRAM(s) IV Push every 6 hours PRN Nausea  traMADol 50 milliGRAM(s) Oral every 4 hours PRN Moderate Pain (4 - 6)      Physical exam  Gen NAD  Neuro AAOx3  Card RRR, right chest dressing inplace, crepitus present, unchanged, no worse  Pulm equal  Abd soft, obese  Ext warm, no edema    I&O's Summary    09 Nov 2021 07:01  -  10 Nov 2021 07:00  --------------------------------------------------------  IN: 0 mL / OUT: 3950 mL / NET: -3950 mL        Assessment  76y Female  w/ PAST MEDICAL & SURGICAL HISTORY:  Hypothyroidism    Diabetes mellitus  diet controlled    Back pain    Asthma    Elevated pancreatic enzyme  patient reports elevated lab 6/2013. Gastroenterologist aware    History of cataract  extracted from right and left    Varicose veins of both lower extremities  surgery    Diverticulosis of intestine without bleeding, unspecified intestinal tract location    Gall stones  gall bladder removed    Urinary urgency    Spinal stenosis of lumbar region    DDD (degenerative disc disease), lumbar    Basal cell carcinoma  removed from face    Pulmonary emphysema, unspecified emphysema type  2017 last exacerbation; never intubated    OA (osteoarthritis)    Frequent UTI  2-3x/year; had an episode 5 weeks ago was treated denies dysuria    GERD (gastroesophageal reflux disease)    Leg swelling    Emphysema lung    COVID-19 vaccine series completed  Moderna - 2nd dose 02/20/2021    Knee pain, left    Scar tissue  left knee S/P TKR    COPD exacerbation    S/P bladder repair  bladder lift 2/2013    Dental disorder  Dental surgery 12/2012, patient reports that upper portion is glued in at this time, plan is to have dental implants placed.    S/P knee surgery  arthroscopy bilateral 2007 and 2010    Skin cancer  to right temple 2012, removed, history of basal cell x 2    H/O lumbosacral spine surgery  fusion 2017    H/O umbilical hernia repair  5/24/19    S/P cholecystectomy  2012    S/P colonoscopy  benign polyp    S/P knee replacement  right 2017, left 11/2019    admitted with complaints of Patient is a 76y old  Female who presents with a chief complaint of Acute hypoxic respiratory failure  COPD exacerbation (10 Nov 2021 08:27)  .  76 year old female pmhx of COPD/bullaous dz, h/o PTX in past, DM, basal cell CA, ex smoker admitted 11/5 w SOB, with a large Right PTX on CXR. S/P right pigtail placement 11/5 with good resolution. Chest tube removed 11/9. CXR 11/10 w no significant PTX.    d/c instructions:  Leave dressing on for 48 hours after chest tube removed. Remove in shower, 11/11 if home,  after has become saturated. If in hospital RN to remove. May leave open to air.    Shower only, no soaking in tub or pool for 1 month  No flying, scuba diving, heavy lifting (>10 pounds), for 1 month   Ambulate frequently as tolerated. No driving while on pain medicine.  f/u Dr. Coulter as needed, [phone]  f/u Dr. Dowd next week w CXR    Discussed with Cardiothoracic Team at AM rounds.

## 2021-11-10 NOTE — DISCHARGE NOTE NURSING/CASE MANAGEMENT/SOCIAL WORK - PATIENT PORTAL LINK FT
You can access the FollowMyHealth Patient Portal offered by [facility] by registering at the following website: [email or web address]. By joining [employer]’s FollowMyHealth portal, you will also be able to view your health information using other applications (apps) compatible with our system.

## 2021-11-10 NOTE — PROGRESS NOTE ADULT - PROVIDER SPECIALTY LIST ADULT
Critical Care
Thoracic Surgery
Hospitalist
Pulmonology
Pulmonology
Hospitalist
Thoracic Surgery
Pulmonology

## 2021-11-10 NOTE — PROGRESS NOTE ADULT - REASON FOR ADMISSION
Acute hypoxic respiratory failure  COPD exacerbation

## 2021-11-11 ENCOUNTER — NON-APPOINTMENT (OUTPATIENT)
Age: 76
End: 2021-11-11

## 2021-11-12 ENCOUNTER — NON-APPOINTMENT (OUTPATIENT)
Age: 76
End: 2021-11-12

## 2021-11-15 DIAGNOSIS — J43.9 EMPHYSEMA, UNSPECIFIED: ICD-10-CM

## 2021-11-15 DIAGNOSIS — Z85.828 PERSONAL HISTORY OF OTHER MALIGNANT NEOPLASM OF SKIN: ICD-10-CM

## 2021-11-15 DIAGNOSIS — M51.36 OTHER INTERVERTEBRAL DISC DEGENERATION, LUMBAR REGION: ICD-10-CM

## 2021-11-15 DIAGNOSIS — K59.00 CONSTIPATION, UNSPECIFIED: ICD-10-CM

## 2021-11-15 DIAGNOSIS — K21.9 GASTRO-ESOPHAGEAL REFLUX DISEASE WITHOUT ESOPHAGITIS: ICD-10-CM

## 2021-11-15 DIAGNOSIS — Z79.51 LONG TERM (CURRENT) USE OF INHALED STEROIDS: ICD-10-CM

## 2021-11-15 DIAGNOSIS — Z87.19 PERSONAL HISTORY OF OTHER DISEASES OF THE DIGESTIVE SYSTEM: ICD-10-CM

## 2021-11-15 DIAGNOSIS — E11.65 TYPE 2 DIABETES MELLITUS WITH HYPERGLYCEMIA: ICD-10-CM

## 2021-11-15 DIAGNOSIS — J44.1 CHRONIC OBSTRUCTIVE PULMONARY DISEASE WITH (ACUTE) EXACERBATION: ICD-10-CM

## 2021-11-15 DIAGNOSIS — L13.9 BULLOUS DISORDER, UNSPECIFIED: ICD-10-CM

## 2021-11-15 DIAGNOSIS — E03.9 HYPOTHYROIDISM, UNSPECIFIED: ICD-10-CM

## 2021-11-15 DIAGNOSIS — Z96.653 PRESENCE OF ARTIFICIAL KNEE JOINT, BILATERAL: ICD-10-CM

## 2021-11-15 DIAGNOSIS — Z87.891 PERSONAL HISTORY OF NICOTINE DEPENDENCE: ICD-10-CM

## 2021-11-15 DIAGNOSIS — M19.90 UNSPECIFIED OSTEOARTHRITIS, UNSPECIFIED SITE: ICD-10-CM

## 2021-11-15 DIAGNOSIS — J93.12 SECONDARY SPONTANEOUS PNEUMOTHORAX: ICD-10-CM

## 2021-11-15 DIAGNOSIS — J96.01 ACUTE RESPIRATORY FAILURE WITH HYPOXIA: ICD-10-CM

## 2021-11-15 DIAGNOSIS — Z88.8 ALLERGY STATUS TO OTHER DRUGS, MEDICAMENTS AND BIOLOGICAL SUBSTANCES: ICD-10-CM

## 2021-11-15 DIAGNOSIS — Z87.440 PERSONAL HISTORY OF URINARY (TRACT) INFECTIONS: ICD-10-CM

## 2021-11-15 DIAGNOSIS — J98.2 INTERSTITIAL EMPHYSEMA: ICD-10-CM

## 2021-11-15 DIAGNOSIS — T38.0X5A ADVERSE EFFECT OF GLUCOCORTICOIDS AND SYNTHETIC ANALOGUES, INITIAL ENCOUNTER: ICD-10-CM

## 2021-11-15 DIAGNOSIS — R53.81 OTHER MALAISE: ICD-10-CM

## 2021-11-15 DIAGNOSIS — Z98.1 ARTHRODESIS STATUS: ICD-10-CM

## 2021-11-18 ENCOUNTER — APPOINTMENT (OUTPATIENT)
Dept: INTERNAL MEDICINE | Facility: CLINIC | Age: 76
End: 2021-11-18
Payer: MEDICARE

## 2021-11-18 VITALS
RESPIRATION RATE: 16 BRPM | HEART RATE: 87 BPM | WEIGHT: 204 LBS | OXYGEN SATURATION: 97 % | HEIGHT: 65 IN | SYSTOLIC BLOOD PRESSURE: 119 MMHG | TEMPERATURE: 98.6 F | DIASTOLIC BLOOD PRESSURE: 80 MMHG | BODY MASS INDEX: 33.99 KG/M2

## 2021-11-18 PROCEDURE — 99495 TRANSJ CARE MGMT MOD F2F 14D: CPT

## 2021-11-18 RX ORDER — NITROFURANTOIN (MONOHYDRATE/MACROCRYSTALS) 25; 75 MG/1; MG/1
100 CAPSULE ORAL TWICE DAILY
Qty: 14 | Refills: 0 | Status: DISCONTINUED | COMMUNITY
Start: 2021-11-12 | End: 2021-11-18

## 2021-11-18 RX ORDER — TRIAMCINOLONE ACETONIDE 1 MG/G
0.1 CREAM TOPICAL 3 TIMES DAILY
Qty: 1 | Refills: 1 | Status: DISCONTINUED | COMMUNITY
Start: 2021-09-21 | End: 2021-11-18

## 2021-11-18 RX ORDER — PREDNISONE 20 MG/1
20 TABLET ORAL TWICE DAILY
Qty: 14 | Refills: 1 | Status: DISCONTINUED | COMMUNITY
Start: 2021-09-22 | End: 2021-11-18

## 2021-11-18 RX ORDER — SULFAMETHOXAZOLE AND TRIMETHOPRIM 800; 160 MG/1; MG/1
800-160 TABLET ORAL TWICE DAILY
Qty: 6 | Refills: 0 | Status: DISCONTINUED | COMMUNITY
Start: 2021-11-12 | End: 2021-11-18

## 2021-11-18 NOTE — PLAN
[FreeTextEntry1] : Mrs. Leon resents for a hospital followup evaluation. She had a right spontaneous pneumothorax secondary significant bullous lung disease. CT scan of the chest does show significant bilateral upper lobe bullous lung disease. Predischarge chest x-ray showed reexpansion of the right lung with bilateral lobe atelectasis. Patient will have a repeat chest x-ray. I've encouraged her to continue use incentive spirometry. Patient was also discharged on supplemental oxygen we'll continue to monitor O2 saturation off of oxygen at specified intervals. Followup as scheduled.

## 2021-11-18 NOTE — HISTORY OF PRESENT ILLNESS
[FreeTextEntry1] : hospital followup [de-identified] : Mrs. Leon presents for a followup evaluation. She was admitted to hospital on 11/5/21 with acute onset of shortness of breath after waking up in the morning. She was found to have a right spontaneous pneumothorax. Patient has a history of significant upper lobe bullous lung disease secondary to COPD/cigarette smoking. The chest tube was placed with complete reexpansion of the lung. CT scan of the chest demonstrated significant bullous lung disease of both upper lobes. Patient was discharged on 11/11. She now presents for followup she continues on 2 L per minute nasal cannula. Mrs. Leon is accompanied by her  and daughter. A second order was participating via video cell phone.

## 2021-11-23 ENCOUNTER — APPOINTMENT (OUTPATIENT)
Dept: CARE COORDINATION | Facility: HOME HEALTH | Age: 76
End: 2021-11-23
Payer: MEDICARE

## 2021-11-23 VITALS
DIASTOLIC BLOOD PRESSURE: 80 MMHG | SYSTOLIC BLOOD PRESSURE: 128 MMHG | HEART RATE: 76 BPM | RESPIRATION RATE: 18 BRPM | OXYGEN SATURATION: 93 %

## 2021-11-23 PROCEDURE — 99349 HOME/RES VST EST MOD MDM 40: CPT

## 2021-11-23 RX ORDER — GLIMEPIRIDE 2 MG/1
2 TABLET ORAL
Qty: 30 | Refills: 0 | Status: DISCONTINUED | COMMUNITY
Start: 2021-11-17

## 2021-11-23 RX ORDER — SODIUM SULFATE, MAGNESIUM SULFATE, AND POTASSIUM CHLORIDE 17.75; 2.7; 2.25 G/1; G/1; G/1
1479-225-188 TABLET ORAL
Qty: 24 | Refills: 0 | Status: DISCONTINUED | COMMUNITY
Start: 2021-07-07 | End: 2021-11-23

## 2021-11-23 RX ORDER — ALBUTEROL SULFATE 90 UG/1
108 (90 BASE) INHALANT RESPIRATORY (INHALATION) EVERY 6 HOURS
Qty: 1 | Refills: 0 | Status: ACTIVE | COMMUNITY
Start: 2021-11-23

## 2021-11-23 NOTE — PHYSICAL EXAM
[No Acute Distress] : no acute distress [Well Nourished] : well nourished [Well Developed] : well developed [Well-Appearing] : well-appearing [Normal Sclera/Conjunctiva] : normal sclera/conjunctiva [Normal Outer Ear/Nose] : the outer ears and nose were normal in appearance [Normal Oropharynx] : the oropharynx was normal [Supple] : supple [No Respiratory Distress] : no respiratory distress  [Clear to Auscultation] : lungs were clear to auscultation bilaterally [No Accessory Muscle Use] : no accessory muscle use [Normal Rate] : normal rate  [Regular Rhythm] : with a regular rhythm [Normal S1, S2] : normal S1 and S2 [Pedal Pulses Present] : the pedal pulses are present [No Extremity Clubbing/Cyanosis] : no extremity clubbing/cyanosis [Soft] : abdomen soft [Non Tender] : non-tender [Non-distended] : non-distended [Normal Bowel Sounds] : normal bowel sounds [No Spinal Tenderness] : no spinal tenderness [Grossly Normal Strength/Tone] : grossly normal strength/tone [No Rash] : no rash [Coordination Grossly Intact] : coordination grossly intact [No Focal Deficits] : no focal deficits [Normal Affect] : the affect was normal [Alert and Oriented x3] : oriented to person, place, and time [Normal Insight/Judgement] : insight and judgment were intact [de-identified] : trace edema LLE chronic [de-identified] : site healed R upper back s/p pigtail cath

## 2021-11-23 NOTE — PLAN
[FreeTextEntry1] : A/P:\par 1. Spontaneous right sided PTX in the settings of emphysema \par - s/p pigtail placement with lung expansion with large blebs of air BL on CT chest\par - developed sc emphysema, removed on 11/9/21 and will need home O2 arranged as her SpO2 was 82% on RA with ambulation and 92% at rest, 96% with O2\par - need to consider chemical pleurodesis vs surgical wedge resection\par - completed steroid taper. \par - con't to wean O2 as tolerated\par \par 2. COPD\par - spiriva daily, albuterol prn\par - Adhere to all medications including demonstrating proper use of inhalers and nebulizers. \par - Encourage the use of proper breathing techniques such as pursed lip breathing or leaning forward during exhalation.\par - Understands proper use of oxygen therapy.\par - Increase activity as tolerated and maintain optimal activity levels. Continue coughing and deep breathing exercises including use of Incentive Spirometry.\par - Receive routine pneumococcal and influenza vaccinations.\par - Identify early signs and sx of disease and notify NP/MD.\par - Maintain proper nutrition and hydration.\par - Follow up with Pulmonologist as scheduled and prn\par \par 3. DMII: controlled\par - A1c 7.0\par - diabetic diet\par - con't janumet and jardiance\par - f/u PCP for routine monitoring\par \par 4. Hypothyroidism:\par - synthroid\par \par 5. GERD\par - PPI\par

## 2021-11-23 NOTE — ASSESSMENT
[FreeTextEntry1] : Pt is being seen after discharge home from [facility]. She was admitted on 11/05/2021 for CP/SOB  and discharged home on 11/10/2021. Discharge medications were reviewed and reconciled with the current medication list and medications in home.\par \par CC:\par Pt is seen at home s/p recent admission for PNA. Pt is temporarily unable to leave home as it requires a considerable and taxing effort, exhibits unsteady gait and needs assistive device to leave home.\par HPI:\par Pt Is a 77 y/o female enrolled in the [facility] program seen at home s/p a recent admission for AHRF 2/2 Emphysema with spontaneous Pneumothrorax.\par \par Hospital Course	\par 76 y.o. female with PMHx of COPD/Emphysema, DMII, Hypothyroidism, Diverticulosis, cataracts, Varicose veins, DDD with LS stenosis, GERD, skin CA p/w acute onset of SOB - diagnosed with spontaneous Right sided PTX and treated with pigtail placement with lung expansion with large blebs of air BL on CT chest - developed sc emphysema, removed on 11/9/21 and will need home O2 arranged as her SpO2 was 82% on RA with ambulation and 92% at rest, 96% with O2\par   - need to consider chemical pleurodesis vs surgical wedge resection\par   - complete steroids taper\par \par Upon examination A&O x 3 NAD. Denies CP, SOB, headache, dizziness, pain, abd discomfort or difficulty with bowel or bladder. Noted with mild RODRIGUEZ. Dr. Dowd's note appreciated from 11/18. She is in process of weaning O2, at 30 minutes sat 93- 94% at 60 minutes sat 91-92%. She will follow up with Dr. Dowd in regard to plan moving forward with CT surgery and possible wedge resection vs chemical pleurodesis. BS elevated this morning prior to meds at 227 but she admits to having ravioli last night. She completed prednisone taper, no signs of COPD exacerbation at this time. [facility] services in home\par \par

## 2021-11-23 NOTE — HISTORY OF PRESENT ILLNESS
[Post-hospitalization from ___ Hospital] : Post-hospitalization from [unfilled] Hospital [Admitted on: ___] : The patient was admitted on [unfilled] [Discharged on ___] : discharged on [unfilled] [FreeTextEntry2] : FROM [facility] PROVIDER:\par  Discharge Note Provider [Charted Location: [facility] 062 [id number]] [Authored: 09-Nov-2021 13:16]- for Visit: [id number], Complete, Revised, Signed in Full, General\par \par \par  Hospital Course:\par Discharge Date	10-Nov-2021\par Admission Date	05-Nov-2021 12:03\par Reason for Admission	Acute hypoxic respiratory failure\par COPD exacerbation\par Hospital Course	\par 76 y.o. female with PMHx of COPD/Emphysema, DMII, Hypothyroidism, Diverticulosis, cataracts, Varicose veins, DDD with LS stenosis, GERD, skin CA p/w acute onset of SOB - diagnosed with spontaneous Right sided PTX and treated with pigtail placement.\par \par 1. Spontaneous right sided PTX in the settings of emphysema s/p pigtail placement with lung expansion with large blebs of air BL on CT chest - developed sc emphysema, removed on 11/9/21 and will need home O2 arranged as her SpO2 was 82% on RA with ambulation and 92% at rest, 96% with O2\par   - need to consider chemical pleurodesis vs surgical wedge resection\par   - complete steroids taper\par \par 2. DMII - HISS, adjusted due to steroid induced hyperglycemia - resume home regimen\par \par 3. Hypothyroidism - synthroid\par \par 4. GERD - PPI\par \par Medically stable for DC\par \par Discussed with pt and daughter, CTS PA\par DC home with O2 \par Time spent 65 \par \par  Med Reconciliation:\par Medication Reconciliation Status	Admission Reconciliation is Completed\par Discharge Reconciliation is Completed\par Discharge Medications	furosemide 40 mg oral tablet: 1 tab(s) orally once a day\par gabapentin 100 mg oral capsule: 1 cap(s) orally once a day\par gabapentin 100 mg oral capsule: 2 cap(s) orally once a day (at bedtime)\par Janumet  mg-1000 mg oral tablet, extended release: 1 tab(s) orally once a day (in the evening)\par Jardiance 10 mg oral tablet: 1 tab(s) orally once a day (in the morning)\par levothyroxine 137 mcg (0.137 mg) oral tablet: 1 tab(s) orally once a day\par pantoprazole 40 mg oral delayed release tablet: 1 tab(s) orally once a day (at bedtime), As Needed\par predniSONE 20 mg oral tablet: 1 tab(s) orally once a day\par tiotropium 18 mcg inhalation capsule: 1 cap(s) inhaled once a day\par Ventolin HFA 90 mcg/inh inhalation aerosol: 2 puff(s) inhaled every 6 hours, As Needed - for shortness of breath and/or wheezing\par ,\par ,\par \par  Care Plan/Procedures:\par Discharge Diagnoses, Assessment and Plan of Treatment	PRINCIPAL DISCHARGE DIAGNOSIS\par Diagnosis: Pneumothorax\par Assessment and Plan of Treatment: s/p chest tube - f/u with CTS and pulmonary for possible elective surgical resectioon of large right sided bullae\par Goal(s)	To get better and follow your care plan as instructed.\par \par  Follow Up:\par Care Providers for Follow up (PCP/Outpatient Provider)	James Dowd)\par Internal Medicine; Pulmonary Disease\par [address] [facility], [address]\[place] [address]\par Phone: [phone]\par Fax: [phone]\par Follow Up Time: \par \par Lissy Coulter)\par Thoracic and Cardiac Surgery\par [address]\[place] [address]\par Phone: [phone]\par Fax: [phone]\par Follow Up Time:\par Discharge Diet	Consistent Carbohydrate Diabetic Diets\par Activity	Bathing allowed\par \par  Quality Measures:\par Patient Condition	Stable\par Hospice Patient	No\par Tobacco Usage Within the Last Year	No\par Does the patient have difficulty running errands alone like visiting a doctor’s office or shopping?	Yes\par Does the patient have difficulty climbing stairs?	Yes\par Cognition: The patient has	No difficulties\par Does the patient have a principal diagnosis of ischemic stroke, hemorrhagic stroke, or TIA?	No\par Does the patient have a principal diagnosis of Acute Myocardial Infarction?	No\par Has the patient had a Percutaneous Coronary Intervention?	No\par \par  Home Health:\par Discharged with Home Health Care Services?	Yes\par Face-To-Face Contact	As certified below, I, or a nurse practitioner or physician assistant working with me, had a face-to-face encounter that meets the physician face-to-face encounter requirements.\par Need for Skilled Services	Other, specify...\par Based on the above findings, the following intermittent skilled services are medically necessary home health services:	Nursing\par Other Need for Skilled Services	Home O2\par Home Bound Status	Shortness of breath with minimal ambulation\par Patient Needs Assistance to Leave Residence...\par Other	Home O2\par Attending Certification	My signature below certifies that the above stated patient is homebound and upon completion of the Face-To-Face encounter, has the need for intermittent skilled nursing, physical therapy and/or speech or occupational therapy services in their home for their current diagnosis as outlined in their initial plan of care. These services will continue to be monitored by myself or another physician.\par Encounter Date	10-Nov-2021\par \par  Document Complete:\par Physician Section Complete	This document is complete and the patient is ready for discharge.\par For questions about your prescriptions, please call:	[phone]\par Care Provider Seen in Hospital	Joseph Espitia\par Is this contact telephone number correct?	Yes\par \par \par \par Electronic Signatures:\par Joseph Espitia)  (Signed 10-Nov-2021 12:08)\par 	Authored: Hospital Course, Med Reconciliation, Care Plan/Procedures, Follow Up, Quality Measures, Home Health, Document Complete\par \par \par Last Updated: 10-Nov-2021 12:08 by Joseph Espitia)\par \par \par

## 2021-12-03 NOTE — H&P PST ADULT - CENTRAL VENOUS CATHETER
Labs pended. Please advise if you would like any other labs for this patient.     Thanks.   
MD Anamika Bond MA; LAURA White Nurse Msg Pool 5 minutes ago (8:00 AM)         agree        
Patient has lab appt 12/6 from Dr. White. No orders have been placed. Please advise.    Thanks!  
no

## 2021-12-04 ENCOUNTER — OUTPATIENT (OUTPATIENT)
Dept: OUTPATIENT SERVICES | Facility: HOSPITAL | Age: 76
LOS: 1 days | End: 2021-12-04
Payer: MEDICARE

## 2021-12-04 ENCOUNTER — APPOINTMENT (OUTPATIENT)
Dept: RADIOLOGY | Facility: CLINIC | Age: 76
End: 2021-12-04
Payer: MEDICARE

## 2021-12-04 DIAGNOSIS — Z98.890 OTHER SPECIFIED POSTPROCEDURAL STATES: Chronic | ICD-10-CM

## 2021-12-04 DIAGNOSIS — J93.83 OTHER PNEUMOTHORAX: ICD-10-CM

## 2021-12-04 DIAGNOSIS — Z90.49 ACQUIRED ABSENCE OF OTHER SPECIFIED PARTS OF DIGESTIVE TRACT: Chronic | ICD-10-CM

## 2021-12-04 DIAGNOSIS — Z96.659 PRESENCE OF UNSPECIFIED ARTIFICIAL KNEE JOINT: Chronic | ICD-10-CM

## 2021-12-04 PROCEDURE — 71046 X-RAY EXAM CHEST 2 VIEWS: CPT

## 2021-12-04 PROCEDURE — 71046 X-RAY EXAM CHEST 2 VIEWS: CPT | Mod: 26

## 2021-12-06 ENCOUNTER — RX RENEWAL (OUTPATIENT)
Age: 76
End: 2021-12-06

## 2021-12-17 ENCOUNTER — RX RENEWAL (OUTPATIENT)
Age: 76
End: 2021-12-17

## 2022-01-12 ENCOUNTER — APPOINTMENT (OUTPATIENT)
Dept: INTERNAL MEDICINE | Facility: CLINIC | Age: 77
End: 2022-01-12
Payer: MEDICARE

## 2022-01-12 VITALS
HEART RATE: 81 BPM | DIASTOLIC BLOOD PRESSURE: 80 MMHG | RESPIRATION RATE: 18 BRPM | OXYGEN SATURATION: 95 % | SYSTOLIC BLOOD PRESSURE: 120 MMHG | BODY MASS INDEX: 33.32 KG/M2 | HEIGHT: 65 IN | WEIGHT: 200 LBS | TEMPERATURE: 98.1 F

## 2022-01-12 PROCEDURE — 99214 OFFICE O/P EST MOD 30 MIN: CPT

## 2022-01-12 RX ORDER — SITAGLIPTIN AND METFORMIN HYDROCHLORIDE 100; 1000 MG/1; MG/1
100-1000 TABLET, FILM COATED, EXTENDED RELEASE ORAL DAILY
Refills: 0 | Status: DISCONTINUED | COMMUNITY
Start: 2021-08-05 | End: 2022-01-12

## 2022-01-12 NOTE — HISTORY OF PRESENT ILLNESS
[FreeTextEntry1] : followup evaluation [de-identified] : Carolyn presents for followup evaluation accompanied by her . Patient was admitted to [facility] on 11/5/21 with spontaneous right-sided pneumothorax. She had a pigtail catheter chest tube placed and was subsequently were moved on 11/9. CT scan of the chest shows significant bullous lung disease. Patient was discharged on home oxygen therapy. She is now using it on an as-needed basis when her O2 saturation dropped below 92%. She is feeling better. Her activity level has increased.

## 2022-01-12 NOTE — PLAN
[FreeTextEntry1] : Mrs. Leon presents for followup evaluation. She has a history of a spontaneous pneumothorax which required treatment with chest tube.Currently she is on supplemental iron sugar on an as needed basis. She is using her oxygen with O2 saturation falls below 92%. She has been given a prescription for CBC, CMP with hemoglobin A1c, iron level, lipid profile, TSH level and urinalysis. Followup in 6 months.

## 2022-01-17 ENCOUNTER — RX RENEWAL (OUTPATIENT)
Age: 77
End: 2022-01-17

## 2022-01-17 ENCOUNTER — NON-APPOINTMENT (OUTPATIENT)
Age: 77
End: 2022-01-17

## 2022-02-02 ENCOUNTER — RX RENEWAL (OUTPATIENT)
Age: 77
End: 2022-02-02

## 2022-03-12 ENCOUNTER — RX RENEWAL (OUTPATIENT)
Age: 77
End: 2022-03-12

## 2022-03-18 ENCOUNTER — NON-APPOINTMENT (OUTPATIENT)
Age: 77
End: 2022-03-18

## 2022-03-18 ENCOUNTER — APPOINTMENT (OUTPATIENT)
Dept: INTERNAL MEDICINE | Facility: CLINIC | Age: 77
End: 2022-03-18
Payer: MEDICARE

## 2022-03-18 VITALS
BODY MASS INDEX: 31.99 KG/M2 | TEMPERATURE: 98.1 F | HEART RATE: 73 BPM | RESPIRATION RATE: 16 BRPM | DIASTOLIC BLOOD PRESSURE: 72 MMHG | WEIGHT: 192 LBS | HEIGHT: 65 IN | SYSTOLIC BLOOD PRESSURE: 118 MMHG | OXYGEN SATURATION: 94 %

## 2022-03-18 DIAGNOSIS — R39.9 UNSPECIFIED SYMPTOMS AND SIGNS INVOLVING THE GENITOURINARY SYSTEM: ICD-10-CM

## 2022-03-18 DIAGNOSIS — L23.7 ALLERGIC CONTACT DERMATITIS DUE TO PLANTS, EXCEPT FOOD: ICD-10-CM

## 2022-03-18 DIAGNOSIS — Z87.39 PERSONAL HISTORY OF OTHER DISEASES OF THE MUSCULOSKELETAL SYSTEM AND CONNECTIVE TISSUE: ICD-10-CM

## 2022-03-18 DIAGNOSIS — G60.8 OTHER HEREDITARY AND IDIOPATHIC NEUROPATHIES: ICD-10-CM

## 2022-03-18 PROCEDURE — 99214 OFFICE O/P EST MOD 30 MIN: CPT | Mod: 25

## 2022-03-18 PROCEDURE — 94010 BREATHING CAPACITY TEST: CPT

## 2022-03-18 RX ORDER — EMPAGLIFLOZIN 10 MG/1
10 TABLET, FILM COATED ORAL DAILY
Refills: 0 | Status: DISCONTINUED | COMMUNITY
Start: 2021-09-15 | End: 2022-03-18

## 2022-03-18 RX ORDER — EMPAGLIFLOZIN, METFORMIN HYDROCHLORIDE 10; 1000 MG/1; MG/1
10-1000 TABLET, EXTENDED RELEASE ORAL DAILY
Qty: 90 | Refills: 0 | Status: ACTIVE | COMMUNITY
Start: 2022-03-18

## 2022-03-18 NOTE — PLAN
[FreeTextEntry1] : Mrs. Leon presents for a followup evaluation.\par \par #1. Patient will continue on current medication regimen which has been reviewed and revised.\par \par #2. She will continue followup with her endocrinologist for management of her diabetes. She has been advised to make an appointment for an ophthalmologist as well as podiatrist.\par \par #3. Patient continues to have symptoms of peripheral neuropathy most likely related to her diabetes. She will follow up with neurology.\par \par #4. Patient will followup in this office in 6 months.

## 2022-03-18 NOTE — HISTORY OF PRESENT ILLNESS
[FreeTextEntry1] : Followup evaluation  [de-identified] : Mrs. Leon presents for a followup evaluation. She has a history of COPD with nocturnal oxygen, hyperlipidemia, hypothyroidism and type 2 diabetes with peripheral neuropathy.

## 2022-03-18 NOTE — DATA REVIEWED
[FreeTextEntry1] : Spirometry shows moderate obstructive lung disease. FEV1 is 1.53 L which is 73% predicted. FVC is 2.4 L which is 86% predicted. FEV1 percent is 63%. FEF 25/75% is 0.77 L which is 49% predicted.

## 2022-03-23 ENCOUNTER — APPOINTMENT (OUTPATIENT)
Dept: DERMATOLOGY | Facility: CLINIC | Age: 77
End: 2022-03-23
Payer: MEDICARE

## 2022-03-23 PROCEDURE — 99213 OFFICE O/P EST LOW 20 MIN: CPT | Mod: 25

## 2022-03-23 PROCEDURE — 17110 DESTRUCTION B9 LES UP TO 14: CPT

## 2022-03-23 PROCEDURE — 17000 DESTRUCT PREMALG LESION: CPT | Mod: 59

## 2022-03-23 NOTE — PHYSICAL EXAM
[Alert] : alert [Oriented x 3] : ~L oriented x 3 [Well Nourished] : well nourished [Full Body Skin Exam Performed] : performed [FreeTextEntry3] : A full skin exam was performed including the scalp, face (including lips, ears, nose and eyes), neck, chest, abdomen, back, buttocks, upper extremities and lower extremities.  The patient declined examination of the genitalia.  \par The exam revealed the following benign growths:\par Treutlen pigmented nevi.\par Seborrheic keratoses.\par Lentigines.\par \par keratotic papule, upper lip in the midline.\par \par Greasy tan erythematous papules, left lateral breast region x 8 and right lateral breast region x 6.

## 2022-03-23 NOTE — HISTORY OF PRESENT ILLNESS
[FreeTextEntry1] : Patient presents for skin examination. [de-identified] : Notes irritated lesions of the bilateral lateral breast regions.  No bleeding.

## 2022-03-29 ENCOUNTER — APPOINTMENT (OUTPATIENT)
Dept: NEUROLOGY | Facility: CLINIC | Age: 77
End: 2022-03-29
Payer: MEDICARE

## 2022-03-29 ENCOUNTER — NON-APPOINTMENT (OUTPATIENT)
Age: 77
End: 2022-03-29

## 2022-03-29 VITALS
WEIGHT: 192 LBS | SYSTOLIC BLOOD PRESSURE: 128 MMHG | TEMPERATURE: 97.8 F | HEIGHT: 65 IN | HEART RATE: 90 BPM | BODY MASS INDEX: 31.99 KG/M2 | DIASTOLIC BLOOD PRESSURE: 76 MMHG

## 2022-03-29 PROCEDURE — 99204 OFFICE O/P NEW MOD 45 MIN: CPT

## 2022-03-29 NOTE — PHYSICAL EXAM
[FreeTextEntry1] : Examination:\par Constitutional: normal, no apparent distress\par Eyes: normal conjunctiva b/l, no ptosis, visual fields full\par Respiratory: no respiratory distress, normal effort, normal auscultation\par Cardiovascular: normal rate, rhythm, no murmurs\par Neck: supple, no masses\par Vascular: carotids normal\par Skin: normal color, no rashes\par Psych: normal mood, affect\par \par Neurological:\par Memory: normal memory, oriented to person, place, time\par Language intact/no aphasia\par Cranial Nerves: II-XII intact, Pupils equally round and reactive to light, ocular muscles/movements intact, no ptosis, no facial weakness, tongue protrudes normally in the midline, \par Motor: normal tone, no pronator drift, full strength in all four extremities in the proximal and distal muscle groups\par Coordination: Fine motor movements intact, rapid alternating movements intact, finger to nose intact bilaterally\par Sensory: intact to  joint position sense, decreased light touch distal to ankle, absent vibration in right foot and ankle, intact on left. \par Decreased sensation over left lateral thigh\par DTRs: symmetric, 2+ in b/l triceps, 2+ in b/l biceps, 2+ in b/l brachioradialis, 1+ in bilateral patellars, absent in bilateral Achilles, Babinskis negative bilaterally\par Gait: narrow based, steady\par

## 2022-03-29 NOTE — HISTORY OF PRESENT ILLNESS
[FreeTextEntry1] : Ms. Leon presents today for neurology evaluation.\par She was a patient of Dr. Flores.\par She states that she has neuropathy for many years which became worse over the last few years.\par \par She reports a history of extensive spine surgery.\par She also  has a history of diabetes.\par \par She reports numbness in her left lateral thigh for many years. \par She reports a burning pain in this area. \par \par She has loss of feeling in her feet. \par She has severe pain in her feet.\par She has been taking gabapentin. She takes 100 mg in the AM and 300 mg at night. \par She denies having any side effects.\par She does not think that it is effective.\par In the past she was taking 400 mg TID.\par \par She does not have symptoms in her hands.\par \par She had a NCV/EMG in the past and was told that she has both neuropathy and an issue with her back.\par

## 2022-03-29 NOTE — DISCUSSION/SUMMARY
[FreeTextEntry1] : Ms. Leon is a 76 year old woman with a history of diabetes and lumbar spine surgery.\par She has also been diagnosed with peripheral neuropathy in the past.\par Unfortunately, records from her prior neurologist are not available at this time. \par \par Peripheral Neuropathy\par -Examination is suggestive of peripheral neuropathy.\par -She does have diabetes, most recent HbA1C was 6.5.\par -Unclear if diabetes is the underlying cause.\par -Will repeat vitamin B12 with next set of bloodwork since previously it was in the 400s.\par -Will also check SPEP.\par -Will again request report of prior NCV/EMG.\par -Discussed importance of blood sugar control.\par -Avoid walking barefoot.\par -At this time she has pain despite low dose gabapentin - 100 mg in the Am and 300 mg at night.\par Can increase dose to 300 mg BID and if not effective can increase to 300 mg in the Am and 600 mg at night.\par Discussed potential side effects including mood disturbance.\par \par Left leg numbness\par -Possible meralgia paresthetica vs lumbar radiculopathy\par -Obtain results of prior EMG.\par -Avoid tight fitting clothing\par -Continue gabapentin\par -Trial of Lidoderm patch in affected area for 12 hours on and 12 hours off. If not covered by insurance can try over the counter 4% patches.\par \par f/u 3-4 months, sooner if needed.

## 2022-03-31 ENCOUNTER — APPOINTMENT (OUTPATIENT)
Dept: ORTHOPEDIC SURGERY | Facility: CLINIC | Age: 77
End: 2022-03-31

## 2022-04-11 ENCOUNTER — APPOINTMENT (OUTPATIENT)
Dept: INTERNAL MEDICINE | Facility: CLINIC | Age: 77
End: 2022-04-11
Payer: MEDICARE

## 2022-04-11 VITALS
WEIGHT: 193.98 LBS | OXYGEN SATURATION: 94 % | HEIGHT: 65 IN | SYSTOLIC BLOOD PRESSURE: 122 MMHG | BODY MASS INDEX: 32.32 KG/M2 | RESPIRATION RATE: 16 BRPM | HEART RATE: 88 BPM | DIASTOLIC BLOOD PRESSURE: 78 MMHG | TEMPERATURE: 98.1 F

## 2022-04-11 DIAGNOSIS — J20.9 ACUTE BRONCHITIS, UNSPECIFIED: ICD-10-CM

## 2022-04-11 PROCEDURE — 99213 OFFICE O/P EST LOW 20 MIN: CPT

## 2022-04-11 RX ORDER — GABAPENTIN 100 MG/1
100 CAPSULE ORAL
Refills: 0 | Status: DISCONTINUED | COMMUNITY
Start: 2021-11-23 | End: 2022-04-11

## 2022-04-11 NOTE — HISTORY OF PRESENT ILLNESS
[FreeTextEntry8] : cough, congestion\par \par Developed cough and chest congestion 3 days ago.   ill with similar symptoms.  He  performed  home covid test which was negative. She denies chest pain, fevers, chills, SOB.  Has O2 at home which she has been using at intermittently at night. Some wheeze per daughter.  Has not used rescue inhaler.   .\par \par

## 2022-04-11 NOTE — PHYSICAL EXAM
[Normal Outer Ear/Nose] : the outer ears and nose were normal in appearance [Normal Oropharynx] : the oropharynx was normal [Supple] : supple [Normal Rate] : normal rate  [Regular Rhythm] : with a regular rhythm [Normal S1, S2] : normal S1 and S2 [No Edema] : there was no peripheral edema [Normal] : affect was normal and insight and judgment were intact

## 2022-04-15 ENCOUNTER — RX RENEWAL (OUTPATIENT)
Age: 77
End: 2022-04-15

## 2022-04-20 ENCOUNTER — APPOINTMENT (OUTPATIENT)
Dept: ORTHOPEDIC SURGERY | Facility: CLINIC | Age: 77
End: 2022-04-20

## 2022-05-19 ENCOUNTER — NON-APPOINTMENT (OUTPATIENT)
Age: 77
End: 2022-05-19

## 2022-05-19 ENCOUNTER — APPOINTMENT (OUTPATIENT)
Dept: INTERNAL MEDICINE | Facility: CLINIC | Age: 77
End: 2022-05-19
Payer: MEDICARE

## 2022-05-19 VITALS
DIASTOLIC BLOOD PRESSURE: 80 MMHG | HEIGHT: 65 IN | SYSTOLIC BLOOD PRESSURE: 120 MMHG | TEMPERATURE: 98.8 F | HEART RATE: 67 BPM | WEIGHT: 190 LBS | RESPIRATION RATE: 16 BRPM | BODY MASS INDEX: 31.65 KG/M2 | OXYGEN SATURATION: 94 %

## 2022-05-19 PROCEDURE — 93000 ELECTROCARDIOGRAM COMPLETE: CPT | Mod: 59

## 2022-05-19 PROCEDURE — 99214 OFFICE O/P EST MOD 30 MIN: CPT | Mod: 25

## 2022-05-19 RX ORDER — METHYLPREDNISOLONE 4 MG/1
4 TABLET ORAL
Qty: 1 | Refills: 0 | Status: DISCONTINUED | COMMUNITY
Start: 2022-04-11 | End: 2022-05-19

## 2022-05-19 RX ORDER — CEFUROXIME AXETIL 500 MG/1
500 TABLET ORAL
Qty: 14 | Refills: 0 | Status: DISCONTINUED | COMMUNITY
Start: 2022-04-11 | End: 2022-05-19

## 2022-05-19 NOTE — DATA REVIEWED
[FreeTextEntry1] : pre/post performed FVC 2.03 72% predicted, FEV1 1.38 66 % predicted. No significant airway reactivity.\par \par EKG SR 98 bpm with rate variability.  NO acute ST T wave changes.

## 2022-05-19 NOTE — PHYSICAL EXAM
[Normal Oropharynx] : the oropharynx was normal [Supple] : supple [No Respiratory Distress] : no respiratory distress  [No Accessory Muscle Use] : no accessory muscle use [Normal Rate] : normal rate  [Normal S1, S2] : normal S1 and S2 [No Edema] : there was no peripheral edema [No Extremity Clubbing/Cyanosis] : no extremity clubbing/cyanosis [Soft] : abdomen soft [Non Tender] : non-tender [Normal] : affect was normal and insight and judgment were intact [Coordination Grossly Intact] : coordination grossly intact [Normal Gait] : normal gait [de-identified] : trace  end expiratory wheeze on forced expiration [de-identified] : irregular

## 2022-05-19 NOTE — PLAN
[FreeTextEntry1] : \par \par \par Cardiology evaluation .\par \par Start Prednisone 20 mg bid 7 days\par \par Call to report status one week\par \par Reviewed symptoms to merit emergent evaluation.  \par \par Patient evaluated and plan reviewed with .

## 2022-05-19 NOTE — HISTORY OF PRESENT ILLNESS
[FreeTextEntry8] : Patient presents with complaint of  increased fatigue  with low O2. She has been monitoring her O2 sat with readings ranging 86-92. Symptoms started about a week ago.  She awakens at night feeling SOB with O2 sats 86-89. +occ palpitations.    She denies chest pain, pleuritic pain, cough,fevers, lightheadedness. No weight gain or edema.     Home covid test done at onset negative.  PMH pneumothorax.  Current symptoms nothing like with pneumothorax.  She has O2 at home which she uses prn.

## 2022-06-01 ENCOUNTER — APPOINTMENT (OUTPATIENT)
Dept: INTERNAL MEDICINE | Facility: CLINIC | Age: 77
End: 2022-06-01
Payer: MEDICARE

## 2022-06-01 VITALS
HEART RATE: 101 BPM | HEIGHT: 65 IN | DIASTOLIC BLOOD PRESSURE: 80 MMHG | BODY MASS INDEX: 30.82 KG/M2 | WEIGHT: 185 LBS | RESPIRATION RATE: 16 BRPM | TEMPERATURE: 99.3 F | SYSTOLIC BLOOD PRESSURE: 104 MMHG | OXYGEN SATURATION: 92 %

## 2022-06-01 DIAGNOSIS — J02.9 ACUTE PHARYNGITIS, UNSPECIFIED: ICD-10-CM

## 2022-06-01 PROCEDURE — 99213 OFFICE O/P EST LOW 20 MIN: CPT

## 2022-06-01 NOTE — HISTORY OF PRESENT ILLNESS
[FreeTextEntry8] : Mrs. Leon presents for routine evaluation. She is complaining of voice hoarseness which started yesterday. She is also having some low-grade temperatures and chills. Patient is also been having some mild diarrhea since yesterday as well. She denies any chest pain or palpitations. The patient does have some shortness of breath with exertion.

## 2022-06-01 NOTE — PHYSICAL EXAM
[No Acute Distress] : no acute distress [Well Nourished] : well nourished [Well Developed] : well developed [Well-Appearing] : well-appearing [Normal Sclera/Conjunctiva] : normal sclera/conjunctiva [PERRL] : pupils equal round and reactive to light [EOMI] : extraocular movements intact [Normal Outer Ear/Nose] : the outer ears and nose were normal in appearance [No JVD] : no jugular venous distention [No Lymphadenopathy] : no lymphadenopathy [Supple] : supple [Thyroid Normal, No Nodules] : the thyroid was normal and there were no nodules present [No Respiratory Distress] : no respiratory distress  [No Accessory Muscle Use] : no accessory muscle use [Clear to Auscultation] : lungs were clear to auscultation bilaterally [Normal Rate] : normal rate  [Regular Rhythm] : with a regular rhythm [Normal S1, S2] : normal S1 and S2 [No Murmur] : no murmur heard [No Carotid Bruits] : no carotid bruits [No Abdominal Bruit] : a ~M bruit was not heard ~T in the abdomen [No Varicosities] : no varicosities [Pedal Pulses Present] : the pedal pulses are present [No Edema] : there was no peripheral edema [No Palpable Aorta] : no palpable aorta [No Extremity Clubbing/Cyanosis] : no extremity clubbing/cyanosis [Soft] : abdomen soft [Non Tender] : non-tender [Non-distended] : non-distended [No Masses] : no abdominal mass palpated [No HSM] : no HSM [Normal Bowel Sounds] : normal bowel sounds [Normal Posterior Cervical Nodes] : no posterior cervical lymphadenopathy [Normal Anterior Cervical Nodes] : no anterior cervical lymphadenopathy [No CVA Tenderness] : no CVA  tenderness [No Spinal Tenderness] : no spinal tenderness [No Joint Swelling] : no joint swelling [Grossly Normal Strength/Tone] : grossly normal strength/tone [No Rash] : no rash [Coordination Grossly Intact] : coordination grossly intact [No Focal Deficits] : no focal deficits [Normal Gait] : normal gait [Deep Tendon Reflexes (DTR)] : deep tendon reflexes were 2+ and symmetric [Normal Affect] : the affect was normal [Normal Insight/Judgement] : insight and judgment were intact [de-identified] : pharyngeal erythema

## 2022-06-01 NOTE — PLAN
[FreeTextEntry1] : Mrs. Leon presents for acute evaluation. She is complaining of low-grade temperatures, diarrhea and voice hoarseness since yesterday. She does have acute pharyngitis on physical examination. Although there is a possibility it is viral patient will be given a Zithromax Z-Mario as antibiotic treatment. If she is no significant improvement within the next 3 days she will notify this office for reevaluation. Patient does not require oral steroids. I have reminded her to make sure that she hydrates and takes an appropriate calories.

## 2022-06-01 NOTE — REVIEW OF SYSTEMS
[Hoarseness] : hoarseness [Sore Throat] : sore throat [Shortness Of Breath] : shortness of breath [Cough] : cough [Negative] : Heme/Lymph

## 2022-06-09 ENCOUNTER — NON-APPOINTMENT (OUTPATIENT)
Age: 77
End: 2022-06-09

## 2022-06-09 ENCOUNTER — RESULT CHARGE (OUTPATIENT)
Age: 77
End: 2022-06-09

## 2022-06-10 ENCOUNTER — INPATIENT (INPATIENT)
Facility: HOSPITAL | Age: 77
LOS: 4 days | Discharge: HOME HEALTH | DRG: 189 | End: 2022-06-15
Attending: HOSPITALIST | Admitting: INTERNAL MEDICINE
Payer: MEDICARE

## 2022-06-10 ENCOUNTER — APPOINTMENT (OUTPATIENT)
Dept: INTERNAL MEDICINE | Facility: CLINIC | Age: 77
End: 2022-06-10
Payer: MEDICARE

## 2022-06-10 ENCOUNTER — NON-APPOINTMENT (OUTPATIENT)
Age: 77
End: 2022-06-10

## 2022-06-10 VITALS
SYSTOLIC BLOOD PRESSURE: 104 MMHG | OXYGEN SATURATION: 90 % | TEMPERATURE: 98.8 F | HEART RATE: 98 BPM | BODY MASS INDEX: 30.82 KG/M2 | RESPIRATION RATE: 16 BRPM | DIASTOLIC BLOOD PRESSURE: 70 MMHG | HEIGHT: 65 IN | WEIGHT: 185 LBS

## 2022-06-10 VITALS — WEIGHT: 184.97 LBS | HEIGHT: 68 IN

## 2022-06-10 VITALS — OXYGEN SATURATION: 96 %

## 2022-06-10 DIAGNOSIS — Z98.890 OTHER SPECIFIED POSTPROCEDURAL STATES: Chronic | ICD-10-CM

## 2022-06-10 DIAGNOSIS — Z96.659 PRESENCE OF UNSPECIFIED ARTIFICIAL KNEE JOINT: Chronic | ICD-10-CM

## 2022-06-10 DIAGNOSIS — Z90.49 ACQUIRED ABSENCE OF OTHER SPECIFIED PARTS OF DIGESTIVE TRACT: Chronic | ICD-10-CM

## 2022-06-10 LAB
BASOPHILS # BLD AUTO: 0.06 K/UL — SIGNIFICANT CHANGE UP (ref 0–0.2)
BASOPHILS NFR BLD AUTO: 0.7 % — SIGNIFICANT CHANGE UP (ref 0–2)
EOSINOPHIL # BLD AUTO: 0.28 K/UL — SIGNIFICANT CHANGE UP (ref 0–0.5)
EOSINOPHIL NFR BLD AUTO: 3.3 % — SIGNIFICANT CHANGE UP (ref 0–6)
HCT VFR BLD CALC: 41.9 % — SIGNIFICANT CHANGE UP (ref 34.5–45)
HGB BLD-MCNC: 13.7 G/DL — SIGNIFICANT CHANGE UP (ref 11.5–15.5)
IMM GRANULOCYTES NFR BLD AUTO: 0.5 % — SIGNIFICANT CHANGE UP (ref 0–1.5)
LYMPHOCYTES # BLD AUTO: 3.61 K/UL — HIGH (ref 1–3.3)
LYMPHOCYTES # BLD AUTO: 42.4 % — SIGNIFICANT CHANGE UP (ref 13–44)
MCHC RBC-ENTMCNC: 29.5 PG — SIGNIFICANT CHANGE UP (ref 27–34)
MCHC RBC-ENTMCNC: 32.7 GM/DL — SIGNIFICANT CHANGE UP (ref 32–36)
MCV RBC AUTO: 90.3 FL — SIGNIFICANT CHANGE UP (ref 80–100)
MONOCYTES # BLD AUTO: 0.64 K/UL — SIGNIFICANT CHANGE UP (ref 0–0.9)
MONOCYTES NFR BLD AUTO: 7.5 % — SIGNIFICANT CHANGE UP (ref 2–14)
NEUTROPHILS # BLD AUTO: 3.89 K/UL — SIGNIFICANT CHANGE UP (ref 1.8–7.4)
NEUTROPHILS NFR BLD AUTO: 45.6 % — SIGNIFICANT CHANGE UP (ref 43–77)
PLATELET # BLD AUTO: 338 K/UL — SIGNIFICANT CHANGE UP (ref 150–400)
RBC # BLD: 4.64 M/UL — SIGNIFICANT CHANGE UP (ref 3.8–5.2)
RBC # FLD: 13 % — SIGNIFICANT CHANGE UP (ref 10.3–14.5)
WBC # BLD: 8.52 K/UL — SIGNIFICANT CHANGE UP (ref 3.8–10.5)
WBC # FLD AUTO: 8.52 K/UL — SIGNIFICANT CHANGE UP (ref 3.8–10.5)

## 2022-06-10 PROCEDURE — 93010 ELECTROCARDIOGRAM REPORT: CPT

## 2022-06-10 PROCEDURE — 80048 BASIC METABOLIC PNL TOTAL CA: CPT

## 2022-06-10 PROCEDURE — 94640 AIRWAY INHALATION TREATMENT: CPT

## 2022-06-10 PROCEDURE — 94010 BREATHING CAPACITY TEST: CPT

## 2022-06-10 PROCEDURE — 81001 URINALYSIS AUTO W/SCOPE: CPT

## 2022-06-10 PROCEDURE — 82962 GLUCOSE BLOOD TEST: CPT

## 2022-06-10 PROCEDURE — 99214 OFFICE O/P EST MOD 30 MIN: CPT | Mod: 25

## 2022-06-10 PROCEDURE — 83036 HEMOGLOBIN GLYCOSYLATED A1C: CPT

## 2022-06-10 PROCEDURE — 71045 X-RAY EXAM CHEST 1 VIEW: CPT | Mod: 26

## 2022-06-10 PROCEDURE — 93000 ELECTROCARDIOGRAM COMPLETE: CPT

## 2022-06-10 PROCEDURE — 99285 EMERGENCY DEPT VISIT HI MDM: CPT

## 2022-06-10 PROCEDURE — 36415 COLL VENOUS BLD VENIPUNCTURE: CPT

## 2022-06-10 PROCEDURE — 94760 N-INVAS EAR/PLS OXIMETRY 1: CPT

## 2022-06-10 PROCEDURE — 71250 CT THORAX DX C-: CPT | Mod: 26,MA

## 2022-06-10 RX ORDER — ALBUTEROL 90 UG/1
2.5 AEROSOL, METERED ORAL
Refills: 0 | Status: COMPLETED | OUTPATIENT
Start: 2022-06-10 | End: 2022-06-10

## 2022-06-10 RX ORDER — PREDNISONE 20 MG/1
20 TABLET ORAL TWICE DAILY
Qty: 14 | Refills: 0 | Status: DISCONTINUED | COMMUNITY
Start: 2022-05-19 | End: 2022-06-10

## 2022-06-10 RX ORDER — MELOXICAM 15 MG/1
15 TABLET ORAL
Qty: 30 | Refills: 0 | Status: DISCONTINUED | COMMUNITY
Start: 2020-09-16 | End: 2022-06-10

## 2022-06-10 RX ORDER — AZITHROMYCIN 250 MG/1
250 TABLET, FILM COATED ORAL
Qty: 6 | Refills: 1 | Status: DISCONTINUED | COMMUNITY
Start: 2022-06-01 | End: 2022-06-10

## 2022-06-10 RX ORDER — ACETAMINOPHEN 500 MG
650 TABLET ORAL EVERY 6 HOURS
Refills: 0 | Status: DISCONTINUED | OUTPATIENT
Start: 2022-06-10 | End: 2022-06-15

## 2022-06-10 RX ORDER — LEVOTHYROXINE SODIUM 0.12 MG/1
125 TABLET ORAL
Qty: 90 | Refills: 1 | Status: DISCONTINUED | COMMUNITY
Start: 2021-05-05 | End: 2022-06-10

## 2022-06-10 RX ADMIN — Medication 125 MILLIGRAM(S): at 23:11

## 2022-06-10 RX ADMIN — ALBUTEROL 2.5 MILLIGRAM(S): 90 AEROSOL, METERED ORAL at 16:00

## 2022-06-10 RX ADMIN — ALBUTEROL 2.5 MILLIGRAM(S): 90 AEROSOL, METERED ORAL at 15:03

## 2022-06-10 RX ADMIN — ALBUTEROL 2.5 MILLIGRAM(S): 90 AEROSOL, METERED ORAL at 16:34

## 2022-06-10 NOTE — PROCEDURE
[FreeTextEntry1] : O2 sat 90% on room air.  96% on 3 L nasal cannula O2.\par \par EKG, heart rate 95, no acute change from previous.\par \par Spirometry today shows a moderate restrictive and obstructive deficit with FEV1 1.14 or 55% predicted.  This was 1.53 in March 2022.

## 2022-06-10 NOTE — HISTORY OF PRESENT ILLNESS
[TextBox_4] : This is a pleasant 77-year-old female with a history of COPD, bullous emphysema, history of pneumothorax November 2021 treated with pigtail catheter placement.  She developed low-grade fever, hoarseness, diarrhea about 11 days ago;  she received a Z-Mario.  O2 sats at home were lower than usual yesterday 84 to 85% on room air.  She now says that she has some right posterior chest discomfort or pain during the last day.  Her brain fog symptoms are better today wearing oxygen.  She denies coughing, sputum production, or wheezing.  She is not having fever or chills. \par \par She denies palpitations, lower extremity edema, recent weight gain.  She does notice more shortness of breath lying flat.

## 2022-06-10 NOTE — ED ADULT NURSE REASSESSMENT NOTE - NS ED NURSE REASSESS COMMENT FT1
pt is resting comfortably at this time. Pt is awake and alert with equal unlabored respirations. Spo2 98% 2LNC. Pt denies cp, sob, dizziness, nausea and appears comfortable. Pt pending bloodwork at this time. Spoke to lab states hemolyzed draw, bloodwork states "specimen received" however lab states labs needs to be redrawn at this time. Dr. Holland is aware. Phlebotomist currently at bedside.

## 2022-06-10 NOTE — ED PROVIDER NOTE - OBJECTIVE STATEMENT
h/o COPD and bullae with h/o PTX presents with SOB x past few days.  sent by Dr Dougherty.  states uses O2 at night intermittently,  normal O2 sat around 94% but was lower at home in 80s.  no CP.  no recent change in meds.  spouse with respiratory illness at home, taking abx.  no fever.

## 2022-06-10 NOTE — PHYSICAL EXAM
[No Acute Distress] : no acute distress [Normal Oropharynx] : normal oropharynx [Normal Appearance] : normal appearance [No Neck Mass] : no neck mass [Normal Rate/Rhythm] : normal rate/rhythm [Normal S1, S2] : normal s1, s2 [No Resp Distress] : no resp distress [Clear to Auscultation Bilaterally] : clear to auscultation bilaterally [No Abnormalities] : no abnormalities [Benign] : benign [Normal Gait] : normal gait [No Clubbing] : no clubbing [No Cyanosis] : no cyanosis [No Edema] : no edema [Normal Color/ Pigmentation] : normal color/ pigmentation [No Focal Deficits] : no focal deficits [Oriented x3] : oriented x3 [Normal Affect] : normal affect [TextBox_54] : HR 90 [TextBox_68] : Decreased audible breath sounds bilaterally.

## 2022-06-10 NOTE — ASSESSMENT
[FreeTextEntry1] : #1  Patient with COPD, bullous emphysema, h.o. R  PTX 11/2021,  with1 day history of hypoxia, describes some right posterior chest pain.  She was sent immediately to the emergency room, rule out pneumothorax.  Had recent likely viral illness.  Her low-grade fever, hoarseness, and diarrhea have resolved.  Consider possible COPD exacerbation. \par \par \par I spoke with ANGELINA Campos, at [facility], ER.\par

## 2022-06-11 DIAGNOSIS — J44.1 CHRONIC OBSTRUCTIVE PULMONARY DISEASE WITH (ACUTE) EXACERBATION: ICD-10-CM

## 2022-06-11 LAB
A1C WITH ESTIMATED AVERAGE GLUCOSE RESULT: 6.6 % — HIGH (ref 4–5.6)
ANION GAP SERPL CALC-SCNC: 8 MMOL/L — SIGNIFICANT CHANGE UP (ref 5–17)
APPEARANCE UR: CLEAR — SIGNIFICANT CHANGE UP
BILIRUB UR-MCNC: NEGATIVE — SIGNIFICANT CHANGE UP
BUN SERPL-MCNC: 13 MG/DL — SIGNIFICANT CHANGE UP (ref 7–23)
CALCIUM SERPL-MCNC: 9.5 MG/DL — SIGNIFICANT CHANGE UP (ref 8.5–10.1)
CHLORIDE SERPL-SCNC: 102 MMOL/L — SIGNIFICANT CHANGE UP (ref 96–108)
CO2 SERPL-SCNC: 26 MMOL/L — SIGNIFICANT CHANGE UP (ref 22–31)
COLOR SPEC: YELLOW — SIGNIFICANT CHANGE UP
CREAT SERPL-MCNC: 0.72 MG/DL — SIGNIFICANT CHANGE UP (ref 0.5–1.3)
DIFF PNL FLD: NEGATIVE — SIGNIFICANT CHANGE UP
EGFR: 85 ML/MIN/1.73M2 — SIGNIFICANT CHANGE UP
ESTIMATED AVERAGE GLUCOSE: 143 MG/DL — HIGH (ref 68–114)
GLUCOSE SERPL-MCNC: 218 MG/DL — HIGH (ref 70–99)
GLUCOSE UR QL: 1000 MG/DL
KETONES UR-MCNC: ABNORMAL
LEUKOCYTE ESTERASE UR-ACNC: ABNORMAL
NITRITE UR-MCNC: NEGATIVE — SIGNIFICANT CHANGE UP
PH UR: 6.5 — SIGNIFICANT CHANGE UP (ref 5–8)
POTASSIUM SERPL-MCNC: 3.8 MMOL/L — SIGNIFICANT CHANGE UP (ref 3.5–5.3)
POTASSIUM SERPL-SCNC: 3.8 MMOL/L — SIGNIFICANT CHANGE UP (ref 3.5–5.3)
PROT UR-MCNC: NEGATIVE — SIGNIFICANT CHANGE UP
SODIUM SERPL-SCNC: 136 MMOL/L — SIGNIFICANT CHANGE UP (ref 135–145)
SP GR SPEC: 1.01 — SIGNIFICANT CHANGE UP (ref 1.01–1.02)
UROBILINOGEN FLD QL: NEGATIVE — SIGNIFICANT CHANGE UP

## 2022-06-11 PROCEDURE — 99222 1ST HOSP IP/OBS MODERATE 55: CPT

## 2022-06-11 RX ORDER — EMPAGLIFLOZIN 10 MG/1
1 TABLET, FILM COATED ORAL
Qty: 0 | Refills: 0 | DISCHARGE

## 2022-06-11 RX ORDER — HEPARIN SODIUM 5000 [USP'U]/ML
5000 INJECTION INTRAVENOUS; SUBCUTANEOUS EVERY 8 HOURS
Refills: 0 | Status: DISCONTINUED | OUTPATIENT
Start: 2022-06-11 | End: 2022-06-15

## 2022-06-11 RX ORDER — FUROSEMIDE 40 MG
40 TABLET ORAL DAILY
Refills: 0 | Status: DISCONTINUED | OUTPATIENT
Start: 2022-06-11 | End: 2022-06-15

## 2022-06-11 RX ORDER — TIOTROPIUM BROMIDE 18 UG/1
1 CAPSULE ORAL; RESPIRATORY (INHALATION) DAILY
Refills: 0 | Status: DISCONTINUED | OUTPATIENT
Start: 2022-06-11 | End: 2022-06-15

## 2022-06-11 RX ORDER — POTASSIUM CHLORIDE 20 MEQ
40 PACKET (EA) ORAL ONCE
Refills: 0 | Status: COMPLETED | OUTPATIENT
Start: 2022-06-11 | End: 2022-06-11

## 2022-06-11 RX ORDER — SITAGLIPTIN AND METFORMIN HYDROCHLORIDE 500; 50 MG/1; MG/1
1 TABLET, FILM COATED ORAL
Qty: 0 | Refills: 0 | DISCHARGE

## 2022-06-11 RX ORDER — GABAPENTIN 400 MG/1
2 CAPSULE ORAL
Qty: 0 | Refills: 0 | DISCHARGE

## 2022-06-11 RX ORDER — SODIUM CHLORIDE 9 MG/ML
1000 INJECTION, SOLUTION INTRAVENOUS
Refills: 0 | Status: DISCONTINUED | OUTPATIENT
Start: 2022-06-11 | End: 2022-06-15

## 2022-06-11 RX ORDER — DEXTROSE 50 % IN WATER 50 %
25 SYRINGE (ML) INTRAVENOUS ONCE
Refills: 0 | Status: DISCONTINUED | OUTPATIENT
Start: 2022-06-11 | End: 2022-06-15

## 2022-06-11 RX ORDER — GLUCAGON INJECTION, SOLUTION 0.5 MG/.1ML
1 INJECTION, SOLUTION SUBCUTANEOUS ONCE
Refills: 0 | Status: DISCONTINUED | OUTPATIENT
Start: 2022-06-11 | End: 2022-06-15

## 2022-06-11 RX ORDER — GABAPENTIN 400 MG/1
1 CAPSULE ORAL
Qty: 0 | Refills: 0 | DISCHARGE

## 2022-06-11 RX ORDER — DEXTROSE 50 % IN WATER 50 %
12.5 SYRINGE (ML) INTRAVENOUS ONCE
Refills: 0 | Status: DISCONTINUED | OUTPATIENT
Start: 2022-06-11 | End: 2022-06-15

## 2022-06-11 RX ORDER — LANOLIN ALCOHOL/MO/W.PET/CERES
3 CREAM (GRAM) TOPICAL AT BEDTIME
Refills: 0 | Status: DISCONTINUED | OUTPATIENT
Start: 2022-06-11 | End: 2022-06-15

## 2022-06-11 RX ORDER — PANTOPRAZOLE SODIUM 20 MG/1
40 TABLET, DELAYED RELEASE ORAL
Refills: 0 | Status: DISCONTINUED | OUTPATIENT
Start: 2022-06-11 | End: 2022-06-15

## 2022-06-11 RX ORDER — ONDANSETRON 8 MG/1
4 TABLET, FILM COATED ORAL EVERY 8 HOURS
Refills: 0 | Status: DISCONTINUED | OUTPATIENT
Start: 2022-06-11 | End: 2022-06-15

## 2022-06-11 RX ORDER — GABAPENTIN 400 MG/1
300 CAPSULE ORAL
Refills: 0 | Status: DISCONTINUED | OUTPATIENT
Start: 2022-06-11 | End: 2022-06-15

## 2022-06-11 RX ORDER — LEVOTHYROXINE SODIUM 125 MCG
137 TABLET ORAL DAILY
Refills: 0 | Status: DISCONTINUED | OUTPATIENT
Start: 2022-06-11 | End: 2022-06-15

## 2022-06-11 RX ORDER — DEXTROSE 50 % IN WATER 50 %
15 SYRINGE (ML) INTRAVENOUS ONCE
Refills: 0 | Status: DISCONTINUED | OUTPATIENT
Start: 2022-06-11 | End: 2022-06-15

## 2022-06-11 RX ORDER — ALBUTEROL 90 UG/1
2 AEROSOL, METERED ORAL EVERY 4 HOURS
Refills: 0 | Status: DISCONTINUED | OUTPATIENT
Start: 2022-06-11 | End: 2022-06-15

## 2022-06-11 RX ORDER — INSULIN LISPRO 100/ML
VIAL (ML) SUBCUTANEOUS AT BEDTIME
Refills: 0 | Status: DISCONTINUED | OUTPATIENT
Start: 2022-06-11 | End: 2022-06-15

## 2022-06-11 RX ORDER — INSULIN LISPRO 100/ML
VIAL (ML) SUBCUTANEOUS
Refills: 0 | Status: DISCONTINUED | OUTPATIENT
Start: 2022-06-11 | End: 2022-06-15

## 2022-06-11 RX ADMIN — Medication 100 MILLIGRAM(S): at 17:21

## 2022-06-11 RX ADMIN — HEPARIN SODIUM 5000 UNIT(S): 5000 INJECTION INTRAVENOUS; SUBCUTANEOUS at 14:40

## 2022-06-11 RX ADMIN — Medication 100 MILLIGRAM(S): at 05:36

## 2022-06-11 RX ADMIN — ALBUTEROL 2 PUFF(S): 90 AEROSOL, METERED ORAL at 07:32

## 2022-06-11 RX ADMIN — ALBUTEROL 2 PUFF(S): 90 AEROSOL, METERED ORAL at 23:21

## 2022-06-11 RX ADMIN — Medication 40 MILLIEQUIVALENT(S): at 05:35

## 2022-06-11 RX ADMIN — ALBUTEROL 2 PUFF(S): 90 AEROSOL, METERED ORAL at 20:22

## 2022-06-11 RX ADMIN — GABAPENTIN 300 MILLIGRAM(S): 400 CAPSULE ORAL at 21:30

## 2022-06-11 RX ADMIN — HEPARIN SODIUM 5000 UNIT(S): 5000 INJECTION INTRAVENOUS; SUBCUTANEOUS at 21:30

## 2022-06-11 RX ADMIN — Medication 137 MICROGRAM(S): at 05:36

## 2022-06-11 RX ADMIN — ALBUTEROL 2 PUFF(S): 90 AEROSOL, METERED ORAL at 16:33

## 2022-06-11 RX ADMIN — HEPARIN SODIUM 5000 UNIT(S): 5000 INJECTION INTRAVENOUS; SUBCUTANEOUS at 05:36

## 2022-06-11 RX ADMIN — ALBUTEROL 2 PUFF(S): 90 AEROSOL, METERED ORAL at 12:13

## 2022-06-11 RX ADMIN — Medication 40 MILLIGRAM(S): at 09:38

## 2022-06-11 RX ADMIN — Medication 40 MILLIGRAM(S): at 05:36

## 2022-06-11 RX ADMIN — GABAPENTIN 300 MILLIGRAM(S): 400 CAPSULE ORAL at 09:38

## 2022-06-11 RX ADMIN — Medication 4: at 07:40

## 2022-06-11 RX ADMIN — Medication 600 MILLIGRAM(S): at 21:30

## 2022-06-11 RX ADMIN — Medication 40 MILLIGRAM(S): at 14:23

## 2022-06-11 RX ADMIN — TIOTROPIUM BROMIDE 1 CAPSULE(S): 18 CAPSULE ORAL; RESPIRATORY (INHALATION) at 07:32

## 2022-06-11 RX ADMIN — Medication 600 MILLIGRAM(S): at 14:23

## 2022-06-11 RX ADMIN — Medication 4: at 16:37

## 2022-06-11 RX ADMIN — Medication 40 MILLIGRAM(S): at 21:29

## 2022-06-11 RX ADMIN — PANTOPRAZOLE SODIUM 40 MILLIGRAM(S): 20 TABLET, DELAYED RELEASE ORAL at 07:33

## 2022-06-11 RX ADMIN — Medication 4: at 11:55

## 2022-06-11 NOTE — H&P ADULT - RS GEN PE MLT RESP DETAILS PC
airway patent/respirations non-labored/diminished breath sounds, L/diminished breath sounds, R/wheezes

## 2022-06-11 NOTE — ED ADULT NURSE REASSESSMENT NOTE - NS ED NURSE REASSESS COMMENT FT1
Patient stating 88% on 2L NC, MD Olivarez made aware. Patient placed on 6L NC. Patient asymptomatic. No distress noted at this time.

## 2022-06-11 NOTE — H&P ADULT - HISTORY OF PRESENT ILLNESS
78 yo Female with h/o COPD presented with SOB x past few days. He was sent by Dr Dougherty to the ED. She uses O2 at night intermittently,  normal O2 sat around 94% but was lower at home in 80s. even with Oxygen supplement. No active chest pain. No recent change in meds. No fever. She also has productive cough, yellowish sputum.

## 2022-06-11 NOTE — H&P ADULT - NSHPREVIEWOFSYSTEMS_GEN_ALL_CORE
Gen: No fever, chills, weakness  ENT: No visual changes or throat pain  Neck: No pain or stiffness  Respiratory: + cough, +wheezing, +shortness of breath  Cardiovascular: No chest pain or palpitations  Gastrointestinal: No abdominal pain, nausea, vomiting, constipation, or diarrhea  Hematologic: No easy bleeding or bruising  Neurologic: No numbness or focal weakness  Psych: No depression or insomnia  Skin: No rash or itching

## 2022-06-11 NOTE — H&P ADULT - ASSESSMENT
A/P:    1.  COPD exacerbation  Acute Hypoxic Respiratory failure  -started on IV Solumedrol  -continue Albuterol Inhaler  -on PO Doxycycline  -follow clinically  -follow Pulmonary consult  -Oxygen supplement as needed to Keep Ox Sat>88%    2.  Heparin for DVT ppx    3.  Code status: patient is full code status.

## 2022-06-11 NOTE — CONSULT NOTE ADULT - ASSESSMENT
COPD exacerbation  Acute Hypoxic Respiratory failure  -started on IV Solumedrol  -continue Albuterol Inhaler  -on PO Doxycycline  -follow clinically  -follow Pulmonary consult  -Oxygen supplement as needed to Keep Ox Sat>88%    2.  Heparin for DVT ppx    3.  Code status: patient is full code status. COPD exacerbation  Acute Hypoxic Respiratory failure  -started on IV Solumedrol  -continue Albuterol Inhaler  -on PO Doxycycline  -follow clinically  -follow Pulmonary consult  -Oxygen supplement as needed to Keep Ox Sat>88%    2.  Heparin for DVT ppx    3.  Code status: patient is full code status.  PROBLEMS:    Ac COPD exacerbation  Acute Hypoxic Respiratory failure    PLAN:    IV Solumedrol  Albuterol Inhaler  PO Doxycycline  Oxygen supplement as needed to Keep Ox Sat>88%  Heparin for DVT ppx

## 2022-06-11 NOTE — H&P ADULT - NSHPPHYSICALEXAM_GEN_ALL_CORE
Vital Signs Last 24 Hrs  T(C): 36.7 (11 Jun 2022 03:58), Max: 37 (10 Pasha 2022 14:24)  T(F): 98.1 (11 Jun 2022 03:58), Max: 98.6 (10 Pasha 2022 14:24)  HR: 77 (11 Jun 2022 03:58) (73 - 86)  BP: 122/80 (11 Jun 2022 03:58) (121/68 - 128/70)  BP(mean): 95 (11 Jun 2022 03:58) (84 - 95)  RR: 20 (11 Jun 2022 03:58) (18 - 22)  SpO2: 92% (11 Jun 2022 03:58) (88% - 98%)

## 2022-06-11 NOTE — ED ADULT NURSE REASSESSMENT NOTE - NS ED NURSE REASSESS COMMENT FT1
report received from SHERITA watters. pt denies complaints. Aox4. breakfast ordered for pt. Morning meds given. Pt on 2L NC. Without O2 pt satting at 87%. pt in no acute distress. will ctm

## 2022-06-11 NOTE — CONSULT NOTE ADULT - SUBJECTIVE AND OBJECTIVE BOX
HPI:  76 yo Female with h/o COPD presented with SOB x past few days. He was sent by Dr Dougherty to the ED. She uses O2 at night intermittently,  normal O2 sat around 94% but was lower at home in 80s. even with Oxygen supplement. No active chest pain. No recent change in meds. No fever. She also has productive cough, yellowish sputum.  (2022 05:17)      PAST MEDICAL & SURGICAL HISTORY:  Hypothyroidism      Diabetes mellitus  diet controlled      Back pain      Asthma      Elevated pancreatic enzyme  patient reports elevated lab 2013. Gastroenterologist aware      History of cataract  extracted from right and left      Varicose veins of both lower extremities  surgery      Diverticulosis of intestine without bleeding, unspecified intestinal tract location      Gall stones  gall bladder removed      Urinary urgency      Spinal stenosis of lumbar region      DDD (degenerative disc disease), lumbar      Basal cell carcinoma  removed from face      Pulmonary emphysema, unspecified emphysema type  2017 last exacerbation; never intubated      OA (osteoarthritis)      Frequent UTI  2-3x/year; had an episode 5 weeks ago was treated denies dysuria      GERD (gastroesophageal reflux disease)      Leg swelling      Emphysema lung      COVID-19 vaccine series completed  Moderna - 2nd dose 2021      Knee pain, left      Scar tissue  left knee S/P TKR      COPD exacerbation      S/P bladder repair  bladder lift 2013      Dental disorder  Dental surgery 2012, patient reports that upper portion is glued in at this time, plan is to have dental implants placed.      S/P knee surgery  arthroscopy bilateral  and       Skin cancer  to right temple , removed, history of basal cell x 2      H/O lumbosacral spine surgery  fusion 2017      H/O umbilical hernia repair  19      S/P cholecystectomy  2012      S/P colonoscopy  benign polyp      S/P knee replacement  right , left 2019          Home Medications:  furosemide 40 mg oral tablet: 1 tab(s) orally once a day (2022 05:02)  gabapentin 300 mg oral capsule: 1 cap(s) orally 2 times a day (2022 05:02)  levothyroxine 137 mcg (0.137 mg) oral tablet: 1 tab(s) orally once a day (2022 05:02)  Ozempic:  (2022 05:02)  pantoprazole 40 mg oral delayed release tablet: 1 tab(s) orally once a day (at bedtime), As Needed (2022 05:02)  Synjardy:  (2022 05:02)  tiotropium 18 mcg inhalation capsule: 1 cap(s) inhaled once a day (2022 05:02)  Ventolin HFA 90 mcg/inh inhalation aerosol: 2 puff(s) inhaled every 6 hours, As Needed - for shortness of breath and/or wheezing (2022 05:02)      MEDICATIONS  (STANDING):  ALBUTerol    90 MICROgram(s) HFA Inhaler 2 Puff(s) Inhalation every 4 hours  dextrose 5%. 1000 milliLiter(s) (100 mL/Hr) IV Continuous <Continuous>  dextrose 5%. 1000 milliLiter(s) (50 mL/Hr) IV Continuous <Continuous>  dextrose 50% Injectable 25 Gram(s) IV Push once  dextrose 50% Injectable 12.5 Gram(s) IV Push once  dextrose 50% Injectable 25 Gram(s) IV Push once  doxycycline hyclate Capsule 100 milliGRAM(s) Oral every 12 hours  furosemide    Tablet 40 milliGRAM(s) Oral daily  gabapentin 300 milliGRAM(s) Oral two times a day  glucagon  Injectable 1 milliGRAM(s) IntraMuscular once  heparin   Injectable 5000 Unit(s) SubCutaneous every 8 hours  insulin lispro (ADMELOG) corrective regimen sliding scale   SubCutaneous three times a day before meals  insulin lispro (ADMELOG) corrective regimen sliding scale   SubCutaneous at bedtime  levothyroxine 137 MICROGram(s) Oral daily  methylPREDNISolone sodium succinate Injectable 40 milliGRAM(s) IV Push every 8 hours  pantoprazole    Tablet 40 milliGRAM(s) Oral before breakfast  tiotropium 18 MICROgram(s) Capsule 1 Capsule(s) Inhalation daily    MEDICATIONS  (PRN):  acetaminophen     Tablet .. 650 milliGRAM(s) Oral every 6 hours PRN Mild Pain (1 - 3)  aluminum hydroxide/magnesium hydroxide/simethicone Suspension 30 milliLiter(s) Oral every 4 hours PRN Dyspepsia  dextrose Oral Gel 15 Gram(s) Oral once PRN Blood Glucose LESS THAN 70 milliGRAM(s)/deciliter  melatonin 3 milliGRAM(s) Oral at bedtime PRN Insomnia  ondansetron Injectable 4 milliGRAM(s) IV Push every 8 hours PRN Nausea and/or Vomiting      Allergies    Breo Ellipta (Rash)    Intolerances        SOCIAL HISTORY: Denies tobacco, etoh abuse or illicit drug use    FAMILY HISTORY:  Family history of pancreatic cancer (Father)    Family history of heart disease (Mother)    Family history of stroke (Mother)        Vital Signs Last 24 Hrs  T(C): 36.4 (2022 09:01), Max: 37.1 (2022 07:10)  T(F): 97.5 (2022 09:01), Max: 98.7 (2022 07:10)  HR: 91 (:) (73 - 91)  BP: 114/77 (2022 09:01) (114/77 - 135/78)  BP(mean): 95 (2022 07:10) (84 - 95)  RR: 18 (:) (18 - 22)  SpO2: 93% (:) (88% - 98%)        REVIEW OF SYSTEMS:    CONSTITUTIONAL:  As per HPI.  HEENT:  Eyes:  No diplopia or blurred vision. ENT:  No earache, sore throat or runny nose.  CARDIOVASCULAR:  No pressure, squeezing, tightness, heaviness or aching about the chest, neck, axilla or epigastrium.  RESPIRATORY:  No cough, shortness of breath, PND or orthopnea.  GASTROINTESTINAL:  No nausea, vomiting or diarrhea.  GENITOURINARY:  No dysuria, frequency or urgency.  MUSCULOSKELETAL:  As per HPI.  SKIN:  No change in skin, hair or nails.  NEUROLOGIC:  No paresthesias, fasciculations, seizures or weakness.  PSYCHIATRIC:  No disorder of thought or mood.  ENDOCRINE:  No heat or cold intolerance, polyuria or polydipsia.  HEMATOLOGICAL:  No easy bruising or bleedings:  .     PHYSICAL EXAMINATION:    GENERAL APPEARANCE:  Pt. is not currently dyspneic, in no distress. Pt. is alert, oriented, and pleasant.  HEENT:  Pupils are normal and react normally. No icterus. Mucous membranes well colored.  NECK:  Supple. No lymphadenopathy. Jugular venous pressure not elevated. Carotids equal.   HEART:   The cardiac impulse has a normal quality. Regular. Normal S1 and S2. There are no murmurs, rubs or gallops noted  CHEST:  Chest is clear to auscultation. Normal respiratory effort.  ABDOMEN:  Soft and nontender.   EXTREMITIES:  There is no cyanosis, clubbing or edema.   SKIN:  No rash or significant lesions are noted.    LABS:                        13.7   8.52  )-----------( 338      ( 10 Pasha 2022 14:27 )             41.9     06-11    136  |  102  |  13  ----------------------------<  218<H>  3.8   |  26  |  0.72    Ca    9.5      2022 06:42  Mg     2.3     06-10    TPro  7.9  /  Alb  2.9<L>  /  TBili  0.4  /  DBili  x   /  AST  34  /  ALT  19  /  AlkPhos  71  06-10    LIVER FUNCTIONS - ( 10 Pasha 2022 19:45 )  Alb: 2.9 g/dL / Pro: 7.9 gm/dL / ALK PHOS: 71 U/L / ALT: 19 U/L / AST: 34 U/L / GGT: x                 Urinalysis Basic - ( 2022 01:11 )    Color: Yellow / Appearance: Clear / S.010 / pH: x  Gluc: x / Ketone: Trace  / Bili: Negative / Urobili: Negative   Blood: x / Protein: Negative / Nitrite: Negative   Leuk Esterase: Trace / RBC: 0-2 /HPF / WBC 6-10   Sq Epi: x / Non Sq Epi: Occasional / Bacteria: Many            RADIOLOGY & ADDITIONAL STUDIES:     CT Chest No Cont (06.10.22 @ 18:24) >  IMPRESSION:  Emphysema with large bulla redemonstrated. Scattered linear   atelectasis/scarring. Lungs otherwise clear.         HPI:    76 yo Female with h/o COPD presented with SOB x past few days. He was sent by Dr Dougherty to the ED. She uses O2 at night intermittently,  normal O2 sat around 94% but was lower at home in 80s. even with Oxygen supplement. No active chest pain. No recent change in meds. No fever. She also has productive cough, yellowish sputum. pat seen for pulmonary eval.      PAST MEDICAL & SURGICAL HISTORY:  Hypothyroidism      Diabetes mellitus  diet controlled      Back pain      Asthma      Elevated pancreatic enzyme  patient reports elevated lab 2013. Gastroenterologist aware      History of cataract  extracted from right and left      Varicose veins of both lower extremities  surgery      Diverticulosis of intestine without bleeding, unspecified intestinal tract location      Gall stones  gall bladder removed      Urinary urgency      Spinal stenosis of lumbar region      DDD (degenerative disc disease), lumbar      Basal cell carcinoma  removed from face      Pulmonary emphysema, unspecified emphysema type  2017 last exacerbation; never intubated      OA (osteoarthritis)      Frequent UTI  2-3x/year; had an episode 5 weeks ago was treated denies dysuria      GERD (gastroesophageal reflux disease)      Leg swelling      Emphysema lung      COVID-19 vaccine series completed  Moderna - 2nd dose 2021      Knee pain, left      Scar tissue  left knee S/P TKR      COPD exacerbation      S/P bladder repair  bladder lift 2013      Dental disorder  Dental surgery 2012, patient reports that upper portion is glued in at this time, plan is to have dental implants placed.      S/P knee surgery  arthroscopy bilateral  and       Skin cancer  to right temple , removed, history of basal cell x 2      H/O lumbosacral spine surgery  fusion 2017      H/O umbilical hernia repair  19      S/P cholecystectomy  2012      S/P colonoscopy  benign polyp      S/P knee replacement  right , left 2019          Home Medications:  furosemide 40 mg oral tablet: 1 tab(s) orally once a day (2022 05:02)  gabapentin 300 mg oral capsule: 1 cap(s) orally 2 times a day (2022 05:02)  levothyroxine 137 mcg (0.137 mg) oral tablet: 1 tab(s) orally once a day (2022 05:02)  Ozempic:  (2022 05:02)  pantoprazole 40 mg oral delayed release tablet: 1 tab(s) orally once a day (at bedtime), As Needed (2022 05:02)  Synjardy:  (2022 05:02)  tiotropium 18 mcg inhalation capsule: 1 cap(s) inhaled once a day (2022 05:02)  Ventolin HFA 90 mcg/inh inhalation aerosol: 2 puff(s) inhaled every 6 hours, As Needed - for shortness of breath and/or wheezing (2022 05:02)      MEDICATIONS  (STANDING):  ALBUTerol    90 MICROgram(s) HFA Inhaler 2 Puff(s) Inhalation every 4 hours  dextrose 5%. 1000 milliLiter(s) (100 mL/Hr) IV Continuous <Continuous>  dextrose 5%. 1000 milliLiter(s) (50 mL/Hr) IV Continuous <Continuous>  dextrose 50% Injectable 25 Gram(s) IV Push once  dextrose 50% Injectable 12.5 Gram(s) IV Push once  dextrose 50% Injectable 25 Gram(s) IV Push once  doxycycline hyclate Capsule 100 milliGRAM(s) Oral every 12 hours  furosemide    Tablet 40 milliGRAM(s) Oral daily  gabapentin 300 milliGRAM(s) Oral two times a day  glucagon  Injectable 1 milliGRAM(s) IntraMuscular once  heparin   Injectable 5000 Unit(s) SubCutaneous every 8 hours  insulin lispro (ADMELOG) corrective regimen sliding scale   SubCutaneous three times a day before meals  insulin lispro (ADMELOG) corrective regimen sliding scale   SubCutaneous at bedtime  levothyroxine 137 MICROGram(s) Oral daily  methylPREDNISolone sodium succinate Injectable 40 milliGRAM(s) IV Push every 8 hours  pantoprazole    Tablet 40 milliGRAM(s) Oral before breakfast  tiotropium 18 MICROgram(s) Capsule 1 Capsule(s) Inhalation daily    MEDICATIONS  (PRN):  acetaminophen     Tablet .. 650 milliGRAM(s) Oral every 6 hours PRN Mild Pain (1 - 3)  aluminum hydroxide/magnesium hydroxide/simethicone Suspension 30 milliLiter(s) Oral every 4 hours PRN Dyspepsia  dextrose Oral Gel 15 Gram(s) Oral once PRN Blood Glucose LESS THAN 70 milliGRAM(s)/deciliter  melatonin 3 milliGRAM(s) Oral at bedtime PRN Insomnia  ondansetron Injectable 4 milliGRAM(s) IV Push every 8 hours PRN Nausea and/or Vomiting      Allergies    Breo Ellipta (Rash)    Intolerances        SOCIAL HISTORY: Denies tobacco, etoh abuse or illicit drug use    FAMILY HISTORY:  Family history of pancreatic cancer (Father)    Family history of heart disease (Mother)    Family history of stroke (Mother)        Vital Signs Last 24 Hrs  T(C): 36.4 (2022 09:01), Max: 37.1 (2022 07:10)  T(F): 97.5 (2022 09:01), Max: 98.7 (2022 07:10)  HR: 91 (:) (73 - 91)  BP: 114/77 (2022 09:01) (114/77 - 135/78)  BP(mean): 95 (2022 07:10) (84 - 95)  RR: 18 (:) (18 - 22)  SpO2: 93% (:) (88% - 98%)        REVIEW OF SYSTEMS:    CONSTITUTIONAL:  As per HPI.  HEENT:  Eyes:  No diplopia or blurred vision. ENT:  No earache, sore throat or runny nose.  CARDIOVASCULAR:  No pressure, squeezing, tightness, heaviness or aching about the chest, neck, axilla or epigastrium.  RESPIRATORY:  No cough, shortness of breath, PND or orthopnea.  GASTROINTESTINAL:  No nausea, vomiting or diarrhea.  GENITOURINARY:  No dysuria, frequency or urgency.  MUSCULOSKELETAL:  As per HPI.  SKIN:  No change in skin, hair or nails.  NEUROLOGIC:  No paresthesias, fasciculations, seizures or weakness.  PSYCHIATRIC:  No disorder of thought or mood.  ENDOCRINE:  No heat or cold intolerance, polyuria or polydipsia.  HEMATOLOGICAL:  No easy bruising or bleedings:  .     PHYSICAL EXAMINATION:    GENERAL APPEARANCE:  Pt. is not currently dyspneic, in no distress. Pt. is alert, oriented, and pleasant.  HEENT:  Pupils are normal and react normally. No icterus. Mucous membranes well colored.  NECK:  Supple. No lymphadenopathy. Jugular venous pressure not elevated. Carotids equal.   HEART:   The cardiac impulse has a normal quality. Regular. Normal S1 and S2. There are no murmurs, rubs or gallops noted  CHEST:  Chest rhonchi to auscultation. Normal respiratory effort.  ABDOMEN:  Soft and nontender.   EXTREMITIES:  There is no cyanosis, clubbing or edema.   SKIN:  No rash or significant lesions are noted.    LABS:                        13.7   8.52  )-----------( 338      ( 10 Pasha 2022 14:27 )             41.9     06-11    136  |  102  |  13  ----------------------------<  218<H>  3.8   |  26  |  0.72    Ca    9.5      2022 06:42  Mg     2.3     06-10    TPro  7.9  /  Alb  2.9<L>  /  TBili  0.4  /  DBili  x   /  AST  34  /  ALT  19  /  AlkPhos  71  06-10    LIVER FUNCTIONS - ( 10 Pasha 2022 19:45 )  Alb: 2.9 g/dL / Pro: 7.9 gm/dL / ALK PHOS: 71 U/L / ALT: 19 U/L / AST: 34 U/L / GGT: x                 Urinalysis Basic - ( 2022 01:11 )    Color: Yellow / Appearance: Clear / S.010 / pH: x  Gluc: x / Ketone: Trace  / Bili: Negative / Urobili: Negative   Blood: x / Protein: Negative / Nitrite: Negative   Leuk Esterase: Trace / RBC: 0-2 /HPF / WBC 6-10   Sq Epi: x / Non Sq Epi: Occasional / Bacteria: Many            RADIOLOGY & ADDITIONAL STUDIES:     CT Chest No Cont (06.10.22 @ 18:24) >  IMPRESSION:  Emphysema with large bulla redemonstrated. Scattered linear   atelectasis/scarring. Lungs otherwise clear.

## 2022-06-12 PROCEDURE — 99233 SBSQ HOSP IP/OBS HIGH 50: CPT

## 2022-06-12 RX ADMIN — Medication 100 MILLIGRAM(S): at 05:39

## 2022-06-12 RX ADMIN — GABAPENTIN 300 MILLIGRAM(S): 400 CAPSULE ORAL at 21:38

## 2022-06-12 RX ADMIN — Medication 30 MILLIGRAM(S): at 21:38

## 2022-06-12 RX ADMIN — Medication 600 MILLIGRAM(S): at 09:33

## 2022-06-12 RX ADMIN — Medication 40 MILLIGRAM(S): at 09:33

## 2022-06-12 RX ADMIN — GABAPENTIN 300 MILLIGRAM(S): 400 CAPSULE ORAL at 09:33

## 2022-06-12 RX ADMIN — Medication 30 MILLIGRAM(S): at 14:06

## 2022-06-12 RX ADMIN — HEPARIN SODIUM 5000 UNIT(S): 5000 INJECTION INTRAVENOUS; SUBCUTANEOUS at 21:38

## 2022-06-12 RX ADMIN — Medication 4: at 12:03

## 2022-06-12 RX ADMIN — Medication 6: at 08:33

## 2022-06-12 RX ADMIN — ALBUTEROL 2 PUFF(S): 90 AEROSOL, METERED ORAL at 09:59

## 2022-06-12 RX ADMIN — Medication 40 MILLIGRAM(S): at 05:39

## 2022-06-12 RX ADMIN — PANTOPRAZOLE SODIUM 40 MILLIGRAM(S): 20 TABLET, DELAYED RELEASE ORAL at 05:39

## 2022-06-12 RX ADMIN — HEPARIN SODIUM 5000 UNIT(S): 5000 INJECTION INTRAVENOUS; SUBCUTANEOUS at 14:07

## 2022-06-12 RX ADMIN — ALBUTEROL 2 PUFF(S): 90 AEROSOL, METERED ORAL at 23:06

## 2022-06-12 RX ADMIN — Medication 2: at 21:38

## 2022-06-12 RX ADMIN — Medication 6: at 16:43

## 2022-06-12 RX ADMIN — ALBUTEROL 2 PUFF(S): 90 AEROSOL, METERED ORAL at 14:12

## 2022-06-12 RX ADMIN — Medication 100 MILLIGRAM(S): at 17:52

## 2022-06-12 RX ADMIN — Medication 137 MICROGRAM(S): at 05:39

## 2022-06-12 RX ADMIN — HEPARIN SODIUM 5000 UNIT(S): 5000 INJECTION INTRAVENOUS; SUBCUTANEOUS at 05:39

## 2022-06-12 RX ADMIN — ALBUTEROL 2 PUFF(S): 90 AEROSOL, METERED ORAL at 19:47

## 2022-06-12 RX ADMIN — ALBUTEROL 2 PUFF(S): 90 AEROSOL, METERED ORAL at 17:49

## 2022-06-12 RX ADMIN — Medication 600 MILLIGRAM(S): at 21:38

## 2022-06-13 ENCOUNTER — TRANSCRIPTION ENCOUNTER (OUTPATIENT)
Age: 77
End: 2022-06-13

## 2022-06-13 DIAGNOSIS — J98.01 ACUTE BRONCHOSPASM: ICD-10-CM

## 2022-06-13 DIAGNOSIS — Z87.09 PERSONAL HISTORY OF OTHER DISEASES OF THE RESPIRATORY SYSTEM: ICD-10-CM

## 2022-06-13 DIAGNOSIS — J96.01 ACUTE RESPIRATORY FAILURE WITH HYPOXIA: ICD-10-CM

## 2022-06-13 DIAGNOSIS — J44.1 CHRONIC OBSTRUCTIVE PULMONARY DISEASE WITH (ACUTE) EXACERBATION: ICD-10-CM

## 2022-06-13 DIAGNOSIS — R06.02 SHORTNESS OF BREATH: ICD-10-CM

## 2022-06-13 LAB
GLUCOSE BLDC GLUCOMTR-MCNC: 182 MG/DL — HIGH (ref 70–99)
GLUCOSE BLDC GLUCOMTR-MCNC: 306 MG/DL — HIGH (ref 70–99)
GLUCOSE BLDC GLUCOMTR-MCNC: 338 MG/DL — HIGH (ref 70–99)

## 2022-06-13 PROCEDURE — 99233 SBSQ HOSP IP/OBS HIGH 50: CPT

## 2022-06-13 RX ADMIN — ALBUTEROL 2 PUFF(S): 90 AEROSOL, METERED ORAL at 08:30

## 2022-06-13 RX ADMIN — Medication 8: at 17:08

## 2022-06-13 RX ADMIN — GABAPENTIN 300 MILLIGRAM(S): 400 CAPSULE ORAL at 09:53

## 2022-06-13 RX ADMIN — Medication 100 MILLIGRAM(S): at 05:29

## 2022-06-13 RX ADMIN — Medication 6: at 08:29

## 2022-06-13 RX ADMIN — Medication 600 MILLIGRAM(S): at 21:10

## 2022-06-13 RX ADMIN — ALBUTEROL 2 PUFF(S): 90 AEROSOL, METERED ORAL at 11:51

## 2022-06-13 RX ADMIN — Medication 8: at 12:32

## 2022-06-13 RX ADMIN — Medication 40 MILLIGRAM(S): at 09:53

## 2022-06-13 RX ADMIN — HEPARIN SODIUM 5000 UNIT(S): 5000 INJECTION INTRAVENOUS; SUBCUTANEOUS at 21:11

## 2022-06-13 RX ADMIN — HEPARIN SODIUM 5000 UNIT(S): 5000 INJECTION INTRAVENOUS; SUBCUTANEOUS at 05:30

## 2022-06-13 RX ADMIN — PANTOPRAZOLE SODIUM 40 MILLIGRAM(S): 20 TABLET, DELAYED RELEASE ORAL at 05:30

## 2022-06-13 RX ADMIN — Medication 100 MILLIGRAM(S): at 17:09

## 2022-06-13 RX ADMIN — Medication 600 MILLIGRAM(S): at 09:53

## 2022-06-13 RX ADMIN — Medication 137 MICROGRAM(S): at 05:29

## 2022-06-13 RX ADMIN — HEPARIN SODIUM 5000 UNIT(S): 5000 INJECTION INTRAVENOUS; SUBCUTANEOUS at 12:33

## 2022-06-13 RX ADMIN — ALBUTEROL 2 PUFF(S): 90 AEROSOL, METERED ORAL at 20:34

## 2022-06-13 RX ADMIN — Medication 40 MILLIGRAM(S): at 09:52

## 2022-06-13 RX ADMIN — GABAPENTIN 300 MILLIGRAM(S): 400 CAPSULE ORAL at 21:10

## 2022-06-13 RX ADMIN — Medication 30 MILLIGRAM(S): at 05:30

## 2022-06-13 RX ADMIN — ALBUTEROL 2 PUFF(S): 90 AEROSOL, METERED ORAL at 14:42

## 2022-06-13 RX ADMIN — TIOTROPIUM BROMIDE 1 CAPSULE(S): 18 CAPSULE ORAL; RESPIRATORY (INHALATION) at 08:25

## 2022-06-13 NOTE — DISCHARGE NOTE NURSING/CASE MANAGEMENT/SOCIAL WORK - NSDCPEFALRISK_GEN_ALL_CORE
For information on Fall & Injury Prevention, visit: [email or web address] OR  [email or web address] OR  https://www.cdc.gov/steadi/patient.html

## 2022-06-13 NOTE — DISCHARGE NOTE NURSING/CASE MANAGEMENT/SOCIAL WORK - NSDCFUADDAPPT_GEN_ALL_CORE_FT
Tele Dr. Dowd's office spoke fadi [employer] to make d/c follow-up appointment for Thurs. 6/23/22 at 2 pm with Cami Saleem NP.

## 2022-06-13 NOTE — DISCHARGE NOTE NURSING/CASE MANAGEMENT/SOCIAL WORK - PATIENT PORTAL LINK FT
You can access the FollowMyHealth Patient Portal offered by [facility] by registering at the following website: [email or web address]. By joining [facility]’s FollowMyHealth portal, you will also be able to view your health information using other applications (apps) compatible with our system.

## 2022-06-14 ENCOUNTER — TRANSCRIPTION ENCOUNTER (OUTPATIENT)
Age: 77
End: 2022-06-14

## 2022-06-14 LAB
GLUCOSE BLDC GLUCOMTR-MCNC: 164 MG/DL — HIGH (ref 70–99)
GLUCOSE BLDC GLUCOMTR-MCNC: 220 MG/DL — HIGH (ref 70–99)
GLUCOSE BLDC GLUCOMTR-MCNC: 228 MG/DL — HIGH (ref 70–99)
GLUCOSE BLDC GLUCOMTR-MCNC: 378 MG/DL — HIGH (ref 70–99)

## 2022-06-14 PROCEDURE — 99232 SBSQ HOSP IP/OBS MODERATE 35: CPT

## 2022-06-14 RX ORDER — BUDESONIDE AND FORMOTEROL FUMARATE DIHYDRATE 160; 4.5 UG/1; UG/1
2 AEROSOL RESPIRATORY (INHALATION)
Refills: 0 | Status: DISCONTINUED | OUTPATIENT
Start: 2022-06-14 | End: 2022-06-15

## 2022-06-14 RX ORDER — BUDESONIDE AND FORMOTEROL FUMARATE DIHYDRATE 160; 4.5 UG/1; UG/1
2 AEROSOL RESPIRATORY (INHALATION)
Qty: 1 | Refills: 0
Start: 2022-06-14 | End: 2022-07-13

## 2022-06-14 RX ADMIN — GABAPENTIN 300 MILLIGRAM(S): 400 CAPSULE ORAL at 10:04

## 2022-06-14 RX ADMIN — Medication 137 MICROGRAM(S): at 05:20

## 2022-06-14 RX ADMIN — Medication 4: at 12:14

## 2022-06-14 RX ADMIN — Medication 10: at 17:34

## 2022-06-14 RX ADMIN — BUDESONIDE AND FORMOTEROL FUMARATE DIHYDRATE 2 PUFF(S): 160; 4.5 AEROSOL RESPIRATORY (INHALATION) at 21:25

## 2022-06-14 RX ADMIN — Medication 600 MILLIGRAM(S): at 10:04

## 2022-06-14 RX ADMIN — HEPARIN SODIUM 5000 UNIT(S): 5000 INJECTION INTRAVENOUS; SUBCUTANEOUS at 05:20

## 2022-06-14 RX ADMIN — TIOTROPIUM BROMIDE 1 CAPSULE(S): 18 CAPSULE ORAL; RESPIRATORY (INHALATION) at 08:29

## 2022-06-14 RX ADMIN — Medication 100 MILLIGRAM(S): at 17:34

## 2022-06-14 RX ADMIN — Medication 600 MILLIGRAM(S): at 21:24

## 2022-06-14 RX ADMIN — Medication 40 MILLIGRAM(S): at 10:04

## 2022-06-14 RX ADMIN — ALBUTEROL 2 PUFF(S): 90 AEROSOL, METERED ORAL at 21:26

## 2022-06-14 RX ADMIN — GABAPENTIN 300 MILLIGRAM(S): 400 CAPSULE ORAL at 21:24

## 2022-06-14 RX ADMIN — PANTOPRAZOLE SODIUM 40 MILLIGRAM(S): 20 TABLET, DELAYED RELEASE ORAL at 05:19

## 2022-06-14 RX ADMIN — ALBUTEROL 2 PUFF(S): 90 AEROSOL, METERED ORAL at 11:55

## 2022-06-14 RX ADMIN — ALBUTEROL 2 PUFF(S): 90 AEROSOL, METERED ORAL at 08:29

## 2022-06-14 RX ADMIN — Medication 2: at 08:15

## 2022-06-14 RX ADMIN — HEPARIN SODIUM 5000 UNIT(S): 5000 INJECTION INTRAVENOUS; SUBCUTANEOUS at 15:56

## 2022-06-14 RX ADMIN — HEPARIN SODIUM 5000 UNIT(S): 5000 INJECTION INTRAVENOUS; SUBCUTANEOUS at 21:24

## 2022-06-14 RX ADMIN — Medication 100 MILLIGRAM(S): at 05:20

## 2022-06-14 NOTE — DISCHARGE NOTE PROVIDER - CARE PROVIDER_API CALL
Cami Saleem (NP; RN)  NP in Family Health  [address] [place] [address]  Phone: [phone]  Fax: [phone]  Scheduled Appointment: 06/23/2022

## 2022-06-14 NOTE — PROGRESS NOTE ADULT - ASSESSMENT
- cont O2  - add symbicort  - cont spiriva   - albuterol prn  - taper iv steroids  - dvt proph
PROBLEMS:    Ac COPD exacerbation  Acute Hypoxic Respiratory failure    PLAN:    pulmonary better-ambulate with oxygen  IV Solumedrol  Albuterol Inhaler  PO Doxycycline  Oxygen supplement as needed to Keep Ox Sat>88%  Heparin for DVT ppx
77F hx DM,  COPD on home oxygen at night intermittently,  presented with SOB x past few days,. admitted for acute hypoxic resp failure 2/2 acute exac of COPD    #COPD exac c/b Acute Hypoxic Respiratory failure  -on RA at rest but requires O2 w ambulation  -po doxy x 5d total  -steroids changed to pred 40 po qd today, will plan on taper on d/c  -albuterol, spiriva  -add symbicort  -f/u further pulm recs (Anselmi)    #Type 2 Dm with steroid induced hyperglycemia  -hyperglycemia improving as steroids weaned  -monitor BG, SSI    #DVT ppx- SQH    #anticipate d/c 6/15, daughter to   pharmacy: [facility], [place]  will need F2F    
77F hx DM,  COPD on home oxygen at night intermittently,  presented with SOB x past few days,. admitted for acute hypoxic resp failure 2/2 acute exac of COPD    #COPD exac c/b Acute Hypoxic Respiratory failure  -still on continuous O2 NC here, RN will try to taper  -will likely need continuous O2 or O2 w ambulation at least in short term, respiratory eval tmrw  -po doxy x 5d total  -steroids, dose lowered by Dr. Dowd today  -albuterol, spiriva  -allergic to Breo?  -f/u further pulm recs    #Type 2 Dm with steroid induced hyperglycemia  -will likely improve as steroids weaned  -monitor BG, SSI    #DVT ppx- SQH      
- cont O2; sat on RA improved  - add symbicort  - cont spiriva   - albuterol prn  - taper iv steroids  - dvt proph

## 2022-06-14 NOTE — DISCHARGE NOTE PROVIDER - NSDCCPCAREPLAN_GEN_ALL_CORE_FT
PRINCIPAL DISCHARGE DIAGNOSIS  Diagnosis: COPD exacerbation  Assessment and Plan of Treatment: Add symbicort to your inhaler regimen. Complete a steroid taper as directed, follow up with Dr. Dowd

## 2022-06-14 NOTE — DISCHARGE NOTE PROVIDER - HOSPITAL COURSE
CC: COPD exacerbation  HPI and Hospital Course: 77F hx DM,  COPD on home oxygen at night intermittently,  presented with SOB x past few days,. admitted for acute hypoxic resp failure 2/2 acute exac of COPD. Improved w steroids here, still requiring O2 w ambulation, but on RA at rest.    VITALS:  T(F): 97.6 (06-14-22 @ 08:56), Max: 97.7 (06-13-22 @ 16:21)  HR: 64 (06-14-22 @ 08:56) (64 - 101)  BP: 109/57 (06-14-22 @ 08:56) (109/57 - 134/77)  RR: 18 (06-14-22 @ 08:56) (18 - 18)  SpO2: 96% (06-14-22 @ 08:56) (92% - 96%)    PHYSICAL EXAM:  General: NAD, lying in bed  HEENT:  pupils equal and reactive, EOMI, no oropharyngeal lesions, erythema, exudates, oral thrush  NECK:   supple, no carotid bruits, no palpable lymph nodes, no thyromegaly  CV:  +S1, +S2, regular, no murmurs or rubs  RESP:   lungs clear to auscultation bilaterally, no wheezing, rales, rhonchi, good air entry bilaterally  BREAST:  not examined  GI:  abdomen soft, non-tender, non-distended, normal BS, no bruits, no abdominal masses, no palpable masses  RECTAL:  not examined  :  not examined  MSK:   normal muscle tone, no atrophy, no rigidity, no contractions  EXT:  no clubbing, no cyanosis, no edema, no calf pain, swelling or erythema  VASCULAR:  pulses equal and symmetric in the upper and lower extremities  NEURO:  AAOX3, no focal neurological deficits, follows all commands, able to move extremities spontaneously  SKIN:  no ulcers, lesions or rashes    CT chest noncon 6/10/22: Emphysema with large bulla redemonstrated. Scattered linear atelectasis/scarring. Lungs otherwise clear.        #COPD exac c/b Acute Hypoxic Respiratory failure  -on RA at rest but requires O2 w ambulation  -po doxy x 5d total, completed here  -steroids changed to pred 40 po, d/c with quick taper  -albuterol, spiriva  -add symbicort  -f/u w pulm recs (Anselmi)    #Type 2 Dm with steroid induced hyperglycemia  -hyperglycemia improving as steroids weaned  -monitor BG, SSI    #DVT ppx- SQH    I have spent 35min on day of d/c coordinating care         CC: COPD exacerbation  HPI and Hospital Course: 77F hx DM,  COPD on home oxygen at night intermittently,  presented with SOB x past few days,. admitted for acute hypoxic resp failure 2/2 acute exac of COPD. Improved w steroids here, still requiring O2 w ambulation, but on RA at rest.    Vital Signs Last 24 Hrs  T(C): 36.4 (15 Pasha 2022 08:11), Max: 36.5 (14 Jun 2022 23:34)  T(F): 97.6 (15 Pasha 2022 08:11), Max: 97.7 (14 Jun 2022 23:34)  HR: 65 (15 Pasha 2022 08:11) (64 - 73)  BP: 125/68 (15 Pasha 2022 08:11) (102/62 - 125/68)  BP(mean): --  RR: 18 (15 Pasha 2022 08:11) (18 - 18)  SpO2: 93% (15 Pasha 2022 08:11) (93% - 95%)    PHYSICAL EXAM:  General: NAD, lying in bed  HEENT:  pupils equal and reactive, EOMI, no oropharyngeal lesions, erythema, exudates, oral thrush  NECK:   supple, no carotid bruits, no palpable lymph nodes, no thyromegaly  CV:  +S1, +S2, regular, no murmurs or rubs  RESP:   lungs clear to auscultation bilaterally, no wheezing, rales, rhonchi, good air entry bilaterally  BREAST:  not examined  GI:  abdomen soft, non-tender, non-distended, normal BS, no bruits, no abdominal masses, no palpable masses  RECTAL:  not examined  :  not examined  MSK:   normal muscle tone, no atrophy, no rigidity, no contractions  EXT:  no clubbing, no cyanosis, no edema, no calf pain, swelling or erythema  VASCULAR:  pulses equal and symmetric in the upper and lower extremities  NEURO:  AAOX3, no focal neurological deficits, follows all commands, able to move extremities spontaneously  SKIN:  no ulcers, lesions or rashes    CT chest noncon 6/10/22: Emphysema with large bulla redemonstrated. Scattered linear atelectasis/scarring. Lungs otherwise clear.      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

## 2022-06-14 NOTE — PROGRESS NOTE ADULT - SUBJECTIVE AND OBJECTIVE BOX
Subjective:    pat better, sitting in bed, no respiratory distress.    Home Medications:  furosemide 40 mg oral tablet: 1 tab(s) orally once a day (2022 05:02)  gabapentin 300 mg oral capsule: 1 cap(s) orally 2 times a day (2022 05:02)  levothyroxine 137 mcg (0.137 mg) oral tablet: 1 tab(s) orally once a day (2022 05:02)  Ozempic:  (:)  pantoprazole 40 mg oral delayed release tablet: 1 tab(s) orally once a day (at bedtime), As Needed (2022 05:02)  Synjardy:  (:)  tiotropium 18 mcg inhalation capsule: 1 cap(s) inhaled once a day (2022 05:02)  Ventolin HFA 90 mcg/inh inhalation aerosol: 2 puff(s) inhaled every 6 hours, As Needed - for shortness of breath and/or wheezing (2022 05:)    MEDICATIONS  (STANDING):  ALBUTerol    90 MICROgram(s) HFA Inhaler 2 Puff(s) Inhalation every 4 hours  dextrose 5%. 1000 milliLiter(s) (100 mL/Hr) IV Continuous <Continuous>  dextrose 5%. 1000 milliLiter(s) (50 mL/Hr) IV Continuous <Continuous>  dextrose 50% Injectable 25 Gram(s) IV Push once  dextrose 50% Injectable 12.5 Gram(s) IV Push once  dextrose 50% Injectable 25 Gram(s) IV Push once  doxycycline hyclate Capsule 100 milliGRAM(s) Oral every 12 hours  furosemide    Tablet 40 milliGRAM(s) Oral daily  gabapentin 300 milliGRAM(s) Oral two times a day  glucagon  Injectable 1 milliGRAM(s) IntraMuscular once  guaiFENesin  milliGRAM(s) Oral every 12 hours  heparin   Injectable 5000 Unit(s) SubCutaneous every 8 hours  insulin lispro (ADMELOG) corrective regimen sliding scale   SubCutaneous three times a day before meals  insulin lispro (ADMELOG) corrective regimen sliding scale   SubCutaneous at bedtime  levothyroxine 137 MICROGram(s) Oral daily  methylPREDNISolone sodium succinate Injectable 30 milliGRAM(s) IV Push every 8 hours  pantoprazole    Tablet 40 milliGRAM(s) Oral before breakfast  tiotropium 18 MICROgram(s) Capsule 1 Capsule(s) Inhalation daily    MEDICATIONS  (PRN):  acetaminophen     Tablet .. 650 milliGRAM(s) Oral every 6 hours PRN Mild Pain (1 - 3)  aluminum hydroxide/magnesium hydroxide/simethicone Suspension 30 milliLiter(s) Oral every 4 hours PRN Dyspepsia  dextrose Oral Gel 15 Gram(s) Oral once PRN Blood Glucose LESS THAN 70 milliGRAM(s)/deciliter  melatonin 3 milliGRAM(s) Oral at bedtime PRN Insomnia  ondansetron Injectable 4 milliGRAM(s) IV Push every 8 hours PRN Nausea and/or Vomiting      Allergies    Breo Ellipta (Rash)    Intolerances        Vital Signs Last 24 Hrs  T(C): 36.3 (2022 08:16), Max: 36.8 (2022 22:00)  T(F): 97.4 (2022 08:16), Max: 98.3 (2022 22:00)  HR: 68 (2022 09:55) (68 - 92)  BP: 113/62 (2022 08:16) (113/62 - 119/60)  BP(mean): --  RR: 18 (2022 08:16) (18 - 20)  SpO2: 93% (2022 09:55) (91% - 93%)      PHYSICAL EXAMINATION:    NECK:  Supple. No lymphadenopathy. Jugular venous pressure not elevated. Carotids equal.   HEART:   The cardiac impulse has a normal quality. Reg., Nl S1 and S2.  There are no murmurs, rubs or gallops noted  CHEST:  Chest crackles to auscultation. Normal respiratory effort.  ABDOMEN:  Soft and nontender.   EXTREMITIES:  There is no edema.       LABS:                        13.7   8.52  )-----------( 338      ( 10 Pasha 2022 14:27 )             41.9     06-11    136  |  102  |  13  ----------------------------<  218<H>  3.8   |  26  |  0.72    Ca    9.5      2022 06:42  Mg     2.3     06-10    TPro  7.9  /  Alb  2.9<L>  /  TBili  0.4  /  DBili  x   /  AST  34  /  ALT  19  /  AlkPhos  71  06-10      Urinalysis Basic - ( 2022 01:11 )    Color: Yellow / Appearance: Clear / S.010 / pH: x  Gluc: x / Ketone: Trace  / Bili: Negative / Urobili: Negative   Blood: x / Protein: Negative / Nitrite: Negative   Leuk Esterase: Trace / RBC: 0-2 /HPF / WBC 6-10   Sq Epi: x / Non Sq Epi: Occasional / Bacteria: Many            
HOSPITALIST ATTENDING PROGRESS NOTE    Chart and meds reviewed.  Patient seen and examined.    CC: COPD exac    Subjective: Feeling improved today, agreeable to d/c tmrw. On RA at rest but desat with ambulation, improved on 2L NC from 88% on RA with amb to 92% on 2L NC with amb.    All other systems reviewed and found to be negative with the exception of what has been described above.    MEDICATIONS  (STANDING):  ALBUTerol    90 MICROgram(s) HFA Inhaler 2 Puff(s) Inhalation every 4 hours  budesonide 160 MICROgram(s)/formoterol 4.5 MICROgram(s) Inhaler 2 Puff(s) Inhalation two times a day  dextrose 5%. 1000 milliLiter(s) (100 mL/Hr) IV Continuous <Continuous>  dextrose 5%. 1000 milliLiter(s) (50 mL/Hr) IV Continuous <Continuous>  dextrose 50% Injectable 25 Gram(s) IV Push once  dextrose 50% Injectable 12.5 Gram(s) IV Push once  dextrose 50% Injectable 25 Gram(s) IV Push once  doxycycline hyclate Capsule 100 milliGRAM(s) Oral every 12 hours  furosemide    Tablet 40 milliGRAM(s) Oral daily  gabapentin 300 milliGRAM(s) Oral two times a day  glucagon  Injectable 1 milliGRAM(s) IntraMuscular once  guaiFENesin  milliGRAM(s) Oral every 12 hours  heparin   Injectable 5000 Unit(s) SubCutaneous every 8 hours  insulin lispro (ADMELOG) corrective regimen sliding scale   SubCutaneous three times a day before meals  insulin lispro (ADMELOG) corrective regimen sliding scale   SubCutaneous at bedtime  levothyroxine 137 MICROGram(s) Oral daily  pantoprazole    Tablet 40 milliGRAM(s) Oral before breakfast  tiotropium 18 MICROgram(s) Capsule 1 Capsule(s) Inhalation daily    MEDICATIONS  (PRN):  acetaminophen     Tablet .. 650 milliGRAM(s) Oral every 6 hours PRN Mild Pain (1 - 3)  aluminum hydroxide/magnesium hydroxide/simethicone Suspension 30 milliLiter(s) Oral every 4 hours PRN Dyspepsia  dextrose Oral Gel 15 Gram(s) Oral once PRN Blood Glucose LESS THAN 70 milliGRAM(s)/deciliter  melatonin 3 milliGRAM(s) Oral at bedtime PRN Insomnia  ondansetron Injectable 4 milliGRAM(s) IV Push every 8 hours PRN Nausea and/or Vomiting      VITALS:  T(F): 97.6 (06-14-22 @ 08:56), Max: 97.7 (06-13-22 @ 16:21)  HR: 64 (06-14-22 @ 08:56) (64 - 101)  BP: 109/57 (06-14-22 @ 08:56) (109/57 - 134/77)  RR: 18 (06-14-22 @ 08:56) (18 - 18)  SpO2: 96% (06-14-22 @ 08:56) (92% - 96%)  Wt(kg): --    I&O's Summary      CAPILLARY BLOOD GLUCOSE      POCT Blood Glucose.: 228 mg/dL (14 Jun 2022 12:11)  POCT Blood Glucose.: 164 mg/dL (14 Jun 2022 07:51)  POCT Blood Glucose.: 182 mg/dL (13 Jun 2022 20:55)  POCT Blood Glucose.: 306 mg/dL (13 Jun 2022 17:06)      PHYSICAL EXAM:  Gen: NAD  HEENT:  pupils equal and reactive, EOMI, no oropharyngeal lesions, erythema, exudates, oral thrush  NECK:   supple, no carotid bruits, no palpable lymph nodes, no thyromegaly  CV:  +S1, +S2, regular, no murmurs or rubs  RESP:   lungs clear to auscultation bilaterally, no wheezing, rales, rhonchi, good air entry bilaterally  BREAST:  not examined  GI:  abdomen soft, non-tender, non-distended, normal BS, no bruits, no abdominal masses, no palpable masses  RECTAL:  not examined  :  not examined  MSK:   normal muscle tone, no atrophy, no rigidity, no contractions  EXT:  no clubbing, no cyanosis, no edema, no calf pain, swelling or erythema  VASCULAR:  pulses equal and symmetric in the upper and lower extremities  NEURO:  AAOX3, no focal neurological deficits, follows all commands, able to move extremities spontaneously  SKIN:  no ulcers, lesions or rashes    LABS:    CULTURES:  no new    Additional results/Imaging, I have personally reviewed:  CT chest noncon 6/10/22: Emphysema with large bulla redemonstrated. Scattered linear atelectasis/scarring. Lungs otherwise clear.    
HOSPITALIST ATTENDING PROGRESS NOTE    Chart and meds reviewed.  Patient seen and examined.    CC: COPD exacerbation    Subjective: Feeling overall improved, reports checking her own puls ox, at rest on RA is >88 but 84% w ambulation, SOB with ambulation. At b/l, only uses O2 intermittently at night. Here has been on continuous. Steroids tapered by Dr. Dowd today.    All other systems reviewed and found to be negative with the exception of what has been described above.    MEDICATIONS  (STANDING):  ALBUTerol    90 MICROgram(s) HFA Inhaler 2 Puff(s) Inhalation every 4 hours  dextrose 5%. 1000 milliLiter(s) (50 mL/Hr) IV Continuous <Continuous>  dextrose 5%. 1000 milliLiter(s) (100 mL/Hr) IV Continuous <Continuous>  dextrose 50% Injectable 25 Gram(s) IV Push once  dextrose 50% Injectable 12.5 Gram(s) IV Push once  dextrose 50% Injectable 25 Gram(s) IV Push once  doxycycline hyclate Capsule 100 milliGRAM(s) Oral every 12 hours  furosemide    Tablet 40 milliGRAM(s) Oral daily  gabapentin 300 milliGRAM(s) Oral two times a day  glucagon  Injectable 1 milliGRAM(s) IntraMuscular once  guaiFENesin  milliGRAM(s) Oral every 12 hours  heparin   Injectable 5000 Unit(s) SubCutaneous every 8 hours  insulin lispro (ADMELOG) corrective regimen sliding scale   SubCutaneous three times a day before meals  insulin lispro (ADMELOG) corrective regimen sliding scale   SubCutaneous at bedtime  levothyroxine 137 MICROGram(s) Oral daily  methylPREDNISolone sodium succinate Injectable 40 milliGRAM(s) IV Push daily  pantoprazole    Tablet 40 milliGRAM(s) Oral before breakfast  tiotropium 18 MICROgram(s) Capsule 1 Capsule(s) Inhalation daily    MEDICATIONS  (PRN):  acetaminophen     Tablet .. 650 milliGRAM(s) Oral every 6 hours PRN Mild Pain (1 - 3)  aluminum hydroxide/magnesium hydroxide/simethicone Suspension 30 milliLiter(s) Oral every 4 hours PRN Dyspepsia  dextrose Oral Gel 15 Gram(s) Oral once PRN Blood Glucose LESS THAN 70 milliGRAM(s)/deciliter  melatonin 3 milliGRAM(s) Oral at bedtime PRN Insomnia  ondansetron Injectable 4 milliGRAM(s) IV Push every 8 hours PRN Nausea and/or Vomiting      VITALS:  T(F): 97.7 (06-13-22 @ 08:03), Max: 98.4 (06-12-22 @ 23:26)  HR: 72 (06-13-22 @ 08:03) (72 - 88)  BP: 141/66 (06-13-22 @ 08:03) (122/68 - 141/66)  RR: 18 (06-13-22 @ 08:03) (18 - 18)  SpO2: 93% (06-13-22 @ 08:03) (92% - 94%)  Wt(kg): --    I&O's Summary      CAPILLARY BLOOD GLUCOSE      POCT Blood Glucose.: 338 mg/dL (13 Jun 2022 12:26)  POCT Blood Glucose.: 269 mg/dL (13 Jun 2022 07:57)  POCT Blood Glucose.: 281 mg/dL (12 Jun 2022 21:34)  POCT Blood Glucose.: 291 mg/dL (12 Jun 2022 16:27)      PHYSICAL EXAM:  Gen: NAD  HEENT:  pupils equal and reactive, EOMI, no oropharyngeal lesions, erythema, exudates, oral thrush  NECK:   supple, no carotid bruits, no palpable lymph nodes, no thyromegaly  CV:  +S1, +S2, regular, no murmurs or rubs  RESP:   lungs clear to auscultation bilaterally, no wheezing, rales, rhonchi, good air entry bilaterally  BREAST:  not examined  GI:  abdomen soft, non-tender, non-distended, normal BS, no bruits, no abdominal masses, no palpable masses  RECTAL:  not examined  :  not examined  MSK:   normal muscle tone, no atrophy, no rigidity, no contractions  EXT:  no clubbing, no cyanosis, no edema, no calf pain, swelling or erythema  VASCULAR:  pulses equal and symmetric in the upper and lower extremities  NEURO:  AAOX3, no focal neurological deficits, follows all commands, able to move extremities spontaneously  SKIN:  no ulcers, lesions or rashes    LABS:    CULTURES:  no new    Additional results/Imaging, I have personally reviewed:  CT chest noncon 6/10/22: Emphysema with large bulla redemonstrated. Scattered linear atelectasis/scarring. Lungs otherwise clear.  
History of Present Illness:   76 yo Female with h/o, DM,  COPD on home oxygen at night,  presented with SOB x past few days; was sent by Dr Dougherty to the ED. She uses O2 at night intermittently,  normal O2 sat around 94% but was lower at home in 80s even with Oxygen supplement. No active chest pain. No recent change in meds. No fever. She also has productive cough, yellowish sputum.   -has been treated with Z pack for pharyngitis recently     6.12: less coughing today            REVIEW OF SYSTEMS:    CONSTITUTIONAL: No weakness, No fevers or chills  ENT: No ear ache, No sorethroat  NECK: No pain, No stiffness  RESPIRATORY: + cough, No wheezing, No hemoptysis; No dyspnea  CARDIOVASCULAR: No chest pain, No palpitations  GASTROINTESTINAL: No abd pain, No nausea, No vomiting, No hematemesis, No diarrhea or constipation. No melena, No hematochezia.  GENITOURINARY: No dysuria, No  hematuria  NEUROLOGICAL: No diplopia, No paresthesia, No motor dysfunction  MUSCULOSKELETAL: No arthralgia, No myalgia  SKIN: No rashes, or lesions   PSYCH: no anxiety, no suicidal ideation    All other review of systems is negative unless indicated above    Vital Signs Last 24 Hrs  T(C): 36.3 (12 Jun 2022 08:16), Max: 36.8 (11 Jun 2022 22:00)  T(F): 97.4 (12 Jun 2022 08:16), Max: 98.3 (11 Jun 2022 22:00)  HR: 68 (12 Jun 2022 09:55) (68 - 92)  BP: 113/62 (12 Jun 2022 08:16) (113/62 - 119/60)  BP(mean): --  RR: 18 (12 Jun 2022 08:16) (18 - 20)  SpO2: 93% (12 Jun 2022 09:55) (91% - 93%)    PHYSICAL EXAM:    GENERAL: NAD  HEENT:  NC/AT, EOMI, PERRLA, No scleral icterus, Moist mucous membranes  NECK: Supple, No JVD  CNS:  Alert & Oriented X3, Motor Strength 5/5 B/L upper and lower extremities; DTRs 2+ intact   LUNG: decreased bilateral Breath sounds, intermittent wheezing   HEART: RRR; No murmurs, No rubs  ABDOMEN: +BS, ST/ND/NT  GENITOURINARY: Voiding, Bladder not distended  EXTREMITIES:  2+ Peripheral Pulses, No clubbing, No cyanosis, No tibial edema  MUSCULOSKELTAL: Joints normal ROM, No TTP, No effusion  VAGINAL: deferred  SKIN: no rashes  RECTAL: deferred, not indicated  BREAST: deferred                          13.7   8.52  )-----------( 338      ( 10 Pasha 2022 14:27 )             41.9     06-11    136  |  102  |  13  ----------------------------<  218<H>  3.8   |  26  |  0.72    Ca    9.5      11 Jun 2022 06:42  Mg     2.3     06-10    TPro  7.9  /  Alb  2.9<L>  /  TBili  0.4  /  DBili  x   /  AST  34  /  ALT  19  /  AlkPhos  71  06-10    Vancomycin levels:   Cultures:     MEDICATIONS  (STANDING):  ALBUTerol    90 MICROgram(s) HFA Inhaler 2 Puff(s) Inhalation every 4 hours  doxycycline hyclate Capsule 100 milliGRAM(s) Oral every 12 hours  furosemide    Tablet 40 milliGRAM(s) Oral daily  gabapentin 300 milliGRAM(s) Oral two times a day  glucagon  Injectable 1 milliGRAM(s) IntraMuscular once  guaiFENesin  milliGRAM(s) Oral every 12 hours  heparin   Injectable 5000 Unit(s) SubCutaneous every 8 hours  insulin lispro (ADMELOG) corrective regimen sliding scale   SubCutaneous three times a day before meals  insulin lispro (ADMELOG) corrective regimen sliding scale   SubCutaneous at bedtime  levothyroxine 137 MICROGram(s) Oral daily  methylPREDNISolone sodium succinate Injectable 40 milliGRAM(s) IV Push every 8 hours  pantoprazole    Tablet 40 milliGRAM(s) Oral before breakfast  tiotropium 18 MICROgram(s) Capsule 1 Capsule(s) Inhalation daily    MEDICATIONS  (PRN):  acetaminophen     Tablet .. 650 milliGRAM(s) Oral every 6 hours PRN Mild Pain (1 - 3)  aluminum hydroxide/magnesium hydroxide/simethicone Suspension 30 milliLiter(s) Oral every 4 hours PRN Dyspepsia  dextrose Oral Gel 15 Gram(s) Oral once PRN Blood Glucose LESS THAN 70 milliGRAM(s)/deciliter  melatonin 3 milliGRAM(s) Oral at bedtime PRN Insomnia  ondansetron Injectable 4 milliGRAM(s) IV Push every 8 hours PRN Nausea and/or Vomiting      all labs reviewed  all imaging reviewed      1.  COPD exacerbation  Acute Hypoxic Respiratory failure  -started on IV Solumedrol 40mg Tid; will decrease dose to 30 Tid  -continue Albuterol Inhaler  -on PO Doxycycline   CT Chest noted: no Pna, +emphysema   -follow Pulmonary consult  -Oxygen supplement as needed to Keep Ox Sat>88%    2.  DM type II:  steroid induced hyperglycemia  c/w ISS    3.  Code status: patient is full code status. 
Subjective:  feeling better   on room air  O2 sat 92%    MEDICATIONS  (STANDING):  ALBUTerol    90 MICROgram(s) HFA Inhaler 2 Puff(s) Inhalation every 4 hours  dextrose 5%. 1000 milliLiter(s) (100 mL/Hr) IV Continuous <Continuous>  dextrose 5%. 1000 milliLiter(s) (50 mL/Hr) IV Continuous <Continuous>  dextrose 50% Injectable 25 Gram(s) IV Push once  dextrose 50% Injectable 12.5 Gram(s) IV Push once  dextrose 50% Injectable 25 Gram(s) IV Push once  doxycycline hyclate Capsule 100 milliGRAM(s) Oral every 12 hours  furosemide    Tablet 40 milliGRAM(s) Oral daily  gabapentin 300 milliGRAM(s) Oral two times a day  glucagon  Injectable 1 milliGRAM(s) IntraMuscular once  guaiFENesin  milliGRAM(s) Oral every 12 hours  heparin   Injectable 5000 Unit(s) SubCutaneous every 8 hours  insulin lispro (ADMELOG) corrective regimen sliding scale   SubCutaneous three times a day before meals  insulin lispro (ADMELOG) corrective regimen sliding scale   SubCutaneous at bedtime  levothyroxine 137 MICROGram(s) Oral daily  methylPREDNISolone sodium succinate Injectable 40 milliGRAM(s) IV Push daily  pantoprazole    Tablet 40 milliGRAM(s) Oral before breakfast  tiotropium 18 MICROgram(s) Capsule 1 Capsule(s) Inhalation daily    MEDICATIONS  (PRN):  acetaminophen     Tablet .. 650 milliGRAM(s) Oral every 6 hours PRN Mild Pain (1 - 3)  aluminum hydroxide/magnesium hydroxide/simethicone Suspension 30 milliLiter(s) Oral every 4 hours PRN Dyspepsia  dextrose Oral Gel 15 Gram(s) Oral once PRN Blood Glucose LESS THAN 70 milliGRAM(s)/deciliter  melatonin 3 milliGRAM(s) Oral at bedtime PRN Insomnia  ondansetron Injectable 4 milliGRAM(s) IV Push every 8 hours PRN Nausea and/or Vomiting      Allergies    Breo Ellipta (Rash)    Intolerances        REVIEW OF SYSTEMS:    CONSTITUTIONAL:  As per HPI.  HEENT:  Eyes:  No diplopia or blurred vision. ENT:  No earache, sore throat or runny nose.  CARDIOVASCULAR:  No pressure, squeezing, tightness, heaviness or aching about the chest, neck, axilla or epigastrium.  RESPIRATORY:  sob  GASTROINTESTINAL:  No nausea, vomiting or diarrhea.  GENITOURINARY:  No dysuria, frequency or urgency.  MUSCULOSKELETAL:  no joint pain, deformity, tenderness  EXTREMITIES: no clubbing cyanosis,edema  SKIN:  No change in skin, hair or nails.  NEUROLOGIC:  No paresthesias, fasciculations, seizures or weakness.  PSYCHIATRIC:  No disorder of thought or mood.  ENDOCRINE:  No heat or cold intolerance, polyuria or polydipsia.  HEMATOLOGICAL:  No easy bruising or bleedings:    Vital Signs Last 24 Hrs  T(C): 36.2 (13 Jun 2022 23:58), Max: 36.5 (13 Jun 2022 16:21)  T(F): 97.1 (13 Jun 2022 23:58), Max: 97.7 (13 Jun 2022 16:21)  HR: 70 (13 Jun 2022 23:58) (66 - 101)  BP: 119/61 (13 Jun 2022 23:58) (119/61 - 134/77)  BP(mean): 75 (13 Jun 2022 23:58) (75 - 75)  RR: 18 (13 Jun 2022 23:58) (18 - 18)  SpO2: 95% (13 Jun 2022 23:58) (92% - 95%)    PHYSICAL EXAMINATION:  SKIN: no rashes  HEAD: NC/AT  EYES: PERRLA, EOMI  EARS: TM's intact  NOSE: no abnormalities  NECK:  Supple. No lymphadenopathy. Jugular venous pressure not elevated. Carotids equal.   HEART:   S1S2 reg  CHEST:  scattered exp ronchi  ABDOMEN:  Soft and nontender.   EXTREMITIES:  no C/C/E  NEURO: AAO x 3, no focal deficts       LABS:                RADIOLOGY & ADDITIONAL TESTS:    
Subjective:  no distress  less sob    MEDICATIONS  (STANDING):  ALBUTerol    90 MICROgram(s) HFA Inhaler 2 Puff(s) Inhalation every 4 hours  dextrose 5%. 1000 milliLiter(s) (100 mL/Hr) IV Continuous <Continuous>  dextrose 5%. 1000 milliLiter(s) (50 mL/Hr) IV Continuous <Continuous>  dextrose 50% Injectable 25 Gram(s) IV Push once  dextrose 50% Injectable 12.5 Gram(s) IV Push once  dextrose 50% Injectable 25 Gram(s) IV Push once  doxycycline hyclate Capsule 100 milliGRAM(s) Oral every 12 hours  furosemide    Tablet 40 milliGRAM(s) Oral daily  gabapentin 300 milliGRAM(s) Oral two times a day  glucagon  Injectable 1 milliGRAM(s) IntraMuscular once  guaiFENesin  milliGRAM(s) Oral every 12 hours  heparin   Injectable 5000 Unit(s) SubCutaneous every 8 hours  insulin lispro (ADMELOG) corrective regimen sliding scale   SubCutaneous three times a day before meals  insulin lispro (ADMELOG) corrective regimen sliding scale   SubCutaneous at bedtime  levothyroxine 137 MICROGram(s) Oral daily  methylPREDNISolone sodium succinate Injectable 30 milliGRAM(s) IV Push every 8 hours  pantoprazole    Tablet 40 milliGRAM(s) Oral before breakfast  tiotropium 18 MICROgram(s) Capsule 1 Capsule(s) Inhalation daily    MEDICATIONS  (PRN):  acetaminophen     Tablet .. 650 milliGRAM(s) Oral every 6 hours PRN Mild Pain (1 - 3)  aluminum hydroxide/magnesium hydroxide/simethicone Suspension 30 milliLiter(s) Oral every 4 hours PRN Dyspepsia  dextrose Oral Gel 15 Gram(s) Oral once PRN Blood Glucose LESS THAN 70 milliGRAM(s)/deciliter  melatonin 3 milliGRAM(s) Oral at bedtime PRN Insomnia  ondansetron Injectable 4 milliGRAM(s) IV Push every 8 hours PRN Nausea and/or Vomiting      Allergies    Breo Ellipta (Rash)    Intolerances        REVIEW OF SYSTEMS:    CONSTITUTIONAL:  As per HPI.  HEENT:  Eyes:  No diplopia or blurred vision. ENT:  No earache, sore throat or runny nose.  CARDIOVASCULAR:  No pressure, squeezing, tightness, heaviness or aching about the chest, neck, axilla or epigastrium.  RESPIRATORY:  No cough, shortness of breath, PND or orthopnea.  GASTROINTESTINAL:  No nausea, vomiting or diarrhea.  GENITOURINARY:  No dysuria, frequency or urgency.  MUSCULOSKELETAL:  no joint pain, deformity, tenderness  EXTREMITIES: no clubbing cyanosis,edema  SKIN:  No change in skin, hair or nails.  NEUROLOGIC:  No paresthesias, fasciculations, seizures or weakness.  PSYCHIATRIC:  No disorder of thought or mood.  ENDOCRINE:  No heat or cold intolerance, polyuria or polydipsia.  HEMATOLOGICAL:  No easy bruising or bleedings:    Vital Signs Last 24 Hrs  T(C): 36.5 (13 Jun 2022 08:03), Max: 36.9 (12 Jun 2022 23:26)  T(F): 97.7 (13 Jun 2022 08:03), Max: 98.4 (12 Jun 2022 23:26)  HR: 72 (13 Jun 2022 08:03) (60 - 89)  BP: 141/66 (13 Jun 2022 08:03) (115/54 - 141/66)  BP(mean): 80 (12 Jun 2022 22:47) (80 - 80)  RR: 18 (13 Jun 2022 08:03) (18 - 18)  SpO2: 93% (13 Jun 2022 08:03) (92% - 94%)    PHYSICAL EXAMINATION:  SKIN: no rashes  HEAD: NC/AT  EYES: PERRLA, EOMI  EARS: TM's intact  NOSE: no abnormalities  NECK:  Supple. No lymphadenopathy. Jugular venous pressure not elevated. Carotids equal.   HEART:   S1S2 reg  CHEST:  bilat ronchi  ABDOMEN:  Soft and nontender.   EXTREMITIES:  no C/C/E  NEURO: AAO x 3, no focal deficts       LABS:                RADIOLOGY & ADDITIONAL TESTS:

## 2022-06-14 NOTE — DISCHARGE NOTE PROVIDER - NSDCMRMEDTOKEN_GEN_ALL_CORE_FT
budesonide-formoterol 160 mcg-4.5 mcg/inh inhalation aerosol: 2 puff(s) inhaled 2 times a day   furosemide 40 mg oral tablet: 1 tab(s) orally once a day  gabapentin 300 mg oral capsule: 1 cap(s) orally 2 times a day  levothyroxine 137 mcg (0.137 mg) oral tablet: 1 tab(s) orally once a day  Ozempic:   pantoprazole 40 mg oral delayed release tablet: 1 tab(s) orally once a day (at bedtime), As Needed  predniSONE 10 mg oral tablet: 4 tab(s) orally once a day x 2 days  3 tab(s) orally once a day x 2 days  2 tab(s) orally once a day x 2 days  1 tab(s) orally once a day x 2 days  Synjardy:   tiotropium 18 mcg inhalation capsule: 1 cap(s) inhaled once a day  Ventolin HFA 90 mcg/inh inhalation aerosol: 2 puff(s) inhaled every 6 hours, As Needed - for shortness of breath and/or wheezing

## 2022-06-14 NOTE — DISCHARGE NOTE PROVIDER - NSDCFUSCHEDAPPT_GEN_ALL_CORE_FT
Cami Saleem  [facility]  INTMED [address]  Scheduled Appointment: 06/23/2022    James Dowd  [facility]  IntMed [address]  Scheduled Appointment: 07/19/2022    Shahida Dill  [facility]  Neurology [address]  Scheduled Appointment: 07/20/2022

## 2022-06-15 VITALS
DIASTOLIC BLOOD PRESSURE: 68 MMHG | TEMPERATURE: 98 F | HEART RATE: 65 BPM | SYSTOLIC BLOOD PRESSURE: 125 MMHG | RESPIRATION RATE: 18 BRPM | OXYGEN SATURATION: 93 %

## 2022-06-15 LAB
ANION GAP SERPL CALC-SCNC: 7 MMOL/L — SIGNIFICANT CHANGE UP (ref 5–17)
BUN SERPL-MCNC: 28 MG/DL — HIGH (ref 7–23)
CALCIUM SERPL-MCNC: 9 MG/DL — SIGNIFICANT CHANGE UP (ref 8.5–10.1)
CHLORIDE SERPL-SCNC: 109 MMOL/L — HIGH (ref 96–108)
CO2 SERPL-SCNC: 25 MMOL/L — SIGNIFICANT CHANGE UP (ref 22–31)
CREAT SERPL-MCNC: 0.72 MG/DL — SIGNIFICANT CHANGE UP (ref 0.5–1.3)
EGFR: 85 ML/MIN/1.73M2 — SIGNIFICANT CHANGE UP
GLUCOSE BLDC GLUCOMTR-MCNC: 150 MG/DL — HIGH (ref 70–99)
GLUCOSE BLDC GLUCOMTR-MCNC: 299 MG/DL — HIGH (ref 70–99)
GLUCOSE SERPL-MCNC: 178 MG/DL — HIGH (ref 70–99)
POTASSIUM SERPL-MCNC: 3.9 MMOL/L — SIGNIFICANT CHANGE UP (ref 3.5–5.3)
POTASSIUM SERPL-SCNC: 3.9 MMOL/L — SIGNIFICANT CHANGE UP (ref 3.5–5.3)
SODIUM SERPL-SCNC: 141 MMOL/L — SIGNIFICANT CHANGE UP (ref 135–145)

## 2022-06-15 PROCEDURE — 99239 HOSP IP/OBS DSCHRG MGMT >30: CPT

## 2022-06-15 RX ADMIN — BUDESONIDE AND FORMOTEROL FUMARATE DIHYDRATE 2 PUFF(S): 160; 4.5 AEROSOL RESPIRATORY (INHALATION) at 08:13

## 2022-06-15 RX ADMIN — PANTOPRAZOLE SODIUM 40 MILLIGRAM(S): 20 TABLET, DELAYED RELEASE ORAL at 08:05

## 2022-06-15 RX ADMIN — TIOTROPIUM BROMIDE 1 CAPSULE(S): 18 CAPSULE ORAL; RESPIRATORY (INHALATION) at 08:12

## 2022-06-15 RX ADMIN — Medication 100 MILLIGRAM(S): at 05:29

## 2022-06-15 RX ADMIN — Medication 137 MICROGRAM(S): at 05:29

## 2022-06-15 RX ADMIN — HEPARIN SODIUM 5000 UNIT(S): 5000 INJECTION INTRAVENOUS; SUBCUTANEOUS at 05:29

## 2022-06-15 RX ADMIN — ALBUTEROL 2 PUFF(S): 90 AEROSOL, METERED ORAL at 08:12

## 2022-06-15 RX ADMIN — GABAPENTIN 300 MILLIGRAM(S): 400 CAPSULE ORAL at 09:12

## 2022-06-15 RX ADMIN — Medication 40 MILLIGRAM(S): at 09:13

## 2022-06-15 RX ADMIN — ALBUTEROL 2 PUFF(S): 90 AEROSOL, METERED ORAL at 11:44

## 2022-06-15 RX ADMIN — Medication 40 MILLIGRAM(S): at 09:12

## 2022-06-15 RX ADMIN — Medication 6: at 11:38

## 2022-06-15 RX ADMIN — Medication 600 MILLIGRAM(S): at 09:12

## 2022-06-16 ENCOUNTER — NON-APPOINTMENT (OUTPATIENT)
Age: 77
End: 2022-06-16

## 2022-06-21 ENCOUNTER — APPOINTMENT (OUTPATIENT)
Dept: CARE COORDINATION | Facility: HOME HEALTH | Age: 77
End: 2022-06-21
Payer: MEDICARE

## 2022-06-21 VITALS
RESPIRATION RATE: 18 BRPM | OXYGEN SATURATION: 95 % | HEART RATE: 60 BPM | SYSTOLIC BLOOD PRESSURE: 136 MMHG | DIASTOLIC BLOOD PRESSURE: 72 MMHG

## 2022-06-21 DIAGNOSIS — J98.01 ACUTE BRONCHOSPASM: ICD-10-CM

## 2022-06-21 DIAGNOSIS — J96.01 ACUTE RESPIRATORY FAILURE WITH HYPOXIA: ICD-10-CM

## 2022-06-21 DIAGNOSIS — Z96.651 PRESENCE OF RIGHT ARTIFICIAL KNEE JOINT: ICD-10-CM

## 2022-06-21 DIAGNOSIS — Z79.84 LONG TERM (CURRENT) USE OF ORAL HYPOGLYCEMIC DRUGS: ICD-10-CM

## 2022-06-21 DIAGNOSIS — J44.1 CHRONIC OBSTRUCTIVE PULMONARY DISEASE WITH (ACUTE) EXACERBATION: ICD-10-CM

## 2022-06-21 DIAGNOSIS — R73.9 HYPERGLYCEMIA, UNSPECIFIED: ICD-10-CM

## 2022-06-21 DIAGNOSIS — Z90.49 ACQUIRED ABSENCE OF OTHER SPECIFIED PARTS OF DIGESTIVE TRACT: ICD-10-CM

## 2022-06-21 DIAGNOSIS — J43.9 EMPHYSEMA, UNSPECIFIED: ICD-10-CM

## 2022-06-21 DIAGNOSIS — M19.90 UNSPECIFIED OSTEOARTHRITIS, UNSPECIFIED SITE: ICD-10-CM

## 2022-06-21 DIAGNOSIS — Z82.49 FAMILY HISTORY OF ISCHEMIC HEART DISEASE AND OTHER DISEASES OF THE CIRCULATORY SYSTEM: ICD-10-CM

## 2022-06-21 DIAGNOSIS — Z87.891 PERSONAL HISTORY OF NICOTINE DEPENDENCE: ICD-10-CM

## 2022-06-21 DIAGNOSIS — Z85.828 PERSONAL HISTORY OF OTHER MALIGNANT NEOPLASM OF SKIN: ICD-10-CM

## 2022-06-21 DIAGNOSIS — Z82.3 FAMILY HISTORY OF STROKE: ICD-10-CM

## 2022-06-21 DIAGNOSIS — K21.9 GASTRO-ESOPHAGEAL REFLUX DISEASE WITHOUT ESOPHAGITIS: ICD-10-CM

## 2022-06-21 DIAGNOSIS — E11.65 TYPE 2 DIABETES MELLITUS WITH HYPERGLYCEMIA: ICD-10-CM

## 2022-06-21 PROCEDURE — 99496 TRANSJ CARE MGMT HIGH F2F 7D: CPT

## 2022-06-21 RX ORDER — CHLORHEXIDINE GLUCONATE, 0.12% ORAL RINSE 1.2 MG/ML
0.12 SOLUTION DENTAL
Qty: 473 | Refills: 0 | Status: DISCONTINUED | COMMUNITY
Start: 2022-03-08

## 2022-06-21 RX ORDER — SEMAGLUTIDE 1.34 MG/ML
2 INJECTION, SOLUTION SUBCUTANEOUS
Refills: 0 | Status: ACTIVE | COMMUNITY

## 2022-06-21 NOTE — HISTORY OF PRESENT ILLNESS
[Post-hospitalization from ___ Hospital] : Post-hospitalization from [unfilled] Hospital [Admitted on: ___] : The patient was admitted on [unfilled] [Discharged on ___] : discharged on [unfilled] [Discharge Summary] : discharge summary [Discharge Med List] : discharge medication list [Med Reconciliation] : medication reconciliation has been completed [Patient Contacted By: ____] : and contacted by [unfilled] [FreeTextEntry2] : FROM [facility] DC NOTE PROVIDER:\par  Discharge Note Provider [Charted Location: [facility] 545 01] [Authored: 14-Jun-2022 15:35]- for Visit: [id number], Complete, Revised, Signed in Full, Available to Patient\par \par \par  Hospital Course:\par Discharge Date	16-Jun-2022\par 	\par Admission Date	10-Pasha-2022 23:15\par Reason for Admission	COPD exacerbation\par Hospital Course	\par CC: COPD exacerbation\par HPI and Hospital Course: 77F hx DM,  COPD on home oxygen at night intermittently,  presented with SOB x past few days,. admitted for acute hypoxic resp failure 2/2 acute exac of COPD. Improved w steroids here, still requiring O2 w ambulation, but on RA at rest.\par \par Vital Signs Last 24 Hrs\par T(C): 36.4 (15 Pasha 2022 08:11), Max: 36.5 (14 Jun 2022 23:34)\par T(F): 97.6 (15 Pasha 2022 08:11), Max: 97.7 (14 Jun 2022 23:34)\par HR: 65 (15 Pasha 2022 08:11) (64 - 73)\par BP: 125/68 (15 Pasha 2022 08:11) (102/62 - 125/68)\par BP(mean): --\par RR: 18 (15 Pasha 2022 08:11) (18 - 18)\par SpO2: 93% (15 Pasha 2022 08:11) (93% - 95%)\par \par PHYSICAL EXAM:\par General: NAD, lying in bed\par HEENT:  pupils equal and reactive, EOMI, no oropharyngeal lesions, erythema, exudates, oral thrush\par NECK:   supple, no carotid bruits, no palpable lymph nodes, no thyromegaly\par CV:  +S1, +S2, regular, no murmurs or rubs\par RESP:   lungs clear to auscultation bilaterally, no wheezing, rales, rhonchi, good air entry bilaterally\par BREAST:  not examined\par GI:  abdomen soft, non-tender, non-distended, normal BS, no bruits, no abdominal masses, no palpable masses\par RECTAL:  not examined\par :  not examined\par MSK:   normal muscle tone, no atrophy, no rigidity, no contractions\par EXT:  no clubbing, no cyanosis, no edema, no calf pain, swelling or erythema\par VASCULAR:  pulses equal and symmetric in the upper and lower extremities\par NEURO:  AAOX3, no focal neurological deficits, follows all commands, able to move extremities spontaneously\par SKIN:  no ulcers, lesions or rashes\par \par CT chest noncon 6/10/22: Emphysema with large bulla redemonstrated. Scattered linear atelectasis/scarring. Lungs otherwise clear.\par \par \par #COPD exac c/b Acute Hypoxic Respiratory failure\par -on RA at rest but requires O2 w ambulation\par -po doxy x 5d total, completed here\par -steroids changed to pred 40 po, d/c with quick taper\par -albuterol, spiriva\par -add symbicort\par -f/u w pulm recs (Anselmi)\par \par #Type 2 Dm with steroid induced hyperglycemia\par -hyperglycemia improving as steroids weaned\par -monitor BG, SSI\par \par #DVT ppx- SQH\par \par I have spent 35min on day of d/c coordinating care\par \par \par \par \par  Med Reconciliation:\par Medication Reconciliation Status	Admission Reconciliation is Completed\par Discharge Reconciliation is Completed\par Discharge Medications	budesonide-formoterol 160 mcg-4.5 mcg/inh inhalation aerosol: 2 puff(s) inhaled 2 times a day \par furosemide 40 mg oral tablet: 1 tab(s) orally once a day\par gabapentin 300 mg oral capsule: 1 cap(s) orally 2 times a day\par levothyroxine 137 mcg (0.137 mg) oral tablet: 1 tab(s) orally once a day\par Ozempic: \par pantoprazole 40 mg oral delayed release tablet: 1 tab(s) orally once a day (at bedtime), As Needed\par predniSONE 10 mg oral tablet: 4 tab(s) orally once a day x 2 days\par 3 tab(s) orally once a day x 2 days\par 2 tab(s) orally once a day x 2 days\par 1 tab(s) orally once a day x 2 days\par Synjardy: \par tiotropium 18 mcg inhalation capsule: 1 cap(s) inhaled once a day\par Ventolin HFA 90 mcg/inh inhalation aerosol: 2 puff(s) inhaled every 6 hours, As Needed - for shortness of breath and/or wheezing\par ,\par ,\par \par  Care Plan/Procedures:\par Discharge Diagnoses, Assessment and Plan of Treatment	PRINCIPAL DISCHARGE DIAGNOSIS\par Diagnosis: COPD exacerbation\par Assessment and Plan of Treatment: Add symbicort to your inhaler regimen. Complete a steroid taper as directed, follow up with Dr. Dowd\par Goal(s)	To get better and follow your care plan as instructed.\par \par  Follow Up:\par Care Providers for Follow up (PCP/Outpatient Provider)	Cami Saleem (NP; RN)\par NP in [facility]\par [address]\[place] [address]\par Phone: [phone]\par Fax: [phone]\par Scheduled Appointment: 06/23/2022\par Patient's Scheduled Appointments	Cami Saleem\par [facility]\par INTMED [address]\par Scheduled Appointment: 06/23/2022\par \par James Dowd\par [facility]\par IntMed [address]\par Scheduled Appointment: 07/19/2022\par \par Shahida Dill\par [facility]\par Neurology [address]\par Scheduled Appointment: 07/20/2022\par Additional Scheduled Appointments	Tele Dr. Dowd's office spoke fadi Power to make d/c follow-up appointment for Thurs. 6/23/22 at 2 pm with Cami Saleem NP.\par Discharge Diet	Consistent Carbohydrate Diabetic Diets\par Activity	No restrictions\par \par  Quality Measures:\par Hospice Patient	No\par Tobacco Usage Within the Last Year	No\par Does the patient have a principal diagnosis of ischemic stroke, hemorrhagic stroke, or TIA?	No\par Does the patient have a principal diagnosis of Acute Myocardial Infarction?	No\par Has the patient had a Percutaneous Coronary Intervention?	No\par \par  Home Health:\par Discharged with Home Health Care Services?	Yes\par Face-To-Face Contact	As certified below, I, or a nurse practitioner or physician assistant working with me, had a face-to-face encounter that meets the physician face-to-face encounter requirements.\par Need for Skilled Services	Observation and assessment  Teaching and training\par Based on the above findings, the following intermittent skilled services are medically necessary home health services:	Nursing  Occupational therapy  Physical therapy\par Home Bound Status	Shortness of breath with minimal ambulation\par Patient Needs Assistance to Leave Residence...\par Attending Certification	My signature below certifies that the above stated patient is homebound and upon completion of the Face-To-Face encounter, has the need for intermittent skilled nursing, physical therapy and/or speech or occupational therapy services in their home for their current diagnosis as outlined in their initial plan of care. These services will continue to be monitored by myself or another physician.\par Encounter Date	14-Jun-2022\par \par  Document Complete:\[facility]par Melissa Grimaldo (hospital medicine)\par Physician Section Complete	This document is complete and the patient is ready for discharge.\par For questions about your prescriptions, please call:	[phone]\par Is this contact telephone number correct?	Yes\par \par \par \par Electronic Signatures:\par Melissa Grimaldo)  (Signed 15-Pasha-2022 08:57)\par 	Authored: Hospital Course, Med Reconciliation, Care Plan/Procedures, Follow Up, Quality Measures, Home Health, Document Complete\par \par \par Last Updated: 15-Pasha-2022 08:57 by Melissa Grimaldo)\par \par \par

## 2022-06-21 NOTE — PHYSICAL EXAM
[No Acute Distress] : no acute distress [Well Nourished] : well nourished [Well Developed] : well developed [Well-Appearing] : well-appearing [Normal Sclera/Conjunctiva] : normal sclera/conjunctiva [Normal Outer Ear/Nose] : the outer ears and nose were normal in appearance [Normal Oropharynx] : the oropharynx was normal [Supple] : supple [No Respiratory Distress] : no respiratory distress  [Clear to Auscultation] : lungs were clear to auscultation bilaterally [No Accessory Muscle Use] : no accessory muscle use [Normal Rate] : normal rate  [Regular Rhythm] : with a regular rhythm [Normal S1, S2] : normal S1 and S2 [Pedal Pulses Present] : the pedal pulses are present [No Edema] : there was no peripheral edema [No Extremity Clubbing/Cyanosis] : no extremity clubbing/cyanosis [Soft] : abdomen soft [Non Tender] : non-tender [Non-distended] : non-distended [Normal Bowel Sounds] : normal bowel sounds [No Spinal Tenderness] : no spinal tenderness [Grossly Normal Strength/Tone] : grossly normal strength/tone [No Rash] : no rash [Normal Gait] : normal gait [Coordination Grossly Intact] : coordination grossly intact [No Focal Deficits] : no focal deficits [Normal Affect] : the affect was normal [Alert and Oriented x3] : oriented to person, place, and time [Normal Insight/Judgement] : insight and judgment were intact [de-identified] : No thrush

## 2022-06-21 NOTE — ASSESSMENT
[FreeTextEntry1] : Pt is being seen after discharge home from [facility]. She was admitted on 06/10/2022 for SOB and discharged home on 06/15/2022. Discharge medications were reviewed and reconciled with the current medication list and medications in home.\par \par CC:\par Pt is seen at home s/p recent admission for COPD exac. Pt is temporarily unable to leave home as it requires a considerable and taxing effort\par HPI:\par Pt Is a 78 y/o female enrolled in the [facility] seen at home s/p a recent admission for COPD exac.Pt was contacted by TCM and med rec was done within 48 hours of DC.\par \par Hospital Course	\par CC: COPD exacerbation\par PMH:  DM,  COPD on home oxygen at night intermittently,  presented with SOB x past few days,. admitted for acute hypoxic resp failure 2/2 acute exac of COPD. Improved w steroids here, still requiring O2 w ambulation, but on RA at rest.\par \par Upon evaluation A&O x 3 NAD. Denies CP, SOB, headache, dizziness, pain, abd discomfort or difficulty with bowel or bladder. She feels she has turned the corner. She monitors her O2 sat throughout the day, wears the O2 HS and prn during the day. She has 2 days remaining on her prednisone taper. O2 sat at rest 95% RA, while ambulating drops to 88-91% while ambulating, while standing still O2 sat 91-91%. Patient encouraged to wear O2 while ambulating especially when outside the home. Instructed s/s hypoxia & potential complications of the same. \par She has appt with Pulm on 6/23; [facility] services in home.\par \par

## 2022-06-21 NOTE — PLAN
[FreeTextEntry1] : A/P:\par 1. COPD exacerbation:\par - s/p IV steroids, will complete po prednisone taper\par - con't symbicort, spiriva and albuterol prn\par - Adhere to all medications including demonstrating proper use of inhalers and nebulizers.\par - Encourage the use of proper breathing techniques such as pursed lip breathing or leaning forward during exhalation.\par - Understands proper use of oxygen therapy.\par - Increase activity as tolerated and maintain optimal activity levels. Continue coughing and deep breathing exercises including use of Incentive Spirometry.\par - Receive routine pneumococcal and influenza vaccinations.\par - Identify early signs and sx of disease and notify NP/MD.\par - Maintain proper nutrition and hydration.\par - Follow up with Pulmonologist within 7 days of discharge.\par \par 2. 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 DM 2:\par - controlled A1c 6.6\par - con't ozempic, synjardy, diabetic diet\par \par 4. Hypothyroid:\par - con't synthroid\par - f/u PCP for routine management\par \par \par

## 2022-06-23 ENCOUNTER — APPOINTMENT (OUTPATIENT)
Dept: INTERNAL MEDICINE | Facility: CLINIC | Age: 77
End: 2022-06-23
Payer: MEDICARE

## 2022-06-23 VITALS
SYSTOLIC BLOOD PRESSURE: 120 MMHG | WEIGHT: 190 LBS | OXYGEN SATURATION: 98 % | HEIGHT: 65 IN | TEMPERATURE: 98.4 F | BODY MASS INDEX: 31.65 KG/M2 | HEART RATE: 69 BPM | RESPIRATION RATE: 18 BRPM | DIASTOLIC BLOOD PRESSURE: 80 MMHG

## 2022-06-23 PROCEDURE — 99214 OFFICE O/P EST MOD 30 MIN: CPT

## 2022-06-23 PROCEDURE — 99495 TRANSJ CARE MGMT MOD F2F 14D: CPT | Mod: 25

## 2022-06-23 NOTE — PLAN
[FreeTextEntry1] : 1. Patient will continue with her Spiriva, Symbicort, and albuterol inhalers as directed\par \par 2. Patient will be referred to PT/OT at home for eval and tx- unsteady gait, at risk of falling\par \par 3. F/u in office 9/2022 or sooner if needed

## 2022-06-23 NOTE — HISTORY OF PRESENT ILLNESS
[Post-hospitalization from ___ Hospital] : Post-hospitalization from [unfilled] Hospital [Admitted on: ___] : The patient was admitted on [unfilled] [Patient Contacted By: ____] : and contacted by [unfilled] [Discharge Summary] : discharge summary [Pertinent Labs] : pertinent labs [Discharge Med List] : discharge medication list [Discharged on ___] : discharged on [unfilled] [FreeTextEntry2] : CC: COPD exacerbation \par \par HPI and Hospital Course: 77F hx DM,  COPD on home oxygen at night \par intermittently,  presented with SOB x past few days,. admitted for acute \par hypoxic resp failure 2/2 acute exac of COPD. Improved w steroids here, still \par requiring O2 w ambulation, but on RA at rest. \par \par CT chest noncon 6/10/22: Emphysema with large bulla redemonstrated. Scattered \par linear atelectasis/scarring. Lungs otherwise clear. \par \par \par #COPD exac c/b Acute Hypoxic Respiratory failure \par -on RA at rest but requires O2 w ambulation \par -po doxy x 5d total, completed here \par -steroids changed to pred 40 po, d/c with quick taper \par -albuterol, spiriva \par -add symbicort \par -f/u w pulm recs (Anselmi) \par \par #Type 2 Dm with steroid induced hyperglycemia \par -hyperglycemia improving as steroids weaned \par -monitor BG, SSI \par \par #DVT ppx- SQH\par \par 6/23/22- Patient presents to office today for HFU. Reports she is feeling well today. Took last dose of steroids today. Has been feeling on edge and "hot" since starting prednisone. She reports she has not needed to use her oxygen since discharge. Denies cough, sob, n/v/d. She reports she feels she is back to her baseline. Would like to work with PT/OT, reports she feels her balance is unsteady. Denies falling.

## 2022-06-23 NOTE — PHYSICAL EXAM
[No Acute Distress] : no acute distress [No Lymphadenopathy] : no lymphadenopathy [Supple] : supple [No Respiratory Distress] : no respiratory distress  [No Accessory Muscle Use] : no accessory muscle use [Clear to Auscultation] : lungs were clear to auscultation bilaterally [Normal Rate] : normal rate  [Regular Rhythm] : with a regular rhythm [Normal S1, S2] : normal S1 and S2 [No Edema] : there was no peripheral edema [Soft] : abdomen soft [Non Tender] : non-tender [Non-distended] : non-distended [Normal Bowel Sounds] : normal bowel sounds [Normal Gait] : normal gait [Normal Affect] : the affect was normal [Alert and Oriented x3] : oriented to person, place, and time

## 2022-07-05 ENCOUNTER — NON-APPOINTMENT (OUTPATIENT)
Age: 77
End: 2022-07-05

## 2022-07-07 ENCOUNTER — APPOINTMENT (OUTPATIENT)
Dept: INTERNAL MEDICINE | Facility: CLINIC | Age: 77
End: 2022-07-07

## 2022-07-07 DIAGNOSIS — U07.1 COVID-19: ICD-10-CM

## 2022-07-07 PROCEDURE — 99442: CPT | Mod: CS,95

## 2022-07-07 RX ORDER — PREDNISONE 10 MG/1
10 TABLET ORAL
Refills: 0 | Status: DISCONTINUED | COMMUNITY
Start: 2022-06-14 | End: 2022-07-07

## 2022-07-07 NOTE — ASSESSMENT
[FreeTextEntry1] : \par 78 yo F pmhx DM, COPD (hx inpt 6/22 with exacerbation on home O2 prn), hypothyroid, GERD, peripheral neuropathy for acute visit\par \par \par covid infection- day #2 or mild sx's.  Hx MAB at home 7/6/22.  \par -advised rest, increased hydration and OTC meds for sx's; will escribe Flonase for prn use (reports hx use in past w/o SEs)\par -advised to notify close contacts of diagnosis\par -advised home quarantine x 5 d, may discontinue if improving and afebrile x 24 hrs off fever reducing medications, but should continue with mask until asymptomatic\par -advised to monitor sx's should seek prompt ER if CP or sob\par -advised to f/u next week for re-evaluation, call transferred to staff (Nik) to assist\par \par \par Pt advised to f/u with PMD, Dr. Dowd for cont'd medical care\par -will relay visit to PMD

## 2022-07-07 NOTE — HISTORY OF PRESENT ILLNESS
[Home] : at home, [unfilled] , at the time of the visit. [Medical Office: ([facility])___] : at the medical office located in  [Verbal consent obtained from patient] : the patient, [unfilled] [FreeTextEntry8] : \par As this is a telemedicine visit, no physical exam could be performed.  Diagnosis and treatment is based on symptoms provided.\par \par 76 yo F pmhx DM, COPD (hx inpt 6/22 with exacerbation on home O2 prn), hypothyroid, GERD, peripheral neuropathy for acute visit\par \par cc: covid positive \par \par Reports tested positive on 7/6/22 home test.  Reports +exposure through  who tested + prior to her.  \par States s/p MAB (IV, Bebtelovimab 175 mg/2 mL) yesterday via home program (private)\par \par Reports having mild sx's since yesterday, 7/6/22- mainly URI sx's, low appetite, malaise- feels overall stable since onset.\par +cough: min sputum\par +postnasal drip; hx Flonase use in past w/o SEs- does not currently have at home\par +gen aches: minimal,  ibuprofen helps- last 24 hrs ago\par Tmax 100 yesterday, took ibuprofen then\par \par Using home O2 qhs on/off since 11/21 - last used yesterday 2.5L (feels is baseline)\par O2 sat 90% at evening (feels is baseline), 95% in daytime\par has MDI- no recent need\par using Symbicort and Spiriva regularly\par \par Denies sore throat, CP, sob/RODRIGUEZ, wheezing, n/v or abd pain.  Reports having normal BMs.\par Trying to keep up with po intake.\par \par Reports hx moderna series, hx booster 11/21\par

## 2022-07-12 NOTE — H&P PST ADULT - NSALCOHOLPROBLEMSRELYN_GEN_A_CORE_SD
July 12, 2022    Christine Hu  [address] [place] [address] [place] [address]    Dear Anabela King to the [facility] at the [facility]. We are located at [address], [address], [place] [id number]. Your appointment has been scheduled on 7/28/2022 at 10:00a with Danika Milan NP.    At your first visit, you will meet your team of caregivers who will help you to develop pain management strategies that will last a lifetime. You will meet with our support staff to review your insurance information and collect your co-payment if required by your insurance company. You will meet with a medical pain specialist and care coordinator who will assess your pain and develop a plan of care for your successful pain rehabilitation. You should expect to spend approximately 1 hour at your first visit with us. Usually, patients work with us for a period of 6-12 months, and eventually return to their primary doctor once their pain management has stabilized.      To help us make your visit go as smoothly as possible, please bring the following items with you on your visit:     Completed Pain Questionnaire enclosed in this packet.  If you do not bring the completed questionnaire, we may have to reschedule your appointment.    List of any medicines that you are currently taking or have been prescribed    Pertinent NON-[place] medical information such as medical records or tests results (X-rays, or laboratory tests)    Your health insurance card    Financial resources to cover your co-payment or balance due at the time of service (cash, personal check, Visa, and MasterCard are acceptable methods of payment)     Due to the high demand for new patient evaluations, you must notify the scheduling department 48 hours (2 days) in advance if you are not able to keep this appointment.  Failure to do so could affect your ability to reschedule with our clinic. Please do not  assume that you will receive any prescription medications at your first visit.    Please call [phone] with any questions regarding your appointment. We look forward to meeting you and working to address your health care needs.     Sincerely,    [facility]                                                                     no

## 2022-07-15 ENCOUNTER — NON-APPOINTMENT (OUTPATIENT)
Age: 77
End: 2022-07-15

## 2022-07-15 ENCOUNTER — INPATIENT (INPATIENT)
Facility: HOSPITAL | Age: 77
LOS: 10 days | Discharge: HOME HEALTH | DRG: 163 | End: 2022-07-26
Attending: THORACIC SURGERY (CARDIOTHORACIC VASCULAR SURGERY) | Admitting: THORACIC SURGERY (CARDIOTHORACIC VASCULAR SURGERY)
Payer: MEDICARE

## 2022-07-15 VITALS
TEMPERATURE: 98 F | HEART RATE: 78 BPM | RESPIRATION RATE: 22 BRPM | SYSTOLIC BLOOD PRESSURE: 145 MMHG | HEIGHT: 66 IN | WEIGHT: 184.97 LBS | OXYGEN SATURATION: 90 % | DIASTOLIC BLOOD PRESSURE: 71 MMHG

## 2022-07-15 DIAGNOSIS — Z98.890 OTHER SPECIFIED POSTPROCEDURAL STATES: Chronic | ICD-10-CM

## 2022-07-15 DIAGNOSIS — J93.9 PNEUMOTHORAX, UNSPECIFIED: ICD-10-CM

## 2022-07-15 DIAGNOSIS — Z90.49 ACQUIRED ABSENCE OF OTHER SPECIFIED PARTS OF DIGESTIVE TRACT: Chronic | ICD-10-CM

## 2022-07-15 DIAGNOSIS — Z96.659 PRESENCE OF UNSPECIFIED ARTIFICIAL KNEE JOINT: Chronic | ICD-10-CM

## 2022-07-15 LAB
ALBUMIN SERPL ELPH-MCNC: 3.5 G/DL — SIGNIFICANT CHANGE UP (ref 3.3–5)
ALP SERPL-CCNC: 87 U/L — SIGNIFICANT CHANGE UP (ref 40–120)
ALT FLD-CCNC: 21 U/L — SIGNIFICANT CHANGE UP (ref 12–78)
ANION GAP SERPL CALC-SCNC: 6 MMOL/L — SIGNIFICANT CHANGE UP (ref 5–17)
APTT BLD: 28.2 SEC — SIGNIFICANT CHANGE UP (ref 27.5–35.5)
AST SERPL-CCNC: 18 U/L — SIGNIFICANT CHANGE UP (ref 15–37)
BASE EXCESS BLDV CALC-SCNC: 0.3 MMOL/L — SIGNIFICANT CHANGE UP
BASOPHILS # BLD AUTO: 0.06 K/UL — SIGNIFICANT CHANGE UP (ref 0–0.2)
BASOPHILS NFR BLD AUTO: 0.8 % — SIGNIFICANT CHANGE UP (ref 0–2)
BILIRUB SERPL-MCNC: 0.4 MG/DL — SIGNIFICANT CHANGE UP (ref 0.2–1.2)
BUN SERPL-MCNC: 18 MG/DL — SIGNIFICANT CHANGE UP (ref 7–23)
CALCIUM SERPL-MCNC: 9.6 MG/DL — SIGNIFICANT CHANGE UP (ref 8.5–10.1)
CHLORIDE SERPL-SCNC: 106 MMOL/L — SIGNIFICANT CHANGE UP (ref 96–108)
CO2 BLDV-SCNC: 27 MMOL/L — HIGH (ref 22–26)
CO2 SERPL-SCNC: 25 MMOL/L — SIGNIFICANT CHANGE UP (ref 22–31)
CREAT SERPL-MCNC: 0.88 MG/DL — SIGNIFICANT CHANGE UP (ref 0.5–1.3)
EGFR: 67 ML/MIN/1.73M2 — SIGNIFICANT CHANGE UP
EOSINOPHIL # BLD AUTO: 0.08 K/UL — SIGNIFICANT CHANGE UP (ref 0–0.5)
EOSINOPHIL NFR BLD AUTO: 1.1 % — SIGNIFICANT CHANGE UP (ref 0–6)
GAS PNL BLDV: SIGNIFICANT CHANGE UP
GLUCOSE SERPL-MCNC: 139 MG/DL — HIGH (ref 70–99)
HCO3 BLDV-SCNC: 25 MMOL/L — SIGNIFICANT CHANGE UP (ref 22–29)
HCT VFR BLD CALC: 41.1 % — SIGNIFICANT CHANGE UP (ref 34.5–45)
HGB BLD-MCNC: 14 G/DL — SIGNIFICANT CHANGE UP (ref 11.5–15.5)
IMM GRANULOCYTES NFR BLD AUTO: 0.3 % — SIGNIFICANT CHANGE UP (ref 0–1.5)
INR BLD: 1.03 RATIO — SIGNIFICANT CHANGE UP (ref 0.88–1.16)
LYMPHOCYTES # BLD AUTO: 3.66 K/UL — HIGH (ref 1–3.3)
LYMPHOCYTES # BLD AUTO: 51.3 % — HIGH (ref 13–44)
MCHC RBC-ENTMCNC: 30.3 PG — SIGNIFICANT CHANGE UP (ref 27–34)
MCHC RBC-ENTMCNC: 34.1 GM/DL — SIGNIFICANT CHANGE UP (ref 32–36)
MCV RBC AUTO: 89 FL — SIGNIFICANT CHANGE UP (ref 80–100)
MONOCYTES # BLD AUTO: 0.55 K/UL — SIGNIFICANT CHANGE UP (ref 0–0.9)
MONOCYTES NFR BLD AUTO: 7.7 % — SIGNIFICANT CHANGE UP (ref 2–14)
NEUTROPHILS # BLD AUTO: 2.77 K/UL — SIGNIFICANT CHANGE UP (ref 1.8–7.4)
NEUTROPHILS NFR BLD AUTO: 38.8 % — LOW (ref 43–77)
PCO2 BLDV: 42 MMHG — SIGNIFICANT CHANGE UP (ref 39–42)
PH BLDV: 7.39 — SIGNIFICANT CHANGE UP (ref 7.32–7.43)
PLATELET # BLD AUTO: 445 K/UL — HIGH (ref 150–400)
PO2 BLDV: 100 MMHG — SIGNIFICANT CHANGE UP
POTASSIUM SERPL-MCNC: 3.7 MMOL/L — SIGNIFICANT CHANGE UP (ref 3.5–5.3)
POTASSIUM SERPL-SCNC: 3.7 MMOL/L — SIGNIFICANT CHANGE UP (ref 3.5–5.3)
PROT SERPL-MCNC: 7.6 GM/DL — SIGNIFICANT CHANGE UP (ref 6–8.3)
PROTHROM AB SERPL-ACNC: 11.9 SEC — SIGNIFICANT CHANGE UP (ref 10.5–13.4)
RBC # BLD: 4.62 M/UL — SIGNIFICANT CHANGE UP (ref 3.8–5.2)
RBC # FLD: 14.4 % — SIGNIFICANT CHANGE UP (ref 10.3–14.5)
SAO2 % BLDV: 98.1 % — SIGNIFICANT CHANGE UP
SODIUM SERPL-SCNC: 137 MMOL/L — SIGNIFICANT CHANGE UP (ref 135–145)
TROPONIN I, HIGH SENSITIVITY RESULT: 6.41 NG/L — SIGNIFICANT CHANGE UP
TROPONIN I, HIGH SENSITIVITY RESULT: 7.28 NG/L — SIGNIFICANT CHANGE UP
WBC # BLD: 7.14 K/UL — SIGNIFICANT CHANGE UP (ref 3.8–10.5)
WBC # FLD AUTO: 7.14 K/UL — SIGNIFICANT CHANGE UP (ref 3.8–10.5)

## 2022-07-15 PROCEDURE — 93010 ELECTROCARDIOGRAM REPORT: CPT

## 2022-07-15 PROCEDURE — 36415 COLL VENOUS BLD VENIPUNCTURE: CPT

## 2022-07-15 PROCEDURE — 93306 TTE W/DOPPLER COMPLETE: CPT

## 2022-07-15 PROCEDURE — U0003: CPT

## 2022-07-15 PROCEDURE — 82962 GLUCOSE BLOOD TEST: CPT

## 2022-07-15 PROCEDURE — 85610 PROTHROMBIN TIME: CPT

## 2022-07-15 PROCEDURE — 85730 THROMBOPLASTIN TIME PARTIAL: CPT

## 2022-07-15 PROCEDURE — 97116 GAIT TRAINING THERAPY: CPT | Mod: GP

## 2022-07-15 PROCEDURE — 84300 ASSAY OF URINE SODIUM: CPT

## 2022-07-15 PROCEDURE — 88307 TISSUE EXAM BY PATHOLOGIST: CPT

## 2022-07-15 PROCEDURE — 82436 ASSAY OF URINE CHLORIDE: CPT

## 2022-07-15 PROCEDURE — 84145 PROCALCITONIN (PCT): CPT

## 2022-07-15 PROCEDURE — 86850 RBC ANTIBODY SCREEN: CPT

## 2022-07-15 PROCEDURE — 86900 BLOOD TYPING SEROLOGIC ABO: CPT

## 2022-07-15 PROCEDURE — 82728 ASSAY OF FERRITIN: CPT

## 2022-07-15 PROCEDURE — 71250 CT THORAX DX C-: CPT | Mod: 26,MA

## 2022-07-15 PROCEDURE — 80053 COMPREHEN METABOLIC PANEL: CPT

## 2022-07-15 PROCEDURE — 32557 INSERT CATH PLEURA W/ IMAGE: CPT

## 2022-07-15 PROCEDURE — 71045 X-RAY EXAM CHEST 1 VIEW: CPT | Mod: 26,76

## 2022-07-15 PROCEDURE — 94640 AIRWAY INHALATION TREATMENT: CPT

## 2022-07-15 PROCEDURE — U0005: CPT

## 2022-07-15 PROCEDURE — 97530 THERAPEUTIC ACTIVITIES: CPT | Mod: GP

## 2022-07-15 PROCEDURE — 97162 PT EVAL MOD COMPLEX 30 MIN: CPT | Mod: GP

## 2022-07-15 PROCEDURE — 86901 BLOOD TYPING SEROLOGIC RH(D): CPT

## 2022-07-15 PROCEDURE — 86140 C-REACTIVE PROTEIN: CPT

## 2022-07-15 PROCEDURE — 80048 BASIC METABOLIC PNL TOTAL CA: CPT

## 2022-07-15 PROCEDURE — 71045 X-RAY EXAM CHEST 1 VIEW: CPT

## 2022-07-15 PROCEDURE — 84133 ASSAY OF URINE POTASSIUM: CPT

## 2022-07-15 PROCEDURE — 99291 CRITICAL CARE FIRST HOUR: CPT | Mod: CS

## 2022-07-15 PROCEDURE — C1889: CPT

## 2022-07-15 PROCEDURE — 83930 ASSAY OF BLOOD OSMOLALITY: CPT

## 2022-07-15 PROCEDURE — 85027 COMPLETE CBC AUTOMATED: CPT

## 2022-07-15 PROCEDURE — 83935 ASSAY OF URINE OSMOLALITY: CPT

## 2022-07-15 RX ORDER — SODIUM CHLORIDE 9 MG/ML
1000 INJECTION, SOLUTION INTRAVENOUS
Refills: 0 | Status: DISCONTINUED | OUTPATIENT
Start: 2022-07-15 | End: 2022-07-26

## 2022-07-15 RX ORDER — FUROSEMIDE 40 MG
40 TABLET ORAL DAILY
Refills: 0 | Status: DISCONTINUED | OUTPATIENT
Start: 2022-07-15 | End: 2022-07-26

## 2022-07-15 RX ORDER — GLUCAGON INJECTION, SOLUTION 0.5 MG/.1ML
1 INJECTION, SOLUTION SUBCUTANEOUS ONCE
Refills: 0 | Status: DISCONTINUED | OUTPATIENT
Start: 2022-07-15 | End: 2022-07-26

## 2022-07-15 RX ORDER — OXYCODONE HYDROCHLORIDE 5 MG/1
10 TABLET ORAL EVERY 4 HOURS
Refills: 0 | Status: DISCONTINUED | OUTPATIENT
Start: 2022-07-15 | End: 2022-07-22

## 2022-07-15 RX ORDER — INSULIN LISPRO 100/ML
VIAL (ML) SUBCUTANEOUS
Refills: 0 | Status: DISCONTINUED | OUTPATIENT
Start: 2022-07-15 | End: 2022-07-26

## 2022-07-15 RX ORDER — LEVOTHYROXINE SODIUM 125 MCG
1 TABLET ORAL
Qty: 0 | Refills: 0 | DISCHARGE

## 2022-07-15 RX ORDER — IPRATROPIUM/ALBUTEROL SULFATE 18-103MCG
3 AEROSOL WITH ADAPTER (GRAM) INHALATION ONCE
Refills: 0 | Status: COMPLETED | OUTPATIENT
Start: 2022-07-15 | End: 2022-07-15

## 2022-07-15 RX ORDER — OXYCODONE HYDROCHLORIDE 5 MG/1
5 TABLET ORAL EVERY 4 HOURS
Refills: 0 | Status: DISCONTINUED | OUTPATIENT
Start: 2022-07-15 | End: 2022-07-22

## 2022-07-15 RX ORDER — FENTANYL CITRATE 50 UG/ML
50 INJECTION INTRAVENOUS ONCE
Refills: 0 | Status: DISCONTINUED | OUTPATIENT
Start: 2022-07-15 | End: 2022-07-15

## 2022-07-15 RX ORDER — KETOROLAC TROMETHAMINE 30 MG/ML
30 SYRINGE (ML) INJECTION EVERY 8 HOURS
Refills: 0 | Status: DISCONTINUED | OUTPATIENT
Start: 2022-07-15 | End: 2022-07-16

## 2022-07-15 RX ORDER — ACETAMINOPHEN 500 MG
650 TABLET ORAL ONCE
Refills: 0 | Status: COMPLETED | OUTPATIENT
Start: 2022-07-15 | End: 2022-07-16

## 2022-07-15 RX ORDER — ALBUTEROL 90 UG/1
2 AEROSOL, METERED ORAL
Qty: 0 | Refills: 0 | DISCHARGE

## 2022-07-15 RX ORDER — GABAPENTIN 400 MG/1
300 CAPSULE ORAL
Refills: 0 | Status: DISCONTINUED | OUTPATIENT
Start: 2022-07-15 | End: 2022-07-26

## 2022-07-15 RX ORDER — EMPAGLIFLOZIN, METFORMIN HYDROCHLORIDE 10; 1000 MG/1; MG/1
0 TABLET, EXTENDED RELEASE ORAL
Qty: 0 | Refills: 0 | DISCHARGE

## 2022-07-15 RX ORDER — LEVOTHYROXINE SODIUM 125 MCG
137 TABLET ORAL DAILY
Refills: 0 | Status: DISCONTINUED | OUTPATIENT
Start: 2022-07-15 | End: 2022-07-26

## 2022-07-15 RX ORDER — PANTOPRAZOLE SODIUM 20 MG/1
40 TABLET, DELAYED RELEASE ORAL
Refills: 0 | Status: DISCONTINUED | OUTPATIENT
Start: 2022-07-15 | End: 2022-07-26

## 2022-07-15 RX ORDER — DEXTROSE 50 % IN WATER 50 %
15 SYRINGE (ML) INTRAVENOUS ONCE
Refills: 0 | Status: DISCONTINUED | OUTPATIENT
Start: 2022-07-15 | End: 2022-07-26

## 2022-07-15 RX ORDER — DEXTROSE 50 % IN WATER 50 %
25 SYRINGE (ML) INTRAVENOUS ONCE
Refills: 0 | Status: DISCONTINUED | OUTPATIENT
Start: 2022-07-15 | End: 2022-07-26

## 2022-07-15 RX ORDER — DEXTROSE 50 % IN WATER 50 %
12.5 SYRINGE (ML) INTRAVENOUS ONCE
Refills: 0 | Status: DISCONTINUED | OUTPATIENT
Start: 2022-07-15 | End: 2022-07-26

## 2022-07-15 RX ORDER — SEMAGLUTIDE 0.68 MG/ML
0 INJECTION, SOLUTION SUBCUTANEOUS
Qty: 0 | Refills: 0 | DISCHARGE

## 2022-07-15 RX ORDER — ACETAMINOPHEN 500 MG
650 TABLET ORAL EVERY 6 HOURS
Refills: 0 | Status: DISCONTINUED | OUTPATIENT
Start: 2022-07-15 | End: 2022-07-26

## 2022-07-15 RX ORDER — HYDROMORPHONE HYDROCHLORIDE 2 MG/ML
0.5 INJECTION INTRAMUSCULAR; INTRAVENOUS; SUBCUTANEOUS ONCE
Refills: 0 | Status: DISCONTINUED | OUTPATIENT
Start: 2022-07-15 | End: 2022-07-15

## 2022-07-15 RX ADMIN — FENTANYL CITRATE 50 MICROGRAM(S): 50 INJECTION INTRAVENOUS at 15:10

## 2022-07-15 RX ADMIN — Medication 3 MILLILITER(S): at 13:41

## 2022-07-15 RX ADMIN — FENTANYL CITRATE 50 MICROGRAM(S): 50 INJECTION INTRAVENOUS at 14:40

## 2022-07-15 RX ADMIN — FENTANYL CITRATE 50 MICROGRAM(S): 50 INJECTION INTRAVENOUS at 15:47

## 2022-07-15 RX ADMIN — HYDROMORPHONE HYDROCHLORIDE 0.5 MILLIGRAM(S): 2 INJECTION INTRAMUSCULAR; INTRAVENOUS; SUBCUTANEOUS at 19:10

## 2022-07-15 RX ADMIN — Medication 30 MILLIGRAM(S): at 21:52

## 2022-07-15 RX ADMIN — FENTANYL CITRATE 50 MICROGRAM(S): 50 INJECTION INTRAVENOUS at 15:17

## 2022-07-15 RX ADMIN — HYDROMORPHONE HYDROCHLORIDE 0.5 MILLIGRAM(S): 2 INJECTION INTRAMUSCULAR; INTRAVENOUS; SUBCUTANEOUS at 18:41

## 2022-07-15 RX ADMIN — Medication 125 MILLIGRAM(S): at 13:41

## 2022-07-15 RX ADMIN — Medication 30 MILLIGRAM(S): at 22:15

## 2022-07-15 RX ADMIN — Medication 2: at 21:51

## 2022-07-15 RX ADMIN — GABAPENTIN 300 MILLIGRAM(S): 400 CAPSULE ORAL at 21:38

## 2022-07-15 NOTE — ED ADULT TRIAGE NOTE - CHIEF COMPLAINT QUOTE
Patient comes in with shortness of breath & back pain. Patient states onset of symptoms was today. Denies chest pain, fever. Hx COPD.

## 2022-07-15 NOTE — ED ADULT NURSE NOTE - IS THE PATIENT ABLE TO BE SCREENED?
"[facility] Care Coordination Note    Encounter Summary:  PCP: Please advise:    Spoke with patient today for quite awhile on the phone today. Her main concern is wanting to be more active and less depressed. She states that she would like to get back into working out at [employer] about 3 days a week. Not long ago she states a chiropractor told her she shouldn't work out. I did read recommendations from her cardiology visit at the end of January which states 'Activity as tolerated."" She is aware of that, and plans to start moving around more. She is considering volunteering at [employer] as well, and attending the [employer] just to get out of the house. Depression: Patient states she has been feeling more depressed than ever, and is wondering if she should try a different antidepressant than the Paxil she is taking, or if that can be increased. Will send a message to PCP for advise. RNCC will F/U in one day. [facility] Assessments    See Care Coordination Smartform for complete baseline and current assessment. Current Signs and Symptoms Assessed this encounter: Yes   [] No new or worsening symptoms present  Symptoms present:      Mood    [] Angry   [x] Anxious   [] Crying   [x] Depressed   [x] Withdrawn    Acute Pain Assessment    Acute Pain:  No       Chronic Pain Assessment    Chronic Pain:  Yes  Chronic Pain Narrative:  Bilateral knees. Back pain. Labs/Diagnostics Reviewed: Not applicable    Goals Addressed     None        RNCC Interventions    Collaboration/Communication:  Yes         PCP Care Team         Patient Education:  Yes   Recipients of Education:  Patient   Education Methods used this encounter:  Verbal   Action Plan:  Yes   Daily Maintenance, Sick Day Plan, Emergency Plan   Wellness/Lifestyle, S/Sx Infection, Nutrition, Medication Education, Exercise, Disease Process, Decision Making support   Reinforce Care Plan   Interventions Narrative:  Over the phone assessment.   45 " minutes of telephonic support provided to patient. Message sent to provider regarding depression. Action plan review.          Acuity Assessment     Acuity Assessment:   Date Acuity Level   2/8/18 2 Yes

## 2022-07-15 NOTE — ED PROVIDER NOTE - OBJECTIVE STATEMENT
78 y/o F with PMH of COPD not on home O2, GERD, hypothyroid, DM, pneumothorax 11/21 s/p chest tube presents with SOB today. Pt states she went for a long walk, then was fixing the foot rests on a wheelchair, after which she started to feel SOB. States she feels mild improvement since ED arrival, but continues to feel SOB. States her baseline O2 is 88-91% at home. States symptoms do not feel like when she had pneumothorax in past. Denies trauma, fever, chills, cough, CP, palpitations.

## 2022-07-15 NOTE — ED PROVIDER NOTE - NS ED ROS FT
GEN: Denies fever, chills, sick contacts, recent travel  HEENT: Denies dizziness, blurry vision, HA  Cardiac: +SOB Denies CP, palpitations  Lungs: Denies cough, wheezing  PV: Denies LE swelling  GI: Denies nausea, vomiting, diarrhea, constipation, flank pain  : Denies hematuria, dysuria, frequency, urgency  Skin: Denies rash, redness, open wound  MSK: Denies pain, decreased ROM  Neuro: Denies dizziness, difficulty speaking, difficulty swallowing, numbness/tingling in extremities, loss of bowel/bladder control, weakness in extremities

## 2022-07-15 NOTE — ED PROVIDER NOTE - EKG ADDITIONAL QUESTION - PERFORMED INDEPENDENT VISUALIZATION
Cramping: Please take 400 mg of ibuprofen every 4 hours for the next three days, this will help with the abdominal cramping.    Fever: Please take 1000 mg 3 times a day if you develop a fever or feel hot or sweaty.  It is okay to take this at the same time as ibuprofen.    Colitis: The C. difficile test is pending, but I am very suspicious that this is C. difficile and think he should be treated as such in the meantime.  Please bring the Fidaxomicin to the pharmacy.  If the cost is too high or if they do not have any in stock then I recommend filling the vancomycin.  It should still work perfectly well.  Antibiotics take 24 hours to fully kick in, you should notice slow improvement day by day over the next few days.    Try keeping her calories up with slow grazing, try to avoid large meals, this may cause more cramping or more urgent diarrhea.  You can also try boost, juices or other ways to get calories and through liquids.  Drink plenty of water as well.    If you are not able to keep up with your fluids, feel lightheaded or dizzy, the pain is too severe at home, please come back to emergency department so we can give you IV fluids and admit you.  
Yes

## 2022-07-15 NOTE — ED PROVIDER NOTE - PHYSICAL EXAMINATION
Gen: Well appearing. A&O x3.   HEENT: NC/AT. Dentition in good repair. No oropharyngeal erythema, tonsilar enlargement or exudates. Uvula midline. No submandibular or anterior cervical lymphadenopathy. TM well visualized bilaterally. Nares patent.  Cardiac: s1s2, RRR.  Lungs: +bilateral scattered wheezing, no chest wall tenderness.  Abdomen: NBS x4, soft, nontender. No CVAT.  MSK: No obvious deformity to extremities. No midline spinal tenderness or tenderness over bony landmarks on extremities. 5/5 upper and lower extremity strength. Full ROM in all extremities  Neuro: CN II-XII intact. Sensation intact to light touch in all extremities. 5/5 strength in all extremities.   PV: No LE edema or calf tenderness. Distal pulses 2+ in all extremities.

## 2022-07-15 NOTE — H&P ADULT - ASSESSMENT
76 yo Female with h/o COPD emphysema and bullous disease with a h/o PTX on November 2021 requiring a pigtail and recent COVID PNA   presented with SOB and chest discomfort noted to have a moderate Rt PTX.      Plan   s/p Rt Pigtail placement to -20 LWCS  Admit to Dr Coulter's service   pulm consult with Dr Dowd   CXR in am  Dw Dr Coulter

## 2022-07-15 NOTE — CONSULT NOTE ADULT - ASSESSMENT
76 yo Female with h/o COPD emphysema and bullous disease with a h/o PTX on November 2021 requiring a pigtail and recent COVID PNA   presented with SOB and chest discomfort noted to have a moderate Rt PTX.      Plan   s/p Rt Pigtail placement to -20 LWCS  Admit to medicine   pulm consult with Dr Dowd   CXR in am  Dw Dr Coulter

## 2022-07-15 NOTE — ED ADULT NURSE NOTE - OBJECTIVE STATEMENT
Patient comes in with shortness of breath & back pain. Patient states onset of symptoms was today. Denies chest pain, fever. Hx COPD. covid + 10 days ago

## 2022-07-15 NOTE — H&P ADULT - HISTORY OF PRESENT ILLNESS
78 yo Female with h/o COPD emphysema and bullous disease with a h/o PTX on November 2021 requiring a pigtail and recent COVID PNA   presented with SOB and chest discomfort after doing strenuous activity today.  The patient reports that she new that something was wrong with her lung after she moved a heavy object. She first called Dr Dowd who instructed her to go to ER.  She called [phone]

## 2022-07-15 NOTE — CONSULT NOTE ADULT - SUBJECTIVE AND OBJECTIVE BOX
Surgeon:  Yfn     Consult requesting by: Dr Barone     HISTORY OF PRESENT ILLNESS:  78 yo Female with h/o COPD emphysema and bullous disease with a h/o PTX on November 2021 requiring a pigtail and recent COVID PNA   presented with SOB and chest discomfort after doing strenuous activity today.  The patient reports that she new that something was wrong with her lung after she moved a heavy object. She first called Dr Dowd who instructed her to go to ER.  She called 911          pt seen at bedside.  O2 sat 88% on 3 L NC.  Mild tachypnea noted .  Noted moderate Rt PTX.  Now s/p Rt Pigtail at -20 LWCS      PAST MEDICAL & SURGICAL HISTORY:  Hypothyroidism      Diabetes mellitus  diet controlled      Back pain      Asthma      Elevated pancreatic enzyme  patient reports elevated lab 6/2013. Gastroenterologist aware      History of cataract  extracted from right and left      Varicose veins of both lower extremities  surgery      Diverticulosis of intestine without bleeding, unspecified intestinal tract location      Gall stones  gall bladder removed      Urinary urgency      Spinal stenosis of lumbar region      DDD (degenerative disc disease), lumbar      Basal cell carcinoma  removed from face      Pulmonary emphysema, unspecified emphysema type  2017 last exacerbation; never intubated      OA (osteoarthritis)      Frequent UTI  2-3x/year; had an episode 5 weeks ago was treated denies dysuria      GERD (gastroesophageal reflux disease)      Leg swelling      Emphysema lung      COVID-19 vaccine series completed  Moderna - 2nd dose 02/20/2021      Knee pain, left      Scar tissue  left knee S/P TKR      COPD exacerbation      S/P bladder repair  bladder lift 2/2013      Dental disorder  Dental surgery 12/2012, patient reports that upper portion is glued in at this time, plan is to have dental implants placed.      S/P knee surgery  arthroscopy bilateral 2007 and 2010      Skin cancer  to right temple 2012, removed, history of basal cell x 2      H/O lumbosacral spine surgery  fusion 2017      H/O umbilical hernia repair  5/24/19      S/P cholecystectomy  2012      S/P colonoscopy  benign polyp      S/P knee replacement  right 2017, left 11/2019          MEDICATIONS  (STANDING):    MEDICATIONS  (PRN):  acetaminophen     Tablet .. 650 milliGRAM(s) Oral once PRN Mild Pain (1 - 3)    Antiplatelet therapy:    none                       Last dose/amt:    Allergies    Breo Ellipta (Rash)    Intolerances        SOCIAL HISTORY:  Smoker: [ ] Yes  [x ] No        PACK YEARS:                         WHEN QUIT? 40 years ago  ETOH use: [ ] Yes  [x ] No              FREQUENCY / QUANTITY:  Ilicit Drug use:  [ ] Yes  [x ] No  Occupation:  Live with:    Assisted device use: no     FAMILY HISTORY:  Family history of pancreatic cancer (Father)    Family history of heart disease (Mother)    Family history of stroke (Mother)        Review of Systems  CONSTITUTIONAL:  Fevers[ ] chills[ ] sweats[ ] fatigue[ ] weight loss[ ] weight gain [ ]                                     NEGATIVE [x ]   NEURO:  parathesias[ ] seizures [ ]  syncope [ ]  confusion [ ]                                                                                NEGATIVE[ x]   EYES: glasses[ ]  blurry vision[ ]  discharge[ ] pain[ ] glaucoma [ ]                                                                          NEGATIVE[ x]   ENMT:  difficulty hearing [ ]  vertigo[ ]  dysphagia[ ] epistaxis[ ] recent dental work [ ]                                    NEGATIVE[ x]   CV:  chest pain[ ] palpitations[ ] RODRIGUEZ [x ] diaphoresis [ ]                                                                                           NEGATIVE[ ]   RESPIRATORY:  wheezing[ ] SOB[ x] cough [ ] sputum[ ] hemoptysis[ ]                                                                  NEGATIVE[ ]   GI:  nausea[ ]  vomiting [ ]  diarrhea[ ] constipation [ ] melena [ ]                                                                      NEGATIVE[x ]   : hematuria[ ]  dysuria[ ] urgency[ ] incontinence[ ]                                                                                            NEGATIVE[x ]   MUSCULOSKELETAL  arthritis[ ]  joint swelling [ ] muscle weakness [ ]                                                                NEGATIVE[x ]   SKIN/BREAST:  rash[ ] itching [ ]  hair loss[ ] masses[ ]                                                                                              NEGATIVE[x ]   PSYCH:  dementia [ ] depression [ ] anxiety[ ]                                                                                                               NEGATIVE[ x]   HEME/LYMPH:  bruises easily[ ] enlarged lymph nodes[ ] tender lymph nodes[ ]                                               NEGATIVE[x ]   ENDOCRINE:  cold intolerance[ ] heat intolerance[ ] polydipsia[ ]                                                                          NEGATIVE[x ]     PHYSICAL EXAM  Vital Signs Last 24 Hrs  T(C): 37.1 (15 Jul 2022 15:55), Max: 37.1 (15 Jul 2022 15:55)  T(F): 98.7 (15 Jul 2022 15:55), Max: 98.7 (15 Jul 2022 15:55)  HR: 86 (15 Jul 2022 15:55) (78 - 97)  BP: 117/61 (15 Jul 2022 15:55) (117/61 - 145/71)  BP(mean): 77 (15 Jul 2022 15:55) (77 - 84)  RR: 18 (15 Jul 2022 15:55) (15 - 22)  SpO2: 93% (15 Jul 2022 15:55) (90% - 93%)    Parameters below as of 15 Jul 2022 15:55  Patient On (Oxygen Delivery Method): nasal cannula  O2 Flow (L/min): 4      CONSTITUTIONAL:                                                                          WNL[ x]   Neuro: WNL[ x] Normal exam oriented to person/place & time with no focal motor or sensory  deficits. Other                     Eyes: WNL[x ]   Normal exam of conjunctiva & lids, pupils equally reactive. Other     ENT: WNL[ x]    Normal exam of nasal/oral mucosa with absence of cyanosis. Other  Neck: WNL[x ]  Normal exam of jugular veins, trachea & thyroid. Other  Chest: WNL[ ] Normal lung exam with good air movement absence of wheezes, rales, or rhonchi: Other   decreased b/l                                                                              CV:  Auscultation: normal [x ] S3[ ] S4[ ] Irregular [ ] Rub[ ] Clicks[ ]    Murmurs none:[x ]systolic [ ]  diastolic [ ] holosystolic [ ]  Carotids: No Bruits[ x] Other                 Abdominal Aorta: normal [ ] nonpalpable[x ]Other                                                                                      GI:           WNL[ x] Normal exam of abdomen, liver & spleen with no noted masses or tenderness. Other                                                                                                        Extremities: WNL[ ] Normal no evidence of cyanosis or deformity Edema: none[x ]trace[ ]1+[ ]2+[ ]3+[ ]4+[ ]  Lower Extremity Pulses: Right[2+ ] Left[2+ ]Varicosities[ ]  SKIN :WNL[ x] Normal exam to inspection & palation. Other:                                                          LABS:                        14.0   7.14  )-----------( 445      ( 15 Jul 2022 13:31 )             41.1     07-15    137  |  106  |  18  ----------------------------<  139<H>  3.7   |  25  |  0.88    Ca    9.6      15 Jul 2022 13:31    TPro  7.6  /  Alb  3.5  /  TBili  0.4  /  DBili  x   /  AST  18  /  ALT  21  /  AlkPhos  87  07-15    PT/INR - ( 15 Jul 2022 13:31 )   PT: 11.9 sec;   INR: 1.03 ratio         PTT - ( 15 Jul 2022 13:31 )  PTT:28.2 sec          < from: CT Chest No Cont (07.15.22 @ 14:25) >  INTERPRETATION:  INDICATION: Shortness of breath, right sided pain,   pneumothorax  TECHNIQUE: Unenhanced CT of the chest. Coronal, sagittal, and MIP images  were reconstructed and reviewed.  COMPARISON: 6/10/2022 chest CT.    FINDINGS:    AIRWAYS, LUNGS and PLEURA: Patent central airways. Severe upper lung   predominant emphysema/bullous disease. Solid opacity along the posterior   margin of the right lower lobe is unchanged possibly representing   scarring/atelectasis. Stable 5 mm subpleural nodule within basilar left   lower lobe. Large right pneumothorax. No pleural effusion.    MEDIASTINUM AND ANAHI: No lymphadenopathy.    HEART AND VESSELS: Heart size is normal. No pericardial effusion.   Coronary and aortic calcifications. Mild ectasia of the descending   thoracic aorta measuring up to 3.6 cm is unchanged.    VISUALIZED UPPER ABDOMEN: Within normal limits.    CHEST WALL AND BONES: No aggressive osseous lesion. Lumbar spine fusion.    LOWER NECK: Within normal limits.    IMPRESSION:    Large right pneumothorax.    Severe upper lung predominant emphysema/bullous disease.    These findings were discussed with Dr. BARONE on 7/15/2022 2:33 PMwith   read back.    < end of copied text >

## 2022-07-15 NOTE — ED ADULT NURSE NOTE - NSIMPLEMENTINTERV_GEN_ALL_ED
Implemented All Fall Risk Interventions:  [place] to call system. Call bell, personal items and telephone within reach. Instruct patient to call for assistance. Room bathroom lighting operational. Non-slip footwear when patient is off stretcher. Physically safe environment: no spills, clutter or unnecessary equipment. Stretcher in lowest position, wheels locked, appropriate side rails in place. Provide visual cue, wrist band, yellow gown, etc. Monitor gait and stability. Monitor for mental status changes and reorient to person, place, and time. Review medications for side effects contributing to fall risk. Reinforce activity limits and safety measures with patient and family.

## 2022-07-15 NOTE — PATIENT PROFILE ADULT - FALL HARM RISK - HARM RISK INTERVENTIONS

## 2022-07-15 NOTE — H&P ADULT - NSICDXPASTSURGICALHX_GEN_ALL_CORE_FT
PAST SURGICAL HISTORY:  Dental disorder Dental surgery 12/2012, patient reports that upper portion is glued in at this time, plan is to have dental implants placed.    H/O lumbosacral spine surgery fusion 2017    H/O umbilical hernia repair 5/24/19    S/P bladder repair bladder lift 2/2013    S/P cholecystectomy 2012    S/P colonoscopy benign polyp    S/P knee replacement right 2017, left 11/2019    S/P knee surgery arthroscopy bilateral 2007 and 2010    Skin cancer to right temple 2012, removed, history of basal cell x 2    
Universal Safety Interventions

## 2022-07-15 NOTE — ED PROVIDER NOTE - CLINICAL SUMMARY MEDICAL DECISION MAKING FREE TEXT BOX
77 F with PMH of COPD, pneumothorax presents with RODRIGUEZ after increased exertion today. O2 saturation at baseline as per patient, not on home O2. +wheezing on exam. Will r/o ACS vs. COPD exacerbation. Plan for EKG, CXR, labs, steroids, nebulizer, reassess. 77 F with PMH of COPD, pneumothorax presents with RODRIGUEZ after increased exertion today. O2 saturation at baseline as per patient, not on home O2. +wheezing on exam. Will r/o ACS vs. COPD exacerbation vs. PTX. Plan for EKG, CXR, labs, steroids, nebulizer, reassess.

## 2022-07-15 NOTE — ED PROVIDER NOTE - PROGRESS NOTE DETAILS
Elsy Lock: 78 y/o pt with SOB and right- sided "lung pain". Pt was adjusting wheel on wheelchair. PMHx- Pneumothorax  PE-Clear lungs bilaterally, O2 sat of 93%   Plan- Agree with ACP assessment and plan Pt with +covid test. States she tested positive 11 days ago, had MAB infusion at that time as well. States she has been vaccinated + booster for covid as well. - Weston Crews PA-C Elsy Lock: Discuss with thoracic surgery. Thoracic surgery will evaluate. CT with large right pneumothorax, thoracic surgery placing chest tube at this time. - Weston Crews PA-C Thoracic surgery placed chest tube, placed successfully. Recommended medical admission. Will follow. - Weston Crews PA-C I, Heydi Lock MD, personally saw the patient with IMMANUEL.  I have personally performed a face to face diagnostic evaluation on this patient.  I have reviewed the IMMANUEL note and agree with the history, exam, and plan of care, except as noted.  I personally saw the patient and performed a substantive portion of the visit including all aspects of the medical decision making. Elsy Lock: 76 y/o pt with SOB and right- sided "lung pain". Pt was adjusting wheel on wheelchair. PMHx- Pneumothorax  PE-Clear lungs bilaterally, O2 sat of 93% on 2L  Plan- Agree with ACP assessment and plan

## 2022-07-15 NOTE — ED ADULT NURSE REASSESSMENT NOTE - NS ED NURSE REASSESS COMMENT FT1
ANGELINA Cuadra placed right chest tube; pt pre & post medicated for pain. pt 92% on 4lpm NC; PA aware. hx-COPD; pt states normal 02 88-92%. Chest tube to low continuous wall suction & air leak noted in chest tube chamber. ANGELINA Cuadra aware & states this is to be expected in this patient. will ctm.

## 2022-07-16 LAB
ALBUMIN SERPL ELPH-MCNC: 3.1 G/DL — LOW (ref 3.3–5)
ALP SERPL-CCNC: 69 U/L — SIGNIFICANT CHANGE UP (ref 40–120)
ALT FLD-CCNC: 18 U/L — SIGNIFICANT CHANGE UP (ref 12–78)
ANION GAP SERPL CALC-SCNC: 8 MMOL/L — SIGNIFICANT CHANGE UP (ref 5–17)
AST SERPL-CCNC: 18 U/L — SIGNIFICANT CHANGE UP (ref 15–37)
BILIRUB SERPL-MCNC: 0.3 MG/DL — SIGNIFICANT CHANGE UP (ref 0.2–1.2)
BUN SERPL-MCNC: 21 MG/DL — SIGNIFICANT CHANGE UP (ref 7–23)
CALCIUM SERPL-MCNC: 9.2 MG/DL — SIGNIFICANT CHANGE UP (ref 8.5–10.1)
CHLORIDE SERPL-SCNC: 106 MMOL/L — SIGNIFICANT CHANGE UP (ref 96–108)
CO2 SERPL-SCNC: 26 MMOL/L — SIGNIFICANT CHANGE UP (ref 22–31)
CREAT SERPL-MCNC: 0.78 MG/DL — SIGNIFICANT CHANGE UP (ref 0.5–1.3)
CRP SERPL-MCNC: <3 MG/L — SIGNIFICANT CHANGE UP
EGFR: 77 ML/MIN/1.73M2 — SIGNIFICANT CHANGE UP
FERRITIN SERPL-MCNC: 60 NG/ML — SIGNIFICANT CHANGE UP (ref 15–150)
GLUCOSE SERPL-MCNC: 161 MG/DL — HIGH (ref 70–99)
POTASSIUM SERPL-MCNC: 4 MMOL/L — SIGNIFICANT CHANGE UP (ref 3.5–5.3)
POTASSIUM SERPL-SCNC: 4 MMOL/L — SIGNIFICANT CHANGE UP (ref 3.5–5.3)
PROCALCITONIN SERPL-MCNC: 0.03 NG/ML — SIGNIFICANT CHANGE UP (ref 0.02–0.1)
PROT SERPL-MCNC: 7 GM/DL — SIGNIFICANT CHANGE UP (ref 6–8.3)
SODIUM SERPL-SCNC: 140 MMOL/L — SIGNIFICANT CHANGE UP (ref 135–145)

## 2022-07-16 RX ADMIN — Medication 137 MICROGRAM(S): at 06:25

## 2022-07-16 RX ADMIN — Medication 30 MILLIGRAM(S): at 22:00

## 2022-07-16 RX ADMIN — Medication 650 MILLIGRAM(S): at 14:23

## 2022-07-16 RX ADMIN — Medication 30 MILLIGRAM(S): at 21:22

## 2022-07-16 RX ADMIN — GABAPENTIN 300 MILLIGRAM(S): 400 CAPSULE ORAL at 09:53

## 2022-07-16 RX ADMIN — OXYCODONE HYDROCHLORIDE 5 MILLIGRAM(S): 5 TABLET ORAL at 17:43

## 2022-07-16 RX ADMIN — OXYCODONE HYDROCHLORIDE 10 MILLIGRAM(S): 5 TABLET ORAL at 11:00

## 2022-07-16 RX ADMIN — OXYCODONE HYDROCHLORIDE 10 MILLIGRAM(S): 5 TABLET ORAL at 10:30

## 2022-07-16 RX ADMIN — Medication 30 MILLIGRAM(S): at 07:00

## 2022-07-16 RX ADMIN — Medication 30 MILLIGRAM(S): at 06:26

## 2022-07-16 RX ADMIN — GABAPENTIN 300 MILLIGRAM(S): 400 CAPSULE ORAL at 21:22

## 2022-07-16 RX ADMIN — Medication 2: at 17:42

## 2022-07-16 RX ADMIN — Medication 40 MILLIGRAM(S): at 06:25

## 2022-07-16 RX ADMIN — PANTOPRAZOLE SODIUM 40 MILLIGRAM(S): 20 TABLET, DELAYED RELEASE ORAL at 06:25

## 2022-07-16 NOTE — PHYSICAL THERAPY INITIAL EVALUATION ADULT - ADDITIONAL COMMENTS
Pt lives in a two story home with her significant other. Pt is independent and takes care of her significant other.

## 2022-07-16 NOTE — PHYSICAL THERAPY INITIAL EVALUATION ADULT - MODALITIES TREATMENT COMMENTS
Pt took steps to chair with stand by assist. Ambulation limited secondary to chest tube to wall suction. OOB to chair. Pt c/o pain in chest tube site with movement. RN Víctor made aware. O2 sat: dropped to 84% with transfer on O2 via NC, quickly rises with rest and deep breathing.

## 2022-07-16 NOTE — PROGRESS NOTE ADULT - SUBJECTIVE AND OBJECTIVE BOX
Brief Hospital Course:77F with a Pmhx of COPD,DM, who presented with SOB and found to have a spontaneous RIght PTX.  Right Pig Tail Catheter placed yesterday with resolution of Right PTX. Small airleak noted. Tube remains on [id number] suction.        Vital Signs:  Vital Signs Last 24 Hrs  T(C): 36.3 (07-16-22 @ 04:00), Max: 37.1 (07-15-22 @ 15:55)  T(F): 97.4 (07-16-22 @ 04:00), Max: 98.7 (07-15-22 @ 15:55)  HR: 61 (07-16-22 @ 07:00) (61 - 101)  BP: 116/66 (07-16-22 @ 07:00) (100/50 - 145/71)  RR: 16 (07-16-22 @ 07:00) (15 - 25)  SpO2: 94% (07-16-22 @ 07:00) (90% - 95%) on (O2)    Telemetry/Alarms:    Relevant labs, radiology and Medications reviewed                        14.0   7.14  )-----------( 445      ( 15 Jul 2022 13:31 )             41.1     07-16    140  |  106  |  21  ----------------------------<  161<H>  4.0   |  26  |  0.78    Ca    9.2      16 Jul 2022 04:43    TPro  7.0  /  Alb  3.1<L>  /  TBili  0.3  /  DBili  x   /  AST  18  /  ALT  18  /  AlkPhos  69  07-16    PT/INR - ( 15 Jul 2022 13:31 )   PT: 11.9 sec;   INR: 1.03 ratio         PTT - ( 15 Jul 2022 13:31 )  PTT:28.2 sec    MEDICATIONS  (STANDING):  dextrose 5%. 1000 milliLiter(s) (50 mL/Hr) IV Continuous <Continuous>  dextrose 5%. 1000 milliLiter(s) (100 mL/Hr) IV Continuous <Continuous>  dextrose 50% Injectable 25 Gram(s) IV Push once  dextrose 50% Injectable 12.5 Gram(s) IV Push once  dextrose 50% Injectable 25 Gram(s) IV Push once  furosemide    Tablet 40 milliGRAM(s) Oral daily  gabapentin 300 milliGRAM(s) Oral two times a day  glucagon  Injectable 1 milliGRAM(s) IntraMuscular once  insulin lispro (ADMELOG) corrective regimen sliding scale   SubCutaneous three times a day before meals  levothyroxine 137 MICROGram(s) Oral daily  pantoprazole    Tablet 40 milliGRAM(s) Oral before breakfast    MEDICATIONS  (PRN):  acetaminophen     Tablet .. 650 milliGRAM(s) Oral once PRN Mild Pain (1 - 3)  acetaminophen     Tablet .. 650 milliGRAM(s) Oral every 6 hours PRN Temp greater or equal to 38C (100.4F), Mild Pain (1 - 3)  dextrose Oral Gel 15 Gram(s) Oral once PRN Blood Glucose LESS THAN 70 milliGRAM(s)/deciliter  ketorolac   Injectable 30 milliGRAM(s) IV Push every 8 hours PRN Moderate Pain (4 - 6)  oxyCODONE    IR 5 milliGRAM(s) Oral every 4 hours PRN Moderate Pain (4 - 6)  oxyCODONE    IR 10 milliGRAM(s) Oral every 4 hours PRN Severe Pain (7 - 10)    I&O's Summary    15 Jul 2022 07:01  -  16 Jul 2022 07:00  --------------------------------------------------------  IN: 240 mL / OUT: 1900 mL / NET: -1660 mL        Physical Exam:  General: Awake, responsive  and appropriate, breathing comfortably,  ENT: PERRLA, EOMI, No JVD  Chest; B/L Bs, decreased at bases, no signifigant sputum production. Right CT in place + small air leak noted  CVS: S1,S2, Regular, No Murmurs noted   Abd: BS+Soft Non-tender, No rebound or localized pain, No Masses,   Ext: Warm and perfused, No Edema   Neuro: A+O x 3, Moving all extremities, non-focal         Assessment  77y Female  w/ PAST MEDICAL & SURGICAL HISTORY:  Hypothyroidism      Diabetes mellitus  diet controlled      Back pain      Asthma      Elevated pancreatic enzyme  patient reports elevated lab 6/2013. Gastroenterologist aware      History of cataract  extracted from right and left      Varicose veins of both lower extremities  surgery      Diverticulosis of intestine without bleeding, unspecified intestinal tract location      Gall stones  gall bladder removed      Urinary urgency      Spinal stenosis of lumbar region      DDD (degenerative disc disease), lumbar      Basal cell carcinoma  removed from face      Pulmonary emphysema, unspecified emphysema type  2017 last exacerbation; never intubated      OA (osteoarthritis)      Frequent UTI  2-3x/year; had an episode 5 weeks ago was treated denies dysuria      GERD (gastroesophageal reflux disease)      Leg swelling      Emphysema lung      COVID-19 vaccine series completed  Moderna - 2nd dose 02/20/2021      Knee pain, left      Scar tissue  left knee S/P TKR      COPD exacerbation      S/P bladder repair  bladder lift 2/2013      Dental disorder  Dental surgery 12/2012, patient reports that upper portion is glued in at this time, plan is to have dental implants placed.      S/P knee surgery  arthroscopy bilateral 2007 and 2010      Skin cancer  to right temple 2012, removed, history of basal cell x 2      H/O lumbosacral spine surgery  fusion 2017      H/O umbilical hernia repair  5/24/19      S/P cholecystectomy  2012      S/P colonoscopy  benign polyp      S/P knee replacement  right 2017, left 11/2019      admitted with complaints of Patient is a 77y old  Female who presents with a chief complaint of Rt PTX (15 Jul 2022 16:21)  .  On (Date), patient underwent . Postoperative course/issues:    PLAN  Maintain CT on suction today, given air leak.  WIll trial on water seal tomorrow.   OOB to chair.  Continue medical management as per pulmonary and Medicine  Will check CXR    DW Dr Gastelum

## 2022-07-16 NOTE — PHYSICAL THERAPY INITIAL EVALUATION ADULT - IMPAIRED TRANSFERS: SIT/STAND, REHAB EVAL
[FreeTextEntry3] : 8 yo male with epistaxis here for f/u. no other bleeding. no epistaxis in the last 3 weeks.  PT mildly prolonged based on lab reference range, but within normal limits per age-based reference range.  INR, PTT, vw panel wnl.  F/u ENT as scheduled.  F/u heme as needed in the future with any concerns of worsening nosebleeds or other bleeding problems.
pain

## 2022-07-16 NOTE — CONSULT NOTE ADULT - ASSESSMENT
78 yo Female with h/o COPD emphysema and bullous disease with a h/o PTX on November 2021 requiring a pigtail and recent COVID PNA   presented with SOB and chest discomfort noted to have a moderate Rt PTX.      Plan   s/p Rt Pigtail placement to -20 LWCS  Admit to Dr Coulter's service   pulm consult with Dr Dowd   CXR in [facility] Dr Coulter   78 yo Female with h/o COPD emphysema and bullous disease with a h/o PTX on November 2021 requiring a pigtail and recent COVID PNA   presented with SOB and chest discomfort noted to have a moderate Rt PTX.      Plan   s/p Rt Pigtail placement to -20 LWCS  Admit to Dr Coulter's service   pulm consult with Dr Dowd   CXR in [facility] Dr Coulter    76 yo Female with h/o COPD emphysema and bullous disease with a h/o PTX on November 2021 requiring a pigtail and recent COVID PNA   presented with SOB and chest discomfort noted to have a moderate Rt PTX.      Plan:    s/p Rt Pigtail placement to -20 LWCS  Titrate fio2  CXR daily  Aerosols  supportive care  DVT prophylasix     78 yo Female with h/o COPD emphysema and bullous disease with a h/o PTX on November 2021 requiring a pigtail and recent COVID PNA   presented with SOB and chest discomfort noted to have a moderate Rt PTX.      Plan:    s/p Rt Pigtail placement to -20 LWCS  Ac hypoxaemic respiratory failure resolved with oxygen-Titrate fio2  CXR daily  Aerosols  supportive care  DVT prophylasix

## 2022-07-16 NOTE — PHYSICAL THERAPY INITIAL EVALUATION ADULT - PERTINENT HX OF CURRENT PROBLEM, REHAB EVAL
Pt admitted to  secondary to SOB, back pain, pneumothorax, COVID +. S/p chest tube placement on 7/15/22. H/o COPD.

## 2022-07-16 NOTE — CONSULT NOTE ADULT - SUBJECTIVE AND OBJECTIVE BOX
HPI:  78 yo Female with h/o COPD emphysema and bullous disease with a h/o PTX on November 2021 requiring a pigtail and recent COVID PNA   presented with SOB and chest discomfort after doing strenuous activity today.  The patient reports that she new that something was wrong with her lung after she moved a heavy object. She first called Dr Dowd who instructed her to go to ER.  She called 911 (15 Jul 2022 16:21)      PAST MEDICAL & SURGICAL HISTORY:  Hypothyroidism      Diabetes mellitus  diet controlled      Back pain      Asthma      Elevated pancreatic enzyme  patient reports elevated lab 6/2013. Gastroenterologist aware      History of cataract  extracted from right and left      Varicose veins of both lower extremities  surgery      Diverticulosis of intestine without bleeding, unspecified intestinal tract location      Gall stones  gall bladder removed      Urinary urgency      Spinal stenosis of lumbar region      DDD (degenerative disc disease), lumbar      Basal cell carcinoma  removed from face      Pulmonary emphysema, unspecified emphysema type  2017 last exacerbation; never intubated      OA (osteoarthritis)      Frequent UTI  2-3x/year; had an episode 5 weeks ago was treated denies dysuria      GERD (gastroesophageal reflux disease)      Leg swelling      Emphysema lung      COVID-19 vaccine series completed  Moderna - 2nd dose 02/20/2021      Knee pain, left      Scar tissue  left knee S/P TKR      COPD exacerbation      S/P bladder repair  bladder lift 2/2013      Dental disorder  Dental surgery 12/2012, patient reports that upper portion is glued in at this time, plan is to have dental implants placed.      S/P knee surgery  arthroscopy bilateral 2007 and 2010      Skin cancer  to right temple 2012, removed, history of basal cell x 2      H/O lumbosacral spine surgery  fusion 2017      H/O umbilical hernia repair  5/24/19      S/P cholecystectomy  2012      S/P colonoscopy  benign polyp      S/P knee replacement  right 2017, left 11/2019          Home Medications:  furosemide 40 mg oral tablet: 1 tab(s) orally once a day (15 Jul 2022 15:52)  gabapentin 300 mg oral capsule: 1 cap(s) orally 2 times a day (15 Jul 2022 15:52)  levothyroxine 137 mcg (0.137 mg) oral tablet: 1 tab(s) orally once a day (15 Jul 2022 15:58)  lidocaine 5% patch: Apply 1 patch topically to upper leg once daily as needed for neuropathy pain (15 Jul 2022 15:58)  Ozempic 2 mg/1.5 mL (0.25 mg or 0.5 mg dose) subcutaneous solution: 0.5 milligram(s) subcutaneously once a week on Wednesday (15 Jul 2022 15:58)  pantoprazole 40 mg oral delayed release tablet: 1 tab(s) orally once a day (at bedtime), As Needed (15 Jul 2022 15:52)  Synjardy XR 10 mg-1000 mg oral tablet, extended release: 1 tab(s) orally once a day (15 Jul 2022 15:58)  tiotropium 18 mcg inhalation capsule: 1 cap(s) inhaled once a day (15 Jul 2022 15:52)      MEDICATIONS  (STANDING):  dextrose 5%. 1000 milliLiter(s) (50 mL/Hr) IV Continuous <Continuous>  dextrose 5%. 1000 milliLiter(s) (100 mL/Hr) IV Continuous <Continuous>  dextrose 50% Injectable 25 Gram(s) IV Push once  dextrose 50% Injectable 12.5 Gram(s) IV Push once  dextrose 50% Injectable 25 Gram(s) IV Push once  furosemide    Tablet 40 milliGRAM(s) Oral daily  gabapentin 300 milliGRAM(s) Oral two times a day  glucagon  Injectable 1 milliGRAM(s) IntraMuscular once  insulin lispro (ADMELOG) corrective regimen sliding scale   SubCutaneous three times a day before meals  levothyroxine 137 MICROGram(s) Oral daily  pantoprazole    Tablet 40 milliGRAM(s) Oral before breakfast    MEDICATIONS  (PRN):  acetaminophen     Tablet .. 650 milliGRAM(s) Oral once PRN Mild Pain (1 - 3)  acetaminophen     Tablet .. 650 milliGRAM(s) Oral every 6 hours PRN Temp greater or equal to 38C (100.4F), Mild Pain (1 - 3)  dextrose Oral Gel 15 Gram(s) Oral once PRN Blood Glucose LESS THAN 70 milliGRAM(s)/deciliter  ketorolac   Injectable 30 milliGRAM(s) IV Push every 8 hours PRN Moderate Pain (4 - 6)  oxyCODONE    IR 5 milliGRAM(s) Oral every 4 hours PRN Moderate Pain (4 - 6)  oxyCODONE    IR 10 milliGRAM(s) Oral every 4 hours PRN Severe Pain (7 - 10)      Allergies    Breo Ellipta (Rash)    Intolerances        SOCIAL HISTORY: Denies tobacco, etoh abuse or illicit drug use    FAMILY HISTORY:  Family history of pancreatic cancer (Father)    Family history of heart disease (Mother)    Family history of stroke (Mother)        Vital Signs Last 24 Hrs  T(C): 36.3 (16 Jul 2022 04:00), Max: 37.1 (15 Jul 2022 15:55)  T(F): 97.4 (16 Jul 2022 04:00), Max: 98.7 (15 Jul 2022 15:55)  HR: 83 (16 Jul 2022 10:00) (59 - 101)  BP: 108/53 (16 Jul 2022 10:00) (100/50 - 145/71)  BP(mean): 67 (16 Jul 2022 10:00) (62 - 95)  RR: 16 (16 Jul 2022 10:00) (15 - 25)  SpO2: 96% (16 Jul 2022 10:00) (90% - 96%)    Parameters below as of 16 Jul 2022 04:00  Patient On (Oxygen Delivery Method): nasal cannula  O2 Flow (L/min): 4          REVIEW OF SYSTEMS:    CONSTITUTIONAL:  As per HPI.  HEENT:  Eyes:  No diplopia or blurred vision. ENT:  No earache, sore throat or runny nose.  CARDIOVASCULAR:  No pressure, squeezing, tightness, heaviness or aching about the chest, neck, axilla or epigastrium.  RESPIRATORY:  No cough, shortness of breath, PND or orthopnea.  GASTROINTESTINAL:  No nausea, vomiting or diarrhea.  GENITOURINARY:  No dysuria, frequency or urgency.  MUSCULOSKELETAL:  As per HPI.  SKIN:  No change in skin, hair or nails.  NEUROLOGIC:  No paresthesias, fasciculations, seizures or weakness.  PSYCHIATRIC:  No disorder of thought or mood.  ENDOCRINE:  No heat or cold intolerance, polyuria or polydipsia.  HEMATOLOGICAL:  No easy bruising or bleedings:  .     PHYSICAL EXAMINATION:    GENERAL APPEARANCE:  Pt. is not currently dyspneic, in no distress. Pt. is alert, oriented, and pleasant.  HEENT:  Pupils are normal and react normally. No icterus. Mucous membranes well colored.  NECK:  Supple. No lymphadenopathy. Jugular venous pressure not elevated. Carotids equal.   HEART:   The cardiac impulse has a normal quality. Regular. Normal S1 and S2. There are no murmurs, rubs or gallops noted  CHEST:  Chest is clear to auscultation. Normal respiratory effort.  ABDOMEN:  Soft and nontender.   EXTREMITIES:  There is no cyanosis, clubbing or edema.   SKIN:  No rash or significant lesions are noted.    LABS:                        14.0   7.14  )-----------( 445      ( 15 Jul 2022 13:31 )             41.1     07-16    140  |  106  |  21  ----------------------------<  161<H>  4.0   |  26  |  0.78    Ca    9.2      16 Jul 2022 04:43    TPro  7.0  /  Alb  3.1<L>  /  TBili  0.3  /  DBili  x   /  AST  18  /  ALT  18  /  AlkPhos  69  07-16    LIVER FUNCTIONS - ( 16 Jul 2022 04:43 )  Alb: 3.1 g/dL / Pro: 7.0 gm/dL / ALK PHOS: 69 U/L / ALT: 18 U/L / AST: 18 U/L / GGT: x           PT/INR - ( 15 Jul 2022 13:31 )   PT: 11.9 sec;   INR: 1.03 ratio         PTT - ( 15 Jul 2022 13:31 )  PTT:28.2 sec              RADIOLOGY & ADDITIONAL STUDIES:   < from: CT Chest No Cont (07.15.22 @ 14:25) >  IMPRESSION:    Large right pneumothorax.    Severe upper lung predominant emphysema/bullous disease.    These findings were discussed with Dr. BARONE, on 7/15/2022 2:33 PMwith   read back.    < end of copied text >       HPI:    76 yo Female with h/o COPD emphysema and bullous disease with a h/o PTX on November 2021 requiring a pigtail and recent COVID PNA  admitted with SOB and chest discomfort after doing strenuous activity today.  The patient reports that she new that something was wrong with her lung after she moved a heavy object. She first called Dr Dowd who instructed her to go to ER.  She called 911 pat came to [place] R PTX s/p pigtail cath feeling better, sitting in chair, seen for pulmonary eval.      PAST MEDICAL & SURGICAL HISTORY:  Hypothyroidism      Diabetes mellitus  diet controlled      Back pain      Asthma      Elevated pancreatic enzyme  patient reports elevated lab 6/2013. Gastroenterologist aware      History of cataract  extracted from right and left      Varicose veins of both lower extremities  surgery      Diverticulosis of intestine without bleeding, unspecified intestinal tract location      Gall stones  gall bladder removed      Urinary urgency      Spinal stenosis of lumbar region      DDD (degenerative disc disease), lumbar      Basal cell carcinoma  removed from face      Pulmonary emphysema, unspecified emphysema type  2017 last exacerbation; never intubated      OA (osteoarthritis)      Frequent UTI  2-3x/year; had an episode 5 weeks ago was treated denies dysuria      GERD (gastroesophageal reflux disease)      Leg swelling      Emphysema lung      COVID-19 vaccine series completed  Moderna - 2nd dose 02/20/2021      Knee pain, left      Scar tissue  left knee S/P TKR      COPD exacerbation      S/P bladder repair  bladder lift 2/2013      Dental disorder  Dental surgery 12/2012, patient reports that upper portion is glued in at this time, plan is to have dental implants placed.      S/P knee surgery  arthroscopy bilateral 2007 and 2010      Skin cancer  to right temple 2012, removed, history of basal cell x 2      H/O lumbosacral spine surgery  fusion 2017      H/O umbilical hernia repair  5/24/19      S/P cholecystectomy  2012      S/P colonoscopy  benign polyp      S/P knee replacement  right 2017, left 11/2019          Home Medications:  furosemide 40 mg oral tablet: 1 tab(s) orally once a day (15 Jul 2022 15:52)  gabapentin 300 mg oral capsule: 1 cap(s) orally 2 times a day (15 Jul 2022 15:52)  levothyroxine 137 mcg (0.137 mg) oral tablet: 1 tab(s) orally once a day (15 Jul 2022 15:58)  lidocaine 5% patch: Apply 1 patch topically to upper leg once daily as needed for neuropathy pain (15 Jul 2022 15:58)  Ozempic 2 mg/1.5 mL (0.25 mg or 0.5 mg dose) subcutaneous solution: 0.5 milligram(s) subcutaneously once a week on Wednesday (15 Jul 2022 15:58)  pantoprazole 40 mg oral delayed release tablet: 1 tab(s) orally once a day (at bedtime), As Needed (15 Jul 2022 15:52)  Synjardy XR 10 mg-1000 mg oral tablet, extended release: 1 tab(s) orally once a day (15 Jul 2022 15:58)  tiotropium 18 mcg inhalation capsule: 1 cap(s) inhaled once a day (15 Jul 2022 15:52)      MEDICATIONS  (STANDING):  dextrose 5%. 1000 milliLiter(s) (50 mL/Hr) IV Continuous <Continuous>  dextrose 5%. 1000 milliLiter(s) (100 mL/Hr) IV Continuous <Continuous>  dextrose 50% Injectable 25 Gram(s) IV Push once  dextrose 50% Injectable 12.5 Gram(s) IV Push once  dextrose 50% Injectable 25 Gram(s) IV Push once  furosemide    Tablet 40 milliGRAM(s) Oral daily  gabapentin 300 milliGRAM(s) Oral two times a day  glucagon  Injectable 1 milliGRAM(s) IntraMuscular once  insulin lispro (ADMELOG) corrective regimen sliding scale   SubCutaneous three times a day before meals  levothyroxine 137 MICROGram(s) Oral daily  pantoprazole    Tablet 40 milliGRAM(s) Oral before breakfast    MEDICATIONS  (PRN):  acetaminophen     Tablet .. 650 milliGRAM(s) Oral once PRN Mild Pain (1 - 3)  acetaminophen     Tablet .. 650 milliGRAM(s) Oral every 6 hours PRN Temp greater or equal to 38C (100.4F), Mild Pain (1 - 3)  dextrose Oral Gel 15 Gram(s) Oral once PRN Blood Glucose LESS THAN 70 milliGRAM(s)/deciliter  ketorolac   Injectable 30 milliGRAM(s) IV Push every 8 hours PRN Moderate Pain (4 - 6)  oxyCODONE    IR 5 milliGRAM(s) Oral every 4 hours PRN Moderate Pain (4 - 6)  oxyCODONE    IR 10 milliGRAM(s) Oral every 4 hours PRN Severe Pain (7 - 10)      Allergies    Breo Ellipta (Rash)    Intolerances        SOCIAL HISTORY: Denies tobacco, etoh abuse or illicit drug use    FAMILY HISTORY:  Family history of pancreatic cancer (Father)    Family history of heart disease (Mother)    Family history of stroke (Mother)        Vital Signs Last 24 Hrs  T(C): 36.3 (16 Jul 2022 04:00), Max: 37.1 (15 Jul 2022 15:55)  T(F): 97.4 (16 Jul 2022 04:00), Max: 98.7 (15 Jul 2022 15:55)  HR: 83 (16 Jul 2022 10:00) (59 - 101)  BP: 108/53 (16 Jul 2022 10:00) (100/50 - 145/71)  BP(mean): 67 (16 Jul 2022 10:00) (62 - 95)  RR: 16 (16 Jul 2022 10:00) (15 - 25)  SpO2: 96% (16 Jul 2022 10:00) (90% - 96%)    Parameters below as of 16 Jul 2022 04:00  Patient On (Oxygen Delivery Method): nasal cannula  O2 Flow (L/min): 4          REVIEW OF SYSTEMS:    CONSTITUTIONAL:  As per HPI.  HEENT:  Eyes:  No diplopia or blurred vision. ENT:  No earache, sore throat or runny nose.  CARDIOVASCULAR:  No pressure, squeezing, tightness, heaviness or aching about the chest, neck, axilla or epigastrium.  RESPIRATORY:  No cough, +shortness of breath, PND or orthopnea.  GASTROINTESTINAL:  No nausea, vomiting or diarrhea.  GENITOURINARY:  No dysuria, frequency or urgency.  MUSCULOSKELETAL:  As per HPI.  SKIN:  No change in skin, hair or nails.  NEUROLOGIC:  No paresthesias, fasciculations, seizures or weakness.  PSYCHIATRIC:  No disorder of thought or mood.  ENDOCRINE:  No heat or cold intolerance, polyuria or polydipsia.  HEMATOLOGICAL:  No easy bruising or bleedings:  .     PHYSICAL EXAMINATION:    GENERAL APPEARANCE:  Pt. is not currently dyspneic, in no distress. Pt. is alert, oriented, and pleasant.  HEENT:  Pupils are normal and react normally. No icterus. Mucous membranes well colored.  NECK:  Supple. No lymphadenopathy. Jugular venous pressure not elevated. Carotids equal.   HEART:   The cardiac impulse has a normal quality. Regular. Normal S1 and S2. There are no murmurs, rubs or gallops noted  CHEST:  Chest rhonchi to auscultation. Normal respiratory effort.  ABDOMEN:  Soft and nontender.   EXTREMITIES:  There is no cyanosis, clubbing or edema.   SKIN:  No rash or significant lesions are noted.    LABS:                        14.0   7.14  )-----------( 445      ( 15 Jul 2022 13:31 )             41.1     07-16    140  |  106  |  21  ----------------------------<  161<H>  4.0   |  26  |  0.78    Ca    9.2      16 Jul 2022 04:43    TPro  7.0  /  Alb  3.1<L>  /  TBili  0.3  /  DBili  x   /  AST  18  /  ALT  18  /  AlkPhos  69  07-16    LIVER FUNCTIONS - ( 16 Jul 2022 04:43 )  Alb: 3.1 g/dL / Pro: 7.0 gm/dL / ALK PHOS: 69 U/L / ALT: 18 U/L / AST: 18 U/L / GGT: x           PT/INR - ( 15 Jul 2022 13:31 )   PT: 11.9 sec;   INR: 1.03 ratio         PTT - ( 15 Jul 2022 13:31 )  PTT:28.2 sec              RADIOLOGY & ADDITIONAL STUDIES:   CT Chest No Cont (07.15.22 @ 14:25) >  IMPRESSION:    Large right pneumothorax.    Severe upper lung predominant emphysema/bullous disease.

## 2022-07-16 NOTE — PHYSICAL THERAPY INITIAL EVALUATION ADULT - GENERAL OBSERVATIONS, REHAB EVAL
Pt supine in bed in ICU in NAD, all lines intact, all monitors attached, +chest tube attached to wall suction, O2 via NC, +purewick. RN Víctor aware. Isolation precautions maintained.

## 2022-07-17 RX ORDER — BUDESONIDE AND FORMOTEROL FUMARATE DIHYDRATE 160; 4.5 UG/1; UG/1
2 AEROSOL RESPIRATORY (INHALATION)
Refills: 0 | Status: DISCONTINUED | OUTPATIENT
Start: 2022-07-17 | End: 2022-07-26

## 2022-07-17 RX ORDER — TIOTROPIUM BROMIDE 18 UG/1
1 CAPSULE ORAL; RESPIRATORY (INHALATION) DAILY
Refills: 0 | Status: DISCONTINUED | OUTPATIENT
Start: 2022-07-17 | End: 2022-07-26

## 2022-07-17 RX ADMIN — PANTOPRAZOLE SODIUM 40 MILLIGRAM(S): 20 TABLET, DELAYED RELEASE ORAL at 06:21

## 2022-07-17 RX ADMIN — Medication 137 MICROGRAM(S): at 05:26

## 2022-07-17 RX ADMIN — OXYCODONE HYDROCHLORIDE 10 MILLIGRAM(S): 5 TABLET ORAL at 14:15

## 2022-07-17 RX ADMIN — GABAPENTIN 300 MILLIGRAM(S): 400 CAPSULE ORAL at 09:16

## 2022-07-17 RX ADMIN — GABAPENTIN 300 MILLIGRAM(S): 400 CAPSULE ORAL at 21:14

## 2022-07-17 RX ADMIN — OXYCODONE HYDROCHLORIDE 10 MILLIGRAM(S): 5 TABLET ORAL at 09:15

## 2022-07-17 RX ADMIN — OXYCODONE HYDROCHLORIDE 10 MILLIGRAM(S): 5 TABLET ORAL at 13:45

## 2022-07-17 RX ADMIN — OXYCODONE HYDROCHLORIDE 5 MILLIGRAM(S): 5 TABLET ORAL at 05:26

## 2022-07-17 RX ADMIN — Medication 40 MILLIGRAM(S): at 05:26

## 2022-07-17 RX ADMIN — OXYCODONE HYDROCHLORIDE 5 MILLIGRAM(S): 5 TABLET ORAL at 06:00

## 2022-07-17 RX ADMIN — OXYCODONE HYDROCHLORIDE 5 MILLIGRAM(S): 5 TABLET ORAL at 22:14

## 2022-07-17 RX ADMIN — OXYCODONE HYDROCHLORIDE 10 MILLIGRAM(S): 5 TABLET ORAL at 09:45

## 2022-07-17 RX ADMIN — OXYCODONE HYDROCHLORIDE 5 MILLIGRAM(S): 5 TABLET ORAL at 21:14

## 2022-07-17 NOTE — PROGRESS NOTE ADULT - SUBJECTIVE AND OBJECTIVE BOX
Patient is a 77y old  Female who presents with a chief complaint of Rt PTX (17 Jul 2022 12:50)      BRIEF HOSPITAL COURSE: 76 yo Female with h/o COPD/bullous emphysema with a h/o PTX 11/2021 requiring a pigtail and recent COVID PNA, who presented with sudden onset of SOB and chest discomfort after doing strenuous activity. The patient reports that she new that something was wrong with her lung after she moved a heavy object. She first called her Pulmonologist Dr Dowd who instructed her to go to ER, where she was found to have large right pneumothorax s/p chest tube placement.      Events last 24 hours: Patient seen sitting OOB in chair, right pigtail to suction with continuous air leak.        PAST MEDICAL & SURGICAL HISTORY:  Hypothyroidism      Diabetes mellitus  diet controlled      Back pain      Asthma      Elevated pancreatic enzyme  patient reports elevated lab 6/2013. Gastroenterologist aware      History of cataract  extracted from right and left      Varicose veins of both lower extremities  surgery      Diverticulosis of intestine without bleeding, unspecified intestinal tract location      Gall stones  gall bladder removed      Urinary urgency      Spinal stenosis of lumbar region      DDD (degenerative disc disease), lumbar      Basal cell carcinoma  removed from face      Pulmonary emphysema, unspecified emphysema type  2017 last exacerbation; never intubated      OA (osteoarthritis)      Frequent UTI  2-3x/year; had an episode 5 weeks ago was treated denies dysuria      GERD (gastroesophageal reflux disease)      Leg swelling      Emphysema lung      COVID-19 vaccine series completed  Moderna - 2nd dose 02/20/2021      Knee pain, left      Scar tissue  left knee S/P TKR      COPD exacerbation      S/P bladder repair  bladder lift 2/2013      Dental disorder  Dental surgery 12/2012, patient reports that upper portion is glued in at this time, plan is to have dental implants placed.      S/P knee surgery  arthroscopy bilateral 2007 and 2010      Skin cancer  to right temple 2012, removed, history of basal cell x 2      H/O lumbosacral spine surgery  fusion 2017      H/O umbilical hernia repair  5/24/19      S/P cholecystectomy  2012      S/P colonoscopy  benign polyp      S/P knee replacement  right 2017, left 11/2019              SOCIAL HISTORY: Lives at home with her . Denies current tobacco use.        Review of Systems:  CONSTITUTIONAL: No fever, chills, or fatigue  EYES: No visual disturbances  ENMT:  No difficulty hearing  NECK: No pain  RESPIRATORY: No cough. No shortness of breath  CARDIOVASCULAR: No chest pain, palpitations, or leg swelling  GASTROINTESTINAL: No abdominal pain. No nausea, vomiting, diarrhea, or constipation. No hematemesis, melena or hematochezia  GENITOURINARY: No dysuria, frequency, hematuria, or incontinence  NEUROLOGICAL: No headaches, loss of strength, numbness, or tremors  SKIN: No rashes  MUSCULOSKELETAL: No back or extremity pain. No calf pain  PSYCHIATRIC: No depression, anxiety, or difficulty sleeping        Medications:    furosemide    Tablet 40 milliGRAM(s) Oral daily      acetaminophen     Tablet .. 650 milliGRAM(s) Oral every 6 hours PRN  gabapentin 300 milliGRAM(s) Oral two times a day  oxyCODONE    IR 5 milliGRAM(s) Oral every 4 hours PRN  oxyCODONE    IR 10 milliGRAM(s) Oral every 4 hours PRN        pantoprazole    Tablet 40 milliGRAM(s) Oral before breakfast      dextrose 50% Injectable 25 Gram(s) IV Push once  dextrose 50% Injectable 12.5 Gram(s) IV Push once  dextrose 50% Injectable 25 Gram(s) IV Push once  dextrose Oral Gel 15 Gram(s) Oral once PRN  glucagon  Injectable 1 milliGRAM(s) IntraMuscular once  insulin lispro (ADMELOG) corrective regimen sliding scale   SubCutaneous three times a day before meals  levothyroxine 137 MICROGram(s) Oral daily    dextrose 5%. 1000 milliLiter(s) IV Continuous <Continuous>  dextrose 5%. 1000 milliLiter(s) IV Continuous <Continuous>                ICU Vital Signs Last 24 Hrs  T(C): 37 (17 Jul 2022 14:00), Max: 37 (17 Jul 2022 14:00)  T(F): 98.6 (17 Jul 2022 14:00), Max: 98.6 (17 Jul 2022 14:00)  HR: 76 (17 Jul 2022 14:00) (51 - 77)  BP: 99/74 (17 Jul 2022 14:00) (89/65 - 132/98)  BP(mean): 80 (17 Jul 2022 14:00) (59 - 107)  ABP: --  ABP(mean): --  RR: 20 (17 Jul 2022 14:00) (13 - 21)  SpO2: 95% (17 Jul 2022 14:00) (92% - 100%)    O2 Parameters below as of 17 Jul 2022 14:00  Patient On (Oxygen Delivery Method): nasal cannula  O2 Flow (L/min): 3              I&O's Detail    16 Jul 2022 07:01  -  17 Jul 2022 07:00  --------------------------------------------------------  IN:    Oral Fluid: 300 mL  Total IN: 300 mL    OUT:    Chest Tube (mL): 15 mL    Voided (mL): 1400 mL  Total OUT: 1415 mL    Total NET: -1115 mL      17 Jul 2022 07:01  -  17 Jul 2022 14:24  --------------------------------------------------------  IN:    Oral Fluid: 840 mL  Total IN: 840 mL    OUT:    Voided (mL): 450 mL  Total OUT: 450 mL    Total NET: 390 mL            LABS:    07-16    140  |  106  |  21  ----------------------------<  161<H>  4.0   |  26  |  0.78    Ca    9.2      16 Jul 2022 04:43    TPro  7.0  /  Alb  3.1<L>  /  TBili  0.3  /  DBili  x   /  AST  18  /  ALT  18  /  AlkPhos  69  07-16          CAPILLARY BLOOD GLUCOSE      POCT Blood Glucose.: 132 mg/dL (17 Jul 2022 11:04)        CULTURES:  Rapid RVP Result: Detected (07-15 @ 13:08)            Physical Examination:    General: No acute distress.      HEENT: Pupils equal, reactive to light.  Symmetric.    PULM: Right pigtail catheter in place. Clear to auscultation bilaterally, no significant sputum production    CVS: Regular rate and rhythm, no murmurs, rubs, or gallops    ABD: Soft, nondistended, nontender, normoactive bowel sounds, no masses    EXT: No edema, nontender    SKIN: Warm and well perfused, no rashes noted.    NEURO: Alert, oriented, interactive, nonfocal        RADIOLOGY: < from: Xray Chest 1 View- PORTABLE-Urgent (Xray Chest 1 View- PORTABLE-Urgent .) (07.15.22 @ 15:32) >    ACC: [id number] EXAM:  XR CHEST PORTABLE URGENT 1V                        ACC: [id number] EXAM:  XR CHEST PORTABLE URGENT 1V                          PROCEDURE DATE:  07/15/2022          INTERPRETATION:  AP chest on Dian July 15, 2022 at 1:38 PM. Patient is   short of breath.    Heart normal for projection.    There is a large right pneumothorax new since Dian 10 of this year.    Linear scarring left apex is unchanged.    Follow-up AP erect chest on July 15, 2022 at 3:10 PM.    A catheter right chest tube is been inserted. The pneumothorax has   disappeared. Some residual atelectasis off right lower hilum is seen.    IMPRESSION: Large right pneumothorax successfully treated with catheter   right chest tube. Linear lung densities in the chest remain.    --- End of Report ---            KEVIN VOSS MD; Attending Radiologist  This document has been electronically signed. Jul 15 2022  3:52PM    < end of copied text > Patient is a 77y old  Female who presents with a chief complaint of Rt PTX (17 Jul 2022 12:50)      BRIEF HOSPITAL COURSE: 78 yo Female with h/o COPD/bullous emphysema with a h/o PTX 11/2021 requiring a pigtail and recent COVID PNA, who presented with sudden onset of SOB and chest discomfort after doing strenuous activity. The patient reports that she new that something was wrong with her lung after she moved a heavy object. She first called her Pulmonologist Dr Dowd who instructed her to go to ER, where she was found to have moderate right pneumothorax s/p chest tube placement.      Events last 24 hours: Patient seen sitting OOB in chair, right pigtail to suction with continuous air leak.        PAST MEDICAL & SURGICAL HISTORY:  Hypothyroidism      Diabetes mellitus  diet controlled      Back pain      Asthma      Elevated pancreatic enzyme  patient reports elevated lab 6/2013. Gastroenterologist aware      History of cataract  extracted from right and left      Varicose veins of both lower extremities  surgery      Diverticulosis of intestine without bleeding, unspecified intestinal tract location      Gall stones  gall bladder removed      Urinary urgency      Spinal stenosis of lumbar region      DDD (degenerative disc disease), lumbar      Basal cell carcinoma  removed from face      Pulmonary emphysema, unspecified emphysema type  2017 last exacerbation; never intubated      OA (osteoarthritis)      Frequent UTI  2-3x/year; had an episode 5 weeks ago was treated denies dysuria      GERD (gastroesophageal reflux disease)      Leg swelling      Emphysema lung      COVID-19 vaccine series completed  Moderna - 2nd dose 02/20/2021      Knee pain, left      Scar tissue  left knee S/P TKR      COPD exacerbation      S/P bladder repair  bladder lift 2/2013      Dental disorder  Dental surgery 12/2012, patient reports that upper portion is glued in at this time, plan is to have dental implants placed.      S/P knee surgery  arthroscopy bilateral 2007 and 2010      Skin cancer  to right temple 2012, removed, history of basal cell x 2      H/O lumbosacral spine surgery  fusion 2017      H/O umbilical hernia repair  5/24/19      S/P cholecystectomy  2012      S/P colonoscopy  benign polyp      S/P knee replacement  right 2017, left 11/2019              SOCIAL HISTORY: Lives at home with her . Denies current tobacco use.        Review of Systems:  CONSTITUTIONAL: No fever, chills, or fatigue  EYES: No visual disturbances  ENMT:  No difficulty hearing  NECK: No pain  RESPIRATORY: No cough. No shortness of breath  CARDIOVASCULAR: No chest pain, palpitations, or leg swelling  GASTROINTESTINAL: No abdominal pain. No nausea, vomiting, diarrhea, or constipation. No hematemesis, melena or hematochezia  GENITOURINARY: No dysuria, frequency, hematuria, or incontinence  NEUROLOGICAL: No headaches, loss of strength, numbness, or tremors  SKIN: No rashes  MUSCULOSKELETAL: No back or extremity pain. No calf pain  PSYCHIATRIC: No depression, anxiety, or difficulty sleeping        Medications:    furosemide    Tablet 40 milliGRAM(s) Oral daily      acetaminophen     Tablet .. 650 milliGRAM(s) Oral every 6 hours PRN  gabapentin 300 milliGRAM(s) Oral two times a day  oxyCODONE    IR 5 milliGRAM(s) Oral every 4 hours PRN  oxyCODONE    IR 10 milliGRAM(s) Oral every 4 hours PRN        pantoprazole    Tablet 40 milliGRAM(s) Oral before breakfast      dextrose 50% Injectable 25 Gram(s) IV Push once  dextrose 50% Injectable 12.5 Gram(s) IV Push once  dextrose 50% Injectable 25 Gram(s) IV Push once  dextrose Oral Gel 15 Gram(s) Oral once PRN  glucagon  Injectable 1 milliGRAM(s) IntraMuscular once  insulin lispro (ADMELOG) corrective regimen sliding scale   SubCutaneous three times a day before meals  levothyroxine 137 MICROGram(s) Oral daily    dextrose 5%. 1000 milliLiter(s) IV Continuous <Continuous>  dextrose 5%. 1000 milliLiter(s) IV Continuous <Continuous>                ICU Vital Signs Last 24 Hrs  T(C): 37 (17 Jul 2022 14:00), Max: 37 (17 Jul 2022 14:00)  T(F): 98.6 (17 Jul 2022 14:00), Max: 98.6 (17 Jul 2022 14:00)  HR: 76 (17 Jul 2022 14:00) (51 - 77)  BP: 99/74 (17 Jul 2022 14:00) (89/65 - 132/98)  BP(mean): 80 (17 Jul 2022 14:00) (59 - 107)  ABP: --  ABP(mean): --  RR: 20 (17 Jul 2022 14:00) (13 - 21)  SpO2: 95% (17 Jul 2022 14:00) (92% - 100%)    O2 Parameters below as of 17 Jul 2022 14:00  Patient On (Oxygen Delivery Method): nasal cannula  O2 Flow (L/min): 3              I&O's Detail    16 Jul 2022 07:01  -  17 Jul 2022 07:00  --------------------------------------------------------  IN:    Oral Fluid: 300 mL  Total IN: 300 mL    OUT:    Chest Tube (mL): 15 mL    Voided (mL): 1400 mL  Total OUT: 1415 mL    Total NET: -1115 mL      17 Jul 2022 07:01  -  17 Jul 2022 14:24  --------------------------------------------------------  IN:    Oral Fluid: 840 mL  Total IN: 840 mL    OUT:    Voided (mL): 450 mL  Total OUT: 450 mL    Total NET: 390 mL            LABS:    07-16    140  |  106  |  21  ----------------------------<  161<H>  4.0   |  26  |  0.78    Ca    9.2      16 Jul 2022 04:43    TPro  7.0  /  Alb  3.1<L>  /  TBili  0.3  /  DBili  x   /  AST  18  /  ALT  18  /  AlkPhos  69  07-16          CAPILLARY BLOOD GLUCOSE      POCT Blood Glucose.: 132 mg/dL (17 Jul 2022 11:04)        CULTURES:  Rapid RVP Result: Detected (07-15 @ 13:08)            Physical Examination:    General: No acute distress.      HEENT: Pupils equal, reactive to light.  Symmetric.    PULM: Right pigtail catheter in place. Clear to auscultation bilaterally, no significant sputum production    CVS: Regular rate and rhythm, no murmurs, rubs, or gallops    ABD: Soft, nondistended, nontender, normoactive bowel sounds, no masses    EXT: No edema, nontender    SKIN: Warm and well perfused, no rashes noted.    NEURO: Alert, oriented, interactive, nonfocal        RADIOLOGY: < from: Xray Chest 1 View- PORTABLE-Urgent (Xray Chest 1 View- PORTABLE-Urgent .) (07.15.22 @ 15:32) >    ACC: [id number] EXAM:  XR CHEST PORTABLE URGENT 1V                        ACC: [id number] EXAM:  XR CHEST PORTABLE URGENT 1V                          PROCEDURE DATE:  07/15/2022          INTERPRETATION:  AP chest on Dian July 15, 2022 at 1:38 PM. Patient is   short of breath.    Heart normal for projection.    There is a large right pneumothorax new since Dian 10 of this year.    Linear scarring left apex is unchanged.    Follow-up AP erect chest on July 15, 2022 at 3:10 PM.    A catheter right chest tube is been inserted. The pneumothorax has   disappeared. Some residual atelectasis off right lower hilum is seen.    IMPRESSION: Large right pneumothorax successfully treated with catheter   right chest tube. Linear lung densities in the chest remain.    --- End of Report ---            KEVIN VOSS MD; Attending Radiologist  This document has been electronically signed. Jul 15 2022  3:52PM    < end of copied text >

## 2022-07-17 NOTE — PROGRESS NOTE ADULT - SUBJECTIVE AND OBJECTIVE BOX
Subjective:    pat better, sitting in chair, still on suction.    Home Medications:  furosemide 40 mg oral tablet: 1 tab(s) orally once a day (15 Jul 2022 15:52)  gabapentin 300 mg oral capsule: 1 cap(s) orally 2 times a day (15 Jul 2022 15:52)  levothyroxine 137 mcg (0.137 mg) oral tablet: 1 tab(s) orally once a day (15 Jul 2022 15:58)  lidocaine 5% patch: Apply 1 patch topically to upper leg once daily as needed for neuropathy pain (15 Jul 2022 15:58)  Ozempic 2 mg/1.5 mL (0.25 mg or 0.5 mg dose) subcutaneous solution: 0.5 milligram(s) subcutaneously once a week on Wednesday (15 Jul 2022 15:58)  pantoprazole 40 mg oral delayed release tablet: 1 tab(s) orally once a day (at bedtime), As Needed (15 Jul 2022 15:52)  Synjardy XR 10 mg-1000 mg oral tablet, extended release: 1 tab(s) orally once a day (15 Jul 2022 15:58)  tiotropium 18 mcg inhalation capsule: 1 cap(s) inhaled once a day (15 Jul 2022 15:52)    MEDICATIONS  (STANDING):  dextrose 5%. 1000 milliLiter(s) (50 mL/Hr) IV Continuous <Continuous>  dextrose 5%. 1000 milliLiter(s) (100 mL/Hr) IV Continuous <Continuous>  dextrose 50% Injectable 25 Gram(s) IV Push once  dextrose 50% Injectable 12.5 Gram(s) IV Push once  dextrose 50% Injectable 25 Gram(s) IV Push once  furosemide    Tablet 40 milliGRAM(s) Oral daily  gabapentin 300 milliGRAM(s) Oral two times a day  glucagon  Injectable 1 milliGRAM(s) IntraMuscular once  insulin lispro (ADMELOG) corrective regimen sliding scale   SubCutaneous three times a day before meals  levothyroxine 137 MICROGram(s) Oral daily  pantoprazole    Tablet 40 milliGRAM(s) Oral before breakfast    MEDICATIONS  (PRN):  acetaminophen     Tablet .. 650 milliGRAM(s) Oral every 6 hours PRN Temp greater or equal to 38C (100.4F), Mild Pain (1 - 3)  dextrose Oral Gel 15 Gram(s) Oral once PRN Blood Glucose LESS THAN 70 milliGRAM(s)/deciliter  oxyCODONE    IR 5 milliGRAM(s) Oral every 4 hours PRN Moderate Pain (4 - 6)  oxyCODONE    IR 10 milliGRAM(s) Oral every 4 hours PRN Severe Pain (7 - 10)      Allergies    Breo Ellipta (Rash)    Intolerances        Vital Signs Last 24 Hrs  T(C): 36.5 (17 Jul 2022 10:00), Max: 36.6 (16 Jul 2022 20:00)  T(F): 97.7 (17 Jul 2022 10:00), Max: 97.9 (17 Jul 2022 00:00)  HR: 66 (17 Jul 2022 12:00) (51 - 77)  BP: 98/50 (17 Jul 2022 12:00) (82/56 - 132/98)  BP(mean): 63 (17 Jul 2022 12:00) (59 - 107)  RR: 21 (17 Jul 2022 12:00) (13 - 21)  SpO2: 95% (17 Jul 2022 12:00) (92% - 100%)    Parameters below as of 17 Jul 2022 12:00  Patient On (Oxygen Delivery Method): nasal cannula  O2 Flow (L/min): 3        PHYSICAL EXAMINATION:    NECK:  Supple. No lymphadenopathy. Jugular venous pressure not elevated. Carotids equal.   HEART:   The cardiac impulse has a normal quality. Reg., Nl S1 and S2.  There are no murmurs, rubs or gallops noted  CHEST:  Chest crackles to auscultation. Normal respiratory effort.  ABDOMEN:  Soft and nontender.   EXTREMITIES:  There is no edema.       LABS:                        14.0   7.14  )-----------( 445      ( 15 Jul 2022 13:31 )             41.1     07-16    140  |  106  |  21  ----------------------------<  161<H>  4.0   |  26  |  0.78    Ca    9.2      16 Jul 2022 04:43    TPro  7.0  /  Alb  3.1<L>  /  TBili  0.3  /  DBili  x   /  AST  18  /  ALT  18  /  AlkPhos  69  07-16    PT/INR - ( 15 Jul 2022 13:31 )   PT: 11.9 sec;   INR: 1.03 ratio         PTT - ( 15 Jul 2022 13:31 )  PTT:28.2 sec

## 2022-07-17 NOTE — PROGRESS NOTE ADULT - ASSESSMENT
76 yo Female with h/o COPD emphysema and bullous disease with a h/o PTX on November 2021 requiring a pigtail and recent COVID PNA   presented with SOB and chest discomfort noted to have a moderate Rt PTX.      Plan:    pulmonary satble  isolated cdiff  s/p Rt Pigtail placement to -15 LWCS  Titrate fio2  CXR daily  Aerosols  supportive care  DVT prophylasix

## 2022-07-17 NOTE — PROGRESS NOTE ADULT - ASSESSMENT
78 yo Female with h/o COPD/bullous emphysema with a h/o PTX 11/2021 requiring a pigtail and recent COVID PNA, who presented with sudden onset of SOB and chest discomfort after doing strenuous activity, found to have large right pneumothorax s/p pigtail placement 7/15.    -Given persistent air leak, will continue pigtail to -20cm suction today  -Will reassess for waterseal trial tomorrow pending clinical course  -Repeat CXR in AM  -Encourage mobilization, IC  -Further plan per primary medical team    Discussed with Dr. Gastelum 76 yo Female with h/o COPD/bullous emphysema with a h/o PTX 11/2021 requiring a pigtail and recent COVID PNA, who presented with sudden onset of SOB and chest discomfort after doing strenuous activity, found to have moderate right pneumothorax s/p pigtail placement 7/15.    -Given persistent air leak, will continue pigtail to -20cm suction today  -Will reassess for waterseal trial tomorrow pending clinical course  -Repeat CXR in AM  -Encourage mobilization, IC  -Further plan per primary medical team    Discussed with Dr. Gastelum

## 2022-07-18 DIAGNOSIS — R09.02 HYPOXEMIA: ICD-10-CM

## 2022-07-18 DIAGNOSIS — J43.9 EMPHYSEMA, UNSPECIFIED: ICD-10-CM

## 2022-07-18 DIAGNOSIS — Z01.818 ENCOUNTER FOR OTHER PREPROCEDURAL EXAMINATION: ICD-10-CM

## 2022-07-18 DIAGNOSIS — J93.83 OTHER PNEUMOTHORAX: ICD-10-CM

## 2022-07-18 DIAGNOSIS — J44.9 CHRONIC OBSTRUCTIVE PULMONARY DISEASE, UNSPECIFIED: ICD-10-CM

## 2022-07-18 LAB
ANION GAP SERPL CALC-SCNC: 4 MMOL/L — LOW (ref 5–17)
APTT BLD: 30.7 SEC — SIGNIFICANT CHANGE UP (ref 27.5–35.5)
BUN SERPL-MCNC: 20 MG/DL — SIGNIFICANT CHANGE UP (ref 7–23)
CALCIUM SERPL-MCNC: 9.3 MG/DL — SIGNIFICANT CHANGE UP (ref 8.5–10.1)
CHLORIDE SERPL-SCNC: 105 MMOL/L — SIGNIFICANT CHANGE UP (ref 96–108)
CO2 SERPL-SCNC: 27 MMOL/L — SIGNIFICANT CHANGE UP (ref 22–31)
CREAT SERPL-MCNC: 0.71 MG/DL — SIGNIFICANT CHANGE UP (ref 0.5–1.3)
EGFR: 86 ML/MIN/1.73M2 — SIGNIFICANT CHANGE UP
GLUCOSE SERPL-MCNC: 121 MG/DL — HIGH (ref 70–99)
HCT VFR BLD CALC: 45.1 % — HIGH (ref 34.5–45)
HGB BLD-MCNC: 14.6 G/DL — SIGNIFICANT CHANGE UP (ref 11.5–15.5)
INR BLD: 1.05 RATIO — SIGNIFICANT CHANGE UP (ref 0.88–1.16)
MCHC RBC-ENTMCNC: 30.2 PG — SIGNIFICANT CHANGE UP (ref 27–34)
MCHC RBC-ENTMCNC: 32.4 GM/DL — SIGNIFICANT CHANGE UP (ref 32–36)
MCV RBC AUTO: 93.2 FL — SIGNIFICANT CHANGE UP (ref 80–100)
PLATELET # BLD AUTO: 364 K/UL — SIGNIFICANT CHANGE UP (ref 150–400)
POTASSIUM SERPL-MCNC: 4 MMOL/L — SIGNIFICANT CHANGE UP (ref 3.5–5.3)
POTASSIUM SERPL-SCNC: 4 MMOL/L — SIGNIFICANT CHANGE UP (ref 3.5–5.3)
PROTHROM AB SERPL-ACNC: 12.2 SEC — SIGNIFICANT CHANGE UP (ref 10.5–13.4)
RBC # BLD: 4.84 M/UL — SIGNIFICANT CHANGE UP (ref 3.8–5.2)
RBC # FLD: 14.5 % — SIGNIFICANT CHANGE UP (ref 10.3–14.5)
SODIUM SERPL-SCNC: 136 MMOL/L — SIGNIFICANT CHANGE UP (ref 135–145)
WBC # BLD: 10.67 K/UL — HIGH (ref 3.8–10.5)
WBC # FLD AUTO: 10.67 K/UL — HIGH (ref 3.8–10.5)

## 2022-07-18 PROCEDURE — 99233 SBSQ HOSP IP/OBS HIGH 50: CPT

## 2022-07-18 PROCEDURE — 99223 1ST HOSP IP/OBS HIGH 75: CPT

## 2022-07-18 PROCEDURE — 71045 X-RAY EXAM CHEST 1 VIEW: CPT | Mod: 26

## 2022-07-18 PROCEDURE — 93306 TTE W/DOPPLER COMPLETE: CPT | Mod: 26

## 2022-07-18 RX ORDER — ENOXAPARIN SODIUM 100 MG/ML
40 INJECTION SUBCUTANEOUS EVERY 24 HOURS
Refills: 0 | Status: DISCONTINUED | OUTPATIENT
Start: 2022-07-18 | End: 2022-07-20

## 2022-07-18 RX ADMIN — Medication 2: at 15:36

## 2022-07-18 RX ADMIN — OXYCODONE HYDROCHLORIDE 5 MILLIGRAM(S): 5 TABLET ORAL at 20:32

## 2022-07-18 RX ADMIN — BUDESONIDE AND FORMOTEROL FUMARATE DIHYDRATE 2 PUFF(S): 160; 4.5 AEROSOL RESPIRATORY (INHALATION) at 20:45

## 2022-07-18 RX ADMIN — GABAPENTIN 300 MILLIGRAM(S): 400 CAPSULE ORAL at 09:40

## 2022-07-18 RX ADMIN — OXYCODONE HYDROCHLORIDE 10 MILLIGRAM(S): 5 TABLET ORAL at 09:40

## 2022-07-18 RX ADMIN — OXYCODONE HYDROCHLORIDE 10 MILLIGRAM(S): 5 TABLET ORAL at 15:30

## 2022-07-18 RX ADMIN — GABAPENTIN 300 MILLIGRAM(S): 400 CAPSULE ORAL at 22:03

## 2022-07-18 RX ADMIN — PANTOPRAZOLE SODIUM 40 MILLIGRAM(S): 20 TABLET, DELAYED RELEASE ORAL at 06:24

## 2022-07-18 RX ADMIN — Medication 650 MILLIGRAM(S): at 07:24

## 2022-07-18 RX ADMIN — Medication 40 MILLIGRAM(S): at 06:25

## 2022-07-18 RX ADMIN — Medication 137 MICROGRAM(S): at 06:24

## 2022-07-18 RX ADMIN — OXYCODONE HYDROCHLORIDE 10 MILLIGRAM(S): 5 TABLET ORAL at 15:00

## 2022-07-18 RX ADMIN — Medication 650 MILLIGRAM(S): at 06:24

## 2022-07-18 RX ADMIN — OXYCODONE HYDROCHLORIDE 5 MILLIGRAM(S): 5 TABLET ORAL at 19:32

## 2022-07-18 RX ADMIN — OXYCODONE HYDROCHLORIDE 10 MILLIGRAM(S): 5 TABLET ORAL at 10:10

## 2022-07-18 RX ADMIN — BUDESONIDE AND FORMOTEROL FUMARATE DIHYDRATE 2 PUFF(S): 160; 4.5 AEROSOL RESPIRATORY (INHALATION) at 09:56

## 2022-07-18 RX ADMIN — TIOTROPIUM BROMIDE 1 CAPSULE(S): 18 CAPSULE ORAL; RESPIRATORY (INHALATION) at 09:56

## 2022-07-18 RX ADMIN — ENOXAPARIN SODIUM 40 MILLIGRAM(S): 100 INJECTION SUBCUTANEOUS at 09:38

## 2022-07-18 NOTE — PROGRESS NOTE ADULT - SUBJECTIVE AND OBJECTIVE BOX
Subjective:  awake and alert  some pain at chest tube site  no respiratory distress    MEDICATIONS  (STANDING):  budesonide 160 MICROgram(s)/formoterol 4.5 MICROgram(s) Inhaler 2 Puff(s) Inhalation two times a day  dextrose 5%. 1000 milliLiter(s) (100 mL/Hr) IV Continuous <Continuous>  dextrose 5%. 1000 milliLiter(s) (50 mL/Hr) IV Continuous <Continuous>  dextrose 50% Injectable 25 Gram(s) IV Push once  dextrose 50% Injectable 12.5 Gram(s) IV Push once  dextrose 50% Injectable 25 Gram(s) IV Push once  enoxaparin Injectable 40 milliGRAM(s) SubCutaneous every 24 hours  furosemide    Tablet 40 milliGRAM(s) Oral daily  gabapentin 300 milliGRAM(s) Oral two times a day  glucagon  Injectable 1 milliGRAM(s) IntraMuscular once  insulin lispro (ADMELOG) corrective regimen sliding scale   SubCutaneous three times a day before meals  levothyroxine 137 MICROGram(s) Oral daily  pantoprazole    Tablet 40 milliGRAM(s) Oral before breakfast  tiotropium 18 MICROgram(s) Capsule 1 Capsule(s) Inhalation daily    MEDICATIONS  (PRN):  acetaminophen     Tablet .. 650 milliGRAM(s) Oral every 6 hours PRN Temp greater or equal to 38C (100.4F), Mild Pain (1 - 3)  dextrose Oral Gel 15 Gram(s) Oral once PRN Blood Glucose LESS THAN 70 milliGRAM(s)/deciliter  oxyCODONE    IR 5 milliGRAM(s) Oral every 4 hours PRN Moderate Pain (4 - 6)  oxyCODONE    IR 10 milliGRAM(s) Oral every 4 hours PRN Severe Pain (7 - 10)      Allergies    Breo Ellipta (Rash)    Intolerances        REVIEW OF SYSTEMS:    CONSTITUTIONAL:  As per HPI.  HEENT:  Eyes:  No diplopia or blurred vision. ENT:  No earache, sore throat or runny nose.  CARDIOVASCULAR:  No pressure, squeezing, tightness, heaviness or aching about the chest, neck, axilla or epigastrium.  RESPIRATORY:  No cough, shortness of breath, PND or orthopnea.  GASTROINTESTINAL:  No nausea, vomiting or diarrhea.  GENITOURINARY:  No dysuria, frequency or urgency.  MUSCULOSKELETAL:  no joint pain, deformity, tenderness  EXTREMITIES: no clubbing cyanosis,edema  SKIN:  No change in skin, hair or nails.  NEUROLOGIC:  No paresthesias, fasciculations, seizures or weakness.  PSYCHIATRIC:  No disorder of thought or mood.  ENDOCRINE:  No heat or cold intolerance, polyuria or polydipsia.  HEMATOLOGICAL:  No easy bruising or bleedings:    Vital Signs Last 24 Hrs  T(C): 36.5 (18 Jul 2022 06:00), Max: 37.1 (17 Jul 2022 17:00)  T(F): 97.7 (18 Jul 2022 06:00), Max: 98.8 (17 Jul 2022 17:00)  HR: 81 (18 Jul 2022 08:00) (60 - 92)  BP: 115/60 (18 Jul 2022 08:00) (89/65 - 123/60)  BP(mean): 68 (18 Jul 2022 08:00) (62 - 80)  RR: 19 (18 Jul 2022 08:00) (16 - 30)  SpO2: 95% (18 Jul 2022 08:00) (93% - 97%)    Parameters below as of 18 Jul 2022 08:00  Patient On (Oxygen Delivery Method): nasal cannula  O2 Flow (L/min): 3      PHYSICAL EXAMINATION:  SKIN: no rashes  HEAD: NC/AT  EYES: PERRLA, EOMI  EARS: TM's intact  NOSE: no abnormalities  NECK:  Supple. No lymphadenopathy. Jugular venous pressure not elevated. Carotids equal.   HEART:  S1S2 reg  CHEST:  right sided ronchi; chest tube w small leak  ABDOMEN:  Soft and nontender.   EXTREMITIES:  no C/C/E  NEURO: AAO x 3, no focal deficts       LABS:                RADIOLOGY & ADDITIONAL TESTS:

## 2022-07-18 NOTE — PROGRESS NOTE ADULT - ASSESSMENT
- cxr shows re-expansion right lung  - chest tube w small leak  - will d/w thoracic re bullectomy/pleuradesis  - preop florinda Mccullough  - cont symbicort  - on O2  - dvt proph

## 2022-07-18 NOTE — CONSULT NOTE ADULT - PROBLEM SELECTOR RECOMMENDATION 9
Discussed w/ Dr. Fabio Kim, pt's cardiologist.  Stress & echo in past year normal.  No new cardiac symptos.  ECG & echo normal.  May proceed w/ surgery w/o further cardiac testing - pt is "cleared"  Intermediate risk for cardiac events periop.

## 2022-07-18 NOTE — CONSULT NOTE ADULT - SUBJECTIVE AND OBJECTIVE BOX
HPI:  78 yo Female with h/o COPD emphysema and bullous disease with a h/o   PTX on November 2021 requiring a pigtail and recent COVID PNA   presented with SOB and chest discomfort after doing strenuous activity today.  The patient reports that she new that something was wrong with her lung after she moved a heavy object. She first called Dr Hunter who instructed her to go to ER.  She called 911 (15 Jul 2022 16:21)    Admitted for moderate right PTX.  H/o prior PTX Nov '21.  R pigtail placed, admitted to CT surgery.  Dr. hunter her pulmonologist & PCP was consulted.  Plan for thoracic re bullectomy & pleuradesis.  Dr. Hunter noted pt has a cardiologist w/ [facility] & consulted me for clearance.    Pt reports chest pain 2/2 PTX but no angina.  Chronic dyspnea no CHF symptoms. No palpitations.  Functional capacity limited by emphysema & recent problems w/ PTX.  reports cardiac testing w/ [facility] 1 yr ago as a "checkup"  including echo stress test which was normal.    Spoke w/ Dr. vivar who saw her 1 yr ago  He reports June '21 echo w/ EF normal mild valv disease  nuclear june '21 normal perfusion.    PAST MEDICAL AND SURGICAL HISTORY:  PAST MEDICAL & SURGICAL HISTORY:  Hypothyroidism      Diabetes mellitus  diet controlled      Back pain      Asthma      Elevated pancreatic enzyme  patient reports elevated lab 6/2013. Gastroenterologist aware      History of cataract  extracted from right and left      Varicose veins of both lower extremities  surgery      Diverticulosis of intestine without bleeding, unspecified intestinal tract location      Gall stones  gall bladder removed      Urinary urgency      Spinal stenosis of lumbar region      DDD (degenerative disc disease), lumbar      Basal cell carcinoma  removed from face      Pulmonary emphysema, unspecified emphysema type  2017 last exacerbation; never intubated      OA (osteoarthritis)      Frequent UTI  2-3x/year; had an episode 5 weeks ago was treated denies dysuria      GERD (gastroesophageal reflux disease)      Leg swelling      Emphysema lung      COVID-19 vaccine series completed  Moderna - 2nd dose 02/20/2021      Knee pain, left      Scar tissue  left knee S/P TKR      COPD exacerbation      S/P bladder repair  bladder lift 2/2013      Dental disorder  Dental surgery 12/2012, patient reports that upper portion is glued in at this time, plan is to have dental implants placed.      S/P knee surgery  arthroscopy bilateral 2007 and 2010      Skin cancer  to right temple 2012, removed, history of basal cell x 2      H/O lumbosacral spine surgery  fusion 2017      H/O umbilical hernia repair  5/24/19      S/P cholecystectomy  2012      S/P colonoscopy  benign polyp      S/P knee replacement  right 2017, left 11/2019          ALLERGIES:  Allergies    Breo Ellipta (Rash)    Intolerances        SOCIAL HISTORY:  Social History:     lives with   denies smoking ETOH (15 Jul 2022 16:21)      FAMILY  HISTORY:  FAMILY HISTORY:  Family history of pancreatic cancer (Father)    Family history of heart disease (Mother)    Family history of stroke (Mother)        MEDICATIONS:  OUTPATIENT:  Home Medications:  furosemide 40 mg oral tablet: 1 tab(s) orally once a day (15 Jul 2022 15:52)  gabapentin 300 mg oral capsule: 1 cap(s) orally 2 times a day (15 Jul 2022 15:52)  levothyroxine 137 mcg (0.137 mg) oral tablet: 1 tab(s) orally once a day (15 Jul 2022 15:58)  lidocaine 5% patch: Apply 1 patch topically to upper leg once daily as needed for neuropathy pain (15 Jul 2022 15:58)  Ozempic 2 mg/1.5 mL (0.25 mg or 0.5 mg dose) subcutaneous solution: 0.5 milligram(s) subcutaneously once a week on Wednesday (15 Jul 2022 15:58)  pantoprazole 40 mg oral delayed release tablet: 1 tab(s) orally once a day (at bedtime), As Needed (15 Jul 2022 15:52)  Synjardy XR 10 mg-1000 mg oral tablet, extended release: 1 tab(s) orally once a day (15 Jul 2022 15:58)  tiotropium 18 mcg inhalation capsule: 1 cap(s) inhaled once a day (15 Jul 2022 15:52)      INPATIENT:  MEDICATIONS  (STANDING):  budesonide 160 MICROgram(s)/formoterol 4.5 MICROgram(s) Inhaler 2 Puff(s) Inhalation two times a day  dextrose 5%. 1000 milliLiter(s) (100 mL/Hr) IV Continuous <Continuous>  dextrose 5%. 1000 milliLiter(s) (50 mL/Hr) IV Continuous <Continuous>  dextrose 50% Injectable 25 Gram(s) IV Push once  dextrose 50% Injectable 12.5 Gram(s) IV Push once  dextrose 50% Injectable 25 Gram(s) IV Push once  enoxaparin Injectable 40 milliGRAM(s) SubCutaneous every 24 hours  furosemide    Tablet 40 milliGRAM(s) Oral daily  gabapentin 300 milliGRAM(s) Oral two times a day  glucagon  Injectable 1 milliGRAM(s) IntraMuscular once  insulin lispro (ADMELOG) corrective regimen sliding scale   SubCutaneous three times a day before meals  levothyroxine 137 MICROGram(s) Oral daily  pantoprazole    Tablet 40 milliGRAM(s) Oral before breakfast  tiotropium 18 MICROgram(s) Capsule 1 Capsule(s) Inhalation daily    MEDICATIONS  (PRN):  acetaminophen     Tablet .. 650 milliGRAM(s) Oral every 6 hours PRN Temp greater or equal to 38C (100.4F), Mild Pain (1 - 3)  dextrose Oral Gel 15 Gram(s) Oral once PRN Blood Glucose LESS THAN 70 milliGRAM(s)/deciliter  oxyCODONE    IR 5 milliGRAM(s) Oral every 4 hours PRN Moderate Pain (4 - 6)  oxyCODONE    IR 10 milliGRAM(s) Oral every 4 hours PRN Severe Pain (7 - 10)    MEDICATIONS  (PRN):  acetaminophen     Tablet .. 650 milliGRAM(s) Oral every 6 hours PRN Temp greater or equal to 38C (100.4F), Mild Pain (1 - 3)  dextrose Oral Gel 15 Gram(s) Oral once PRN Blood Glucose LESS THAN 70 milliGRAM(s)/deciliter  oxyCODONE    IR 5 milliGRAM(s) Oral every 4 hours PRN Moderate Pain (4 - 6)  oxyCODONE    IR 10 milliGRAM(s) Oral every 4 hours PRN Severe Pain (7 - 10)      REVIEW OF SYSTEMS:  ===============================  ===============================  CONSTITUTIONAL: No weakness, fevers or chills  EYES/ENT: No visual changes;  No vertigo or throat pain   NECK: No pain or stiffness  RESPIRATORY: No cough, wheezing, hemoptysis; No shortness of breath  CARDIOVASCULAR: No chest pain or palpitations  GASTROINTESTINAL: No abdominal or epigastric pain. No nausea, vomiting, or hematemesis;   No diarrhea or constipation. No melena or hematochezia.  GENITOURINARY: No dysuria, frequency or hematuria  NEUROLOGICAL: No numbness or weakness  SKIN: No itching, burning, rashes, or lesions   All other review of systems is negative unless indicated above    Vital Signs Last 24 Hrs  T(C): 36.3 (18 Jul 2022 14:00), Max: 36.8 (18 Jul 2022 10:00)  T(F): 97.3 (18 Jul 2022 14:00), Max: 98.3 (18 Jul 2022 10:00)  HR: 77 (18 Jul 2022 16:00) (62 - 100)  BP: 124/54 (18 Jul 2022 16:00) (90/73 - 126/63)  BP(mean): 70 (18 Jul 2022 16:00) (62 - 91)  RR: 18 (18 Jul 2022 16:00) (13 - 34)  SpO2: 93% (18 Jul 2022 16:00) (91% - 97%)    Parameters below as of 18 Jul 2022 16:00  Patient On (Oxygen Delivery Method): nasal cannula  O2 Flow (L/min): 3      I&O's Summary    17 Jul 2022 07:01  -  18 Jul 2022 07:00  --------------------------------------------------------  IN: 2550 mL / OUT: 3316 mL / NET: -766 mL    18 Jul 2022 07:01  -  18 Jul 2022 17:47  --------------------------------------------------------  IN: 1200 mL / OUT: 800 mL / NET: 400 mL        I&O's Detail    17 Jul 2022 07:01  -  18 Jul 2022 07:00  --------------------------------------------------------  IN:    Oral Fluid: 2550 mL  Total IN: 2550 mL    OUT:    Chest Tube (mL): 16 mL    Voided (mL): 3300 mL  Total OUT: 3316 mL    Total NET: -766 mL      18 Jul 2022 07:01  -  18 Jul 2022 17:47  --------------------------------------------------------  IN:    Oral Fluid: 1200 mL  Total IN: 1200 mL    OUT:    Voided (mL): 800 mL  Total OUT: 800 mL    Total NET: 400 mL          PHYSICAL EXAM:    Constitutional: NAD, awake and alert, well-developed  HEENT: PERR, EOMI,  No oral cyananosis.  Neck:  supple,  No JVD  Respiratory: Breath sounds are clear bilaterally, No wheezing, rales or rhonchi  Cardiovascular: S1 and S2, regular rate and rhythm, no Murmurs, gallops or rubs  Gastrointestinal: Bowel Sounds present, soft, nontender.   Extremities: No peripheral edema. No clubbing or cyanosis.  Vascular: 2+ peripheral pulses  Neurological: A/O x 3, no focal deficits  Musculoskeletal: no calf tenderness.  Skin: No rashes.    ===============================  ===============================  LABS:                         14.6   10.67 )-----------( 364      ( 18 Jul 2022 11:51 )             45.1     18 Jul 2022 11:51    136    |  105    |  20     ----------------------------<  121    4.0     |  27     |  0.71   16 Jul 2022 04:43    140    |  106    |  21     ----------------------------<  161    4.0     |  26     |  0.78     Ca    9.3        18 Jul 2022 11:51  Ca    9.2        16 Jul 2022 04:43    TPro  7.0    /  Alb  3.1    /  TBili  0.3    /  DBili  x      /  AST  18     /  ALT  18     /  AlkPhos  69     16 Jul 2022 04:43    PT/INR - ( 18 Jul 2022 11:51 )   PT: 12.2 sec;   INR: 1.05 ratio         PTT - ( 18 Jul 2022 11:51 )  PTT:30.7 sec    THYROID STUDIES:    ===============================  ===============================  CARDIAC BIOMARKERS:  -------  -BNP VALUES:  06-10 @ 19:45  Pro Bnp 254  11-05 @ 11:31  Pro Bnp 154    -------  -TROPONIN VALUES:   Troponin I, High Sensitivity Result: 7.28 ng/L (07-15-22 @ 15:31)  Troponin I, High Sensitivity Result: 6.41 ng/L (07-15-22 @ 13:31)  Troponin I, High Sensitivity Result: 7.88 ng/L (06-10-22 @ 19:45)  Troponin I, High Sensitivity Result: 11.08 ng/L (11-05-21 @ 11:31)  ===============================  ===============================  EKG: sinus no ischemia    TELE: no events.    ===============================  ECHO 07/18/2022:     Impression     Summary     Mild aortic sclerosis is present with normal valvular opening.   Trace aortic regurgitation is present.   Normal appearing left atrium.   The left ventricle is normal in size, wall thickness, wall motion and   contractility.   Estimated left ventricular ejection fraction is 70 %.   The IVC appears normal.   Mild mitral annular calcification is present.   The mitral valve leaflets appear thickened.   EA reversal of the mitral inflow consistent with reduced compliance of   the   left ventricle.   Trace mitral regurgitation is present.   No evidence of pericardial effusion.   Normal appearing pulmonic valve structure.   Normal appearing right atrium.   Normal appearing right ventricle structure and function.   The tricuspid valve leaflets are thin and pliable; valve motion is   normal.   Mild to Moderate Tricuspid regurgitation is present.   Mild pulmonary hypertension.     Signature     ----------------------------------------------------------------   Electronically signed by Raul Fraire MD(Interpreting   physician) on 07/18/2022 03:46 PM   ----------------------------------------------------------------  ===============================    Oscar Martins M.D.  Cardiology, [facility]  Cell: [phone]  Office:   [phone] ([facility] Office)  [phone] ([facility] Office)    ===============================

## 2022-07-18 NOTE — PROGRESS NOTE ADULT - SUBJECTIVE AND OBJECTIVE BOX
Subjective:  Pt seen, no new complaints. Seen w Dr. Dowd.     Vital Signs:  Vital Signs Last 24 Hrs  T(C): 36.8 (07-18-22 @ 10:00), Max: 37.1 (07-17-22 @ 17:00)  T(F): 98.3 (07-18-22 @ 10:00), Max: 98.8 (07-17-22 @ 17:00)  HR: 62 (07-18-22 @ 10:00) (61 - 92)  BP: 114/81 (07-18-22 @ 10:00) (89/65 - 123/60)  RR: 13 (07-18-22 @ 10:00) (13 - 30)  SpO2: 97% (07-18-22 @ 10:00) (93% - 97%) on (O2)    Telemetry/Alarms:    Relevant labs, radiology and Medications reviewed            MEDICATIONS  (STANDING):  budesonide 160 MICROgram(s)/formoterol 4.5 MICROgram(s) Inhaler 2 Puff(s) Inhalation two times a day  dextrose 5%. 1000 milliLiter(s) (100 mL/Hr) IV Continuous <Continuous>  dextrose 5%. 1000 milliLiter(s) (50 mL/Hr) IV Continuous <Continuous>  dextrose 50% Injectable 25 Gram(s) IV Push once  dextrose 50% Injectable 12.5 Gram(s) IV Push once  dextrose 50% Injectable 25 Gram(s) IV Push once  enoxaparin Injectable 40 milliGRAM(s) SubCutaneous every 24 hours  furosemide    Tablet 40 milliGRAM(s) Oral daily  gabapentin 300 milliGRAM(s) Oral two times a day  glucagon  Injectable 1 milliGRAM(s) IntraMuscular once  insulin lispro (ADMELOG) corrective regimen sliding scale   SubCutaneous three times a day before meals  levothyroxine 137 MICROGram(s) Oral daily  pantoprazole    Tablet 40 milliGRAM(s) Oral before breakfast  tiotropium 18 MICROgram(s) Capsule 1 Capsule(s) Inhalation daily    MEDICATIONS  (PRN):  acetaminophen     Tablet .. 650 milliGRAM(s) Oral every 6 hours PRN Temp greater or equal to 38C (100.4F), Mild Pain (1 - 3)  dextrose Oral Gel 15 Gram(s) Oral once PRN Blood Glucose LESS THAN 70 milliGRAM(s)/deciliter  oxyCODONE    IR 5 milliGRAM(s) Oral every 4 hours PRN Moderate Pain (4 - 6)  oxyCODONE    IR 10 milliGRAM(s) Oral every 4 hours PRN Severe Pain (7 - 10)      Physical exam  Gen NAD  HEENT no cyanosis, NC, AT  neck supple  Neuro AAOx3  Card RRR  Pulm equal  Abd soft  Ext warm  skin no rashes  psych normal affect    Tubes: right pigtail -20 suction, + AL    I&O's Summary    17 Jul 2022 07:01  -  18 Jul 2022 07:00  --------------------------------------------------------  IN: 2550 mL / OUT: 3316 mL / NET: -766 mL        Assessment  77y Female  w/ PAST MEDICAL & SURGICAL HISTORY:  Hypothyroidism      Diabetes mellitus  diet controlled      Back pain      Asthma      Elevated pancreatic enzyme  patient reports elevated lab 6/2013. Gastroenterologist aware      History of cataract  extracted from right and left      Varicose veins of both lower extremities  surgery      Diverticulosis of intestine without bleeding, unspecified intestinal tract location      Gall stones  gall bladder removed      Urinary urgency      Spinal stenosis of lumbar region      DDD (degenerative disc disease), lumbar      Basal cell carcinoma  removed from face      Pulmonary emphysema, unspecified emphysema type  2017 last exacerbation; never intubated      OA (osteoarthritis)      Frequent UTI  2-3x/year; had an episode 5 weeks ago was treated denies dysuria      GERD (gastroesophageal reflux disease)      Leg swelling      Emphysema lung      COVID-19 vaccine series completed  Moderna - 2nd dose 02/20/2021      Knee pain, left      Scar tissue  left knee S/P TKR      COPD exacerbation      S/P bladder repair  bladder lift 2/2013      Dental disorder  Dental surgery 12/2012, patient reports that upper portion is glued in at this time, plan is to have dental implants placed.      S/P knee surgery  arthroscopy bilateral 2007 and 2010      Skin cancer  to right temple 2012, removed, history of basal cell x 2      H/O lumbosacral spine surgery  fusion 2017      H/O umbilical hernia repair  5/24/19      S/P cholecystectomy  2012      S/P colonoscopy  benign polyp      S/P knee replacement  right 2017, left 11/2019      admitted with complaints of Patient is a 77y old  Female who presents with a chief complaint of Rt PTX (18 Jul 2022 08:43)  .  76 yo Female with h/o COPD/bullous emphysema with a h/o PTX 11/2021 requiring a pigtail and recent COVID PNA, who presented with sudden onset of SOB and chest discomfort after doing strenuous activity, found to have moderate right pneumothorax s/p pigtail placement 7/15.    Pt w b/l multiple blebs on CT  second PTX occurance  plan for Rt VATS bleb resection, pleurodesis 7/19  labs ordered  npo p mn\  medical clearance requested  cardiology clearance requested by medical team      Discussed with Cardiothoracic Team at AM rounds.

## 2022-07-19 ENCOUNTER — APPOINTMENT (OUTPATIENT)
Dept: INTERNAL MEDICINE | Facility: CLINIC | Age: 77
End: 2022-07-19

## 2022-07-19 PROCEDURE — 71045 X-RAY EXAM CHEST 1 VIEW: CPT | Mod: 26

## 2022-07-19 PROCEDURE — 99233 SBSQ HOSP IP/OBS HIGH 50: CPT

## 2022-07-19 PROCEDURE — 99231 SBSQ HOSP IP/OBS SF/LOW 25: CPT

## 2022-07-19 RX ADMIN — GABAPENTIN 300 MILLIGRAM(S): 400 CAPSULE ORAL at 09:05

## 2022-07-19 RX ADMIN — Medication 650 MILLIGRAM(S): at 05:46

## 2022-07-19 RX ADMIN — Medication 40 MILLIGRAM(S): at 05:47

## 2022-07-19 RX ADMIN — Medication 2: at 16:19

## 2022-07-19 RX ADMIN — GABAPENTIN 300 MILLIGRAM(S): 400 CAPSULE ORAL at 21:28

## 2022-07-19 RX ADMIN — OXYCODONE HYDROCHLORIDE 10 MILLIGRAM(S): 5 TABLET ORAL at 23:49

## 2022-07-19 RX ADMIN — PANTOPRAZOLE SODIUM 40 MILLIGRAM(S): 20 TABLET, DELAYED RELEASE ORAL at 06:10

## 2022-07-19 RX ADMIN — OXYCODONE HYDROCHLORIDE 10 MILLIGRAM(S): 5 TABLET ORAL at 17:30

## 2022-07-19 RX ADMIN — OXYCODONE HYDROCHLORIDE 10 MILLIGRAM(S): 5 TABLET ORAL at 08:10

## 2022-07-19 RX ADMIN — BUDESONIDE AND FORMOTEROL FUMARATE DIHYDRATE 2 PUFF(S): 160; 4.5 AEROSOL RESPIRATORY (INHALATION) at 08:59

## 2022-07-19 RX ADMIN — Medication 650 MILLIGRAM(S): at 06:16

## 2022-07-19 RX ADMIN — TIOTROPIUM BROMIDE 1 CAPSULE(S): 18 CAPSULE ORAL; RESPIRATORY (INHALATION) at 09:00

## 2022-07-19 RX ADMIN — Medication 137 MICROGRAM(S): at 05:47

## 2022-07-19 RX ADMIN — OXYCODONE HYDROCHLORIDE 10 MILLIGRAM(S): 5 TABLET ORAL at 18:00

## 2022-07-19 RX ADMIN — OXYCODONE HYDROCHLORIDE 10 MILLIGRAM(S): 5 TABLET ORAL at 07:40

## 2022-07-19 NOTE — CDI QUERY NOTE - NSCDIOTHERTXTBX_GEN_ALL_CORE_HH
The Physician's or Provider's documentation of the patient's presentation, evaluation and medical management, as identified below, may support a diagnosis that is not documented to the furthest specificity in the medical record. Please clarify in your progress notes and/or discharge summary if there is a corresponding diagnosis associated with the clinical information described below:    Can the patient's respiratory status be clarified if known?    -Acute respiratory failure                                       -Chronic respiratory failure                               -Acute on chronic respiratory failure    -Acute Respiratory Distress Syndrome  -Other (specify)  -Unknown    ALSO: Specify if with:  Hypoxia  Hypercapnea        SUPPORTING DOCUMENTATION AND/OR CLINICAL EVIDENCE:    RR: 16 (07-16-22 @ 07:00) (15 - 25)  SpO2: 94% (07-16-22 @ 07:00) (90% - 95%) on (O2)    RR: 11 (07-19-22) (11 - 34)  SpO2: 96% (07-19-22) (90% - 100%)          ED nurse documentation on 7/15/2022:  Comments: ANGELINA Cuadra placed right chest tube; pt pre & post medicated for pain. pt 92% on 4lpm NC; PA aware. hx-COPD; pt states normal 02 88-92%. Chest tube to low continuous wall suction & air leak noted in chest tube chamber. ANGELINA Cuadra aware & states this is to be expected in this patient. will ctm.      ED provider documentation on 7/15/2022:  · Chief Complaint: The patient is a 77y Female complaining of shortness of breath.  · HPI Objective Statement: 78 y/o F with PMH of COPD not on home O2, GERD, hypothyroid, DM, pneumothorax 11/21 s/p chest tube presents with SOB today. Pt states she went for a long walk, then was fixing the foot rests on a wheelchair, after which she started to feel SOB. States she feels mild improvement since ED arrival, but continues to feel SOB. States her baseline O2 is 88-91% at home. States symptoms do not feel like when she had pneumothorax in past. Denies trauma, fever, chills, cough, CP, palpitations.      Pulmonary documentation on 7/18/2022:   Problem/Plan - 1:  ·  Problem: Recurrent spontaneous pneumothorax.      Problem/Plan - 2:  ·  Problem: COPD, moderate.      Problem/Plan - 3:  ·  Problem: Lung disease, bullous.      Problem/Plan - 4:  ·  Problem: Hypoxemia requiring supplemental oxygen.

## 2022-07-19 NOTE — PRE-OP CHECKLIST - ADVANCE DIRECTIVE ADDRESSED/READDRESSED
Caller: IAN MUNIZ RN WITH [facility]    Relationship: [facility]    Best call back number: [phone]    What orders are you requesting (i.e. lab or imaging): SKILLED NURSING AND PHYSICAL THERAPY/ OCCUPATIONAL THERAPY EVALUATION    In what timeframe would the patient need to come in: ASAP    Where will you receive your lab/imaging services: IN HOME    Additional notes: IAN MUNIZ RN WITH [facility] STATES PATIENT WAS DISCHARGED FROM [facility] ON 09/22/2021 AND IS ASKING FOR VERBAL ORDERS FOR THE ABOVE, PLEASE ADVISE ASAP    A message was left authorizing the above   done

## 2022-07-19 NOTE — PROGRESS NOTE ADULT - SUBJECTIVE AND OBJECTIVE BOX
Subjective:  awake and alert  no distress    MEDICATIONS  (STANDING):  budesonide 160 MICROgram(s)/formoterol 4.5 MICROgram(s) Inhaler 2 Puff(s) Inhalation two times a day  dextrose 5%. 1000 milliLiter(s) (100 mL/Hr) IV Continuous <Continuous>  dextrose 5%. 1000 milliLiter(s) (50 mL/Hr) IV Continuous <Continuous>  dextrose 50% Injectable 25 Gram(s) IV Push once  dextrose 50% Injectable 12.5 Gram(s) IV Push once  dextrose 50% Injectable 25 Gram(s) IV Push once  enoxaparin Injectable 40 milliGRAM(s) SubCutaneous every 24 hours  furosemide    Tablet 40 milliGRAM(s) Oral daily  gabapentin 300 milliGRAM(s) Oral two times a day  glucagon  Injectable 1 milliGRAM(s) IntraMuscular once  insulin lispro (ADMELOG) corrective regimen sliding scale   SubCutaneous three times a day before meals  levothyroxine 137 MICROGram(s) Oral daily  pantoprazole    Tablet 40 milliGRAM(s) Oral before breakfast  tiotropium 18 MICROgram(s) Capsule 1 Capsule(s) Inhalation daily    MEDICATIONS  (PRN):  acetaminophen     Tablet .. 650 milliGRAM(s) Oral every 6 hours PRN Temp greater or equal to 38C (100.4F), Mild Pain (1 - 3)  dextrose Oral Gel 15 Gram(s) Oral once PRN Blood Glucose LESS THAN 70 milliGRAM(s)/deciliter  oxyCODONE    IR 5 milliGRAM(s) Oral every 4 hours PRN Moderate Pain (4 - 6)  oxyCODONE    IR 10 milliGRAM(s) Oral every 4 hours PRN Severe Pain (7 - 10)      Allergies    Breo Ellipta (Rash)    Intolerances        REVIEW OF SYSTEMS:    CONSTITUTIONAL:  As per HPI.  HEENT:  Eyes:  No diplopia or blurred vision. ENT:  No earache, sore throat or runny nose.  CARDIOVASCULAR:  No pressure, squeezing, tightness, heaviness or aching about the chest, neck, axilla or epigastrium.  RESPIRATORY:  No cough, shortness of breath, PND or orthopnea.  GASTROINTESTINAL:  No nausea, vomiting or diarrhea.  GENITOURINARY:  No dysuria, frequency or urgency.  MUSCULOSKELETAL:  no joint pain, deformity, tenderness  EXTREMITIES: no clubbing cyanosis,edema  SKIN:  No change in skin, hair or nails.  NEUROLOGIC:  No paresthesias, fasciculations, seizures or weakness.  PSYCHIATRIC:  No disorder of thought or mood.  ENDOCRINE:  No heat or cold intolerance, polyuria or polydipsia.  HEMATOLOGICAL:  No easy bruising or bleedings:    Vital Signs Last 24 Hrs  T(C): 36.7 (19 Jul 2022 06:00), Max: 36.8 (18 Jul 2022 10:00)  T(F): 98 (19 Jul 2022 06:00), Max: 98.3 (18 Jul 2022 10:00)  HR: 71 (19 Jul 2022 07:00) (58 - 100)  BP: 95/64 (19 Jul 2022 07:00) (95/64 - 126/63)  BP(mean): 71 (19 Jul 2022 07:00) (59 - 91)  RR: 14 (19 Jul 2022 07:00) (13 - 34)  SpO2: 93% (19 Jul 2022 07:00) (91% - 97%)    Parameters below as of 19 Jul 2022 07:00  Patient On (Oxygen Delivery Method): nasal cannula  O2 Flow (L/min): 3      PHYSICAL EXAMINATION:  SKIN: no rashes  HEAD: NC/AT  EYES: PERRLA, EOMI  EARS: TM's intact  NOSE: no abnormalities  NECK:  Supple. No lymphadenopathy. Jugular venous pressure not elevated. Carotids equal.   HEART:   S1S2 reg  CHEST:  right sided ronchi  ABDOMEN:  Soft and nontender.   EXTREMITIES:  no C/C/E  NEURO: AAO x 3, no focal deficts       LABS:                        14.6   10.67 )-----------( 364      ( 18 Jul 2022 11:51 )             45.1     07-18    136  |  105  |  20  ----------------------------<  121<H>  4.0   |  27  |  0.71    Ca    9.3      18 Jul 2022 11:51      PT/INR - ( 18 Jul 2022 11:51 )   PT: 12.2 sec;   INR: 1.05 ratio         PTT - ( 18 Jul 2022 11:51 )  PTT:30.7 sec      RADIOLOGY & ADDITIONAL TESTS:

## 2022-07-19 NOTE — PROGRESS NOTE ADULT - ASSESSMENT
- cxr shows re-expansion right lung  - chest tube w small leak  - for right bullectomy/pleurodesis today  - cardio f/u noted  - there is no contraindication to surgery w sedation/anaesthesia  - dvt proph

## 2022-07-19 NOTE — PROGRESS NOTE ADULT - ASSESSMENT
76 yo Female with h/o COPD/bullous emphysema with a h/o PTX 11/2021 requiring a pigtail and recent COVID PNA, who presented with sudden onset of SOB and chest discomfort after doing strenuous activity, found to have moderate right pneumothorax s/p pigtail placement 7/15.      Plan   Pt w b/l multiple blebs on CT  second PTX occurance  plan for Rt VATS bleb resection, pleurodesis 7/19  labs ordered  Cardio cleared pt for procedure   NPO      Discussed with Cardiothoracic Team at AM rounds. 78 yo Female with h/o COPD/bullous emphysema with a h/o PTX 11/2021 requiring a pigtail and recent COVID PNA, who presented with sudden onset of SOB and chest discomfort after doing strenuous activity, found to have moderate right pneumothorax s/p pigtail placement 7/15.      Plan   Pt w b/l multiple blebs on CT  second PTX occurance  Case postponed today due to lack of OR time   plan for Rt VATS bleb resection, pleurodesis 7/20  labs ordered  Cardio cleared pt for procedure   NPO p midnight tonight       Discussed with Cardiothoracic Team at AM rounds.

## 2022-07-19 NOTE — PROGRESS NOTE ADULT - SUBJECTIVE AND OBJECTIVE BOX
Subjective:  Pt in bed NAD No issues overnight . Plan for OR this afternoon     T(C): 36.7 (07-19-22 @ 11:24), Max: 36.8 (07-18-22 @ 17:00)  HR: 69 (07-19-22 @ 11:24) (58 - 100)  BP: 96/73 (07-19-22 @ 11:24) (95/64 - 126/63)  ABP: --  ABP(mean): --  RR: 15 (07-19-22 @ 11:24) (11 - 34)  SpO2: 93% (07-19-22 @ 11:24) (91% - 97%) 2 L NC   Wt(kg): --  CVP(mm Hg): --  CO: --  CI: --  PA: --                                              Tele: SR    CHEST TUBE:  20cc                              OUTPUT:     per 24 hours    AIR LEAKS:  [ x] YES [ ] NO          07-18    136  |  105  |  20  ----------------------------<  121<H>  4.0   |  27  |  0.71    Ca    9.3      18 Jul 2022 11:51                                 14.6   10.67 )-----------( 364      ( 18 Jul 2022 11:51 )             45.1        PT/INR - ( 18 Jul 2022 11:51 )   PT: 12.2 sec;   INR: 1.05 ratio         PTT - ( 18 Jul 2022 11:51 )  PTT:30.7 sec         CAPILLARY BLOOD GLUCOSE      POCT Blood Glucose.: 119 mg/dL (19 Jul 2022 11:17)  POCT Blood Glucose.: 114 mg/dL (19 Jul 2022 07:42)  POCT Blood Glucose.: 175 mg/dL (18 Jul 2022 22:06)  POCT Blood Glucose.: 176 mg/dL (18 Jul 2022 15:35)           CXR:  < from: Xray Chest 1 View- PORTABLE-Urgent (Xray Chest 1 View- PORTABLE-Urgent .) (07.15.22 @ 15:32) >  INTERPRETATION:  AP chest on Dian July 15, 2022 at 1:38 PM. Patient is   short of breath.    Heart normal for projection.    There is a large right pneumothorax new since Dian 10 of this year.    Linear scarring left apex is unchanged.    Follow-up AP erect chest on July 15, 2022 at 3:10 PM.    A catheter right chest tube is been inserted. The pneumothorax has   disappeared. Some residual atelectasis off right lower hilum is seen.    IMPRESSION: Large right pneumothorax successfully treated with catheter   right chest tube. Linear lung densities in the chest remain.    < end of copied text >        Exam   Neuro:  Alert Awake NAD  Pulm: decreased b/l + Rt Pigtail   CV: RRR   Abd: soft  Extremities: warm          Assessment:  77yFemale    with PAST MEDICAL & SURGICAL HISTORY:  Hypothyroidism      Diabetes mellitus  diet controlled      Back pain      Asthma      Elevated pancreatic enzyme  patient reports elevated lab 6/2013. Gastroenterologist aware      History of cataract  extracted from right and left      Varicose veins of both lower extremities  surgery      Diverticulosis of intestine without bleeding, unspecified intestinal tract location      Gall stones  gall bladder removed      Urinary urgency      Spinal stenosis of lumbar region      DDD (degenerative disc disease), lumbar      Basal cell carcinoma  removed from face      Pulmonary emphysema, unspecified emphysema type  2017 last exacerbation; never intubated      OA (osteoarthritis)      Frequent UTI  2-3x/year; had an episode 5 weeks ago was treated denies dysuria      GERD (gastroesophageal reflux disease)      Leg swelling      Emphysema lung      COVID-19 vaccine series completed  Moderna - 2nd dose 02/20/2021      Knee pain, left      Scar tissue  left knee S/P TKR      COPD exacerbation      S/P bladder repair  bladder lift 2/2013      Dental disorder  Dental surgery 12/2012, patient reports that upper portion is glued in at this time, plan is to have dental implants placed.      S/P knee surgery  arthroscopy bilateral 2007 and 2010      Skin cancer  to right temple 2012, removed, history of basal cell x 2      H/O lumbosacral spine surgery  fusion 2017      H/O umbilical hernia repair  5/24/19      S/P cholecystectomy  2012      S/P colonoscopy  benign polyp      S/P knee replacement  right 2017, left 11/2019            Plan:

## 2022-07-20 ENCOUNTER — TRANSCRIPTION ENCOUNTER (OUTPATIENT)
Age: 77
End: 2022-07-20

## 2022-07-20 ENCOUNTER — RESULT REVIEW (OUTPATIENT)
Age: 77
End: 2022-07-20

## 2022-07-20 ENCOUNTER — APPOINTMENT (OUTPATIENT)
Dept: THORACIC SURGERY | Facility: HOSPITAL | Age: 77
End: 2022-07-20

## 2022-07-20 PROCEDURE — 32666 THORACOSCOPY W/WEDGE RESECT: CPT | Mod: RT,22

## 2022-07-20 PROCEDURE — 32650 THORACOSCOPY W/PLEURODESIS: CPT | Mod: AS

## 2022-07-20 PROCEDURE — 32666 THORACOSCOPY W/WEDGE RESECT: CPT | Mod: AS,RT,22

## 2022-07-20 PROCEDURE — 88307 TISSUE EXAM BY PATHOLOGIST: CPT | Mod: 26

## 2022-07-20 PROCEDURE — 99233 SBSQ HOSP IP/OBS HIGH 50: CPT

## 2022-07-20 PROCEDURE — 71045 X-RAY EXAM CHEST 1 VIEW: CPT | Mod: 26

## 2022-07-20 PROCEDURE — 32650 THORACOSCOPY W/PLEURODESIS: CPT

## 2022-07-20 RX ORDER — HEPARIN SODIUM 5000 [USP'U]/ML
5000 INJECTION INTRAVENOUS; SUBCUTANEOUS EVERY 12 HOURS
Refills: 0 | Status: DISCONTINUED | OUTPATIENT
Start: 2022-07-21 | End: 2022-07-26

## 2022-07-20 RX ORDER — ONDANSETRON 8 MG/1
4 TABLET, FILM COATED ORAL EVERY 6 HOURS
Refills: 0 | Status: DISCONTINUED | OUTPATIENT
Start: 2022-07-20 | End: 2022-07-20

## 2022-07-20 RX ORDER — SODIUM CHLORIDE 9 MG/ML
1000 INJECTION, SOLUTION INTRAVENOUS
Refills: 0 | Status: DISCONTINUED | OUTPATIENT
Start: 2022-07-20 | End: 2022-07-20

## 2022-07-20 RX ORDER — FENTANYL CITRATE 50 UG/ML
50 INJECTION INTRAVENOUS
Refills: 0 | Status: DISCONTINUED | OUTPATIENT
Start: 2022-07-20 | End: 2022-07-20

## 2022-07-20 RX ORDER — TALC 4 G/50ML
4 POWDER INTRAPLEURAL ONCE
Refills: 0 | Status: DISCONTINUED | OUTPATIENT
Start: 2022-07-20 | End: 2022-07-22

## 2022-07-20 RX ORDER — HYDROMORPHONE HYDROCHLORIDE 2 MG/ML
0.5 INJECTION INTRAMUSCULAR; INTRAVENOUS; SUBCUTANEOUS
Refills: 0 | Status: DISCONTINUED | OUTPATIENT
Start: 2022-07-20 | End: 2022-07-20

## 2022-07-20 RX ORDER — OXYCODONE HYDROCHLORIDE 5 MG/1
5 TABLET ORAL ONCE
Refills: 0 | Status: DISCONTINUED | OUTPATIENT
Start: 2022-07-20 | End: 2022-07-20

## 2022-07-20 RX ORDER — ACETAMINOPHEN 500 MG
1000 TABLET ORAL ONCE
Refills: 0 | Status: COMPLETED | OUTPATIENT
Start: 2022-07-20 | End: 2022-07-20

## 2022-07-20 RX ORDER — GABAPENTIN 400 MG/1
300 CAPSULE ORAL
Refills: 0 | Status: DISCONTINUED | OUTPATIENT
Start: 2022-07-20 | End: 2022-07-20

## 2022-07-20 RX ADMIN — OXYCODONE HYDROCHLORIDE 5 MILLIGRAM(S): 5 TABLET ORAL at 08:34

## 2022-07-20 RX ADMIN — SODIUM CHLORIDE 75 MILLILITER(S): 9 INJECTION, SOLUTION INTRAVENOUS at 17:00

## 2022-07-20 RX ADMIN — HYDROMORPHONE HYDROCHLORIDE 0.5 MILLIGRAM(S): 2 INJECTION INTRAMUSCULAR; INTRAVENOUS; SUBCUTANEOUS at 17:26

## 2022-07-20 RX ADMIN — Medication 40 MILLIGRAM(S): at 05:46

## 2022-07-20 RX ADMIN — OXYCODONE HYDROCHLORIDE 10 MILLIGRAM(S): 5 TABLET ORAL at 21:28

## 2022-07-20 RX ADMIN — HYDROMORPHONE HYDROCHLORIDE 0.5 MILLIGRAM(S): 2 INJECTION INTRAMUSCULAR; INTRAVENOUS; SUBCUTANEOUS at 19:22

## 2022-07-20 RX ADMIN — TIOTROPIUM BROMIDE 1 CAPSULE(S): 18 CAPSULE ORAL; RESPIRATORY (INHALATION) at 08:00

## 2022-07-20 RX ADMIN — GABAPENTIN 300 MILLIGRAM(S): 400 CAPSULE ORAL at 21:28

## 2022-07-20 RX ADMIN — FENTANYL CITRATE 50 MICROGRAM(S): 50 INJECTION INTRAVENOUS at 16:23

## 2022-07-20 RX ADMIN — HYDROMORPHONE HYDROCHLORIDE 0.5 MILLIGRAM(S): 2 INJECTION INTRAMUSCULAR; INTRAVENOUS; SUBCUTANEOUS at 16:34

## 2022-07-20 RX ADMIN — Medication 400 MILLIGRAM(S): at 16:25

## 2022-07-20 RX ADMIN — OXYCODONE HYDROCHLORIDE 5 MILLIGRAM(S): 5 TABLET ORAL at 09:04

## 2022-07-20 RX ADMIN — PANTOPRAZOLE SODIUM 40 MILLIGRAM(S): 20 TABLET, DELAYED RELEASE ORAL at 05:47

## 2022-07-20 RX ADMIN — Medication 137 MICROGRAM(S): at 05:46

## 2022-07-20 RX ADMIN — FENTANYL CITRATE 50 MICROGRAM(S): 50 INJECTION INTRAVENOUS at 16:30

## 2022-07-20 RX ADMIN — HYDROMORPHONE HYDROCHLORIDE 0.5 MILLIGRAM(S): 2 INJECTION INTRAMUSCULAR; INTRAVENOUS; SUBCUTANEOUS at 16:53

## 2022-07-20 RX ADMIN — OXYCODONE HYDROCHLORIDE 10 MILLIGRAM(S): 5 TABLET ORAL at 22:28

## 2022-07-20 RX ADMIN — Medication 1000 MILLIGRAM(S): at 16:50

## 2022-07-20 RX ADMIN — BUDESONIDE AND FORMOTEROL FUMARATE DIHYDRATE 2 PUFF(S): 160; 4.5 AEROSOL RESPIRATORY (INHALATION) at 08:02

## 2022-07-20 RX ADMIN — OXYCODONE HYDROCHLORIDE 10 MILLIGRAM(S): 5 TABLET ORAL at 00:30

## 2022-07-20 RX ADMIN — GABAPENTIN 300 MILLIGRAM(S): 400 CAPSULE ORAL at 08:34

## 2022-07-20 RX ADMIN — HYDROMORPHONE HYDROCHLORIDE 0.5 MILLIGRAM(S): 2 INJECTION INTRAMUSCULAR; INTRAVENOUS; SUBCUTANEOUS at 19:53

## 2022-07-20 RX ADMIN — HYDROMORPHONE HYDROCHLORIDE 0.5 MILLIGRAM(S): 2 INJECTION INTRAMUSCULAR; INTRAVENOUS; SUBCUTANEOUS at 16:50

## 2022-07-20 NOTE — BRIEF OPERATIVE NOTE - NSICDXBRIEFPROCEDURE_GEN_ALL_CORE_FT
PROCEDURES:  Flexible bronchoscopy 20-Jul-2022 15:55:35  Pk Cuadra  Wedge resection of left lung with video-assisted thoracoscopic surgery (VATS) 20-Jul-2022 15:55:49  Pk Cuadra   PROCEDURES:  Flexible bronchoscopy 20-Jul-2022 15:55:35  Pk Cuadra  VATS wedge resection of right upper lobe of lung 21-Jul-2022 08:08:30  Pk Cuadra  Talc pleurodesis 21-Jul-2022 08:08:41  Pk Cuadra

## 2022-07-20 NOTE — PRE-OP CHECKLIST - SELECT TESTS ORDERED
BMP/CBC/PT/PTT/INR/Type and Screen
labs from 7/18/2022, ANGELINA Whittington aware of last labs from 7/18/BMP/CBC/PT/PTT/INR/Type and Screen/EKG/CXR

## 2022-07-20 NOTE — PROGRESS NOTE ADULT - SUBJECTIVE AND OBJECTIVE BOX
Subjective:  no distress  awaiting surgery    MEDICATIONS  (STANDING):  budesonide 160 MICROgram(s)/formoterol 4.5 MICROgram(s) Inhaler 2 Puff(s) Inhalation two times a day  dextrose 5%. 1000 milliLiter(s) (100 mL/Hr) IV Continuous <Continuous>  dextrose 5%. 1000 milliLiter(s) (50 mL/Hr) IV Continuous <Continuous>  dextrose 50% Injectable 25 Gram(s) IV Push once  dextrose 50% Injectable 12.5 Gram(s) IV Push once  dextrose 50% Injectable 25 Gram(s) IV Push once  enoxaparin Injectable 40 milliGRAM(s) SubCutaneous every 24 hours  furosemide    Tablet 40 milliGRAM(s) Oral daily  gabapentin 300 milliGRAM(s) Oral two times a day  glucagon  Injectable 1 milliGRAM(s) IntraMuscular once  insulin lispro (ADMELOG) corrective regimen sliding scale   SubCutaneous three times a day before meals  levothyroxine 137 MICROGram(s) Oral daily  pantoprazole    Tablet 40 milliGRAM(s) Oral before breakfast  tiotropium 18 MICROgram(s) Capsule 1 Capsule(s) Inhalation daily    MEDICATIONS  (PRN):  acetaminophen     Tablet .. 650 milliGRAM(s) Oral every 6 hours PRN Temp greater or equal to 38C (100.4F), Mild Pain (1 - 3)  dextrose Oral Gel 15 Gram(s) Oral once PRN Blood Glucose LESS THAN 70 milliGRAM(s)/deciliter  oxyCODONE    IR 5 milliGRAM(s) Oral every 4 hours PRN Moderate Pain (4 - 6)  oxyCODONE    IR 10 milliGRAM(s) Oral every 4 hours PRN Severe Pain (7 - 10)      Allergies    Breo Ellipta (Rash)    Intolerances        REVIEW OF SYSTEMS:    CONSTITUTIONAL:  As per HPI.  HEENT:  Eyes:  No diplopia or blurred vision. ENT:  No earache, sore throat or runny nose.  CARDIOVASCULAR:  No pressure, squeezing, tightness, heaviness or aching about the chest, neck, axilla or epigastrium.  RESPIRATORY:  No cough, shortness of breath, PND or orthopnea.  GASTROINTESTINAL:  No nausea, vomiting or diarrhea.  GENITOURINARY:  No dysuria, frequency or urgency.  MUSCULOSKELETAL:  no joint pain, deformity, tenderness  EXTREMITIES: no clubbing cyanosis,edema  SKIN:  No change in skin, hair or nails.  NEUROLOGIC:  No paresthesias, fasciculations, seizures or weakness.  PSYCHIATRIC:  No disorder of thought or mood.  ENDOCRINE:  No heat or cold intolerance, polyuria or polydipsia.  HEMATOLOGICAL:  No easy bruising or bleedings:    Vital Signs Last 24 Hrs  T(C): 36.2 (20 Jul 2022 04:00), Max: 36.8 (19 Jul 2022 14:00)  T(F): 97.2 (20 Jul 2022 04:00), Max: 98.2 (19 Jul 2022 14:00)  HR: 63 (20 Jul 2022 06:00) (62 - 94)  BP: 103/79 (20 Jul 2022 06:00) (81/58 - 110/52)  BP(mean): 85 (20 Jul 2022 06:00) (59 - 85)  RR: 18 (20 Jul 2022 06:00) (11 - 23)  SpO2: 96% (20 Jul 2022 06:00) (93% - 99%)    Parameters below as of 20 Jul 2022 06:00  Patient On (Oxygen Delivery Method): nasal cannula  O2 Flow (L/min): 3      PHYSICAL EXAMINATION:  SKIN: no rashes  HEAD: NC/AT  EYES: PERRLA, EOMI  EARS: TM's intact  NOSE: no abnormalities  NECK:  Supple. No lymphadenopathy. Jugular venous pressure not elevated. Carotids equal.   HEART:  S1S2 reg  CHEST:  scttered roncgi, right chest tube  ABDOMEN:  Soft and nontender.   EXTREMITIES:  no C/C/E  NEURO: AAO x 3, no focal deficts       LABS:                        14.6   10.67 )-----------( 364      ( 18 Jul 2022 11:51 )             45.1     07-18    136  |  105  |  20  ----------------------------<  121<H>  4.0   |  27  |  0.71    Ca    9.3      18 Jul 2022 11:51      PT/INR - ( 18 Jul 2022 11:51 )   PT: 12.2 sec;   INR: 1.05 ratio         PTT - ( 18 Jul 2022 11:51 )  PTT:30.7 sec      RADIOLOGY & ADDITIONAL TESTS:

## 2022-07-21 LAB
ANION GAP SERPL CALC-SCNC: 5 MMOL/L — SIGNIFICANT CHANGE UP (ref 5–17)
BUN SERPL-MCNC: 12 MG/DL — SIGNIFICANT CHANGE UP (ref 7–23)
CALCIUM SERPL-MCNC: 9 MG/DL — SIGNIFICANT CHANGE UP (ref 8.5–10.1)
CHLORIDE SERPL-SCNC: 102 MMOL/L — SIGNIFICANT CHANGE UP (ref 96–108)
CO2 SERPL-SCNC: 24 MMOL/L — SIGNIFICANT CHANGE UP (ref 22–31)
CREAT SERPL-MCNC: 0.48 MG/DL — LOW (ref 0.5–1.3)
EGFR: 96 ML/MIN/1.73M2 — SIGNIFICANT CHANGE UP
GLUCOSE SERPL-MCNC: 135 MG/DL — HIGH (ref 70–99)
HCT VFR BLD CALC: 40.3 % — SIGNIFICANT CHANGE UP (ref 34.5–45)
HGB BLD-MCNC: 13.4 G/DL — SIGNIFICANT CHANGE UP (ref 11.5–15.5)
MCHC RBC-ENTMCNC: 29.7 PG — SIGNIFICANT CHANGE UP (ref 27–34)
MCHC RBC-ENTMCNC: 33.3 GM/DL — SIGNIFICANT CHANGE UP (ref 32–36)
MCV RBC AUTO: 89.4 FL — SIGNIFICANT CHANGE UP (ref 80–100)
PLATELET # BLD AUTO: 348 K/UL — SIGNIFICANT CHANGE UP (ref 150–400)
POTASSIUM SERPL-MCNC: 4.4 MMOL/L — SIGNIFICANT CHANGE UP (ref 3.5–5.3)
POTASSIUM SERPL-SCNC: 4.4 MMOL/L — SIGNIFICANT CHANGE UP (ref 3.5–5.3)
RBC # BLD: 4.51 M/UL — SIGNIFICANT CHANGE UP (ref 3.8–5.2)
RBC # FLD: 14.2 % — SIGNIFICANT CHANGE UP (ref 10.3–14.5)
SODIUM SERPL-SCNC: 131 MMOL/L — LOW (ref 135–145)
WBC # BLD: 12.14 K/UL — HIGH (ref 3.8–10.5)
WBC # FLD AUTO: 12.14 K/UL — HIGH (ref 3.8–10.5)

## 2022-07-21 PROCEDURE — 99233 SBSQ HOSP IP/OBS HIGH 50: CPT

## 2022-07-21 PROCEDURE — 99231 SBSQ HOSP IP/OBS SF/LOW 25: CPT

## 2022-07-21 PROCEDURE — 71045 X-RAY EXAM CHEST 1 VIEW: CPT | Mod: 26

## 2022-07-21 RX ORDER — ACETAMINOPHEN 500 MG
1000 TABLET ORAL ONCE
Refills: 0 | Status: COMPLETED | OUTPATIENT
Start: 2022-07-21 | End: 2022-07-21

## 2022-07-21 RX ORDER — HYDROMORPHONE HYDROCHLORIDE 2 MG/ML
0.5 INJECTION INTRAMUSCULAR; INTRAVENOUS; SUBCUTANEOUS EVERY 4 HOURS
Refills: 0 | Status: DISCONTINUED | OUTPATIENT
Start: 2022-07-21 | End: 2022-07-22

## 2022-07-21 RX ADMIN — HYDROMORPHONE HYDROCHLORIDE 0.5 MILLIGRAM(S): 2 INJECTION INTRAMUSCULAR; INTRAVENOUS; SUBCUTANEOUS at 14:49

## 2022-07-21 RX ADMIN — OXYCODONE HYDROCHLORIDE 10 MILLIGRAM(S): 5 TABLET ORAL at 12:14

## 2022-07-21 RX ADMIN — Medication 1000 MILLIGRAM(S): at 15:22

## 2022-07-21 RX ADMIN — OXYCODONE HYDROCHLORIDE 10 MILLIGRAM(S): 5 TABLET ORAL at 12:44

## 2022-07-21 RX ADMIN — HYDROMORPHONE HYDROCHLORIDE 0.5 MILLIGRAM(S): 2 INJECTION INTRAMUSCULAR; INTRAVENOUS; SUBCUTANEOUS at 04:37

## 2022-07-21 RX ADMIN — BUDESONIDE AND FORMOTEROL FUMARATE DIHYDRATE 2 PUFF(S): 160; 4.5 AEROSOL RESPIRATORY (INHALATION) at 21:22

## 2022-07-21 RX ADMIN — GABAPENTIN 300 MILLIGRAM(S): 400 CAPSULE ORAL at 21:49

## 2022-07-21 RX ADMIN — TIOTROPIUM BROMIDE 1 CAPSULE(S): 18 CAPSULE ORAL; RESPIRATORY (INHALATION) at 09:29

## 2022-07-21 RX ADMIN — HYDROMORPHONE HYDROCHLORIDE 0.5 MILLIGRAM(S): 2 INJECTION INTRAMUSCULAR; INTRAVENOUS; SUBCUTANEOUS at 09:17

## 2022-07-21 RX ADMIN — HYDROMORPHONE HYDROCHLORIDE 0.5 MILLIGRAM(S): 2 INJECTION INTRAMUSCULAR; INTRAVENOUS; SUBCUTANEOUS at 22:15

## 2022-07-21 RX ADMIN — HYDROMORPHONE HYDROCHLORIDE 0.5 MILLIGRAM(S): 2 INJECTION INTRAMUSCULAR; INTRAVENOUS; SUBCUTANEOUS at 18:55

## 2022-07-21 RX ADMIN — HEPARIN SODIUM 5000 UNIT(S): 5000 INJECTION INTRAVENOUS; SUBCUTANEOUS at 21:49

## 2022-07-21 RX ADMIN — GABAPENTIN 300 MILLIGRAM(S): 400 CAPSULE ORAL at 10:25

## 2022-07-21 RX ADMIN — Medication 137 MICROGRAM(S): at 06:23

## 2022-07-21 RX ADMIN — BUDESONIDE AND FORMOTEROL FUMARATE DIHYDRATE 2 PUFF(S): 160; 4.5 AEROSOL RESPIRATORY (INHALATION) at 09:28

## 2022-07-21 RX ADMIN — Medication 650 MILLIGRAM(S): at 21:52

## 2022-07-21 RX ADMIN — HYDROMORPHONE HYDROCHLORIDE 0.5 MILLIGRAM(S): 2 INJECTION INTRAMUSCULAR; INTRAVENOUS; SUBCUTANEOUS at 21:57

## 2022-07-21 RX ADMIN — OXYCODONE HYDROCHLORIDE 10 MILLIGRAM(S): 5 TABLET ORAL at 18:18

## 2022-07-21 RX ADMIN — PANTOPRAZOLE SODIUM 40 MILLIGRAM(S): 20 TABLET, DELAYED RELEASE ORAL at 06:23

## 2022-07-21 RX ADMIN — HYDROMORPHONE HYDROCHLORIDE 0.5 MILLIGRAM(S): 2 INJECTION INTRAMUSCULAR; INTRAVENOUS; SUBCUTANEOUS at 00:55

## 2022-07-21 RX ADMIN — Medication 400 MILLIGRAM(S): at 14:49

## 2022-07-21 RX ADMIN — HYDROMORPHONE HYDROCHLORIDE 0.5 MILLIGRAM(S): 2 INJECTION INTRAMUSCULAR; INTRAVENOUS; SUBCUTANEOUS at 15:19

## 2022-07-21 RX ADMIN — OXYCODONE HYDROCHLORIDE 10 MILLIGRAM(S): 5 TABLET ORAL at 17:48

## 2022-07-21 RX ADMIN — Medication 650 MILLIGRAM(S): at 20:52

## 2022-07-21 RX ADMIN — Medication 40 MILLIGRAM(S): at 06:24

## 2022-07-21 RX ADMIN — Medication 2: at 17:47

## 2022-07-21 RX ADMIN — HYDROMORPHONE HYDROCHLORIDE 0.5 MILLIGRAM(S): 2 INJECTION INTRAMUSCULAR; INTRAVENOUS; SUBCUTANEOUS at 00:39

## 2022-07-21 RX ADMIN — HYDROMORPHONE HYDROCHLORIDE 0.5 MILLIGRAM(S): 2 INJECTION INTRAMUSCULAR; INTRAVENOUS; SUBCUTANEOUS at 19:25

## 2022-07-21 RX ADMIN — HYDROMORPHONE HYDROCHLORIDE 0.5 MILLIGRAM(S): 2 INJECTION INTRAMUSCULAR; INTRAVENOUS; SUBCUTANEOUS at 04:52

## 2022-07-21 RX ADMIN — HYDROMORPHONE HYDROCHLORIDE 0.5 MILLIGRAM(S): 2 INJECTION INTRAMUSCULAR; INTRAVENOUS; SUBCUTANEOUS at 08:47

## 2022-07-21 RX ADMIN — Medication 2: at 12:14

## 2022-07-21 RX ADMIN — HEPARIN SODIUM 5000 UNIT(S): 5000 INJECTION INTRAVENOUS; SUBCUTANEOUS at 10:24

## 2022-07-21 RX ADMIN — OXYCODONE HYDROCHLORIDE 10 MILLIGRAM(S): 5 TABLET ORAL at 23:03

## 2022-07-21 NOTE — PROGRESS NOTE ADULT - PROBLEM SELECTOR PROBLEM 1
Postprocedural state
Postprocedural state
[FreeTextEntry3] : \par Saw and examined patient and agree with plan with modifications.\par \par Sofia Smith MD\par [facility]\par Pediatric Orthopedic Surgery\par

## 2022-07-21 NOTE — PROGRESS NOTE ADULT - ASSESSMENT
- cxr shows re-expansion right lung  - chest tube w small leak  - for right bullectomy/pleurodesis today  - cardio f/u noted  - there is no contraindication to surgery w sedation/anaesthesia  - dvt proph - cxr shows re-expansion right lung  - s/p right VATS bullectomy w talc pleurodesis  - chest tube no leak   - Na+ low  - for repeat labs in am  - dvt proph

## 2022-07-21 NOTE — PROGRESS NOTE ADULT - SUBJECTIVE AND OBJECTIVE BOX
Subjective:  awake and alert  s/p VATS bullectomy    MEDICATIONS  (STANDING):  budesonide 160 MICROgram(s)/formoterol 4.5 MICROgram(s) Inhaler 2 Puff(s) Inhalation two times a day  dextrose 5%. 1000 milliLiter(s) (100 mL/Hr) IV Continuous <Continuous>  dextrose 5%. 1000 milliLiter(s) (50 mL/Hr) IV Continuous <Continuous>  dextrose 50% Injectable 25 Gram(s) IV Push once  dextrose 50% Injectable 12.5 Gram(s) IV Push once  dextrose 50% Injectable 25 Gram(s) IV Push once  furosemide    Tablet 40 milliGRAM(s) Oral daily  gabapentin 300 milliGRAM(s) Oral two times a day  glucagon  Injectable 1 milliGRAM(s) IntraMuscular once  heparin   Injectable 5000 Unit(s) SubCutaneous every 12 hours  insulin lispro (ADMELOG) corrective regimen sliding scale   SubCutaneous three times a day before meals  levothyroxine 137 MICROGram(s) Oral daily  pantoprazole    Tablet 40 milliGRAM(s) Oral before breakfast  talc (Sterile) 4 Gram(s) IntraPleural. once  tiotropium 18 MICROgram(s) Capsule 1 Capsule(s) Inhalation daily    MEDICATIONS  (PRN):  acetaminophen     Tablet .. 650 milliGRAM(s) Oral every 6 hours PRN Temp greater or equal to 38C (100.4F), Mild Pain (1 - 3)  artificial tears (preservative free) Ophthalmic Solution 1 Drop(s) Both EYES every 2 hours PRN Painful Dry Eyes  dextrose Oral Gel 15 Gram(s) Oral once PRN Blood Glucose LESS THAN 70 milliGRAM(s)/deciliter  HYDROmorphone  Injectable 0.5 milliGRAM(s) IV Push every 4 hours PRN Severe Pain (7 - 10)  oxyCODONE    IR 5 milliGRAM(s) Oral every 4 hours PRN Moderate Pain (4 - 6)  oxyCODONE    IR 10 milliGRAM(s) Oral every 4 hours PRN Severe Pain (7 - 10)      Allergies    Breo Ellipta (Rash)    Intolerances        REVIEW OF SYSTEMS:    CONSTITUTIONAL:  As per HPI.  HEENT:  Eyes:  No diplopia or blurred vision. ENT:  No earache, sore throat or runny nose.  CARDIOVASCULAR:  No pressure, squeezing, tightness, heaviness or aching about the chest, neck, axilla or epigastrium.  RESPIRATORY:  No cough, shortness of breath, PND or orthopnea.  GASTROINTESTINAL:  No nausea, vomiting or diarrhea.  GENITOURINARY:  No dysuria, frequency or urgency.  MUSCULOSKELETAL:  no joint pain, deformity, tenderness  EXTREMITIES: no clubbing cyanosis,edema  SKIN:  No change in skin, hair or nails.  NEUROLOGIC:  No paresthesias, fasciculations, seizures or weakness.  PSYCHIATRIC:  No disorder of thought or mood.  ENDOCRINE:  No heat or cold intolerance, polyuria or polydipsia.  HEMATOLOGICAL:  No easy bruising or bleedings:    Vital Signs Last 24 Hrs  T(C): 36.7 (21 Jul 2022 04:00), Max: 36.8 (20 Jul 2022 19:00)  T(F): 98.1 (21 Jul 2022 04:00), Max: 98.3 (20 Jul 2022 19:00)  HR: 65 (21 Jul 2022 06:00) (58 - 82)  BP: 126/61 (21 Jul 2022 06:00) (92/52 - 144/56)  BP(mean): 77 (21 Jul 2022 06:00) (56 - 101)  RR: 15 (21 Jul 2022 06:00) (11 - 22)  SpO2: 93% (21 Jul 2022 06:00) (92% - 100%)    Parameters below as of 21 Jul 2022 06:00  Patient On (Oxygen Delivery Method): nasal cannula  O2 Flow (L/min): 5      PHYSICAL EXAMINATION:  SKIN: no rashes  HEAD: NC/AT  EYES: PERRLA, EOMI  EARS: TM's intact  NOSE: no abnormalities  NECK:  Supple. No lymphadenopathy. Jugular venous pressure not elevated. Carotids equal.   HEART:  S1S2 reg  CHEST: right sided ronchi; 2 chest tubes w no leak  ABDOMEN:  Soft and nontender.   EXTREMITIES:  no C/C/E  NEURO: AAO x 3, no focal deficts       LABS:                        13.4   12.14 )-----------( 348      ( 21 Jul 2022 06:27 )             40.3     07-21    131<L>  |  102  |  12  ----------------------------<  135<H>  4.4   |  24  |  0.48<L>    Ca    9.0      21 Jul 2022 06:27            RADIOLOGY & ADDITIONAL TESTS:

## 2022-07-22 DIAGNOSIS — E87.1 HYPO-OSMOLALITY AND HYPONATREMIA: ICD-10-CM

## 2022-07-22 LAB
ANION GAP SERPL CALC-SCNC: 4 MMOL/L — LOW (ref 5–17)
BUN SERPL-MCNC: 15 MG/DL — SIGNIFICANT CHANGE UP (ref 7–23)
CALCIUM SERPL-MCNC: 8.6 MG/DL — SIGNIFICANT CHANGE UP (ref 8.5–10.1)
CHLORIDE SERPL-SCNC: 100 MMOL/L — SIGNIFICANT CHANGE UP (ref 96–108)
CHLORIDE UR-SCNC: <20 MMOL/L — SIGNIFICANT CHANGE UP
CO2 SERPL-SCNC: 27 MMOL/L — SIGNIFICANT CHANGE UP (ref 22–31)
CREAT SERPL-MCNC: 0.64 MG/DL — SIGNIFICANT CHANGE UP (ref 0.5–1.3)
EGFR: 90 ML/MIN/1.73M2 — SIGNIFICANT CHANGE UP
GLUCOSE SERPL-MCNC: 154 MG/DL — HIGH (ref 70–99)
HCT VFR BLD CALC: 34.7 % — SIGNIFICANT CHANGE UP (ref 34.5–45)
HGB BLD-MCNC: 11.5 G/DL — SIGNIFICANT CHANGE UP (ref 11.5–15.5)
MCHC RBC-ENTMCNC: 29.9 PG — SIGNIFICANT CHANGE UP (ref 27–34)
MCHC RBC-ENTMCNC: 33.1 GM/DL — SIGNIFICANT CHANGE UP (ref 32–36)
MCV RBC AUTO: 90.1 FL — SIGNIFICANT CHANGE UP (ref 80–100)
OSMOLALITY SERPL: 279 MOSMOL/KG — LOW (ref 280–301)
OSMOLALITY UR: 272 MOSM/KG — SIGNIFICANT CHANGE UP (ref 50–1200)
PLATELET # BLD AUTO: 289 K/UL — SIGNIFICANT CHANGE UP (ref 150–400)
POTASSIUM SERPL-MCNC: 3.9 MMOL/L — SIGNIFICANT CHANGE UP (ref 3.5–5.3)
POTASSIUM SERPL-SCNC: 3.9 MMOL/L — SIGNIFICANT CHANGE UP (ref 3.5–5.3)
POTASSIUM UR-SCNC: 22.7 MMOL/L — SIGNIFICANT CHANGE UP
RBC # BLD: 3.85 M/UL — SIGNIFICANT CHANGE UP (ref 3.8–5.2)
RBC # FLD: 14.5 % — SIGNIFICANT CHANGE UP (ref 10.3–14.5)
SODIUM SERPL-SCNC: 131 MMOL/L — LOW (ref 135–145)
SODIUM UR-SCNC: <20 MMOL/L — SIGNIFICANT CHANGE UP
WBC # BLD: 10.96 K/UL — HIGH (ref 3.8–10.5)
WBC # FLD AUTO: 10.96 K/UL — HIGH (ref 3.8–10.5)

## 2022-07-22 PROCEDURE — 71045 X-RAY EXAM CHEST 1 VIEW: CPT | Mod: 26,76

## 2022-07-22 PROCEDURE — 99231 SBSQ HOSP IP/OBS SF/LOW 25: CPT

## 2022-07-22 PROCEDURE — 99233 SBSQ HOSP IP/OBS HIGH 50: CPT

## 2022-07-22 RX ORDER — HYDROMORPHONE HYDROCHLORIDE 2 MG/ML
1 INJECTION INTRAMUSCULAR; INTRAVENOUS; SUBCUTANEOUS
Refills: 0 | Status: DISCONTINUED | OUTPATIENT
Start: 2022-07-22 | End: 2022-07-25

## 2022-07-22 RX ORDER — POLYETHYLENE GLYCOL 3350 17 G/17G
17 POWDER, FOR SOLUTION ORAL
Refills: 0 | Status: DISCONTINUED | OUTPATIENT
Start: 2022-07-22 | End: 2022-07-26

## 2022-07-22 RX ORDER — SENNA PLUS 8.6 MG/1
2 TABLET ORAL AT BEDTIME
Refills: 0 | Status: DISCONTINUED | OUTPATIENT
Start: 2022-07-22 | End: 2022-07-26

## 2022-07-22 RX ORDER — ALBUTEROL 90 UG/1
90 AEROSOL, METERED ORAL EVERY 6 HOURS
Refills: 0 | Status: DISCONTINUED | OUTPATIENT
Start: 2022-07-22 | End: 2022-07-26

## 2022-07-22 RX ORDER — OXYCODONE HYDROCHLORIDE 5 MG/1
10 TABLET ORAL EVERY 4 HOURS
Refills: 0 | Status: DISCONTINUED | OUTPATIENT
Start: 2022-07-22 | End: 2022-07-26

## 2022-07-22 RX ORDER — KETOROLAC TROMETHAMINE 30 MG/ML
15 SYRINGE (ML) INJECTION ONCE
Refills: 0 | Status: DISCONTINUED | OUTPATIENT
Start: 2022-07-22 | End: 2022-07-22

## 2022-07-22 RX ORDER — ACETAMINOPHEN 500 MG
1000 TABLET ORAL ONCE
Refills: 0 | Status: COMPLETED | OUTPATIENT
Start: 2022-07-22 | End: 2022-07-22

## 2022-07-22 RX ADMIN — HYDROMORPHONE HYDROCHLORIDE 1 MILLIGRAM(S): 2 INJECTION INTRAMUSCULAR; INTRAVENOUS; SUBCUTANEOUS at 20:22

## 2022-07-22 RX ADMIN — HYDROMORPHONE HYDROCHLORIDE 0.5 MILLIGRAM(S): 2 INJECTION INTRAMUSCULAR; INTRAVENOUS; SUBCUTANEOUS at 06:00

## 2022-07-22 RX ADMIN — Medication 600 MILLIGRAM(S): at 18:04

## 2022-07-22 RX ADMIN — BUDESONIDE AND FORMOTEROL FUMARATE DIHYDRATE 2 PUFF(S): 160; 4.5 AEROSOL RESPIRATORY (INHALATION) at 08:49

## 2022-07-22 RX ADMIN — Medication 15 MILLIGRAM(S): at 01:15

## 2022-07-22 RX ADMIN — Medication 1000 MILLIGRAM(S): at 09:00

## 2022-07-22 RX ADMIN — HYDROMORPHONE HYDROCHLORIDE 0.5 MILLIGRAM(S): 2 INJECTION INTRAMUSCULAR; INTRAVENOUS; SUBCUTANEOUS at 05:44

## 2022-07-22 RX ADMIN — SENNA PLUS 2 TABLET(S): 8.6 TABLET ORAL at 20:23

## 2022-07-22 RX ADMIN — Medication 2: at 08:12

## 2022-07-22 RX ADMIN — HYDROMORPHONE HYDROCHLORIDE 1 MILLIGRAM(S): 2 INJECTION INTRAMUSCULAR; INTRAVENOUS; SUBCUTANEOUS at 08:46

## 2022-07-22 RX ADMIN — HEPARIN SODIUM 5000 UNIT(S): 5000 INJECTION INTRAVENOUS; SUBCUTANEOUS at 10:04

## 2022-07-22 RX ADMIN — Medication 400 MILLIGRAM(S): at 08:28

## 2022-07-22 RX ADMIN — Medication 6: at 11:37

## 2022-07-22 RX ADMIN — Medication 40 MILLIGRAM(S): at 06:28

## 2022-07-22 RX ADMIN — OXYCODONE HYDROCHLORIDE 10 MILLIGRAM(S): 5 TABLET ORAL at 00:00

## 2022-07-22 RX ADMIN — Medication 15 MILLIGRAM(S): at 01:00

## 2022-07-22 RX ADMIN — HYDROMORPHONE HYDROCHLORIDE 1 MILLIGRAM(S): 2 INJECTION INTRAMUSCULAR; INTRAVENOUS; SUBCUTANEOUS at 09:20

## 2022-07-22 RX ADMIN — GABAPENTIN 300 MILLIGRAM(S): 400 CAPSULE ORAL at 10:04

## 2022-07-22 RX ADMIN — HYDROMORPHONE HYDROCHLORIDE 1 MILLIGRAM(S): 2 INJECTION INTRAMUSCULAR; INTRAVENOUS; SUBCUTANEOUS at 13:40

## 2022-07-22 RX ADMIN — Medication 137 MICROGRAM(S): at 06:28

## 2022-07-22 RX ADMIN — GABAPENTIN 300 MILLIGRAM(S): 400 CAPSULE ORAL at 20:23

## 2022-07-22 RX ADMIN — HYDROMORPHONE HYDROCHLORIDE 1 MILLIGRAM(S): 2 INJECTION INTRAMUSCULAR; INTRAVENOUS; SUBCUTANEOUS at 13:10

## 2022-07-22 RX ADMIN — PANTOPRAZOLE SODIUM 40 MILLIGRAM(S): 20 TABLET, DELAYED RELEASE ORAL at 06:29

## 2022-07-22 RX ADMIN — TIOTROPIUM BROMIDE 1 CAPSULE(S): 18 CAPSULE ORAL; RESPIRATORY (INHALATION) at 08:49

## 2022-07-22 RX ADMIN — OXYCODONE HYDROCHLORIDE 10 MILLIGRAM(S): 5 TABLET ORAL at 16:10

## 2022-07-22 RX ADMIN — OXYCODONE HYDROCHLORIDE 10 MILLIGRAM(S): 5 TABLET ORAL at 15:25

## 2022-07-22 RX ADMIN — HEPARIN SODIUM 5000 UNIT(S): 5000 INJECTION INTRAVENOUS; SUBCUTANEOUS at 20:23

## 2022-07-22 NOTE — PROGRESS NOTE ADULT - ASSESSMENT
- cxr shows re-expansion right lung  - s/p right Vats bullectomy w talc pleurodesis  - chest tubes no leak  - check CXR  - hyponatremia; not on fluids  - check urine lytes  - repeat labs in am  - dvt proph

## 2022-07-22 NOTE — PROGRESS NOTE ADULT - ASSESSMENT
76 yo Female with h/o COPD/bullous emphysema with a h/o PTX 11/2021 requiring a pigtail and recent COVID PNA, who presented with sudden onset of SOB and chest discomfort after doing strenuous activity, found to have moderate right pneumothorax s/p pigtail placement 7/15. 7/20 FB Left VATS RODERICK wedge resection and talc pleurodesis       Plan   maintain CT to -20 LWCS  today   CXR in am   plan to place CT to WS tomorrow   OOB/Ambulate   NO TORADOL in the setting of pleurodesis  IS  pain mgmt        Discussed with Cardiothoracic Team at AM rounds.

## 2022-07-22 NOTE — PROGRESS NOTE ADULT - SUBJECTIVE AND OBJECTIVE BOX
Subjective: Pt in bed c/o pain  when she moves.  Increased pain medication     T(C): 37.3 (07-22-22 @ 04:00), Max: 37.4 (07-21-22 @ 18:00)  HR: 98 (07-22-22 @ 06:00) (60 - 98)  BP: 128/74 (07-22-22 @ 06:00) (85/46 - 128/74)  ABP: --  ABP(mean): --  RR: 23 (07-22-22 @ 06:00) (13 - 25)  SpO2: 97% (07-22-22 @ 06:00) (93% - 98%) 2 L NC   Wt(kg): --  CVP(mm Hg): --  CO: --  CI: --  PA: --                                              Tele: SR     CHEST TUBE:   #1 50 #2 20                            OUTPUT:     per 24 hours    AIR LEAKS:  [ ] YES [x ] NO          07-22    131<L>  |  100  |  15  ----------------------------<  154<H>  3.9   |  27  |  0.64    Ca    8.6      22 Jul 2022 05:33                                 11.5   10.96 )-----------( 289      ( 22 Jul 2022 05:33 )             34.7                 CAPILLARY BLOOD GLUCOSE      POCT Blood Glucose.: 170 mg/dL (22 Jul 2022 08:01)  POCT Blood Glucose.: 215 mg/dL (21 Jul 2022 23:11)  POCT Blood Glucose.: 197 mg/dL (21 Jul 2022 17:38)  POCT Blood Glucose.: 151 mg/dL (21 Jul 2022 12:02)           CXR: < from: Xray Chest 1 View- PORTABLE-Urgent (Xray Chest 1 View- PORTABLE-Urgent .) (07.21.22 @ 07:48) >  INTERPRETATION:    Heart size and the mediastinum cannot be accurately evaluated on this   projection. Calcified thoracic aorta.  Right lung volume loss with postsurgical change including chain sutures.   Right chest tubes not significantly changed in position.  No pneumothorax.  Left apical bulla again noted.  Slight decrease in right subcutaneous emphysema.  Trace right pleural effusion.  Trace left pleural effusion and left basilar subsegmental atelectasis.      IMPRESSION:  Right lung volume loss with postsurgical change. Right chest   tubes not significantly changed in position.    No pneumothorax. Slight decrease in right subcutaneous emphysema.    Bilateral trace pleural effusions.    Left basilar subsegmental atelectasis.    < end of copied text >          exam   Neuro:  Alert Awake NAD   Pulm: decreased at bases +2 CT   CV: RR S1 S2   Abd:  soft NT ND   Extremities:  Trace edema         Assessment:  77yFemale    with PAST MEDICAL & SURGICAL HISTORY:  Hypothyroidism      Diabetes mellitus  diet controlled      Back pain      Asthma      Elevated pancreatic enzyme  patient reports elevated lab 6/2013. Gastroenterologist aware      History of cataract  extracted from right and left      Varicose veins of both lower extremities  surgery      Diverticulosis of intestine without bleeding, unspecified intestinal tract location      Gall stones  gall bladder removed      Urinary urgency      Spinal stenosis of lumbar region      DDD (degenerative disc disease), lumbar      Basal cell carcinoma  removed from face      Pulmonary emphysema, unspecified emphysema type  2017 last exacerbation; never intubated      OA (osteoarthritis)      Frequent UTI  2-3x/year; had an episode 5 weeks ago was treated denies dysuria      GERD (gastroesophageal reflux disease)      Leg swelling      Emphysema lung      COVID-19 vaccine series completed  Moderna - 2nd dose 02/20/2021      Knee pain, left      Scar tissue  left knee S/P TKR      COPD exacerbation      S/P bladder repair  bladder lift 2/2013      Dental disorder  Dental surgery 12/2012, patient reports that upper portion is glued in at this time, plan is to have dental implants placed.      S/P knee surgery  arthroscopy bilateral 2007 and 2010      Skin cancer  to right temple 2012, removed, history of basal cell x 2      H/O lumbosacral spine surgery  fusion 2017      H/O umbilical hernia repair  5/24/19      S/P cholecystectomy  2012      S/P colonoscopy  benign polyp      S/P knee replacement  right 2017, left 11/2019

## 2022-07-22 NOTE — PROGRESS NOTE ADULT - SUBJECTIVE AND OBJECTIVE BOX
Subjective:  awake and alert  doing well  no sob    MEDICATIONS  (STANDING):  budesonide 160 MICROgram(s)/formoterol 4.5 MICROgram(s) Inhaler 2 Puff(s) Inhalation two times a day  dextrose 5%. 1000 milliLiter(s) (100 mL/Hr) IV Continuous <Continuous>  dextrose 5%. 1000 milliLiter(s) (50 mL/Hr) IV Continuous <Continuous>  dextrose 50% Injectable 25 Gram(s) IV Push once  dextrose 50% Injectable 12.5 Gram(s) IV Push once  dextrose 50% Injectable 25 Gram(s) IV Push once  furosemide    Tablet 40 milliGRAM(s) Oral daily  gabapentin 300 milliGRAM(s) Oral two times a day  glucagon  Injectable 1 milliGRAM(s) IntraMuscular once  heparin   Injectable 5000 Unit(s) SubCutaneous every 12 hours  insulin lispro (ADMELOG) corrective regimen sliding scale   SubCutaneous three times a day before meals  levothyroxine 137 MICROGram(s) Oral daily  pantoprazole    Tablet 40 milliGRAM(s) Oral before breakfast  talc (Sterile) 4 Gram(s) IntraPleural. once  tiotropium 18 MICROgram(s) Capsule 1 Capsule(s) Inhalation daily    MEDICATIONS  (PRN):  acetaminophen     Tablet .. 650 milliGRAM(s) Oral every 6 hours PRN Temp greater or equal to 38C (100.4F), Mild Pain (1 - 3)  artificial tears (preservative free) Ophthalmic Solution 1 Drop(s) Both EYES every 2 hours PRN Painful Dry Eyes  dextrose Oral Gel 15 Gram(s) Oral once PRN Blood Glucose LESS THAN 70 milliGRAM(s)/deciliter  HYDROmorphone  Injectable 0.5 milliGRAM(s) IV Push every 4 hours PRN Severe Pain (7 - 10)  oxyCODONE    IR 5 milliGRAM(s) Oral every 4 hours PRN Moderate Pain (4 - 6)  oxyCODONE    IR 10 milliGRAM(s) Oral every 4 hours PRN Severe Pain (7 - 10)      Allergies    Breo Ellipta (Rash)    Intolerances        REVIEW OF SYSTEMS:    CONSTITUTIONAL:  As per HPI.  HEENT:  Eyes:  No diplopia or blurred vision. ENT:  No earache, sore throat or runny nose.  CARDIOVASCULAR:  No pressure, squeezing, tightness, heaviness or aching about the chest, neck, axilla or epigastrium.  RESPIRATORY:  No cough, shortness of breath, PND or orthopnea.  GASTROINTESTINAL:  No nausea, vomiting or diarrhea.  GENITOURINARY:  No dysuria, frequency or urgency.  MUSCULOSKELETAL:  no joint pain, deformity, tenderness  EXTREMITIES: no clubbing cyanosis,edema  SKIN:  No change in skin, hair or nails.  NEUROLOGIC:  No paresthesias, fasciculations, seizures or weakness.  PSYCHIATRIC:  No disorder of thought or mood.  ENDOCRINE:  No heat or cold intolerance, polyuria or polydipsia.  HEMATOLOGICAL:  No easy bruising or bleedings:    Vital Signs Last 24 Hrs  T(C): 37.3 (22 Jul 2022 04:00), Max: 37.4 (21 Jul 2022 18:00)  T(F): 99.2 (22 Jul 2022 04:00), Max: 99.3 (21 Jul 2022 18:00)  HR: 98 (22 Jul 2022 06:00) (60 - 98)  BP: 128/74 (22 Jul 2022 06:00) (85/46 - 128/74)  BP(mean): 83 (22 Jul 2022 06:00) (52 - 84)  RR: 23 (22 Jul 2022 06:00) (13 - 25)  SpO2: 97% (22 Jul 2022 06:00) (93% - 98%)    Parameters below as of 22 Jul 2022 06:00  Patient On (Oxygen Delivery Method): nasal cannula  O2 Flow (L/min): 5      PHYSICAL EXAMINATION:  SKIN: no rashes  HEAD: NC/AT  EYES: PERRLA, EOMI  EARS: TM's intact  NOSE: no abnormalities  NECK:  Supple. No lymphadenopathy. Jugular venous pressure not elevated. Carotids equal.   HEART:   S1S2 reg  CHEST:  scattered ronchi; right chest tube x 2; no leak  ABDOMEN:  Soft and nontender.   EXTREMITIES:  no C/C/E  NEURO: AAO x 3, no focal deficts       LABS:                        11.5   10.96 )-----------( 289      ( 22 Jul 2022 05:33 )             34.7     07-22    131<L>  |  100  |  15  ----------------------------<  154<H>  3.9   |  27  |  0.64    Ca    8.6      22 Jul 2022 05:33            RADIOLOGY & ADDITIONAL TESTS:

## 2022-07-23 LAB
ALBUMIN SERPL ELPH-MCNC: 2.2 G/DL — LOW (ref 3.3–5)
ALP SERPL-CCNC: 58 U/L — SIGNIFICANT CHANGE UP (ref 40–120)
ALT FLD-CCNC: 15 U/L — SIGNIFICANT CHANGE UP (ref 12–78)
ANION GAP SERPL CALC-SCNC: 4 MMOL/L — LOW (ref 5–17)
AST SERPL-CCNC: 18 U/L — SIGNIFICANT CHANGE UP (ref 15–37)
BILIRUB SERPL-MCNC: 0.6 MG/DL — SIGNIFICANT CHANGE UP (ref 0.2–1.2)
BUN SERPL-MCNC: 9 MG/DL — SIGNIFICANT CHANGE UP (ref 7–23)
CALCIUM SERPL-MCNC: 8.8 MG/DL — SIGNIFICANT CHANGE UP (ref 8.5–10.1)
CHLORIDE SERPL-SCNC: 103 MMOL/L — SIGNIFICANT CHANGE UP (ref 96–108)
CO2 SERPL-SCNC: 27 MMOL/L — SIGNIFICANT CHANGE UP (ref 22–31)
CREAT SERPL-MCNC: 0.47 MG/DL — LOW (ref 0.5–1.3)
EGFR: 97 ML/MIN/1.73M2 — SIGNIFICANT CHANGE UP
GLUCOSE SERPL-MCNC: 150 MG/DL — HIGH (ref 70–99)
POTASSIUM SERPL-MCNC: 3.7 MMOL/L — SIGNIFICANT CHANGE UP (ref 3.5–5.3)
POTASSIUM SERPL-SCNC: 3.7 MMOL/L — SIGNIFICANT CHANGE UP (ref 3.5–5.3)
PROT SERPL-MCNC: 5.7 GM/DL — LOW (ref 6–8.3)
SARS-COV-2 RNA SPEC QL NAA+PROBE: DETECTED
SODIUM SERPL-SCNC: 134 MMOL/L — LOW (ref 135–145)

## 2022-07-23 PROCEDURE — 99233 SBSQ HOSP IP/OBS HIGH 50: CPT

## 2022-07-23 PROCEDURE — 71045 X-RAY EXAM CHEST 1 VIEW: CPT | Mod: 26

## 2022-07-23 RX ADMIN — HYDROMORPHONE HYDROCHLORIDE 1 MILLIGRAM(S): 2 INJECTION INTRAMUSCULAR; INTRAVENOUS; SUBCUTANEOUS at 19:30

## 2022-07-23 RX ADMIN — OXYCODONE HYDROCHLORIDE 10 MILLIGRAM(S): 5 TABLET ORAL at 17:27

## 2022-07-23 RX ADMIN — OXYCODONE HYDROCHLORIDE 10 MILLIGRAM(S): 5 TABLET ORAL at 21:35

## 2022-07-23 RX ADMIN — HYDROMORPHONE HYDROCHLORIDE 1 MILLIGRAM(S): 2 INJECTION INTRAMUSCULAR; INTRAVENOUS; SUBCUTANEOUS at 00:41

## 2022-07-23 RX ADMIN — BUDESONIDE AND FORMOTEROL FUMARATE DIHYDRATE 2 PUFF(S): 160; 4.5 AEROSOL RESPIRATORY (INHALATION) at 20:23

## 2022-07-23 RX ADMIN — OXYCODONE HYDROCHLORIDE 10 MILLIGRAM(S): 5 TABLET ORAL at 09:14

## 2022-07-23 RX ADMIN — HYDROMORPHONE HYDROCHLORIDE 1 MILLIGRAM(S): 2 INJECTION INTRAMUSCULAR; INTRAVENOUS; SUBCUTANEOUS at 23:00

## 2022-07-23 RX ADMIN — OXYCODONE HYDROCHLORIDE 10 MILLIGRAM(S): 5 TABLET ORAL at 09:45

## 2022-07-23 RX ADMIN — PANTOPRAZOLE SODIUM 40 MILLIGRAM(S): 20 TABLET, DELAYED RELEASE ORAL at 05:22

## 2022-07-23 RX ADMIN — HYDROMORPHONE HYDROCHLORIDE 1 MILLIGRAM(S): 2 INJECTION INTRAMUSCULAR; INTRAVENOUS; SUBCUTANEOUS at 12:15

## 2022-07-23 RX ADMIN — TIOTROPIUM BROMIDE 1 CAPSULE(S): 18 CAPSULE ORAL; RESPIRATORY (INHALATION) at 11:43

## 2022-07-23 RX ADMIN — GABAPENTIN 300 MILLIGRAM(S): 400 CAPSULE ORAL at 09:15

## 2022-07-23 RX ADMIN — BUDESONIDE AND FORMOTEROL FUMARATE DIHYDRATE 2 PUFF(S): 160; 4.5 AEROSOL RESPIRATORY (INHALATION) at 08:36

## 2022-07-23 RX ADMIN — HYDROMORPHONE HYDROCHLORIDE 1 MILLIGRAM(S): 2 INJECTION INTRAMUSCULAR; INTRAVENOUS; SUBCUTANEOUS at 15:43

## 2022-07-23 RX ADMIN — HYDROMORPHONE HYDROCHLORIDE 1 MILLIGRAM(S): 2 INJECTION INTRAMUSCULAR; INTRAVENOUS; SUBCUTANEOUS at 00:45

## 2022-07-23 RX ADMIN — HEPARIN SODIUM 5000 UNIT(S): 5000 INJECTION INTRAVENOUS; SUBCUTANEOUS at 21:36

## 2022-07-23 RX ADMIN — Medication 137 MICROGRAM(S): at 05:22

## 2022-07-23 RX ADMIN — HYDROMORPHONE HYDROCHLORIDE 1 MILLIGRAM(S): 2 INJECTION INTRAMUSCULAR; INTRAVENOUS; SUBCUTANEOUS at 18:59

## 2022-07-23 RX ADMIN — Medication 40 MILLIGRAM(S): at 05:22

## 2022-07-23 RX ADMIN — GABAPENTIN 300 MILLIGRAM(S): 400 CAPSULE ORAL at 21:35

## 2022-07-23 RX ADMIN — HEPARIN SODIUM 5000 UNIT(S): 5000 INJECTION INTRAVENOUS; SUBCUTANEOUS at 09:15

## 2022-07-23 RX ADMIN — HYDROMORPHONE HYDROCHLORIDE 1 MILLIGRAM(S): 2 INJECTION INTRAMUSCULAR; INTRAVENOUS; SUBCUTANEOUS at 01:52

## 2022-07-23 RX ADMIN — HYDROMORPHONE HYDROCHLORIDE 1 MILLIGRAM(S): 2 INJECTION INTRAMUSCULAR; INTRAVENOUS; SUBCUTANEOUS at 11:52

## 2022-07-23 RX ADMIN — Medication 600 MILLIGRAM(S): at 22:23

## 2022-07-23 RX ADMIN — HYDROMORPHONE HYDROCHLORIDE 1 MILLIGRAM(S): 2 INJECTION INTRAMUSCULAR; INTRAVENOUS; SUBCUTANEOUS at 15:50

## 2022-07-23 RX ADMIN — HYDROMORPHONE HYDROCHLORIDE 1 MILLIGRAM(S): 2 INJECTION INTRAMUSCULAR; INTRAVENOUS; SUBCUTANEOUS at 22:26

## 2022-07-23 RX ADMIN — OXYCODONE HYDROCHLORIDE 10 MILLIGRAM(S): 5 TABLET ORAL at 22:00

## 2022-07-23 RX ADMIN — OXYCODONE HYDROCHLORIDE 10 MILLIGRAM(S): 5 TABLET ORAL at 18:00

## 2022-07-23 RX ADMIN — SENNA PLUS 2 TABLET(S): 8.6 TABLET ORAL at 21:36

## 2022-07-23 RX ADMIN — Medication 4: at 11:55

## 2022-07-23 NOTE — PROGRESS NOTE ADULT - SUBJECTIVE AND OBJECTIVE BOX
CC:  Patient is a 77y old  Female who presents with a chief complaint of Rt PTX (22 Jul 2022 08:13)      HPI/BRIEF HOSPITAL COURSE:   76 y/o with hx of hypothyroid, COPD/ Bullous emphysema, PTX 11/2021 requiring a pigtail and recent COVID PNA, presented to ED with sudden onset SOB and chest discomfort during strenuous activity. Found to have moderate R PTX and pigtail placed on 7/15.  CT scan showed multiple blebs and  7/20 FB L VATSRLUL wedge resection and talc pleurodesis performed. Tolerating -20 Suction on chest tube since procedure.    Events last 24 hours:   No acute events overnight. HD stable, denies chest pain.     PAST MEDICAL & SURGICAL HISTORY:  Hypothyroidism      Diabetes mellitus  diet controlled      Back pain      Asthma      Elevated pancreatic enzyme  patient reports elevated lab 6/2013. Gastroenterologist aware      History of cataract  extracted from right and left      Varicose veins of both lower extremities  surgery      Diverticulosis of intestine without bleeding, unspecified intestinal tract location      Gall stones  gall bladder removed      Urinary urgency      Spinal stenosis of lumbar region      DDD (degenerative disc disease), lumbar      Basal cell carcinoma  removed from face      Pulmonary emphysema, unspecified emphysema type  2017 last exacerbation; never intubated      OA (osteoarthritis)      Frequent UTI  2-3x/year; had an episode 5 weeks ago was treated denies dysuria      GERD (gastroesophageal reflux disease)      Leg swelling      Emphysema lung      COVID-19 vaccine series completed  Moderna - 2nd dose 02/20/2021      Knee pain, left      Scar tissue  left knee S/P TKR      COPD exacerbation      S/P bladder repair  bladder lift 2/2013      Dental disorder  Dental surgery 12/2012, patient reports that upper portion is glued in at this time, plan is to have dental implants placed.      S/P knee surgery  arthroscopy bilateral 2007 and 2010      Skin cancer  to right temple 2012, removed, history of basal cell x 2      H/O lumbosacral spine surgery  fusion 2017      H/O umbilical hernia repair  5/24/19      S/P cholecystectomy  2012      S/P colonoscopy  benign polyp      S/P knee replacement  right 2017, left 11/2019        Allergies    Breo Ellipta (Rash)    Intolerances      FAMILY HISTORY:  Family history of pancreatic cancer (Father)    Family history of heart disease (Mother)    Family history of stroke (Mother)        Review of Systems:  CONSTITUTIONAL: No fever, chills, or fatigue  EYES: No eye pain, visual disturbances, or discharge  ENMT:  No difficulty hearing, tinnitus, vertigo; No sinus or throat pain  NECK: No pain or stiffness  RESPIRATORY: No cough, wheezing, chills or hemoptysis; No shortness of breath  CARDIOVASCULAR: No chest pain, palpitations, dizziness, or leg swelling  GASTROINTESTINAL: No abdominal or epigastric pain. No nausea, vomiting, or hematemesis; No diarrhea or constipation. No melena or hematochezia.  GENITOURINARY: No dysuria, frequency, hematuria, or incontinence  NEUROLOGICAL: No headaches, memory loss, loss of strength, numbness, or tremors  SKIN: No itching, burning, rashes, or lesions   MUSCULOSKELETAL: No joint pain or swelling; No muscle, back, or extremity pain  PSYCHIATRIC: No depression, anxiety, mood swings, or difficulty sleeping      Medications:    furosemide    Tablet 40 milliGRAM(s) Oral daily    ALBUTerol    90 MICROgram(s) HFA Inhaler 90 Puff(s) Inhalation every 6 hours  budesonide 160 MICROgram(s)/formoterol 4.5 MICROgram(s) Inhaler 2 Puff(s) Inhalation two times a day  guaiFENesin  milliGRAM(s) Oral every 12 hours PRN  tiotropium 18 MICROgram(s) Capsule 1 Capsule(s) Inhalation daily    acetaminophen     Tablet .. 650 milliGRAM(s) Oral every 6 hours PRN  gabapentin 300 milliGRAM(s) Oral two times a day  HYDROmorphone  Injectable 1 milliGRAM(s) IV Push every 3 hours PRN  oxyCODONE    IR 10 milliGRAM(s) Oral every 4 hours PRN      heparin   Injectable 5000 Unit(s) SubCutaneous every 12 hours    pantoprazole    Tablet 40 milliGRAM(s) Oral before breakfast  polyethylene glycol 3350 17 Gram(s) Oral two times a day PRN  senna 2 Tablet(s) Oral at bedtime      dextrose 50% Injectable 25 Gram(s) IV Push once  dextrose 50% Injectable 12.5 Gram(s) IV Push once  dextrose 50% Injectable 25 Gram(s) IV Push once  dextrose Oral Gel 15 Gram(s) Oral once PRN  glucagon  Injectable 1 milliGRAM(s) IntraMuscular once  insulin lispro (ADMELOG) corrective regimen sliding scale   SubCutaneous three times a day before meals  levothyroxine 137 MICROGram(s) Oral daily    dextrose 5%. 1000 milliLiter(s) IV Continuous <Continuous>  dextrose 5%. 1000 milliLiter(s) IV Continuous <Continuous>      artificial tears (preservative free) Ophthalmic Solution 1 Drop(s) Both EYES every 2 hours PRN            ICU Vital Signs Last 24 Hrs  T(C): 36.6 (23 Jul 2022 05:00), Max: 37 (22 Jul 2022 10:00)  T(F): 97.8 (23 Jul 2022 05:00), Max: 98.6 (22 Jul 2022 10:00)  HR: 2 (23 Jul 2022 05:00) (2 - 127)  BP: 109/52 (23 Jul 2022 04:00) (93/44 - 121/97)  BP(mean): 66 (23 Jul 2022 04:00) (57 - 101)  ABP: --  ABP(mean): --  RR: 18 (23 Jul 2022 04:00) (11 - 24)  SpO2: 95% (23 Jul 2022 04:00) (90% - 99%)    O2 Parameters below as of 22 Jul 2022 20:00  Patient On (Oxygen Delivery Method): nasal cannula  O2 Flow (L/min): 5        Vital Signs Last 24 Hrs  T(C): 36.6 (23 Jul 2022 05:00), Max: 37 (22 Jul 2022 10:00)  T(F): 97.8 (23 Jul 2022 05:00), Max: 98.6 (22 Jul 2022 10:00)  HR: 2 (23 Jul 2022 05:00) (2 - 127)  BP: 109/52 (23 Jul 2022 04:00) (93/44 - 121/97)  BP(mean): 66 (23 Jul 2022 04:00) (57 - 101)  RR: 18 (23 Jul 2022 04:00) (11 - 24)  SpO2: 95% (23 Jul 2022 04:00) (90% - 99%)    Parameters below as of 22 Jul 2022 20:00  Patient On (Oxygen Delivery Method): nasal cannula  O2 Flow (L/min): 5          I&O's Detail    21 Jul 2022 07:01  -  22 Jul 2022 07:00  --------------------------------------------------------  IN:    Oral Fluid: 960 mL  Total IN: 960 mL    OUT:    Chest Tube (mL): 54 mL    Chest Tube (mL): 22 mL    Voided (mL): 2350 mL  Total OUT: 2426 mL    Total NET: -1466 mL      22 Jul 2022 07:01  -  23 Jul 2022 06:06  --------------------------------------------------------  IN:    IV PiggyBack: 100 mL    Oral Fluid: 640 mL  Total IN: 740 mL    OUT:    Chest Tube (mL): 80 mL    Chest Tube (mL): 11 mL    Voided (mL): 3650 mL  Total OUT: 3741 mL    Total NET: -3001 mL            LABS:                        11.5   10.96 )-----------( 289      ( 22 Jul 2022 05:33 )             34.7     07-22    131<L>  |  100  |  15  ----------------------------<  154<H>  3.9   |  27  |  0.64    Ca    8.6      22 Jul 2022 05:33            CAPILLARY BLOOD GLUCOSE      POCT Blood Glucose.: 122 mg/dL (22 Jul 2022 17:11)        CULTURES:        Physical Examination:  General: No acute distress.  Alert, oriented, interactive, nonfocal  NEURO: A&O X3, motor function 5/5 BL UE/LE  HEENT: Pupils equal, reactive to light.  Symmetric.  PULM: [place] B/l. B/L Chest tubes.  no significant sputum production, no wheezes, rales, rhonchi  CVS: Regular rate and rhythm, no murmurs, rubs, or gallops  ABD: Soft, nondistended, nontender, normoactive bowel sounds, no masses  EXT: No edema, nontender  SKIN: Warm and well perfused, no rashes noted          RADIOLOGY:     CXR: < from: Xray Chest 1 View- PORTABLE-Urgent (Xray Chest 1 View- PORTABLE-Urgent .) (07.21.22 @ 07:48) >  INTERPRETATION:    Heart size and the mediastinum cannot be accurately evaluated on this   projection. Calcified thoracic aorta.  Right lung volume loss with postsurgical change including chain sutures.   Right chest tubes not significantly changed in position.  No pneumothorax.  Left apical bulla again noted.  Slight decrease in right subcutaneous emphysema.  Trace right pleural effusion.  Trace left pleural effusion and left basilar subsegmental atelectasis.      IMPRESSION:  Right lung volume loss with postsurgical change. Right chest   tubes not significantly changed in position.    No pneumothorax. Slight decrease in right subcutaneous emphysema.    Bilateral trace pleural effusions.    Left basilar subsegmental atelectasis.    < end of copied text >            ASSESSMENT   76 y/o F with pmhx of COPD Bullous emphysema with hx of PTX presented to ED with sudden onset SOB and chest pain and found to have R PTX. S/p Pigtail placement 7/15. 7/20 FB Left VATS RUL wedge resection and Talc Pleurodiesis  1. PTX  PLAN   1. PTX  -  CT placed to WS currently tolerating   - F/u CXR   - OOB as much as tolerated  - IS  -Avoid anti-inflammatory medications such as NSAIDs   - Pain management as orderd   - Plan to remove CT tomm if tolerates WS

## 2022-07-23 NOTE — PROGRESS NOTE ADULT - SUBJECTIVE AND OBJECTIVE BOX
Subjective:  7/23  awake and alert, no distress, doing well, no sob.    MEDICATIONS  (STANDING):  budesonide 160 MICROgram(s)/formoterol 4.5 MICROgram(s) Inhaler 2 Puff(s) Inhalation two times a day  dextrose 5%. 1000 milliLiter(s) (100 mL/Hr) IV Continuous <Continuous>  dextrose 5%. 1000 milliLiter(s) (50 mL/Hr) IV Continuous <Continuous>  dextrose 50% Injectable 25 Gram(s) IV Push once  dextrose 50% Injectable 12.5 Gram(s) IV Push once  dextrose 50% Injectable 25 Gram(s) IV Push once  furosemide    Tablet 40 milliGRAM(s) Oral daily  gabapentin 300 milliGRAM(s) Oral two times a day  glucagon  Injectable 1 milliGRAM(s) IntraMuscular once  heparin   Injectable 5000 Unit(s) SubCutaneous every 12 hours  insulin lispro (ADMELOG) corrective regimen sliding scale   SubCutaneous three times a day before meals  levothyroxine 137 MICROGram(s) Oral daily  pantoprazole    Tablet 40 milliGRAM(s) Oral before breakfast  talc (Sterile) 4 Gram(s) IntraPleural. once  tiotropium 18 MICROgram(s) Capsule 1 Capsule(s) Inhalation daily    MEDICATIONS  (PRN):  acetaminophen     Tablet .. 650 milliGRAM(s) Oral every 6 hours PRN Temp greater or equal to 38C (100.4F), Mild Pain (1 - 3)  artificial tears (preservative free) Ophthalmic Solution 1 Drop(s) Both EYES every 2 hours PRN Painful Dry Eyes  dextrose Oral Gel 15 Gram(s) Oral once PRN Blood Glucose LESS THAN 70 milliGRAM(s)/deciliter  HYDROmorphone  Injectable 0.5 milliGRAM(s) IV Push every 4 hours PRN Severe Pain (7 - 10)  oxyCODONE    IR 5 milliGRAM(s) Oral every 4 hours PRN Moderate Pain (4 - 6)  oxyCODONE    IR 10 milliGRAM(s) Oral every 4 hours PRN Severe Pain (7 - 10)      Allergies    Breo Ellipta (Rash)    Intolerances        REVIEW OF SYSTEMS:    CONSTITUTIONAL:  As per HPI.  HEENT:  Eyes:  No diplopia or blurred vision. ENT:  No earache, sore throat or runny nose.  CARDIOVASCULAR:  No pressure, squeezing, tightness, heaviness or aching about the chest, neck, axilla or epigastrium.  RESPIRATORY:  No cough, shortness of breath, PND or orthopnea.  GASTROINTESTINAL:  No nausea, vomiting or diarrhea.  GENITOURINARY:  No dysuria, frequency or urgency.  MUSCULOSKELETAL:  no joint pain, deformity, tenderness  EXTREMITIES: no clubbing cyanosis,edema  SKIN:  No change in skin, hair or nails.  NEUROLOGIC:  No paresthesias, fasciculations, seizures or weakness.  PSYCHIATRIC:  No disorder of thought or mood.  ENDOCRINE:  No heat or cold intolerance, polyuria or polydipsia.  HEMATOLOGICAL:  No easy bruising or bleedings:    Vital Signs Last 24 Hrs  T(C): 37.3 (22 Jul 2022 04:00), Max: 37.4 (21 Jul 2022 18:00)  T(F): 99.2 (22 Jul 2022 04:00), Max: 99.3 (21 Jul 2022 18:00)  HR: 98 (22 Jul 2022 06:00) (60 - 98)  BP: 128/74 (22 Jul 2022 06:00) (85/46 - 128/74)  BP(mean): 83 (22 Jul 2022 06:00) (52 - 84)  RR: 23 (22 Jul 2022 06:00) (13 - 25)  SpO2: 97% (22 Jul 2022 06:00) (93% - 98%)    Parameters below as of 22 Jul 2022 06:00  Patient On (Oxygen Delivery Method): nasal cannula  O2 Flow (L/min): 5      PHYSICAL EXAMINATION:  SKIN: no rashes  HEAD: NC/AT  EYES: PERRLA, EOMI  EARS: TM's intact  NOSE: no abnormalities  NECK:  Supple. No lymphadenopathy. Jugular venous pressure not elevated. Carotids equal.   HEART:   S1S2 reg  CHEST:  chest clear to A; right chest tube x 2; no leak  ABDOMEN:  Soft and nontender.   EXTREMITIES:  no C/C/E  NEURO: AAO x 3, no focal deficts       LABS:                        11.5   10.96 )-----------( 289      ( 22 Jul 2022 05:33 )             34.7     07-22    131<L>  |  100  |  15  ----------------------------<  154<H>  3.9   |  27  |  0.64    Ca    8.6      22 Jul 2022 05:33            RADIOLOGY & ADDITIONAL TESTS:

## 2022-07-23 NOTE — PROGRESS NOTE ADULT - ASSESSMENT
- cxr shows re-expansion right lung, no PTX  - s/p right Vats bullectomy, RUL wedge resection,  w talc pleurodesis  - chest tubes no leak, to water seal as per CTS  - Albuterol, Symbicort, Spiriva inhalers  - NC O2  - check CXR  - hyponatremia; not on fluids.  Repeat improved to Na 134  - dvt proph

## 2022-07-23 NOTE — CHART NOTE - NSCHARTNOTEFT_GEN_A_CORE
Patient is showing chronic COVID positivity without symptoms of malaise. COVID precautions were removed by ID prior to her surgery.

## 2022-07-24 PROCEDURE — 99233 SBSQ HOSP IP/OBS HIGH 50: CPT

## 2022-07-24 PROCEDURE — 71045 X-RAY EXAM CHEST 1 VIEW: CPT | Mod: 26

## 2022-07-24 PROCEDURE — 71045 X-RAY EXAM CHEST 1 VIEW: CPT | Mod: 26,77

## 2022-07-24 RX ADMIN — GABAPENTIN 300 MILLIGRAM(S): 400 CAPSULE ORAL at 09:54

## 2022-07-24 RX ADMIN — SENNA PLUS 2 TABLET(S): 8.6 TABLET ORAL at 20:30

## 2022-07-24 RX ADMIN — TIOTROPIUM BROMIDE 1 CAPSULE(S): 18 CAPSULE ORAL; RESPIRATORY (INHALATION) at 09:25

## 2022-07-24 RX ADMIN — OXYCODONE HYDROCHLORIDE 10 MILLIGRAM(S): 5 TABLET ORAL at 11:07

## 2022-07-24 RX ADMIN — OXYCODONE HYDROCHLORIDE 10 MILLIGRAM(S): 5 TABLET ORAL at 03:16

## 2022-07-24 RX ADMIN — PANTOPRAZOLE SODIUM 40 MILLIGRAM(S): 20 TABLET, DELAYED RELEASE ORAL at 05:21

## 2022-07-24 RX ADMIN — OXYCODONE HYDROCHLORIDE 10 MILLIGRAM(S): 5 TABLET ORAL at 04:00

## 2022-07-24 RX ADMIN — OXYCODONE HYDROCHLORIDE 10 MILLIGRAM(S): 5 TABLET ORAL at 15:15

## 2022-07-24 RX ADMIN — HYDROMORPHONE HYDROCHLORIDE 1 MILLIGRAM(S): 2 INJECTION INTRAMUSCULAR; INTRAVENOUS; SUBCUTANEOUS at 06:00

## 2022-07-24 RX ADMIN — BUDESONIDE AND FORMOTEROL FUMARATE DIHYDRATE 2 PUFF(S): 160; 4.5 AEROSOL RESPIRATORY (INHALATION) at 20:04

## 2022-07-24 RX ADMIN — GABAPENTIN 300 MILLIGRAM(S): 400 CAPSULE ORAL at 20:30

## 2022-07-24 RX ADMIN — BUDESONIDE AND FORMOTEROL FUMARATE DIHYDRATE 2 PUFF(S): 160; 4.5 AEROSOL RESPIRATORY (INHALATION) at 09:25

## 2022-07-24 RX ADMIN — HEPARIN SODIUM 5000 UNIT(S): 5000 INJECTION INTRAVENOUS; SUBCUTANEOUS at 09:54

## 2022-07-24 RX ADMIN — Medication 600 MILLIGRAM(S): at 20:30

## 2022-07-24 RX ADMIN — OXYCODONE HYDROCHLORIDE 10 MILLIGRAM(S): 5 TABLET ORAL at 23:27

## 2022-07-24 RX ADMIN — HYDROMORPHONE HYDROCHLORIDE 1 MILLIGRAM(S): 2 INJECTION INTRAMUSCULAR; INTRAVENOUS; SUBCUTANEOUS at 20:31

## 2022-07-24 RX ADMIN — OXYCODONE HYDROCHLORIDE 10 MILLIGRAM(S): 5 TABLET ORAL at 14:45

## 2022-07-24 RX ADMIN — OXYCODONE HYDROCHLORIDE 10 MILLIGRAM(S): 5 TABLET ORAL at 10:37

## 2022-07-24 RX ADMIN — HYDROMORPHONE HYDROCHLORIDE 1 MILLIGRAM(S): 2 INJECTION INTRAMUSCULAR; INTRAVENOUS; SUBCUTANEOUS at 21:00

## 2022-07-24 RX ADMIN — HEPARIN SODIUM 5000 UNIT(S): 5000 INJECTION INTRAVENOUS; SUBCUTANEOUS at 20:30

## 2022-07-24 RX ADMIN — Medication 40 MILLIGRAM(S): at 05:21

## 2022-07-24 RX ADMIN — Medication 137 MICROGRAM(S): at 05:22

## 2022-07-24 RX ADMIN — HYDROMORPHONE HYDROCHLORIDE 1 MILLIGRAM(S): 2 INJECTION INTRAMUSCULAR; INTRAVENOUS; SUBCUTANEOUS at 05:44

## 2022-07-24 NOTE — PROGRESS NOTE ADULT - SUBJECTIVE AND OBJECTIVE BOX
CC:  Patient is a 77y old  Female who presents with a chief complaint of Rt PTX (23 Jul 2022 11:40)    HPI/BRIEF HOSPITAL COURSE:   76 y/o with hx of hypothyroid, COPD/ Bullous emphysema, PTX 11/2021 requiring a pigtail and recent COVID PNA, presented to ED with sudden onset SOB and chest discomfort during strenuous activity. Found to have moderate R PTX and pigtail placed on 7/15.  CT scan showed multiple blebs and  7/20 FB L VATS RUL wedge resection and talc pleurodesis performed. Tolerating -20 Suction on chest tube since procedure.    Events last 24 hours:   No acute events overnight. HD stable, denies chest pain. Tolerating water seal well.     PAST MEDICAL & SURGICAL HISTORY:  Hypothyroidism      Diabetes mellitus  diet controlled      Back pain      Asthma      Elevated pancreatic enzyme  patient reports elevated lab 6/2013. Gastroenterologist aware      History of cataract  extracted from right and left      Varicose veins of both lower extremities  surgery      Diverticulosis of intestine without bleeding, unspecified intestinal tract location      Gall stones  gall bladder removed      Urinary urgency      Spinal stenosis of lumbar region      DDD (degenerative disc disease), lumbar      Basal cell carcinoma  removed from face      Pulmonary emphysema, unspecified emphysema type  2017 last exacerbation; never intubated      OA (osteoarthritis)      Frequent UTI  2-3x/year; had an episode 5 weeks ago was treated denies dysuria      GERD (gastroesophageal reflux disease)      Leg swelling      Emphysema lung      COVID-19 vaccine series completed  Moderna - 2nd dose 02/20/2021      Knee pain, left      Scar tissue  left knee S/P TKR      COPD exacerbation      S/P bladder repair  bladder lift 2/2013      Dental disorder  Dental surgery 12/2012, patient reports that upper portion is glued in at this time, plan is to have dental implants placed.      S/P knee surgery  arthroscopy bilateral 2007 and 2010      Skin cancer  to right temple 2012, removed, history of basal cell x 2      H/O lumbosacral spine surgery  fusion 2017      H/O umbilical hernia repair  5/24/19      S/P cholecystectomy  2012      S/P colonoscopy  benign polyp      S/P knee replacement  right 2017, left 11/2019        Allergies    Breo Ellipta (Rash)    Intolerances      FAMILY HISTORY:  Family history of pancreatic cancer (Father)    Family history of heart disease (Mother)    Family history of stroke (Mother)        Review of Systems:  CONSTITUTIONAL: No fever, chills, or fatigue  EYES: No eye pain, visual disturbances, or discharge  ENMT:  No difficulty hearing, tinnitus, vertigo; No sinus or throat pain  NECK: No pain or stiffness  RESPIRATORY: No cough, wheezing, chills or hemoptysis; No shortness of breath  CARDIOVASCULAR: No chest pain, palpitations, dizziness, or leg swelling  GASTROINTESTINAL: No abdominal or epigastric pain. No nausea, vomiting, or hematemesis; No diarrhea or constipation. No melena or hematochezia.  GENITOURINARY: No dysuria, frequency, hematuria, or incontinence  NEUROLOGICAL: No headaches, memory loss, loss of strength, numbness, or tremors  SKIN: No itching, burning, rashes, or lesions   MUSCULOSKELETAL: No joint pain or swelling; No muscle, back, or extremity pain  PSYCHIATRIC: No depression, anxiety, mood swings, or difficulty sleeping      Medications:    furosemide    Tablet 40 milliGRAM(s) Oral daily    ALBUTerol    90 MICROgram(s) HFA Inhaler 90 Puff(s) Inhalation every 6 hours  budesonide 160 MICROgram(s)/formoterol 4.5 MICROgram(s) Inhaler 2 Puff(s) Inhalation two times a day  guaiFENesin  milliGRAM(s) Oral every 12 hours PRN  tiotropium 18 MICROgram(s) Capsule 1 Capsule(s) Inhalation daily    acetaminophen     Tablet .. 650 milliGRAM(s) Oral every 6 hours PRN  gabapentin 300 milliGRAM(s) Oral two times a day  HYDROmorphone  Injectable 1 milliGRAM(s) IV Push every 3 hours PRN  oxyCODONE    IR 10 milliGRAM(s) Oral every 4 hours PRN      heparin   Injectable 5000 Unit(s) SubCutaneous every 12 hours    pantoprazole    Tablet 40 milliGRAM(s) Oral before breakfast  polyethylene glycol 3350 17 Gram(s) Oral two times a day PRN  senna 2 Tablet(s) Oral at bedtime      dextrose 50% Injectable 25 Gram(s) IV Push once  dextrose 50% Injectable 25 Gram(s) IV Push once  dextrose 50% Injectable 12.5 Gram(s) IV Push once  dextrose Oral Gel 15 Gram(s) Oral once PRN  glucagon  Injectable 1 milliGRAM(s) IntraMuscular once  insulin lispro (ADMELOG) corrective regimen sliding scale   SubCutaneous three times a day before meals  levothyroxine 137 MICROGram(s) Oral daily    dextrose 5%. 1000 milliLiter(s) IV Continuous <Continuous>  dextrose 5%. 1000 milliLiter(s) IV Continuous <Continuous>      artificial tears (preservative free) Ophthalmic Solution 1 Drop(s) Both EYES every 2 hours PRN             Vital Signs Last 24 Hrs  T(C): 37.2 (24 Jul 2022 10:31), Max: 37.2 (23 Jul 2022 16:28)  T(F): 99 (24 Jul 2022 10:31), Max: 99 (24 Jul 2022 10:31)  HR: 69 (24 Jul 2022 09:29) (68 - 104)  BP: 95/53 (24 Jul 2022 08:00) (92/50 - 133/65)  BP(mean): 64 (24 Jul 2022 08:00) (57 - 99)  ABP: --  ABP(mean): --  RR: 25 (24 Jul 2022 08:00) (16 - 29)  SpO2: 95% (24 Jul 2022 08:00) (91% - 100%)    O2 Parameters below as of 24 Jul 2022 08:00  Patient On (Oxygen Delivery Method): nasal cannula  O2 Flow (L/min): 5        Vital Signs Last 24 Hrs  T(C): 37.2 (24 Jul 2022 10:31), Max: 37.2 (23 Jul 2022 16:28)  T(F): 99 (24 Jul 2022 10:31), Max: 99 (24 Jul 2022 10:31)  HR: 69 (24 Jul 2022 09:29) (68 - 104)  BP: 95/53 (24 Jul 2022 08:00) (92/50 - 133/65)  BP(mean): 64 (24 Jul 2022 08:00) (57 - 99)  RR: 25 (24 Jul 2022 08:00) (16 - 29)  SpO2: 95% (24 Jul 2022 08:00) (91% - 100%)    Parameters below as of 24 Jul 2022 08:00  Patient On (Oxygen Delivery Method): nasal cannula  O2 Flow (L/min): 5          I&O's Detail    23 Jul 2022 07:01  -  24 Jul 2022 07:00  --------------------------------------------------------  IN:  Total IN: 0 mL    OUT:    Chest Tube (mL): 0 mL    Chest Tube (mL): 0 mL    Voided (mL): 3750 mL  Total OUT: 3750 mL    Total NET: -3750 mL      24 Jul 2022 07:01  -  24 Jul 2022 11:43  --------------------------------------------------------  IN:  Total IN: 0 mL    OUT:    Voided (mL): 1 mL  Total OUT: 1 mL    Total NET: -1 mL            LABS:    07-23    134<L>  |  103  |  9   ----------------------------<  150<H>  3.7   |  27  |  0.47<L>    Ca    8.8      23 Jul 2022 05:43    TPro  5.7<L>  /  Alb  2.2<L>  /  TBili  0.6  /  DBili  x   /  AST  18  /  ALT  15  /  AlkPhos  58  07-23        Physical Examination:  General: No acute distress.  Alert, oriented, interactive, nonfocal  NEURO: A&O X3, motor function 5/5 BL UE/LE  HEENT: Pupils equal, reactive to light.  Symmetric.  PULM: [place] B/l. B/L Chest tubes.  no significant sputum production, no wheezes, rales, rhonchi  CVS: Regular rate and rhythm, no murmurs, rubs, or gallops  ABD: Soft, nondistended, nontender, normoactive bowel sounds, no masses  EXT: No edema, nontender  SKIN: Warm and well perfused, no rashes noted          RADIOLOGY:     CXR: < from: Xray Chest 1 View- PORTABLE-Urgent (Xray Chest 1 View- PORTABLE-Urgent .) (07.21.22 @ 07:48) >  INTERPRETATION:    Heart size and the mediastinum cannot be accurately evaluated on this   projection. Calcified thoracic aorta.  Right lung volume loss with postsurgical change including chain sutures.   Right chest tubes not significantly changed in position.  No pneumothorax.  Left apical bulla again noted.  Slight decrease in right subcutaneous emphysema.  Trace right pleural effusion.  Trace left pleural effusion and left basilar subsegmental atelectasis.      IMPRESSION:  Right lung volume loss with postsurgical change. Right chest   tubes not significantly changed in position.    No pneumothorax. Slight decrease in right subcutaneous emphysema.    Bilateral trace pleural effusions.    Left basilar subsegmental atelectasis.    < end of copied text >

## 2022-07-24 NOTE — PROGRESS NOTE ADULT - ASSESSMENT
- cxr shows re-expansion right lung, no PTX  - s/p right Vats bullectomy, RUL wedge resection,  w talc pleurodesis  - 1 chest tube now, no leak, to water seal as per CTS  - Albuterol, Symbicort, Spiriva inhalers, incentive spirometry  - NC O2  - check CXR  - hyponatremia; not on fluids.  Repeat improved to Na 134  - dvt proph

## 2022-07-24 NOTE — PROGRESS NOTE ADULT - PROBLEM SELECTOR PROBLEM 4
Hypoxemia requiring supplemental oxygen

## 2022-07-24 NOTE — PROGRESS NOTE ADULT - PROBLEM SELECTOR PROBLEM 2
COPD, moderate
PRINCIPAL DISCHARGE DIAGNOSIS  Diagnosis: Bipolar disorder, unspecified  Assessment and Plan of Treatment:       
COPD, moderate

## 2022-07-24 NOTE — PROGRESS NOTE ADULT - PROBLEM SELECTOR PROBLEM 1
Recurrent spontaneous pneumothorax

## 2022-07-24 NOTE — PROGRESS NOTE ADULT - PROBLEM SELECTOR PROBLEM 3
Lung disease, bullous

## 2022-07-24 NOTE — PROGRESS NOTE ADULT - SUBJECTIVE AND OBJECTIVE BOX
Subjective:  7/23  awake and alert, no distress, doing well, no sob.  7/24: in bed, awake, alert, no distress, no SOB.     MEDICATIONS  (STANDING):  budesonide 160 MICROgram(s)/formoterol 4.5 MICROgram(s) Inhaler 2 Puff(s) Inhalation two times a day  dextrose 5%. 1000 milliLiter(s) (100 mL/Hr) IV Continuous <Continuous>  dextrose 5%. 1000 milliLiter(s) (50 mL/Hr) IV Continuous <Continuous>  dextrose 50% Injectable 25 Gram(s) IV Push once  dextrose 50% Injectable 12.5 Gram(s) IV Push once  dextrose 50% Injectable 25 Gram(s) IV Push once  furosemide    Tablet 40 milliGRAM(s) Oral daily  gabapentin 300 milliGRAM(s) Oral two times a day  glucagon  Injectable 1 milliGRAM(s) IntraMuscular once  heparin   Injectable 5000 Unit(s) SubCutaneous every 12 hours  insulin lispro (ADMELOG) corrective regimen sliding scale   SubCutaneous three times a day before meals  levothyroxine 137 MICROGram(s) Oral daily  pantoprazole    Tablet 40 milliGRAM(s) Oral before breakfast  talc (Sterile) 4 Gram(s) IntraPleural. once  tiotropium 18 MICROgram(s) Capsule 1 Capsule(s) Inhalation daily    MEDICATIONS  (PRN):  acetaminophen     Tablet .. 650 milliGRAM(s) Oral every 6 hours PRN Temp greater or equal to 38C (100.4F), Mild Pain (1 - 3)  artificial tears (preservative free) Ophthalmic Solution 1 Drop(s) Both EYES every 2 hours PRN Painful Dry Eyes  dextrose Oral Gel 15 Gram(s) Oral once PRN Blood Glucose LESS THAN 70 milliGRAM(s)/deciliter  HYDROmorphone  Injectable 0.5 milliGRAM(s) IV Push every 4 hours PRN Severe Pain (7 - 10)  oxyCODONE    IR 5 milliGRAM(s) Oral every 4 hours PRN Moderate Pain (4 - 6)  oxyCODONE    IR 10 milliGRAM(s) Oral every 4 hours PRN Severe Pain (7 - 10)      Allergies    Breo Ellipta (Rash)    Intolerances        REVIEW OF SYSTEMS:    CONSTITUTIONAL:  As per HPI.  HEENT:  Eyes:  No diplopia or blurred vision. ENT:  No earache, sore throat or runny nose.  CARDIOVASCULAR:  No pressure, squeezing, tightness, heaviness or aching about the chest, neck, axilla or epigastrium.  RESPIRATORY:  No cough, shortness of breath, PND or orthopnea.  GASTROINTESTINAL:  No nausea, vomiting or diarrhea.  GENITOURINARY:  No dysuria, frequency or urgency.  MUSCULOSKELETAL:  no joint pain, deformity, tenderness  EXTREMITIES: no clubbing cyanosis,edema  SKIN:  No change in skin, hair or nails.  NEUROLOGIC:  No paresthesias, fasciculations, seizures or weakness.  PSYCHIATRIC:  No disorder of thought or mood.  ENDOCRINE:  No heat or cold intolerance, polyuria or polydipsia.  HEMATOLOGICAL:  No easy bruising or bleedings:    Vital Signs Last 24 Hrs  T(C): 37.3 (22 Jul 2022 04:00), Max: 37.4 (21 Jul 2022 18:00)  T(F): 99.2 (22 Jul 2022 04:00), Max: 99.3 (21 Jul 2022 18:00)  HR: 98 (22 Jul 2022 06:00) (60 - 98)  BP: 128/74 (22 Jul 2022 06:00) (85/46 - 128/74)  BP(mean): 83 (22 Jul 2022 06:00) (52 - 84)  RR: 23 (22 Jul 2022 06:00) (13 - 25)  SpO2: 97% (22 Jul 2022 06:00) (93% - 98%)    Parameters below as of 22 Jul 2022 06:00  Patient On (Oxygen Delivery Method): nasal cannula  O2 Flow (L/min): 5      PHYSICAL EXAMINATION:  SKIN: no rashes  HEAD: NC/AT  EYES: PERRLA, EOMI  EARS: TM's intact  NOSE: no abnormalities  NECK:  Supple. No lymphadenopathy. Jugular venous pressure not elevated. Carotids equal.   HEART:   S1S2 reg  CHEST:  chest clear to A; right chest tube x 1; no leak  ABDOMEN:  Soft and nontender.   EXTREMITIES:  no C/C/E  NEURO: AAO x 3, no focal deficts       LABS:                        11.5   10.96 )-----------( 289      ( 22 Jul 2022 05:33 )             34.7     07-22    131<L>  |  100  |  15  ----------------------------<  154<H>  3.9   |  27  |  0.64    Ca    8.6      22 Jul 2022 05:33            RADIOLOGY & ADDITIONAL TESTS:

## 2022-07-24 NOTE — PROGRESS NOTE ADULT - ASSESSMENT
ASSESSMENT   76 y/o F with pmhx of COPD Bullous emphysema with hx of PTX presented to ED with sudden onset SOB and chest pain and found to have R PTX. S/p Pigtail placement 7/15. 7/20 FB Left VATS RUL wedge resection and Talc Pleurodiesis  1. PTX      PLAN   1. PTX  -   Removed chest tube  in apices, chest tube in base of lung still present on water seal   -  S/p chest tube removal CXR performed and shows no PTX.   - OOB as much as tolerated  - IS  -Avoid anti-inflammatory medications such as NSAIDs   - Pain management as ordered   - Daily CXR f/u

## 2022-07-25 PROCEDURE — 99231 SBSQ HOSP IP/OBS SF/LOW 25: CPT

## 2022-07-25 PROCEDURE — 71045 X-RAY EXAM CHEST 1 VIEW: CPT | Mod: 26

## 2022-07-25 RX ORDER — HYDROMORPHONE HYDROCHLORIDE 2 MG/ML
0.5 INJECTION INTRAMUSCULAR; INTRAVENOUS; SUBCUTANEOUS EVERY 4 HOURS
Refills: 0 | Status: DISCONTINUED | OUTPATIENT
Start: 2022-07-25 | End: 2022-07-26

## 2022-07-25 RX ADMIN — OXYCODONE HYDROCHLORIDE 10 MILLIGRAM(S): 5 TABLET ORAL at 10:40

## 2022-07-25 RX ADMIN — TIOTROPIUM BROMIDE 1 CAPSULE(S): 18 CAPSULE ORAL; RESPIRATORY (INHALATION) at 10:00

## 2022-07-25 RX ADMIN — OXYCODONE HYDROCHLORIDE 10 MILLIGRAM(S): 5 TABLET ORAL at 00:00

## 2022-07-25 RX ADMIN — Medication 137 MICROGRAM(S): at 05:41

## 2022-07-25 RX ADMIN — Medication 2: at 12:00

## 2022-07-25 RX ADMIN — SENNA PLUS 2 TABLET(S): 8.6 TABLET ORAL at 21:37

## 2022-07-25 RX ADMIN — Medication 2: at 16:52

## 2022-07-25 RX ADMIN — BUDESONIDE AND FORMOTEROL FUMARATE DIHYDRATE 2 PUFF(S): 160; 4.5 AEROSOL RESPIRATORY (INHALATION) at 09:59

## 2022-07-25 RX ADMIN — HYDROMORPHONE HYDROCHLORIDE 1 MILLIGRAM(S): 2 INJECTION INTRAMUSCULAR; INTRAVENOUS; SUBCUTANEOUS at 05:43

## 2022-07-25 RX ADMIN — HEPARIN SODIUM 5000 UNIT(S): 5000 INJECTION INTRAVENOUS; SUBCUTANEOUS at 21:37

## 2022-07-25 RX ADMIN — GABAPENTIN 300 MILLIGRAM(S): 400 CAPSULE ORAL at 09:54

## 2022-07-25 RX ADMIN — Medication 40 MILLIGRAM(S): at 05:41

## 2022-07-25 RX ADMIN — GABAPENTIN 300 MILLIGRAM(S): 400 CAPSULE ORAL at 21:37

## 2022-07-25 RX ADMIN — OXYCODONE HYDROCHLORIDE 10 MILLIGRAM(S): 5 TABLET ORAL at 09:54

## 2022-07-25 RX ADMIN — OXYCODONE HYDROCHLORIDE 10 MILLIGRAM(S): 5 TABLET ORAL at 21:37

## 2022-07-25 RX ADMIN — BUDESONIDE AND FORMOTEROL FUMARATE DIHYDRATE 2 PUFF(S): 160; 4.5 AEROSOL RESPIRATORY (INHALATION) at 20:16

## 2022-07-25 RX ADMIN — POLYETHYLENE GLYCOL 3350 17 GRAM(S): 17 POWDER, FOR SOLUTION ORAL at 21:37

## 2022-07-25 RX ADMIN — OXYCODONE HYDROCHLORIDE 10 MILLIGRAM(S): 5 TABLET ORAL at 17:38

## 2022-07-25 RX ADMIN — PANTOPRAZOLE SODIUM 40 MILLIGRAM(S): 20 TABLET, DELAYED RELEASE ORAL at 05:41

## 2022-07-25 RX ADMIN — HEPARIN SODIUM 5000 UNIT(S): 5000 INJECTION INTRAVENOUS; SUBCUTANEOUS at 09:54

## 2022-07-25 RX ADMIN — OXYCODONE HYDROCHLORIDE 10 MILLIGRAM(S): 5 TABLET ORAL at 16:52

## 2022-07-25 RX ADMIN — OXYCODONE HYDROCHLORIDE 10 MILLIGRAM(S): 5 TABLET ORAL at 22:33

## 2022-07-25 NOTE — PROGRESS NOTE ADULT - SUBJECTIVE AND OBJECTIVE BOX
Subjective:  Pt in bed NAD no issues overnight.  Angled CT d/c without issue . CXR without PTX    T(C): 36.8 (07-25-22 @ 08:55), Max: 37.4 (07-24-22 @ 18:00)  HR: 66 (07-25-22 @ 08:00) (63 - 95)  BP: 102/43 (07-25-22 @ 08:00) (90/50 - 122/93)  ABP: --  ABP(mean): --  RR: 18 (07-25-22 @ 08:00) (15 - 25)  SpO2: 95% (07-25-22 @ 05:45) (93% - 99%) 2 l nC   Wt(kg): --  CVP(mm Hg): --  CO: --  CI: --  PA: --                                              Tele: SR     CHEST TUBE:    15cc                           OUTPUT:     per 24 hours    AIR LEAKS:  [ ] YES [ x] NO                                   CAPILLARY BLOOD GLUCOSE      POCT Blood Glucose.: 154 mg/dL (25 Jul 2022 11:22)  POCT Blood Glucose.: 127 mg/dL (25 Jul 2022 07:41)  POCT Blood Glucose.: 135 mg/dL (24 Jul 2022 18:32)  POCT Blood Glucose.: 144 mg/dL (24 Jul 2022 12:38)           CXR:      exam   Neuro:  Alert Awake NAD   Pulm: decreased at bases   CV: RR S1 S2   Abd:  soft NT ND   Extremities:  Trace edema  inc : Rt thoracic  C/D/I           Assessment:  77yFemale    with PAST MEDICAL & SURGICAL HISTORY:  Hypothyroidism      Diabetes mellitus  diet controlled      Back pain      Asthma      Elevated pancreatic enzyme  patient reports elevated lab 6/2013. Gastroenterologist aware      History of cataract  extracted from right and left      Varicose veins of both lower extremities  surgery      Diverticulosis of intestine without bleeding, unspecified intestinal tract location      Gall stones  gall bladder removed      Urinary urgency      Spinal stenosis of lumbar region      DDD (degenerative disc disease), lumbar      Basal cell carcinoma  removed from face      Pulmonary emphysema, unspecified emphysema type  2017 last exacerbation; never intubated      OA (osteoarthritis)      Frequent UTI  2-3x/year; had an episode 5 weeks ago was treated denies dysuria      GERD (gastroesophageal reflux disease)      Leg swelling      Emphysema lung      COVID-19 vaccine series completed  Moderna - 2nd dose 02/20/2021      Knee pain, left      Scar tissue  left knee S/P TKR      COPD exacerbation      S/P bladder repair  bladder lift 2/2013      Dental disorder  Dental surgery 12/2012, patient reports that upper portion is glued in at this time, plan is to have dental implants placed.      S/P knee surgery  arthroscopy bilateral 2007 and 2010      Skin cancer  to right temple 2012, removed, history of basal cell x 2      H/O lumbosacral spine surgery  fusion 2017      H/O umbilical hernia repair  5/24/19      S/P cholecystectomy  2012      S/P colonoscopy  benign polyp      S/P knee replacement  right 2017, left 11/2019            Plan:

## 2022-07-25 NOTE — PROGRESS NOTE ADULT - ASSESSMENT
78 yo Female with h/o COPD/bullous emphysema with a h/o PTX 11/2021 requiring a pigtail and recent COVID PNA, who presented with sudden onset of SOB and chest discomfort after doing strenuous activity, found to have moderate right pneumothorax s/p pigtail placement 7/15. 7/20 FB Left VATS RODERICK wedge resection and talc pleurodesis       Plan   Both CT removed.  CXR without PTX  Pt has Oxygen at home if needed   CXR in am   OOB/Ambulate   NO TORADOL in the setting of pleurodesis  IS  pain mgmt    Plan for discharge tomorrow   OK to transfer to Med/surg floor with cont pulse Ox       Discussed with Cardiothoracic Team at AM rounds.

## 2022-07-26 ENCOUNTER — TRANSCRIPTION ENCOUNTER (OUTPATIENT)
Age: 77
End: 2022-07-26

## 2022-07-26 VITALS
DIASTOLIC BLOOD PRESSURE: 53 MMHG | RESPIRATION RATE: 17 BRPM | SYSTOLIC BLOOD PRESSURE: 112 MMHG | HEART RATE: 73 BPM | TEMPERATURE: 99 F | OXYGEN SATURATION: 94 %

## 2022-07-26 PROCEDURE — 71045 X-RAY EXAM CHEST 1 VIEW: CPT | Mod: 26

## 2022-07-26 PROCEDURE — 99231 SBSQ HOSP IP/OBS SF/LOW 25: CPT

## 2022-07-26 RX ORDER — OXYCODONE HYDROCHLORIDE 5 MG/1
5 TABLET ORAL EVERY 6 HOURS
Refills: 0 | Status: DISCONTINUED | OUTPATIENT
Start: 2022-07-26 | End: 2022-07-26

## 2022-07-26 RX ORDER — ACETAMINOPHEN 500 MG
2 TABLET ORAL
Qty: 0 | Refills: 0 | DISCHARGE
Start: 2022-07-26

## 2022-07-26 RX ORDER — OXYCODONE HYDROCHLORIDE 5 MG/1
1 TABLET ORAL
Qty: 25 | Refills: 0
Start: 2022-07-26

## 2022-07-26 RX ADMIN — OXYCODONE HYDROCHLORIDE 10 MILLIGRAM(S): 5 TABLET ORAL at 06:29

## 2022-07-26 RX ADMIN — OXYCODONE HYDROCHLORIDE 10 MILLIGRAM(S): 5 TABLET ORAL at 10:42

## 2022-07-26 RX ADMIN — OXYCODONE HYDROCHLORIDE 10 MILLIGRAM(S): 5 TABLET ORAL at 11:12

## 2022-07-26 RX ADMIN — POLYETHYLENE GLYCOL 3350 17 GRAM(S): 17 POWDER, FOR SOLUTION ORAL at 12:48

## 2022-07-26 RX ADMIN — TIOTROPIUM BROMIDE 1 CAPSULE(S): 18 CAPSULE ORAL; RESPIRATORY (INHALATION) at 10:40

## 2022-07-26 RX ADMIN — HYDROMORPHONE HYDROCHLORIDE 0.5 MILLIGRAM(S): 2 INJECTION INTRAMUSCULAR; INTRAVENOUS; SUBCUTANEOUS at 01:26

## 2022-07-26 RX ADMIN — BUDESONIDE AND FORMOTEROL FUMARATE DIHYDRATE 2 PUFF(S): 160; 4.5 AEROSOL RESPIRATORY (INHALATION) at 10:41

## 2022-07-26 RX ADMIN — PANTOPRAZOLE SODIUM 40 MILLIGRAM(S): 20 TABLET, DELAYED RELEASE ORAL at 05:34

## 2022-07-26 RX ADMIN — GABAPENTIN 300 MILLIGRAM(S): 400 CAPSULE ORAL at 10:22

## 2022-07-26 RX ADMIN — Medication 600 MILLIGRAM(S): at 12:47

## 2022-07-26 RX ADMIN — OXYCODONE HYDROCHLORIDE 10 MILLIGRAM(S): 5 TABLET ORAL at 05:36

## 2022-07-26 RX ADMIN — HYDROMORPHONE HYDROCHLORIDE 0.5 MILLIGRAM(S): 2 INJECTION INTRAMUSCULAR; INTRAVENOUS; SUBCUTANEOUS at 00:36

## 2022-07-26 RX ADMIN — HEPARIN SODIUM 5000 UNIT(S): 5000 INJECTION INTRAVENOUS; SUBCUTANEOUS at 10:22

## 2022-07-26 RX ADMIN — Medication 40 MILLIGRAM(S): at 05:34

## 2022-07-26 RX ADMIN — Medication 137 MICROGRAM(S): at 05:34

## 2022-07-26 NOTE — PROGRESS NOTE ADULT - PROVIDER SPECIALTY LIST ADULT
Thoracic Surgery
Pulmonology
Thoracic Surgery
Pulmonology

## 2022-07-26 NOTE — DISCHARGE NOTE PROVIDER - HOSPITAL COURSE
78 yo Female with h/o COPD/bullous emphysema with a h/o PTX 11/2021 requiring a pigtail and recent COVID PNA, who presented with sudden onset of SOB and chest discomfort after doing strenuous activity, found to have moderate right pneumothorax s/p pigtail placement 7/15/22. Pt then underwent a FB Right VATS RODERICK wedge resection and talc pleurodesis 7/20/22.

## 2022-07-26 NOTE — DISCHARGE NOTE PROVIDER - CARE PROVIDER_API CALL
Lissy Coulter)  Thoracic and Cardiac Surgery  [address] [place] [address]  Phone: [phone]  Fax: [phone]  Follow Up Time:

## 2022-07-26 NOTE — PROGRESS NOTE ADULT - SUBJECTIVE AND OBJECTIVE BOX
Subjective:  Pt seen, on 3L. CXR w no PTX.    Vital Signs:  Vital Signs Last 24 Hrs  T(C): 37.1 (07-25-22 @ 21:00), Max: 37.1 (07-25-22 @ 21:00)  T(F): 98.8 (07-25-22 @ 21:00), Max: 98.8 (07-25-22 @ 21:00)  HR: 69 (07-25-22 @ 21:00) (69 - 93)  BP: 122/62 (07-25-22 @ 21:00) (96/54 - 122/62)  RR: 18 (07-25-22 @ 21:00) (16 - 23)  SpO2: 94% (07-25-22 @ 21:00) (92% - 96%) on (O2)    Telemetry/Alarms:    Relevant labs, radiology and Medications reviewed            MEDICATIONS  (STANDING):  ALBUTerol    90 MICROgram(s) HFA Inhaler 90 Puff(s) Inhalation every 6 hours  budesonide 160 MICROgram(s)/formoterol 4.5 MICROgram(s) Inhaler 2 Puff(s) Inhalation two times a day  dextrose 5%. 1000 milliLiter(s) (100 mL/Hr) IV Continuous <Continuous>  dextrose 5%. 1000 milliLiter(s) (50 mL/Hr) IV Continuous <Continuous>  dextrose 50% Injectable 25 Gram(s) IV Push once  dextrose 50% Injectable 12.5 Gram(s) IV Push once  dextrose 50% Injectable 25 Gram(s) IV Push once  furosemide    Tablet 40 milliGRAM(s) Oral daily  gabapentin 300 milliGRAM(s) Oral two times a day  glucagon  Injectable 1 milliGRAM(s) IntraMuscular once  heparin   Injectable 5000 Unit(s) SubCutaneous every 12 hours  insulin lispro (ADMELOG) corrective regimen sliding scale   SubCutaneous three times a day before meals  levothyroxine 137 MICROGram(s) Oral daily  pantoprazole    Tablet 40 milliGRAM(s) Oral before breakfast  senna 2 Tablet(s) Oral at bedtime  tiotropium 18 MICROgram(s) Capsule 1 Capsule(s) Inhalation daily    MEDICATIONS  (PRN):  acetaminophen     Tablet .. 650 milliGRAM(s) Oral every 6 hours PRN Temp greater or equal to 38C (100.4F), Mild Pain (1 - 3)  artificial tears (preservative free) Ophthalmic Solution 1 Drop(s) Both EYES every 2 hours PRN Painful Dry Eyes  dextrose Oral Gel 15 Gram(s) Oral once PRN Blood Glucose LESS THAN 70 milliGRAM(s)/deciliter  guaiFENesin  milliGRAM(s) Oral every 12 hours PRN Cough  HYDROmorphone  Injectable 0.5 milliGRAM(s) IV Push every 4 hours PRN Severe Pain (7 - 10)  oxyCODONE    IR 10 milliGRAM(s) Oral every 4 hours PRN Moderate Pain (4 - 6)  polyethylene glycol 3350 17 Gram(s) Oral two times a day PRN Constipation      Physical exam  Gen NAD  HEENT no cyanosis, NC, AT  neck supple  Neuro AAOx3  Card RRR  Pulm equal  Abd soft, obese  Ext warm      I&O's Summary    25 Jul 2022 07:01  -  26 Jul 2022 07:00  --------------------------------------------------------  IN: 0 mL / OUT: 1400 mL / NET: -1400 mL        Assessment  77y Female  w/ PAST MEDICAL & SURGICAL HISTORY:  Hypothyroidism      Diabetes mellitus  diet controlled      Back pain      Asthma      Elevated pancreatic enzyme  patient reports elevated lab 6/2013. Gastroenterologist aware      History of cataract  extracted from right and left      Varicose veins of both lower extremities  surgery      Diverticulosis of intestine without bleeding, unspecified intestinal tract location      Gall stones  gall bladder removed      Urinary urgency      Spinal stenosis of lumbar region      DDD (degenerative disc disease), lumbar      Basal cell carcinoma  removed from face      Pulmonary emphysema, unspecified emphysema type  2017 last exacerbation; never intubated      OA (osteoarthritis)      Frequent UTI  2-3x/year; had an episode 5 weeks ago was treated denies dysuria      GERD (gastroesophageal reflux disease)      Leg swelling      Emphysema lung      COVID-19 vaccine series completed  Moderna - 2nd dose 02/20/2021      Knee pain, left      Scar tissue  left knee S/P TKR      COPD exacerbation      S/P bladder repair  bladder lift 2/2013      Dental disorder  Dental surgery 12/2012, patient reports that upper portion is glued in at this time, plan is to have dental implants placed.      S/P knee surgery  arthroscopy bilateral 2007 and 2010      Skin cancer  to right temple 2012, removed, history of basal cell x 2      H/O lumbosacral spine surgery  fusion 2017      H/O umbilical hernia repair  5/24/19      S/P cholecystectomy  2012      S/P colonoscopy  benign polyp      S/P knee replacement  right 2017, left 11/2019      admitted with complaints of Patient is a 77y old  Female who presents with a chief complaint of Rt PTX (25 Jul 2022 11:33)  .  78 yo Female with h/o COPD/bullous emphysema with a h/o PTX 11/2021 requiring a pigtail and recent COVID PNA, who presented with sudden onset of SOB and chest discomfort after doing strenuous activity, found to have moderate right pneumothorax s/p pigtail placement 7/15. 7/20 FB Right VATS RODERICK wedge resection and talc pleurodesis     PLAN  O2 eval for O2 requirements to go home w  plan for d/c today  f/u Dr. Kelly in 10 days  Leave dressing on for 48 hours after chest tube removed. Remove in shower after has become saturated. May leave open to air. Minimal drainage is normal. If foul smelling or purulent or bloody call surgeon at [phone]. Shower only, no soaking in tub or pool.  Ambulate frequently as tolerated. No driving while on pain medicine.    Discussed with Cardiothoracic Team at AM rounds.

## 2022-07-26 NOTE — DISCHARGE NOTE PROVIDER - NSDCFUADDINST_GEN_ALL_CORE_FT
Leave dressing on for 48 hours after chest tube removed, remove 7/27/22. Remove in shower after has become saturated. May leave open to air. Minimal drainage is normal. If foul smelling or purulent or bloody call surgeon at [phone]. Shower only, no soaking in tub or pool. May cover for comfort w dry guaze and paper tape.  Return to ER if chest pain, shortness of breath or chest pressure develops  Ambulate frequently as tolerated. No driving while on pain medicine.

## 2022-07-26 NOTE — DISCHARGE NOTE NURSING/CASE MANAGEMENT/SOCIAL WORK - NSDCPEFALRISK_GEN_ALL_CORE
For information on Fall & Injury Prevention, visit: [email or web address] OR  [email or web address] OR  https://www.cdc.gov/steadi/patient.html

## 2022-07-26 NOTE — DISCHARGE NOTE PROVIDER - NSDCFUSCHEDAPPT_GEN_ALL_CORE_FT
James Dowd  [facility]  [facility] [address]  Scheduled Appointment: 09/19/2022

## 2022-07-26 NOTE — DISCHARGE NOTE PROVIDER - NSDCCPTREATMENT_GEN_ALL_CORE_FT
PRINCIPAL PROCEDURE  Procedure: VATS wedge resection of right upper lobe of lung  Findings and Treatment:

## 2022-07-26 NOTE — PROGRESS NOTE ADULT - REASON FOR ADMISSION
Rt PTX

## 2022-07-26 NOTE — DISCHARGE NOTE PROVIDER - NSDCMRMEDTOKEN_GEN_ALL_CORE_FT
acetaminophen 325 mg oral tablet: 2 tab(s) orally every 6 hours, As needed, Temp greater or equal to 38C (100.4F), Mild Pain (1 - 3)  budesonide-formoterol 160 mcg-4.5 mcg/inh inhalation aerosol: 2 puff(s) inhaled 2 times a day   furosemide 40 mg oral tablet: 1 tab(s) orally once a day  gabapentin 300 mg oral capsule: 1 cap(s) orally 2 times a day  levothyroxine 137 mcg (0.137 mg) oral tablet: 1 tab(s) orally once a day  lidocaine 5% patch: Apply 1 patch topically to upper leg once daily as needed for neuropathy pain  Ozempic 2 mg/1.5 mL (0.25 mg or 0.5 mg dose) subcutaneous solution: 0.5 milligram(s) subcutaneously once a week on Wednesday  pantoprazole 40 mg oral delayed release tablet: 1 tab(s) orally once a day (at bedtime), As Needed  Synjardy XR 10 mg-1000 mg oral tablet, extended release: 1 tab(s) orally once a day  tiotropium 18 mcg inhalation capsule: 1 cap(s) inhaled once a day   acetaminophen 325 mg oral tablet: 2 tab(s) orally every 6 hours, As needed, Temp greater or equal to 38C (100.4F), Mild Pain (1 - 3)  budesonide-formoterol 160 mcg-4.5 mcg/inh inhalation aerosol: 2 puff(s) inhaled 2 times a day   furosemide 40 mg oral tablet: 1 tab(s) orally once a day  gabapentin 300 mg oral capsule: 1 cap(s) orally 2 times a day  levothyroxine 137 mcg (0.137 mg) oral tablet: 1 tab(s) orally once a day  lidocaine 5% patch: Apply 1 patch topically to upper leg once daily as needed for neuropathy pain  oxyCODONE 5 mg oral tablet: 1 tab(s) orally every 6 hours, As Needed -Moderate Pain (4 - 6) - for moderate pain MDD:6  Ozempic 2 mg/1.5 mL (0.25 mg or 0.5 mg dose) subcutaneous solution: 0.5 milligram(s) subcutaneously once a week on Wednesday  pantoprazole 40 mg oral delayed release tablet: 1 tab(s) orally once a day (at bedtime), As Needed  Synjardy XR 10 mg-1000 mg oral tablet, extended release: 1 tab(s) orally once a day  tiotropium 18 mcg inhalation capsule: 1 cap(s) inhaled once a day

## 2022-07-26 NOTE — DISCHARGE NOTE NURSING/CASE MANAGEMENT/SOCIAL WORK - PATIENT PORTAL LINK FT
You can access the FollowMyHealth Patient Portal offered by [facility] by registering at the following website: [email or web address]. By joining [facility]’s FollowMyHealth portal, you will also be able to view your health information using other applications (apps) compatible with our system.

## 2022-07-27 ENCOUNTER — NON-APPOINTMENT (OUTPATIENT)
Age: 77
End: 2022-07-27

## 2022-07-29 DIAGNOSIS — J93.9 PNEUMOTHORAX, UNSPECIFIED: ICD-10-CM

## 2022-07-29 DIAGNOSIS — Z86.16 PERSONAL HISTORY OF COVID-19: ICD-10-CM

## 2022-07-29 DIAGNOSIS — E87.1 HYPO-OSMOLALITY AND HYPONATREMIA: ICD-10-CM

## 2022-07-29 DIAGNOSIS — J43.8 OTHER EMPHYSEMA: ICD-10-CM

## 2022-07-29 DIAGNOSIS — J96.01 ACUTE RESPIRATORY FAILURE WITH HYPOXIA: ICD-10-CM

## 2022-07-29 DIAGNOSIS — Z85.828 PERSONAL HISTORY OF OTHER MALIGNANT NEOPLASM OF SKIN: ICD-10-CM

## 2022-07-29 DIAGNOSIS — Z79.52 LONG TERM (CURRENT) USE OF SYSTEMIC STEROIDS: ICD-10-CM

## 2022-07-29 DIAGNOSIS — Z79.84 LONG TERM (CURRENT) USE OF ORAL HYPOGLYCEMIC DRUGS: ICD-10-CM

## 2022-07-29 DIAGNOSIS — E11.9 TYPE 2 DIABETES MELLITUS WITHOUT COMPLICATIONS: ICD-10-CM

## 2022-07-29 DIAGNOSIS — M51.36 OTHER INTERVERTEBRAL DISC DEGENERATION, LUMBAR REGION: ICD-10-CM

## 2022-07-29 DIAGNOSIS — Z79.51 LONG TERM (CURRENT) USE OF INHALED STEROIDS: ICD-10-CM

## 2022-07-29 DIAGNOSIS — E03.9 HYPOTHYROIDISM, UNSPECIFIED: ICD-10-CM

## 2022-07-29 DIAGNOSIS — K57.90 DIVERTICULOSIS OF INTESTINE, PART UNSPECIFIED, WITHOUT PERFORATION OR ABSCESS WITHOUT BLEEDING: ICD-10-CM

## 2022-07-29 DIAGNOSIS — Z96.653 PRESENCE OF ARTIFICIAL KNEE JOINT, BILATERAL: ICD-10-CM

## 2022-07-29 DIAGNOSIS — M19.90 UNSPECIFIED OSTEOARTHRITIS, UNSPECIFIED SITE: ICD-10-CM

## 2022-07-29 DIAGNOSIS — J45.909 UNSPECIFIED ASTHMA, UNCOMPLICATED: ICD-10-CM

## 2022-07-29 DIAGNOSIS — K21.9 GASTRO-ESOPHAGEAL REFLUX DISEASE WITHOUT ESOPHAGITIS: ICD-10-CM

## 2022-08-02 ENCOUNTER — OUTPATIENT (OUTPATIENT)
Dept: OUTPATIENT SERVICES | Facility: HOSPITAL | Age: 77
LOS: 1 days | End: 2022-08-02
Payer: MEDICARE

## 2022-08-02 ENCOUNTER — APPOINTMENT (OUTPATIENT)
Dept: RADIOLOGY | Facility: IMAGING CENTER | Age: 77
End: 2022-08-02

## 2022-08-02 DIAGNOSIS — Z98.890 OTHER SPECIFIED POSTPROCEDURAL STATES: Chronic | ICD-10-CM

## 2022-08-02 DIAGNOSIS — R06.02 SHORTNESS OF BREATH: ICD-10-CM

## 2022-08-02 DIAGNOSIS — Z90.49 ACQUIRED ABSENCE OF OTHER SPECIFIED PARTS OF DIGESTIVE TRACT: Chronic | ICD-10-CM

## 2022-08-02 DIAGNOSIS — Z96.659 PRESENCE OF UNSPECIFIED ARTIFICIAL KNEE JOINT: Chronic | ICD-10-CM

## 2022-08-02 PROCEDURE — 71046 X-RAY EXAM CHEST 2 VIEWS: CPT

## 2022-08-02 PROCEDURE — 71046 X-RAY EXAM CHEST 2 VIEWS: CPT | Mod: 26

## 2022-08-03 ENCOUNTER — APPOINTMENT (OUTPATIENT)
Dept: THORACIC SURGERY | Facility: CLINIC | Age: 77
End: 2022-08-03

## 2022-08-03 VITALS — OXYGEN SATURATION: 95 % | HEART RATE: 75 BPM | RESPIRATION RATE: 16 BRPM

## 2022-08-03 PROCEDURE — 99024 POSTOP FOLLOW-UP VISIT: CPT

## 2022-08-03 NOTE — PHYSICAL THERAPY INITIAL EVALUATION ADULT - PRECAUTIONS/LIMITATIONS, REHAB EVAL
August 3, 2022     Patient: Kimber Yoo   YOB: 1976   Date of Visit: 8/3/2022       To Whom it May Concern:    Kimber Yoo was seen in my clinic on 8/3/2022 at 2:00 pm.    Please excuse Kimber for her absence from work on the date listed above to be able to make her appointment. She may return to work Monday 8/08/22 with no restrictions.  Any questions regarding this matter please give our office a call.    Sincerely,         Sarahi Alaniz MD    Medical information is confidential and cannot be disclosed without the written consent of the patient or her representative.       fall precautions/surgical precautions

## 2022-08-10 ENCOUNTER — RX RENEWAL (OUTPATIENT)
Age: 77
End: 2022-08-10

## 2022-08-30 ENCOUNTER — APPOINTMENT (OUTPATIENT)
Dept: INTERNAL MEDICINE | Facility: CLINIC | Age: 77
End: 2022-08-30

## 2022-08-30 ENCOUNTER — NON-APPOINTMENT (OUTPATIENT)
Age: 77
End: 2022-08-30

## 2022-08-30 VITALS
TEMPERATURE: 97.3 F | WEIGHT: 188 LBS | HEIGHT: 65 IN | BODY MASS INDEX: 31.32 KG/M2 | SYSTOLIC BLOOD PRESSURE: 130 MMHG | OXYGEN SATURATION: 95 % | DIASTOLIC BLOOD PRESSURE: 74 MMHG | RESPIRATION RATE: 16 BRPM | HEART RATE: 86 BPM

## 2022-08-30 PROCEDURE — 99495 TRANSJ CARE MGMT MOD F2F 14D: CPT | Mod: 25

## 2022-08-30 PROCEDURE — 94060 EVALUATION OF WHEEZING: CPT

## 2022-08-30 NOTE — DATA REVIEWED
[FreeTextEntry1] : Spirometry pre and postbronchodilator therapy shows moderate obstructive lung disease.  FEV1 is 1.63 L which is 79% predicted.  FVC is 2.5 mL which is 93% predicted.  FEV1 over FVC ratio is 63%.  FEF 25/75% is 0.85 L per second which is 56% predicted.  There is no significant bronchodilator response.

## 2022-08-30 NOTE — PLAN
[FreeTextEntry1] : Mrs. Leon presents for a follow-up evaluation.\par \par 1.  Patient has a history of recurrent right spontaneous pneumothorax.  She is status post robotic assisted right upper lobe wedge resection with bullectomy and talc pleurodesis.\par \par 2.  Patient has had thoracic surgery follow-up with Dr. Coulter.  She does have a large bullae in the left upper lobe.  Consideration of surgical resection will be made in approximately 4 months.\par \par 3.  Patient will continue on current medication regimen which has been reviewed and revised.\par \par 4.  Prescription for CBC, CMP with hemoglobin A1c, iron level, lipid profile, TSH level, urinalysis and vitamin D level have been given.\par \par 5.  Follow-up in 4 months with complete PFTs.

## 2022-08-30 NOTE — HISTORY OF PRESENT ILLNESS
[FreeTextEntry1] : Follow-up [de-identified] : Mrs. Leon presents for follow-up evaluation accompanied by her .  She was admitted to [facility] on 7/15/2022 with shortness of breath and right-sided chest pain.  Patient was found to have a right spontaneous pneumothorax.  She has a previous history of right spontaneous pneumothorax.  Chest tube was placed by thoracic surgery.  Patient then underwent fiberoptic bronchoscopy with robotic assisted right VATS wedge resection/bullectomy and talc pleurodesis.  She tolerated the procedure well.  There was reexpansion of the right lung.  Patient was discharged on 7/26/2022.\par She now presents for follow-up.  Mrs. Leon is feeling well.  She is not on oxygen therapy.  She denies significant shortness of breath.  Patient awoke this morning with redness of the left eye with some crustiness around the eye.

## 2022-08-31 LAB
25(OH)D3 SERPL-MCNC: 38.8 NG/ML
ALBUMIN SERPL ELPH-MCNC: 4.6 G/DL
ALP BLD-CCNC: 87 U/L
ALT SERPL-CCNC: 13 U/L
ANION GAP SERPL CALC-SCNC: 15 MMOL/L
APPEARANCE: ABNORMAL
AST SERPL-CCNC: 21 U/L
BACTERIA: ABNORMAL
BASOPHILS # BLD AUTO: 0.08 K/UL
BASOPHILS NFR BLD AUTO: 0.6 %
BILIRUB SERPL-MCNC: 0.4 MG/DL
BILIRUBIN URINE: NEGATIVE
BLOOD URINE: ABNORMAL
BUN SERPL-MCNC: 17 MG/DL
CALCIUM SERPL-MCNC: 10.3 MG/DL
CHLORIDE SERPL-SCNC: 102 MMOL/L
CHOLEST SERPL-MCNC: 227 MG/DL
CO2 SERPL-SCNC: 26 MMOL/L
COLOR: NORMAL
CREAT SERPL-MCNC: 0.87 MG/DL
EGFR: 68 ML/MIN/1.73M2
EOSINOPHIL # BLD AUTO: 0.14 K/UL
EOSINOPHIL NFR BLD AUTO: 1.1 %
ESTIMATED AVERAGE GLUCOSE: 137 MG/DL
GLUCOSE QUALITATIVE U: ABNORMAL
GLUCOSE SERPL-MCNC: 126 MG/DL
HBA1C MFR BLD HPLC: 6.4 %
HCT VFR BLD CALC: 44.5 %
HDLC SERPL-MCNC: 73 MG/DL
HGB BLD-MCNC: 14.4 G/DL
HYALINE CASTS: 0 /LPF
IMM GRANULOCYTES NFR BLD AUTO: 0.2 %
IRON SERPL-MCNC: 56 UG/DL
KETONES URINE: NEGATIVE
LDLC SERPL CALC-MCNC: 134 MG/DL
LEUKOCYTE ESTERASE URINE: ABNORMAL
LYMPHOCYTES # BLD AUTO: 4.51 K/UL
LYMPHOCYTES NFR BLD AUTO: 35.4 %
MAN DIFF?: NORMAL
MCHC RBC-ENTMCNC: 29.9 PG
MCHC RBC-ENTMCNC: 32.4 GM/DL
MCV RBC AUTO: 92.5 FL
MICROSCOPIC-UA: NORMAL
MONOCYTES # BLD AUTO: 1.05 K/UL
MONOCYTES NFR BLD AUTO: 8.2 %
NEUTROPHILS # BLD AUTO: 6.93 K/UL
NEUTROPHILS NFR BLD AUTO: 54.5 %
NITRITE URINE: POSITIVE
NONHDLC SERPL-MCNC: 153 MG/DL
PH URINE: 6
PLATELET # BLD AUTO: 337 K/UL
POTASSIUM SERPL-SCNC: 4.3 MMOL/L
PROT SERPL-MCNC: 7.8 G/DL
PROTEIN URINE: NORMAL
RBC # BLD: 4.81 M/UL
RBC # FLD: 14.2 %
RED BLOOD CELLS URINE: 9 /HPF
SODIUM SERPL-SCNC: 143 MMOL/L
SPECIFIC GRAVITY URINE: 1.01
SQUAMOUS EPITHELIAL CELLS: 0 /HPF
TRIGL SERPL-MCNC: 96 MG/DL
TSH SERPL-ACNC: 2.74 UIU/ML
UROBILINOGEN URINE: NORMAL
WBC # FLD AUTO: 12.74 K/UL
WHITE BLOOD CELLS URINE: 154 /HPF

## 2022-09-02 ENCOUNTER — NON-APPOINTMENT (OUTPATIENT)
Age: 77
End: 2022-09-02

## 2022-09-06 ENCOUNTER — NON-APPOINTMENT (OUTPATIENT)
Age: 77
End: 2022-09-06

## 2022-09-06 LAB
APPEARANCE: ABNORMAL
BACTERIA UR CULT: ABNORMAL
BACTERIA: ABNORMAL
BILIRUBIN URINE: NEGATIVE
BLOOD URINE: ABNORMAL
COLOR: YELLOW
GLUCOSE QUALITATIVE U: ABNORMAL
HYALINE CASTS: 1 /LPF
KETONES URINE: NEGATIVE
LEUKOCYTE ESTERASE URINE: ABNORMAL
MICROSCOPIC-UA: NORMAL
NITRITE URINE: POSITIVE
PH URINE: 6
PROTEIN URINE: ABNORMAL
RED BLOOD CELLS URINE: 5 /HPF
SPECIFIC GRAVITY URINE: 1.01
SQUAMOUS EPITHELIAL CELLS: 1 /HPF
UROBILINOGEN URINE: NORMAL
WHITE BLOOD CELLS URINE: 539 /HPF

## 2022-09-15 ENCOUNTER — APPOINTMENT (OUTPATIENT)
Dept: ORTHOPEDIC SURGERY | Facility: CLINIC | Age: 77
End: 2022-09-15

## 2022-09-15 DIAGNOSIS — Z96.652 PRESENCE OF LEFT ARTIFICIAL KNEE JOINT: ICD-10-CM

## 2022-09-15 PROCEDURE — 99213 OFFICE O/P EST LOW 20 MIN: CPT

## 2022-09-15 PROCEDURE — 73560 X-RAY EXAM OF KNEE 1 OR 2: CPT | Mod: 50

## 2022-09-16 LAB
BASOPHILS # BLD AUTO: 0.09 K/UL
BASOPHILS NFR BLD AUTO: 0.9 %
EOSINOPHIL # BLD AUTO: 0.15 K/UL
EOSINOPHIL NFR BLD AUTO: 1.6 %
HCT VFR BLD CALC: 43.7 %
HGB BLD-MCNC: 14.2 G/DL
IMM GRANULOCYTES NFR BLD AUTO: 0.3 %
LYMPHOCYTES # BLD AUTO: 4.5 K/UL
LYMPHOCYTES NFR BLD AUTO: 46.9 %
MAN DIFF?: NORMAL
MCHC RBC-ENTMCNC: 29.6 PG
MCHC RBC-ENTMCNC: 32.5 GM/DL
MCV RBC AUTO: 91 FL
MONOCYTES # BLD AUTO: 0.79 K/UL
MONOCYTES NFR BLD AUTO: 8.2 %
NEUTROPHILS # BLD AUTO: 4.04 K/UL
NEUTROPHILS NFR BLD AUTO: 42.1 %
PLATELET # BLD AUTO: 314 K/UL
RBC # BLD: 4.8 M/UL
RBC # FLD: 13.2 %
WBC # FLD AUTO: 9.6 K/UL

## 2022-09-19 ENCOUNTER — APPOINTMENT (OUTPATIENT)
Dept: INTERNAL MEDICINE | Facility: CLINIC | Age: 77
End: 2022-09-19

## 2022-09-19 ENCOUNTER — NON-APPOINTMENT (OUTPATIENT)
Age: 77
End: 2022-09-19

## 2022-09-19 VITALS
BODY MASS INDEX: 30.99 KG/M2 | OXYGEN SATURATION: 95 % | SYSTOLIC BLOOD PRESSURE: 112 MMHG | HEART RATE: 68 BPM | HEIGHT: 65 IN | WEIGHT: 186 LBS | DIASTOLIC BLOOD PRESSURE: 68 MMHG | TEMPERATURE: 98.8 F | RESPIRATION RATE: 16 BRPM

## 2022-09-19 DIAGNOSIS — Z23 ENCOUNTER FOR IMMUNIZATION: ICD-10-CM

## 2022-09-19 PROCEDURE — 90662 IIV NO PRSV INCREASED AG IM: CPT

## 2022-09-19 PROCEDURE — G0008: CPT

## 2022-09-19 PROCEDURE — 94060 EVALUATION OF WHEEZING: CPT

## 2022-09-19 PROCEDURE — 99214 OFFICE O/P EST MOD 30 MIN: CPT | Mod: 25

## 2022-09-19 RX ORDER — CIPROFLOXACIN HYDROCHLORIDE 500 MG/1
500 TABLET, FILM COATED ORAL
Qty: 14 | Refills: 0 | Status: DISCONTINUED | COMMUNITY
Start: 2022-09-02 | End: 2022-09-19

## 2022-09-19 NOTE — HISTORY OF PRESENT ILLNESS
[FreeTextEntry1] : Follow-up [de-identified] : Mrs. Leon presents for follow-up evaluation.  She is feeling well.  Patient was treated on outpatient basis for urinary tract infection with Cipro 500 mg twice daily x7 days for an E. coli urinary tract infection.  Patient states that she is still having urinary frequency and that her urine is cloudy.  There is no hematuria or dysuria.\par Patient states that her shortness of breath has improved since she had right-sided bullectomy and talc pleurodesis after having a recurrent right secondary spontaneous pneumothorax.  She continues on metered-dose inhaler therapy.\par Repeat CBC showed normal white blood cell count.

## 2022-09-19 NOTE — DATA REVIEWED
[FreeTextEntry1] : Spirometry pre and postbronchodilator therapy shows mild obstructive lung disease.  FEV1 is 1.67 L which is 81% predicted.  FVC is 2.46 L which is 89% predicted.  FEV1/FVC ratio is 68%.  FEF 25/75% is 0.98 L/s which is 64% predicted.  There is no significant bronchodilator response.

## 2022-09-19 NOTE — HISTORY OF PRESENT ILLNESS
[de-identified] : Maya comes in today for her left knee.  She states, at times, she feels like her knee wants to give out on her.

## 2022-09-19 NOTE — PHYSICAL EXAM
[de-identified] : Left knee:\par Knee: Range of Motion in Degrees	\par 	                  Claimant:	Normal:	\par Flexion Active	  125 	                135-degrees	\par Flexion Passive	  125	                135-degrees	\par Extension Active	   0	                0-5-degrees	\par Extension Passive	   0	                0-5-degrees	\par \par Well-healed scar.  No instability.  No weakness to flexion/extension.  Negative  Lachman.  Negative pivot shift.  Negative anterior drawer test.  Negative posterior drawer test.  Negative Marissa.  Negative Apley grind.  No medial or lateral joint line tenderness.  No tenderness over the medial and lateral facet of the patella.  No patellofemoral crepitations.  No lateral tilting patella.  No patellar apprehension.  No crepitation in the medial and lateral femoral condyle.  No proximal or distal swelling, edema or tenderness.  No gross motor or sensory deficits.  No intra-articular swelling.  2+ DP and PT pulses. No varus or valgus malalignment.   [de-identified] : Gait: Slightly antalgic to antalgic gait.  Station: Normal  [de-identified] : Appearance: Well-developed, well-nourished female  in no acute distress.  [de-identified] : Radiographs taken in the office today, one to two views of the left knee, show excellent position of the implant.

## 2022-09-19 NOTE — ADDENDUM
[FreeTextEntry1] : This note was written by Anai Alvarez on 09/19/2022 acting as scribe for Klever Brink III, MD

## 2022-09-19 NOTE — DISCUSSION/SUMMARY
[de-identified] : I am concerned this may be a slight course of muscle weakness.  At this time start on a course of physical therapy.  She will be reassessed in four weeks.

## 2022-09-19 NOTE — PLAN
[FreeTextEntry1] : Mrs. Leon presents for follow-up evaluation.\par \par 1.  She has a history of E. coli UTI.  She continues to complain of UTI symptoms.  Patient stated that in the past Macrodantin cleared did not most efficiently.  Urine culture shows the E. coli is sensitive to nitrofurantoin.  Patient will be put on nitrofurantoin 100 mg p.o. twice daily x7 days.  She will leave a urine sample for repeat urinalysis and culture.\par \par 2.  Patient will continue on current medication regimen which been reviewed and revised.\par \par 3.  Patient will receive the high-dose flu vaccine.\par \par 4.  Cardiothoracic follow-up with Dr. Coulter to discuss left-sided bullectomy and possible talc pleurodesis.

## 2022-09-22 LAB
APPEARANCE: ABNORMAL
BACTERIA: ABNORMAL
BILIRUBIN URINE: NEGATIVE
BLOOD URINE: NORMAL
COLOR: NORMAL
GLUCOSE QUALITATIVE U: ABNORMAL
HYALINE CASTS: 1 /LPF
KETONES URINE: NEGATIVE
LEUKOCYTE ESTERASE URINE: ABNORMAL
MICROSCOPIC-UA: NORMAL
NITRITE URINE: POSITIVE
PH URINE: 6
PROTEIN URINE: NEGATIVE
RED BLOOD CELLS URINE: 1 /HPF
SPECIFIC GRAVITY URINE: 1.01
SQUAMOUS EPITHELIAL CELLS: 0 /HPF
UROBILINOGEN URINE: NORMAL
WHITE BLOOD CELLS URINE: 321 /HPF

## 2022-09-23 ENCOUNTER — RX RENEWAL (OUTPATIENT)
Age: 77
End: 2022-09-23

## 2022-09-23 LAB — BACTERIA UR CULT: ABNORMAL

## 2022-09-26 ENCOUNTER — NON-APPOINTMENT (OUTPATIENT)
Age: 77
End: 2022-09-26

## 2022-11-04 ENCOUNTER — RX RENEWAL (OUTPATIENT)
Age: 77
End: 2022-11-04

## 2022-11-14 ENCOUNTER — APPOINTMENT (OUTPATIENT)
Dept: THORACIC SURGERY | Facility: CLINIC | Age: 77
End: 2022-11-14

## 2022-11-14 VITALS
HEART RATE: 88 BPM | WEIGHT: 186 LBS | DIASTOLIC BLOOD PRESSURE: 70 MMHG | SYSTOLIC BLOOD PRESSURE: 107 MMHG | OXYGEN SATURATION: 95 % | BODY MASS INDEX: 30.99 KG/M2 | HEIGHT: 65 IN

## 2022-11-14 PROCEDURE — 99213 OFFICE O/P EST LOW 20 MIN: CPT

## 2022-11-15 ENCOUNTER — NON-APPOINTMENT (OUTPATIENT)
Age: 77
End: 2022-11-15

## 2022-11-16 NOTE — PHYSICAL EXAM
[Sclera] : the sclera and conjunctiva were normal [PERRL With Normal Accommodation] : pupils were equal in size, round, and reactive to light [Extraocular Movements] : extraocular movements were intact [Neck Appearance] : the appearance of the neck was normal [Neck Cervical Mass (___cm)] : no neck mass was observed [Jugular Venous Distention Increased] : there was no jugular-venous distention [Thyroid Diffuse Enlargement] : the thyroid was not enlarged [Thyroid Nodule] : there were no palpable thyroid nodules [Auscultation Breath Sounds / Voice Sounds] : lungs were clear to auscultation bilaterally [Heart Rate And Rhythm] : heart rate was normal and rhythm regular [Heart Sounds] : normal S1 and S2 [Heart Sounds Gallop] : no gallops [Murmurs] : no murmurs [Heart Sounds Pericardial Friction Rub] : no pericardial rub [Examination Of The Chest] : the chest was normal in appearance [Chest Visual Inspection Thoracic Asymmetry] : no chest asymmetry [Diminished Respiratory Excursion] : normal chest expansion [Bowel Sounds] : normal bowel sounds [Abdomen Soft] : soft [Abdomen Tenderness] : non-tender [Abdomen Mass (___ Cm)] : no abdominal mass palpated [Abnormal Walk] : normal gait [Nail Clubbing] : no clubbing  or cyanosis of the fingernails [Musculoskeletal - Swelling] : no joint swelling seen [Motor Tone] : muscle strength and tone were normal [Skin Color & Pigmentation] : normal skin color and pigmentation [Skin Turgor] : normal skin turgor [] : no rash [Deep Tendon Reflexes (DTR)] : deep tendon reflexes were 2+ and symmetric [Sensation] : the sensory exam was normal to light touch and pinprick [No Focal Deficits] : no focal deficits [Oriented To Time, Place, And Person] : oriented to person, place, and time [Impaired Insight] : insight and judgment were intact [Affect] : the affect was normal

## 2022-11-16 NOTE — ASSESSMENT
[FreeTextEntry1] : Previous CT Chest reviewed. No previous history of left sided pneumothorax. Patient has done well after her Right VATS blebectomy surgery. She has no complaints with her breathing. We discussed elective left lung surgery, however will hold off for now. \par \par Patient does not travel, she has no anxiety about having the bleb, she does not go anywhere remote. For these reasons, we have decided to treat the left bleb expectantly. She understands that the risk of her developing a left pneumothorax is more than the average population.  If patient changes her mind, she will call the office. \par

## 2022-11-16 NOTE — HISTORY OF PRESENT ILLNESS
[FreeTextEntry1] : Ms. MITA RAE is a 77 year female who presents today for followup. Previously, she has been followed status post VATS wedge resection of right upper lobe on 7/20/22 with pleurodesis and did well postoperatively. 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 She presents today to discuss elective blebectomy and talc pleurodesis of the left lung.\par \par Additional past medical history includes hypothyroidism, hyperlipidemia, type 2 diabetes mellitus, chronic obstructive pulmonary disease. She has no major complaints. She does not travel often. \par

## 2022-11-17 ENCOUNTER — APPOINTMENT (OUTPATIENT)
Dept: OBGYN | Facility: CLINIC | Age: 77
End: 2022-11-17

## 2022-11-17 PROCEDURE — 99213 OFFICE O/P EST LOW 20 MIN: CPT

## 2022-11-17 NOTE — CHIEF COMPLAINT
[Urgent Visit] : Urgent Visit [FreeTextEntry1] : Patient is a 78y/o female here today for Right sided breast tenderness for about 3 months. She is s/p lobectomy on right side. She denies a noticeable breast lump or blood or nipple discharge. She is not utd on mammogram

## 2022-11-17 NOTE — DISCUSSION/SUMMARY
[FreeTextEntry1] : \par patient given Rx for her mammogram. Patient also referred to MD quintanilla for eval and consult.She is to micheal her mammogram and follow up with him regarding tenderness and area of density on breast exam.\par all of her questions were answered she is agreeable with plan

## 2022-11-17 NOTE — HISTORY OF PRESENT ILLNESS
[Localized Pain] : localized breast pain [Right Breast] : right [Sup-Lat] : right superior lateral [___ Months] : started [unfilled] months ago

## 2022-11-17 NOTE — REVIEW OF SYSTEMS
[Skin Lesions] : no skin lesions [Change In A Mole] : no change in a mole [Breast Pain] : breast pain [Breast Lump] : no breast lump [Nl] : Hematologic/Lymphatic

## 2022-11-18 ENCOUNTER — RX RENEWAL (OUTPATIENT)
Age: 77
End: 2022-11-18

## 2022-11-28 ENCOUNTER — APPOINTMENT (OUTPATIENT)
Dept: NEUROLOGY | Facility: CLINIC | Age: 77
End: 2022-11-28

## 2022-11-28 VITALS
TEMPERATURE: 97.8 F | WEIGHT: 184 LBS | SYSTOLIC BLOOD PRESSURE: 93 MMHG | HEIGHT: 65 IN | HEART RATE: 108 BPM | DIASTOLIC BLOOD PRESSURE: 63 MMHG | BODY MASS INDEX: 30.66 KG/M2

## 2022-11-28 DIAGNOSIS — E53.8 DEFICIENCY OF OTHER SPECIFIED B GROUP VITAMINS: ICD-10-CM

## 2022-11-28 PROCEDURE — 99213 OFFICE O/P EST LOW 20 MIN: CPT

## 2022-11-28 RX ORDER — NITROFURANTOIN (MONOHYDRATE/MACROCRYSTALS) 25; 75 MG/1; MG/1
100 CAPSULE ORAL
Qty: 14 | Refills: 0 | Status: DISCONTINUED | COMMUNITY
Start: 2022-09-19 | End: 2022-11-28

## 2022-11-28 RX ORDER — OFLOXACIN 3 MG/ML
0.3 SOLUTION/ DROPS OPHTHALMIC 4 TIMES DAILY
Qty: 1 | Refills: 2 | Status: DISCONTINUED | COMMUNITY
Start: 2022-08-30 | End: 2022-11-28

## 2022-11-28 NOTE — DISCUSSION/SUMMARY
[FreeTextEntry1] : Ms. Leon is a 76 year old woman with a history of diabetes and lumbar spine surgery.\par She has also been diagnosed with peripheral neuropathy in the past.\par Unfortunately, records from her prior neurologist are not available at this time. \par \par Peripheral Neuropathy\par -Examination is suggestive of peripheral neuropathy.\par -She does have diabetes, most recent HbA1C was 6.5.\par -Unclear if diabetes is the underlying cause.\par -Labs ordered at last visit do not appear to have been done. will check vitamin b12 level (previously in 400s) and SPEP (I found paperwork from [facility] that mentions MGUS).\par -She will try to find the name of the physician who performed her prior EMG/NCV and we will request the record. \par -Blood sugar control.\par -Avoid walking barefoot.\par -Continue gabapentin 300 mg BID\par \par Left leg numbness\par -Possible meralgia paresthetica vs lumbar radiculopathy\par -Obtain results of prior EMG.\par -Avoid tight fitting clothing\par -Continue gabapentin\par -Continue Lidoderm patch in affected area for 12 hours on and 12 hours off. \par \par f/u 6 months, sooner if needed. \par \par

## 2022-11-28 NOTE — HISTORY OF PRESENT ILLNESS
[FreeTextEntry1] : 3/29/22:\par Ms. Leon presents today for neurology evaluation.\par She was a patient of Dr. Flores.\par She states that she has neuropathy for many years which became worse over the last few years.\par \par She reports a history of extensive spine surgery.\par She also  has a history of diabetes.\par \par She reports numbness in her left lateral thigh for many years. \par She reports a burning pain in this area. \par \par She has loss of feeling in her feet. \par She has severe pain in her feet.\par She has been taking gabapentin. She takes 100 mg in the AM and 300 mg at night. \par She denies having any side effects.\par She does not think that it is effective.\par In the past she was taking 400 mg TID.\par \par She does not have symptoms in her hands.\par \par She had a NCV/EMG in the past and was told that she has both neuropathy and an issue with her back.\par \par 11/28/22:\par In June she was hospitalized with a respiratory infection.\par In July she had a pneumothorax.She underwent a VATS wedge resection on 7/20/22.\par She had follow-up with Dr. Coulter and they decided to hold off on blebectomy at this time. \par \par She says that her feet are about the same.\par She has been seeing a myofascial therapist for her back. \par \par The lidocaine patches help with the discomfort on the left thigh.

## 2022-11-28 NOTE — PHYSICAL EXAM
[FreeTextEntry1] : Examination:\par Constitutional: normal, no apparent distress\par Eyes: normal conjunctiva b/l, no ptosis, visual fields full\par Respiratory: no respiratory distress, normal effort, normal auscultation\par Cardiovascular: normal rate, rhythm, no murmurs\par Neck: supple, no masses\par Vascular: carotids normal\par Skin: normal color, no rashes\par Psych: normal mood, affect\par \par Neurological:\par Memory: normal memory, oriented to person, place, time\par Language intact/no aphasia\par Cranial Nerves: II-XII intact, Pupils equally round and reactive to light, ocular muscles/movements intact, no ptosis, no facial weakness, tongue protrudes normally in the midline, \par Motor: normal tone, no pronator drift, full strength in all four extremities in the proximal and distal muscle groups\par Coordination: Fine motor movements intact, rapid alternating movements intact, finger to nose intact bilaterally\par Sensory: intact to joint position sense, decreased light touch distal to ankle, absent vibration in right foot and ankle, intact on left. \par Decreased sensation over left lateral thigh\par DTRs: symmetric, 2+ in b/l triceps, 2+ in b/l biceps, 2+ in b/l brachioradialis, 1+ in bilateral patellars\par Gait: narrow based, steady\par . \par

## 2022-11-30 LAB — VIT B12 SERPL-MCNC: 1397 PG/ML

## 2022-12-02 ENCOUNTER — APPOINTMENT (OUTPATIENT)
Dept: INTERNAL MEDICINE | Facility: CLINIC | Age: 77
End: 2022-12-02

## 2022-12-02 ENCOUNTER — NON-APPOINTMENT (OUTPATIENT)
Age: 77
End: 2022-12-02

## 2022-12-02 VITALS
BODY MASS INDEX: 30.99 KG/M2 | TEMPERATURE: 98.1 F | SYSTOLIC BLOOD PRESSURE: 100 MMHG | DIASTOLIC BLOOD PRESSURE: 70 MMHG | OXYGEN SATURATION: 95 % | HEIGHT: 65 IN | WEIGHT: 186 LBS | HEART RATE: 91 BPM

## 2022-12-02 DIAGNOSIS — K21.9 GASTRO-ESOPHAGEAL REFLUX DISEASE W/OUT ESOPHAGITIS: ICD-10-CM

## 2022-12-02 DIAGNOSIS — Z99.81 HYPOXEMIA: ICD-10-CM

## 2022-12-02 DIAGNOSIS — R09.02 HYPOXEMIA: ICD-10-CM

## 2022-12-02 DIAGNOSIS — J44.1 CHRONIC OBSTRUCTIVE PULMONARY DISEASE WITH (ACUTE) EXACERBATION: ICD-10-CM

## 2022-12-02 DIAGNOSIS — R07.9 CHEST PAIN, UNSPECIFIED: ICD-10-CM

## 2022-12-02 DIAGNOSIS — Z92.89 PERSONAL HISTORY OF OTHER MEDICAL TREATMENT: ICD-10-CM

## 2022-12-02 DIAGNOSIS — Z87.898 PERSONAL HISTORY OF OTHER SPECIFIED CONDITIONS: ICD-10-CM

## 2022-12-02 PROCEDURE — 99214 OFFICE O/P EST MOD 30 MIN: CPT

## 2022-12-02 RX ORDER — FLUTICASONE PROPIONATE 50 UG/1
50 SPRAY, METERED NASAL
Qty: 1 | Refills: 1 | Status: DISCONTINUED | COMMUNITY
Start: 2022-07-07 | End: 2022-12-02

## 2022-12-02 RX ORDER — OXYCODONE 5 MG/1
5 TABLET ORAL
Qty: 30 | Refills: 0 | Status: DISCONTINUED | COMMUNITY
Start: 2022-08-05 | End: 2022-12-02

## 2022-12-02 NOTE — PLAN
[FreeTextEntry1] : Mrs. Leon presents for follow-up evaluation.\par \par 1.  Patient has a history of right spontaneous pneumothorax.  She is status post right upper lobe VATS bullectomy with pleurodesis on 7/20/2022.  She has a large bulla on the left but has opted not to have surgery at the present time.  See Dr. Coulter's note.\par \par 2.  Patient is complaining of symptoms of UTI.  She will be given Macrodantin 100 mg p.o. twice daily x7 days.\par \par 3.  She will continue on current medication regimen which has been reviewed and revised.\par \par 4.  Endocrinology follow-up for diabetes management to continue with Dr. Raygoza.  Patient has been to the ophthalmologist.\par \par 5.  Neurology follow-up to continue with Dr. Dill for peripheral neuropathy\par \par 6.  Prescription for CBC, CMP, iron level, lipid profile, TSH level and hemoglobin A1c level.\par \par 7.  Follow-up in 6 months.

## 2022-12-02 NOTE — HISTORY OF PRESENT ILLNESS
[FreeTextEntry1] : Follow-up evaluation [de-identified] : Mrs. Leon presents for follow-up evaluation.  She has a history of a recurrent right pneumothorax.  Patient underwent a VATS right upper lobe bullectomy with pleurodesis on 7/20/2022.  She has significant upper lobe bullous lung disease.  She has a large bulla on the left upper lobe as well.  Currently she is feeling well.  Patient denies any chest pain, shortness of breath or palpitations.  She had been on oxygen post discharge but does not require oxygen anymore.\par Patient is complaining of symptoms of a urinary tract infection.  She has increased urinary frequency with dysuria.  She also states that her urine is cloudy.  Mrs. Leon denies any fevers or chills.

## 2022-12-07 ENCOUNTER — TRANSCRIPTION ENCOUNTER (OUTPATIENT)
Age: 77
End: 2022-12-07

## 2022-12-07 LAB
A/G RATIO: 0.9
ALBUMIN: 3.5 G/DL
ALPHA-1-GLOBULIN: 0.3 G/DL
ALPHA-2-GLOBULIN: 0.9 G/DL
BETA GLOBULIN: 1.3 G/DL
GAMMA GLOBULIN: 1.5 G/DL
GLOBULIN SER CALC-MCNC: 4 G/DL
IFE REFLEX: NORMAL
Lab: NORMAL
M-SPIKE: NORMAL G/DL
PROTEIN, TOTAL: 7.5 G/DL

## 2022-12-08 ENCOUNTER — APPOINTMENT (OUTPATIENT)
Dept: CT IMAGING | Facility: CLINIC | Age: 77
End: 2022-12-08

## 2022-12-08 ENCOUNTER — OUTPATIENT (OUTPATIENT)
Dept: OUTPATIENT SERVICES | Facility: HOSPITAL | Age: 77
LOS: 1 days | End: 2022-12-08
Payer: MEDICARE

## 2022-12-08 DIAGNOSIS — Z98.890 OTHER SPECIFIED POSTPROCEDURAL STATES: Chronic | ICD-10-CM

## 2022-12-08 DIAGNOSIS — Z96.659 PRESENCE OF UNSPECIFIED ARTIFICIAL KNEE JOINT: Chronic | ICD-10-CM

## 2022-12-08 DIAGNOSIS — Z90.49 ACQUIRED ABSENCE OF OTHER SPECIFIED PARTS OF DIGESTIVE TRACT: Chronic | ICD-10-CM

## 2022-12-08 DIAGNOSIS — J43.9 EMPHYSEMA, UNSPECIFIED: ICD-10-CM

## 2022-12-08 PROCEDURE — 71250 CT THORAX DX C-: CPT

## 2022-12-08 PROCEDURE — 71250 CT THORAX DX C-: CPT | Mod: 26,MH

## 2022-12-14 ENCOUNTER — NON-APPOINTMENT (OUTPATIENT)
Age: 77
End: 2022-12-14

## 2022-12-15 LAB
BASOPHILS # BLD AUTO: 0.09 K/UL
BASOPHILS NFR BLD AUTO: 1 %
EOSINOPHIL # BLD AUTO: 0.24 K/UL
EOSINOPHIL NFR BLD AUTO: 2.7 %
HCT VFR BLD CALC: 43 %
HGB BLD-MCNC: 13.8 G/DL
IMM GRANULOCYTES NFR BLD AUTO: 0.3 %
LYMPHOCYTES # BLD AUTO: 3.71 K/UL
LYMPHOCYTES NFR BLD AUTO: 41.4 %
MAN DIFF?: NORMAL
MCHC RBC-ENTMCNC: 28.5 PG
MCHC RBC-ENTMCNC: 32.1 GM/DL
MCV RBC AUTO: 88.7 FL
MONOCYTES # BLD AUTO: 0.74 K/UL
MONOCYTES NFR BLD AUTO: 8.3 %
NEUTROPHILS # BLD AUTO: 4.15 K/UL
NEUTROPHILS NFR BLD AUTO: 46.3 %
PLATELET # BLD AUTO: 321 K/UL
RBC # BLD: 4.85 M/UL
RBC # FLD: 14.6 %
TSH SERPL-ACNC: 2.23 UIU/ML
WBC # FLD AUTO: 8.96 K/UL

## 2022-12-16 LAB
ALBUMIN SERPL ELPH-MCNC: 4.5 G/DL
ALP BLD-CCNC: 117 U/L
ALT SERPL-CCNC: 15 U/L
ANION GAP SERPL CALC-SCNC: 14 MMOL/L
AST SERPL-CCNC: 21 U/L
BILIRUB SERPL-MCNC: 0.5 MG/DL
BUN SERPL-MCNC: 21 MG/DL
CALCIUM SERPL-MCNC: 10.1 MG/DL
CHLORIDE SERPL-SCNC: 101 MMOL/L
CHOLEST SERPL-MCNC: 191 MG/DL
CO2 SERPL-SCNC: 25 MMOL/L
CREAT SERPL-MCNC: 0.89 MG/DL
EGFR: 66 ML/MIN/1.73M2
ESTIMATED AVERAGE GLUCOSE: 143 MG/DL
GLUCOSE SERPL-MCNC: 127 MG/DL
HBA1C MFR BLD HPLC: 6.6 %
HDLC SERPL-MCNC: 71 MG/DL
IRON SERPL-MCNC: 103 UG/DL
LDLC SERPL CALC-MCNC: 106 MG/DL
NONHDLC SERPL-MCNC: 120 MG/DL
POTASSIUM SERPL-SCNC: 4.5 MMOL/L
PROT SERPL-MCNC: 7.5 G/DL
SODIUM SERPL-SCNC: 140 MMOL/L
TRIGL SERPL-MCNC: 67 MG/DL

## 2022-12-19 ENCOUNTER — NON-APPOINTMENT (OUTPATIENT)
Age: 77
End: 2022-12-19

## 2022-12-22 ENCOUNTER — APPOINTMENT (OUTPATIENT)
Dept: INTERNAL MEDICINE | Facility: CLINIC | Age: 77
End: 2022-12-22

## 2022-12-22 VITALS
WEIGHT: 190 LBS | OXYGEN SATURATION: 96 % | RESPIRATION RATE: 16 BRPM | BODY MASS INDEX: 31.65 KG/M2 | TEMPERATURE: 98.8 F | DIASTOLIC BLOOD PRESSURE: 72 MMHG | SYSTOLIC BLOOD PRESSURE: 117 MMHG | HEART RATE: 88 BPM | HEIGHT: 65 IN

## 2022-12-22 PROCEDURE — 94727 GAS DIL/WSHOT DETER LNG VOL: CPT

## 2022-12-22 PROCEDURE — 94060 EVALUATION OF WHEEZING: CPT

## 2022-12-22 PROCEDURE — 94729 DIFFUSING CAPACITY: CPT

## 2023-01-04 NOTE — ED ADULT NURSE NOTE - CCCP TRG CHIEF CMPLNT
-- DO NOT REPLY / DO NOT REPLY ALL --  -- Message is from Engagement Center Operations (ECO) --    Order Request  Lab: blood work    Message / reason: patient wants to have annual blood work done    Insurance type:   Payor: [facility] [employer] STATE / Plan: [place] MEDICAID HMO / Product Type: T19 HMO    Preferred Delivery Method   Input in Epic     Caller Information       Type Contact Phone/Fax    01/04/2023 02:00 PM CST Phone (Incoming) Nereida Camacho (Self) [phone] (M)          Alternative phone number:     Can a detailed message be left? Yes    Message Turnaround: [place]:    Refer to site's KB page for routing instructions    Please give this turnaround time to the caller:   \"You can expect to receive a response 2-3 business days after your provider's clinical team reviews the message\"   shortness of breath

## 2023-01-08 ENCOUNTER — RX RENEWAL (OUTPATIENT)
Age: 78
End: 2023-01-08

## 2023-01-09 ENCOUNTER — RESULT CHARGE (OUTPATIENT)
Age: 78
End: 2023-01-09

## 2023-01-09 ENCOUNTER — APPOINTMENT (OUTPATIENT)
Dept: OBGYN | Facility: CLINIC | Age: 78
End: 2023-01-09
Payer: MEDICARE

## 2023-01-09 ENCOUNTER — LABORATORY RESULT (OUTPATIENT)
Age: 78
End: 2023-01-09

## 2023-01-09 VITALS
DIASTOLIC BLOOD PRESSURE: 74 MMHG | BODY MASS INDEX: 30.95 KG/M2 | SYSTOLIC BLOOD PRESSURE: 121 MMHG | WEIGHT: 186 LBS | HEART RATE: 97 BPM

## 2023-01-09 DIAGNOSIS — R82.998 OTHER ABNORMAL FINDINGS IN URINE: ICD-10-CM

## 2023-01-09 LAB
BILIRUB UR QL STRIP: NORMAL
CLARITY UR: NORMAL
COLLECTION METHOD: NORMAL
GLUCOSE UR-MCNC: NORMAL
HCG UR QL: 0.2 EU/DL
HEMOGLOBIN: 13.3
HGB UR QL STRIP.AUTO: NORMAL
KETONES UR-MCNC: NORMAL
LEUKOCYTE ESTERASE UR QL STRIP: NORMAL
NITRITE UR QL STRIP: POSITIVE
PH UR STRIP: 5.5
PROT UR STRIP-MCNC: NORMAL
SP GR UR STRIP: 1.01

## 2023-01-09 PROCEDURE — 85018 HEMOGLOBIN: CPT | Mod: QW

## 2023-01-09 PROCEDURE — 81003 URINALYSIS AUTO W/O SCOPE: CPT | Mod: QW

## 2023-01-09 PROCEDURE — G0101: CPT

## 2023-01-09 PROCEDURE — 82270 OCCULT BLOOD FECES: CPT

## 2023-01-09 NOTE — HISTORY OF PRESENT ILLNESS
[TextBox_4] : Patient is a 78 y/o female here today for annual visit. She was seen by NP for breast tenderness about 2 months ago. \par She saw Dr. quintanilla and was sent for a diagnostic mammogram.

## 2023-01-09 NOTE — PHYSICAL EXAM
[Chaperone Present] : A chaperone was present in the examining room during all aspects of the physical examination [Appropriately responsive] : appropriately responsive [Alert] : alert [Soft] : soft [Non-tender] : non-tender [Oriented x3] : oriented x3 [Examination Of The Breasts] : a normal appearance [No Discharge] : no discharge [No Masses] : no breast masses were palpable [Labia Majora] : normal [Labia Minora] : normal [Normal] : normal [Uterine Adnexae] : normal [Normal rectal exam] : was normal [FreeTextEntry1] : Joanna SANCHEZ-RHONDA chaperoned during entire physical exam [Occult Blood Positive] : was negative for occult blood analysis

## 2023-01-09 NOTE — PLAN
[FreeTextEntry1] : \par \par Patient to follow up in 1 year for annual GYN exam\par Mammogram: 11/23 Rx given \par Colonoscopy: done spring 2021, polyp follow up spring 2026 \par Bone density due: now Rx given \par Pap ordered\par \par Hemoccult ordered \par All questions answered, patient agreeable with plan.\par \par \par \par I Joanna Moody FNP-C am scribing for the presence of Dr. Moya the following sections HISTORY OF PRESENT ILLNESS, PAST MEDICAL/FAMILY/SOCIAL HISTORY; REVIEW OF SYSTEMS; VITAL SIGNS; PHYSICAL EXAM; DISPOSITION. \par \par \par I personally performed the services described in the documentation, reviewed the documentation recorded by the scribe in my presence and it accurately and completely records my words and actions.\par

## 2023-01-10 LAB
APPEARANCE: ABNORMAL
BILIRUBIN URINE: NEGATIVE
BLOOD URINE: ABNORMAL
COLOR: ABNORMAL
CYTOLOGY CVX/VAG DOC THIN PREP: NORMAL
GLUCOSE QUALITATIVE U: ABNORMAL
KETONES URINE: NEGATIVE
LEUKOCYTE ESTERASE URINE: ABNORMAL
NITRITE URINE: NEGATIVE
PH URINE: 5
PROTEIN URINE: ABNORMAL
SPECIFIC GRAVITY URINE: 1.01
UROBILINOGEN URINE: NORMAL

## 2023-01-12 LAB — BACTERIA UR CULT: ABNORMAL

## 2023-01-12 NOTE — PATIENT PROFILE ADULT - PACKS YRS CALCULATION
Is This A New Presentation, Or A Follow-Up?: Skin Lesion Has Your Skin Lesion Been Treated?: not been treated 0

## 2023-01-13 ENCOUNTER — APPOINTMENT (OUTPATIENT)
Dept: ORTHOPEDIC SURGERY | Facility: CLINIC | Age: 78
End: 2023-01-13
Payer: MEDICARE

## 2023-01-13 VITALS
WEIGHT: 186 LBS | BODY MASS INDEX: 29.89 KG/M2 | DIASTOLIC BLOOD PRESSURE: 81 MMHG | SYSTOLIC BLOOD PRESSURE: 126 MMHG | HEART RATE: 91 BPM | HEIGHT: 66 IN

## 2023-01-13 DIAGNOSIS — M17.0 BILATERAL PRIMARY OSTEOARTHRITIS OF KNEE: ICD-10-CM

## 2023-01-13 PROCEDURE — 99213 OFFICE O/P EST LOW 20 MIN: CPT

## 2023-01-13 NOTE — DISCHARGE NOTE NURSING/CASE MANAGEMENT/SOCIAL WORK - NSDCDPATPORTLINK_GEN_ALL_CORE
You can access the [facility], offered by [facility], by registering with the following website: [email or web address]
no

## 2023-01-18 VITALS
BODY MASS INDEX: 29.89 KG/M2 | WEIGHT: 186 LBS | SYSTOLIC BLOOD PRESSURE: 126 MMHG | DIASTOLIC BLOOD PRESSURE: 81 MMHG | HEIGHT: 66 IN

## 2023-01-18 PROBLEM — M17.0 BILATERAL PRIMARY OSTEOARTHRITIS OF KNEE: Status: ACTIVE | Noted: 2019-05-07

## 2023-01-18 NOTE — ADDENDUM
[FreeTextEntry1] : This note was written by Shanthi Braun on 01/18/2023 acting as scribe for Brunilda Burger, OTR/L, PA

## 2023-01-18 NOTE — DISCUSSION/SUMMARY
[de-identified] : At this time, due to osteoarthritis of the bilateral knees, the patient will go to physical therapy. She will return to the office as needed.

## 2023-01-18 NOTE — PHYSICAL EXAM
[de-identified] : Right Knee: \par Range of Motion in Degrees	\par 	                  Claimant:	Normal:	\par Flexion Active	  135 	                135-degrees	\par Flexion Passive	  135	                135-degrees	\par Extension Active	  0-5	                0-5-degrees	\par Extension Passive	  0-5	                0-5-degrees	\par \par No weakness to flexion/extension.  No evidence of instability in the AP plane or varus or valgus stress.  Negative  Lachman.  Negative pivot shift.  Negative anterior drawer test.  Negative posterior drawer test.  Negative Marissa.  Negative Apley grind.  No medial or lateral joint line tenderness.  Positive tenderness over the medial and lateral facet of the patella.  Positive patellofemoral crepitations.  No lateral tilting patella.  No patella apprehension.  Positive crepitation in the medial and lateral femoral condyle.  No proximal or distal swelling, edema or tenderness.  No gross motor or sensory deficits.  Mild intra-articular swelling.  2+ DP and PT pulses.  No varus or valgus malalignment.  Skin is intact.  No rashes, scars or lesions. \par \par Left Knee: \par Range of Motion in Degrees	\par 	                  Claimant:	Normal:	\par Flexion Active	  135 	                135-degrees	\par Flexion Passive	  135	                135-degrees	\par Extension Active	  0-5	                0-5-degrees	\par Extension Passive	  0-5	                0-5-degrees	\par \par No weakness to flexion/extension.  No evidence of instability in the AP plane or varus or valgus stress.  Negative  Lachman.  Negative pivot shift.  Negative anterior drawer test.  Negative posterior drawer test.  Negative Marissa.  Negative Apley grind.  No medial or lateral joint line tenderness.  Positive tenderness over the medial and lateral facet of the patella.  Positive patellofemoral crepitations.  No lateral tilting patella.  No patella apprehension.  Positive crepitation in the medial and lateral femoral condyle.  No proximal or distal swelling, edema or tenderness.  No gross motor or sensory deficits.  Mild intra-articular swelling.  2+ DP and PT pulses.  No varus or valgus malalignment.  Skin is intact.  No rashes, scars or lesions. \par  [de-identified] : Ambulating with a slightly antalgic to antalgic gait.  Station:  Normal.  [de-identified] : Appearance:  Well-developed, well-nourished female in no acute distress.\par

## 2023-02-03 ENCOUNTER — APPOINTMENT (OUTPATIENT)
Dept: OBGYN | Facility: CLINIC | Age: 78
End: 2023-02-03
Payer: MEDICARE

## 2023-02-03 ENCOUNTER — LABORATORY RESULT (OUTPATIENT)
Age: 78
End: 2023-02-03

## 2023-02-03 ENCOUNTER — NON-APPOINTMENT (OUTPATIENT)
Age: 78
End: 2023-02-03

## 2023-02-03 PROCEDURE — 81003 URINALYSIS AUTO W/O SCOPE: CPT | Mod: QW

## 2023-02-06 LAB
APPEARANCE: ABNORMAL
BACTERIA UR CULT: ABNORMAL
BILIRUBIN URINE: NEGATIVE
BLOOD URINE: NEGATIVE
COLOR: COLORLESS
GLUCOSE QUALITATIVE U: ABNORMAL
KETONES URINE: NEGATIVE
LEUKOCYTE ESTERASE URINE: ABNORMAL
NITRITE URINE: NEGATIVE
PH URINE: 5
PROTEIN URINE: NEGATIVE
SPECIFIC GRAVITY URINE: 1.01
UROBILINOGEN URINE: NORMAL

## 2023-03-08 ENCOUNTER — RX RENEWAL (OUTPATIENT)
Age: 78
End: 2023-03-08

## 2023-03-22 ENCOUNTER — NON-APPOINTMENT (OUTPATIENT)
Age: 78
End: 2023-03-22

## 2023-03-22 ENCOUNTER — APPOINTMENT (OUTPATIENT)
Dept: DERMATOLOGY | Facility: CLINIC | Age: 78
End: 2023-03-22
Payer: MEDICARE

## 2023-03-22 ENCOUNTER — APPOINTMENT (OUTPATIENT)
Dept: INTERNAL MEDICINE | Facility: CLINIC | Age: 78
End: 2023-03-22
Payer: MEDICARE

## 2023-03-22 VITALS
SYSTOLIC BLOOD PRESSURE: 120 MMHG | HEIGHT: 66 IN | DIASTOLIC BLOOD PRESSURE: 80 MMHG | RESPIRATION RATE: 16 BRPM | WEIGHT: 191 LBS | BODY MASS INDEX: 30.7 KG/M2 | OXYGEN SATURATION: 97 % | HEART RATE: 104 BPM

## 2023-03-22 PROCEDURE — 17110 DESTRUCTION B9 LES UP TO 14: CPT

## 2023-03-22 PROCEDURE — 99213 OFFICE O/P EST LOW 20 MIN: CPT

## 2023-03-22 PROCEDURE — 99213 OFFICE O/P EST LOW 20 MIN: CPT | Mod: 25

## 2023-03-22 NOTE — ASSESSMENT
[FreeTextEntry1] : A complete skin examination was performed.  There is no evidence of skin cancer.  We discussed the importance of photoprotection, including the use of hats, protective clothing and sunscreens with an SPF of at least 30.  Sun avoidance was also discussed.  The ABCDE's of melanoma was discussed.  Regular skin exams recommended.\par \par Colloid milium\par Education.

## 2023-03-22 NOTE — PLAN
[FreeTextEntry1] : 1. Recommend evaluation in ED. Pt declining I call EMS, she wants to go home and call 911 as she has an elderly . \par \par

## 2023-03-22 NOTE — DATA REVIEWED
[FreeTextEntry1] : EKG reveals T wave inversion in inferior leads (new). Normal rate 84bpm, normal intervals.

## 2023-03-22 NOTE — REVIEW OF SYSTEMS
[Recent Change In Weight] : ~T recent weight change [Dyspnea on Exertion] : dyspnea on exertion [Fever] : no fever [Chills] : no chills [Fatigue] : no fatigue [Night Sweats] : no night sweats [Discharge] : no discharge [Vision Problems] : no vision problems [Earache] : no earache [Hearing Loss] : no hearing loss [Nasal Discharge] : no nasal discharge [Sore Throat] : no sore throat [Chest Pain] : no chest pain [Palpitations] : no palpitations [Leg Claudication] : no leg claudication [Lower Ext Edema] : no lower extremity edema [Orthopnea] : no orthopnea [Paroxysmal Nocturnal Dyspnea] : no paroxysmal nocturnal dyspnea [Shortness Of Breath] : no shortness of breath [Wheezing] : no wheezing [Cough] : no cough [Abdominal Pain] : no abdominal pain [Vomiting] : no vomiting [Dysuria] : no dysuria [Hematuria] : no hematuria [Joint Pain] : no joint pain [Muscle Pain] : no muscle pain [Itching] : no itching [Skin Rash] : no skin rash [Headache] : no headache [Dizziness] : no dizziness [Fainting] : no fainting [Insomnia] : no insomnia [Easy Bruising] : no easy bruising [Swollen Glands] : no swollen glands [FreeTextEntry2] : gained 5 pounds  [FreeTextEntry4] : left jaw pain radiating down neck

## 2023-03-22 NOTE — HISTORY OF PRESENT ILLNESS
[FreeTextEntry8] : 77F w/ PMHx of Bullous emphysema, COPD, recurrent spontaneous pneumothorax s/p VATS, GERD, Hypothyroidism, DM2, & HLD presents to office today c/o left jaw pain that radiates down her neck x 2 weeks. No recent injury, no CP, palps, breathlessness, LE edema, cough or other contributory symptoms. \par \par EKG reveals T wave inversion in inferior leads (new). Normal rate 84bpm, normal intervals. \par \par Pt follows with Dr. Fabio Kim for cardiology, she was last seen over a year ago, stress testing and echo were done 2 years ago. Call placed to Dr. Kim awaiting call back.

## 2023-03-22 NOTE — HISTORY OF PRESENT ILLNESS
[FreeTextEntry1] : Patient presents for skin examination. [de-identified] : Notes irritated lesions of the left lateral breast and right anterior thigh.

## 2023-03-22 NOTE — PHYSICAL EXAM
[Alert] : alert [Oriented x 3] : ~L oriented x 3 [Well Nourished] : well nourished [Full Body Skin Exam Performed] : performed [FreeTextEntry3] : A full skin exam was performed including the scalp, face (including the lips, ears, nose and eyes), neck, chest, breasts, abdomen, back, buttocks, upper extremities and lower extremities.  The patient declined examination of the genitalia.  \par The exam revealed the following benign growths:\par Seborrheic keratoses.\par Lentigines.\par \par Greasy brown erythematous papules, left lateral breast and right proximal anterior thigh.\par \par Colloid milium, posterior scalp.\par \par

## 2023-03-22 NOTE — PHYSICAL EXAM
[No Acute Distress] : no acute distress [Normal Sclera/Conjunctiva] : normal sclera/conjunctiva [Normal Outer Ear/Nose] : the outer ears and nose were normal in appearance [Normal Oropharynx] : the oropharynx was normal [No JVD] : no jugular venous distention [No Lymphadenopathy] : no lymphadenopathy [No Respiratory Distress] : no respiratory distress  [No Accessory Muscle Use] : no accessory muscle use [Clear to Auscultation] : lungs were clear to auscultation bilaterally [Normal Rate] : normal rate  [Regular Rhythm] : with a regular rhythm [Normal S1, S2] : normal S1 and S2 [No Edema] : there was no peripheral edema [No Extremity Clubbing/Cyanosis] : no extremity clubbing/cyanosis [Soft] : abdomen soft [Non Tender] : non-tender [Non-distended] : non-distended [No Masses] : no abdominal mass palpated [Normal Bowel Sounds] : normal bowel sounds [Normal Anterior Cervical Nodes] : no anterior cervical lymphadenopathy [Grossly Normal Strength/Tone] : grossly normal strength/tone [No Focal Deficits] : no focal deficits [Normal Gait] : normal gait [Normal Affect] : the affect was normal [Alert and Oriented x3] : oriented to person, place, and time [Normal Insight/Judgement] : insight and judgment were intact

## 2023-04-04 ENCOUNTER — INPATIENT (INPATIENT)
Facility: HOSPITAL | Age: 78
LOS: 2 days | Discharge: HOME | DRG: 199 | End: 2023-04-07
Attending: SURGERY | Admitting: SURGERY
Payer: MEDICARE

## 2023-04-04 ENCOUNTER — RX RENEWAL (OUTPATIENT)
Age: 78
End: 2023-04-04

## 2023-04-04 VITALS
DIASTOLIC BLOOD PRESSURE: 104 MMHG | OXYGEN SATURATION: 80 % | WEIGHT: 190.04 LBS | SYSTOLIC BLOOD PRESSURE: 169 MMHG | HEART RATE: 97 BPM | RESPIRATION RATE: 28 BRPM

## 2023-04-04 DIAGNOSIS — Z90.49 ACQUIRED ABSENCE OF OTHER SPECIFIED PARTS OF DIGESTIVE TRACT: Chronic | ICD-10-CM

## 2023-04-04 DIAGNOSIS — Z96.659 PRESENCE OF UNSPECIFIED ARTIFICIAL KNEE JOINT: Chronic | ICD-10-CM

## 2023-04-04 DIAGNOSIS — Z98.890 OTHER SPECIFIED POSTPROCEDURAL STATES: Chronic | ICD-10-CM

## 2023-04-04 DIAGNOSIS — J93.83 OTHER PNEUMOTHORAX: ICD-10-CM

## 2023-04-04 LAB
ALBUMIN SERPL ELPH-MCNC: 3.6 G/DL — SIGNIFICANT CHANGE UP (ref 3.3–5)
ALP SERPL-CCNC: 114 U/L — SIGNIFICANT CHANGE UP (ref 40–120)
ALT FLD-CCNC: 26 U/L — SIGNIFICANT CHANGE UP (ref 12–78)
ANION GAP SERPL CALC-SCNC: 3 MMOL/L — LOW (ref 5–17)
AST SERPL-CCNC: 44 U/L — HIGH (ref 15–37)
BASE EXCESS BLDV CALC-SCNC: 2.5 MMOL/L — SIGNIFICANT CHANGE UP
BASOPHILS # BLD AUTO: 0.07 K/UL — SIGNIFICANT CHANGE UP (ref 0–0.2)
BASOPHILS NFR BLD AUTO: 0.6 % — SIGNIFICANT CHANGE UP (ref 0–2)
BILIRUB SERPL-MCNC: 0.5 MG/DL — SIGNIFICANT CHANGE UP (ref 0.2–1.2)
BUN SERPL-MCNC: 18 MG/DL — SIGNIFICANT CHANGE UP (ref 7–23)
CALCIUM SERPL-MCNC: 9.4 MG/DL — SIGNIFICANT CHANGE UP (ref 8.5–10.1)
CHLORIDE SERPL-SCNC: 106 MMOL/L — SIGNIFICANT CHANGE UP (ref 96–108)
CO2 BLDV-SCNC: 31 MMOL/L — HIGH (ref 22–26)
CO2 SERPL-SCNC: 27 MMOL/L — SIGNIFICANT CHANGE UP (ref 22–31)
CREAT SERPL-MCNC: 0.85 MG/DL — SIGNIFICANT CHANGE UP (ref 0.5–1.3)
EGFR: 70 ML/MIN/1.73M2 — SIGNIFICANT CHANGE UP
EOSINOPHIL # BLD AUTO: 0.17 K/UL — SIGNIFICANT CHANGE UP (ref 0–0.5)
EOSINOPHIL NFR BLD AUTO: 1.6 % — SIGNIFICANT CHANGE UP (ref 0–6)
FLUAV AG NPH QL: SIGNIFICANT CHANGE UP
FLUBV AG NPH QL: SIGNIFICANT CHANGE UP
GLUCOSE BLDC GLUCOMTR-MCNC: 103 MG/DL — HIGH (ref 70–99)
GLUCOSE SERPL-MCNC: 144 MG/DL — HIGH (ref 70–99)
HCO3 BLDV-SCNC: 29 MMOL/L — SIGNIFICANT CHANGE UP (ref 22–29)
HCT VFR BLD CALC: 45.1 % — HIGH (ref 34.5–45)
HGB BLD-MCNC: 14.7 G/DL — SIGNIFICANT CHANGE UP (ref 11.5–15.5)
IMM GRANULOCYTES NFR BLD AUTO: 0.2 % — SIGNIFICANT CHANGE UP (ref 0–0.9)
LYMPHOCYTES # BLD AUTO: 38.2 % — SIGNIFICANT CHANGE UP (ref 13–44)
LYMPHOCYTES # BLD AUTO: 4.19 K/UL — HIGH (ref 1–3.3)
MCHC RBC-ENTMCNC: 28.5 PG — SIGNIFICANT CHANGE UP (ref 27–34)
MCHC RBC-ENTMCNC: 32.6 GM/DL — SIGNIFICANT CHANGE UP (ref 32–36)
MCV RBC AUTO: 87.4 FL — SIGNIFICANT CHANGE UP (ref 80–100)
MONOCYTES # BLD AUTO: 0.75 K/UL — SIGNIFICANT CHANGE UP (ref 0–0.9)
MONOCYTES NFR BLD AUTO: 6.8 % — SIGNIFICANT CHANGE UP (ref 2–14)
NEUTROPHILS # BLD AUTO: 5.76 K/UL — SIGNIFICANT CHANGE UP (ref 1.8–7.4)
NEUTROPHILS NFR BLD AUTO: 52.6 % — SIGNIFICANT CHANGE UP (ref 43–77)
PCO2 BLDV: 53 MMHG — HIGH (ref 39–42)
PH BLDV: 7.35 — SIGNIFICANT CHANGE UP (ref 7.32–7.43)
PLATELET # BLD AUTO: 299 K/UL — SIGNIFICANT CHANGE UP (ref 150–400)
PO2 BLDV: 46 MMHG — SIGNIFICANT CHANGE UP
POTASSIUM SERPL-MCNC: 4.8 MMOL/L — SIGNIFICANT CHANGE UP (ref 3.5–5.3)
POTASSIUM SERPL-SCNC: 4.8 MMOL/L — SIGNIFICANT CHANGE UP (ref 3.5–5.3)
PROT SERPL-MCNC: 8.6 GM/DL — HIGH (ref 6–8.3)
RBC # BLD: 5.16 M/UL — SIGNIFICANT CHANGE UP (ref 3.8–5.2)
RBC # FLD: 14.1 % — SIGNIFICANT CHANGE UP (ref 10.3–14.5)
RSV RNA NPH QL NAA+NON-PROBE: SIGNIFICANT CHANGE UP
SAO2 % BLDV: 74.8 % — SIGNIFICANT CHANGE UP
SARS-COV-2 RNA SPEC QL NAA+PROBE: SIGNIFICANT CHANGE UP
SODIUM SERPL-SCNC: 136 MMOL/L — SIGNIFICANT CHANGE UP (ref 135–145)
TROPONIN I, HIGH SENSITIVITY RESULT: 6.08 NG/L — SIGNIFICANT CHANGE UP
WBC # BLD: 10.96 K/UL — HIGH (ref 3.8–10.5)
WBC # FLD AUTO: 10.96 K/UL — HIGH (ref 3.8–10.5)

## 2023-04-04 PROCEDURE — 80048 BASIC METABOLIC PNL TOTAL CA: CPT

## 2023-04-04 PROCEDURE — 84100 ASSAY OF PHOSPHORUS: CPT

## 2023-04-04 PROCEDURE — 81001 URINALYSIS AUTO W/SCOPE: CPT

## 2023-04-04 PROCEDURE — 71045 X-RAY EXAM CHEST 1 VIEW: CPT | Mod: 26

## 2023-04-04 PROCEDURE — 36415 COLL VENOUS BLD VENIPUNCTURE: CPT

## 2023-04-04 PROCEDURE — 99222 1ST HOSP IP/OBS MODERATE 55: CPT | Mod: 25

## 2023-04-04 PROCEDURE — 71045 X-RAY EXAM CHEST 1 VIEW: CPT | Mod: 26,77

## 2023-04-04 PROCEDURE — 85027 COMPLETE CBC AUTOMATED: CPT

## 2023-04-04 PROCEDURE — 83036 HEMOGLOBIN GLYCOSYLATED A1C: CPT

## 2023-04-04 PROCEDURE — 82962 GLUCOSE BLOOD TEST: CPT

## 2023-04-04 PROCEDURE — 32551 INSERTION OF CHEST TUBE: CPT

## 2023-04-04 PROCEDURE — 97162 PT EVAL MOD COMPLEX 30 MIN: CPT | Mod: GP

## 2023-04-04 PROCEDURE — 99285 EMERGENCY DEPT VISIT HI MDM: CPT | Mod: CS

## 2023-04-04 PROCEDURE — 97116 GAIT TRAINING THERAPY: CPT | Mod: GP

## 2023-04-04 PROCEDURE — 71045 X-RAY EXAM CHEST 1 VIEW: CPT

## 2023-04-04 PROCEDURE — 94640 AIRWAY INHALATION TREATMENT: CPT

## 2023-04-04 PROCEDURE — 83735 ASSAY OF MAGNESIUM: CPT

## 2023-04-04 RX ORDER — MORPHINE SULFATE 50 MG/1
3 CAPSULE, EXTENDED RELEASE ORAL ONCE
Refills: 0 | Status: DISCONTINUED | OUTPATIENT
Start: 2023-04-04 | End: 2023-04-04

## 2023-04-04 RX ORDER — FUROSEMIDE 40 MG
40 TABLET ORAL DAILY
Refills: 0 | Status: DISCONTINUED | OUTPATIENT
Start: 2023-04-04 | End: 2023-04-07

## 2023-04-04 RX ORDER — SODIUM CHLORIDE 9 MG/ML
1000 INJECTION, SOLUTION INTRAVENOUS
Refills: 0 | Status: DISCONTINUED | OUTPATIENT
Start: 2023-04-04 | End: 2023-04-06

## 2023-04-04 RX ORDER — LIDOCAINE 4 G/100G
0 CREAM TOPICAL
Qty: 0 | Refills: 0 | DISCHARGE

## 2023-04-04 RX ORDER — PANTOPRAZOLE SODIUM 20 MG/1
40 TABLET, DELAYED RELEASE ORAL DAILY
Refills: 0 | Status: DISCONTINUED | OUTPATIENT
Start: 2023-04-04 | End: 2023-04-07

## 2023-04-04 RX ORDER — SODIUM CHLORIDE 9 MG/ML
1000 INJECTION INTRAMUSCULAR; INTRAVENOUS; SUBCUTANEOUS ONCE
Refills: 0 | Status: COMPLETED | OUTPATIENT
Start: 2023-04-04 | End: 2023-04-04

## 2023-04-04 RX ORDER — ACETAMINOPHEN 500 MG
650 TABLET ORAL EVERY 6 HOURS
Refills: 0 | Status: DISCONTINUED | OUTPATIENT
Start: 2023-04-04 | End: 2023-04-07

## 2023-04-04 RX ORDER — GABAPENTIN 400 MG/1
1 CAPSULE ORAL
Qty: 0 | Refills: 0 | DISCHARGE

## 2023-04-04 RX ORDER — TIOTROPIUM BROMIDE 18 UG/1
2 CAPSULE ORAL; RESPIRATORY (INHALATION) DAILY
Refills: 0 | Status: DISCONTINUED | OUTPATIENT
Start: 2023-04-04 | End: 2023-04-07

## 2023-04-04 RX ORDER — DEXTROSE 50 % IN WATER 50 %
25 SYRINGE (ML) INTRAVENOUS ONCE
Refills: 0 | Status: DISCONTINUED | OUTPATIENT
Start: 2023-04-04 | End: 2023-04-06

## 2023-04-04 RX ORDER — INSULIN LISPRO 100/ML
VIAL (ML) SUBCUTANEOUS
Refills: 0 | Status: DISCONTINUED | OUTPATIENT
Start: 2023-04-04 | End: 2023-04-07

## 2023-04-04 RX ORDER — LEVOTHYROXINE SODIUM 125 MCG
137 TABLET ORAL DAILY
Refills: 0 | Status: DISCONTINUED | OUTPATIENT
Start: 2023-04-04 | End: 2023-04-05

## 2023-04-04 RX ORDER — DEXTROSE 50 % IN WATER 50 %
12.5 SYRINGE (ML) INTRAVENOUS ONCE
Refills: 0 | Status: DISCONTINUED | OUTPATIENT
Start: 2023-04-04 | End: 2023-04-06

## 2023-04-04 RX ORDER — GABAPENTIN 400 MG/1
300 CAPSULE ORAL
Refills: 0 | Status: DISCONTINUED | OUTPATIENT
Start: 2023-04-04 | End: 2023-04-07

## 2023-04-04 RX ORDER — LIDOCAINE 4 G/100G
1 CREAM TOPICAL DAILY
Refills: 0 | Status: DISCONTINUED | OUTPATIENT
Start: 2023-04-04 | End: 2023-04-07

## 2023-04-04 RX ORDER — ENOXAPARIN SODIUM 100 MG/ML
40 INJECTION SUBCUTANEOUS EVERY 24 HOURS
Refills: 0 | Status: DISCONTINUED | OUTPATIENT
Start: 2023-04-04 | End: 2023-04-05

## 2023-04-04 RX ORDER — OXYCODONE HYDROCHLORIDE 5 MG/1
5 TABLET ORAL EVERY 4 HOURS
Refills: 0 | Status: DISCONTINUED | OUTPATIENT
Start: 2023-04-04 | End: 2023-04-04

## 2023-04-04 RX ORDER — GLUCAGON INJECTION, SOLUTION 0.5 MG/.1ML
1 INJECTION, SOLUTION SUBCUTANEOUS ONCE
Refills: 0 | Status: DISCONTINUED | OUTPATIENT
Start: 2023-04-04 | End: 2023-04-06

## 2023-04-04 RX ORDER — DEXTROSE 50 % IN WATER 50 %
15 SYRINGE (ML) INTRAVENOUS ONCE
Refills: 0 | Status: DISCONTINUED | OUTPATIENT
Start: 2023-04-04 | End: 2023-04-06

## 2023-04-04 RX ORDER — LEVOTHYROXINE SODIUM 125 MCG
137 TABLET ORAL DAILY
Refills: 0 | Status: DISCONTINUED | OUTPATIENT
Start: 2023-04-04 | End: 2023-04-07

## 2023-04-04 RX ORDER — BUDESONIDE AND FORMOTEROL FUMARATE DIHYDRATE 160; 4.5 UG/1; UG/1
2 AEROSOL RESPIRATORY (INHALATION)
Refills: 0 | Status: DISCONTINUED | OUTPATIENT
Start: 2023-04-04 | End: 2023-04-07

## 2023-04-04 RX ADMIN — TIOTROPIUM BROMIDE 2 PUFF(S): 18 CAPSULE ORAL; RESPIRATORY (INHALATION) at 15:51

## 2023-04-04 RX ADMIN — Medication 40 MILLIGRAM(S): at 14:39

## 2023-04-04 RX ADMIN — LIDOCAINE 1 PATCH: 4 CREAM TOPICAL at 14:40

## 2023-04-04 RX ADMIN — MORPHINE SULFATE 3 MILLIGRAM(S): 50 CAPSULE, EXTENDED RELEASE ORAL at 11:27

## 2023-04-04 RX ADMIN — GABAPENTIN 300 MILLIGRAM(S): 400 CAPSULE ORAL at 20:48

## 2023-04-04 RX ADMIN — PANTOPRAZOLE SODIUM 40 MILLIGRAM(S): 20 TABLET, DELAYED RELEASE ORAL at 14:39

## 2023-04-04 RX ADMIN — SODIUM CHLORIDE 2000 MILLILITER(S): 9 INJECTION INTRAMUSCULAR; INTRAVENOUS; SUBCUTANEOUS at 11:07

## 2023-04-04 RX ADMIN — LIDOCAINE 1 PATCH: 4 CREAM TOPICAL at 20:41

## 2023-04-04 RX ADMIN — MORPHINE SULFATE 3 MILLIGRAM(S): 50 CAPSULE, EXTENDED RELEASE ORAL at 17:35

## 2023-04-04 RX ADMIN — ENOXAPARIN SODIUM 40 MILLIGRAM(S): 100 INJECTION SUBCUTANEOUS at 20:46

## 2023-04-04 RX ADMIN — OXYCODONE HYDROCHLORIDE 5 MILLIGRAM(S): 5 TABLET ORAL at 14:39

## 2023-04-04 RX ADMIN — BUDESONIDE AND FORMOTEROL FUMARATE DIHYDRATE 2 PUFF(S): 160; 4.5 AEROSOL RESPIRATORY (INHALATION) at 21:32

## 2023-04-04 RX ADMIN — Medication 137 MICROGRAM(S): at 14:39

## 2023-04-04 RX ADMIN — BUDESONIDE AND FORMOTEROL FUMARATE DIHYDRATE 2 PUFF(S): 160; 4.5 AEROSOL RESPIRATORY (INHALATION) at 15:51

## 2023-04-04 NOTE — ED PROVIDER NOTE - PHYSICAL EXAMINATION
General: AAOx3, NAD  HEENT: NCAT  Cardiac: Normal rate and rhythm, no murmurs, normal peripheral perfusion  Respiratory: tachypnea min distress decrease breath sounds left chest   GI: Soft, nondistended, nontender  Neuro: No focal deficits. NUNES equally x4, sensation to light touch intact throughout  MSK: FROMx4, no focal bony tenderness, no peripheral edema  Skin: No rash

## 2023-04-04 NOTE — ED ADULT TRIAGE NOTE - CHIEF COMPLAINT QUOTE
Pt BIBEMS from home, c/o difficulty breathing and chest pain starting this morning after showering. Pt hypoxic to 80% on RA in triage. HX of COPD and lung collapse in July 2022. Denies fevers. STAT EKG to be completed. Dr. Pedroza at bedside. Brought directly to trauma room. Respiratory called.

## 2023-04-04 NOTE — H&P ADULT - HISTORY OF PRESENT ILLNESS
76 yo Female with h/o 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 Pt states developed acute chest pain on left side associated w difficulty breathing this AM after showering.

## 2023-04-04 NOTE — ED PEDIATRIC NURSE REASSESSMENT NOTE - NS ED NURSE REASSESS COMMENT FT2
Pt alert and oriented, VSS, chest tube to suction, 10ml serosanguineous drainage at this time, pt c/o pain earlier, morphine given, no other complaints, pt instructed on plan of care, call bell in reach

## 2023-04-04 NOTE — ED PROVIDER NOTE - NS ED ROS FT
Constitutional: No fevers, chills, or sweats.  Cardiac: No chest pain, exertional dyspnea, orthopnea  Respiratory: + difficulty breathing   GI: No abdominal pain, no N/V/D  Neuro: No headaches, no neck pain/stiffness, no numbness  All other systems reviewed and are negative unless otherwise stated in the HPI.

## 2023-04-04 NOTE — ED ADULT NURSE REASSESSMENT NOTE - NS ED NURSE REASSESS COMMENT FT1
verbal bedside order from ANGELINA Villarreal to order 3mg IV push morphine once.
Pt. received in stable condition, daughter at bedside.  No s/sx of resp. distress/pain.  Able to make all needs known, chest tube to left side in place functioning properly. VSS, safety maintained. Report given to 3E, awaiting transport.

## 2023-04-04 NOTE — ED ADULT NURSE NOTE - OBJECTIVE STATEMENT
Pt BIBEMS from home, c/o difficulty breathing and chest pain starting this morning after showering. Pt hypoxic to 80% on RA in triage. HX of COPD and lung collapse in July 2022. Denies fevers. STAT EKG to be completed. Dr. Pedroza at bedside. Brought directly to trauma room. Respiratory called. Pt is awake and alert, answering questions appropriately. Pt O2 sat 95% on 15L  NRB.

## 2023-04-04 NOTE — ED PROVIDER NOTE - CLINICAL SUMMARY MEDICAL DECISION MAKING FREE TEXT BOX
Pt with the above hx presents with sudden SOB, diffuse left sided chest pain, hypoxic on RA, decreased breath sounds on the left suspicious for pneumothorax otherwise well appearing. Plan for CXR for confirmation, labs, EKG and will contact her cardiothoracics surgeon Pt with the above hx presents with sudden SOB, diffuse left sided chest pain, hypoxic on RA, decreased breath sounds on the left suspicious for pneumothorax otherwise well appearing. Plan for CXR for confirmation, labs, EKG and will contact her cardiothoracics surgeon  -+PTX seen on CXR, pt seen in ED by CTS team and pigtail placed. Will require admission for further care

## 2023-04-04 NOTE — H&P ADULT - NSHPPHYSICALEXAM_GEN_ALL_CORE
Gen NAD  HEENT no cyanosis, NC, AT  neck supple  Neuro AAOx3  Card RRR  Pulm no BS on left, tachypnic, using accessory muscles  Abd soft, obese  Ext warm  psych normal affect  skin no rashes

## 2023-04-04 NOTE — H&P ADULT - NSHPREVIEWOFSYSTEMS_GEN_ALL_CORE
CONSTITUTIONAL:  As per HPI.  HEENT:  Eyes:  No diplopia or blurred vision. ENT:  No earache, sore throat or runny nose.  CARDIOVASCULAR: left sided chest pain  RESPIRATORY:  + cough, +shortness of breath,   GASTROINTESTINAL:  No nausea, vomiting or diarrhea.  GENITOURINARY:  No dysuria, frequency or urgency.  MUSCULOSKELETAL:  As per HPI.  SKIN:  No change in skin, hair or nails.  NEUROLOGIC:  No paresthesias, fasciculations, seizures or weakness.  PSYCHIATRIC:  No disorder of thought or mood.  ENDOCRINE:  No heat or cold intolerance, polyuria or polydipsia. +DM as per pt  HEMATOLOGICAL:  No easy bruising or bleedings:

## 2023-04-04 NOTE — ED PROVIDER NOTE - OBJECTIVE STATEMENT
76 y/o F with PMH of COPD not on home O2, GERD, hypothyroid, DM, pneumothorax 11/21 s/p chest tube presents to the ED BIBEMS from home c/o difficulty breathing and chest pain starting this morning after showering.

## 2023-04-04 NOTE — PATIENT PROFILE ADULT - FALL HARM RISK - HARM RISK INTERVENTIONS

## 2023-04-04 NOTE — H&P ADULT - ASSESSMENT
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    Left pigtail catheter placed w re-expansion of left lung  cont chest tube to suction   lidoderm patch and pain control  pulmonary consultation  admit to thoracic surgery, Dr. Herbert

## 2023-04-04 NOTE — ED ADULT NURSE NOTE - NS ED NURSE LEVEL OF CONSCIOUSNESS MENTAL STATUS
In 5-7 days Please follow up with your primary care doctor:  Latoya Nunn MD   [id number] [address] / [facility] [id number]   [phone]      Home, Amoxicillin 875 mg twice daily for 10 days, Tylenol or Advil for pain or fever, push fluids, if COVID test is positive you should quarantine for 3 more days then wear a mask around others for 5 days after that.  Follow with Dr. Nunn or go to the emergency department for new or worsening symptoms  
Awake/Alert/Cooperative

## 2023-04-05 DIAGNOSIS — R07.89 OTHER CHEST PAIN: ICD-10-CM

## 2023-04-05 DIAGNOSIS — J43.9 EMPHYSEMA, UNSPECIFIED: ICD-10-CM

## 2023-04-05 DIAGNOSIS — J44.9 CHRONIC OBSTRUCTIVE PULMONARY DISEASE, UNSPECIFIED: ICD-10-CM

## 2023-04-05 DIAGNOSIS — J93.9 PNEUMOTHORAX, UNSPECIFIED: ICD-10-CM

## 2023-04-05 LAB
A1C WITH ESTIMATED AVERAGE GLUCOSE RESULT: 6.9 % — HIGH (ref 4–5.6)
APPEARANCE UR: ABNORMAL
BILIRUB UR-MCNC: NEGATIVE — SIGNIFICANT CHANGE UP
COLOR SPEC: YELLOW — SIGNIFICANT CHANGE UP
DIFF PNL FLD: ABNORMAL
ESTIMATED AVERAGE GLUCOSE: 151 MG/DL — HIGH (ref 68–114)
GLUCOSE BLDC GLUCOMTR-MCNC: 116 MG/DL — HIGH (ref 70–99)
GLUCOSE BLDC GLUCOMTR-MCNC: 121 MG/DL — HIGH (ref 70–99)
GLUCOSE BLDC GLUCOMTR-MCNC: 154 MG/DL — HIGH (ref 70–99)
GLUCOSE BLDC GLUCOMTR-MCNC: 156 MG/DL — HIGH (ref 70–99)
GLUCOSE UR QL: 250
KETONES UR-MCNC: ABNORMAL
LEUKOCYTE ESTERASE UR-ACNC: ABNORMAL
MAGNESIUM SERPL-MCNC: 2 MG/DL — SIGNIFICANT CHANGE UP (ref 1.6–2.6)
NITRITE UR-MCNC: POSITIVE
PH UR: 5 — SIGNIFICANT CHANGE UP (ref 5–8)
PROT UR-MCNC: SIGNIFICANT CHANGE UP MG/DL
SP GR SPEC: 1.02 — SIGNIFICANT CHANGE UP (ref 1.01–1.02)
UROBILINOGEN FLD QL: NEGATIVE — SIGNIFICANT CHANGE UP

## 2023-04-05 PROCEDURE — 99222 1ST HOSP IP/OBS MODERATE 55: CPT

## 2023-04-05 PROCEDURE — 99231 SBSQ HOSP IP/OBS SF/LOW 25: CPT

## 2023-04-05 PROCEDURE — 71045 X-RAY EXAM CHEST 1 VIEW: CPT | Mod: 26

## 2023-04-05 RX ORDER — SENNA PLUS 8.6 MG/1
2 TABLET ORAL AT BEDTIME
Refills: 0 | Status: DISCONTINUED | OUTPATIENT
Start: 2023-04-05 | End: 2023-04-07

## 2023-04-05 RX ORDER — POLYETHYLENE GLYCOL 3350 17 G/17G
17 POWDER, FOR SOLUTION ORAL EVERY 12 HOURS
Refills: 0 | Status: DISCONTINUED | OUTPATIENT
Start: 2023-04-05 | End: 2023-04-07

## 2023-04-05 RX ADMIN — SENNA PLUS 2 TABLET(S): 8.6 TABLET ORAL at 20:50

## 2023-04-05 RX ADMIN — Medication 137 MICROGRAM(S): at 05:43

## 2023-04-05 RX ADMIN — BUDESONIDE AND FORMOTEROL FUMARATE DIHYDRATE 2 PUFF(S): 160; 4.5 AEROSOL RESPIRATORY (INHALATION) at 21:21

## 2023-04-05 RX ADMIN — TIOTROPIUM BROMIDE 2 PUFF(S): 18 CAPSULE ORAL; RESPIRATORY (INHALATION) at 11:08

## 2023-04-05 RX ADMIN — Medication 40 MILLIGRAM(S): at 05:44

## 2023-04-05 RX ADMIN — GABAPENTIN 300 MILLIGRAM(S): 400 CAPSULE ORAL at 20:49

## 2023-04-05 RX ADMIN — GABAPENTIN 300 MILLIGRAM(S): 400 CAPSULE ORAL at 09:18

## 2023-04-05 RX ADMIN — PANTOPRAZOLE SODIUM 40 MILLIGRAM(S): 20 TABLET, DELAYED RELEASE ORAL at 09:18

## 2023-04-05 RX ADMIN — BUDESONIDE AND FORMOTEROL FUMARATE DIHYDRATE 2 PUFF(S): 160; 4.5 AEROSOL RESPIRATORY (INHALATION) at 11:08

## 2023-04-05 RX ADMIN — Medication 2: at 12:13

## 2023-04-05 RX ADMIN — LIDOCAINE 1 PATCH: 4 CREAM TOPICAL at 09:19

## 2023-04-05 RX ADMIN — POLYETHYLENE GLYCOL 3350 17 GRAM(S): 17 POWDER, FOR SOLUTION ORAL at 16:20

## 2023-04-05 RX ADMIN — Medication 2: at 08:03

## 2023-04-05 RX ADMIN — LIDOCAINE 1 PATCH: 4 CREAM TOPICAL at 02:00

## 2023-04-05 NOTE — CONSULT NOTE ADULT - ASSESSMENT
- cont O2  - on symbicort/spiriva  - had spontaneous left ptx from large bullae  - has previous hx recurrent right ptx from bullous disease s/p talc pleurodesis 7/22  - thoracic f/u noted  - will probably need left pleurodesis  - dvt proph

## 2023-04-05 NOTE — CONSULT NOTE ADULT - SUBJECTIVE AND OBJECTIVE BOX
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 Pt states developed acute chest pain on left side associated w difficulty breathing this AM after showering.  (2023 12:18)    : History as above. No distress. Has left chest tube.      PAST MEDICAL & SURGICAL HISTORY:  Hypothyroidism      Diabetes mellitus  diet controlled      Back pain      Asthma      Elevated pancreatic enzyme  patient reports elevated lab 2013. Gastroenterologist aware      History of cataract  extracted from right and left      Varicose veins of both lower extremities  surgery      Diverticulosis of intestine without bleeding, unspecified intestinal tract location      Gall stones  gall bladder removed      Urinary urgency      Spinal stenosis of lumbar region      DDD (degenerative disc disease), lumbar      Basal cell carcinoma  removed from face      Pulmonary emphysema, unspecified emphysema type   last exacerbation; never intubated      OA (osteoarthritis)      Frequent UTI  2-3x/year; had an episode 5 weeks ago was treated denies dysuria      GERD (gastroesophageal reflux disease)      Leg swelling      Emphysema lung      COVID-19 vaccine series completed  Moderna - 2nd dose 2021      Knee pain, left      Scar tissue  left knee S/P TKR      COPD exacerbation      S/P bladder repair  bladder lift 2013      Dental disorder  Dental surgery 2012, patient reports that upper portion is glued in at this time, plan is to have dental implants placed.      S/P knee surgery  arthroscopy bilateral  and       Skin cancer  to right temple , removed, history of basal cell x 2      H/O lumbosacral spine surgery  fusion       H/O umbilical hernia repair  19      S/P cholecystectomy  2012      S/P colonoscopy  benign polyp      S/P knee replacement  right , left 2019          MEDICATIONS  (STANDING):  budesonide 160 MICROgram(s)/formoterol 4.5 MICROgram(s) Inhaler 2 Puff(s) Inhalation two times a day  dextrose 5%. 1000 milliLiter(s) (50 mL/Hr) IV Continuous <Continuous>  dextrose 5%. 1000 milliLiter(s) (100 mL/Hr) IV Continuous <Continuous>  dextrose 50% Injectable 25 Gram(s) IV Push once  dextrose 50% Injectable 12.5 Gram(s) IV Push once  dextrose 50% Injectable 25 Gram(s) IV Push once  enoxaparin Injectable 40 milliGRAM(s) SubCutaneous every 24 hours  furosemide    Tablet 40 milliGRAM(s) Oral daily  gabapentin 300 milliGRAM(s) Oral two times a day  glucagon  Injectable 1 milliGRAM(s) IntraMuscular once  insulin lispro (ADMELOG) corrective regimen sliding scale   SubCutaneous three times a day before meals  levothyroxine 137 MICROGram(s) Oral daily  levothyroxine 137 MICROGram(s) Oral daily  lidocaine   4% Patch 1 Patch Transdermal daily  pantoprazole    Tablet 40 milliGRAM(s) Oral daily  tiotropium 2.5 MICROgram(s) Inhaler 2 Puff(s) Inhalation daily    MEDICATIONS  (PRN):  acetaminophen     Tablet .. 650 milliGRAM(s) Oral every 6 hours PRN Mild Pain (1 - 3)  dextrose Oral Gel 15 Gram(s) Oral once PRN Blood Glucose LESS THAN 70 milliGRAM(s)/deciliter  oxycodone    5 mG/acetaminophen 325 mG 2 Tablet(s) Oral every 6 hours PRN Severe Pain (7 - 10)  oxycodone    5 mG/acetaminophen 325 mG 1 Tablet(s) Oral every 4 hours PRN Moderate Pain (4 - 6)      Allergies    Breo Ellipta (Rash)    Intolerances        SOCIAL HISTORY: Denies tobacco, etoh abuse or illicit drug use    FAMILY HISTORY:  Family history of pancreatic cancer (Father)    Family history of heart disease (Mother)    Family history of stroke (Mother)        Vital Signs Last 24 Hrs  T(C): 36.6 (2023 21:00), Max: 37.6 (2023 20:05)  T(F): 97.8 (2023 21:00), Max: 99.6 (2023 20:05)  HR: 74 (2023 21:34) (66 - 97)  BP: 123/56 (2023 21:00) (123/56 - 169/104)  BP(mean): --  RR: 18 (2023 21:00) (18 - 28)  SpO2: 96% (2023 21:34) (80% - 96%)    Parameters below as of 2023 21:34  Patient On (Oxygen Delivery Method): nasal cannula,3 Lpm        REVIEW OF SYSTEMS:    CONSTITUTIONAL:  As per HPI.  SKIN: no rashes  HEENT:  Eyes:  No diplopia or blurred vision. ENT:  No earache, sore throat or runny nose.  CARDIOVASCULAR:  No pressure, squeezing, tightness, heaviness or aching about the chest, neck, axilla or epigastrium.  RESPIRATORY:  sob  GASTROINTESTINAL:  No nausea, vomiting or diarrhea.  GENITOURINARY:  No dysuria, frequency or urgency.  MUSCULOSKELETAL:  As per HPI.  SKIN:  No change in skin, hair or nails.  NEUROLOGIC:  No paresthesias, fasciculations, seizures or weakness.  PSYCHIATRIC:  No disorder of thought or mood.  ENDOCRINE:  No heat or cold intolerance, polyuria or polydipsia.  HEMATOLOGICAL:  No easy bruising or bleedings:  .     PHYSICAL EXAMINATION:    GENERAL APPEARANCE:  Pt. is not currently dyspneic, in no distress. Pt. is alert, oriented, and pleasant.  HEENT:  Pupils are normal and react normally. No icterus. Mucous membranes well colored.  NECK:  Supple. No lymphadenopathy. Jugular venous pressure not elevated. Carotids equal.   HEART:  S1S2 reg  CHEST:  good air entry; left sided ronchi; left chest tube w no leak  ABDOMEN:  Soft and nontender.   EXTREMITIES:  There is no cyanosis, clubbing or edema.   SKIN:  No rash or significant lesions are noted.    LABS:                        14.7   10.96 )-----------( 299      ( 2023 10:33 )             45.1     04-04    136  |  106  |  18  ----------------------------<  144<H>  4.8   |  27  |  0.85    Ca    9.4      2023 10:33  Mg     2.0     04-05    TPro  8.6<H>  /  Alb  3.6  /  TBili  0.5  /  DBili  x   /  AST  44<H>  /  ALT  26  /  AlkPhos  114  04-04    LIVER FUNCTIONS - ( 2023 10:33 )  Alb: 3.6 g/dL / Pro: 8.6 gm/dL / ALK PHOS: 114 U/L / ALT: 26 U/L / AST: 44 U/L / GGT: x           PT/INR - ( 2023 11:52 )   PT: 12.3 sec;   INR: 1.06 ratio         PTT - ( 2023 11:52 )  PTT:30.4 sec      Urinalysis Basic - ( 2023 06:00 )    Color: Yellow / Appearance: Slightly Turbid / S.020 / pH: x  Gluc: x / Ketone: Trace  / Bili: Negative / Urobili: Negative   Blood: x / Protein: Trace mg/dL / Nitrite: Positive   Leuk Esterase: Moderate / RBC: Negative /HPF / WBC >50 /HPF   Sq Epi: x / Non Sq Epi: x / Bacteria: TNTC          RADIOLOGY & ADDITIONAL STUDIES:

## 2023-04-06 LAB
GLUCOSE BLDC GLUCOMTR-MCNC: 129 MG/DL — HIGH (ref 70–99)
GLUCOSE BLDC GLUCOMTR-MCNC: 131 MG/DL — HIGH (ref 70–99)
GLUCOSE BLDC GLUCOMTR-MCNC: 141 MG/DL — HIGH (ref 70–99)

## 2023-04-06 PROCEDURE — 71045 X-RAY EXAM CHEST 1 VIEW: CPT | Mod: 26

## 2023-04-06 PROCEDURE — 99232 SBSQ HOSP IP/OBS MODERATE 35: CPT

## 2023-04-06 PROCEDURE — 71045 X-RAY EXAM CHEST 1 VIEW: CPT | Mod: 26,77

## 2023-04-06 RX ADMIN — BUDESONIDE AND FORMOTEROL FUMARATE DIHYDRATE 2 PUFF(S): 160; 4.5 AEROSOL RESPIRATORY (INHALATION) at 20:37

## 2023-04-06 RX ADMIN — BUDESONIDE AND FORMOTEROL FUMARATE DIHYDRATE 2 PUFF(S): 160; 4.5 AEROSOL RESPIRATORY (INHALATION) at 09:09

## 2023-04-06 RX ADMIN — SENNA PLUS 2 TABLET(S): 8.6 TABLET ORAL at 21:26

## 2023-04-06 RX ADMIN — GABAPENTIN 300 MILLIGRAM(S): 400 CAPSULE ORAL at 09:20

## 2023-04-06 RX ADMIN — LIDOCAINE 1 PATCH: 4 CREAM TOPICAL at 09:21

## 2023-04-06 RX ADMIN — PANTOPRAZOLE SODIUM 40 MILLIGRAM(S): 20 TABLET, DELAYED RELEASE ORAL at 09:20

## 2023-04-06 RX ADMIN — Medication 137 MICROGRAM(S): at 05:53

## 2023-04-06 RX ADMIN — POLYETHYLENE GLYCOL 3350 17 GRAM(S): 17 POWDER, FOR SOLUTION ORAL at 10:23

## 2023-04-06 RX ADMIN — Medication 40 MILLIGRAM(S): at 05:53

## 2023-04-06 RX ADMIN — GABAPENTIN 300 MILLIGRAM(S): 400 CAPSULE ORAL at 21:27

## 2023-04-06 NOTE — PROGRESS NOTE ADULT - ADDITIONAL PE
found sititng in bed on laptop during first visit   second visit found sitting in bed reading   no acute distress   + chest tube without air leak

## 2023-04-06 NOTE — PHYSICAL THERAPY INITIAL EVALUATION ADULT - GENERAL OBSERVATIONS, REHAB EVAL
Pt received semisupine in bed, +2L nasal cannula, +chest tube to water seal, +pulse ox, in NAD. Pt agreeable to participate in PT evaluation.

## 2023-04-06 NOTE — PHYSICAL THERAPY INITIAL EVALUATION ADULT - PERTINENT HX OF CURRENT PROBLEM, REHAB EVAL
Pt is a 77 year old female presenting with spontaneous left large PTX. Left pigtail catheter placed on 4/4 w/ re-expansion of left lung. PMH listed below.

## 2023-04-06 NOTE — PHYSICAL THERAPY INITIAL EVALUATION ADULT - ADDITIONAL COMMENTS
Pt lives in a condominium with 14 steps to negotiate (7 steps x 2 with platform between). Pt lives with spouse and is primary caregiver for spouse. At baseline, pt ambulates independently. Uses walking sticks in the community.

## 2023-04-06 NOTE — PHYSICAL THERAPY INITIAL EVALUATION ADULT - PATIENT PROFILE REVIEW, REHAB EVAL
PT evaluate and treat orders received: no formal activity orders. Consult with SHERITA Amor, pt may participate in PT evaluation./yes

## 2023-04-06 NOTE — PHYSICAL THERAPY INITIAL EVALUATION ADULT - IMPAIRMENTS CONTRIBUTING TO GAIT DEVIATIONS, PT EVAL
endurance decreased; SpO2 monitored: 88% with activity on 2L, improved to >90% within 30s rest./impaired balance

## 2023-04-07 ENCOUNTER — TRANSCRIPTION ENCOUNTER (OUTPATIENT)
Age: 78
End: 2023-04-07

## 2023-04-07 VITALS
RESPIRATION RATE: 18 BRPM | TEMPERATURE: 98 F | OXYGEN SATURATION: 94 % | HEART RATE: 57 BPM | SYSTOLIC BLOOD PRESSURE: 99 MMHG | DIASTOLIC BLOOD PRESSURE: 47 MMHG

## 2023-04-07 LAB
ANION GAP SERPL CALC-SCNC: 2 MMOL/L — LOW (ref 5–17)
BUN SERPL-MCNC: 20 MG/DL — SIGNIFICANT CHANGE UP (ref 7–23)
CALCIUM SERPL-MCNC: 8.8 MG/DL — SIGNIFICANT CHANGE UP (ref 8.5–10.1)
CHLORIDE SERPL-SCNC: 108 MMOL/L — SIGNIFICANT CHANGE UP (ref 96–108)
CO2 SERPL-SCNC: 27 MMOL/L — SIGNIFICANT CHANGE UP (ref 22–31)
CREAT SERPL-MCNC: 0.68 MG/DL — SIGNIFICANT CHANGE UP (ref 0.5–1.3)
EGFR: 89 ML/MIN/1.73M2 — SIGNIFICANT CHANGE UP
GLUCOSE BLDC GLUCOMTR-MCNC: 114 MG/DL — HIGH (ref 70–99)
GLUCOSE BLDC GLUCOMTR-MCNC: 118 MG/DL — HIGH (ref 70–99)
GLUCOSE SERPL-MCNC: 125 MG/DL — HIGH (ref 70–99)
HCT VFR BLD CALC: 37.8 % — SIGNIFICANT CHANGE UP (ref 34.5–45)
HGB BLD-MCNC: 12.3 G/DL — SIGNIFICANT CHANGE UP (ref 11.5–15.5)
MAGNESIUM SERPL-MCNC: 1.9 MG/DL — SIGNIFICANT CHANGE UP (ref 1.6–2.6)
MCHC RBC-ENTMCNC: 28.4 PG — SIGNIFICANT CHANGE UP (ref 27–34)
MCHC RBC-ENTMCNC: 32.5 GM/DL — SIGNIFICANT CHANGE UP (ref 32–36)
MCV RBC AUTO: 87.3 FL — SIGNIFICANT CHANGE UP (ref 80–100)
PHOSPHATE SERPL-MCNC: 3.1 MG/DL — SIGNIFICANT CHANGE UP (ref 2.5–4.5)
PLATELET # BLD AUTO: 248 K/UL — SIGNIFICANT CHANGE UP (ref 150–400)
POTASSIUM SERPL-MCNC: 3.9 MMOL/L — SIGNIFICANT CHANGE UP (ref 3.5–5.3)
POTASSIUM SERPL-SCNC: 3.9 MMOL/L — SIGNIFICANT CHANGE UP (ref 3.5–5.3)
RBC # BLD: 4.33 M/UL — SIGNIFICANT CHANGE UP (ref 3.8–5.2)
RBC # FLD: 14 % — SIGNIFICANT CHANGE UP (ref 10.3–14.5)
SODIUM SERPL-SCNC: 137 MMOL/L — SIGNIFICANT CHANGE UP (ref 135–145)
WBC # BLD: 8.08 K/UL — SIGNIFICANT CHANGE UP (ref 3.8–10.5)
WBC # FLD AUTO: 8.08 K/UL — SIGNIFICANT CHANGE UP (ref 3.8–10.5)

## 2023-04-07 PROCEDURE — 99232 SBSQ HOSP IP/OBS MODERATE 35: CPT

## 2023-04-07 PROCEDURE — 71045 X-RAY EXAM CHEST 1 VIEW: CPT | Mod: 26,76

## 2023-04-07 PROCEDURE — 71045 X-RAY EXAM CHEST 1 VIEW: CPT | Mod: 26,77

## 2023-04-07 PROCEDURE — 99231 SBSQ HOSP IP/OBS SF/LOW 25: CPT

## 2023-04-07 RX ORDER — GABAPENTIN 400 MG/1
1 CAPSULE ORAL
Qty: 0 | Refills: 0 | DISCHARGE
Start: 2023-04-07

## 2023-04-07 RX ORDER — ACETAMINOPHEN 500 MG
2 TABLET ORAL
Qty: 0 | Refills: 0 | DISCHARGE
Start: 2023-04-07

## 2023-04-07 RX ORDER — OXYCODONE AND ACETAMINOPHEN 5; 325 MG/1; MG/1
1 TABLET ORAL
Qty: 1 | Refills: 0
Start: 2023-04-07

## 2023-04-07 RX ORDER — GABAPENTIN 400 MG/1
1 CAPSULE ORAL
Refills: 0 | DISCHARGE

## 2023-04-07 RX ADMIN — GABAPENTIN 300 MILLIGRAM(S): 400 CAPSULE ORAL at 09:39

## 2023-04-07 RX ADMIN — PANTOPRAZOLE SODIUM 40 MILLIGRAM(S): 20 TABLET, DELAYED RELEASE ORAL at 09:39

## 2023-04-07 RX ADMIN — LIDOCAINE 1 PATCH: 4 CREAM TOPICAL at 09:40

## 2023-04-07 RX ADMIN — TIOTROPIUM BROMIDE 2 PUFF(S): 18 CAPSULE ORAL; RESPIRATORY (INHALATION) at 09:38

## 2023-04-07 RX ADMIN — BUDESONIDE AND FORMOTEROL FUMARATE DIHYDRATE 2 PUFF(S): 160; 4.5 AEROSOL RESPIRATORY (INHALATION) at 09:37

## 2023-04-07 RX ADMIN — Medication 137 MICROGRAM(S): at 05:44

## 2023-04-07 RX ADMIN — Medication 40 MILLIGRAM(S): at 05:44

## 2023-04-07 NOTE — DISCHARGE NOTE PROVIDER - NSDCMRMEDTOKEN_GEN_ALL_CORE_FT
acetaminophen 325 mg oral tablet: 2 tab(s) orally every 6 hours As needed Mild Pain (1 - 3)  budesonide-formoterol 160 mcg-4.5 mcg/inh inhalation aerosol: 2 puff(s) inhaled 2 times a day   furosemide 40 mg oral tablet: 1 tab(s) orally once a day  gabapentin 300 mg oral capsule: 1 cap(s) orally 2 times a day  levothyroxine 137 mcg (0.137 mg) oral tablet: 1 tab(s) orally once a day  Ozempic 2 mg/1.5 mL (0.25 mg or 0.5 mg dose) subcutaneous solution: 0.5 milligram(s) subcutaneously once a week on Wednesday  pantoprazole 40 mg oral delayed release tablet: 1 tab(s) orally once a day  Synjardy XR 10 mg-1000 mg oral tablet, extended release: 1 tab(s) orally once a day  tiotropium 18 mcg inhalation capsule: 1 cap(s) inhaled once a day

## 2023-04-07 NOTE — PROGRESS NOTE ADULT - SUBJECTIVE AND OBJECTIVE BOX
Subjective:  awake and alert  no respiratory distress    MEDICATIONS  (STANDING):  budesonide 160 MICROgram(s)/formoterol 4.5 MICROgram(s) Inhaler 2 Puff(s) Inhalation two times a day  dextrose 5%. 1000 milliLiter(s) (50 mL/Hr) IV Continuous <Continuous>  dextrose 5%. 1000 milliLiter(s) (100 mL/Hr) IV Continuous <Continuous>  dextrose 50% Injectable 25 Gram(s) IV Push once  dextrose 50% Injectable 12.5 Gram(s) IV Push once  dextrose 50% Injectable 25 Gram(s) IV Push once  furosemide    Tablet 40 milliGRAM(s) Oral daily  gabapentin 300 milliGRAM(s) Oral two times a day  glucagon  Injectable 1 milliGRAM(s) IntraMuscular once  insulin lispro (ADMELOG) corrective regimen sliding scale   SubCutaneous three times a day before meals  levothyroxine 137 MICROGram(s) Oral daily  lidocaine   4% Patch 1 Patch Transdermal daily  pantoprazole    Tablet 40 milliGRAM(s) Oral daily  senna 2 Tablet(s) Oral at bedtime  tiotropium 2.5 MICROgram(s) Inhaler 2 Puff(s) Inhalation daily    MEDICATIONS  (PRN):  acetaminophen     Tablet .. 650 milliGRAM(s) Oral every 6 hours PRN Mild Pain (1 - 3)  dextrose Oral Gel 15 Gram(s) Oral once PRN Blood Glucose LESS THAN 70 milliGRAM(s)/deciliter  oxycodone    5 mG/acetaminophen 325 mG 2 Tablet(s) Oral every 6 hours PRN Severe Pain (7 - 10)  oxycodone    5 mG/acetaminophen 325 mG 1 Tablet(s) Oral every 4 hours PRN Moderate Pain (4 - 6)  polyethylene glycol 3350 17 Gram(s) Oral every 12 hours PRN Constipation      Allergies    Breo Ellipta (Rash)    Intolerances        REVIEW OF SYSTEMS:    CONSTITUTIONAL:  As per HPI.  HEENT:  Eyes:  No diplopia or blurred vision. ENT:  No earache, sore throat or runny nose.  CARDIOVASCULAR:  No pressure, squeezing, tightness, heaviness or aching about the chest, neck, axilla or epigastrium.  RESPIRATORY:  No cough, shortness of breath, PND or orthopnea.  GASTROINTESTINAL:  No nausea, vomiting or diarrhea.  GENITOURINARY:  No dysuria, frequency or urgency.  MUSCULOSKELETAL:  no joint pain, deformity, tenderness  EXTREMITIES: no clubbing cyanosis,edema  SKIN:  No change in skin, hair or nails.  NEUROLOGIC:  No paresthesias, fasciculations, seizures or weakness.  PSYCHIATRIC:  No disorder of thought or mood.  ENDOCRINE:  No heat or cold intolerance, polyuria or polydipsia.  HEMATOLOGICAL:  No easy bruising or bleedings:    Vital Signs Last 24 Hrs  T(C): 37.2 (2023 07:54), Max: 37.2 (2023 07:54)  T(F): 99 (2023 07:54), Max: 99 (2023 07:54)  HR: 63 (2023 07:54) (63 - 87)  BP: 124/48 (2023 07:54) (116/58 - 124/48)  BP(mean): --  RR: 19 (2023 07:54) (18 - 19)  SpO2: 96% (2023 07:54) (95% - 96%)    Parameters below as of 2023 07:54  Patient On (Oxygen Delivery Method): nasal cannula  O2 Flow (L/min): 2      PHYSICAL EXAMINATION:  SKIN: no rashes  HEAD: NC/AT  EYES: PERRLA, EOMI  EARS: TM's intact  NOSE: no abnormalities  NECK:  Supple. No lymphadenopathy. Jugular venous pressure not elevated. Carotids equal.   HEART:   S1S2 reg  CHEST:  scattered ronchi L>R; left chest tube no leak  ABDOMEN:  Soft and nontender.   EXTREMITIES:  no C/C/E  NEURO: AAO x 3, no focal deficts       LABS:                        14.7   10.96 )-----------( 299      ( 2023 10:33 )             45.1     04-04    136  |  106  |  18  ----------------------------<  144<H>  4.8   |  27  |  0.85    Ca    9.4      2023 10:33  Mg     2.0     04-05    TPro  8.6<H>  /  Alb  3.6  /  TBili  0.5  /  DBili  x   /  AST  44<H>  /  ALT  26  /  AlkPhos  114  04-04    PT/INR - ( 2023 11:52 )   PT: 12.3 sec;   INR: 1.06 ratio         PTT - ( 2023 11:52 )  PTT:30.4 sec  Urinalysis Basic - ( 2023 06:00 )    Color: Yellow / Appearance: Slightly Turbid / S.020 / pH: x  Gluc: x / Ketone: Trace  / Bili: Negative / Urobili: Negative   Blood: x / Protein: Trace mg/dL / Nitrite: Positive   Leuk Esterase: Moderate / RBC: Negative /HPF / WBC >50 /HPF   Sq Epi: x / Non Sq Epi: x / Bacteria: TNTC        RADIOLOGY & ADDITIONAL TESTS:    
  Subjective: Pt in bed NAD .  Pt on 2 L NC, but desats on RA to 88%. Pigtail flushed today     T(C): 36.8 (04-05-23 @ 07:30), Max: 37.6 (04-04-23 @ 20:05)  HR: 57 (04-05-23 @ 07:30) (57 - 97)  BP: 116/62 (04-05-23 @ 07:30) (116/62 - 169/104)  ABP: --  ABP(mean): --  RR: 18 (04-05-23 @ 07:30) (18 - 28)  SpO2: 93% (04-05-23 @ 07:30) (80% - 96%) 2 L NC   Wt(kg): --  CVP(mm Hg): --  CO: --  CI: --  PA: --                                              Tele: SR     CHEST TUBE:  25cc                             OUTPUT:     per 24 hours    AIR LEAKS:  [ ] YES [ x] NO          04-04    136  |  106  |  18  ----------------------------<  144<H>  4.8   |  27  |  0.85    Ca    9.4      04 Apr 2023 10:33  Mg     2.0     04-05    TPro  8.6<H>  /  Alb  3.6  /  TBili  0.5  /  DBili  x   /  AST  44<H>  /  ALT  26  /  AlkPhos  114  04-04                               14.7   10.96 )-----------( 299      ( 04 Apr 2023 10:33 )             45.1        PT/INR - ( 04 Apr 2023 11:52 )   PT: 12.3 sec;   INR: 1.06 ratio         PTT - ( 04 Apr 2023 11:52 )  PTT:30.4 sec         CAPILLARY BLOOD GLUCOSE      POCT Blood Glucose.: 156 mg/dL (05 Apr 2023 07:37)  POCT Blood Glucose.: 103 mg/dL (04 Apr 2023 18:38)           CXR: < from: Xray Chest 1 View- PORTABLE-Urgent (Xray Chest 1 View- PORTABLE-Urgent .) (04.04.23 @ 11:51) >  Heart magnified by technique.    A catheter left chest tube is been inserted. The large left pneumothorax   seen earlier has almost totally reexpanded with possible slight apical   residual.    There are some small atelectatic areas in the lower lung fields at this   time.    IMPRESSION: Marked improvement in left pneumothorax after insertion of   catheter chest tube.    < end of copied text >          Exam   Neuro:  Alert Awake NAD  Pulm: decreased at bases b/l, clear  + left Pigtail   CV: RRR S1 S2   Abd:  soft , NT ND   Extremities: warm b/l         Assessment:  77yFemale    with PAST MEDICAL & SURGICAL HISTORY:  Hypothyroidism      Diabetes mellitus  diet controlled      Back pain      Asthma      Elevated pancreatic enzyme  patient reports elevated lab 6/2013. Gastroenterologist aware      History of cataract  extracted from right and left      Varicose veins of both lower extremities  surgery      Diverticulosis of intestine without bleeding, unspecified intestinal tract location      Gall stones  gall bladder removed      Urinary urgency      Spinal stenosis of lumbar region      DDD (degenerative disc disease), lumbar      Basal cell carcinoma  removed from face      Pulmonary emphysema, unspecified emphysema type  2017 last exacerbation; never intubated      OA (osteoarthritis)      Frequent UTI  2-3x/year; had an episode 5 weeks ago was treated denies dysuria      GERD (gastroesophageal reflux disease)      Leg swelling      Emphysema lung      COVID-19 vaccine series completed  Moderna - 2nd dose 02/20/2021      Knee pain, left      Scar tissue  left knee S/P TKR      COPD exacerbation      S/P bladder repair  bladder lift 2/2013      Dental disorder  Dental surgery 12/2012, patient reports that upper portion is glued in at this time, plan is to have dental implants placed.      S/P knee surgery  arthroscopy bilateral 2007 and 2010      Skin cancer  to right temple 2012, removed, history of basal cell x 2      H/O lumbosacral spine surgery  fusion 2017      H/O umbilical hernia repair  5/24/19      S/P cholecystectomy  2012      S/P colonoscopy  benign polyp      S/P knee replacement  right 2017, left 11/2019              
Subjective:  awake and alert  no distress    MEDICATIONS  (STANDING):  budesonide 160 MICROgram(s)/formoterol 4.5 MICROgram(s) Inhaler 2 Puff(s) Inhalation two times a day  furosemide    Tablet 40 milliGRAM(s) Oral daily  gabapentin 300 milliGRAM(s) Oral two times a day  insulin lispro (ADMELOG) corrective regimen sliding scale   SubCutaneous three times a day before meals  levothyroxine 137 MICROGram(s) Oral daily  lidocaine   4% Patch 1 Patch Transdermal daily  pantoprazole    Tablet 40 milliGRAM(s) Oral daily  senna 2 Tablet(s) Oral at bedtime  tiotropium 2.5 MICROgram(s) Inhaler 2 Puff(s) Inhalation daily    MEDICATIONS  (PRN):  acetaminophen     Tablet .. 650 milliGRAM(s) Oral every 6 hours PRN Mild Pain (1 - 3)  oxycodone    5 mG/acetaminophen 325 mG 2 Tablet(s) Oral every 6 hours PRN Severe Pain (7 - 10)  oxycodone    5 mG/acetaminophen 325 mG 1 Tablet(s) Oral every 4 hours PRN Moderate Pain (4 - 6)  polyethylene glycol 3350 17 Gram(s) Oral every 12 hours PRN Constipation      Allergies    Breo Ellipta (Rash)    Intolerances        REVIEW OF SYSTEMS:    CONSTITUTIONAL:  As per HPI.  HEENT:  Eyes:  No diplopia or blurred vision. ENT:  No earache, sore throat or runny nose.  CARDIOVASCULAR:  No pressure, squeezing, tightness, heaviness or aching about the chest, neck, axilla or epigastrium.  RESPIRATORY:  No cough, shortness of breath, PND or orthopnea.  GASTROINTESTINAL:  No nausea, vomiting or diarrhea.  GENITOURINARY:  No dysuria, frequency or urgency.  MUSCULOSKELETAL:  no joint pain, deformity, tenderness  EXTREMITIES: no clubbing cyanosis,edema  SKIN:  No change in skin, hair or nails.  NEUROLOGIC:  No paresthesias, fasciculations, seizures or weakness.  PSYCHIATRIC:  No disorder of thought or mood.  ENDOCRINE:  No heat or cold intolerance, polyuria or polydipsia.  HEMATOLOGICAL:  No easy bruising or bleedings:    Vital Signs Last 24 Hrs  T(C): 36.9 (06 Apr 2023 20:30), Max: 37.2 (06 Apr 2023 07:54)  T(F): 98.5 (06 Apr 2023 20:30), Max: 99 (06 Apr 2023 07:54)  HR: 69 (07 Apr 2023 05:43) (63 - 76)  BP: 105/54 (07 Apr 2023 05:43) (105/54 - 124/48)  BP(mean): --  RR: 19 (06 Apr 2023 20:30) (19 - 19)  SpO2: 94% (06 Apr 2023 20:37) (93% - 96%)    Parameters below as of 06 Apr 2023 20:37  Patient On (Oxygen Delivery Method): nasal cannula,2L        PHYSICAL EXAMINATION:  SKIN: no rashes  HEAD: NC/AT  EYES: PERRLA, EOMI  NECK:  Supple. No lymphadenopathy. Jugular venous pressure not elevated. Carotids equal.   HEART:  S1S2 reg  CHEST:  bilat ronchi; good air entry bilat; chest tube no leak  ABDOMEN:  Soft and nontender.   EXTREMITIES:  no C/C/E  NEURO: AAO x 3, no focal deficts       LABS:                        12.3   8.08  )-----------( 248      ( 07 Apr 2023 06:22 )             37.8     04-07    137  |  108  |  20  ----------------------------<  125<H>  3.9   |  27  |  0.68    Ca    8.8      07 Apr 2023 06:22  Phos  3.1     04-07  Mg     1.9     04-07            RADIOLOGY & ADDITIONAL TESTS:    
SUBJECTIVE   "what do you think maybe"   without acute complaints as this time   currently without pain  concerned about plan - extensive conversation reguarding plan     INTERIM HISTORY SIGNIFICANT FOR   extensive conversation with daughter and patient reguarding current plan   currently tolerating waterseal trial - pending cxr     Patient is a 77y old  Female who presents with a chief complaint of PTX left (06 Apr 2023 08:01)    HPI:  76 yo Female with h/o 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 Pt states developed acute chest pain on left side associated w difficulty breathing this AM after showering.  (04 Apr 2023 12:18)    OBJECTIVE  PAST MEDICAL & SURGICAL HISTORY:  Hypothyroidism      Diabetes mellitus  diet controlled      Back pain      Asthma      Elevated pancreatic enzyme  patient reports elevated lab 6/2013. Gastroenterologist aware      History of cataract  extracted from right and left      Varicose veins of both lower extremities  surgery      Diverticulosis of intestine without bleeding, unspecified intestinal tract location      Gall stones  gall bladder removed      Urinary urgency      Spinal stenosis of lumbar region      DDD (degenerative disc disease), lumbar      Basal cell carcinoma  removed from face      Pulmonary emphysema, unspecified emphysema type  2017 last exacerbation; never intubated      OA (osteoarthritis)      Frequent UTI  2-3x/year; had an episode 5 weeks ago was treated denies dysuria      GERD (gastroesophageal reflux disease)      Leg swelling      Emphysema lung      COVID-19 vaccine series completed  Moderna - 2nd dose 02/20/2021      Knee pain, left      Scar tissue  left knee S/P TKR      COPD exacerbation      S/P bladder repair  bladder lift 2/2013      Dental disorder  Dental surgery 12/2012, patient reports that upper portion is glued in at this time, plan is to have dental implants placed.      S/P knee surgery  arthroscopy bilateral 2007 and 2010      Skin cancer  to right temple 2012, removed, history of basal cell x 2      H/O lumbosacral spine surgery  fusion 2017      H/O umbilical hernia repair  5/24/19      S/P cholecystectomy  2012      S/P colonoscopy  benign polyp      S/P knee replacement  right 2017, left 11/2019        Breo Ellipta (Rash)    Home Medications:  furosemide 40 mg oral tablet: 1 tab(s) orally once a day (04 Apr 2023 13:06)  gabapentin 300 mg oral capsule: 1 cap(s) orally 2-3 times a day (04 Apr 2023 13:07)  levothyroxine 137 mcg (0.137 mg) oral tablet: 1 tab(s) orally once a day (04 Apr 2023 13:06)  Ozempic 2 mg/1.5 mL (0.25 mg or 0.5 mg dose) subcutaneous solution: 0.5 milligram(s) subcutaneously once a week on Wednesday (04 Apr 2023 13:06)  pantoprazole 40 mg oral delayed release tablet: 1 tab(s) orally once a day (04 Apr 2023 13:07)  Synjardy XR 10 mg-1000 mg oral tablet, extended release: 1 tab(s) orally once a day (04 Apr 2023 13:06)  tiotropium 18 mcg inhalation capsule: 1 cap(s) inhaled once a day (04 Apr 2023 13:06)    VITALS  Currently in sinus rhythm with vitals as below  ICU Vital Signs Last 24 Hrs  T(C): 37.2 (06 Apr 2023 07:54), Max: 37.2 (06 Apr 2023 07:54)  T(F): 99 (06 Apr 2023 07:54), Max: 99 (06 Apr 2023 07:54)  HR: 75 (06 Apr 2023 09:14) (63 - 87)  BP: 124/48 (06 Apr 2023 07:54) (116/58 - 124/48)  BP(mean): --  ABP: --  ABP(mean): --  RR: 19 (06 Apr 2023 07:54) (18 - 19)  SpO2: 93% (06 Apr 2023 09:14) (93% - 96%)    O2 Parameters below as of 06 Apr 2023 09:14  Patient On (Oxygen Delivery Method): nasal cannula,2 L          Adult Advanced Hemodynamics Last 24 Hrs  CVP(mm Hg): --  CVP(cm H2O): --  CO: --  CI: --  PA: --  PA(mean): --  PCWP: --  SVR: --  SVRI: --  PVR: --  PVRI: --  LABS    Mg     2.0     04-05    CAPILLARY BLOOD GLUCOSE      POCT Blood Glucose.: 141 mg/dL (06 Apr 2023 11:44)          IN/OUT    04-05-23 @ 07:01  -  04-06-23 @ 07:00  --------------------------------------------------------  IN: 0 mL / OUT: 90 mL / NET: -90 mL      IMAGING  personally reviewed imaging     CURRENT MEDICATIONS  MEDICATIONS  (STANDING):  budesonide 160 MICROgram(s)/formoterol 4.5 MICROgram(s) Inhaler 2 Puff(s) Inhalation two times a day  furosemide    Tablet 40 milliGRAM(s) Oral daily  gabapentin 300 milliGRAM(s) Oral two times a day  insulin lispro (ADMELOG) corrective regimen sliding scale   SubCutaneous three times a day before meals  levothyroxine 137 MICROGram(s) Oral daily  lidocaine   4% Patch 1 Patch Transdermal daily  pantoprazole    Tablet 40 milliGRAM(s) Oral daily  senna 2 Tablet(s) Oral at bedtime  tiotropium 2.5 MICROgram(s) Inhaler 2 Puff(s) Inhalation daily    MEDICATIONS  (PRN):  acetaminophen     Tablet .. 650 milliGRAM(s) Oral every 6 hours PRN Mild Pain (1 - 3)  oxycodone    5 mG/acetaminophen 325 mG 2 Tablet(s) Oral every 6 hours PRN Severe Pain (7 - 10)  oxycodone    5 mG/acetaminophen 325 mG 1 Tablet(s) Oral every 4 hours PRN Moderate Pain (4 - 6)  polyethylene glycol 3350 17 Gram(s) Oral every 12 hours PRN Constipation

## 2023-04-07 NOTE — DISCHARGE NOTE NURSING/CASE MANAGEMENT/SOCIAL WORK - PATIENT PORTAL LINK FT
You can access the FollowMyHealth Patient Portal offered by [facility] by registering at the following website: [email or web address]. By joining [facility]’s FollowMyHealth portal, you will also be able to view your health information using other applications (apps) compatible with our system.

## 2023-04-07 NOTE — DISCHARGE NOTE PROVIDER - PROVIDER TOKENS
Problem: Acute Care of the Febrile Neutropenia Patient  Goal: Optimal Outcome for the Febrile Neutropenia Patient  Intervention: Vital Signs Q 4 hours, oral temps only  Q4 VS and oral temps only. Low grade temp early am. None thus far today   Intervention: Initiate Neutropenic Precautions for patients with an absolute neutrophil count less than 1000. (WBC x Neutrophils + bands)  Neutropenic precautions in place  Intervention: Encourage frequent oral care (change saline bottle every 24 hours)  Oral care encouraged           PROVIDER:[TOKEN:[[id number]]]

## 2023-04-07 NOTE — DISCHARGE NOTE PROVIDER - NSDCPNSUBOBJ_GEN_ALL_CORE
Subjective:  Pt in bed NAD no issues overnight . noted small PTX post CT removal . Pt stable     T(C): 36.7 (04-07-23 @ 08:09), Max: 36.9 (04-06-23 @ 20:30)  HR: 57 (04-07-23 @ 08:09) (57 - 76)  BP: 99/47 (04-07-23 @ 08:09) (99/47 - 112/46)  ABP: --  ABP(mean): --  RR: 18 (04-07-23 @ 08:09) (18 - 19)  SpO2: 94% (04-07-23 @ 08:09) (94% - 94%)  Wt(kg): --  CVP(mm Hg): --  CO: --  CI: --  PA: --                                              Tele: SR         04-07    137  |  108  |  20  ----------------------------<  125<H>  3.9   |  27  |  0.68    Ca    8.8      07 Apr 2023 06:22  Phos  3.1     04-07  Mg     1.9     04-07                                 12.3   8.08  )-----------( 248      ( 07 Apr 2023 06:22 )             37.8                 CAPILLARY BLOOD GLUCOSE      POCT Blood Glucose.: 114 mg/dL (07 Apr 2023 11:46)  POCT Blood Glucose.: 118 mg/dL (07 Apr 2023 07:56)  POCT Blood Glucose.: 131 mg/dL (06 Apr 2023 16:41)           CXR: < from: Xray Chest 1 View- PORTABLE-Urgent (Xray Chest 1 View- PORTABLE-Urgent .) (04.06.23 @ 14:41) >      Left pleural pigtail catheter, not significantly changed in position.  Once again, no definite pneumothorax however limited evaluation due to   large left apical bulla.  New right lower lung linear atelectasis.  Other findings, not significantly changed.      IMPRESSION:  Left pleural pigtail catheter, not significantly changed in   position on the most recent image.    No definite pneumothorax, however limited evaluation due to a large left   apical bulla.    New right lower lung linear atelectasis on the most recent image.   Continued left lower lung linear atelectasis.    < end of copied text >    Exam   Neuro:  Alert Awake NAD  Pulm: decreased at bases b/l, clear  + left Pigtail   CV: RRR S1 S2   Abd:  soft , NT ND   Extremities: warm b/l                 Assessment:  77yFemale    with PAST MEDICAL & SURGICAL HISTORY:  Hypothyroidism      Diabetes mellitus  diet controlled      Back pain      Asthma      Elevated pancreatic enzyme  patient reports elevated lab 6/2013. Gastroenterologist aware      History of cataract  extracted from right and left      Varicose veins of both lower extremities  surgery      Diverticulosis of intestine without bleeding, unspecified intestinal tract location      Gall stones  gall bladder removed      Urinary urgency      Spinal stenosis of lumbar region      DDD (degenerative disc disease), lumbar      Basal cell carcinoma  removed from face      Pulmonary emphysema, unspecified emphysema type  2017 last exacerbation; never intubated      OA (osteoarthritis)      Frequent UTI  2-3x/year; had an episode 5 weeks ago was treated denies dysuria      GERD (gastroesophageal reflux disease)      Leg swelling      Emphysema lung      COVID-19 vaccine series completed  Moderna - 2nd dose 02/20/2021      Knee pain, left      Scar tissue  left knee S/P TKR      COPD exacerbation      S/P bladder repair  bladder lift 2/2013      Dental disorder  Dental surgery 12/2012, patient reports that upper portion is glued in at this time, plan is to have dental implants placed.      S/P knee surgery  arthroscopy bilateral 2007 and 2010      Skin cancer  to right temple 2012, removed, history of basal cell x 2      H/O lumbosacral spine surgery  fusion 2017      H/O umbilical hernia repair  5/24/19      S/P cholecystectomy  2012      S/P colonoscopy  benign polyp      S/P knee replacement  right 2017, left 11/2019      78 yo Female with h/o 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 4/4 Left pigtail catheter placed w re-expansion of left lung    Plan   Pigtail d/c- small apical PTX- awaiting another CXR   Plan to d/c home today if CXR looks good   Lidoderm patch and pain control  pulmonary consultation appreciated - Dr Noemí mijares pain mgmt  [facility], [place]   Pt to follow up with Dr Herbert in 2 weeks post discharge   Remove dressing tomorrow and shower   [place] Thoracic team in am rounds

## 2023-04-07 NOTE — DISCHARGE NOTE PROVIDER - NSDCFUSCHEDAPPT_GEN_ALL_CORE_FT
James Dowd  [facility]  [facility] [address]  Scheduled Appointment: 06/08/2023

## 2023-04-07 NOTE — DISCHARGE NOTE PROVIDER - NSDCCPCAREPLAN_GEN_ALL_CORE_FT
PRINCIPAL DISCHARGE DIAGNOSIS  Diagnosis: Spontaneous pneumothorax  Assessment and Plan of Treatment:

## 2023-04-07 NOTE — DISCHARGE NOTE PROVIDER - NSDCFUADDAPPT_GEN_ALL_CORE_FT
Leave dressing intact until tomorrow evening, reinforcing with tape if necessary (about 36 hours from chest tube removal). At that time you may remove the dressing and take a shower. Place clean gauze over wound if continual drainage. Call Dr. Herbert's office at [phone] tomorrow or the next business day to make a followup appointment. Call the office if you experience any fevers, shortness of breath, chest pain or excessive drainage from the incision, day or night. Go to the emergency room if any of these symptoms are severe. Take your medications as ordered and take a stool softener if needed with the narcotic medications.

## 2023-04-07 NOTE — DISCHARGE NOTE PROVIDER - CARE PROVIDER_API CALL
Pk Herbert)  Surgery  [address] [place] [address]  Phone: [phone]  Fax: [phone]  Follow Up Time:

## 2023-04-07 NOTE — PROGRESS NOTE ADULT - ASSESSMENT
- chest tube on water seal  - will repeat cxr in 4 hours as per thoracic  - if no recurrent PTX patient has opted for discharge instead of surgery  - d/w thoracic surgery  - will f/u as outpatient
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 4/4 Left pigtail catheter placed w re-expansion of left lung    Plan   Maintain pigtail to suction today   Lidoderm patch and pain control  pulmonary consultation appreciated - Dr Noemí mijares pain mgmt  [place]   [place] Thoracic team in am rounds 
- cont O2  - on symbicort/spiriva  - has spontaneous left ptx from large bullae  - had previous hx recurrent right ptx from bullous disease s/p talc pleurodesis 7/22  - thoracic f/u noted  - will probably need left pleurodesis; ? bullectomy  - dvt proph
76 yo Female with h/o 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 4/4 Left pigtail catheter placed w re-expansion of left lung    Plan   Maintain pigtail to waterseal - pending cxr at this time   possible clamp trial thereafter   possible d.c in AM if able to d/c chest tube vs OR monday     Lidoderm patch and pain control  pulmonary consultation appreciated - Dr Dowd   cont pain mgmt  [facility], [place]   [place] Thoracic team in am rounds

## 2023-04-07 NOTE — DISCHARGE NOTE PROVIDER - HOSPITAL COURSE
76 yo Female with h/o 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 4/4 Left pigtail catheter placed w re-expansion of left lung

## 2023-04-08 ENCOUNTER — INPATIENT (INPATIENT)
Facility: HOSPITAL | Age: 78
LOS: 8 days | Discharge: HOME HEALTH | DRG: 163 | End: 2023-04-17
Attending: SURGERY | Admitting: SURGERY
Payer: MEDICARE

## 2023-04-08 VITALS
OXYGEN SATURATION: 73 % | TEMPERATURE: 99 F | HEART RATE: 130 BPM | DIASTOLIC BLOOD PRESSURE: 126 MMHG | RESPIRATION RATE: 30 BRPM | HEIGHT: 65 IN | SYSTOLIC BLOOD PRESSURE: 171 MMHG | WEIGHT: 190.04 LBS

## 2023-04-08 DIAGNOSIS — Z96.651 PRESENCE OF RIGHT ARTIFICIAL KNEE JOINT: ICD-10-CM

## 2023-04-08 DIAGNOSIS — N39.0 URINARY TRACT INFECTION, SITE NOT SPECIFIED: ICD-10-CM

## 2023-04-08 DIAGNOSIS — Z98.890 OTHER SPECIFIED POSTPROCEDURAL STATES: Chronic | ICD-10-CM

## 2023-04-08 DIAGNOSIS — Z98.1 ARTHRODESIS STATUS: ICD-10-CM

## 2023-04-08 DIAGNOSIS — Z87.891 PERSONAL HISTORY OF NICOTINE DEPENDENCE: ICD-10-CM

## 2023-04-08 DIAGNOSIS — Z79.890 HORMONE REPLACEMENT THERAPY: ICD-10-CM

## 2023-04-08 DIAGNOSIS — Z85.828 PERSONAL HISTORY OF OTHER MALIGNANT NEOPLASM OF SKIN: ICD-10-CM

## 2023-04-08 DIAGNOSIS — K57.90 DIVERTICULOSIS OF INTESTINE, PART UNSPECIFIED, WITHOUT PERFORATION OR ABSCESS WITHOUT BLEEDING: ICD-10-CM

## 2023-04-08 DIAGNOSIS — J96.11 CHRONIC RESPIRATORY FAILURE WITH HYPOXIA: ICD-10-CM

## 2023-04-08 DIAGNOSIS — J90 PLEURAL EFFUSION, NOT ELSEWHERE CLASSIFIED: ICD-10-CM

## 2023-04-08 DIAGNOSIS — J93.9 PNEUMOTHORAX, UNSPECIFIED: ICD-10-CM

## 2023-04-08 DIAGNOSIS — Z98.41 CATARACT EXTRACTION STATUS, RIGHT EYE: ICD-10-CM

## 2023-04-08 DIAGNOSIS — E03.9 HYPOTHYROIDISM, UNSPECIFIED: ICD-10-CM

## 2023-04-08 DIAGNOSIS — Z90.49 ACQUIRED ABSENCE OF OTHER SPECIFIED PARTS OF DIGESTIVE TRACT: ICD-10-CM

## 2023-04-08 DIAGNOSIS — B96.20 UNSPECIFIED ESCHERICHIA COLI [E. COLI] AS THE CAUSE OF DISEASES CLASSIFIED ELSEWHERE: ICD-10-CM

## 2023-04-08 DIAGNOSIS — J43.8 OTHER EMPHYSEMA: ICD-10-CM

## 2023-04-08 DIAGNOSIS — Z79.84 LONG TERM (CURRENT) USE OF ORAL HYPOGLYCEMIC DRUGS: ICD-10-CM

## 2023-04-08 DIAGNOSIS — K21.9 GASTRO-ESOPHAGEAL REFLUX DISEASE WITHOUT ESOPHAGITIS: ICD-10-CM

## 2023-04-08 DIAGNOSIS — J93.83 OTHER PNEUMOTHORAX: ICD-10-CM

## 2023-04-08 DIAGNOSIS — Z99.81 DEPENDENCE ON SUPPLEMENTAL OXYGEN: ICD-10-CM

## 2023-04-08 DIAGNOSIS — Z90.49 ACQUIRED ABSENCE OF OTHER SPECIFIED PARTS OF DIGESTIVE TRACT: Chronic | ICD-10-CM

## 2023-04-08 DIAGNOSIS — Z98.42 CATARACT EXTRACTION STATUS, LEFT EYE: ICD-10-CM

## 2023-04-08 DIAGNOSIS — J96.00 ACUTE RESPIRATORY FAILURE, UNSPECIFIED WHETHER WITH HYPOXIA OR HYPERCAPNIA: ICD-10-CM

## 2023-04-08 DIAGNOSIS — M51.36 OTHER INTERVERTEBRAL DISC DEGENERATION, LUMBAR REGION: ICD-10-CM

## 2023-04-08 DIAGNOSIS — Z88.8 ALLERGY STATUS TO OTHER DRUGS, MEDICAMENTS AND BIOLOGICAL SUBSTANCES: ICD-10-CM

## 2023-04-08 DIAGNOSIS — Z96.659 PRESENCE OF UNSPECIFIED ARTIFICIAL KNEE JOINT: Chronic | ICD-10-CM

## 2023-04-08 DIAGNOSIS — E11.9 TYPE 2 DIABETES MELLITUS WITHOUT COMPLICATIONS: ICD-10-CM

## 2023-04-08 DIAGNOSIS — R79.1 ABNORMAL COAGULATION PROFILE: ICD-10-CM

## 2023-04-08 LAB
ALBUMIN SERPL ELPH-MCNC: 3.4 G/DL — SIGNIFICANT CHANGE UP (ref 3.3–5)
ALP SERPL-CCNC: 98 U/L — SIGNIFICANT CHANGE UP (ref 40–120)
ALT FLD-CCNC: 28 U/L — SIGNIFICANT CHANGE UP (ref 12–78)
ANION GAP SERPL CALC-SCNC: 6 MMOL/L — SIGNIFICANT CHANGE UP (ref 5–17)
APTT BLD: 28.3 SEC — SIGNIFICANT CHANGE UP (ref 27.5–35.5)
AST SERPL-CCNC: 59 U/L — HIGH (ref 15–37)
BASE EXCESS BLDV CALC-SCNC: 0.8 MMOL/L — SIGNIFICANT CHANGE UP
BASOPHILS # BLD AUTO: 0.08 K/UL — SIGNIFICANT CHANGE UP (ref 0–0.2)
BASOPHILS NFR BLD AUTO: 0.6 % — SIGNIFICANT CHANGE UP (ref 0–2)
BILIRUB SERPL-MCNC: 0.4 MG/DL — SIGNIFICANT CHANGE UP (ref 0.2–1.2)
BUN SERPL-MCNC: 18 MG/DL — SIGNIFICANT CHANGE UP (ref 7–23)
CALCIUM SERPL-MCNC: 9.5 MG/DL — SIGNIFICANT CHANGE UP (ref 8.5–10.1)
CHLORIDE SERPL-SCNC: 106 MMOL/L — SIGNIFICANT CHANGE UP (ref 96–108)
CO2 BLDV-SCNC: 26 MMOL/L — SIGNIFICANT CHANGE UP (ref 22–26)
CO2 SERPL-SCNC: 24 MMOL/L — SIGNIFICANT CHANGE UP (ref 22–31)
CREAT SERPL-MCNC: 0.75 MG/DL — SIGNIFICANT CHANGE UP (ref 0.5–1.3)
EGFR: 81 ML/MIN/1.73M2 — SIGNIFICANT CHANGE UP
EOSINOPHIL # BLD AUTO: 0.23 K/UL — SIGNIFICANT CHANGE UP (ref 0–0.5)
EOSINOPHIL NFR BLD AUTO: 1.8 % — SIGNIFICANT CHANGE UP (ref 0–6)
GLUCOSE SERPL-MCNC: 124 MG/DL — HIGH (ref 70–99)
HCO3 BLDV-SCNC: 25 MMOL/L — SIGNIFICANT CHANGE UP (ref 22–29)
HCT VFR BLD CALC: 46.7 % — HIGH (ref 34.5–45)
HGB BLD-MCNC: 15.1 G/DL — SIGNIFICANT CHANGE UP (ref 11.5–15.5)
IMM GRANULOCYTES NFR BLD AUTO: 0.3 % — SIGNIFICANT CHANGE UP (ref 0–0.9)
INR BLD: 1.03 RATIO — SIGNIFICANT CHANGE UP (ref 0.88–1.16)
LACTATE SERPL-SCNC: 1.6 MMOL/L — SIGNIFICANT CHANGE UP (ref 0.7–2)
LYMPHOCYTES # BLD AUTO: 45.6 % — HIGH (ref 13–44)
LYMPHOCYTES # BLD AUTO: 5.8 K/UL — HIGH (ref 1–3.3)
MCHC RBC-ENTMCNC: 28.4 PG — SIGNIFICANT CHANGE UP (ref 27–34)
MCHC RBC-ENTMCNC: 32.3 GM/DL — SIGNIFICANT CHANGE UP (ref 32–36)
MCV RBC AUTO: 87.9 FL — SIGNIFICANT CHANGE UP (ref 80–100)
MONOCYTES # BLD AUTO: 0.94 K/UL — HIGH (ref 0–0.9)
MONOCYTES NFR BLD AUTO: 7.4 % — SIGNIFICANT CHANGE UP (ref 2–14)
NEUTROPHILS # BLD AUTO: 5.63 K/UL — SIGNIFICANT CHANGE UP (ref 1.8–7.4)
NEUTROPHILS NFR BLD AUTO: 44.3 % — SIGNIFICANT CHANGE UP (ref 43–77)
PCO2 BLDV: 39 MMHG — SIGNIFICANT CHANGE UP (ref 39–42)
PH BLDV: 7.42 — SIGNIFICANT CHANGE UP (ref 7.32–7.43)
PLATELET # BLD AUTO: 321 K/UL — SIGNIFICANT CHANGE UP (ref 150–400)
PO2 BLDV: 95 MMHG — SIGNIFICANT CHANGE UP
POTASSIUM SERPL-MCNC: 4.8 MMOL/L — SIGNIFICANT CHANGE UP (ref 3.5–5.3)
POTASSIUM SERPL-SCNC: 4.8 MMOL/L — SIGNIFICANT CHANGE UP (ref 3.5–5.3)
PROT SERPL-MCNC: 8.9 GM/DL — HIGH (ref 6–8.3)
PROTHROM AB SERPL-ACNC: 11.9 SEC — SIGNIFICANT CHANGE UP (ref 10.5–13.4)
RAPID RVP RESULT: SIGNIFICANT CHANGE UP
RBC # BLD: 5.31 M/UL — HIGH (ref 3.8–5.2)
RBC # FLD: 14.1 % — SIGNIFICANT CHANGE UP (ref 10.3–14.5)
SAO2 % BLDV: 98.2 % — SIGNIFICANT CHANGE UP
SARS-COV-2 RNA SPEC QL NAA+PROBE: SIGNIFICANT CHANGE UP
SODIUM SERPL-SCNC: 136 MMOL/L — SIGNIFICANT CHANGE UP (ref 135–145)
TROPONIN I, HIGH SENSITIVITY RESULT: 4.92 NG/L — SIGNIFICANT CHANGE UP
WBC # BLD: 12.72 K/UL — HIGH (ref 3.8–10.5)
WBC # FLD AUTO: 12.72 K/UL — HIGH (ref 3.8–10.5)

## 2023-04-08 PROCEDURE — 71250 CT THORAX DX C-: CPT

## 2023-04-08 PROCEDURE — 88307 TISSUE EXAM BY PATHOLOGIST: CPT

## 2023-04-08 PROCEDURE — 85730 THROMBOPLASTIN TIME PARTIAL: CPT

## 2023-04-08 PROCEDURE — 88305 TISSUE EXAM BY PATHOLOGIST: CPT

## 2023-04-08 PROCEDURE — 87186 SC STD MICRODIL/AGAR DIL: CPT

## 2023-04-08 PROCEDURE — 97116 GAIT TRAINING THERAPY: CPT | Mod: GP

## 2023-04-08 PROCEDURE — 85610 PROTHROMBIN TIME: CPT

## 2023-04-08 PROCEDURE — 93005 ELECTROCARDIOGRAM TRACING: CPT

## 2023-04-08 PROCEDURE — 81001 URINALYSIS AUTO W/SCOPE: CPT

## 2023-04-08 PROCEDURE — 94640 AIRWAY INHALATION TREATMENT: CPT

## 2023-04-08 PROCEDURE — 82962 GLUCOSE BLOOD TEST: CPT

## 2023-04-08 PROCEDURE — 97163 PT EVAL HIGH COMPLEX 45 MIN: CPT | Mod: GP

## 2023-04-08 PROCEDURE — 99291 CRITICAL CARE FIRST HOUR: CPT | Mod: CS,25

## 2023-04-08 PROCEDURE — 80053 COMPREHEN METABOLIC PANEL: CPT

## 2023-04-08 PROCEDURE — 71045 X-RAY EXAM CHEST 1 VIEW: CPT

## 2023-04-08 PROCEDURE — 93010 ELECTROCARDIOGRAM REPORT: CPT

## 2023-04-08 PROCEDURE — 36415 COLL VENOUS BLD VENIPUNCTURE: CPT

## 2023-04-08 PROCEDURE — C1889: CPT

## 2023-04-08 PROCEDURE — 32551 INSERTION OF CHEST TUBE: CPT

## 2023-04-08 PROCEDURE — C9399: CPT

## 2023-04-08 PROCEDURE — 84100 ASSAY OF PHOSPHORUS: CPT

## 2023-04-08 PROCEDURE — 87086 URINE CULTURE/COLONY COUNT: CPT

## 2023-04-08 PROCEDURE — 85027 COMPLETE CBC AUTOMATED: CPT

## 2023-04-08 PROCEDURE — 80048 BASIC METABOLIC PNL TOTAL CA: CPT

## 2023-04-08 PROCEDURE — 87635 SARS-COV-2 COVID-19 AMP PRB: CPT

## 2023-04-08 PROCEDURE — 71045 X-RAY EXAM CHEST 1 VIEW: CPT | Mod: 26

## 2023-04-08 PROCEDURE — 83735 ASSAY OF MAGNESIUM: CPT

## 2023-04-08 PROCEDURE — 86850 RBC ANTIBODY SCREEN: CPT

## 2023-04-08 PROCEDURE — 97530 THERAPEUTIC ACTIVITIES: CPT | Mod: GP

## 2023-04-08 PROCEDURE — 86901 BLOOD TYPING SEROLOGIC RH(D): CPT

## 2023-04-08 PROCEDURE — 86900 BLOOD TYPING SEROLOGIC ABO: CPT

## 2023-04-08 PROCEDURE — C9290: CPT

## 2023-04-08 RX ORDER — SEMAGLUTIDE 0.68 MG/ML
1 INJECTION, SOLUTION SUBCUTANEOUS
Refills: 0 | DISCHARGE

## 2023-04-08 RX ORDER — CHOLECALCIFEROL (VITAMIN D3) 125 MCG
1 CAPSULE ORAL
Refills: 0 | DISCHARGE

## 2023-04-08 RX ORDER — IBUPROFEN 200 MG
400 TABLET ORAL EVERY 8 HOURS
Refills: 0 | Status: COMPLETED | OUTPATIENT
Start: 2023-04-08 | End: 2023-04-10

## 2023-04-08 RX ORDER — ENOXAPARIN SODIUM 100 MG/ML
40 INJECTION SUBCUTANEOUS EVERY 24 HOURS
Refills: 0 | Status: DISCONTINUED | OUTPATIENT
Start: 2023-04-08 | End: 2023-04-10

## 2023-04-08 RX ORDER — INSULIN LISPRO 100/ML
VIAL (ML) SUBCUTANEOUS
Refills: 0 | Status: ACTIVE | OUTPATIENT
Start: 2023-04-08 | End: 2024-03-06

## 2023-04-08 RX ORDER — ACETAMINOPHEN 500 MG
1000 TABLET ORAL ONCE
Refills: 0 | Status: COMPLETED | OUTPATIENT
Start: 2023-04-08 | End: 2023-04-08

## 2023-04-08 RX ORDER — OXYCODONE HYDROCHLORIDE 5 MG/1
5 TABLET ORAL EVERY 6 HOURS
Refills: 0 | Status: DISCONTINUED | OUTPATIENT
Start: 2023-04-08 | End: 2023-04-15

## 2023-04-08 RX ORDER — GABAPENTIN 400 MG/1
300 CAPSULE ORAL
Refills: 0 | Status: ACTIVE | OUTPATIENT
Start: 2023-04-08 | End: 2024-03-06

## 2023-04-08 RX ORDER — DEXTROSE 50 % IN WATER 50 %
25 SYRINGE (ML) INTRAVENOUS ONCE
Refills: 0 | Status: ACTIVE | OUTPATIENT
Start: 2023-04-08

## 2023-04-08 RX ORDER — MORPHINE SULFATE 50 MG/1
4 CAPSULE, EXTENDED RELEASE ORAL ONCE
Refills: 0 | Status: DISCONTINUED | OUTPATIENT
Start: 2023-04-08 | End: 2023-04-08

## 2023-04-08 RX ORDER — SEMAGLUTIDE 0.68 MG/ML
0.5 INJECTION, SOLUTION SUBCUTANEOUS
Qty: 0 | Refills: 0 | DISCHARGE

## 2023-04-08 RX ORDER — EMPAGLIFLOZIN, METFORMIN HYDROCHLORIDE 10; 1000 MG/1; MG/1
1 TABLET, EXTENDED RELEASE ORAL
Qty: 0 | Refills: 0 | DISCHARGE

## 2023-04-08 RX ORDER — SODIUM CHLORIDE 9 MG/ML
1000 INJECTION, SOLUTION INTRAVENOUS
Refills: 0 | Status: ACTIVE | OUTPATIENT
Start: 2023-04-08 | End: 2024-03-06

## 2023-04-08 RX ORDER — TIOTROPIUM BROMIDE 18 UG/1
2 CAPSULE ORAL; RESPIRATORY (INHALATION) DAILY
Refills: 0 | Status: ACTIVE | OUTPATIENT
Start: 2023-04-08 | End: 2024-03-06

## 2023-04-08 RX ORDER — LEVOTHYROXINE SODIUM 125 MCG
137 TABLET ORAL DAILY
Refills: 0 | Status: ACTIVE | OUTPATIENT
Start: 2023-04-08 | End: 2024-03-06

## 2023-04-08 RX ORDER — FUROSEMIDE 40 MG
40 TABLET ORAL DAILY
Refills: 0 | Status: ACTIVE | OUTPATIENT
Start: 2023-04-08 | End: 2024-03-06

## 2023-04-08 RX ORDER — PANTOPRAZOLE SODIUM 20 MG/1
40 TABLET, DELAYED RELEASE ORAL
Refills: 0 | Status: ACTIVE | OUTPATIENT
Start: 2023-04-08 | End: 2024-03-06

## 2023-04-08 RX ORDER — DEXTROSE 50 % IN WATER 50 %
12.5 SYRINGE (ML) INTRAVENOUS ONCE
Refills: 0 | Status: ACTIVE | OUTPATIENT
Start: 2023-04-08

## 2023-04-08 RX ORDER — FUROSEMIDE 40 MG
1 TABLET ORAL
Qty: 0 | Refills: 0 | DISCHARGE

## 2023-04-08 RX ORDER — IPRATROPIUM/ALBUTEROL SULFATE 18-103MCG
3 AEROSOL WITH ADAPTER (GRAM) INHALATION
Refills: 0 | Status: ACTIVE | OUTPATIENT
Start: 2023-04-08 | End: 2023-04-08

## 2023-04-08 RX ORDER — GLUCAGON INJECTION, SOLUTION 0.5 MG/.1ML
1 INJECTION, SOLUTION SUBCUTANEOUS ONCE
Refills: 0 | Status: ACTIVE | OUTPATIENT
Start: 2023-04-08 | End: 2024-03-06

## 2023-04-08 RX ORDER — BUDESONIDE AND FORMOTEROL FUMARATE DIHYDRATE 160; 4.5 UG/1; UG/1
2 AEROSOL RESPIRATORY (INHALATION)
Refills: 0 | Status: ACTIVE | OUTPATIENT
Start: 2023-04-08 | End: 2024-03-06

## 2023-04-08 RX ORDER — LIDOCAINE 4 G/100G
1 CREAM TOPICAL DAILY
Refills: 0 | Status: ACTIVE | OUTPATIENT
Start: 2023-04-08 | End: 2024-03-06

## 2023-04-08 RX ORDER — PANTOPRAZOLE SODIUM 20 MG/1
1 TABLET, DELAYED RELEASE ORAL
Refills: 0 | DISCHARGE

## 2023-04-08 RX ORDER — HYDROMORPHONE HYDROCHLORIDE 2 MG/ML
1 INJECTION INTRAMUSCULAR; INTRAVENOUS; SUBCUTANEOUS EVERY 6 HOURS
Refills: 0 | Status: DISCONTINUED | OUTPATIENT
Start: 2023-04-08 | End: 2023-04-11

## 2023-04-08 RX ORDER — HYDROMORPHONE HYDROCHLORIDE 2 MG/ML
1 INJECTION INTRAMUSCULAR; INTRAVENOUS; SUBCUTANEOUS ONCE
Refills: 0 | Status: DISCONTINUED | OUTPATIENT
Start: 2023-04-08 | End: 2023-04-08

## 2023-04-08 RX ORDER — LEVOTHYROXINE SODIUM 125 MCG
1 TABLET ORAL
Qty: 0 | Refills: 0 | DISCHARGE

## 2023-04-08 RX ORDER — ASCORBIC ACID 60 MG
1 TABLET,CHEWABLE ORAL
Refills: 0 | DISCHARGE

## 2023-04-08 RX ORDER — DEXTROSE 50 % IN WATER 50 %
15 SYRINGE (ML) INTRAVENOUS ONCE
Refills: 0 | Status: ACTIVE | OUTPATIENT
Start: 2023-04-08 | End: 2024-03-06

## 2023-04-08 RX ORDER — ACETAMINOPHEN 500 MG
1000 TABLET ORAL ONCE
Refills: 0 | Status: COMPLETED | OUTPATIENT
Start: 2023-04-09 | End: 2023-04-09

## 2023-04-08 RX ADMIN — HYDROMORPHONE HYDROCHLORIDE 1 MILLIGRAM(S): 2 INJECTION INTRAMUSCULAR; INTRAVENOUS; SUBCUTANEOUS at 20:59

## 2023-04-08 RX ADMIN — MORPHINE SULFATE 4 MILLIGRAM(S): 50 CAPSULE, EXTENDED RELEASE ORAL at 19:35

## 2023-04-08 NOTE — H&P ADULT - ASSESSMENT
Problem List:  1) Left PTX    S/p left pigtail chest tube  Lung now reexpanded, patient SOB improved chest tube to suction   Will admit to thoracic service  Restart home medications  Pain control with IV tylenol, NSAIDs, oxy, dilaudid, lido patch  tentative OR on tuesday for pleurodesis   OK for diet now  insulin for DM management   Plan discussed with attending Dr. Herbert

## 2023-04-08 NOTE — H&P ADULT - NSHPPHYSICALEXAM_GEN_ALL_CORE
Physical Examination:    General:  Alert, oriented, interactive, nonfocal    HEENT: Pupils equal, reactive to light.  Symmetric    PULM: Clear to auscultation bilaterally, no significant sputum production    CVS: Regular rate and rhythm, no murmurs, rubs, or gallops    ABD: Soft, nondistended, nontender, normoactive bowel sounds, no masses    EXT: No edema, nontender    SKIN: Warm, left pigtail chest tube to suction with no air leak    NEURO: A&Ox3, strength 5/5 all extremities, cranial nerves grossly intact, no focal deficits

## 2023-04-08 NOTE — ED PROVIDER NOTE - CLINICAL SUMMARY MEDICAL DECISION MAKING FREE TEXT BOX
78 y/o female presents to the ED for SOB, recent admission for pneumothorax, here pt hypoxic tachy tachypneic will workup for pneumothorax PNA, PE, labs, imaging, reassess. 76 y/o female presents to the ED for SOB, recent admission for pneumothorax, here pt hypoxic tachy tachypneic will workup for pneumothorax PNA, PE, labs, imaging, reassess.    All labs and imaging reviewed by myself.  Patient with pneumothorax on the left.  Dimer is very mildly elevated however shortness of breath was likely from pneumothorax.  Chest tube placed on left patient is feeling better.  Discussed case with patient's surgeon Dr. Figueredo who states to admit to his service will operate on patient next week.  Case discussed with patient's family.

## 2023-04-08 NOTE — ED PROVIDER NOTE - OBJECTIVE STATEMENT
78 y/o female with a PMHx of COPD exacerbation, leg swelling, GERD, frequent UTI, OA, pulmonary emphysema, basal cell carcinoma, DDD, diverticulosis, elevated pancreas enzymes, DM, hypothyroidism, H/O pleurodesis on right side in July presents to ED s/p hypoxia and sob. Pt O2 sat in 70s recently dc with pneumothorax on Tuesday. As per family, about an hour ago O2 went down to 80s so came to ED for evaluation. No recent fever/cough. Cardiothoracic surgeon: Dr. Eisenberg.

## 2023-04-08 NOTE — ED ADULT TRIAGE NOTE - CHIEF COMPLAINT QUOTE
Pt presents to ED BIBA for shortness of breath that started today. Pt was recently discharged from  for pneumothorax. Pt O2 sat 74% on RA, pt has 1-2 word dyspnea. Denies chest pain, dizziness.

## 2023-04-08 NOTE — ED PROVIDER NOTE - NS ED ROS FT
Constitutional: No fever or chills  Eyes: No visual changes  HEENT: No throat pain  CV: No chest pain  Resp: +SOB, +hypoxic   GI: No abd pain, nausea or vomiting  : No dysuria  MSK: No musculoskeletal pain  Skin: No rash  Neuro: No headache

## 2023-04-08 NOTE — ED PROVIDER NOTE - NS_ ATTENDINGSCRIBEDETAILS _ED_A_ED_FT
I, Madhav Sanchez MD,  performed the initial face to face bedside interview with this patient regarding history of present illness, review of symptoms and relevant past medical, social and family history.  I completed an independent physical examination.  I was the initial provider who evaluated this patient.   I personally saw the patient and performed a substantive portion of the visit including all aspects of the medical decision making.  The history, relevant review of systems, past medical and surgical history, medical decision making, and physical examination was documented by the scribe in my presence and I attest to the accuracy of the documentation.

## 2023-04-08 NOTE — H&P ADULT - HISTORY OF PRESENT ILLNESS
78 yo Female with h/o COPD/bullous emphysema on home O2 PRN,  h/o Rt PTX 11/2021 and 7/2022 s/p pigtail placement, s/p  FB Right VATS RODERICK wedge resection and talc pleurodesis 7/20/22, left PTX on 4/4/23 now admitted w spontaneous Left large PTX again. Pigtail placed in the ER, lung reexpanded. Patient seen and examined at the bedside. Lying in bed, complains of pain over left side with deep inspiration. States she suddenly became SOB this evening, discharged yesterday.

## 2023-04-08 NOTE — H&P ADULT - NSHPREVIEWOFSYSTEMS_GEN_ALL_CORE
Review of Systems:  CONSTITUTIONAL: No fever, chills, or fatigue  EYES: No eye pain, visual disturbances, or discharge  ENMT:  No difficulty hearing, tinnitus, vertigo; No sinus or throat pain  NECK: No pain or stiffness  RESPIRATORY: No cough, wheezing, chills or hemoptysis; + shortness of breath  CARDIOVASCULAR: No chest pain, palpitations, dizziness, or leg swelling  GASTROINTESTINAL: No abdominal or epigastric pain. No nausea, vomiting, or hematemesis; No diarrhea or constipation. No melena or hematochezia.  GENITOURINARY: No dysuria, frequency, hematuria, or incontinence  NEUROLOGICAL: No headaches, memory loss, loss of strength, numbness, or tremors  SKIN: No itching, burning, rashes, or lesions   MUSCULOSKELETAL: No joint pain or swelling; No muscle, back, or extremity pain  PSYCHIATRIC: No depression, anxiety, mood swings, or difficulty sleeping

## 2023-04-08 NOTE — ED PROVIDER NOTE - PHYSICAL EXAMINATION
Constitutional: NAD AAOx3  Eyes: PERRLA EOMI  Head: Normocephalic atraumatic  Mouth: MMM  Cardiac: tachycardic   Resp: Diminished breath sounds b/l, tachypneic  GI: Abd s/nt/nd  Neuro: CN2-12 intact  Skin: No rashes Constitutional: moderate distress  Eyes: PERRLA EOMI  Head: Normocephalic atraumatic  Mouth: MMM  Cardiac: tachycardic   Resp: absent breath sounds on left, tachypneic  GI: Abd s/nt/nd  Neuro: CN2-12 intact  Skin: No rashes

## 2023-04-09 LAB
ALBUMIN SERPL ELPH-MCNC: 2.8 G/DL — LOW (ref 3.3–5)
ALP SERPL-CCNC: 76 U/L — SIGNIFICANT CHANGE UP (ref 40–120)
ALT FLD-CCNC: 19 U/L — SIGNIFICANT CHANGE UP (ref 12–78)
ANION GAP SERPL CALC-SCNC: 5 MMOL/L — SIGNIFICANT CHANGE UP (ref 5–17)
APPEARANCE UR: CLEAR — SIGNIFICANT CHANGE UP
AST SERPL-CCNC: 20 U/L — SIGNIFICANT CHANGE UP (ref 15–37)
BACTERIA # UR AUTO: ABNORMAL
BILIRUB SERPL-MCNC: 0.6 MG/DL — SIGNIFICANT CHANGE UP (ref 0.2–1.2)
BILIRUB UR-MCNC: NEGATIVE — SIGNIFICANT CHANGE UP
BUN SERPL-MCNC: 21 MG/DL — SIGNIFICANT CHANGE UP (ref 7–23)
CALCIUM SERPL-MCNC: 8.9 MG/DL — SIGNIFICANT CHANGE UP (ref 8.5–10.1)
CHLORIDE SERPL-SCNC: 102 MMOL/L — SIGNIFICANT CHANGE UP (ref 96–108)
CO2 SERPL-SCNC: 27 MMOL/L — SIGNIFICANT CHANGE UP (ref 22–31)
COLOR SPEC: YELLOW — SIGNIFICANT CHANGE UP
CREAT SERPL-MCNC: 0.83 MG/DL — SIGNIFICANT CHANGE UP (ref 0.5–1.3)
DIFF PNL FLD: ABNORMAL
EGFR: 72 ML/MIN/1.73M2 — SIGNIFICANT CHANGE UP
EPI CELLS # UR: SIGNIFICANT CHANGE UP
GLUCOSE BLDC GLUCOMTR-MCNC: 107 MG/DL — HIGH (ref 70–99)
GLUCOSE BLDC GLUCOMTR-MCNC: 121 MG/DL — HIGH (ref 70–99)
GLUCOSE BLDC GLUCOMTR-MCNC: 130 MG/DL — HIGH (ref 70–99)
GLUCOSE BLDC GLUCOMTR-MCNC: 155 MG/DL — HIGH (ref 70–99)
GLUCOSE BLDC GLUCOMTR-MCNC: 93 MG/DL — SIGNIFICANT CHANGE UP (ref 70–99)
GLUCOSE SERPL-MCNC: 99 MG/DL — SIGNIFICANT CHANGE UP (ref 70–99)
GLUCOSE UR QL: 1000 MG/DL
HCT VFR BLD CALC: 38.5 % — SIGNIFICANT CHANGE UP (ref 34.5–45)
HGB BLD-MCNC: 12.5 G/DL — SIGNIFICANT CHANGE UP (ref 11.5–15.5)
KETONES UR-MCNC: NEGATIVE — SIGNIFICANT CHANGE UP
LEUKOCYTE ESTERASE UR-ACNC: ABNORMAL
MAGNESIUM SERPL-MCNC: 1.9 MG/DL — SIGNIFICANT CHANGE UP (ref 1.6–2.6)
MCHC RBC-ENTMCNC: 28.7 PG — SIGNIFICANT CHANGE UP (ref 27–34)
MCHC RBC-ENTMCNC: 32.5 GM/DL — SIGNIFICANT CHANGE UP (ref 32–36)
MCV RBC AUTO: 88.3 FL — SIGNIFICANT CHANGE UP (ref 80–100)
NITRITE UR-MCNC: NEGATIVE — SIGNIFICANT CHANGE UP
PH UR: 5 — SIGNIFICANT CHANGE UP (ref 5–8)
PHOSPHATE SERPL-MCNC: 4.3 MG/DL — SIGNIFICANT CHANGE UP (ref 2.5–4.5)
PLATELET # BLD AUTO: 294 K/UL — SIGNIFICANT CHANGE UP (ref 150–400)
POTASSIUM SERPL-MCNC: 3.6 MMOL/L — SIGNIFICANT CHANGE UP (ref 3.5–5.3)
POTASSIUM SERPL-SCNC: 3.6 MMOL/L — SIGNIFICANT CHANGE UP (ref 3.5–5.3)
PROT SERPL-MCNC: 6.8 GM/DL — SIGNIFICANT CHANGE UP (ref 6–8.3)
PROT UR-MCNC: SIGNIFICANT CHANGE UP MG/DL
RBC # BLD: 4.36 M/UL — SIGNIFICANT CHANGE UP (ref 3.8–5.2)
RBC # FLD: 14.1 % — SIGNIFICANT CHANGE UP (ref 10.3–14.5)
RBC CASTS # UR COMP ASSIST: ABNORMAL /HPF (ref 0–4)
SODIUM SERPL-SCNC: 134 MMOL/L — LOW (ref 135–145)
SP GR SPEC: 1.01 — SIGNIFICANT CHANGE UP (ref 1.01–1.02)
UROBILINOGEN FLD QL: NEGATIVE — SIGNIFICANT CHANGE UP
WBC # BLD: 8.78 K/UL — SIGNIFICANT CHANGE UP (ref 3.8–10.5)
WBC # FLD AUTO: 8.78 K/UL — SIGNIFICANT CHANGE UP (ref 3.8–10.5)
WBC UR QL: ABNORMAL /HPF (ref 0–5)

## 2023-04-09 PROCEDURE — 71045 X-RAY EXAM CHEST 1 VIEW: CPT | Mod: 26

## 2023-04-09 RX ADMIN — BUDESONIDE AND FORMOTEROL FUMARATE DIHYDRATE 2 PUFF(S): 160; 4.5 AEROSOL RESPIRATORY (INHALATION) at 10:47

## 2023-04-09 RX ADMIN — ENOXAPARIN SODIUM 40 MILLIGRAM(S): 100 INJECTION SUBCUTANEOUS at 05:01

## 2023-04-09 RX ADMIN — Medication 40 MILLIGRAM(S): at 12:45

## 2023-04-09 RX ADMIN — Medication 137 MICROGRAM(S): at 05:01

## 2023-04-09 RX ADMIN — OXYCODONE HYDROCHLORIDE 5 MILLIGRAM(S): 5 TABLET ORAL at 00:12

## 2023-04-09 RX ADMIN — Medication 400 MILLIGRAM(S): at 05:02

## 2023-04-09 RX ADMIN — OXYCODONE HYDROCHLORIDE 5 MILLIGRAM(S): 5 TABLET ORAL at 19:51

## 2023-04-09 RX ADMIN — LIDOCAINE 1 PATCH: 4 CREAM TOPICAL at 11:02

## 2023-04-09 RX ADMIN — Medication 400 MILLIGRAM(S): at 21:46

## 2023-04-09 RX ADMIN — OXYCODONE HYDROCHLORIDE 5 MILLIGRAM(S): 5 TABLET ORAL at 18:51

## 2023-04-09 RX ADMIN — LIDOCAINE 1 PATCH: 4 CREAM TOPICAL at 22:03

## 2023-04-09 RX ADMIN — Medication 400 MILLIGRAM(S): at 01:58

## 2023-04-09 RX ADMIN — GABAPENTIN 300 MILLIGRAM(S): 400 CAPSULE ORAL at 11:04

## 2023-04-09 RX ADMIN — Medication 400 MILLIGRAM(S): at 14:52

## 2023-04-09 RX ADMIN — BUDESONIDE AND FORMOTEROL FUMARATE DIHYDRATE 2 PUFF(S): 160; 4.5 AEROSOL RESPIRATORY (INHALATION) at 20:21

## 2023-04-09 RX ADMIN — PANTOPRAZOLE SODIUM 40 MILLIGRAM(S): 20 TABLET, DELAYED RELEASE ORAL at 05:01

## 2023-04-09 RX ADMIN — Medication 2: at 17:19

## 2023-04-09 RX ADMIN — OXYCODONE HYDROCHLORIDE 5 MILLIGRAM(S): 5 TABLET ORAL at 09:37

## 2023-04-09 RX ADMIN — TIOTROPIUM BROMIDE 2 PUFF(S): 18 CAPSULE ORAL; RESPIRATORY (INHALATION) at 10:33

## 2023-04-09 RX ADMIN — OXYCODONE HYDROCHLORIDE 5 MILLIGRAM(S): 5 TABLET ORAL at 08:37

## 2023-04-09 RX ADMIN — Medication 400 MILLIGRAM(S): at 01:57

## 2023-04-09 RX ADMIN — GABAPENTIN 300 MILLIGRAM(S): 400 CAPSULE ORAL at 21:46

## 2023-04-09 RX ADMIN — HYDROMORPHONE HYDROCHLORIDE 1 MILLIGRAM(S): 2 INJECTION INTRAMUSCULAR; INTRAVENOUS; SUBCUTANEOUS at 13:00

## 2023-04-09 RX ADMIN — HYDROMORPHONE HYDROCHLORIDE 1 MILLIGRAM(S): 2 INJECTION INTRAMUSCULAR; INTRAVENOUS; SUBCUTANEOUS at 12:45

## 2023-04-09 NOTE — PATIENT PROFILE ADULT - NSPROPTRIGHTNOTIFY_GEN_A_NUR
Date/Time Patient Seen:  		  Referring MD:   Data Reviewed	       Patient is a 85y old  Male who presents with a chief complaint of bronchoscopy (01 Sep 2022 15:38)      Subjective/HPI     PAST MEDICAL & SURGICAL HISTORY:  No pertinent past medical history    Skin cancer  s/p Mohs    Essential hypertension    Cerebrovascular accident (CVA), unspecified mechanism  2015    Hyperlipidemia, unspecified hyperlipidemia type    Complex tear of lateral meniscus of right knee as current injury, initial encounter    Complex tear of medial meniscus of right knee as current injury, initial encounter    Benign prostatic hyperplasia, presence of lower urinary tract symptoms unspecified, unspecified morphology    H/O pilonidal cyst          Medication list         MEDICATIONS  (STANDING):  ALBUTerol    0.083% 2.5 milliGRAM(s) Nebulizer every 6 hours  ALBUTerol    90 MICROgram(s) HFA Inhaler 1 Puff(s) Inhalation every 6 hours  amLODIPine   Tablet 5 milliGRAM(s) Oral daily  apixaban 5 milliGRAM(s) Oral two times a day  atorvastatin 40 milliGRAM(s) Oral at bedtime  budesonide  80 MICROgram(s)/formoterol 4.5 MICROgram(s) Inhaler 2 Puff(s) Inhalation two times a day  finasteride 5 milliGRAM(s) Oral daily  metoprolol tartrate 50 milliGRAM(s) Oral two times a day  naloxone Injectable 0.4 milliGRAM(s) IV Push once  pantoprazole    Tablet 40 milliGRAM(s) Oral before breakfast  saccharomyces boulardii 250 milliGRAM(s) Oral two times a day  senna 2 Tablet(s) Oral at bedtime  sodium chloride 0.9%. 1000 milliLiter(s) (60 mL/Hr) IV Continuous <Continuous>  tamsulosin 0.4 milliGRAM(s) Oral at bedtime    MEDICATIONS  (PRN):  bisacodyl 5 milliGRAM(s) Oral daily PRN Constipation  melatonin 3 milliGRAM(s) Oral at bedtime PRN Insomnia  oxyCODONE    IR 2.5 milliGRAM(s) Oral every 4 hours PRN Moderate Pain (4 - 6)  polyethylene glycol 3350 17 Gram(s) Oral daily PRN Constipation         Vitals log        ICU Vital Signs Last 24 Hrs  T(C): 36.6 (02 Sep 2022 05:44), Max: 36.9 (01 Sep 2022 20:15)  T(F): 97.8 (02 Sep 2022 05:44), Max: 98.5 (01 Sep 2022 20:15)  HR: 97 (02 Sep 2022 05:44) (57 - 97)  BP: 101/67 (02 Sep 2022 05:44) (101/67 - 121/59)  BP(mean): --  ABP: --  ABP(mean): --  RR: 18 (02 Sep 2022 05:44) (17 - 18)  SpO2: 93% (02 Sep 2022 05:44) (90% - 98%)    O2 Parameters below as of 02 Sep 2022 05:44  Patient On (Oxygen Delivery Method): nasal cannula  O2 Flow (L/min): 2               Input and Output:  I&O's Detail    31 Aug 2022 07:01  -  01 Sep 2022 07:00  --------------------------------------------------------  IN:  Total IN: 0 mL    OUT:    Voided (mL): 400 mL  Total OUT: 400 mL    Total NET: -400 mL      01 Sep 2022 07:01  -  02 Sep 2022 06:40  --------------------------------------------------------  IN:  Total IN: 0 mL    OUT:    Voided (mL): 1100 mL  Total OUT: 1100 mL    Total NET: -1100 mL          Lab Data                        11.8   12.50 )-----------( 530      ( 01 Sep 2022 06:24 )             36.7     09-01    140  |  107  |  18  ----------------------------<  88  4.4   |  28  |  0.95    Ca    8.6      01 Sep 2022 06:24              Review of Systems	      Objective     Physical Examination    heart s1s2  lung dec BS  abd soft  head nc      Pertinent Lab findings & Imaging      Bud:  NO   Adequate UO     I&O's Detail    31 Aug 2022 07:01  -  01 Sep 2022 07:00  --------------------------------------------------------  IN:  Total IN: 0 mL    OUT:    Voided (mL): 400 mL  Total OUT: 400 mL    Total NET: -400 mL      01 Sep 2022 07:01  -  02 Sep 2022 06:40  --------------------------------------------------------  IN:  Total IN: 0 mL    OUT:    Voided (mL): 1100 mL  Total OUT: 1100 mL    Total NET: -1100 mL               Discussed with:     Cultures:	        Radiology                             yes

## 2023-04-09 NOTE — PATIENT PROFILE ADULT - FALL HARM RISK - HARM RISK INTERVENTIONS

## 2023-04-09 NOTE — PROGRESS NOTE ADULT - SUBJECTIVE AND OBJECTIVE BOX
Brief Hospital Course:    Vital Signs:  Vital Signs Last 24 Hrs  T(C): 36.9 (04-09-23 @ 09:00), Max: 37.6 (04-09-23 @ 01:27)  T(F): 98.5 (04-09-23 @ 09:00), Max: 99.7 (04-09-23 @ 01:27)  HR: 57 (04-09-23 @ 09:00) (57 - 130)  BP: 96/42 (04-09-23 @ 09:00) (96/42 - 171/126)  RR: 18 (04-09-23 @ 09:00) (16 - 30)  SpO2: 95% (04-09-23 @ 09:00) (73% - 100%) on (O2)    Telemetry/Alarms:    Relevant labs, radiology and Medications reviewed                        12.5   8.78  )-----------( 294      ( 09 Apr 2023 07:48 )             38.5     04-09    134<L>  |  102  |  21  ----------------------------<  99  3.6   |  27  |  0.83    Ca    8.9      09 Apr 2023 07:48  Phos  4.3     04-09  Mg     1.9     04-09    TPro  6.8  /  Alb  2.8<L>  /  TBili  0.6  /  DBili  x   /  AST  20  /  ALT  19  /  AlkPhos  76  04-09    PT/INR - ( 08 Apr 2023 19:05 )   PT: 11.9 sec;   INR: 1.03 ratio         PTT - ( 08 Apr 2023 19:05 )  PTT:28.3 sec    MEDICATIONS  (STANDING):  albuterol/ipratropium for Nebulization.. 3 milliLiter(s) Nebulizer every 20 minutes  budesonide 160 MICROgram(s)/formoterol 4.5 MICROgram(s) Inhaler 2 Puff(s) Inhalation two times a day  dextrose 5%. 1000 milliLiter(s) (50 mL/Hr) IV Continuous <Continuous>  dextrose 5%. 1000 milliLiter(s) (100 mL/Hr) IV Continuous <Continuous>  dextrose 50% Injectable 25 Gram(s) IV Push once  dextrose 50% Injectable 12.5 Gram(s) IV Push once  dextrose 50% Injectable 25 Gram(s) IV Push once  enoxaparin Injectable 40 milliGRAM(s) SubCutaneous every 24 hours  furosemide    Tablet 40 milliGRAM(s) Oral daily  gabapentin 300 milliGRAM(s) Oral two times a day  glucagon  Injectable 1 milliGRAM(s) IntraMuscular once  ibuprofen  Tablet. 400 milliGRAM(s) Oral every 8 hours  insulin lispro (ADMELOG) corrective regimen sliding scale   SubCutaneous three times a day before meals  levothyroxine 137 MICROGram(s) Oral daily  lidocaine   4% Patch 1 Patch Transdermal daily  methylPREDNISolone sodium succinate Injectable 125 milliGRAM(s) IV Push Once  pantoprazole    Tablet 40 milliGRAM(s) Oral before breakfast  tiotropium 2.5 MICROgram(s) Inhaler 2 Puff(s) Inhalation daily    MEDICATIONS  (PRN):  dextrose Oral Gel 15 Gram(s) Oral once PRN Blood Glucose LESS THAN 70 milliGRAM(s)/deciliter  HYDROmorphone  Injectable 1 milliGRAM(s) IV Push every 6 hours PRN Severe Pain (7 - 10)  oxyCODONE    IR 5 milliGRAM(s) Oral every 6 hours PRN Moderate Pain (4 - 6)    I&O's Summary      Physical Exam:  General: Awake, resposiveabnd appropriate, breathing comfortably,  ENT: PERRLA, EOMI, No JVD  Chest; B/L Bs, decreased at bases, no signifigant sputum production   CVS: S1,S2, Regular, No Murmurs noted   Abd: BS+Soft Non-tender, No rebound or localized pain, No Masses,   Ext: Warm and perfused, No Edema   Neuro: A+O x 3, Moving all extremities, non-focal         Assessment  77y Female  w/ PAST MEDICAL & SURGICAL HISTORY:  Hypothyroidism      Diabetes mellitus  diet controlled      Back pain      Asthma      Elevated pancreatic enzyme  patient reports elevated lab 6/2013. Gastroenterologist aware      History of cataract  extracted from right and left      Varicose veins of both lower extremities  surgery      Diverticulosis of intestine without bleeding, unspecified intestinal tract location      Gall stones  gall bladder removed      Urinary urgency      Spinal stenosis of lumbar region      DDD (degenerative disc disease), lumbar      Basal cell carcinoma  removed from face      Pulmonary emphysema, unspecified emphysema type  2017 last exacerbation; never intubated      OA (osteoarthritis)      Frequent UTI  2-3x/year; had an episode 5 weeks ago was treated denies dysuria      GERD (gastroesophageal reflux disease)      Leg swelling      Emphysema lung      COVID-19 vaccine series completed  Moderna - 2nd dose 02/20/2021      Knee pain, left      Scar tissue  left knee S/P TKR      COPD exacerbation      S/P bladder repair  bladder lift 2/2013      Dental disorder  Dental surgery 12/2012, patient reports that upper portion is glued in at this time, plan is to have dental implants placed.      S/P knee surgery  arthroscopy bilateral 2007 and 2010      Skin cancer  to right temple 2012, removed, history of basal cell x 2      H/O lumbosacral spine surgery  fusion 2017      H/O umbilical hernia repair  5/24/19      S/P cholecystectomy  2012      S/P colonoscopy  benign polyp      S/P knee replacement  right 2017, left 11/2019       Brief Hospital Course: 76 yo Female with h/o COPD/bullous emphysema on home O2 PRN,  h/o Rt PTX 11/2021 and 7/2022 s/p pigtail placement, s/p  FB Right VATS RODERICK wedge resection and talc pleurodesis 7/20/22, left PTX on 4/4/23 now admitted w spontaneous Left large PTX again. Pigtail placed in the ER, lung reexpanded. Patient seen and examined at the bedside. Lying in bed, complains of pain over left side with deep inspiration. States she suddenly became SOB this evening, discharged yesterday (4/7/23).    Vital Signs:  Vital Signs Last 24 Hrs  T(C): 36.9 (04-09-23 @ 09:00), Max: 37.6 (04-09-23 @ 01:27)  T(F): 98.5 (04-09-23 @ 09:00), Max: 99.7 (04-09-23 @ 01:27)  HR: 57 (04-09-23 @ 09:00) (57 - 130)  BP: 96/42 (04-09-23 @ 09:00) (96/42 - 171/126)  RR: 18 (04-09-23 @ 09:00) (16 - 30)  SpO2: 95% (04-09-23 @ 09:00) (73% - 100%) on (O2)    Telemetry/Alarms:    Relevant labs, radiology and Medications reviewed                        12.5   8.78  )-----------( 294      ( 09 Apr 2023 07:48 )             38.5     04-09    134<L>  |  102  |  21  ----------------------------<  99  3.6   |  27  |  0.83    Ca    8.9      09 Apr 2023 07:48  Phos  4.3     04-09  Mg     1.9     04-09    TPro  6.8  /  Alb  2.8<L>  /  TBili  0.6  /  DBili  x   /  AST  20  /  ALT  19  /  AlkPhos  76  04-09    PT/INR - ( 08 Apr 2023 19:05 )   PT: 11.9 sec;   INR: 1.03 ratio         PTT - ( 08 Apr 2023 19:05 )  PTT:28.3 sec    MEDICATIONS  (STANDING):  albuterol/ipratropium for Nebulization.. 3 milliLiter(s) Nebulizer every 20 minutes  budesonide 160 MICROgram(s)/formoterol 4.5 MICROgram(s) Inhaler 2 Puff(s) Inhalation two times a day  dextrose 5%. 1000 milliLiter(s) (50 mL/Hr) IV Continuous <Continuous>  dextrose 5%. 1000 milliLiter(s) (100 mL/Hr) IV Continuous <Continuous>  dextrose 50% Injectable 25 Gram(s) IV Push once  dextrose 50% Injectable 12.5 Gram(s) IV Push once  dextrose 50% Injectable 25 Gram(s) IV Push once  enoxaparin Injectable 40 milliGRAM(s) SubCutaneous every 24 hours  furosemide    Tablet 40 milliGRAM(s) Oral daily  gabapentin 300 milliGRAM(s) Oral two times a day  glucagon  Injectable 1 milliGRAM(s) IntraMuscular once  ibuprofen  Tablet. 400 milliGRAM(s) Oral every 8 hours  insulin lispro (ADMELOG) corrective regimen sliding scale   SubCutaneous three times a day before meals  levothyroxine 137 MICROGram(s) Oral daily  lidocaine   4% Patch 1 Patch Transdermal daily  methylPREDNISolone sodium succinate Injectable 125 milliGRAM(s) IV Push Once  pantoprazole    Tablet 40 milliGRAM(s) Oral before breakfast  tiotropium 2.5 MICROgram(s) Inhaler 2 Puff(s) Inhalation daily    MEDICATIONS  (PRN):  dextrose Oral Gel 15 Gram(s) Oral once PRN Blood Glucose LESS THAN 70 milliGRAM(s)/deciliter  HYDROmorphone  Injectable 1 milliGRAM(s) IV Push every 6 hours PRN Severe Pain (7 - 10)  oxyCODONE    IR 5 milliGRAM(s) Oral every 6 hours PRN Moderate Pain (4 - 6)    I&O's Summary      Physical Exam:  General: Awake, alert and appropriate, breathing comfortably  ENT: PERRLA, EOMI, No JVD  Chest: +L pigtail, no air leak noted; B/L Bs  CVS: S1,S2, Regular, No Murmurs noted   Abd: BS+Soft Non-tender, No rebound or localized pain   Ext: Warm and perfused, No Edema   Neuro: A+O x 3, Moving all extremities, non-focal

## 2023-04-09 NOTE — PROGRESS NOTE ADULT - ASSESSMENT
PLAN  Neuro: Pain management  Pulm: Encourage coughing, deep breathing and use of incentive spirometry. Wean off supplemental oxygen as able. Daily CXR.   Cardio: Monitor telemetry/alarms  GI: Tolerating diet. Continue stool softeners.  Renal: monitor urine output, supplement electrolytes as needed  Vasc: Heparin SC/SCDs for DVT prophylaxis  Heme: Stable H/H. .   ID: Off antibiotics. Stable.  Therapy: OOB/ambulate  Tubes: Monitor Chest tube output  Disposition: Aim to D/C to home on  Discussed with Cardiothoracic Team at AM rounds. A/P: 78 yo Female with h/o COPD/bullous emphysema on home O2 PRN,  h/o Rt PTX 11/2021 and 7/2022 s/p pigtail placement, s/p FB Right VATS RODERICK wedge resection and talc pleurodesis 7/20/22, left PTX on 4/4/23 now admitted w spontaneous Left large PTX again.    S/p left pigtail chest tube  Lung now reexpanded, patient SOB improved chest tube to suction   CT no airleak.   Placed on waterseal.   CXR shows lung still expanded.   Breathing comfortably on 4L NC.   Pain control with IV tylenol, NSAIDs, oxy, dilaudid, lido patch  tentative OR on tuesday for pleurodesis   OK for diet now  insulin for DM management   Plan discussed with attending Dr. Colón

## 2023-04-10 ENCOUNTER — NON-APPOINTMENT (OUTPATIENT)
Age: 78
End: 2023-04-10

## 2023-04-10 LAB
ALBUMIN SERPL ELPH-MCNC: 3 G/DL — LOW (ref 3.3–5)
ALP SERPL-CCNC: 95 U/L — SIGNIFICANT CHANGE UP (ref 40–120)
ALT FLD-CCNC: 20 U/L — SIGNIFICANT CHANGE UP (ref 12–78)
ANION GAP SERPL CALC-SCNC: 4 MMOL/L — LOW (ref 5–17)
APTT BLD: 32.7 SEC — SIGNIFICANT CHANGE UP (ref 27.5–35.5)
AST SERPL-CCNC: 18 U/L — SIGNIFICANT CHANGE UP (ref 15–37)
BILIRUB SERPL-MCNC: 0.5 MG/DL — SIGNIFICANT CHANGE UP (ref 0.2–1.2)
BLD GP AB SCN SERPL QL: SIGNIFICANT CHANGE UP
BUN SERPL-MCNC: 18 MG/DL — SIGNIFICANT CHANGE UP (ref 7–23)
CALCIUM SERPL-MCNC: 9.2 MG/DL — SIGNIFICANT CHANGE UP (ref 8.5–10.1)
CHLORIDE SERPL-SCNC: 105 MMOL/L — SIGNIFICANT CHANGE UP (ref 96–108)
CO2 SERPL-SCNC: 26 MMOL/L — SIGNIFICANT CHANGE UP (ref 22–31)
CREAT SERPL-MCNC: 0.73 MG/DL — SIGNIFICANT CHANGE UP (ref 0.5–1.3)
EGFR: 84 ML/MIN/1.73M2 — SIGNIFICANT CHANGE UP
GLUCOSE BLDC GLUCOMTR-MCNC: 144 MG/DL — HIGH (ref 70–99)
GLUCOSE BLDC GLUCOMTR-MCNC: 151 MG/DL — HIGH (ref 70–99)
GLUCOSE BLDC GLUCOMTR-MCNC: 157 MG/DL — HIGH (ref 70–99)
GLUCOSE BLDC GLUCOMTR-MCNC: 158 MG/DL — HIGH (ref 70–99)
GLUCOSE SERPL-MCNC: 148 MG/DL — HIGH (ref 70–99)
HCT VFR BLD CALC: 43.9 % — SIGNIFICANT CHANGE UP (ref 34.5–45)
HGB BLD-MCNC: 14.3 G/DL — SIGNIFICANT CHANGE UP (ref 11.5–15.5)
INR BLD: 1.12 RATIO — SIGNIFICANT CHANGE UP (ref 0.88–1.16)
MAGNESIUM SERPL-MCNC: 2.1 MG/DL — SIGNIFICANT CHANGE UP (ref 1.6–2.6)
MCHC RBC-ENTMCNC: 28.8 PG — SIGNIFICANT CHANGE UP (ref 27–34)
MCHC RBC-ENTMCNC: 32.6 GM/DL — SIGNIFICANT CHANGE UP (ref 32–36)
MCV RBC AUTO: 88.3 FL — SIGNIFICANT CHANGE UP (ref 80–100)
PHOSPHATE SERPL-MCNC: 3.2 MG/DL — SIGNIFICANT CHANGE UP (ref 2.5–4.5)
PLATELET # BLD AUTO: 274 K/UL — SIGNIFICANT CHANGE UP (ref 150–400)
POTASSIUM SERPL-MCNC: 3.7 MMOL/L — SIGNIFICANT CHANGE UP (ref 3.5–5.3)
POTASSIUM SERPL-SCNC: 3.7 MMOL/L — SIGNIFICANT CHANGE UP (ref 3.5–5.3)
PROT SERPL-MCNC: 7.4 GM/DL — SIGNIFICANT CHANGE UP (ref 6–8.3)
PROTHROM AB SERPL-ACNC: 13 SEC — SIGNIFICANT CHANGE UP (ref 10.5–13.4)
RBC # BLD: 4.97 M/UL — SIGNIFICANT CHANGE UP (ref 3.8–5.2)
RBC # FLD: 14.1 % — SIGNIFICANT CHANGE UP (ref 10.3–14.5)
SARS-COV-2 RNA SPEC QL NAA+PROBE: SIGNIFICANT CHANGE UP
SODIUM SERPL-SCNC: 135 MMOL/L — SIGNIFICANT CHANGE UP (ref 135–145)
WBC # BLD: 8.34 K/UL — SIGNIFICANT CHANGE UP (ref 3.8–10.5)
WBC # FLD AUTO: 8.34 K/UL — SIGNIFICANT CHANGE UP (ref 3.8–10.5)

## 2023-04-10 PROCEDURE — 99231 SBSQ HOSP IP/OBS SF/LOW 25: CPT

## 2023-04-10 RX ADMIN — PANTOPRAZOLE SODIUM 40 MILLIGRAM(S): 20 TABLET, DELAYED RELEASE ORAL at 05:37

## 2023-04-10 RX ADMIN — BUDESONIDE AND FORMOTEROL FUMARATE DIHYDRATE 2 PUFF(S): 160; 4.5 AEROSOL RESPIRATORY (INHALATION) at 21:12

## 2023-04-10 RX ADMIN — Medication 2: at 16:46

## 2023-04-10 RX ADMIN — HYDROMORPHONE HYDROCHLORIDE 1 MILLIGRAM(S): 2 INJECTION INTRAMUSCULAR; INTRAVENOUS; SUBCUTANEOUS at 10:22

## 2023-04-10 RX ADMIN — HYDROMORPHONE HYDROCHLORIDE 1 MILLIGRAM(S): 2 INJECTION INTRAMUSCULAR; INTRAVENOUS; SUBCUTANEOUS at 22:20

## 2023-04-10 RX ADMIN — Medication 400 MILLIGRAM(S): at 22:50

## 2023-04-10 RX ADMIN — HYDROMORPHONE HYDROCHLORIDE 1 MILLIGRAM(S): 2 INJECTION INTRAMUSCULAR; INTRAVENOUS; SUBCUTANEOUS at 17:03

## 2023-04-10 RX ADMIN — HYDROMORPHONE HYDROCHLORIDE 1 MILLIGRAM(S): 2 INJECTION INTRAMUSCULAR; INTRAVENOUS; SUBCUTANEOUS at 22:50

## 2023-04-10 RX ADMIN — BUDESONIDE AND FORMOTEROL FUMARATE DIHYDRATE 2 PUFF(S): 160; 4.5 AEROSOL RESPIRATORY (INHALATION) at 08:20

## 2023-04-10 RX ADMIN — Medication 2: at 11:51

## 2023-04-10 RX ADMIN — Medication 400 MILLIGRAM(S): at 22:20

## 2023-04-10 RX ADMIN — Medication 40 MILLIGRAM(S): at 10:08

## 2023-04-10 RX ADMIN — Medication 400 MILLIGRAM(S): at 05:37

## 2023-04-10 RX ADMIN — TIOTROPIUM BROMIDE 2 PUFF(S): 18 CAPSULE ORAL; RESPIRATORY (INHALATION) at 08:17

## 2023-04-10 RX ADMIN — Medication 400 MILLIGRAM(S): at 16:04

## 2023-04-10 RX ADMIN — GABAPENTIN 300 MILLIGRAM(S): 400 CAPSULE ORAL at 22:20

## 2023-04-10 RX ADMIN — Medication 137 MICROGRAM(S): at 05:37

## 2023-04-10 RX ADMIN — ENOXAPARIN SODIUM 40 MILLIGRAM(S): 100 INJECTION SUBCUTANEOUS at 05:37

## 2023-04-10 RX ADMIN — HYDROMORPHONE HYDROCHLORIDE 1 MILLIGRAM(S): 2 INJECTION INTRAMUSCULAR; INTRAVENOUS; SUBCUTANEOUS at 10:07

## 2023-04-10 RX ADMIN — HYDROMORPHONE HYDROCHLORIDE 1 MILLIGRAM(S): 2 INJECTION INTRAMUSCULAR; INTRAVENOUS; SUBCUTANEOUS at 16:48

## 2023-04-10 RX ADMIN — GABAPENTIN 300 MILLIGRAM(S): 400 CAPSULE ORAL at 10:08

## 2023-04-10 RX ADMIN — Medication 400 MILLIGRAM(S): at 16:34

## 2023-04-10 NOTE — PROGRESS NOTE ADULT - ASSESSMENT
78 yo Female with h/o COPD/bullous emphysema on home O2 PRN,  h/o Rt PTX 11/2021 and 7/2022 s/p pigtail placement, s/p FB Right VATS RODERICK wedge resection and talc pleurodesis 7/20/22, left PTX on 4/4/23 now admitted w spontaneous Left large PTX again. Pt was discharged on 4/8 and readmitted 4/9 with moderated Left PTX.  Pigtail was placed with good reexpansion of lung.    Plan   Maintain Left pigtail to WS today  CXR in am   pain mgmt   IS  Plan for OR for Left VATS pleurodesis on Thursday with Dr Herbert   DW Dr Herbert in am rounds     78 yo Female with h/o COPD/bullous emphysema on home O2 PRN,  h/o Rt PTX 11/2021 and 7/2022 s/p pigtail placement, s/p FB Right VATS RODERICK wedge resection and talc pleurodesis 7/20/22, left PTX on 4/4/23 now admitted w spontaneous Left large PTX again. Pt was discharged on 4/8 and readmitted 4/9 with moderated Left PTX.  Pigtail was placed with good reexpansion of lung.    Plan   Maintain Left pigtail to suction   NPO p midnight   CXR in am   pain mgmt   IS  Plan for OR for Left VATS pleurodesis on Tomorrow with Dr Herbert   DW Dr Herbert in am rounds

## 2023-04-10 NOTE — PROGRESS NOTE ADULT - ASSESSMENT
78 yo Female with h/o COPD/bullous emphysema on home O2 PRN,  h/o Rt PTX 11/2021 and 7/2022 s/p pigtail placement, s/p FB Right VATS RODERICK wedge resection and talc pleurodesis 7/20/22, left PTX on 4/4/23 now admitted w spontaneous Left large PTX again. Pt was discharged on 4/8 and readmitted 4/9 with moderated Left PTX.  Pigtail was placed with good reexpansion of lung. S/p left pigtail placement 4/8/23        Plan   OOB/Ambulate - PT consult placed   Maintain CT to WS today   CXR in am/ encourage IS  pain mgmt with PVA today   am labs   d/c B[place]/Terral  + Urine culture- start Bactrim for 5 days   no anti inflammatories - s/p Pleurodesis    Discussed with Dr Herbert

## 2023-04-10 NOTE — CDI QUERY NOTE - NSCDIOTHERTXTBX_GEN_ALL_CORE_HH
The Physician's or Provider's documentation of the patient's presentation, evaluation and medical management, as identified below, may support a diagnosis that is not documented to the furthest specificity in the medical record. Please clarify in your progress notes and/or discharge summary if there is a corresponding diagnosis associated with the clinical information described below:    Can the patient's respiratory status be clarified if known?                                     -Chronic respiratory failure                               -Acute on chronic respiratory failure    -Other (specify)    SUPPORTING DOCUMENTATION AND/OR CLINICAL EVIDENCE:    Pulse Oximeter 80, given 2L NC   RR: 18 (04-07-23) (18 - 28)  SpO2: 94% (04-07-23) (80% - 96%)    DC summary -78 yo Female with h/o 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 4/4 Left pigtail catheter placed w re-expansion of left lung      H&P-78 yo Female with h/o COPD/bullous emphysema on home O2 PRN,  h/o Rt PTX 11/2021 and 7/2022 78 yo Female with h/o 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 Pt states developed acute chest pain on left side associated w difficulty breathing this AM after showering.

## 2023-04-10 NOTE — PROGRESS NOTE ADULT - SUBJECTIVE AND OBJECTIVE BOX
Subjective:  pt in bed NAD no issues overnight     T(C): 36.6 (04-12-23 @ 06:29), Max: 36.8 (04-12-23 @ 00:00)  HR: 71 (04-12-23 @ 06:00) (65 - 100)  BP: 90/50 (04-11-23 @ 21:00) (90/50 - 142/73)  ABP: 127/66 (04-12-23 @ 06:00) (72/64 - 160/80)  ABP(mean): 91 (04-12-23 @ 06:00) (67 - 91)  RR: 17 (04-12-23 @ 06:00) (10 - 25)  SpO2: 96% (04-12-23 @ 06:00) (91% - 99%) 3  LNC   Wt(kg): --  CVP(mm Hg): --  CO: --  CI: --  PA: --                                              Tele: SR    CHEST TUBE:    500cc                          OUTPUT:     per 24 hours    AIR LEAKS:  [ ] YES [x ] NO          04-12    134<L>  |  107  |  20  ----------------------------<  142<H>  4.1   |  23  |  0.54    Ca    8.6      12 Apr 2023 05:30  Phos  2.9     04-12  Mg     1.8     04-12                                 11.5   11.02 )-----------( 281      ( 12 Apr 2023 05:30 )             36.6                 CAPILLARY BLOOD GLUCOSE      POCT Blood Glucose.: 142 mg/dL (12 Apr 2023 07:53)  POCT Blood Glucose.: 152 mg/dL (11 Apr 2023 18:13)  POCT Blood Glucose.: 193 mg/dL (11 Apr 2023 12:41)           CXR: < from: Xray Chest 1 View- PORTABLE-Urgent (Xray Chest 1 View- PORTABLE-Urgent .) (04.11.23 @ 14:01) >  INTERPRETATION:  AP chest on April 11, 2023 at 7:09 AM. Patient is being   followed for catheter left chest tube.    Heart magnified by technique. Elevated right hemidiaphragm with right   basilar atelectasis is similar to April 9.    Catheter left chest tube remains.    There is a somewhat increased left lateral pneumothorax compared to April 9. Some atelectasis at left base again noted.    Follow-up AP chest on April 11, 2023 at 1:39 PM.    Elevated right diaphragm again noted. Heart magnified by technique.    On April 11 there is a catheter left chest tube. This is been removed in   the center chest tube inserted.    Left pneumothorax on earlier study is diminished but there is an apical   residual.    Mild right base atelectasis unchanged.    IMPRESSION: Replacement of catheter left chest tube with a standard chest   tube resulting in improved left pneumothorax with mild left apical   pneumothorax remaining.    < end of copied text >          Exam   Neuro:  Alert Awake NAD  Pulm: decreased at bases b/l  + Left CT  CV: RRR S1 S2   Abd: soft NT - BM since surgery   Extremities: warm, trace edema   Inc: Left thorax- C/D/I + Lt CT         Assessment:  77yFemale    with PAST MEDICAL & SURGICAL HISTORY:  Hypothyroidism      Diabetes mellitus  diet controlled      Back pain      Asthma      Elevated pancreatic enzyme  patient reports elevated lab 6/2013. Gastroenterologist aware      History of cataract  extracted from right and left      Varicose veins of both lower extremities  surgery      Diverticulosis of intestine without bleeding, unspecified intestinal tract location      Gall stones  gall bladder removed      Urinary urgency      Spinal stenosis of lumbar region      DDD (degenerative disc disease), lumbar      Basal cell carcinoma  removed from face      Pulmonary emphysema, unspecified emphysema type  2017 last exacerbation; never intubated      OA (osteoarthritis)      Frequent UTI  2-3x/year; had an episode 5 weeks ago was treated denies dysuria      GERD (gastroesophageal reflux disease)      Leg swelling      Emphysema lung      COVID-19 vaccine series completed  Moderna - 2nd dose 02/20/2021      Knee pain, left      Scar tissue  left knee S/P TKR      COPD exacerbation      S/P bladder repair  bladder lift 2/2013      Dental disorder  Dental surgery 12/2012, patient reports that upper portion is glued in at this time, plan is to have dental implants placed.      S/P knee surgery  arthroscopy bilateral 2007 and 2010      Skin cancer  to right temple 2012, removed, history of basal cell x 2      H/O lumbosacral spine surgery  fusion 2017      H/O umbilical hernia repair  5/24/19      S/P cholecystectomy  2012      S/P colonoscopy  benign polyp      S/P knee replacement  right 2017, left 11/2019

## 2023-04-10 NOTE — PROGRESS NOTE ADULT - SUBJECTIVE AND OBJECTIVE BOX
Subjective: Pt in bed NAD . Pt on waterseal     T(C): 36.9 (04-10-23 @ 07:09), Max: 36.9 (04-10-23 @ 07:09)  HR: 72 (04-10-23 @ 07:09) (70 - 87)  BP: 119/67 (04-10-23 @ 07:09) (115/54 - 119/67)  ABP: --  ABP(mean): --  RR: 18 (04-10-23 @ 07:09) (18 - 18)  SpO2: 93% (04-10-23 @ 07:09) (92% - 93%) RA  Wt(kg): --  CVP(mm Hg): --  CO: --  CI: --  PA: --                                              Tele: SR     CHEST TUBE: 30cc                              OUTPUT:     per 24 hours    AIR LEAKS:  [ ] YES [x ] NO          04-10    135  |  105  |  18  ----------------------------<  148<H>  3.7   |  26  |  0.73    Ca    9.2      10 Apr 2023 07:51  Phos  3.2     04-10  Mg     2.1     04-10    TPro  7.4  /  Alb  3.0<L>  /  TBili  0.5  /  DBili  x   /  AST  18  /  ALT  20  /  AlkPhos  95  04-10                               14.3   8.34  )-----------( 274      ( 10 Apr 2023 07:51 )             43.9        PT/INR - ( 10 Apr 2023 07:51 )   PT: 13.0 sec;   INR: 1.12 ratio         PTT - ( 10 Apr 2023 07:51 )  PTT:32.7 sec         CAPILLARY BLOOD GLUCOSE      POCT Blood Glucose.: 144 mg/dL (10 Apr 2023 07:46)  POCT Blood Glucose.: 130 mg/dL (09 Apr 2023 21:33)  POCT Blood Glucose.: 155 mg/dL (09 Apr 2023 17:16)  POCT Blood Glucose.: 93 mg/dL (09 Apr 2023 12:31)           CXR: < from: Xray Chest 1 View AP/PA. (04.09.23 @ 11:45) >  IMPRESSION: On the first film of this series, there is a large left-sided   pneumothorax with atelectasis and scarring within the collapsed left   lung. There is linear atelectasis noted within the right midlung. Sutures   are noted within the right upper lung. The heart is normal in size.    On the follow-up film, there is a pigtail catheter present with   persistent opacification at the left lung base but with reexpansion of   the lung.    On the final film of this series, the lung remains expanded with improved   aeration in less opacification at the mid and lower left lung. There is   worsening atelectasis of the right lung base. There is elevation of the   right hemidiaphragm.    < end of copied text >           EXAM  Neuro: Alert awake NAD   Pulm:  decreased at bases b/l  + left Pigtail   CV: RRR S1 S2   Abd:  soft NT ND   Extremities:         Assessment:  77yFemale    with PAST MEDICAL & SURGICAL HISTORY:  Hypothyroidism      Diabetes mellitus  diet controlled      Back pain      Asthma      Elevated pancreatic enzyme  patient reports elevated lab 6/2013. Gastroenterologist aware      History of cataract  extracted from right and left      Varicose veins of both lower extremities  surgery      Diverticulosis of intestine without bleeding, unspecified intestinal tract location      Gall stones  gall bladder removed      Urinary urgency      Spinal stenosis of lumbar region      DDD (degenerative disc disease), lumbar      Basal cell carcinoma  removed from face      Pulmonary emphysema, unspecified emphysema type  2017 last exacerbation; never intubated      OA (osteoarthritis)      Frequent UTI  2-3x/year; had an episode 5 weeks ago was treated denies dysuria      GERD (gastroesophageal reflux disease)      Leg swelling      Emphysema lung      COVID-19 vaccine series completed  Moderna - 2nd dose 02/20/2021      Knee pain, left      Scar tissue  left knee S/P TKR      COPD exacerbation      S/P bladder repair  bladder lift 2/2013      Dental disorder  Dental surgery 12/2012, patient reports that upper portion is glued in at this time, plan is to have dental implants placed.      S/P knee surgery  arthroscopy bilateral 2007 and 2010      Skin cancer  to right temple 2012, removed, history of basal cell x 2      H/O lumbosacral spine surgery  fusion 2017      H/O umbilical hernia repair  5/24/19      S/P cholecystectomy  2012      S/P colonoscopy  benign polyp      S/P knee replacement  right 2017, left 11/2019

## 2023-04-11 ENCOUNTER — TRANSCRIPTION ENCOUNTER (OUTPATIENT)
Age: 78
End: 2023-04-11

## 2023-04-11 ENCOUNTER — APPOINTMENT (OUTPATIENT)
Dept: THORACIC SURGERY | Facility: HOSPITAL | Age: 78
End: 2023-04-11

## 2023-04-11 DIAGNOSIS — R09.02 HYPOXEMIA: ICD-10-CM

## 2023-04-11 DIAGNOSIS — J93.9 PNEUMOTHORAX, UNSPECIFIED: ICD-10-CM

## 2023-04-11 DIAGNOSIS — J43.9 EMPHYSEMA, UNSPECIFIED: ICD-10-CM

## 2023-04-11 DIAGNOSIS — J44.9 CHRONIC OBSTRUCTIVE PULMONARY DISEASE, UNSPECIFIED: ICD-10-CM

## 2023-04-11 LAB
-  AMIKACIN: SIGNIFICANT CHANGE UP
-  AMOXICILLIN/CLAVULANIC ACID: SIGNIFICANT CHANGE UP
-  AMPICILLIN/SULBACTAM: SIGNIFICANT CHANGE UP
-  AMPICILLIN: SIGNIFICANT CHANGE UP
-  AZTREONAM: SIGNIFICANT CHANGE UP
-  CEFAZOLIN: SIGNIFICANT CHANGE UP
-  CEFEPIME: SIGNIFICANT CHANGE UP
-  CEFOXITIN: SIGNIFICANT CHANGE UP
-  CEFTRIAXONE: SIGNIFICANT CHANGE UP
-  CEFUROXIME: SIGNIFICANT CHANGE UP
-  CIPROFLOXACIN: SIGNIFICANT CHANGE UP
-  ERTAPENEM: SIGNIFICANT CHANGE UP
-  GENTAMICIN: SIGNIFICANT CHANGE UP
-  IMIPENEM: SIGNIFICANT CHANGE UP
-  LEVOFLOXACIN: SIGNIFICANT CHANGE UP
-  MEROPENEM: SIGNIFICANT CHANGE UP
-  NITROFURANTOIN: SIGNIFICANT CHANGE UP
-  PIPERACILLIN/TAZOBACTAM: SIGNIFICANT CHANGE UP
-  TOBRAMYCIN: SIGNIFICANT CHANGE UP
-  TRIMETHOPRIM/SULFAMETHOXAZOLE: SIGNIFICANT CHANGE UP
CULTURE RESULTS: SIGNIFICANT CHANGE UP
GLUCOSE BLDC GLUCOMTR-MCNC: 113 MG/DL — HIGH (ref 70–99)
GLUCOSE BLDC GLUCOMTR-MCNC: 152 MG/DL — HIGH (ref 70–99)
GLUCOSE BLDC GLUCOMTR-MCNC: 193 MG/DL — HIGH (ref 70–99)
METHOD TYPE: SIGNIFICANT CHANGE UP
ORGANISM # SPEC MICROSCOPIC CNT: SIGNIFICANT CHANGE UP
ORGANISM # SPEC MICROSCOPIC CNT: SIGNIFICANT CHANGE UP
SPECIMEN SOURCE: SIGNIFICANT CHANGE UP

## 2023-04-11 PROCEDURE — 32656 THORACOSCOPY W/PLEURECTOMY: CPT

## 2023-04-11 PROCEDURE — 88307 TISSUE EXAM BY PATHOLOGIST: CPT | Mod: 26

## 2023-04-11 PROCEDURE — 32656 THORACOSCOPY W/PLEURECTOMY: CPT | Mod: AS

## 2023-04-11 PROCEDURE — 32655 THORACOSCOPY RESECT BULLAE: CPT | Mod: AS,LT

## 2023-04-11 PROCEDURE — 88305 TISSUE EXAM BY PATHOLOGIST: CPT | Mod: 26

## 2023-04-11 PROCEDURE — 99223 1ST HOSP IP/OBS HIGH 75: CPT

## 2023-04-11 PROCEDURE — 32650 THORACOSCOPY W/PLEURODESIS: CPT | Mod: 59

## 2023-04-11 PROCEDURE — 99231 SBSQ HOSP IP/OBS SF/LOW 25: CPT | Mod: 57

## 2023-04-11 PROCEDURE — 31622 DX BRONCHOSCOPE/WASH: CPT

## 2023-04-11 PROCEDURE — 71045 X-RAY EXAM CHEST 1 VIEW: CPT | Mod: 26,76

## 2023-04-11 PROCEDURE — 32655 THORACOSCOPY RESECT BULLAE: CPT | Mod: LT

## 2023-04-11 PROCEDURE — 32650 THORACOSCOPY W/PLEURODESIS: CPT | Mod: AS,59

## 2023-04-11 RX ORDER — ENOXAPARIN SODIUM 100 MG/ML
40 INJECTION SUBCUTANEOUS EVERY 24 HOURS
Refills: 0 | Status: ACTIVE | OUTPATIENT
Start: 2023-04-12 | End: 2024-03-10

## 2023-04-11 RX ORDER — FENTANYL CITRATE 50 UG/ML
50 INJECTION INTRAVENOUS
Refills: 0 | Status: DISCONTINUED | OUTPATIENT
Start: 2023-04-11 | End: 2023-04-12

## 2023-04-11 RX ORDER — OXYCODONE HYDROCHLORIDE 5 MG/1
5 TABLET ORAL ONCE
Refills: 0 | Status: DISCONTINUED | OUTPATIENT
Start: 2023-04-11 | End: 2023-04-12

## 2023-04-11 RX ORDER — ONDANSETRON 8 MG/1
4 TABLET, FILM COATED ORAL EVERY 6 HOURS
Refills: 0 | Status: ACTIVE | OUTPATIENT
Start: 2023-04-11 | End: 2024-03-09

## 2023-04-11 RX ORDER — ONDANSETRON 8 MG/1
4 TABLET, FILM COATED ORAL ONCE
Refills: 0 | Status: DISCONTINUED | OUTPATIENT
Start: 2023-04-11 | End: 2023-04-12

## 2023-04-11 RX ORDER — ACETAMINOPHEN 500 MG
975 TABLET ORAL EVERY 8 HOURS
Refills: 0 | Status: COMPLETED | OUTPATIENT
Start: 2023-04-11 | End: 2024-03-09

## 2023-04-11 RX ORDER — HYDROMORPHONE HYDROCHLORIDE 2 MG/ML
0.5 INJECTION INTRAMUSCULAR; INTRAVENOUS; SUBCUTANEOUS
Refills: 0 | Status: DISCONTINUED | OUTPATIENT
Start: 2023-04-11 | End: 2023-04-13

## 2023-04-11 RX ORDER — LIDOCAINE 4 G/100G
1 CREAM TOPICAL DAILY
Refills: 0 | Status: DISCONTINUED | OUTPATIENT
Start: 2023-04-11 | End: 2023-04-11

## 2023-04-11 RX ORDER — ACETAMINOPHEN 500 MG
1000 TABLET ORAL ONCE
Refills: 0 | Status: COMPLETED | OUTPATIENT
Start: 2023-04-11 | End: 2023-04-11

## 2023-04-11 RX ORDER — HYDROMORPHONE HYDROCHLORIDE 2 MG/ML
30 INJECTION INTRAMUSCULAR; INTRAVENOUS; SUBCUTANEOUS
Refills: 0 | Status: DISCONTINUED | OUTPATIENT
Start: 2023-04-11 | End: 2023-04-13

## 2023-04-11 RX ORDER — NALOXONE HYDROCHLORIDE 4 MG/.1ML
0.1 SPRAY NASAL
Refills: 0 | Status: ACTIVE | OUTPATIENT
Start: 2023-04-11 | End: 2024-03-09

## 2023-04-11 RX ORDER — SENNA PLUS 8.6 MG/1
2 TABLET ORAL AT BEDTIME
Refills: 0 | Status: ACTIVE | OUTPATIENT
Start: 2023-04-11 | End: 2024-03-09

## 2023-04-11 RX ADMIN — Medication 2: at 12:44

## 2023-04-11 RX ADMIN — FENTANYL CITRATE 50 MICROGRAM(S): 50 INJECTION INTRAVENOUS at 12:13

## 2023-04-11 RX ADMIN — Medication 1000 MILLIGRAM(S): at 09:39

## 2023-04-11 RX ADMIN — HYDROMORPHONE HYDROCHLORIDE 30 MILLILITER(S): 2 INJECTION INTRAMUSCULAR; INTRAVENOUS; SUBCUTANEOUS at 12:13

## 2023-04-11 RX ADMIN — Medication 137 MICROGRAM(S): at 06:11

## 2023-04-11 RX ADMIN — FENTANYL CITRATE 50 MICROGRAM(S): 50 INJECTION INTRAVENOUS at 12:03

## 2023-04-11 RX ADMIN — LIDOCAINE 1 PATCH: 4 CREAM TOPICAL at 19:00

## 2023-04-11 RX ADMIN — HYDROMORPHONE HYDROCHLORIDE 1 MILLIGRAM(S): 2 INJECTION INTRAMUSCULAR; INTRAVENOUS; SUBCUTANEOUS at 06:15

## 2023-04-11 RX ADMIN — Medication 400 MILLIGRAM(S): at 18:17

## 2023-04-11 RX ADMIN — LIDOCAINE 1 PATCH: 4 CREAM TOPICAL at 15:21

## 2023-04-11 RX ADMIN — HYDROMORPHONE HYDROCHLORIDE 1 MILLIGRAM(S): 2 INJECTION INTRAMUSCULAR; INTRAVENOUS; SUBCUTANEOUS at 06:35

## 2023-04-11 RX ADMIN — TIOTROPIUM BROMIDE 2 PUFF(S): 18 CAPSULE ORAL; RESPIRATORY (INHALATION) at 08:22

## 2023-04-11 RX ADMIN — Medication 2: at 18:16

## 2023-04-11 RX ADMIN — Medication 1000 MILLIGRAM(S): at 18:30

## 2023-04-11 RX ADMIN — BUDESONIDE AND FORMOTEROL FUMARATE DIHYDRATE 2 PUFF(S): 160; 4.5 AEROSOL RESPIRATORY (INHALATION) at 08:22

## 2023-04-11 RX ADMIN — PANTOPRAZOLE SODIUM 40 MILLIGRAM(S): 20 TABLET, DELAYED RELEASE ORAL at 06:11

## 2023-04-11 NOTE — PROGRESS NOTE ADULT - SUBJECTIVE AND OBJECTIVE BOX
Subjective:  Pt seen, NPO for OR.    Vital Signs:  Vital Signs Last 24 Hrs  T(C): 36.6 (04-11-23 @ 07:42), Max: 36.9 (04-10-23 @ 15:15)  T(F): 97.9 (04-11-23 @ 07:42), Max: 98.4 (04-10-23 @ 15:15)  HR: 95 (04-11-23 @ 12:15) (72 - 95)  BP: 134/72 (04-11-23 @ 12:15) (104/50 - 142/73)  RR: 19 (04-11-23 @ 12:15) (18 - 25)  SpO2: 97% (04-11-23 @ 12:15) (91% - 97%) on (O2)    Telemetry/Alarms:    Relevant labs, radiology and Medications reviewed                        14.3   8.34  )-----------( 274      ( 10 Apr 2023 07:51 )             43.9     04-10    135  |  105  |  18  ----------------------------<  148<H>  3.7   |  26  |  0.73    Ca    9.2      10 Apr 2023 07:51  Phos  3.2     04-10  Mg     2.1     04-10    TPro  7.4  /  Alb  3.0<L>  /  TBili  0.5  /  DBili  x   /  AST  18  /  ALT  20  /  AlkPhos  95  04-10    PT/INR - ( 10 Apr 2023 07:51 )   PT: 13.0 sec;   INR: 1.12 ratio         PTT - ( 10 Apr 2023 07:51 )  PTT:32.7 sec  MEDICATIONS  (STANDING):  acetaminophen     Tablet .. 975 milliGRAM(s) Oral every 8 hours  acetaminophen   IVPB .. 1000 milliGRAM(s) IV Intermittent once  albuterol/ipratropium for Nebulization.. 3 milliLiter(s) Nebulizer every 20 minutes  budesonide 160 MICROgram(s)/formoterol 4.5 MICROgram(s) Inhaler 2 Puff(s) Inhalation two times a day  dextrose 5%. 1000 milliLiter(s) (50 mL/Hr) IV Continuous <Continuous>  dextrose 5%. 1000 milliLiter(s) (100 mL/Hr) IV Continuous <Continuous>  dextrose 50% Injectable 25 Gram(s) IV Push once  dextrose 50% Injectable 12.5 Gram(s) IV Push once  dextrose 50% Injectable 25 Gram(s) IV Push once  doxycycline Intrapleural Syringe 1000 milliGRAM(s) IntraPleural. once  furosemide    Tablet 40 milliGRAM(s) Oral daily  gabapentin 300 milliGRAM(s) Oral two times a day  glucagon  Injectable 1 milliGRAM(s) IntraMuscular once  HYDROmorphone PCA (1 mG/mL) 30 milliLiter(s) PCA Continuous PCA Continuous  insulin lispro (ADMELOG) corrective regimen sliding scale   SubCutaneous three times a day before meals  levothyroxine 137 MICROGram(s) Oral daily  lidocaine   4% Patch 1 Patch Transdermal daily  lidocaine   4% Patch 1 Patch Transdermal daily  methylPREDNISolone sodium succinate Injectable 125 milliGRAM(s) IV Push Once  pantoprazole    Tablet 40 milliGRAM(s) Oral before breakfast  tiotropium 2.5 MICROgram(s) Inhaler 2 Puff(s) Inhalation daily    MEDICATIONS  (PRN):  dextrose Oral Gel 15 Gram(s) Oral once PRN Blood Glucose LESS THAN 70 milliGRAM(s)/deciliter  fentaNYL    Injectable 50 MICROGram(s) IV Push every 10 minutes PRN Severe Pain (7 - 10)  HYDROmorphone  Injectable 1 milliGRAM(s) IV Push every 6 hours PRN Severe Pain (7 - 10)  HYDROmorphone PCA (1 mG/mL) Rescue Clinician Bolus 0.5 milliGRAM(s) IV Push every 15 minutes PRN for Pain Scale GREATER THAN 6  naloxone Injectable 0.1 milliGRAM(s) IV Push every 3 minutes PRN For ANY of the following changes in patient status:  A. RR LESS THAN 10 breaths per minute, B. Oxygen saturation LESS THAN 90%, C. Sedation score of 6  ondansetron Injectable 4 milliGRAM(s) IV Push every 6 hours PRN Nausea  ondansetron Injectable 4 milliGRAM(s) IV Push once PRN Nausea and/or Vomiting  oxyCODONE    IR 5 milliGRAM(s) Oral once PRN Moderate Pain (4 - 6)  oxyCODONE    IR 5 milliGRAM(s) Oral every 6 hours PRN Moderate Pain (4 - 6)  senna 2 Tablet(s) Oral at bedtime PRN Constipation      Physical exam  Gen NAD  Neuro AAOx3  Card RRR  Pulm equal  Abd soft  Ext warm    Tubes: Left pigtail cath to suction -20    I&O's Summary    10 Apr 2023 07:01  -  11 Apr 2023 07:00  --------------------------------------------------------  IN: 0 mL / OUT: 45 mL / NET: -45 mL    11 Apr 2023 07:01  -  11 Apr 2023 12:34  --------------------------------------------------------  IN: 800 mL / OUT: 375 mL / NET: 425 mL        Assessment  77y Female  w/ PAST MEDICAL & SURGICAL HISTORY:  Hypothyroidism      Diabetes mellitus  diet controlled      Back pain      Asthma      Elevated pancreatic enzyme  patient reports elevated lab 6/2013. Gastroenterologist aware      History of cataract  extracted from right and left      Varicose veins of both lower extremities  surgery      Diverticulosis of intestine without bleeding, unspecified intestinal tract location      Gall stones  gall bladder removed      Urinary urgency      Spinal stenosis of lumbar region      DDD (degenerative disc disease), lumbar      Basal cell carcinoma  removed from face      Pulmonary emphysema, unspecified emphysema type  2017 last exacerbation; never intubated      OA (osteoarthritis)      Frequent UTI  2-3x/year; had an episode 5 weeks ago was treated denies dysuria      GERD (gastroesophageal reflux disease)      Leg swelling      Emphysema lung      COVID-19 vaccine series completed  Moderna - 2nd dose 02/20/2021      Knee pain, left      Scar tissue  left knee S/P TKR      COPD exacerbation      S/P bladder repair  bladder lift 2/2013      Dental disorder  Dental surgery 12/2012, patient reports that upper portion is glued in at this time, plan is to have dental implants placed.      S/P knee surgery  arthroscopy bilateral 2007 and 2010      Skin cancer  to right temple 2012, removed, history of basal cell x 2      H/O lumbosacral spine surgery  fusion 2017      H/O umbilical hernia repair  5/24/19      S/P cholecystectomy  2012      S/P colonoscopy  benign polyp      S/P knee replacement  right 2017, left 11/2019      admitted with complaints of Patient is a 77y old  Female who presents with a chief complaint of left PTX (11 Apr 2023 08:35)  .  78 yo Female with h/o COPD/bullous emphysema on home O2 PRN,  h/o Rt PTX 11/2021 and 7/2022 s/p pigtail placement, s/p FB Right VATS RODERICK wedge resection and talc pleurodesis 7/20/22, left PTX on 4/4/23 now admitted w spontaneous Left large PTX again. Pt was discharged on 4/8 and readmitted 4/9 with moderated Left PTX.  Pigtail was placed with good reexpansion of lung. S/p left pigtail placement 4/8/23    NPO for OR today  cont chest tube to suction  will move to SDU post op  preop labs reviewed    Discussed with Cardiothoracic Team at AM rounds.

## 2023-04-11 NOTE — BRIEF OPERATIVE NOTE - NSICDXBRIEFPROCEDURE_GEN_ALL_CORE_FT
PROCEDURES:  VATS, with upper lobe wedge resection 11-Apr-2023 12:29:10 FB L VATS RODERICK wedge resection, total pleurectomy, mechanical and chemical (doxy) pleurodesis, intercostal never block Mikala Villarreal E

## 2023-04-11 NOTE — CONSULT NOTE ADULT - ASSESSMENT
- recuurent left pneumothorax  - cont O2  - for VATS talc pleurodesis/bullectomy  - no contraindication to procedure.

## 2023-04-11 NOTE — CONSULT NOTE ADULT - SUBJECTIVE AND OBJECTIVE BOX
HPI:  78 yo Female with h/o COPD/bullous emphysema on home O2 PRN,  h/o Rt PTX 2021 and 2022 s/p pigtail placement, s/p  FB Right VATS RODERICK wedge resection and talc pleurodesis 22, left PTX on 23 now admitted w spontaneous Left large PTX again. Pigtail placed in the ER, lung reexpanded. Patient seen and examined at the bedside. Lying in bed, complains of pain over left side with deep inspiration. States she suddenly became SOB this evening, discharged yesterday.  (2023 22:52)    : History as above. Has left chest tube. C/o some pain at chest tube site. Was discharged on  after admission for spontaneous left PTX.      PAST MEDICAL & SURGICAL HISTORY:  Hypothyroidism      Diabetes mellitus  diet controlled      Back pain      Asthma      Elevated pancreatic enzyme  patient reports elevated lab 2013. Gastroenterologist aware      History of cataract  extracted from right and left      Varicose veins of both lower extremities  surgery      Diverticulosis of intestine without bleeding, unspecified intestinal tract location      Gall stones  gall bladder removed      Urinary urgency      Spinal stenosis of lumbar region      DDD (degenerative disc disease), lumbar      Basal cell carcinoma  removed from face      Pulmonary emphysema, unspecified emphysema type   last exacerbation; never intubated      OA (osteoarthritis)      Frequent UTI  2-3x/year; had an episode 5 weeks ago was treated denies dysuria      GERD (gastroesophageal reflux disease)      Leg swelling      Emphysema lung      COVID-19 vaccine series completed  Moderna - 2nd dose 2021      Knee pain, left      Scar tissue  left knee S/P TKR      COPD exacerbation      S/P bladder repair  bladder lift 2013      Dental disorder  Dental surgery 2012, patient reports that upper portion is glued in at this time, plan is to have dental implants placed.      S/P knee surgery  arthroscopy bilateral  and       Skin cancer  to right temple , removed, history of basal cell x 2      H/O lumbosacral spine surgery  fusion       H/O umbilical hernia repair  19      S/P cholecystectomy  2012      S/P colonoscopy  benign polyp      S/P knee replacement  right , left 2019          MEDICATIONS  (STANDING):  albuterol/ipratropium for Nebulization.. 3 milliLiter(s) Nebulizer every 20 minutes  budesonide 160 MICROgram(s)/formoterol 4.5 MICROgram(s) Inhaler 2 Puff(s) Inhalation two times a day  dextrose 5%. 1000 milliLiter(s) (100 mL/Hr) IV Continuous <Continuous>  dextrose 5%. 1000 milliLiter(s) (50 mL/Hr) IV Continuous <Continuous>  dextrose 50% Injectable 25 Gram(s) IV Push once  dextrose 50% Injectable 12.5 Gram(s) IV Push once  dextrose 50% Injectable 25 Gram(s) IV Push once  furosemide    Tablet 40 milliGRAM(s) Oral daily  gabapentin 300 milliGRAM(s) Oral two times a day  glucagon  Injectable 1 milliGRAM(s) IntraMuscular once  insulin lispro (ADMELOG) corrective regimen sliding scale   SubCutaneous three times a day before meals  levothyroxine 137 MICROGram(s) Oral daily  lidocaine   4% Patch 1 Patch Transdermal daily  methylPREDNISolone sodium succinate Injectable 125 milliGRAM(s) IV Push Once  pantoprazole    Tablet 40 milliGRAM(s) Oral before breakfast  tiotropium 2.5 MICROgram(s) Inhaler 2 Puff(s) Inhalation daily    MEDICATIONS  (PRN):  dextrose Oral Gel 15 Gram(s) Oral once PRN Blood Glucose LESS THAN 70 milliGRAM(s)/deciliter  HYDROmorphone  Injectable 1 milliGRAM(s) IV Push every 6 hours PRN Severe Pain (7 - 10)  oxyCODONE    IR 5 milliGRAM(s) Oral every 6 hours PRN Moderate Pain (4 - 6)      Allergies    Breo Ellipta (Rash)    Intolerances        SOCIAL HISTORY: Denies tobacco, etoh abuse or illicit drug use    FAMILY HISTORY:  Family history of pancreatic cancer (Father)    Family history of heart disease (Mother)    Family history of stroke (Mother)        Vital Signs Last 24 Hrs  T(C): 36.6 (2023 07:42), Max: 36.9 (10 Apr 2023 15:15)  T(F): 97.9 (2023 07:42), Max: 98.4 (10 Apr 2023 15:15)  HR: 72 (2023 07:42) (72 - 85)  BP: 109/54 (2023 07:42) (104/50 - 119/61)  BP(mean): --  RR: 18 (2023 07:42) (18 - 18)  SpO2: 91% (2023 07:42) (91% - 95%)    Parameters below as of 2023 07:42  Patient On (Oxygen Delivery Method): nasal cannula  O2 Flow (L/min): 4      REVIEW OF SYSTEMS:    CONSTITUTIONAL:  As per HPI.  SKIN: no rashes  HEENT:  Eyes:  No diplopia or blurred vision. ENT:  No earache, sore throat or runny nose.  CARDIOVASCULAR:  No pressure, squeezing, tightness, heaviness or aching about the chest, neck, axilla or epigastrium.  RESPIRATORY:  No cough, shortness of breath, PND or orthopnea.  GASTROINTESTINAL:  No nausea, vomiting or diarrhea.  GENITOURINARY:  No dysuria, frequency or urgency.  MUSCULOSKELETAL:  As per HPI.  SKIN:  No change in skin, hair or nails.  NEUROLOGIC:  No paresthesias, fasciculations, seizures or weakness.  PSYCHIATRIC:  No disorder of thought or mood.  ENDOCRINE:  No heat or cold intolerance, polyuria or polydipsia.  HEMATOLOGICAL:  No easy bruising or bleedings:  .     PHYSICAL EXAMINATION:    GENERAL APPEARANCE:  Pt. is not currently dyspneic, in no distress. Pt. is alert, oriented, and pleasant.  HEENT:  Pupils are normal and react normally. No icterus. Mucous membranes well colored.  NECK:  Supple. No lymphadenopathy. Jugular venous pressure not elevated. Carotids equal.   HEART:   S1S2 reg  CHEST:  left ronchi; chest tube no leak  ABDOMEN:  Soft and nontender.   EXTREMITIES:  There is no cyanosis, clubbing or edema.   SKIN:  No rash or significant lesions are noted.    LABS:                        14.3   8.34  )-----------( 274      ( 10 Apr 2023 07:51 )             43.9     04-10    135  |  105  |  18  ----------------------------<  148<H>  3.7   |  26  |  0.73    Ca    9.2      10 Apr 2023 07:51  Phos  3.2     04-10  Mg     2.1     04-10    TPro  7.4  /  Alb  3.0<L>  /  TBili  0.5  /  DBili  x   /  AST  18  /  ALT  20  /  AlkPhos  95  04-10    LIVER FUNCTIONS - ( 10 Apr 2023 07:51 )  Alb: 3.0 g/dL / Pro: 7.4 gm/dL / ALK PHOS: 95 U/L / ALT: 20 U/L / AST: 18 U/L / GGT: x           PT/INR - ( 10 Apr 2023 07:51 )   PT: 13.0 sec;   INR: 1.12 ratio         PTT - ( 10 Apr 2023 07:51 )  PTT:32.7 sec      Urinalysis Basic - ( 2023 09:52 )    Color: Yellow / Appearance: Clear / S.015 / pH: x  Gluc: x / Ketone: Negative  / Bili: Negative / Urobili: Negative   Blood: x / Protein: Trace mg/dL / Nitrite: Negative   Leuk Esterase: Small / RBC: 3-5 /HPF / WBC 11-25 /HPF   Sq Epi: x / Non Sq Epi: x / Bacteria: Many          RADIOLOGY & ADDITIONAL STUDIES:

## 2023-04-12 DIAGNOSIS — K57.90 DIVERTICULOSIS OF INTESTINE, PART UNSPECIFIED, WITHOUT PERFORATION OR ABSCESS WITHOUT BLEEDING: ICD-10-CM

## 2023-04-12 DIAGNOSIS — J43.9 EMPHYSEMA, UNSPECIFIED: ICD-10-CM

## 2023-04-12 DIAGNOSIS — K21.9 GASTRO-ESOPHAGEAL REFLUX DISEASE WITHOUT ESOPHAGITIS: ICD-10-CM

## 2023-04-12 DIAGNOSIS — M48.061 SPINAL STENOSIS, LUMBAR REGION WITHOUT NEUROGENIC CLAUDICATION: ICD-10-CM

## 2023-04-12 DIAGNOSIS — E11.9 TYPE 2 DIABETES MELLITUS WITHOUT COMPLICATIONS: ICD-10-CM

## 2023-04-12 DIAGNOSIS — J96.21 ACUTE AND CHRONIC RESPIRATORY FAILURE WITH HYPOXIA: ICD-10-CM

## 2023-04-12 DIAGNOSIS — E03.9 HYPOTHYROIDISM, UNSPECIFIED: ICD-10-CM

## 2023-04-12 DIAGNOSIS — M17.12 UNILATERAL PRIMARY OSTEOARTHRITIS, LEFT KNEE: ICD-10-CM

## 2023-04-12 DIAGNOSIS — J93.83 OTHER PNEUMOTHORAX: ICD-10-CM

## 2023-04-12 DIAGNOSIS — J98.11 ATELECTASIS: ICD-10-CM

## 2023-04-12 DIAGNOSIS — I83.93 ASYMPTOMATIC VARICOSE VEINS OF BILATERAL LOWER EXTREMITIES: ICD-10-CM

## 2023-04-12 DIAGNOSIS — R39.15 URGENCY OF URINATION: ICD-10-CM

## 2023-04-12 DIAGNOSIS — M51.36 OTHER INTERVERTEBRAL DISC DEGENERATION, LUMBAR REGION: ICD-10-CM

## 2023-04-12 DIAGNOSIS — R07.89 OTHER CHEST PAIN: ICD-10-CM

## 2023-04-12 LAB
ANION GAP SERPL CALC-SCNC: 4 MMOL/L — LOW (ref 5–17)
BUN SERPL-MCNC: 20 MG/DL — SIGNIFICANT CHANGE UP (ref 7–23)
CALCIUM SERPL-MCNC: 8.6 MG/DL — SIGNIFICANT CHANGE UP (ref 8.5–10.1)
CHLORIDE SERPL-SCNC: 107 MMOL/L — SIGNIFICANT CHANGE UP (ref 96–108)
CO2 SERPL-SCNC: 23 MMOL/L — SIGNIFICANT CHANGE UP (ref 22–31)
CREAT SERPL-MCNC: 0.54 MG/DL — SIGNIFICANT CHANGE UP (ref 0.5–1.3)
EGFR: 94 ML/MIN/1.73M2 — SIGNIFICANT CHANGE UP
GLUCOSE BLDC GLUCOMTR-MCNC: 139 MG/DL — HIGH (ref 70–99)
GLUCOSE BLDC GLUCOMTR-MCNC: 140 MG/DL — HIGH (ref 70–99)
GLUCOSE BLDC GLUCOMTR-MCNC: 142 MG/DL — HIGH (ref 70–99)
GLUCOSE BLDC GLUCOMTR-MCNC: 188 MG/DL — HIGH (ref 70–99)
GLUCOSE SERPL-MCNC: 142 MG/DL — HIGH (ref 70–99)
HCT VFR BLD CALC: 36.6 % — SIGNIFICANT CHANGE UP (ref 34.5–45)
HGB BLD-MCNC: 11.5 G/DL — SIGNIFICANT CHANGE UP (ref 11.5–15.5)
MAGNESIUM SERPL-MCNC: 1.8 MG/DL — SIGNIFICANT CHANGE UP (ref 1.6–2.6)
MCHC RBC-ENTMCNC: 28.3 PG — SIGNIFICANT CHANGE UP (ref 27–34)
MCHC RBC-ENTMCNC: 31.4 GM/DL — LOW (ref 32–36)
MCV RBC AUTO: 89.9 FL — SIGNIFICANT CHANGE UP (ref 80–100)
PHOSPHATE SERPL-MCNC: 2.9 MG/DL — SIGNIFICANT CHANGE UP (ref 2.5–4.5)
PLATELET # BLD AUTO: 281 K/UL — SIGNIFICANT CHANGE UP (ref 150–400)
POTASSIUM SERPL-MCNC: 4.1 MMOL/L — SIGNIFICANT CHANGE UP (ref 3.5–5.3)
POTASSIUM SERPL-SCNC: 4.1 MMOL/L — SIGNIFICANT CHANGE UP (ref 3.5–5.3)
RBC # BLD: 4.07 M/UL — SIGNIFICANT CHANGE UP (ref 3.8–5.2)
RBC # FLD: 14 % — SIGNIFICANT CHANGE UP (ref 10.3–14.5)
SODIUM SERPL-SCNC: 134 MMOL/L — LOW (ref 135–145)
WBC # BLD: 11.02 K/UL — HIGH (ref 3.8–10.5)
WBC # FLD AUTO: 11.02 K/UL — HIGH (ref 3.8–10.5)

## 2023-04-12 PROCEDURE — 71045 X-RAY EXAM CHEST 1 VIEW: CPT | Mod: 26

## 2023-04-12 PROCEDURE — 99231 SBSQ HOSP IP/OBS SF/LOW 25: CPT

## 2023-04-12 PROCEDURE — 99233 SBSQ HOSP IP/OBS HIGH 50: CPT

## 2023-04-12 RX ORDER — ACETAMINOPHEN 500 MG
975 TABLET ORAL EVERY 8 HOURS
Refills: 0 | Status: COMPLETED | OUTPATIENT
Start: 2023-04-12 | End: 2023-04-15

## 2023-04-12 RX ADMIN — Medication 1 TABLET(S): at 21:42

## 2023-04-12 RX ADMIN — BUDESONIDE AND FORMOTEROL FUMARATE DIHYDRATE 2 PUFF(S): 160; 4.5 AEROSOL RESPIRATORY (INHALATION) at 09:19

## 2023-04-12 RX ADMIN — Medication 975 MILLIGRAM(S): at 22:10

## 2023-04-12 RX ADMIN — Medication 40 MILLIGRAM(S): at 11:19

## 2023-04-12 RX ADMIN — LIDOCAINE 1 PATCH: 4 CREAM TOPICAL at 23:32

## 2023-04-12 RX ADMIN — Medication 137 MICROGRAM(S): at 05:19

## 2023-04-12 RX ADMIN — GABAPENTIN 300 MILLIGRAM(S): 400 CAPSULE ORAL at 21:42

## 2023-04-12 RX ADMIN — BUDESONIDE AND FORMOTEROL FUMARATE DIHYDRATE 2 PUFF(S): 160; 4.5 AEROSOL RESPIRATORY (INHALATION) at 20:44

## 2023-04-12 RX ADMIN — Medication 975 MILLIGRAM(S): at 21:41

## 2023-04-12 RX ADMIN — SENNA PLUS 2 TABLET(S): 8.6 TABLET ORAL at 21:42

## 2023-04-12 RX ADMIN — LIDOCAINE 1 PATCH: 4 CREAM TOPICAL at 20:00

## 2023-04-12 RX ADMIN — ENOXAPARIN SODIUM 40 MILLIGRAM(S): 100 INJECTION SUBCUTANEOUS at 16:26

## 2023-04-12 RX ADMIN — LIDOCAINE 1 PATCH: 4 CREAM TOPICAL at 03:43

## 2023-04-12 RX ADMIN — Medication 1 TABLET(S): at 13:28

## 2023-04-12 RX ADMIN — GABAPENTIN 300 MILLIGRAM(S): 400 CAPSULE ORAL at 11:19

## 2023-04-12 RX ADMIN — PANTOPRAZOLE SODIUM 40 MILLIGRAM(S): 20 TABLET, DELAYED RELEASE ORAL at 06:45

## 2023-04-12 RX ADMIN — TIOTROPIUM BROMIDE 2 PUFF(S): 18 CAPSULE ORAL; RESPIRATORY (INHALATION) at 09:18

## 2023-04-12 RX ADMIN — Medication 2: at 16:26

## 2023-04-12 RX ADMIN — LIDOCAINE 1 PATCH: 4 CREAM TOPICAL at 11:18

## 2023-04-12 NOTE — PROGRESS NOTE ADULT - SUBJECTIVE AND OBJECTIVE BOX
Subjective:  s/p left vats pleurodesis/RODERICK bullectomy  awake and alert  no distress    MEDICATIONS  (STANDING):  acetaminophen     Tablet .. 975 milliGRAM(s) Oral every 8 hours  albuterol/ipratropium for Nebulization.. 3 milliLiter(s) Nebulizer every 20 minutes  budesonide 160 MICROgram(s)/formoterol 4.5 MICROgram(s) Inhaler 2 Puff(s) Inhalation two times a day  dextrose 5%. 1000 milliLiter(s) (50 mL/Hr) IV Continuous <Continuous>  dextrose 5%. 1000 milliLiter(s) (100 mL/Hr) IV Continuous <Continuous>  dextrose 50% Injectable 25 Gram(s) IV Push once  dextrose 50% Injectable 12.5 Gram(s) IV Push once  dextrose 50% Injectable 25 Gram(s) IV Push once  furosemide    Tablet 40 milliGRAM(s) Oral daily  gabapentin 300 milliGRAM(s) Oral two times a day  glucagon  Injectable 1 milliGRAM(s) IntraMuscular once  HYDROmorphone PCA (1 mG/mL) 30 milliLiter(s) PCA Continuous PCA Continuous  insulin lispro (ADMELOG) corrective regimen sliding scale   SubCutaneous three times a day before meals  levothyroxine 137 MICROGram(s) Oral daily  lidocaine   4% Patch 1 Patch Transdermal daily  methylPREDNISolone sodium succinate Injectable 125 milliGRAM(s) IV Push Once  pantoprazole    Tablet 40 milliGRAM(s) Oral before breakfast  tiotropium 2.5 MICROgram(s) Inhaler 2 Puff(s) Inhalation daily    MEDICATIONS  (PRN):  dextrose Oral Gel 15 Gram(s) Oral once PRN Blood Glucose LESS THAN 70 milliGRAM(s)/deciliter  HYDROmorphone  Injectable 1 milliGRAM(s) IV Push every 6 hours PRN Severe Pain (7 - 10)  HYDROmorphone PCA (1 mG/mL) Rescue Clinician Bolus 0.5 milliGRAM(s) IV Push every 15 minutes PRN for Pain Scale GREATER THAN 6  naloxone Injectable 0.1 milliGRAM(s) IV Push every 3 minutes PRN For ANY of the following changes in patient status:  A. RR LESS THAN 10 breaths per minute, B. Oxygen saturation LESS THAN 90%, C. Sedation score of 6  ondansetron Injectable 4 milliGRAM(s) IV Push every 6 hours PRN Nausea  oxyCODONE    IR 5 milliGRAM(s) Oral every 6 hours PRN Moderate Pain (4 - 6)  senna 2 Tablet(s) Oral at bedtime PRN Constipation      Allergies    Breo Ellipta (Rash)    Intolerances        REVIEW OF SYSTEMS:    CONSTITUTIONAL:  As per HPI.  HEENT:  Eyes:  No diplopia or blurred vision. ENT:  No earache, sore throat or runny nose.  CARDIOVASCULAR:  No pressure, squeezing, tightness, heaviness or aching about the chest, neck, axilla or epigastrium.  RESPIRATORY:  no sob  GASTROINTESTINAL:  No nausea, vomiting or diarrhea.  GENITOURINARY:  No dysuria, frequency or urgency.  MUSCULOSKELETAL:  no joint pain, deformity, tenderness  EXTREMITIES: no clubbing cyanosis,edema  SKIN:  No change in skin, hair or nails.  NEUROLOGIC:  No paresthesias, fasciculations, seizures or weakness.  PSYCHIATRIC:  No disorder of thought or mood.  ENDOCRINE:  No heat or cold intolerance, polyuria or polydipsia.  HEMATOLOGICAL:  No easy bruising or bleedings:    Vital Signs Last 24 Hrs  T(C): 36.6 (12 Apr 2023 06:29), Max: 36.8 (12 Apr 2023 00:00)  T(F): 97.9 (12 Apr 2023 06:29), Max: 98.2 (12 Apr 2023 00:00)  HR: 71 (12 Apr 2023 06:00) (65 - 100)  BP: 90/50 (11 Apr 2023 21:00) (90/50 - 142/73)  BP(mean): --  RR: 17 (12 Apr 2023 06:00) (10 - 25)  SpO2: 96% (12 Apr 2023 06:00) (91% - 99%)    Parameters below as of 12 Apr 2023 01:30  Patient On (Oxygen Delivery Method): nasal cannula  O2 Flow (L/min): 4      PHYSICAL EXAMINATION:  SKIN: no rashes  HEAD: NC/AT  EYES: PERRLA, EOMI  NECK:  Supple. No lymphadenopathy. Jugular venous pressure not elevated. Carotids equal.   HEART:  S1S2 reg  CHEST:  left sided ronchi; left chest tube no leak; sub q emphysema  ABDOMEN:  Soft and nontender.   EXTREMITIES:  no C/C/E  NEURO: AAO x 3, no focal deficts       LABS:                        11.5   11.02 )-----------( 281      ( 12 Apr 2023 05:30 )             36.6     04-12    134<L>  |  107  |  20  ----------------------------<  142<H>  4.1   |  23  |  0.54    Ca    8.6      12 Apr 2023 05:30  Phos  2.9     04-12  Mg     1.8     04-12            RADIOLOGY & ADDITIONAL TESTS:

## 2023-04-12 NOTE — PROGRESS NOTE ADULT - ASSESSMENT
76 yo Female with h/o COPD/bullous emphysema on home O2 PRN,  h/o Rt PTX 11/2021 and 7/2022 s/p pigtail placement, s/p FB Right VATS RODERICK wedge resection and talc pleurodesis 7/20/22, left PTX on 4/4/23 now admitted w spontaneous Left large PTX again. Pt was discharged on 4/8 and readmitted 4/9 with moderated Left PTX.  Pigtail was placed with good reexpansion of lung. S/p left pigtail placement 4/8/23 now S/P FB Left VATS RODERICK wedge resection Doxy and mechanical pleurodesis       Plan   maintian CT to suction  Pain mgmt  IS/PT consult   Pt may ambulate off suction with PT   Labs / CXR in am   + UTI , Bactrim started for 5 days     Discussed with Cardiothoracic Team at AM rounds.

## 2023-04-12 NOTE — PHYSICAL THERAPY INITIAL EVALUATION ADULT - ADDITIONAL COMMENTS
Pt lives in a 2 story condo, 1STE without railing and 14 steps to reach bedroom.  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  She ambulates with "walking sticks" PRN and remains driving.  She has home O2, cane and RW.  She does have a weekly  and children that assist when needed.

## 2023-04-12 NOTE — PHYSICAL THERAPY INITIAL EVALUATION ADULT - DIAGNOSIS, PT EVAL
Recurrent Left PTX s/p pigtail 4/8/23, now readmitted, S/P  FB, L VATS RODERICK wedge resection, total pleurectomy, mechanical and chemical (doxy) pleurodesis, intercostal never block

## 2023-04-12 NOTE — PHYSICAL THERAPY INITIAL EVALUATION ADULT - PERTINENT HX OF CURRENT PROBLEM, REHAB EVAL
76 yo Female with h/o COPD/bullous emphysema on home O2 PRN,  h/o Rt PTX 11/2021 and 7/2022 s/p pigtail placement, s/p  FB Right VATS RODERICK wedge resection and talc pleurodesis 7/20/22, left PTX on 4/4/23 now admitted w spontaneous Left large PTX again. Pigtail placed in the ER, lung reexpanded. Patient seen and examined at the bedside. Lying in bed, complains of pain over left side with deep inspiration. States she suddenly became SOB this evening, discharged yesterday. Recurrent Left PTX s/p pigtail 4/8/23 (08 Apr 2023 22:52)   S/P  FB, L VATS RODERICK wedge resection, total pleurectomy, mechanical and chemical (doxy) pleurodesis, intercostal never block

## 2023-04-12 NOTE — PHYSICAL THERAPY INITIAL EVALUATION ADULT - PRECAUTIONS/LIMITATIONS, REHAB EVAL
chest tube/cardiac precautions/fall precautions/oxygen therapy device and L/min/surgical precautions

## 2023-04-12 NOTE — PROGRESS NOTE ADULT - SUBJECTIVE AND OBJECTIVE BOX
Subjective: Pt in bed NAD no issues overnight     T(C): 36.9 (04-13-23 @ 09:38), Max: 38.6 (04-12-23 @ 21:11)  HR: 76 (04-13-23 @ 10:00) (71 - 105)  BP: 99/54 (04-13-23 @ 10:00) (98/50 - 120/87)  ABP: --  ABP(mean): --  RR: 22 (04-13-23 @ 10:00) (12 - 28)  SpO2: 93% (04-13-23 @ 10:00) (90% - 95%) 2 L NC   Wt(kg): --  CVP(mm Hg): --  CO: --  CI: --  PA: --                                              Tele: SR     CHEST TUBE: 100cc                              OUTPUT:     per 24 hours    AIR LEAKS:  [ ] YES [x ] NO          04-13    129<L>  |  102  |  18  ----------------------------<  148<H>  4.3   |  25  |  0.63    Ca    8.6      13 Apr 2023 06:03  Phos  2.0     04-13  Mg     1.8     04-13                                 11.1   10.11 )-----------( 300      ( 13 Apr 2023 06:03 )             33.3                 CAPILLARY BLOOD GLUCOSE      POCT Blood Glucose.: 145 mg/dL (13 Apr 2023 08:03)  POCT Blood Glucose.: 139 mg/dL (12 Apr 2023 21:17)  POCT Blood Glucose.: 188 mg/dL (12 Apr 2023 16:19)           CXR: < from: Xray Chest 1 View- PORTABLE-Routine (Xray Chest 1 View- PORTABLE-Routine in AM.) (04.12.23 @ 07:51) >  FINDINGS: The pulmonary venous congestion has resolved. The pleural   effusions have resolved. There is mild atelectasis at bases. Elevated   right diaphragm. Heart size and mediastinum stable. Left chest tube in   place without evidence of pneumothorax. Subcutaneous emphysema along left   chest wall.    Impression:    Mild atelectasis at the bases    No pneumothorax. Moderate subcutaneous emphysema along left chest wall.    < end of copied text >      Exam   Neuro:  Alert Awake NAD  Pulm: decreased at bases + LT CT   CV: RRR S1 S2   Abd:  soft   Extremities:  warm           Assessment:  77yFemale    with PAST MEDICAL & SURGICAL HISTORY:  Hypothyroidism      Diabetes mellitus  diet controlled      Back pain      Asthma      Elevated pancreatic enzyme  patient reports elevated lab 6/2013. Gastroenterologist aware      History of cataract  extracted from right and left      Varicose veins of both lower extremities  surgery      Diverticulosis of intestine without bleeding, unspecified intestinal tract location      Gall stones  gall bladder removed      Urinary urgency      Spinal stenosis of lumbar region      DDD (degenerative disc disease), lumbar      Basal cell carcinoma  removed from face      Pulmonary emphysema, unspecified emphysema type  2017 last exacerbation; never intubated      OA (osteoarthritis)      Frequent UTI  2-3x/year; had an episode 5 weeks ago was treated denies dysuria      GERD (gastroesophageal reflux disease)      Leg swelling      Emphysema lung      COVID-19 vaccine series completed  Moderna - 2nd dose 02/20/2021      Knee pain, left      Scar tissue  left knee S/P TKR      COPD exacerbation      S/P bladder repair  bladder lift 2/2013      Dental disorder  Dental surgery 12/2012, patient reports that upper portion is glued in at this time, plan is to have dental implants placed.      S/P knee surgery  arthroscopy bilateral 2007 and 2010      Skin cancer  to right temple 2012, removed, history of basal cell x 2      H/O lumbosacral spine surgery  fusion 2017      H/O umbilical hernia repair  5/24/19      S/P cholecystectomy  2012      S/P colonoscopy  benign polyp      S/P knee replacement  right 2017, left 11/2019            Plan:

## 2023-04-12 NOTE — PROGRESS NOTE ADULT - ASSESSMENT
- recuurent left pneumothorax  - cont O2  - s/p VATS talc pleurodesis/RODERICK bullectomy  - cxr show complete re-expansion left lung  - chest tube no leak  - on O2   - cont bronchodilators  - incentive jacqueline  - dvt proph

## 2023-04-12 NOTE — PHYSICAL THERAPY INITIAL EVALUATION ADULT - NSPTDMEREC_GEN_A_CORE
Discussion/Summary  [id number] Dr. Rodriguez notified by [facility] radiology of pseudoaneurysm left groin.  Dr. Rodriguez states she instructed radiology to transport patient to registration pending admission.  Hospitalist, Jovany Anderson M.D. notified by Dr. Rodriguez and will admit patient.      Signatures   Electronically signed by : Ayah Garnica R.N.; Jun 21 2017  5:20PM CST    
rolling walker

## 2023-04-12 NOTE — PHYSICAL THERAPY INITIAL EVALUATION ADULT - GENERAL OBSERVATIONS, REHAB EVAL
Pt was found lying in bed in SICU with tele,o2,pulse oxy, left side chest tube,IV, PCA, BP cuff, Pt premedicated with pain meds 7/10 pain at rest, Pt is pleasant and willing to participate in PT.

## 2023-04-13 LAB
ANION GAP SERPL CALC-SCNC: 2 MMOL/L — LOW (ref 5–17)
BUN SERPL-MCNC: 18 MG/DL — SIGNIFICANT CHANGE UP (ref 7–23)
CALCIUM SERPL-MCNC: 8.6 MG/DL — SIGNIFICANT CHANGE UP (ref 8.5–10.1)
CHLORIDE SERPL-SCNC: 102 MMOL/L — SIGNIFICANT CHANGE UP (ref 96–108)
CO2 SERPL-SCNC: 25 MMOL/L — SIGNIFICANT CHANGE UP (ref 22–31)
CREAT SERPL-MCNC: 0.63 MG/DL — SIGNIFICANT CHANGE UP (ref 0.5–1.3)
EGFR: 90 ML/MIN/1.73M2 — SIGNIFICANT CHANGE UP
GLUCOSE BLDC GLUCOMTR-MCNC: 145 MG/DL — HIGH (ref 70–99)
GLUCOSE BLDC GLUCOMTR-MCNC: 150 MG/DL — HIGH (ref 70–99)
GLUCOSE BLDC GLUCOMTR-MCNC: 168 MG/DL — HIGH (ref 70–99)
GLUCOSE BLDC GLUCOMTR-MCNC: 238 MG/DL — HIGH (ref 70–99)
GLUCOSE SERPL-MCNC: 148 MG/DL — HIGH (ref 70–99)
HCT VFR BLD CALC: 33.3 % — LOW (ref 34.5–45)
HGB BLD-MCNC: 11.1 G/DL — LOW (ref 11.5–15.5)
MAGNESIUM SERPL-MCNC: 1.8 MG/DL — SIGNIFICANT CHANGE UP (ref 1.6–2.6)
MCHC RBC-ENTMCNC: 29.3 PG — SIGNIFICANT CHANGE UP (ref 27–34)
MCHC RBC-ENTMCNC: 33.3 GM/DL — SIGNIFICANT CHANGE UP (ref 32–36)
MCV RBC AUTO: 87.9 FL — SIGNIFICANT CHANGE UP (ref 80–100)
PHOSPHATE SERPL-MCNC: 2 MG/DL — LOW (ref 2.5–4.5)
PLATELET # BLD AUTO: 300 K/UL — SIGNIFICANT CHANGE UP (ref 150–400)
POTASSIUM SERPL-MCNC: 4.3 MMOL/L — SIGNIFICANT CHANGE UP (ref 3.5–5.3)
POTASSIUM SERPL-SCNC: 4.3 MMOL/L — SIGNIFICANT CHANGE UP (ref 3.5–5.3)
RBC # BLD: 3.79 M/UL — LOW (ref 3.8–5.2)
RBC # FLD: 14.1 % — SIGNIFICANT CHANGE UP (ref 10.3–14.5)
SODIUM SERPL-SCNC: 129 MMOL/L — LOW (ref 135–145)
WBC # BLD: 10.11 K/UL — SIGNIFICANT CHANGE UP (ref 3.8–10.5)
WBC # FLD AUTO: 10.11 K/UL — SIGNIFICANT CHANGE UP (ref 3.8–10.5)

## 2023-04-13 PROCEDURE — 71045 X-RAY EXAM CHEST 1 VIEW: CPT | Mod: 26

## 2023-04-13 PROCEDURE — 99233 SBSQ HOSP IP/OBS HIGH 50: CPT

## 2023-04-13 PROCEDURE — 99231 SBSQ HOSP IP/OBS SF/LOW 25: CPT

## 2023-04-13 RX ORDER — KETOROLAC TROMETHAMINE 30 MG/ML
30 SYRINGE (ML) INJECTION EVERY 8 HOURS
Refills: 0 | Status: DISCONTINUED | OUTPATIENT
Start: 2023-04-13 | End: 2023-04-14

## 2023-04-13 RX ORDER — OXYCODONE HYDROCHLORIDE 5 MG/1
5 TABLET ORAL EVERY 6 HOURS
Refills: 0 | Status: ACTIVE | OUTPATIENT
Start: 2023-04-13 | End: 2023-04-20

## 2023-04-13 RX ORDER — OXYCODONE HYDROCHLORIDE 5 MG/1
10 TABLET ORAL EVERY 6 HOURS
Refills: 0 | Status: ACTIVE | OUTPATIENT
Start: 2023-04-13 | End: 2023-04-20

## 2023-04-13 RX ADMIN — Medication 975 MILLIGRAM(S): at 23:00

## 2023-04-13 RX ADMIN — LIDOCAINE 1 PATCH: 4 CREAM TOPICAL at 22:20

## 2023-04-13 RX ADMIN — LIDOCAINE 1 PATCH: 4 CREAM TOPICAL at 20:00

## 2023-04-13 RX ADMIN — Medication 137 MICROGRAM(S): at 05:08

## 2023-04-13 RX ADMIN — LIDOCAINE 1 PATCH: 4 CREAM TOPICAL at 10:24

## 2023-04-13 RX ADMIN — Medication 975 MILLIGRAM(S): at 22:19

## 2023-04-13 RX ADMIN — Medication 975 MILLIGRAM(S): at 06:26

## 2023-04-13 RX ADMIN — Medication 4: at 13:39

## 2023-04-13 RX ADMIN — Medication 1 TABLET(S): at 22:19

## 2023-04-13 RX ADMIN — HYDROMORPHONE HYDROCHLORIDE 30 MILLILITER(S): 2 INJECTION INTRAMUSCULAR; INTRAVENOUS; SUBCUTANEOUS at 11:30

## 2023-04-13 RX ADMIN — Medication 1 TABLET(S): at 10:24

## 2023-04-13 RX ADMIN — BUDESONIDE AND FORMOTEROL FUMARATE DIHYDRATE 2 PUFF(S): 160; 4.5 AEROSOL RESPIRATORY (INHALATION) at 22:20

## 2023-04-13 RX ADMIN — Medication 30 MILLIGRAM(S): at 23:14

## 2023-04-13 RX ADMIN — GABAPENTIN 300 MILLIGRAM(S): 400 CAPSULE ORAL at 10:24

## 2023-04-13 RX ADMIN — GABAPENTIN 300 MILLIGRAM(S): 400 CAPSULE ORAL at 22:19

## 2023-04-13 RX ADMIN — Medication 30 MILLIGRAM(S): at 23:30

## 2023-04-13 RX ADMIN — Medication 975 MILLIGRAM(S): at 07:00

## 2023-04-13 RX ADMIN — ENOXAPARIN SODIUM 40 MILLIGRAM(S): 100 INJECTION SUBCUTANEOUS at 17:32

## 2023-04-13 RX ADMIN — PANTOPRAZOLE SODIUM 40 MILLIGRAM(S): 20 TABLET, DELAYED RELEASE ORAL at 06:27

## 2023-04-13 RX ADMIN — Medication 40 MILLIGRAM(S): at 10:24

## 2023-04-13 NOTE — PROGRESS NOTE ADULT - SUBJECTIVE AND OBJECTIVE BOX
Subjective:  Pt in chair NAD. CT remains minimal drainage no AL    T(C): 36.9 (04-13-23 @ 09:38), Max: 38.6 (04-12-23 @ 21:11)  HR: 76 (04-13-23 @ 10:00) (71 - 105)  BP: 99/54 (04-13-23 @ 10:00) (98/50 - 120/87)  ABP: --  ABP(mean): --  RR: 22 (04-13-23 @ 10:00) (12 - 28)  SpO2: 93% (04-13-23 @ 10:00) (90% - 95%) 3 L NC   Wt(kg): --  CVP(mm Hg): --  CO: --  CI: --  PA: --                                              Tele: SR     CHEST TUBE:    210cc                            OUTPUT:     per 24 hours    AIR LEAKS:  [ ] YES [ x] NO          04-13    129<L>  |  102  |  18  ----------------------------<  148<H>  4.3   |  25  |  0.63    Ca    8.6      13 Apr 2023 06:03  Phos  2.0     04-13  Mg     1.8     04-13                                 11.1   10.11 )-----------( 300      ( 13 Apr 2023 06:03 )             33.3                 CAPILLARY BLOOD GLUCOSE      POCT Blood Glucose.: 145 mg/dL (13 Apr 2023 08:03)  POCT Blood Glucose.: 139 mg/dL (12 Apr 2023 21:17)  POCT Blood Glucose.: 188 mg/dL (12 Apr 2023 16:19)           CXR: < from: Xray Chest 1 View- PORTABLE-Routine (Xray Chest 1 View- PORTABLE-Routine in AM.) (04.13.23 @ 08:03) >  FINDINGS: The pulmonary venous congestion has resolved. The pleural   effusions have resolved. There is mild atelectasis at bases. Elevated   right diaphragm. Heart size and mediastinum stable. Left chest tube in   place without evidence of pneumothorax. Subcutaneous emphysema along left   chest wall.    Impression:    Mild atelectasis at the bases    No pneumothorax. Moderate subcutaneous emphysema along left chest wall.    < end of copied text >          Exam   Neuro:  Alert Awake NAD  Pulm: decreased at bases + LT CT   CV: RRR S1 S2   Abd:  soft   Extremities:  warm         Assessment:  77yFemale    with PAST MEDICAL & SURGICAL HISTORY:  Hypothyroidism      Diabetes mellitus  diet controlled      Back pain      Asthma      Elevated pancreatic enzyme  patient reports elevated lab 6/2013. Gastroenterologist aware      History of cataract  extracted from right and left      Varicose veins of both lower extremities  surgery      Diverticulosis of intestine without bleeding, unspecified intestinal tract location      Gall stones  gall bladder removed      Urinary urgency      Spinal stenosis of lumbar region      DDD (degenerative disc disease), lumbar      Basal cell carcinoma  removed from face      Pulmonary emphysema, unspecified emphysema type  2017 last exacerbation; never intubated      OA (osteoarthritis)      Frequent UTI  2-3x/year; had an episode 5 weeks ago was treated denies dysuria      GERD (gastroesophageal reflux disease)      Leg swelling      Emphysema lung      COVID-19 vaccine series completed  Moderna - 2nd dose 02/20/2021      Knee pain, left      Scar tissue  left knee S/P TKR      COPD exacerbation      S/P bladder repair  bladder lift 2/2013      Dental disorder  Dental surgery 12/2012, patient reports that upper portion is glued in at this time, plan is to have dental implants placed.      S/P knee surgery  arthroscopy bilateral 2007 and 2010      Skin cancer  to right temple 2012, removed, history of basal cell x 2      H/O lumbosacral spine surgery  fusion 2017      H/O umbilical hernia repair  5/24/19      S/P cholecystectomy  2012      S/P colonoscopy  benign polyp      S/P knee replacement  right 2017, left 11/2019

## 2023-04-13 NOTE — PROGRESS NOTE ADULT - SUBJECTIVE AND OBJECTIVE BOX
Subjective:  awake and alert  no sob  s/p VATS pleuradesis/RODERICK bullectomy POD#2    MEDICATIONS  (STANDING):  acetaminophen     Tablet .. 975 milliGRAM(s) Oral every 8 hours  albuterol/ipratropium for Nebulization.. 3 milliLiter(s) Nebulizer every 20 minutes  budesonide 160 MICROgram(s)/formoterol 4.5 MICROgram(s) Inhaler 2 Puff(s) Inhalation two times a day  dextrose 5%. 1000 milliLiter(s) (50 mL/Hr) IV Continuous <Continuous>  dextrose 5%. 1000 milliLiter(s) (100 mL/Hr) IV Continuous <Continuous>  dextrose 50% Injectable 25 Gram(s) IV Push once  dextrose 50% Injectable 12.5 Gram(s) IV Push once  dextrose 50% Injectable 25 Gram(s) IV Push once  enoxaparin Injectable 40 milliGRAM(s) SubCutaneous every 24 hours  furosemide    Tablet 40 milliGRAM(s) Oral daily  gabapentin 300 milliGRAM(s) Oral two times a day  glucagon  Injectable 1 milliGRAM(s) IntraMuscular once  HYDROmorphone PCA (1 mG/mL) 30 milliLiter(s) PCA Continuous PCA Continuous  insulin lispro (ADMELOG) corrective regimen sliding scale   SubCutaneous three times a day before meals  levothyroxine 137 MICROGram(s) Oral daily  lidocaine   4% Patch 1 Patch Transdermal daily  methylPREDNISolone sodium succinate Injectable 125 milliGRAM(s) IV Push Once  pantoprazole    Tablet 40 milliGRAM(s) Oral before breakfast  tiotropium 2.5 MICROgram(s) Inhaler 2 Puff(s) Inhalation daily  trimethoprim  160 mG/sulfamethoxazole 800 mG 1 Tablet(s) Oral two times a day    MEDICATIONS  (PRN):  dextrose Oral Gel 15 Gram(s) Oral once PRN Blood Glucose LESS THAN 70 milliGRAM(s)/deciliter  HYDROmorphone  Injectable 1 milliGRAM(s) IV Push every 6 hours PRN Severe Pain (7 - 10)  HYDROmorphone PCA (1 mG/mL) Rescue Clinician Bolus 0.5 milliGRAM(s) IV Push every 15 minutes PRN for Pain Scale GREATER THAN 6  naloxone Injectable 0.1 milliGRAM(s) IV Push every 3 minutes PRN For ANY of the following changes in patient status:  A. RR LESS THAN 10 breaths per minute, B. Oxygen saturation LESS THAN 90%, C. Sedation score of 6  ondansetron Injectable 4 milliGRAM(s) IV Push every 6 hours PRN Nausea  oxyCODONE    IR 5 milliGRAM(s) Oral every 6 hours PRN Moderate Pain (4 - 6)  senna 2 Tablet(s) Oral at bedtime PRN Constipation      Allergies    Breo Ellipta (Rash)    Intolerances        REVIEW OF SYSTEMS:    CONSTITUTIONAL:  As per HPI.  HEENT:  Eyes:  No diplopia or blurred vision. ENT:  No earache, sore throat or runny nose.  CARDIOVASCULAR:  No pressure, squeezing, tightness, heaviness or aching about the chest, neck, axilla or epigastrium.  RESPIRATORY:  no sob  GASTROINTESTINAL:  No nausea, vomiting or diarrhea.  GENITOURINARY:  No dysuria, frequency or urgency.  MUSCULOSKELETAL:  no joint pain, deformity, tenderness  EXTREMITIES: no clubbing cyanosis,edema  SKIN:  No change in skin, hair or nails.  NEUROLOGIC:  No paresthesias, fasciculations, seizures or weakness.  PSYCHIATRIC:  No disorder of thought or mood.  ENDOCRINE:  No heat or cold intolerance, polyuria or polydipsia.  HEMATOLOGICAL:  No easy bruising or bleedings:    Vital Signs Last 24 Hrs  T(C): 36.7 (13 Apr 2023 06:25), Max: 38.6 (12 Apr 2023 21:11)  T(F): 98 (13 Apr 2023 06:25), Max: 101.4 (12 Apr 2023 21:11)  HR: 77 (13 Apr 2023 06:25) (71 - 105)  BP: 101/61 (13 Apr 2023 06:25) (96/31 - 119/84)  BP(mean): 73 (13 Apr 2023 06:25) (49 - 96)  RR: 18 (13 Apr 2023 06:25) (12 - 28)  SpO2: 92% (13 Apr 2023 06:25) (90% - 95%)    Parameters below as of 12 Apr 2023 21:11  Patient On (Oxygen Delivery Method): nasal cannula  O2 Flow (L/min): 3      PHYSICAL EXAMINATION:  SKIN: no rashes  HEAD: NC/AT  EYES: PERRLA, EOMI  NECK:  Supple. No lymphadenopathy. Jugular venous pressure not elevated. Carotids equal.   HEART:   S1S2 reg  CHEST:  left sided ronchi; decreased bs left base  ABDOMEN:  Soft and nontender.   EXTREMITIES:  no C/C/E  NEURO: AAO x 3, no focal deficts       LABS:                        11.1   10.11 )-----------( 300      ( 13 Apr 2023 06:03 )             33.3     04-13    129<L>  |  102  |  18  ----------------------------<  148<H>  4.3   |  25  |  0.63    Ca    8.6      13 Apr 2023 06:03  Phos  2.0     04-13  Mg     1.8     04-13            RADIOLOGY & ADDITIONAL TESTS:

## 2023-04-13 NOTE — PROGRESS NOTE ADULT - ASSESSMENT
76 yo Female with h/o COPD/bullous emphysema on home O2 PRN,  h/o Rt PTX 11/2021 and 7/2022 s/p pigtail placement, s/p FB Right VATS RODERICK wedge resection and talc pleurodesis 7/20/22, left PTX on 4/4/23 now admitted w spontaneous Left large PTX again. Pt was discharged on 4/8 and readmitted 4/9 with moderated Left PTX.  Pigtail was placed with good reexpansion of lung. S/p left pigtail placement 4/8/23    NPO for OR today  cont chest tube to suction  will move to SDU post op  preop labs reviewed    Discussed with Cardiothoracic Team at AM rounds. 76 yo Female with h/o COPD/bullous emphysema on home O2 PRN,  h/o Rt PTX 11/2021 and 7/2022 s/p pigtail placement, s/p FB Right VATS RODERICK wedge resection and talc pleurodesis 7/20/22, left PTX on 4/4/23 now admitted w spontaneous Left large PTX again. Pt was discharged on 4/8 and readmitted 4/9 with moderated Left PTX.  Pigtail was placed with good reexpansion of lung. S/p left pigtail placement 4/8/23 now S/P FB Left VATS RODERICK wedge resection Doxy and mechanical pleurodesis       Plan   maintian CT to suction  Pain mgmt  IS/PT consult   Pt may ambulate off suction with PT   Labs / CXR in am   + UTI , Bactrim started for 5 days   Lovenox DVTP  Discussed with Cardiothoracic Team at AM rounds.

## 2023-04-13 NOTE — PROGRESS NOTE ADULT - ASSESSMENT
- recuurent left pneumothorax  - cont O2  - s/p VATS talc pleurodesis/RODERICK bullectomy POD #2  - cxr show complete re-expansion left lung; left chest tube; possible small left effussion  - chest tube no leak  - on O2   - cont bronchodilators  - incentive jacqueline  - dvt proph

## 2023-04-14 ENCOUNTER — TRANSCRIPTION ENCOUNTER (OUTPATIENT)
Age: 78
End: 2023-04-14

## 2023-04-14 LAB
CULTURE RESULTS: SIGNIFICANT CHANGE UP
CULTURE RESULTS: SIGNIFICANT CHANGE UP
GLUCOSE BLDC GLUCOMTR-MCNC: 114 MG/DL — HIGH (ref 70–99)
GLUCOSE BLDC GLUCOMTR-MCNC: 144 MG/DL — HIGH (ref 70–99)
GLUCOSE BLDC GLUCOMTR-MCNC: 148 MG/DL — HIGH (ref 70–99)
GLUCOSE BLDC GLUCOMTR-MCNC: 155 MG/DL — HIGH (ref 70–99)
SPECIMEN SOURCE: SIGNIFICANT CHANGE UP
SPECIMEN SOURCE: SIGNIFICANT CHANGE UP

## 2023-04-14 PROCEDURE — 99231 SBSQ HOSP IP/OBS SF/LOW 25: CPT

## 2023-04-14 PROCEDURE — 71045 X-RAY EXAM CHEST 1 VIEW: CPT | Mod: 26,77

## 2023-04-14 PROCEDURE — 93010 ELECTROCARDIOGRAM REPORT: CPT

## 2023-04-14 PROCEDURE — 71045 X-RAY EXAM CHEST 1 VIEW: CPT | Mod: 26

## 2023-04-14 RX ADMIN — PANTOPRAZOLE SODIUM 40 MILLIGRAM(S): 20 TABLET, DELAYED RELEASE ORAL at 06:18

## 2023-04-14 RX ADMIN — ENOXAPARIN SODIUM 40 MILLIGRAM(S): 100 INJECTION SUBCUTANEOUS at 17:06

## 2023-04-14 RX ADMIN — Medication 975 MILLIGRAM(S): at 13:44

## 2023-04-14 RX ADMIN — LIDOCAINE 1 PATCH: 4 CREAM TOPICAL at 10:09

## 2023-04-14 RX ADMIN — Medication 30 MILLIGRAM(S): at 22:45

## 2023-04-14 RX ADMIN — Medication 137 MICROGRAM(S): at 06:18

## 2023-04-14 RX ADMIN — Medication 1 TABLET(S): at 22:32

## 2023-04-14 RX ADMIN — Medication 975 MILLIGRAM(S): at 14:45

## 2023-04-14 RX ADMIN — Medication 40 MILLIGRAM(S): at 10:09

## 2023-04-14 RX ADMIN — Medication 30 MILLIGRAM(S): at 14:05

## 2023-04-14 RX ADMIN — Medication 30 MILLIGRAM(S): at 22:32

## 2023-04-14 RX ADMIN — LIDOCAINE 1 PATCH: 4 CREAM TOPICAL at 22:35

## 2023-04-14 RX ADMIN — Medication 975 MILLIGRAM(S): at 22:45

## 2023-04-14 RX ADMIN — GABAPENTIN 300 MILLIGRAM(S): 400 CAPSULE ORAL at 22:32

## 2023-04-14 RX ADMIN — LIDOCAINE 1 PATCH: 4 CREAM TOPICAL at 20:02

## 2023-04-14 RX ADMIN — Medication 30 MILLIGRAM(S): at 06:30

## 2023-04-14 RX ADMIN — Medication 30 MILLIGRAM(S): at 06:18

## 2023-04-14 RX ADMIN — GABAPENTIN 300 MILLIGRAM(S): 400 CAPSULE ORAL at 10:09

## 2023-04-14 RX ADMIN — BUDESONIDE AND FORMOTEROL FUMARATE DIHYDRATE 2 PUFF(S): 160; 4.5 AEROSOL RESPIRATORY (INHALATION) at 19:59

## 2023-04-14 RX ADMIN — Medication 1 TABLET(S): at 10:10

## 2023-04-14 RX ADMIN — Medication 30 MILLIGRAM(S): at 13:47

## 2023-04-14 RX ADMIN — Medication 975 MILLIGRAM(S): at 22:32

## 2023-04-14 NOTE — DISCHARGE NOTE PROVIDER - NSDCFUADDAPPT_GEN_ALL_CORE_FT
Leave dressing intact until tomorrow evening, reinforcing with tape if necessary (about 36 hours from chest tube removal). At that time you may remove the dressing and take a shower. Place clean gauze over wound if continual drainage. Call Dr. Herbert's office at [phone] tomorrow or the next business day to make a followup appointment. Call the office if you experience any fevers, shortness of breath, chest pain or excessive drainage from the incision, day or night. Go to the emergency room if any of these symptoms are severe. Take your medications as ordered and take a stool softener if needed with the narcotic medications.  Call Dr. Herbert's office at [phone] tomorrow or the next business day to make a followup appointment. Call the office if you experience any fevers, shortness of breath, chest pain or excessive drainage from the incision, day or night. Go to the emergency room if any of these symptoms are severe. Take your medications as ordered and take a stool softener if needed with the narcotic medications.     Shower Daily  No Ointments creams lotions to wounds

## 2023-04-14 NOTE — DISCHARGE NOTE PROVIDER - NSDCPNSUBOBJ_GEN_ALL_CORE
Subjective:  Pt in chair NAD no issues overnight.  Pt walked without the need of O2.     T(C): 37.1 (04-17-23 @ 14:39), Max: 37.1 (04-17-23 @ 14:39)  HR: 91 (04-17-23 @ 14:00) (76 - 97)  BP: 105/49 (04-17-23 @ 12:00) (105/49 - 130/57)  ABP: --  ABP(mean): --  RR: 29 (04-17-23 @ 14:00) (17 - 29)  SpO2: 92% (04-17-23 @ 14:00) (92% - 97%) RA   Wt(kg): --  CVP(mm Hg): --  CO: --  CI: --  PA: --                                              Tele: SR             04-17    137  |  108  |  13  ----------------------------<  248<H>  4.2   |  23  |  0.81    Ca    9.1      17 Apr 2023 09:12                                 11.6   9.43  )-----------( 417      ( 17 Apr 2023 09:12 )             36.1                 CAPILLARY BLOOD GLUCOSE      POCT Blood Glucose.: 136 mg/dL (17 Apr 2023 12:06)  POCT Blood Glucose.: 140 mg/dL (17 Apr 2023 08:07)  POCT Blood Glucose.: 162 mg/dL (16 Apr 2023 22:34)  POCT Blood Glucose.: 192 mg/dL (16 Apr 2023 16:30)         < from: CT Chest No Cont (04.17.23 @ 08:46) >    AIRWAYS, LUNGS, PLEURA: Trachea and mainstem bronchi patent.    Emphysema. The patient has had interval left upper bullectomy with   associated postoperative changes. Small loculated left pleural effusion   with foci of hypodensity, likely blood products. Small amount of gas   within the left pleural space.    Right upper lobe partial resection with stable postoperative changes. The   right middle lobe is mostly atelectatic.    MEDIASTINUM: Normal heart size. No pericardial effusion. Thoracic aorta   normal caliber.  No large mediastinal lymph nodes.    IMAGED ABDOMEN: Cholecystectomy.    SOFT TISSUES: Left chest wall subcutaneous emphysema.    BONES: Unremarkable.      IMPRESSION:.    Interval left upper lobe bullectomy with associated postoperative changes.    Small loculated left hydropneumothorax. Hyperdensity within the left   pleural space compatible with blood products/hemothorax.    The right upper lobe is mostly atelectatic.    < end of copied text >          exam  Neuro: Alert Awake NAD  Pulm: decreased at bases b/l   CV: RRR S1 S2   Abd: soft   Extremities: warm   Inc: Left Thorax  C/D/I           Assessment:  77yFemale    with PAST MEDICAL & SURGICAL HISTORY:  Hypothyroidism      Diabetes mellitus  diet controlled      Back pain      Asthma      Elevated pancreatic enzyme  patient reports elevated lab 6/2013. Gastroenterologist aware      History of cataract  extracted from right and left      Varicose veins of both lower extremities  surgery      Diverticulosis of intestine without bleeding, unspecified intestinal tract location      Gall stones  gall bladder removed      Urinary urgency      Spinal stenosis of lumbar region      DDD (degenerative disc disease), lumbar      Basal cell carcinoma  removed from face      Pulmonary emphysema, unspecified emphysema type  2017 last exacerbation; never intubated      OA (osteoarthritis)      Frequent UTI  2-3x/year; had an episode 5 weeks ago was treated denies dysuria      GERD (gastroesophageal reflux disease)      Leg swelling      Emphysema lung      COVID-19 vaccine series completed  Moderna - 2nd dose 02/20/2021      Knee pain, left      Scar tissue  left knee S/P TKR      COPD exacerbation      S/P bladder repair  bladder lift 2/2013      Dental disorder  Dental surgery 12/2012, patient reports that upper portion is glued in at this time, plan is to have dental implants placed.      S/P knee surgery  arthroscopy bilateral 2007 and 2010      Skin cancer  to right temple 2012, removed, history of basal cell x 2      H/O lumbosacral spine surgery  fusion 2017      H/O umbilical hernia repair  5/24/19      S/P cholecystectomy  2012      S/P colonoscopy  benign polyp      S/P knee replacement  right 2017, left 11/2019    78 yo Female with h/o COPD/bullous emphysema on home O2 PRN,  h/o Rt PTX 11/2021 and 7/2022 s/p pigtail placement, s/p FB Right VATS RODERICK wedge resection and talc pleurodesis 7/20/22, left PTX on 4/4/23 now admitted w spontaneous Left large PTX again. Pt was discharged on 4/8 and readmitted 4/9 with moderated Left PTX.  Pigtail was placed with good reexpansion of lung. S/p left pigtail placement 4/8/23 now S/P FB Left VATS RODERICK wedge resection Doxy and mechanical pleurodesis       Plan   CT d/c yesterday without issue  CT today noted Rt Hemidiaphragm and subcutaneous air left CT insertion site   Pain mgmt  IS/PT consult   Pt may ambulate off suction with PT   Labs / CXR in am   + UTI , Bactrim started for 5 days - completed   Plan for discharge today - Pt to follow up with Dr Herbert in 2 weeks   Discussed with Cardiothoracic Team at AM rounds.

## 2023-04-14 NOTE — DISCHARGE NOTE PROVIDER - CARE PROVIDER_API CALL
Pk Herbert)  Surgery  [address] [place] [address]  Phone: [phone]  Fax: [phone]  Follow Up Time:

## 2023-04-14 NOTE — PROGRESS NOTE ADULT - SUBJECTIVE AND OBJECTIVE BOX
Subjective: Pt in bed NAD no issues overnight     T(C): 36.3 (04-14-23 @ 05:10), Max: 37.1 (04-13-23 @ 14:31)  HR: 59 (04-14-23 @ 08:00) (59 - 98)  BP: 108/53 (04-14-23 @ 08:00) (96/55 - 108/53)  ABP: --  ABP(mean): --  RR: 17 (04-14-23 @ 08:00) (15 - 22)  SpO2: 96% (04-14-23 @ 08:00) (88% - 96%) 3 L NC   Wt(kg): --  CVP(mm Hg): --  CO: --  CI: --  PA: --                                              Tele: SR     CHEST TUBE: 55cc                               OUTPUT:     per 24 hours    AIR LEAKS:  [ ] YES [x ] NO          04-13    129<L>  |  102  |  18  ----------------------------<  148<H>  4.3   |  25  |  0.63    Ca    8.6      13 Apr 2023 06:03  Phos  2.0     04-13  Mg     1.8     04-13                                 11.1   10.11 )-----------( 300      ( 13 Apr 2023 06:03 )             33.3                 CAPILLARY BLOOD GLUCOSE      POCT Blood Glucose.: 144 mg/dL (14 Apr 2023 07:36)  POCT Blood Glucose.: 168 mg/dL (13 Apr 2023 21:48)  POCT Blood Glucose.: 150 mg/dL (13 Apr 2023 16:57)  POCT Blood Glucose.: 238 mg/dL (13 Apr 2023 13:36)           CXR:   < from: Xray Chest 1 View- PORTABLE-Routine (Xray Chest 1 View- PORTABLE-Routine in AM.) (04.13.23 @ 08:03) >  FINDINGS:  Heart/Vascular: The heart size, mediastinum, hilum and aorta are stable.  Pulmonary: Midline trachea. There is new mild pulmonary venous congestion   and small pleural effusions. Elevated right diaphragm. No gross   pneumothorax. Postsurgical changes noted bilaterally. Moderate   subcutaneous emphysema on the left.  Bones: There is no fracture.  Lines and catheter: Left chest tube unchanged in position.    Portable chest 4/13/2023 at 7:51 AM    FINDINGS: The pulmonary venous congestion has resolved. The pleural   effusions have resolved. There is mild atelectasis at bases. Elevated   right diaphragm. Heart size and mediastinum stable. Left chest tube in   place without evidence of pneumothorax. Subcutaneous emphysema along left   chest wall.    Impression:    Mild atelectasis at the bases    No pneumothorax. Moderate subcutaneous emphysema along left chest wall.    < end of copied text >        Exam   Neuro: alert awake NAD   Pulm: decreased at bases + Left CT   CV: RRR S1 S2   Abd: soft NT ND   Extremities: warm   Inc Left Thorax CDI         Assessment:  77yFemale    with PAST MEDICAL & SURGICAL HISTORY:  Hypothyroidism      Diabetes mellitus  diet controlled      Back pain      Asthma      Elevated pancreatic enzyme  patient reports elevated lab 6/2013. Gastroenterologist aware      History of cataract  extracted from right and left      Varicose veins of both lower extremities  surgery      Diverticulosis of intestine without bleeding, unspecified intestinal tract location      Gall stones  gall bladder removed      Urinary urgency      Spinal stenosis of lumbar region      DDD (degenerative disc disease), lumbar      Basal cell carcinoma  removed from face      Pulmonary emphysema, unspecified emphysema type  2017 last exacerbation; never intubated      OA (osteoarthritis)      Frequent UTI  2-3x/year; had an episode 5 weeks ago was treated denies dysuria      GERD (gastroesophageal reflux disease)      Leg swelling      Emphysema lung      COVID-19 vaccine series completed  Moderna - 2nd dose 02/20/2021      Knee pain, left      Scar tissue  left knee S/P TKR      COPD exacerbation      S/P bladder repair  bladder lift 2/2013      Dental disorder  Dental surgery 12/2012, patient reports that upper portion is glued in at this time, plan is to have dental implants placed.      S/P knee surgery  arthroscopy bilateral 2007 and 2010      Skin cancer  to right temple 2012, removed, history of basal cell x 2      H/O lumbosacral spine surgery  fusion 2017      H/O umbilical hernia repair  5/24/19      S/P cholecystectomy  2012      S/P colonoscopy  benign polyp      S/P knee replacement  right 2017, left 11/2019            Plan:

## 2023-04-14 NOTE — DISCHARGE NOTE PROVIDER - HOSPITAL COURSE
78 yo Female with h/o COPD/bullous emphysema on home O2 PRN,  h/o Rt PTX 11/2021 and 7/2022 s/p pigtail placement, s/p FB Right VATS RODERICK wedge resection and talc pleurodesis 7/20/22, left PTX on 4/4/23 now admitted w spontaneous Left large PTX again. Pt was discharged on 4/8 and readmitted 4/9 with moderated Left PTX.  Pigtail was placed with good reexpansion of lung. S/p left pigtail placement 4/8/23 now S/P FB Left VATS RODERICK wedge resection Doxy and mechanical pleurodesis

## 2023-04-14 NOTE — DISCHARGE NOTE PROVIDER - NSDCCPTREATMENT_GEN_ALL_CORE_FT
PRINCIPAL PROCEDURE  Procedure: VATS, with upper lobe wedge resection  Findings and Treatment: FB, L VATS RODERICK wedge resection, total pleurectomy, mechanical and chemical (doxy) pleurodesis, intercostal never block

## 2023-04-14 NOTE — PROGRESS NOTE ADULT - ASSESSMENT
76 yo Female with h/o COPD/bullous emphysema on home O2 PRN,  h/o Rt PTX 11/2021 and 7/2022 s/p pigtail placement, s/p FB Right VATS RODERICK wedge resection and talc pleurodesis 7/20/22, left PTX on 4/4/23 now admitted w spontaneous Left large PTX again. Pt was discharged on 4/8 and readmitted 4/9 with moderated Left PTX.  Pigtail was placed with good reexpansion of lung. S/p left pigtail placement 4/8/23 now S/P FB Left VATS RODERICK wedge resection Doxy and mechanical pleurodesis       Plan   maintian CT to suction  plan to place on WS tomorrow   Pain mgmt  IS/PT consult   Pt may ambulate off suction with PT   Labs / CXR in am   + UTI , Bactrim started for 5 days   Lovenox DVTP  plan to d/c tube this weekend and potentially d/c home Sunday   Discussed with Cardiothoracic Team at AM rounds.

## 2023-04-14 NOTE — DISCHARGE NOTE PROVIDER - NSDCFUSCHEDAPPT_GEN_ALL_CORE_FT
James Dowd  [facility]  [facility] [address]  Scheduled Appointment: 06/08/2023

## 2023-04-14 NOTE — DISCHARGE NOTE PROVIDER - NSDCMRMEDTOKEN_GEN_ALL_CORE_FT
ascorbic acid 500 mg oral tablet: 1 tab(s) orally once a day  budesonide-formoterol 160 mcg-4.5 mcg/inh inhalation aerosol: 2 puff(s) inhaled 2 times a day   cholecalciferol 25 mcg (1000 intl units) oral tablet: 1 tab(s) orally once a day  furosemide 40 mg oral tablet: 1 tab(s) orally once a day  gabapentin 300 mg oral capsule: 1 cap(s) orally 2 times a day  levothyroxine 137 mcg (0.137 mg) oral tablet: 1 tab(s) orally once a day  Ozempic 2 mg/1.5 mL (1 mg dose) subcutaneous solution: 1 milligram(s) subcutaneously once a week on Saturday  pantoprazole 40 mg oral delayed release tablet: 1 tab(s) orally once a day  Synjardy XR 10 mg-1000 mg oral tablet, extended release: 1 tab(s) orally once a day  tiotropium 18 mcg inhalation capsule: 1 cap(s) inhaled once a day   ascorbic acid 500 mg oral tablet: 1 tab(s) orally once a day  cholecalciferol 25 mcg (1000 intl units) oral tablet: 1 tab(s) orally once a day  furosemide 40 mg oral tablet: 1 tab(s) orally once a day  gabapentin 300 mg oral capsule: 1 cap(s) orally 2 times a day  levothyroxine 137 mcg (0.137 mg) oral tablet: 1 tab(s) orally once a day  oxyCODONE 5 mg oral tablet: 1 tab(s) orally every 6 hours as needed for  moderate pain MDD: 4  Ozempic 2 mg/1.5 mL (1 mg dose) subcutaneous solution: 1 milligram(s) subcutaneously once a week on Saturday  pantoprazole 40 mg oral delayed release tablet: 1 tab(s) orally once a day  Synjardy XR 10 mg-1000 mg oral tablet, extended release: 1 tab(s) orally once a day

## 2023-04-15 LAB
GLUCOSE BLDC GLUCOMTR-MCNC: 115 MG/DL — HIGH (ref 70–99)
GLUCOSE BLDC GLUCOMTR-MCNC: 162 MG/DL — HIGH (ref 70–99)
GLUCOSE BLDC GLUCOMTR-MCNC: 229 MG/DL — HIGH (ref 70–99)

## 2023-04-15 PROCEDURE — 71045 X-RAY EXAM CHEST 1 VIEW: CPT | Mod: 26

## 2023-04-15 PROCEDURE — 99233 SBSQ HOSP IP/OBS HIGH 50: CPT

## 2023-04-15 RX ADMIN — Medication 1 TABLET(S): at 21:44

## 2023-04-15 RX ADMIN — BUDESONIDE AND FORMOTEROL FUMARATE DIHYDRATE 2 PUFF(S): 160; 4.5 AEROSOL RESPIRATORY (INHALATION) at 20:33

## 2023-04-15 RX ADMIN — Medication 1 TABLET(S): at 09:51

## 2023-04-15 RX ADMIN — Medication 2: at 11:47

## 2023-04-15 RX ADMIN — BUDESONIDE AND FORMOTEROL FUMARATE DIHYDRATE 2 PUFF(S): 160; 4.5 AEROSOL RESPIRATORY (INHALATION) at 08:39

## 2023-04-15 RX ADMIN — Medication 137 MICROGRAM(S): at 05:22

## 2023-04-15 RX ADMIN — OXYCODONE HYDROCHLORIDE 5 MILLIGRAM(S): 5 TABLET ORAL at 16:45

## 2023-04-15 RX ADMIN — Medication 4: at 16:04

## 2023-04-15 RX ADMIN — ENOXAPARIN SODIUM 40 MILLIGRAM(S): 100 INJECTION SUBCUTANEOUS at 16:04

## 2023-04-15 RX ADMIN — Medication 975 MILLIGRAM(S): at 05:31

## 2023-04-15 RX ADMIN — PANTOPRAZOLE SODIUM 40 MILLIGRAM(S): 20 TABLET, DELAYED RELEASE ORAL at 05:23

## 2023-04-15 RX ADMIN — TIOTROPIUM BROMIDE 2 PUFF(S): 18 CAPSULE ORAL; RESPIRATORY (INHALATION) at 08:39

## 2023-04-15 RX ADMIN — Medication 40 MILLIGRAM(S): at 09:51

## 2023-04-15 RX ADMIN — GABAPENTIN 300 MILLIGRAM(S): 400 CAPSULE ORAL at 21:44

## 2023-04-15 RX ADMIN — OXYCODONE HYDROCHLORIDE 5 MILLIGRAM(S): 5 TABLET ORAL at 16:08

## 2023-04-15 RX ADMIN — GABAPENTIN 300 MILLIGRAM(S): 400 CAPSULE ORAL at 09:51

## 2023-04-15 NOTE — PROGRESS NOTE ADULT - SUBJECTIVE AND OBJECTIVE BOX
Subjective:  awake and alert  no sob, no distress  s/p VATS pleuradesis/RODERICK bullectomy POD#4    MEDICATIONS  (STANDING):  acetaminophen     Tablet .. 975 milliGRAM(s) Oral every 8 hours  albuterol/ipratropium for Nebulization.. 3 milliLiter(s) Nebulizer every 20 minutes  budesonide 160 MICROgram(s)/formoterol 4.5 MICROgram(s) Inhaler 2 Puff(s) Inhalation two times a day  dextrose 5%. 1000 milliLiter(s) (50 mL/Hr) IV Continuous <Continuous>  dextrose 5%. 1000 milliLiter(s) (100 mL/Hr) IV Continuous <Continuous>  dextrose 50% Injectable 25 Gram(s) IV Push once  dextrose 50% Injectable 12.5 Gram(s) IV Push once  dextrose 50% Injectable 25 Gram(s) IV Push once  enoxaparin Injectable 40 milliGRAM(s) SubCutaneous every 24 hours  furosemide    Tablet 40 milliGRAM(s) Oral daily  gabapentin 300 milliGRAM(s) Oral two times a day  glucagon  Injectable 1 milliGRAM(s) IntraMuscular once  HYDROmorphone PCA (1 mG/mL) 30 milliLiter(s) PCA Continuous PCA Continuous  insulin lispro (ADMELOG) corrective regimen sliding scale   SubCutaneous three times a day before meals  levothyroxine 137 MICROGram(s) Oral daily  lidocaine   4% Patch 1 Patch Transdermal daily  methylPREDNISolone sodium succinate Injectable 125 milliGRAM(s) IV Push Once  pantoprazole    Tablet 40 milliGRAM(s) Oral before breakfast  tiotropium 2.5 MICROgram(s) Inhaler 2 Puff(s) Inhalation daily  trimethoprim  160 mG/sulfamethoxazole 800 mG 1 Tablet(s) Oral two times a day    MEDICATIONS  (PRN):  dextrose Oral Gel 15 Gram(s) Oral once PRN Blood Glucose LESS THAN 70 milliGRAM(s)/deciliter  HYDROmorphone  Injectable 1 milliGRAM(s) IV Push every 6 hours PRN Severe Pain (7 - 10)  HYDROmorphone PCA (1 mG/mL) Rescue Clinician Bolus 0.5 milliGRAM(s) IV Push every 15 minutes PRN for Pain Scale GREATER THAN 6  naloxone Injectable 0.1 milliGRAM(s) IV Push every 3 minutes PRN For ANY of the following changes in patient status:  A. RR LESS THAN 10 breaths per minute, B. Oxygen saturation LESS THAN 90%, C. Sedation score of 6  ondansetron Injectable 4 milliGRAM(s) IV Push every 6 hours PRN Nausea  oxyCODONE    IR 5 milliGRAM(s) Oral every 6 hours PRN Moderate Pain (4 - 6)  senna 2 Tablet(s) Oral at bedtime PRN Constipation      Allergies    Breo Ellipta (Rash)    Intolerances        REVIEW OF SYSTEMS:    CONSTITUTIONAL:  As per HPI.  HEENT:  Eyes:  No diplopia or blurred vision. ENT:  No earache, sore throat or runny nose.  CARDIOVASCULAR:  No pressure, squeezing, tightness, heaviness or aching about the chest, neck, axilla or epigastrium.  RESPIRATORY:  no sob  GASTROINTESTINAL:  No nausea, vomiting or diarrhea.  GENITOURINARY:  No dysuria, frequency or urgency.  MUSCULOSKELETAL:  no joint pain, deformity, tenderness  EXTREMITIES: no clubbing cyanosis,edema  SKIN:  No change in skin, hair or nails.  NEUROLOGIC:  No paresthesias, fasciculations, seizures or weakness.  PSYCHIATRIC:  No disorder of thought or mood.  ENDOCRINE:  No heat or cold intolerance, polyuria or polydipsia.  HEMATOLOGICAL:  No easy bruising or bleedings:    Vital Signs Last 24 Hrs  T(C): 36.7 (13 Apr 2023 06:25), Max: 38.6 (12 Apr 2023 21:11)  T(F): 98 (13 Apr 2023 06:25), Max: 101.4 (12 Apr 2023 21:11)  HR: 77 (13 Apr 2023 06:25) (71 - 105)  BP: 101/61 (13 Apr 2023 06:25) (96/31 - 119/84)  BP(mean): 73 (13 Apr 2023 06:25) (49 - 96)  RR: 18 (13 Apr 2023 06:25) (12 - 28)  SpO2: 92% (13 Apr 2023 06:25) (90% - 95%)    Parameters below as of 12 Apr 2023 21:11  Patient On (Oxygen Delivery Method): nasal cannula  O2 Flow (L/min): 3      PHYSICAL EXAMINATION:  SKIN: no rashes  HEAD: NC/AT  EYES: PERRLA, EOMI  NECK:  Supple. No lymphadenopathy. Jugular venous pressure not elevated. Carotids equal.   HEART:   S1S2 reg  CHEST:  clear to A.  ABDOMEN:  Soft and nontender.   EXTREMITIES:  no C/C/E  NEURO: AAO x 3, no focal deficts       LABS:                        11.1   10.11 )-----------( 300      ( 13 Apr 2023 06:03 )             33.3     04-13    129<L>  |  102  |  18  ----------------------------<  148<H>  4.3   |  25  |  0.63    Ca    8.6      13 Apr 2023 06:03  Phos  2.0     04-13  Mg     1.8     04-13            RADIOLOGY & ADDITIONAL TESTS:

## 2023-04-15 NOTE — PROGRESS NOTE ADULT - SUBJECTIVE AND OBJECTIVE BOX
CTS Progress Note    HPI:    S:    Pt seen and examined  HD # 8  FULL CODE  76 yo Female with h/o COPD/bullous emphysema on home O2 PRN,  h/o Rt PTX 11/2021 and 7/2022 s/p pigtail placement, s/p FB Right VATS RODERICK wedge resection and talc pleurodesis 7/20/22, left PTX on 4/4/23 now admitted w spontaneous Left large PTX again. Pt was discharged on 4/8 and readmitted 4/9 with moderated Left PTX.  Pigtail was placed with good reexpansion of lung. S/p left pigtail placement 4/8/23 now S/P FB Left VATS RODERICK wedge resection Doxy and mechanical pleurodesis     4/15 AM: Improving. CT on sxn. No AL. 20cc output 24h.    ROS: + SOB, improved  Remainder of systems reviewed, negative     Allergies    Breo Ellipta (Rash)    Intolerances    MEDICATIONS  (STANDING):    albuterol/ipratropium for Nebulization.. 3 milliLiter(s) Nebulizer every 20 minutes  budesonide 160 MICROgram(s)/formoterol 4.5 MICROgram(s) Inhaler 2 Puff(s) Inhalation two times a day  dextrose 5%. 1000 milliLiter(s) (50 mL/Hr) IV Continuous <Continuous>  dextrose 5%. 1000 milliLiter(s) (100 mL/Hr) IV Continuous <Continuous>  dextrose 50% Injectable 25 Gram(s) IV Push once  dextrose 50% Injectable 12.5 Gram(s) IV Push once  dextrose 50% Injectable 25 Gram(s) IV Push once  enoxaparin Injectable 40 milliGRAM(s) SubCutaneous every 24 hours  furosemide    Tablet 40 milliGRAM(s) Oral daily  gabapentin 300 milliGRAM(s) Oral two times a day  glucagon  Injectable 1 milliGRAM(s) IntraMuscular once  insulin lispro (ADMELOG) corrective regimen sliding scale   SubCutaneous three times a day before meals  levothyroxine 137 MICROGram(s) Oral daily  lidocaine   4% Patch 1 Patch Transdermal daily  methylPREDNISolone sodium succinate Injectable 125 milliGRAM(s) IV Push Once  pantoprazole    Tablet 40 milliGRAM(s) Oral before breakfast  tiotropium 2.5 MICROgram(s) Inhaler 2 Puff(s) Inhalation daily  trimethoprim  160 mG/sulfamethoxazole 800 mG 1 Tablet(s) Oral two times a day    MEDICATIONS  (PRN):    dextrose Oral Gel 15 Gram(s) Oral once PRN Blood Glucose LESS THAN 70 milliGRAM(s)/deciliter  HYDROmorphone  Injectable 1 milliGRAM(s) IV Push every 6 hours PRN Severe Pain (7 - 10)  naloxone Injectable 0.1 milliGRAM(s) IV Push every 3 minutes PRN For ANY of the following changes in patient status:  A. RR LESS THAN 10 breaths per minute, B. Oxygen saturation LESS THAN 90%, C. Sedation score of 6  ondansetron Injectable 4 milliGRAM(s) IV Push every 6 hours PRN Nausea  oxyCODONE    IR 5 milliGRAM(s) Oral every 6 hours PRN Moderate Pain (4 - 6)  oxyCODONE    IR 10 milliGRAM(s) Oral every 6 hours PRN Severe Pain (7 - 10)  oxyCODONE    IR 5 milliGRAM(s) Oral every 6 hours PRN Moderate Pain (4 - 6)  senna 2 Tablet(s) Oral at bedtime PRN Constipation    Drug Dosing Weight    Height (cm): 165.1 (08 Apr 2023 18:43)  Weight (kg): 87 (09 Apr 2023 02:48)  BMI (kg/m2): 31.9 (09 Apr 2023 02:48)  BSA (m2): 1.94 (09 Apr 2023 02:48)    PAST MEDICAL & SURGICAL HISTORY:    Hypothyroidism  Diabetes mellitus  diet controlled  Back pain  Asthma  Elevated pancreatic enzyme  patient reports elevated lab 6/2013. Gastroenterologist aware  History of cataract  extracted from right and left  Varicose veins of both lower extremities  surgery  Divertitulosis of intestine without bleeding, unspecified intestinal tract location  Gall stones  gall bladder removed  Urinary urgency  Spinal stenosis of lumbar region  DDD (degenerative disc disease), lumbar  Basal cell carcinoma  removed from face  Pulmonary emphysema, unspecified emphysema type  2017 last exacerbation; never intubated  OA (osteoarthritis)  Frequent UTI  2-3x/year; had an episode 5 weeks ago was treated denies dysuria  GERD (gastroesophageal reflux disease)  Leg swelling  Emphysema lung  COVID-19 vaccine series completed  Moderna - 2nd dose 02/20/2021  Knee pain, left  Scar tissue  left knee S/P TKR  COPD exacerbation  S/P bladder repair  bladder lift 2/2013  Dental disorder  Dental surgery 12/2012, patient reports that upper portion is glued in at this time, plan is to have dental implants placed.  S/P knee surgery  arthroscopy bilateral 2007 and 2010  Skin cancer  to right temple 2012, removed, history of basal cell x 2  H/O lumbosacral spine surgery  fusion 2017  H/O umbilical hernia repair  5/24/19  S/P cholecystectomy  2012  S/P colonoscopy  benign polyp  S/P knee replacement  right 2017, left 11/2019    FAMILY HISTORY:    Family history of pancreatic cancer (Father)    Family history of heart disease (Mother)    Family history of stroke (Mother)    ROS: See HPI; otherwise, all systems reviewed and negative.    O:    ICU Vital Signs Last 24 Hrs  T(C): 36.6 (15 Apr 2023 04:52), Max: 36.8 (14 Apr 2023 09:55)  T(F): 97.9 (15 Apr 2023 04:52), Max: 98.3 (14 Apr 2023 09:55)  HR: 71 (15 Apr 2023 08:00) (64 - 92)  BP: 133/65 (15 Apr 2023 08:00) (92/59 - 134/73)  BP(mean): 78 (15 Apr 2023 08:00) (63 - 105)  ABP: --  ABP(mean): --  RR: 22 (15 Apr 2023 08:00) (15 - 23)  SpO2: 96% (15 Apr 2023 08:00) (92% - 100%)    O2 Parameters below as of 15 Apr 2023 08:00  Patient On (Oxygen Delivery Method): nasal cannula  O2 Flow (L/min): 4    I&O's Detail    14 Apr 2023 07:01  -  15 Apr 2023 07:00  --------------------------------------------------------  IN:  Total IN: 0 mL    OUT:    Chest Tube (mL): 120 mL    Incontinent per Collection Bag (mL): 1450 mL    Voided (mL): 800 mL  Total OUT: 2370 mL    Total NET: -2370 mL    PE:    Elderly F lying in bed  No JVD trachea midline  S1S2+  CTA B/L  + Lt chest tube in place, minimal serous output, dressing C/D/I  Abd soft NTND  No leg swelling/edema noted  Awake and alert  Skin pink and well perfused    LABS:                CAPILLARY BLOOD GLUCOSE      POCT Blood Glucose.: 115 mg/dL (15 Apr 2023 08:07)  POCT Blood Glucose.: 155 mg/dL (14 Apr 2023 21:50)  POCT Blood Glucose.: 148 mg/dL (14 Apr 2023 17:01)  POCT Blood Glucose.: 114 mg/dL (14 Apr 2023 12:30)

## 2023-04-15 NOTE — PROGRESS NOTE ADULT - ASSESSMENT
A:    77F  HD # 8  FULL CODE    Here for:    1. PTX s/p VATS, talc pleurodesis  2. Chronic resp failure 2/2 COPD on home oxygen  3. UTI    P:    Improving, but remains high complexity of pathology and care    Ambulating  Tolerating diet    Place chest tube to water seal; done  CXR improving, f/u interval CXR  Hopeful for d/c chest tube tomm AM  Encourage ambulation, IS, deep breathing, diet  PRN analgesia, pain well controlled  VTE ppx  Tx for UTI with bactrim, 5 day course, today day #4  Bowel regimen  Lasix daily, good UOP  Check AM labs    Dispo: CT to water seal, hope to d/c tomm AM. Tx for UTI. Ambulate, OOB.    Discussed with Pt and Pt daughter (by speaker phone) in detail, all questions answered, plan of care discussed.      A:    77F  HD # 8  FULL CODE    Here for:    1. PTX s/p VATS, pleurodesis  2. Chronic resp failure 2/2 COPD on home oxygen  3. UTI    P:    Improving, but remains high complexity of pathology and care    Ambulating  Tolerating diet    Place chest tube to water seal; done  CXR improving, f/u interval CXR  Hopeful for d/c chest tube tomm AM  Encourage ambulation, IS, deep breathing, diet  PRN analgesia, pain well controlled  VTE ppx  Tx for UTI with bactrim, 5 day course, today day #4  Bowel regimen  Lasix daily, good UOP  Check AM labs    Dispo: CT to water seal, hope to d/c tomm AM. Tx for UTI. Ambulate, OOB.    Discussed with Pt and Pt daughter (by speaker phone) in detail, all questions answered, plan of care discussed.

## 2023-04-15 NOTE — PROGRESS NOTE ADULT - ASSESSMENT
- reccurent left pneumothorax  - on NC O2, 2-4 L/min  - s/p VATS talc pleurodesis/RODERICK bullectomy POD #4  - chest tube no leak  - cont bronchodilators  - incentive jacqueline  - dvt proph

## 2023-04-16 LAB
GLUCOSE BLDC GLUCOMTR-MCNC: 123 MG/DL — HIGH (ref 70–99)
GLUCOSE BLDC GLUCOMTR-MCNC: 136 MG/DL — HIGH (ref 70–99)
GLUCOSE BLDC GLUCOMTR-MCNC: 162 MG/DL — HIGH (ref 70–99)
GLUCOSE BLDC GLUCOMTR-MCNC: 192 MG/DL — HIGH (ref 70–99)

## 2023-04-16 PROCEDURE — 71045 X-RAY EXAM CHEST 1 VIEW: CPT | Mod: 26

## 2023-04-16 PROCEDURE — 99233 SBSQ HOSP IP/OBS HIGH 50: CPT

## 2023-04-16 PROCEDURE — 71045 X-RAY EXAM CHEST 1 VIEW: CPT | Mod: 26,77

## 2023-04-16 RX ADMIN — ENOXAPARIN SODIUM 40 MILLIGRAM(S): 100 INJECTION SUBCUTANEOUS at 16:58

## 2023-04-16 RX ADMIN — Medication 40 MILLIGRAM(S): at 09:35

## 2023-04-16 RX ADMIN — TIOTROPIUM BROMIDE 2 PUFF(S): 18 CAPSULE ORAL; RESPIRATORY (INHALATION) at 08:16

## 2023-04-16 RX ADMIN — GABAPENTIN 300 MILLIGRAM(S): 400 CAPSULE ORAL at 09:35

## 2023-04-16 RX ADMIN — Medication 1 TABLET(S): at 09:37

## 2023-04-16 RX ADMIN — Medication 2: at 16:58

## 2023-04-16 RX ADMIN — Medication 137 MICROGRAM(S): at 05:02

## 2023-04-16 RX ADMIN — Medication 1 TABLET(S): at 21:42

## 2023-04-16 RX ADMIN — PANTOPRAZOLE SODIUM 40 MILLIGRAM(S): 20 TABLET, DELAYED RELEASE ORAL at 05:01

## 2023-04-16 RX ADMIN — SENNA PLUS 2 TABLET(S): 8.6 TABLET ORAL at 21:44

## 2023-04-16 RX ADMIN — BUDESONIDE AND FORMOTEROL FUMARATE DIHYDRATE 2 PUFF(S): 160; 4.5 AEROSOL RESPIRATORY (INHALATION) at 08:15

## 2023-04-16 RX ADMIN — GABAPENTIN 300 MILLIGRAM(S): 400 CAPSULE ORAL at 21:42

## 2023-04-16 RX ADMIN — BUDESONIDE AND FORMOTEROL FUMARATE DIHYDRATE 2 PUFF(S): 160; 4.5 AEROSOL RESPIRATORY (INHALATION) at 20:37

## 2023-04-16 NOTE — PROGRESS NOTE ADULT - ASSESSMENT
- recurrent left pneumothorax  - on NC O2, 2-4 L/min  - s/p VATS talc pleurodesis/RODERICK bullectomy POD #5  - chest tube taken out today  - cont bronchodilators  - incentive jacqueline  - dvt proph

## 2023-04-16 NOTE — PROGRESS NOTE ADULT - ASSESSMENT
76 yo female  HD # 8  FULL CODE    Here for:    1. Left PTX s/p VATS, pleurodesis  2. 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 E. coli UTI    P:  Left thoracostomy tube with minimal drainage overnight, AM chest xray reviewed  Chest tube d/c'd  Repeat CXR later today post-ctx removal and in AM  Weaned to room air, supplemental O2 PRN to keep SpO2 >88%  Encourage ambulation, incentive spirometry  PRN analgesia, pain well controlled  Diet as tolerated  Lasix daily, good UOP   VTE ppx  Tx for E. coli UTI, sensitivities reviewed on Bactrim, planned for 5 day course to complete 4/17  Bowel regimen      Dispo: hopeful for d/c home tomorrow    Above d/w Dr. Álvarez

## 2023-04-16 NOTE — PROGRESS NOTE ADULT - SUBJECTIVE AND OBJECTIVE BOX
Subjective:  awake and alert  no sob, no distress. on NC O2.   s/p VATS pleuradesis/RODERICK bullectomy POD#5.  L Chest Tube taken out today.    MEDICATIONS  (STANDING):  acetaminophen     Tablet .. 975 milliGRAM(s) Oral every 8 hours  albuterol/ipratropium for Nebulization.. 3 milliLiter(s) Nebulizer every 20 minutes  budesonide 160 MICROgram(s)/formoterol 4.5 MICROgram(s) Inhaler 2 Puff(s) Inhalation two times a day  dextrose 5%. 1000 milliLiter(s) (50 mL/Hr) IV Continuous <Continuous>  dextrose 5%. 1000 milliLiter(s) (100 mL/Hr) IV Continuous <Continuous>  dextrose 50% Injectable 25 Gram(s) IV Push once  dextrose 50% Injectable 12.5 Gram(s) IV Push once  dextrose 50% Injectable 25 Gram(s) IV Push once  enoxaparin Injectable 40 milliGRAM(s) SubCutaneous every 24 hours  furosemide    Tablet 40 milliGRAM(s) Oral daily  gabapentin 300 milliGRAM(s) Oral two times a day  glucagon  Injectable 1 milliGRAM(s) IntraMuscular once  HYDROmorphone PCA (1 mG/mL) 30 milliLiter(s) PCA Continuous PCA Continuous  insulin lispro (ADMELOG) corrective regimen sliding scale   SubCutaneous three times a day before meals  levothyroxine 137 MICROGram(s) Oral daily  lidocaine   4% Patch 1 Patch Transdermal daily  methylPREDNISolone sodium succinate Injectable 125 milliGRAM(s) IV Push Once  pantoprazole    Tablet 40 milliGRAM(s) Oral before breakfast  tiotropium 2.5 MICROgram(s) Inhaler 2 Puff(s) Inhalation daily  trimethoprim  160 mG/sulfamethoxazole 800 mG 1 Tablet(s) Oral two times a day    MEDICATIONS  (PRN):  dextrose Oral Gel 15 Gram(s) Oral once PRN Blood Glucose LESS THAN 70 milliGRAM(s)/deciliter  HYDROmorphone  Injectable 1 milliGRAM(s) IV Push every 6 hours PRN Severe Pain (7 - 10)  HYDROmorphone PCA (1 mG/mL) Rescue Clinician Bolus 0.5 milliGRAM(s) IV Push every 15 minutes PRN for Pain Scale GREATER THAN 6  naloxone Injectable 0.1 milliGRAM(s) IV Push every 3 minutes PRN For ANY of the following changes in patient status:  A. RR LESS THAN 10 breaths per minute, B. Oxygen saturation LESS THAN 90%, C. Sedation score of 6  ondansetron Injectable 4 milliGRAM(s) IV Push every 6 hours PRN Nausea  oxyCODONE    IR 5 milliGRAM(s) Oral every 6 hours PRN Moderate Pain (4 - 6)  senna 2 Tablet(s) Oral at bedtime PRN Constipation      Allergies    Breo Ellipta (Rash)    Intolerances        REVIEW OF SYSTEMS:    CONSTITUTIONAL:  As per HPI.  HEENT:  Eyes:  No diplopia or blurred vision. ENT:  No earache, sore throat or runny nose.  CARDIOVASCULAR:  No pressure, squeezing, tightness, heaviness or aching about the chest, neck, axilla or epigastrium.  RESPIRATORY:  no sob  GASTROINTESTINAL:  No nausea, vomiting or diarrhea.  GENITOURINARY:  No dysuria, frequency or urgency.  MUSCULOSKELETAL:  no joint pain, deformity, tenderness  EXTREMITIES: no clubbing cyanosis,edema  SKIN:  No change in skin, hair or nails.  NEUROLOGIC:  No paresthesias, fasciculations, seizures or weakness.  PSYCHIATRIC:  No disorder of thought or mood.  ENDOCRINE:  No heat or cold intolerance, polyuria or polydipsia.  HEMATOLOGICAL:  No easy bruising or bleedings:    Vital Signs Last 24 Hrs  T(C): 36.7 (13 Apr 2023 06:25), Max: 38.6 (12 Apr 2023 21:11)  T(F): 98 (13 Apr 2023 06:25), Max: 101.4 (12 Apr 2023 21:11)  HR: 77 (13 Apr 2023 06:25) (71 - 105)  BP: 101/61 (13 Apr 2023 06:25) (96/31 - 119/84)  BP(mean): 73 (13 Apr 2023 06:25) (49 - 96)  RR: 18 (13 Apr 2023 06:25) (12 - 28)  SpO2: 92% (13 Apr 2023 06:25) (90% - 95%)    Parameters below as of 12 Apr 2023 21:11  Patient On (Oxygen Delivery Method): nasal cannula  O2 Flow (L/min): 3      PHYSICAL EXAMINATION:  SKIN: no rashes  HEAD: NC/AT  EYES: PERRLA, EOMI  NECK:  Supple. No lymphadenopathy. Jugular venous pressure not elevated. Carotids equal.   HEART:   S1S2 reg  CHEST:  clear to A.  ABDOMEN:  Soft and nontender.   EXTREMITIES:  no C/C/E  NEURO: AAO x 3, no focal deficts       LABS:                        11.1   10.11 )-----------( 300      ( 13 Apr 2023 06:03 )             33.3     04-13    129<L>  |  102  |  18  ----------------------------<  148<H>  4.3   |  25  |  0.63    Ca    8.6      13 Apr 2023 06:03  Phos  2.0     04-13  Mg     1.8     04-13            RADIOLOGY & ADDITIONAL TESTS:

## 2023-04-16 NOTE — PROGRESS NOTE ADULT - SUBJECTIVE AND OBJECTIVE BOX
Patient is a 77y old  Female who presents with a chief complaint of left PTX (15 Apr 2023 12:57)      BRIEF HOSPITAL COURSE: 76 yo Female with h/o COPD/bullous emphysema on home O2 PRN,  h/o Rt PTX 11/2021 and 7/2022 s/p pigtail placement, s/p FB Right VATS RODERICK wedge resection and talc pleurodesis 7/20/22, left PTX on 4/4/23 now admitted w spontaneous Left large PTX again. Pt was discharged on 4/8 and readmitted 4/9 with moderated Left PTX.  Pigtail was placed with good reexpansion of lung. S/p left pigtail placement 4/8/23 now S/P FB Left VATS RODERICK wedge resection Doxy and mechanical pleurodesis        Events last 24 hours: HD #9. L thoracostomy tube 10cc serosanguineous output overnight. Reports some dyspnea, unable to take full deep breath. Otherwise feeling well, no complaints. Was able to wean off supplemental oxygen to room air, with SpO2 92-95%.      PAST MEDICAL & SURGICAL HISTORY:  Hypothyroidism      Diabetes mellitus  diet controlled      Back pain      Asthma      Elevated pancreatic enzyme  patient reports elevated lab 6/2013. Gastroenterologist aware      History of cataract  extracted from right and left      Varicose veins of both lower extremities  surgery      Diverticulosis of intestine without bleeding, unspecified intestinal tract location      Gall stones  gall bladder removed      Urinary urgency      Spinal stenosis of lumbar region      DDD (degenerative disc disease), lumbar      Basal cell carcinoma  removed from face      Pulmonary emphysema, unspecified emphysema type  2017 last exacerbation; never intubated      OA (osteoarthritis)      Frequent UTI  2-3x/year; had an episode 5 weeks ago was treated denies dysuria      GERD (gastroesophageal reflux disease)      Leg swelling      Emphysema lung      COVID-19 vaccine series completed  Moderna - 2nd dose 02/20/2021      Knee pain, left      Scar tissue  left knee S/P TKR      COPD exacerbation      S/P bladder repair  bladder lift 2/2013      Dental disorder  Dental surgery 12/2012, patient reports that upper portion is glued in at this time, plan is to have dental implants placed.      S/P knee surgery  arthroscopy bilateral 2007 and 2010      Skin cancer  to right temple 2012, removed, history of basal cell x 2      H/O lumbosacral spine surgery  fusion 2017      H/O umbilical hernia repair  5/24/19      S/P cholecystectomy  2012      S/P colonoscopy  benign polyp      S/P knee replacement  right 2017, left 11/2019          SOCIAL HISTORY: former smoker        Review of Systems:  CONSTITUTIONAL: No fever, chills  RESPIRATORY: +Dyspnea with deep inspiration  CARDIOVASCULAR: No chest pain  GASTROINTESTINAL: No abdominal pain. No nausea, vomiting, diarrhea, or constipation.  GENITOURINARY: No dysuria, hematuria  MUSCULOSKELETAL: No back pain.        Medications:  trimethoprim  160 mG/sulfamethoxazole 800 mG 1 Tablet(s) Oral two times a day    furosemide    Tablet 40 milliGRAM(s) Oral daily    albuterol/ipratropium for Nebulization.. 3 milliLiter(s) Nebulizer every 20 minutes  budesonide 160 MICROgram(s)/formoterol 4.5 MICROgram(s) Inhaler 2 Puff(s) Inhalation two times a day  tiotropium 2.5 MICROgram(s) Inhaler 2 Puff(s) Inhalation daily    gabapentin 300 milliGRAM(s) Oral two times a day  ondansetron Injectable 4 milliGRAM(s) IV Push every 6 hours PRN  oxyCODONE    IR 10 milliGRAM(s) Oral every 6 hours PRN  oxyCODONE    IR 5 milliGRAM(s) Oral every 6 hours PRN      enoxaparin Injectable 40 milliGRAM(s) SubCutaneous every 24 hours    pantoprazole    Tablet 40 milliGRAM(s) Oral before breakfast  senna 2 Tablet(s) Oral at bedtime PRN      dextrose 50% Injectable 25 Gram(s) IV Push once  dextrose 50% Injectable 25 Gram(s) IV Push once  dextrose 50% Injectable 12.5 Gram(s) IV Push once  dextrose Oral Gel 15 Gram(s) Oral once PRN  glucagon  Injectable 1 milliGRAM(s) IntraMuscular once  insulin lispro (ADMELOG) corrective regimen sliding scale   SubCutaneous three times a day before meals  levothyroxine 137 MICROGram(s) Oral daily  methylPREDNISolone sodium succinate Injectable 125 milliGRAM(s) IV Push Once    dextrose 5%. 1000 milliLiter(s) IV Continuous <Continuous>  dextrose 5%. 1000 milliLiter(s) IV Continuous <Continuous>      lidocaine   4% Patch 1 Patch Transdermal daily    naloxone Injectable 0.1 milliGRAM(s) IV Push every 3 minutes PRN          ICU Vital Signs Last 24 Hrs  T(C): 37.1 (16 Apr 2023 09:20), Max: 37.8 (15 Apr 2023 22:31)  T(F): 98.8 (16 Apr 2023 09:20), Max: 100.1 (15 Apr 2023 22:31)  HR: 79 (16 Apr 2023 10:00) (71 - 99)  BP: 92/59 (16 Apr 2023 10:00) (92/59 - 150/82)  BP(mean): 71 (16 Apr 2023 10:00) (71 - 97)  ABP: --  ABP(mean): --  RR: 18 (16 Apr 2023 10:00) (18 - 30)  SpO2: 92% (16 Apr 2023 10:00) (92% - 100%)    O2 Parameters below as of 16 Apr 2023 03:58  Patient On (Oxygen Delivery Method): nasal cannula                I&O's Detail    15 Apr 2023 07:01  -  16 Apr 2023 07:00  --------------------------------------------------------  IN:  Total IN: 0 mL    OUT:    Chest Tube (mL): 90 mL    Incontinent per Collection Bag (mL): 2100 mL  Total OUT: 2190 mL    Total NET: -2190 mL            LABS:                CAPILLARY BLOOD GLUCOSE      POCT Blood Glucose.: 123 mg/dL (16 Apr 2023 08:09)        CULTURES:            Physical Examination:    General: No acute distress.      HEENT: Pupils equal, reactive to light.  Symmetric.    PULM: Clear to auscultation bilaterally. Left thoracostomy tube in place to WS, dressing saturated in serosanguineous fluid.    CVS: Regular rate and rhythm, no murmurs, rubs, or gallops    ABD: Soft, nondistended, nontender, normoactive bowel sounds, no masses    EXT: No edema, nontender    SKIN: Warm and well perfused, no rashes noted.    NEURO: Alert, oriented, interactive, nonfocal        RADIOLOGY: < from: Xray Chest 1 View- PORTABLE-Routine (Xray Chest 1 View- PORTABLE-Routine in AM.) (04.15.23 @ 07:46) >  ACC: [id number] EXAM:  XR CHEST PORTABLE ROUTINE 1V   ORDERED BY: NA MEI     PROCEDURE DATE:  04/15/2023          INTERPRETATION:  AP chest on April 15, 2023 at 6:50 AM.    Heart magnified by technique. Elevated right hemidiaphragm theleft chest   tube remain.    There are persistent slight linear atelectases at right base and scarring   at right apex.    Left lung is expanded. Increasing pattern in the left lung remains.    Chest is similar to April 14.    IMPRESSION: Stable findings similar to one day prior.    --- End of Report ---            KEVIN VOSS MD; Attending Radiologist  This document has been electronically signed. Apr 15 2023  1:05PM    < end of copied text >

## 2023-04-17 ENCOUNTER — TRANSCRIPTION ENCOUNTER (OUTPATIENT)
Age: 78
End: 2023-04-17

## 2023-04-17 VITALS
HEART RATE: 105 BPM | RESPIRATION RATE: 22 BRPM | DIASTOLIC BLOOD PRESSURE: 62 MMHG | OXYGEN SATURATION: 94 % | SYSTOLIC BLOOD PRESSURE: 110 MMHG

## 2023-04-17 LAB
ANION GAP SERPL CALC-SCNC: 6 MMOL/L — SIGNIFICANT CHANGE UP (ref 5–17)
BUN SERPL-MCNC: 13 MG/DL — SIGNIFICANT CHANGE UP (ref 7–23)
CALCIUM SERPL-MCNC: 9.1 MG/DL — SIGNIFICANT CHANGE UP (ref 8.5–10.1)
CHLORIDE SERPL-SCNC: 108 MMOL/L — SIGNIFICANT CHANGE UP (ref 96–108)
CO2 SERPL-SCNC: 23 MMOL/L — SIGNIFICANT CHANGE UP (ref 22–31)
CREAT SERPL-MCNC: 0.81 MG/DL — SIGNIFICANT CHANGE UP (ref 0.5–1.3)
EGFR: 74 ML/MIN/1.73M2 — SIGNIFICANT CHANGE UP
GLUCOSE BLDC GLUCOMTR-MCNC: 136 MG/DL — HIGH (ref 70–99)
GLUCOSE BLDC GLUCOMTR-MCNC: 140 MG/DL — HIGH (ref 70–99)
GLUCOSE SERPL-MCNC: 248 MG/DL — HIGH (ref 70–99)
HCT VFR BLD CALC: 36.1 % — SIGNIFICANT CHANGE UP (ref 34.5–45)
HGB BLD-MCNC: 11.6 G/DL — SIGNIFICANT CHANGE UP (ref 11.5–15.5)
MCHC RBC-ENTMCNC: 28.3 PG — SIGNIFICANT CHANGE UP (ref 27–34)
MCHC RBC-ENTMCNC: 32.1 GM/DL — SIGNIFICANT CHANGE UP (ref 32–36)
MCV RBC AUTO: 88 FL — SIGNIFICANT CHANGE UP (ref 80–100)
PLATELET # BLD AUTO: 417 K/UL — HIGH (ref 150–400)
POTASSIUM SERPL-MCNC: 4.2 MMOL/L — SIGNIFICANT CHANGE UP (ref 3.5–5.3)
POTASSIUM SERPL-SCNC: 4.2 MMOL/L — SIGNIFICANT CHANGE UP (ref 3.5–5.3)
RBC # BLD: 4.1 M/UL — SIGNIFICANT CHANGE UP (ref 3.8–5.2)
RBC # FLD: 14.8 % — HIGH (ref 10.3–14.5)
SODIUM SERPL-SCNC: 137 MMOL/L — SIGNIFICANT CHANGE UP (ref 135–145)
WBC # BLD: 9.43 K/UL — SIGNIFICANT CHANGE UP (ref 3.8–10.5)
WBC # FLD AUTO: 9.43 K/UL — SIGNIFICANT CHANGE UP (ref 3.8–10.5)

## 2023-04-17 PROCEDURE — 99232 SBSQ HOSP IP/OBS MODERATE 35: CPT

## 2023-04-17 PROCEDURE — 99238 HOSP IP/OBS DSCHRG MGMT 30/<: CPT

## 2023-04-17 PROCEDURE — 71045 X-RAY EXAM CHEST 1 VIEW: CPT | Mod: 26

## 2023-04-17 PROCEDURE — 71250 CT THORAX DX C-: CPT | Mod: 26

## 2023-04-17 RX ORDER — TIOTROPIUM BROMIDE 18 UG/1
1 CAPSULE ORAL; RESPIRATORY (INHALATION)
Qty: 0 | Refills: 0 | DISCHARGE

## 2023-04-17 RX ORDER — OXYCODONE HYDROCHLORIDE 5 MG/1
1 TABLET ORAL
Qty: 28 | Refills: 0
Start: 2023-04-17

## 2023-04-17 RX ADMIN — Medication 40 MILLIGRAM(S): at 10:17

## 2023-04-17 RX ADMIN — GABAPENTIN 300 MILLIGRAM(S): 400 CAPSULE ORAL at 10:17

## 2023-04-17 RX ADMIN — PANTOPRAZOLE SODIUM 40 MILLIGRAM(S): 20 TABLET, DELAYED RELEASE ORAL at 06:17

## 2023-04-17 RX ADMIN — ENOXAPARIN SODIUM 40 MILLIGRAM(S): 100 INJECTION SUBCUTANEOUS at 16:45

## 2023-04-17 RX ADMIN — Medication 137 MICROGRAM(S): at 06:17

## 2023-04-17 NOTE — DISCHARGE NOTE NURSING/CASE MANAGEMENT/SOCIAL WORK - NSDCPEFALRISK_GEN_ALL_CORE
For information on Fall & Injury Prevention, visit: [email or web address] OR  [email or web address] OR  https://www.cdc.gov/steadi/patient.html

## 2023-04-17 NOTE — DISCHARGE NOTE NURSING/CASE MANAGEMENT/SOCIAL WORK - PATIENT PORTAL LINK FT
You can access the FollowMyHealth Patient Portal offered by [facility] by registering at the following website: [email or web address]. By joining [employer]’s FollowMyHealth portal, you will also be able to view your health information using other applications (apps) compatible with our system.

## 2023-04-17 NOTE — PROGRESS NOTE ADULT - ASSESSMENT
- recuurent left pneumothorax  - s/p VATS talc pleurodesis/RODERICK bullectomy POD #6  - cxr show complete re-expansion left lung; left chest tube; possible small left effussion; elevated right hemidiaphragm  - chest tube chest tube out  - on O2; desating of O2; was not on O2 at home  - will get ct scan chest  - cont bronchodilators  - incentive jacqueline  - dvt proph

## 2023-04-17 NOTE — PROGRESS NOTE ADULT - SUBJECTIVE AND OBJECTIVE BOX
Subjective:  awake and alert   sob w exertion  chest tube out    MEDICATIONS  (STANDING):  albuterol/ipratropium for Nebulization.. 3 milliLiter(s) Nebulizer every 20 minutes  budesonide 160 MICROgram(s)/formoterol 4.5 MICROgram(s) Inhaler 2 Puff(s) Inhalation two times a day  dextrose 5%. 1000 milliLiter(s) (100 mL/Hr) IV Continuous <Continuous>  dextrose 5%. 1000 milliLiter(s) (50 mL/Hr) IV Continuous <Continuous>  dextrose 50% Injectable 25 Gram(s) IV Push once  dextrose 50% Injectable 12.5 Gram(s) IV Push once  dextrose 50% Injectable 25 Gram(s) IV Push once  enoxaparin Injectable 40 milliGRAM(s) SubCutaneous every 24 hours  furosemide    Tablet 40 milliGRAM(s) Oral daily  gabapentin 300 milliGRAM(s) Oral two times a day  glucagon  Injectable 1 milliGRAM(s) IntraMuscular once  insulin lispro (ADMELOG) corrective regimen sliding scale   SubCutaneous three times a day before meals  levothyroxine 137 MICROGram(s) Oral daily  lidocaine   4% Patch 1 Patch Transdermal daily  methylPREDNISolone sodium succinate Injectable 125 milliGRAM(s) IV Push Once  pantoprazole    Tablet 40 milliGRAM(s) Oral before breakfast  tiotropium 2.5 MICROgram(s) Inhaler 2 Puff(s) Inhalation daily    MEDICATIONS  (PRN):  dextrose Oral Gel 15 Gram(s) Oral once PRN Blood Glucose LESS THAN 70 milliGRAM(s)/deciliter  naloxone Injectable 0.1 milliGRAM(s) IV Push every 3 minutes PRN For ANY of the following changes in patient status:  A. RR LESS THAN 10 breaths per minute, B. Oxygen saturation LESS THAN 90%, C. Sedation score of 6  ondansetron Injectable 4 milliGRAM(s) IV Push every 6 hours PRN Nausea  oxyCODONE    IR 10 milliGRAM(s) Oral every 6 hours PRN Severe Pain (7 - 10)  oxyCODONE    IR 5 milliGRAM(s) Oral every 6 hours PRN Moderate Pain (4 - 6)  senna 2 Tablet(s) Oral at bedtime PRN Constipation      Allergies    Breo Ellipta (Rash)    Intolerances        REVIEW OF SYSTEMS:    CONSTITUTIONAL:  As per HPI.  HEENT:  Eyes:  No diplopia or blurred vision. ENT:  No earache, sore throat or runny nose.  CARDIOVASCULAR:  No pressure, squeezing, tightness, heaviness or aching about the chest, neck, axilla or epigastrium.  RESPIRATORY:  sob  GASTROINTESTINAL:  No nausea, vomiting or diarrhea.  GENITOURINARY:  No dysuria, frequency or urgency.  MUSCULOSKELETAL:  no joint pain, deformity, tenderness  EXTREMITIES: no clubbing cyanosis,edema  SKIN:  No change in skin, hair or nails.  NEUROLOGIC:  No paresthesias, fasciculations, seizures or weakness.  PSYCHIATRIC:  No disorder of thought or mood.  ENDOCRINE:  No heat or cold intolerance, polyuria or polydipsia.  HEMATOLOGICAL:  No easy bruising or bleedings:    Vital Signs Last 24 Hrs  T(C): 36.9 (16 Apr 2023 21:50), Max: 37.1 (16 Apr 2023 09:20)  T(F): 98.5 (16 Apr 2023 21:50), Max: 98.8 (16 Apr 2023 09:20)  HR: 94 (17 Apr 2023 06:24) (64 - 94)  BP: 126/57 (17 Apr 2023 06:24) (92/59 - 130/57)  BP(mean): 76 (17 Apr 2023 06:24) (70 - 81)  RR: 17 (17 Apr 2023 06:24) (17 - 26)  SpO2: 94% (17 Apr 2023 06:24) (92% - 98%)    Parameters below as of 16 Apr 2023 20:31  Patient On (Oxygen Delivery Method): room air        PHYSICAL EXAMINATION:  SKIN: no rashes  HEAD: NC/AT  EYES: PERRLA, EOMI  NECK:  Supple. No lymphadenopathy. Jugular venous pressure not elevated. Carotids equal.   HEART:   S1S2 reg  CHEST:  decreased bs left base; crackles right base  ABDOMEN:  Soft and nontender.   EXTREMITIES:  no C/C/E  NEURO: AAO x 3, no focal deficts       LABS:                RADIOLOGY & ADDITIONAL TESTS:

## 2023-04-17 NOTE — PROGRESS NOTE ADULT - REASON FOR ADMISSION
left PTX

## 2023-04-17 NOTE — PROGRESS NOTE ADULT - PROBLEM SELECTOR PROBLEM 4
Hypoxemia requiring supplemental oxygen

## 2023-04-17 NOTE — PROGRESS NOTE ADULT - PROBLEM SELECTOR PROBLEM 2
Giant bullous emphysema

## 2023-04-17 NOTE — PROGRESS NOTE ADULT - PROVIDER SPECIALTY LIST ADULT
Thoracic Surgery
Pulmonology
Thoracic Surgery
Thoracic Surgery
Pulmonology
Thoracic Surgery
Pulmonology
Pulmonology
Thoracic Surgery
Pulmonology

## 2023-04-17 NOTE — DISCHARGE NOTE NURSING/CASE MANAGEMENT/SOCIAL WORK - NSDCFUADDAPPT_GEN_ALL_CORE_FT
Call Dr. Herbert's office at [phone] tomorrow or the next business day to make a followup appointment. Call the office if you experience any fevers, shortness of breath, chest pain or excessive drainage from the incision, day or night. Go to the emergency room if any of these symptoms are severe. Take your medications as ordered and take a stool softener if needed with the narcotic medications.     Shower Daily  No Ointments creams lotions to wounds

## 2023-04-18 ENCOUNTER — NON-APPOINTMENT (OUTPATIENT)
Age: 78
End: 2023-04-18

## 2023-04-18 LAB — SURGICAL PATHOLOGY STUDY: SIGNIFICANT CHANGE UP

## 2023-04-21 ENCOUNTER — NON-APPOINTMENT (OUTPATIENT)
Age: 78
End: 2023-04-21

## 2023-04-24 ENCOUNTER — APPOINTMENT (OUTPATIENT)
Dept: INTERNAL MEDICINE | Facility: CLINIC | Age: 78
End: 2023-04-24

## 2023-04-24 NOTE — CDI QUERY NOTE - NSCDIOTHERTXTBX_GEN_ALL_CORE_HH
The Physician's or Provider's documentation of the patient's presentation, evaluation and medical management, as identified below, may support a diagnosis that is not documented to the furthest specificity in the medical record. Please clarify in your progress notes and/or discharge summary if there is a corresponding diagnosis associated with the clinical information described below:    Conflicting information on home oxygen prn versus no home O2 prior to this admission.    Can the patient's respiratory status be clarified if known?    -Acute respiratory failure                                       -Acute Respiratory Distress Syndrome  -Cardiorespiratory Failure  -Post-procedural respiratory failure  -Other (specify)  -Unknown    ALSO: Specify if with:  Hypoxia  Hypercapnea    Link Respiratory Failure to the underlying cause if known (e.g., respiratory failure secondary to _______).          SUPPORTING DOCUMENTATION AND/OR CLINICAL EVIDENCE:    Patient on non rebreather and 4 liter nasal o2 during admission.    ABG:   pH, Venous: 7.42 (04.08.23 @ 19:05)   pH, Venous: 7.35 (04.04.23 @ 10:33)   pH, Venous: 7.39 (07.15.22 @ 13:31)       Ed triage nurse documentation on 4/8/2023:  · Chief Complaint Quote  Pt presents to ED BIBA for shortness of breath that started today. Pt was recently discharged from  for pneumothorax. Pt O2 sat 74% on RA, pt has 1-2 word dyspnea. Denies chest pain, dizziness.  · O2 Delivery/Oxygen Delivery Method - room air · SpO2 (%)  73 %  · Respiration Rate (breaths/min)  30 /min      H&P documentation on 4/8/2023:  76 yo Female with h/o COPD/bullous emphysema on home O2 PRN,  h/o Rt PTX 11/2021 and 7/2022 s/p pigtail placement, s/p  FB Right VATS RODERICK wedge resection and talc pleurodesis 7/20/22, left PTX on 4/4/23 now admitted w spontaneous Left large PTX again. Pigtail placed in the ER, lung reexpanded. Patient seen and examined at the bedside. Lying in bed, complains of pain over left side with deep inspiration. States she suddenly became SOB this evening      Pulmonary documentation on 4/17/2023:  - recuurent left pneumothorax  - s/p VATS talc pleurodesis/RODERICK bullectomy POD #6  - cxr show complete re-expansion left lung; left chest tube; possible small left effussion; elevated right hemidiaphragm  - chest tube chest tube out  - on O2; desating of O2; was not on O2 at home    Problem/Plan - 3:  ·  Problem: COPD (chronic obstructive pulmonary disease).     Problem/Plan - 4:  ·  Problem: Hypoxemia requiring supplemental oxygen.

## 2023-04-25 PROBLEM — J93.83 SPONTANEOUS PNEUMOTHORAX: Status: RESOLVED | Noted: 2021-11-18 | Resolved: 2023-04-25

## 2023-04-26 ENCOUNTER — APPOINTMENT (OUTPATIENT)
Dept: RADIOLOGY | Facility: CLINIC | Age: 78
End: 2023-04-26
Payer: MEDICARE

## 2023-04-26 ENCOUNTER — OUTPATIENT (OUTPATIENT)
Dept: OUTPATIENT SERVICES | Facility: HOSPITAL | Age: 78
LOS: 1 days | End: 2023-04-26
Payer: MEDICARE

## 2023-04-26 DIAGNOSIS — Z98.890 OTHER SPECIFIED POSTPROCEDURAL STATES: Chronic | ICD-10-CM

## 2023-04-26 DIAGNOSIS — J93.83 OTHER PNEUMOTHORAX: ICD-10-CM

## 2023-04-26 DIAGNOSIS — Z96.659 PRESENCE OF UNSPECIFIED ARTIFICIAL KNEE JOINT: Chronic | ICD-10-CM

## 2023-04-26 DIAGNOSIS — Z90.49 ACQUIRED ABSENCE OF OTHER SPECIFIED PARTS OF DIGESTIVE TRACT: Chronic | ICD-10-CM

## 2023-04-26 PROCEDURE — 71046 X-RAY EXAM CHEST 2 VIEWS: CPT

## 2023-04-26 PROCEDURE — 71046 X-RAY EXAM CHEST 2 VIEWS: CPT | Mod: 26

## 2023-04-26 RX ORDER — NITROFURANTOIN (MONOHYDRATE/MACROCRYSTALS) 25; 75 MG/1; MG/1
100 CAPSULE ORAL
Qty: 14 | Refills: 0 | Status: COMPLETED | COMMUNITY
Start: 2022-12-02 | End: 2023-04-26

## 2023-04-26 NOTE — CHART NOTE - NSCHARTNOTEFT_GEN_A_CORE
Pt admitted 4/8/23 in acute respiratory failure due to hypoxia, w O2 sat in 70s secondary to large pneumothorax and underlying COPD.
POC  Pt seen, on venti mask sating 97%. Comfortable, on PCA. Chest tube to suction, drained 250cc bloody output. Dtr at bedside.    Vital Signs Last 24 Hrs  T(C): 36.6 (11 Apr 2023 07:42), Max: 36.8 (10 Apr 2023 21:35)  T(F): 97.9 (11 Apr 2023 07:42), Max: 98.2 (10 Apr 2023 21:35)  HR: 78 (11 Apr 2023 16:00) (72 - 100)  BP: 113/52 (11 Apr 2023 16:00) (95/75 - 142/73)  BP(mean): --  RR: 12 (11 Apr 2023 16:00) (12 - 25)  SpO2: 96% (11 Apr 2023 16:00) (91% - 97%)    Parameters below as of 11 Apr 2023 12:15  Patient On (Oxygen Delivery Method): mask, Venturi    O2 Concentration (%): 40  I&O's Detail    10 Apr 2023 07:01  -  11 Apr 2023 07:00  --------------------------------------------------------  IN:  Total IN: 0 mL    OUT:    Chest Tube (mL): 45 mL  Total OUT: 45 mL    Total NET: -45 mL      11 Apr 2023 07:01  -  11 Apr 2023 16:09  --------------------------------------------------------  IN:    Other (mL): 800 mL  Total IN: 800 mL    OUT:    Chest Tube (mL): 200 mL    Indwelling Catheter - Urethral (mL): 325 mL    Other (mL): 375 mL      Physical Exam:  General: NAD, resting comfortably in bed  Pulmonary: Nonlabored breathing, no respiratory distress, left 28F chest tube to suction , -20, drained 250cc bloody output  Cardiovascular: NSR  Abdominal: soft, NT/ND  Extremities: WWP    LABS:                        14.3   8.34  )-----------( 274      ( 10 Apr 2023 07:51 )             43.9     04-10    135  |  105  |  18  ----------------------------<  148<H>  3.7   |  26  |  0.73    Ca    9.2      10 Apr 2023 07:51  Phos  3.2     04-10  Mg     2.1     04-10    TPro  7.4  /  Alb  3.0<L>  /  TBili  0.5  /  DBili  x   /  AST  18  /  ALT  20  /  AlkPhos  95  04-10    PT/INR - ( 10 Apr 2023 07:51 )   PT: 13.0 sec;   INR: 1.12 ratio         PTT - ( 10 Apr 2023 07:51 )  PTT:32.7 sec  CAPILLARY BLOOD GLUCOSE      POCT Blood Glucose.: 193 mg/dL (11 Apr 2023 12:41)  POCT Blood Glucose.: 113 mg/dL (11 Apr 2023 06:09)  POCT Blood Glucose.: 151 mg/dL (10 Apr 2023 21:13)  POCT Blood Glucose.: 158 mg/dL (10 Apr 2023 16:11)      Radiology and Additional Studies:    Assessment:77yFemaleHPI:  76 yo Female with h/o COPD/bullous emphysema on home O2 PRN,  h/o Rt PTX 11/2021 and 7/2022 s/p pigtail placement, s/p  FB Right VATS RODERICK wedge resection and talc pleurodesis 7/20/22, left PTX on 4/4/23 now admitted w spontaneous Left large PTX again. Pigtail placed in the ER, lung reexpanded. Patient seen and examined at the bedside. Lying in bed, complains of pain over left side with deep inspiration. States she suddenly became SOB this evening, discharged yesterday. Recurrent Left PTX s/p pigtail 4/8/23 (08 Apr 2023 22:52)   S/P  FB, L VATS RODERICK wedge resection, total pleurectomy, mechanical and chemical (doxy) pleurodesis, intercostal never block     Plan:  Continue CT to suction  AM CXR and labs  pain control w PCA and ATC tylenol, lidoderm  OOB/Ambulate/PT in am  advance diet as tolerated  wean O2 as tolerated  IS  pt w cloudy urine on mendoza cath placement, pt states w odor, will send UA/culture    Seen and discussed with Dr. Álvarez

## 2023-04-28 ENCOUNTER — NON-APPOINTMENT (OUTPATIENT)
Age: 78
End: 2023-04-28

## 2023-04-28 ENCOUNTER — APPOINTMENT (OUTPATIENT)
Dept: INTERNAL MEDICINE | Facility: CLINIC | Age: 78
End: 2023-04-28
Payer: MEDICARE

## 2023-04-28 ENCOUNTER — APPOINTMENT (OUTPATIENT)
Dept: THORACIC SURGERY | Facility: CLINIC | Age: 78
End: 2023-04-28
Payer: MEDICARE

## 2023-04-28 VITALS
DIASTOLIC BLOOD PRESSURE: 70 MMHG | BODY MASS INDEX: 30.22 KG/M2 | HEIGHT: 66 IN | SYSTOLIC BLOOD PRESSURE: 110 MMHG | TEMPERATURE: 98.3 F | WEIGHT: 188 LBS | OXYGEN SATURATION: 94 % | RESPIRATION RATE: 16 BRPM | HEART RATE: 104 BPM

## 2023-04-28 VITALS
BODY MASS INDEX: 30.53 KG/M2 | SYSTOLIC BLOOD PRESSURE: 119 MMHG | HEIGHT: 66 IN | HEART RATE: 105 BPM | WEIGHT: 190 LBS | OXYGEN SATURATION: 92 % | DIASTOLIC BLOOD PRESSURE: 67 MMHG

## 2023-04-28 DIAGNOSIS — J93.83 OTHER PNEUMOTHORAX: ICD-10-CM

## 2023-04-28 PROCEDURE — 99495 TRANSJ CARE MGMT MOD F2F 14D: CPT | Mod: 25

## 2023-04-28 PROCEDURE — 99215 OFFICE O/P EST HI 40 MIN: CPT | Mod: 25

## 2023-04-28 PROCEDURE — 99024 POSTOP FOLLOW-UP VISIT: CPT

## 2023-04-28 RX ORDER — PHENAZOPYRIDINE 200 MG/1
200 TABLET, FILM COATED ORAL 3 TIMES DAILY
Qty: 6 | Refills: 0 | Status: DISCONTINUED | COMMUNITY
Start: 2023-02-06 | End: 2023-04-28

## 2023-04-28 RX ORDER — NITROFURANTOIN (MONOHYDRATE/MACROCRYSTALS) 25; 75 MG/1; MG/1
100 CAPSULE ORAL
Qty: 14 | Refills: 0 | Status: DISCONTINUED | COMMUNITY
Start: 2023-01-16 | End: 2023-04-28

## 2023-04-28 RX ORDER — ROSUVASTATIN CALCIUM 5 MG/1
5 TABLET, FILM COATED ORAL
Qty: 90 | Refills: 1 | Status: DISCONTINUED | COMMUNITY
Start: 2022-12-19 | End: 2023-04-28

## 2023-04-28 RX ORDER — LIDOCAINE 5% 700 MG/1
5 PATCH TOPICAL
Qty: 30 | Refills: 5 | Status: DISCONTINUED | COMMUNITY
Start: 2022-03-29 | End: 2023-04-28

## 2023-04-28 NOTE — PHYSICAL EXAM
[No Acute Distress] : no acute distress [No Respiratory Distress] : no respiratory distress  [No Accessory Muscle Use] : no accessory muscle use [Clear to Auscultation] : lungs were clear to auscultation bilaterally [Normal Rate] : normal rate  [Regular Rhythm] : with a regular rhythm [Normal S1, S2] : normal S1 and S2 [No Edema] : there was no peripheral edema [Soft] : abdomen soft [Non Tender] : non-tender [Non-distended] : non-distended [Normal Bowel Sounds] : normal bowel sounds [Normal Gait] : normal gait [Normal Affect] : the affect was normal [Alert and Oriented x3] : oriented to person, place, and time [de-identified] : diminished b/l, 3 incisions healing well

## 2023-04-28 NOTE — REASON FOR VISIT
[de-identified] : VATS, with upper lobe wedge resection, L VATS RODERICK wedge \par resection, total pleurectomy, mechanical and chemical (doxy) pleurodesis, \par intercostal never block [de-identified] : 4/11/2023

## 2023-04-28 NOTE — ASSESSMENT
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 \par \par On 4/4/2023 she presented with a recurrent spontaneous Left large pneumothorax.  Pt was discharged on 4/8/2023 and readmitted on 4/9/2023 with moderate Left pneumothorax. A pigtail was placed with reexpansion of lung. A left pigtail catheter was placed on 4/8/23 and patient underwent a FB Left VATS RODERICK wedge resection, pleurectomy with chemical and mechanical pleurodesis. She was discharged to home on 4/17/2023 and returns today for post op check. \par \par she is doing well, off home O2 and nearly back to all activity\par doing more walking\par pain 6/10, continues with oxy prn\par improving with home PT\par denies any worsening davidson, improving overall\par \par \par s/p L VATS RODERICK wedge resection, pleurectomy, mechanical and chemical pleurodesis\par - transition off oxy for pain, tylenol 500mg prn\par - continue to work with home PT as tolerated\par - continue IS\par - CXR reviewed, complete expansion of L lung, minimal to no effusion at L base vs atelectasis; R sergo-diaphragm elevation improved\par - phone call in 1 month to assess overall condition, f/u prn

## 2023-04-28 NOTE — HISTORY OF PRESENT ILLNESS
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 Pt was discharged on 4/8 and readmitted 4/9 with \par moderated Left PTX.  Pigtail was placed with good reexpansion of lung. S/p left \par pigtail placement 4/8/23 now S/P FB Left VATS RODERICK wedge resection Doxy and \par mechanical pleurodesis \par \par 4/28/23- Patient presents to office today for follow up with daughter. Patient reports she is slowly improving but feels well overall. No longer needing o2, has been checking sats with exertion and maintaining spo2 >90%. Still has cough, no yellow/green mucus. No wheezing. Will be seeing CTSX later today. CXR 4/26/23 reviewed, improved when compared to 4/17/23\par

## 2023-04-28 NOTE — CONSULT LETTER
[Dear  ___] : Dear  [unfilled], [Courtesy Letter:] : I had the pleasure of seeing your patient, [unfilled], in my office today. [Please see my note below.] : Please see my note below. [Consult Closing:] : Thank you very much for allowing me to participate in the care of this patient.  If you have any questions, please do not hesitate to contact me. [Sincerely,] : Sincerely, [FreeTextEntry2] : Dr. James Dowd\par [facility] [place]\par [address] [place] [address]\[place] [address]\par Fax: [phone] [FreeTextEntry3] : Pk Herbert MD\par Attending Surgeon\par Division of Thoracic Surgery\par [facility]\par [phone]\par \par \par

## 2023-04-28 NOTE — PLAN
[FreeTextEntry1] : 1. The patient will continue to monitor spo2 - will use oxygen if <88%\par \par 2. Light walking encouraged,  take breaks as needed\par \par 3. Patient will follow up with CTSX later today\par \par 4. Patient seen with Dr. Dowd- will follow up with him 6/2023 \par \par 5. Notify or go to nearest ER if patient should develop sob, cp, back pain in near future

## 2023-05-23 ENCOUNTER — RX RENEWAL (OUTPATIENT)
Age: 78
End: 2023-05-23

## 2023-05-25 PROBLEM — J93.83 SPONTANEOUS PNEUMOTHORAX: Status: ACTIVE | Noted: 2023-04-25

## 2023-05-26 ENCOUNTER — APPOINTMENT (OUTPATIENT)
Dept: THORACIC SURGERY | Facility: CLINIC | Age: 78
End: 2023-05-26
Payer: MEDICARE

## 2023-05-26 DIAGNOSIS — J93.83 OTHER PNEUMOTHORAX: ICD-10-CM

## 2023-05-26 PROCEDURE — 99024 POSTOP FOLLOW-UP VISIT: CPT

## 2023-05-30 NOTE — ASSESSMENT
[FreeTextEntry1] : Maya is a 77 year old female who underwent a FB Left VATS RODERICK wedge resection, pleurectomy with chemical and mechanical pleurodesis.  She was seen on 4/28/2023 for post op visit and was doing well, off home O2 and increasing ambulation. Plan at that visit was a one month follow up via telehealth visit to assess overall condition. \par \par \par s/p L VATS blebectomy, pleurectomy, pleurodesis\par - no acute issues, off O2, pain improving\par - f/u prn

## 2023-05-30 NOTE — CONSULT LETTER
[Dear  ___] : Dear  [unfilled], [Courtesy Letter:] : I had the pleasure of seeing your patient, [unfilled], in my office today. [Please see my note below.] : Please see my note below. [Consult Closing:] : Thank you very much for allowing me to participate in the care of this patient.  If you have any questions, please do not hesitate to contact me. [Sincerely,] : Sincerely, [FreeTextEntry2] : Dr. James Dowd\par [address] [place] [address]\[place] [address] [FreeTextEntry3] : Pk Herbert MD\par Attending Surgeon\par Division of Thoracic Surgery\par [facility]\par [phone]\par \par \par

## 2023-05-30 NOTE — HISTORY OF PRESENT ILLNESS
[FreeTextEntry1] : Maya is a 77 year old female who underwent a FB Left VATS RODERICK wedge resection, pleurectomy with chemical and mechanical pleurodesis.  She was seen on 4/28/2023 for post op visit and was doing well, off home O2 and increasing ambulation. Plan at that visit was a one month follow up via telehealth visit to assess overall condition. \par \par she is doing well, still has some moderate pain worse with activity, but overall improving\par denies any worsening sob, davidson; currently off O2

## 2023-06-08 ENCOUNTER — APPOINTMENT (OUTPATIENT)
Dept: INTERNAL MEDICINE | Facility: CLINIC | Age: 78
End: 2023-06-08

## 2023-06-19 RX ORDER — LEVOTHYROXINE SODIUM 0.14 MG/1
137 TABLET ORAL
Qty: 90 | Refills: 1 | Status: ACTIVE | COMMUNITY
Start: 2018-04-11 | End: 1900-01-01

## 2023-07-26 ENCOUNTER — RX RENEWAL (OUTPATIENT)
Age: 78
End: 2023-07-26

## 2023-08-02 NOTE — PATIENT PROFILE ADULT - ANY SIGNIFICANT CHANGE IN ABILITY TO PERFORM THE FOLLOWING ADL SINCE THE ONSET OF PRESENT ILLNESS?
[facility] CARE DEPARTMENT - [address] [place] [address]  PROGRESS NOTE    Shift date: 8.1.2023  Shift day: Tuesday   Shift # 2    Room # 27/27   Name: Ryley Prado                Evangelical: unknown   Place of Pentecostalism: unknown    Referral: Routine Visit    Admit Date & Time: 8/1/2023 11:57 AM    Assessment:  Ryley Prado is a 23 y.o. male in the hospital because of a metal pole falling on the patient's leg. Family member (grandmother) asked to go bedside.  assisted family in locating family. However, upon arrival, there were two visitors bedside with the physician assisting the patient. The family member seeing two already bedside graciously returned to the waiting area. Intervention:  Writer introduced self and title as  Writer offered space for the grandmother  to express feelings, needs, and concerns and provided a ministry presence. Outcome:  Grandmother appeared initially anxious but after seeing the patient calm and coping, seemed to be more at ease. Plan:  Chaplains will remain available to offer spiritual and emotional support as needed.       Electronically signed by Darshana Greene on 8/2/2023 at 12:33 AM.  [address] [place]  [phone]       08/01/23 1715   Encounter Summary   Service Provided For: Family   Referral/Consult From:  [address] Family members   Last Encounter  08/01/23   Complexity of Encounter Moderate   Begin Time 1715   End Time  1730   Total Time Calculated 15 min   Encounter    Type Follow up   Assessment/Intervention/Outcome   Assessment Calm;Coping   Intervention Active listening;Explored/Affirmed feelings, thoughts, concerns   Outcome Coping;Engaged in conversation     Electronically signed by Manuel Bhandari on 8/2/2023 at 12:33 AM no

## 2023-08-09 ENCOUNTER — APPOINTMENT (OUTPATIENT)
Dept: NEUROLOGY | Facility: CLINIC | Age: 78
End: 2023-08-09
Payer: MEDICARE

## 2023-08-09 VITALS
DIASTOLIC BLOOD PRESSURE: 68 MMHG | TEMPERATURE: 98.2 F | HEART RATE: 79 BPM | HEIGHT: 66 IN | SYSTOLIC BLOOD PRESSURE: 120 MMHG | BODY MASS INDEX: 30.53 KG/M2 | WEIGHT: 190 LBS

## 2023-08-09 DIAGNOSIS — G62.9 POLYNEUROPATHY, UNSPECIFIED: ICD-10-CM

## 2023-08-09 DIAGNOSIS — G57.12 MERALGIA PARESTHETICA, LEFT LOWER LIMB: ICD-10-CM

## 2023-08-09 DIAGNOSIS — M79.2 NEURALGIA AND NEURITIS, UNSPECIFIED: ICD-10-CM

## 2023-08-09 PROCEDURE — 99213 OFFICE O/P EST LOW 20 MIN: CPT

## 2023-08-09 NOTE — DISCUSSION/SUMMARY
[FreeTextEntry1] : Ms. Leon is a 78 year old woman with a history of diabetes and lumbar spine surgery.  She has also been diagnosed with peripheral neuropathy in the past. Unfortunately, records from her prior neurologist are not available at this time.    Peripheral Neuropathy -Examination is suggestive of peripheral neuropathy. -She does have diabetes -Vitamin B12 level was not low. Paperwork from [facility] has mentioned MGUS. Will repeat SPEP. -She will try to find the name of the physician who performed her prior EMG/NCV and we will request the record. -Blood sugar control. -Avoid walking barefoot. -PT/myofascial therapy -Continue gabapentin 300 mg BID    Left leg numbness -Possible meralgia paresthetica vs lumbar radiculopathy, it is not bothering her at this time -Obtain results of prior EMG. -Avoid tight fitting clothing -Continue gabapentin  Pain over left trunk -Likely related to surgery -Can try using lidocaine patches for 12 hours/day. If helpful she will let me know and I will send in a new prescription.   f/u 6 months, sooner if needed.

## 2023-08-09 NOTE — PHYSICAL EXAM
[FreeTextEntry1] : Examination: Constitutional: normal, no apparent distress Eyes: normal conjunctiva b/l, no ptosis, visual fields full Respiratory: no respiratory distress, normal effort, normal auscultation Cardiovascular: normal rate, rhythm, no murmurs Neck: supple, no masses Vascular: carotids normal Skin: normal color, no rashes Psych: normal mood, affect    Neurological: Memory: normal memory, oriented to person, place, time Language intact/no aphasia Cranial Nerves: II-XII intact, Pupils equally round and reactive to light, ocular muscles/movements intact, no ptosis, no facial weakness, tongue protrudes normally in the midline, Motor: normal tone, no pronator drift, full strength in all four extremities in the proximal and distal muscle groups Coordination: Fine motor movements intact, rapid alternating movements intact, finger to nose intact bilaterally Sensory: intact to joint position sense, decreased light touch distal to ankle, decreased vibration in feet Decreased sensation over left lateral thigh and lower leg. DTRs: symmetric, 2+ in b/l triceps, 2+ in b/l biceps, 2+ in b/l brachioradialis, trace in bilateral patellars, absent ankle jerks Gait: narrow based, steady  Scars over left chest  .

## 2023-08-09 NOTE — HISTORY OF PRESENT ILLNESS
[FreeTextEntry1] : 3/29/22: Ms. Leon presents today for neurology evaluation. She was a patient of Dr. Flores. She states that she has neuropathy for many years which became worse over the last few years.  She reports a history of extensive spine surgery. She also  has a history of diabetes.  She reports numbness in her left lateral thigh for many years.  She reports a burning pain in this area.   She has loss of feeling in her feet.  She has severe pain in her feet. She has been taking gabapentin. She takes 100 mg in the AM and 300 mg at night.  She denies having any side effects. She does not think that it is effective. In the past she was taking 400 mg TID.  She does not have symptoms in her hands.  She had a NCV/EMG in the past and was told that she has both neuropathy and an issue with her back.  11/28/22: In June she was hospitalized with a respiratory infection. In July she had a pneumothorax.She underwent a VATS wedge resection on 7/20/22. She had follow-up with Dr. Coulter and they decided to hold off on blebectomy at this time.   She says that her feet are about the same. She has been seeing a myofascial therapist for her back.   The lidocaine patches help with the discomfort on the left thigh.  8/9/23: She had another wedge resection in April 2010. She has a lot of pain in the left ribs/breast.  She no longer has pain in the left thigh.   Her feet are still numb. She goes for massage every other week.  She has a light burning in her feet. She does think that gabapentin provides benefit.

## 2023-08-22 NOTE — PATIENT PROFILE ADULT - HAS THE PATIENT RECEIVED THE INFLUENZA VACCINE THIS SEASON?
Ambulatory Care Management Outreach Attempt    This patient was received as a referral from  Daily assignment for case management of ed utilizer. Attempted to reach patient for ED follow up. Pt has respectfully declined services at this time, my contact information has been shared with pt in the event there circumstances change. They are free to reach out at any time. ACM signing off. Patient: Joni Lowe Patient : 1945 MRN: [id number]    Last Discharge [address]       Date Complaint Diagnosis Description Type Department Provider    23 Shortness of Breath COPD exacerbation ([address] ) . ..  ED (DISCHARGE) [facility] Adam Hunt MD                Noted following upcoming appointments from discharge chart review:   [facility] follow up appointment(s):   Future Appointments   Date Time Provider [address]   2023  9:00 AM Thuy Scales MD PST [facility] AMB   2023  3:40 PM Sharmaine Wetzel, APRN - CNP PPS [facility] AM[facility]   2023 11:20 AM Nas Arriaza MD [address] [facility]   2023 12:00 PM [address] [facility]   10/18/2023 11:20 AM Ingrid Koenig MD PPS [facility] AMB     Non-BS follow up appointment(s):
yes...

## 2023-08-30 ENCOUNTER — NON-APPOINTMENT (OUTPATIENT)
Age: 78
End: 2023-08-30

## 2023-08-30 ENCOUNTER — APPOINTMENT (OUTPATIENT)
Dept: INTERNAL MEDICINE | Facility: CLINIC | Age: 78
End: 2023-08-30
Payer: MEDICARE

## 2023-08-30 VITALS
WEIGHT: 190 LBS | OXYGEN SATURATION: 95 % | SYSTOLIC BLOOD PRESSURE: 120 MMHG | HEIGHT: 66 IN | RESPIRATION RATE: 16 BRPM | DIASTOLIC BLOOD PRESSURE: 72 MMHG | TEMPERATURE: 96.1 F | HEART RATE: 84 BPM | BODY MASS INDEX: 30.53 KG/M2

## 2023-08-30 DIAGNOSIS — R68.84 JAW PAIN: ICD-10-CM

## 2023-08-30 DIAGNOSIS — Z98.890 OTHER SPECIFIED POSTPROCEDURAL STATES: ICD-10-CM

## 2023-08-30 DIAGNOSIS — N64.4 MASTODYNIA: ICD-10-CM

## 2023-08-30 DIAGNOSIS — R79.89 OTHER SPECIFIED ABNORMAL FINDINGS OF BLOOD CHEMISTRY: ICD-10-CM

## 2023-08-30 DIAGNOSIS — Z87.2 PERSONAL HISTORY OF DISEASES OF THE SKIN AND SUBCUTANEOUS TISSUE: ICD-10-CM

## 2023-08-30 DIAGNOSIS — J43.9 EMPHYSEMA, UNSPECIFIED: ICD-10-CM

## 2023-08-30 DIAGNOSIS — L57.8 OTHER SKIN CHANGES DUE TO CHRONIC EXPOSURE TO NONIONIZING RADIATION: ICD-10-CM

## 2023-08-30 DIAGNOSIS — Z86.69 PERSONAL HISTORY OF OTHER DISEASES OF THE NERVOUS SYSTEM AND SENSE ORGANS: ICD-10-CM

## 2023-08-30 DIAGNOSIS — Z20.822 CONTACT WITH AND (SUSPECTED) EXPOSURE TO COVID-19: ICD-10-CM

## 2023-08-30 DIAGNOSIS — R92.2 INCONCLUSIVE MAMMOGRAM: ICD-10-CM

## 2023-08-30 DIAGNOSIS — R39.9 UNSPECIFIED SYMPTOMS AND SIGNS INVOLVING THE GENITOURINARY SYSTEM: ICD-10-CM

## 2023-08-30 PROCEDURE — 94060 EVALUATION OF WHEEZING: CPT

## 2023-08-30 PROCEDURE — 99214 OFFICE O/P EST MOD 30 MIN: CPT | Mod: 25

## 2023-08-30 PROCEDURE — G0009: CPT

## 2023-08-30 PROCEDURE — 90677 PCV20 VACCINE IM: CPT

## 2023-08-30 NOTE — DATA REVIEWED
[FreeTextEntry1] : Spirometry in pre and postbronchodilator therapy was performed.  FVC 2.36 L which is 88% predicted.  FEV1 1.46 L which 78% predicted.  FEV1 over FVC ratio 62%.  FEF 25/75% 0.69 L/s which is 47% predicted.  PEF 3.64 L/s which is 66% predicted. Postbronchodilator: FEV1 1.51 L which is 81% predicted.  PEF is 4.13 L/s which is 75% predicted.  There is no significant bronchodilator response.

## 2023-08-30 NOTE — PLAN
[FreeTextEntry1] : Ms. Leon presents for follow-up evaluation.  1.  She has a history of left VATS with bullectomy and pleurodesis secondary to spontaneous pneumothorax.  She did have follow-up with Dr. Herbert from thoracic surgery.  No further follow-up is required.  2.  Patient will continue on current medication regimen which has been reviewed and revised.  3.  Prescription for CBC, CMP, hemoglobin A1c, B12 and folate level, lipid profile, TSH level and urinalysis.  4.  Patient has been advised to receive the influenza vaccine and RSV vaccine when available.  5.  Mrs. Leon will make an appointment with her cardiologist, Dr. Kim for evaluation of her atypical chest pain.  Patient has been advised that if the pain persists or she develops associated shortness of breath to go to the emergency room.  6.  Follow-up in 6 months with repeat lab work and pulmonary function test.

## 2023-08-30 NOTE — HISTORY OF PRESENT ILLNESS
[FreeTextEntry1] : Follow-up [de-identified] : Ms. Leon presents for follow-up evaluation.  She is feeling well.  She has a history of left VATS with left upper lobe wedge resection/bullectomy, pleurectomy and chemical and mechanical pleurodesis for a left spontaneous pneumothorax.  She has a previous history of similar procedure for her right side.  Currently she is feeling well.  She continues on Symbicort and Spiriva metered-dose inhalers.  Patient is complaining of symptoms of atypical chest pain.  She states that she will get a sharp, stabbing type pain in the middle of her chest.  It seems to happen more with exertion but can happen at rest.  She also continues to complain of some pain in the left mid axillary line which is most likely result of her VATS.

## 2023-09-05 LAB
ALBUMIN SERPL ELPH-MCNC: 4 G/DL
ALP BLD-CCNC: 91 U/L
ALT SERPL-CCNC: 16 U/L
ANION GAP SERPL CALC-SCNC: 16 MMOL/L
APPEARANCE: CLEAR
AST SERPL-CCNC: 22 U/L
BACTERIA: ABNORMAL /HPF
BILIRUB SERPL-MCNC: 0.3 MG/DL
BILIRUBIN URINE: NEGATIVE
BLOOD URINE: NEGATIVE
BUN SERPL-MCNC: 19 MG/DL
CALCIUM SERPL-MCNC: 9.4 MG/DL
CAST: 0 /LPF
CHLORIDE SERPL-SCNC: 100 MMOL/L
CHOLEST SERPL-MCNC: 195 MG/DL
CO2 SERPL-SCNC: 23 MMOL/L
COLOR: YELLOW
CREAT SERPL-MCNC: 0.78 MG/DL
EGFR: 77 ML/MIN/1.73M2
EPITHELIAL CELLS: 2 /HPF
ESTIMATED AVERAGE GLUCOSE: 143 MG/DL
FOLATE SERPL-MCNC: 9.8 NG/ML
GLUCOSE QUALITATIVE U: 500 MG/DL
GLUCOSE SERPL-MCNC: 80 MG/DL
HBA1C MFR BLD HPLC: 6.6 %
HDLC SERPL-MCNC: 60 MG/DL
IRON SERPL-MCNC: 53 UG/DL
KETONES URINE: NEGATIVE MG/DL
LDLC SERPL CALC-MCNC: 115 MG/DL
LEUKOCYTE ESTERASE URINE: ABNORMAL
MICROSCOPIC-UA: NORMAL
NITRITE URINE: POSITIVE
NONHDLC SERPL-MCNC: 135 MG/DL
PH URINE: 6
POTASSIUM SERPL-SCNC: 4.1 MMOL/L
PROT SERPL-MCNC: 7.2 G/DL
PROTEIN URINE: NEGATIVE MG/DL
PSA SERPL-MCNC: <0.01 NG/ML
RED BLOOD CELLS URINE: 1 /HPF
SODIUM SERPL-SCNC: 139 MMOL/L
SPECIFIC GRAVITY URINE: 1.01
TRIGL SERPL-MCNC: 108 MG/DL
TSH SERPL-ACNC: 0.85 UIU/ML
UROBILINOGEN URINE: 0.2 MG/DL
VIT B12 SERPL-MCNC: 512 PG/ML
WHITE BLOOD CELLS URINE: 24 /HPF

## 2023-09-08 ENCOUNTER — NON-APPOINTMENT (OUTPATIENT)
Age: 78
End: 2023-09-08

## 2023-09-13 LAB
APPEARANCE: CLEAR
BACTERIA UR CULT: ABNORMAL
BACTERIA: ABNORMAL /HPF
BILIRUBIN URINE: NEGATIVE
BLOOD URINE: NEGATIVE
CAST: 1 /LPF
COLOR: YELLOW
EPITHELIAL CELLS: 0 /HPF
GLUCOSE QUALITATIVE U: 500 MG/DL
KETONES URINE: NEGATIVE MG/DL
LEUKOCYTE ESTERASE URINE: ABNORMAL
MICROSCOPIC-UA: NORMAL
NITRITE URINE: NEGATIVE
PH URINE: 6
PROTEIN URINE: NEGATIVE MG/DL
RED BLOOD CELLS URINE: 1 /HPF
SPECIFIC GRAVITY URINE: 1.01
UROBILINOGEN URINE: 0.2 MG/DL
WHITE BLOOD CELLS URINE: 103 /HPF

## 2023-09-19 ENCOUNTER — NON-APPOINTMENT (OUTPATIENT)
Age: 78
End: 2023-09-19

## 2023-09-19 DIAGNOSIS — N39.0 URINARY TRACT INFECTION, SITE NOT SPECIFIED: ICD-10-CM

## 2023-09-20 ENCOUNTER — APPOINTMENT (OUTPATIENT)
Dept: DERMATOLOGY | Facility: CLINIC | Age: 78
End: 2023-09-20
Payer: MEDICARE

## 2023-09-20 PROCEDURE — 99213 OFFICE O/P EST LOW 20 MIN: CPT | Mod: 25

## 2023-09-20 PROCEDURE — 17000 DESTRUCT PREMALG LESION: CPT

## 2023-09-28 NOTE — CDI QUERY NOTE - NSCDIOTHERTXTBX2_GEN_ALL_CORE_FT
Multiple views of the right coronary artery obtained using hand injection. Please document the relationship between the following conditions: Pneumothorax and recent procedures  s/p pigtail placement, s/p  FB Right VATS RODERICK wedge resection and talc pleurodesis     -Pneumothorax associated with recent procedures  s/p pigtail placement, s/p  FB Right VATS RODERICK wedge resection and talc pleurodesis   -Unable to rule out Pneumothorax associated with recent procedures  s/p pigtail placement, s/p  FB Right VATS RODERICK wedge resection and talc pleurodesis   -Pneumothorax not associated with recent procedures  s/p pigtail placement, s/p  FB Right VATS RODERICK wedge resection and talc pleurodesis   -Other please specify       SUPPORTING DOCUMENTATION AND/OR CLINICAL EVIDENCE:    DC summary -76 yo Female with h/o 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 Pt states developed acute chest pain on left side associated w difficulty breathing this AM after showering.    Pulmonology- cont O2- on symbicort/spiriva- had spontaneous left ptx from large bullae- has previous hx recurrent right ptx from bullous disease s/p talc pleurodesis 7/22- thoracic f/u noted- will probably need left pleurodesis- dvt proph

## 2023-11-07 ENCOUNTER — RX RENEWAL (OUTPATIENT)
Age: 78
End: 2023-11-07

## 2024-01-23 ENCOUNTER — RX RENEWAL (OUTPATIENT)
Age: 79
End: 2024-01-23

## 2024-02-08 ENCOUNTER — APPOINTMENT (OUTPATIENT)
Dept: INTERNAL MEDICINE | Facility: CLINIC | Age: 79
End: 2024-02-08
Payer: MEDICARE

## 2024-02-08 VITALS
OXYGEN SATURATION: 95 % | HEIGHT: 66 IN | DIASTOLIC BLOOD PRESSURE: 72 MMHG | SYSTOLIC BLOOD PRESSURE: 118 MMHG | WEIGHT: 185 LBS | BODY MASS INDEX: 29.73 KG/M2 | RESPIRATION RATE: 16 BRPM | HEART RATE: 78 BPM | TEMPERATURE: 96.6 F

## 2024-02-08 DIAGNOSIS — Z87.891 PERSONAL HISTORY OF NICOTINE DEPENDENCE: ICD-10-CM

## 2024-02-08 DIAGNOSIS — Z86.03 PERSONAL HISTORY OF NEOPLASM OF UNCERTAIN BEHAVIOR: ICD-10-CM

## 2024-02-08 DIAGNOSIS — R07.89 OTHER CHEST PAIN: ICD-10-CM

## 2024-02-08 DIAGNOSIS — Z87.898 PERSONAL HISTORY OF OTHER SPECIFIED CONDITIONS: ICD-10-CM

## 2024-02-08 DIAGNOSIS — Z86.008 PERSONAL HISTORY OF IN-SITU NEOPLASM OF OTHER SITE: ICD-10-CM

## 2024-02-08 DIAGNOSIS — R82.90 UNSPECIFIED ABNORMAL FINDINGS IN URINE: ICD-10-CM

## 2024-02-08 DIAGNOSIS — E03.9 HYPOTHYROIDISM, UNSPECIFIED: ICD-10-CM

## 2024-02-08 DIAGNOSIS — L82.0 INFLAMED SEBORRHEIC KERATOSIS: ICD-10-CM

## 2024-02-08 DIAGNOSIS — Z87.2 PERSONAL HISTORY OF DISEASES OF THE SKIN AND SUBCUTANEOUS TISSUE: ICD-10-CM

## 2024-02-08 LAB
ALBUMIN SERPL ELPH-MCNC: 4.1 G/DL
ALP BLD-CCNC: 82 U/L
ALT SERPL-CCNC: 17 U/L
ANION GAP SERPL CALC-SCNC: 13 MMOL/L
APPEARANCE: CLEAR
AST SERPL-CCNC: 23 U/L
BACTERIA: ABNORMAL /HPF
BILIRUB SERPL-MCNC: 0.6 MG/DL
BILIRUBIN URINE: NEGATIVE
BLOOD URINE: NEGATIVE
BUN SERPL-MCNC: 19 MG/DL
CALCIUM SERPL-MCNC: 9.5 MG/DL
CAST: 0 /LPF
CHLORIDE SERPL-SCNC: 105 MMOL/L
CHOLEST SERPL-MCNC: 136 MG/DL
CO2 SERPL-SCNC: 24 MMOL/L
COLOR: YELLOW
CREAT SERPL-MCNC: 0.87 MG/DL
EGFR: 67 ML/MIN/1.73M2
EPITHELIAL CELLS: 5 /HPF
ESTIMATED AVERAGE GLUCOSE: 137 MG/DL
FOLATE SERPL-MCNC: 11 NG/ML
GLUCOSE QUALITATIVE U: >=1000 MG/DL
GLUCOSE SERPL-MCNC: 103 MG/DL
HBA1C MFR BLD HPLC: 6.4 %
HCT VFR BLD CALC: 40.9 %
HDLC SERPL-MCNC: 69 MG/DL
HGB BLD-MCNC: 13 G/DL
IRON SERPL-MCNC: 82 UG/DL
KETONES URINE: NEGATIVE MG/DL
LDLC SERPL CALC-MCNC: 53 MG/DL
LEUKOCYTE ESTERASE URINE: ABNORMAL
MCHC RBC-ENTMCNC: 27.1 PG
MCHC RBC-ENTMCNC: 31.8 GM/DL
MCV RBC AUTO: 85.4 FL
MICROSCOPIC-UA: NORMAL
NITRITE URINE: POSITIVE
NONHDLC SERPL-MCNC: 67 MG/DL
PH URINE: 5.5
PLATELET # BLD AUTO: 301 K/UL
POTASSIUM SERPL-SCNC: 3.9 MMOL/L
PROT SERPL-MCNC: 7.5 G/DL
PROTEIN URINE: NEGATIVE MG/DL
RBC # BLD: 4.79 M/UL
RBC # FLD: 15.4 %
RED BLOOD CELLS URINE: 1 /HPF
SODIUM SERPL-SCNC: 142 MMOL/L
SPECIFIC GRAVITY URINE: 1.02
TRIGL SERPL-MCNC: 71 MG/DL
TSH SERPL-ACNC: 1.01 UIU/ML
UROBILINOGEN URINE: 0.2 MG/DL
VIT B12 SERPL-MCNC: 503 PG/ML
WBC # FLD AUTO: 7.4 K/UL
WHITE BLOOD CELLS URINE: 31 /HPF

## 2024-02-08 PROCEDURE — 99214 OFFICE O/P EST MOD 30 MIN: CPT

## 2024-02-08 RX ORDER — TIOTROPIUM BROMIDE 18 UG/1
18 CAPSULE ORAL; RESPIRATORY (INHALATION)
Qty: 90 | Refills: 2 | Status: ACTIVE | COMMUNITY
Start: 2021-01-15 | End: 1900-01-01

## 2024-02-08 RX ORDER — NITROFURANTOIN MACROCRYSTALS 100 MG/1
100 CAPSULE ORAL
Qty: 14 | Refills: 1 | Status: DISCONTINUED | COMMUNITY
Start: 2023-09-08 | End: 2024-02-08

## 2024-02-08 NOTE — HISTORY OF PRESENT ILLNESS
[FreeTextEntry1] : Follow-up evaluation [de-identified] : Mrs. Leon presents for a follow-up evaluation and review of comprehensive blood profile.  She is feeling well.  She denies any chest pain, shortness of breath or palpitations.  Patient has a history of bullous lung disease status post left VATS left upper lobe wedge resection, pleurectomy with chemical and mechanical pleurodesis..  She also has a previous history of spontaneous pneumothorax.

## 2024-02-08 NOTE — PLAN
[FreeTextEntry1] : Mrs. Leon presents for follow-up evaluation.  1.  She will continue on current medication regimen which has been reviewed and revised. 2.  Comprehensive blood profile was reviewed with patient.  She will continue to follow-up with her endocrinologist, Dr. Matthew for management of her diabetes. 3.  He will continue on metered-dose inhaler therapy. 4.  Follow-up in 6 months with complete pulmonary function test.

## 2024-03-20 ENCOUNTER — RX RENEWAL (OUTPATIENT)
Age: 79
End: 2024-03-20

## 2024-03-20 RX ORDER — GABAPENTIN 300 MG/1
300 CAPSULE ORAL
Qty: 90 | Refills: 3 | Status: ACTIVE | COMMUNITY
Start: 2022-03-29 | End: 1900-01-01

## 2024-03-27 ENCOUNTER — APPOINTMENT (OUTPATIENT)
Dept: DERMATOLOGY | Facility: CLINIC | Age: 79
End: 2024-03-27
Payer: MEDICARE

## 2024-03-27 DIAGNOSIS — L82.1 OTHER SEBORRHEIC KERATOSIS: ICD-10-CM

## 2024-03-27 DIAGNOSIS — L53.9 ERYTHEMATOUS CONDITION, UNSPECIFIED: ICD-10-CM

## 2024-03-27 DIAGNOSIS — L91.8 OTHER HYPERTROPHIC DISORDERS OF THE SKIN: ICD-10-CM

## 2024-03-27 DIAGNOSIS — D22.9 MELANOCYTIC NEVI, UNSPECIFIED: ICD-10-CM

## 2024-03-27 DIAGNOSIS — L57.0 ACTINIC KERATOSIS: ICD-10-CM

## 2024-03-27 PROCEDURE — 11200 RMVL SKIN TAGS UP TO&INC 15: CPT

## 2024-03-27 PROCEDURE — 99213 OFFICE O/P EST LOW 20 MIN: CPT | Mod: 25

## 2024-03-27 PROCEDURE — 17000 DESTRUCT PREMALG LESION: CPT | Mod: 59

## 2024-03-27 PROCEDURE — 17003 DESTRUCT PREMALG LES 2-14: CPT

## 2024-03-27 NOTE — PHYSICAL EXAM
[Alert] : alert [Oriented x 3] : ~L oriented x 3 [Well Nourished] : well nourished [Full Body Skin Exam Performed] : performed [FreeTextEntry3] : A full skin exam was performed including the scalp, face, neck, chest, abdomen, back, buttocks, upper extremities and lower extremities.  The patient declined examination of the breasts and genitalia.   The exam did show the following benign growths: Scotts pigmented nevi. Seborrheic keratoses.  keratotic papules, nose and right upper back.  Skin tag with erythema, right axilla.

## 2024-03-27 NOTE — HISTORY OF PRESENT ILLNESS
[FreeTextEntry1] : Patient presents for skin examination. [de-identified] : Notes irritated lesion of the right axilla.  Slow growth.

## 2024-04-12 RX ORDER — BUDESONIDE AND FORMOTEROL FUMARATE DIHYDRATE 160; 4.5 UG/1; UG/1
160-4.5 AEROSOL RESPIRATORY (INHALATION)
Qty: 10.2 | Refills: 5 | Status: ACTIVE | COMMUNITY
Start: 2022-06-14 | End: 1900-01-01

## 2024-05-01 ENCOUNTER — NON-APPOINTMENT (OUTPATIENT)
Age: 79
End: 2024-05-01

## 2024-05-01 ENCOUNTER — APPOINTMENT (OUTPATIENT)
Dept: INTERNAL MEDICINE | Facility: CLINIC | Age: 79
End: 2024-05-01
Payer: MEDICARE

## 2024-05-01 VITALS
TEMPERATURE: 98.4 F | HEART RATE: 92 BPM | OXYGEN SATURATION: 95 % | SYSTOLIC BLOOD PRESSURE: 102 MMHG | WEIGHT: 183 LBS | RESPIRATION RATE: 16 BRPM | DIASTOLIC BLOOD PRESSURE: 54 MMHG | HEIGHT: 66 IN | BODY MASS INDEX: 29.41 KG/M2

## 2024-05-01 DIAGNOSIS — E78.5 HYPERLIPIDEMIA, UNSPECIFIED: ICD-10-CM

## 2024-05-01 DIAGNOSIS — J20.9 ACUTE BRONCHITIS, UNSPECIFIED: ICD-10-CM

## 2024-05-01 DIAGNOSIS — J43.9 EMPHYSEMA, UNSPECIFIED: ICD-10-CM

## 2024-05-01 DIAGNOSIS — J44.9 CHRONIC OBSTRUCTIVE PULMONARY DISEASE, UNSPECIFIED: ICD-10-CM

## 2024-05-01 DIAGNOSIS — E11.9 TYPE 2 DIABETES MELLITUS W/OUT COMPLICATIONS: ICD-10-CM

## 2024-05-01 DIAGNOSIS — J06.9 ACUTE UPPER RESPIRATORY INFECTION, UNSPECIFIED: ICD-10-CM

## 2024-05-01 PROCEDURE — 94010 BREATHING CAPACITY TEST: CPT

## 2024-05-01 PROCEDURE — 99214 OFFICE O/P EST MOD 30 MIN: CPT | Mod: 25

## 2024-05-01 RX ORDER — CIPROFLOXACIN HYDROCHLORIDE 500 MG/1
500 TABLET, FILM COATED ORAL
Qty: 14 | Refills: 0 | Status: DISCONTINUED | COMMUNITY
Start: 2023-09-19 | End: 2024-05-01

## 2024-05-01 RX ORDER — SENNOSIDES 50; 8.6 MG/1; MG/1
200 TABLET ORAL
Qty: 30 | Refills: 0 | Status: DISCONTINUED | COMMUNITY
Start: 2022-12-19 | End: 2024-05-01

## 2024-05-01 RX ORDER — ROSUVASTATIN CALCIUM 5 MG/1
5 TABLET, FILM COATED ORAL
Qty: 90 | Refills: 0 | Status: ACTIVE | COMMUNITY
Start: 2023-09-07 | End: 1900-01-01

## 2024-05-01 RX ORDER — CIPROFLOXACIN HYDROCHLORIDE 500 MG/1
500 TABLET, FILM COATED ORAL
Qty: 14 | Refills: 1 | Status: DISCONTINUED | COMMUNITY
Start: 2024-02-08 | End: 2024-05-01

## 2024-05-01 NOTE — PHYSICAL EXAM
[No Acute Distress] : no acute distress [Well Nourished] : well nourished [Well Developed] : well developed [Normal Sclera/Conjunctiva] : normal sclera/conjunctiva [Normal Outer Ear/Nose] : the outer ears and nose were normal in appearance [Normal Oropharynx] : the oropharynx was normal [No JVD] : no jugular venous distention [No Lymphadenopathy] : no lymphadenopathy [Supple] : supple [No Respiratory Distress] : no respiratory distress  [No Accessory Muscle Use] : no accessory muscle use [Clear to Auscultation] : lungs were clear to auscultation bilaterally [Normal Rate] : normal rate  [Regular Rhythm] : with a regular rhythm [Normal S1, S2] : normal S1 and S2 [No Palpable Aorta] : no palpable aorta [No Extremity Clubbing/Cyanosis] : no extremity clubbing/cyanosis [Soft] : abdomen soft [Non Tender] : non-tender [Non-distended] : non-distended [No HSM] : no HSM [Normal Bowel Sounds] : normal bowel sounds [Normal Anterior Cervical Nodes] : no anterior cervical lymphadenopathy [No Rash] : no rash [Coordination Grossly Intact] : coordination grossly intact [No Focal Deficits] : no focal deficits [Normal Gait] : normal gait [Normal Affect] : the affect was normal [Normal Insight/Judgement] : insight and judgment were intact [de-identified] : Few crackles audible bilaterally. [de-identified] : Tr LE edema.

## 2024-05-01 NOTE — HISTORY OF PRESENT ILLNESS
[FreeTextEntry8] : This is a 78-year-old female with a history of COPD, bullous emphysema, status post left upper lobe wedge, pleurectomy, chemical and mechanical pleurodesis.  History of previous spontaneous pneumothorax.  She also has a history of diabetes, hyperlipidemia, hypothyroidism.  She noted symptoms about 14 days ago of runny nose which has persisted and sore throat which is improved.  She has noted tiredness.  She has cough but does not seem to be bringing up much in terms of sputum.  She is not having any hemoptysis.  She is not having fever and there is no shortness of breath.  No change in her noted breathing with exertion.  She was seen at urgent care 3 days ago and begun on Augmentin 875 strength for 10 days.  She is maintained on Symbicort 160 strength and generic Spiriva.  He has not needed to use her rescue albuterol inhaler recently.  Her live-in mate also developed URI symptoms about 4 days ago.

## 2024-05-01 NOTE — ASSESSMENT
[FreeTextEntry1] : #1  Likely viral upper respiratory infection.  Patient was begun on Augmentin 3 days ago at  to treat concomitant acute bronchitis. Also, ensure fluids, use nasal saline spray as needed, Flonase as needed, Robitussin syrup as needed, continue inhalers Symbicort 160, 2 puffs twice daily and  Titropium 1 inhalation per day.  She has albuterol to use as needed for rescue.  She will call or return if her symptoms are not improving.

## 2024-05-01 NOTE — DATA REVIEWED
[FreeTextEntry1] : Spirometry today shows a mild obstructive and restrictive deficit with an FEV1 of 1.36 or 64% predicted.  This was 1.46 in August 2023.

## 2024-05-08 NOTE — PROGRESS NOTE ADULT - ASSESSMENT
- for chest tube removal  - will evaluate for bullectomy as outpatient  - cont symbicort/spiriva  - plan for discharge  - dvt proph 08-May-2024

## 2024-05-23 ENCOUNTER — APPOINTMENT (OUTPATIENT)
Dept: ORTHOPEDIC SURGERY | Facility: CLINIC | Age: 79
End: 2024-05-23

## 2024-06-04 NOTE — H&P ADULT - NSICDXPASTSURGICALHX_GEN_ALL_CORE_FT
without decisional capacity  supportive care
PAST SURGICAL HISTORY:  Dental disorder Dental surgery 12/2012, patient reports that upper portion is glued in at this time, plan is to have dental implants placed.    H/O lumbosacral spine surgery fusion 2017    H/O umbilical hernia repair 5/24/19    S/P bladder repair bladder lift 2/2013    S/P cholecystectomy 2012    S/P colonoscopy benign polyp    S/P knee replacement right 2017, left 11/2019    S/P knee surgery arthroscopy bilateral 2007 and 2010    Skin cancer to right temple 2012, removed, history of basal cell x 2

## 2024-06-12 ENCOUNTER — APPOINTMENT (OUTPATIENT)
Dept: DERMATOLOGY | Facility: CLINIC | Age: 79
End: 2024-06-12
Payer: MEDICARE

## 2024-06-12 DIAGNOSIS — L90.5 SCAR CONDITIONS AND FIBROSIS OF SKIN: ICD-10-CM

## 2024-06-12 PROCEDURE — 99213 OFFICE O/P EST LOW 20 MIN: CPT

## 2024-06-12 NOTE — HISTORY OF PRESENT ILLNESS
[FreeTextEntry1] : Patient fit in to check the left upper lip. [de-identified] : She is s/p removal of a BCC in the region.

## 2024-06-13 ENCOUNTER — APPOINTMENT (OUTPATIENT)
Dept: INTERNAL MEDICINE | Facility: CLINIC | Age: 79
End: 2024-06-13
Payer: MEDICARE

## 2024-06-13 VITALS
HEART RATE: 66 BPM | SYSTOLIC BLOOD PRESSURE: 110 MMHG | BODY MASS INDEX: 28.61 KG/M2 | RESPIRATION RATE: 16 BRPM | DIASTOLIC BLOOD PRESSURE: 70 MMHG | HEIGHT: 66 IN | WEIGHT: 178 LBS | TEMPERATURE: 99.2 F | OXYGEN SATURATION: 97 %

## 2024-06-13 DIAGNOSIS — J06.9 ACUTE UPPER RESPIRATORY INFECTION, UNSPECIFIED: ICD-10-CM

## 2024-06-13 PROCEDURE — 99213 OFFICE O/P EST LOW 20 MIN: CPT

## 2024-06-13 RX ORDER — PREDNISONE 20 MG/1
20 TABLET ORAL TWICE DAILY
Qty: 10 | Refills: 0 | Status: ACTIVE | COMMUNITY
Start: 2024-06-13 | End: 1900-01-01

## 2024-06-13 RX ORDER — HYDROCODONE BITARTRATE AND HOMATROPINE METHYLBROMIDE 1.5; 5 MG/5ML; MG/5ML
5-1.5 SOLUTION ORAL EVERY 6 HOURS
Qty: 140 | Refills: 0 | Status: ACTIVE | COMMUNITY
Start: 2024-06-13 | End: 1900-01-01

## 2024-06-13 RX ORDER — AZITHROMYCIN 500 MG/1
500 TABLET, FILM COATED ORAL DAILY
Qty: 5 | Refills: 0 | Status: ACTIVE | COMMUNITY
Start: 2024-06-13 | End: 1900-01-01

## 2024-06-13 NOTE — PHYSICAL EXAM
[No Acute Distress] : no acute distress [Normal Sclera/Conjunctiva] : normal sclera/conjunctiva [Normal Outer Ear/Nose] : the outer ears and nose were normal in appearance [Normal Oropharynx] : the oropharynx was normal [Normal TMs] : both tympanic membranes were normal [No JVD] : no jugular venous distention [No Lymphadenopathy] : no lymphadenopathy [Supple] : supple [No Respiratory Distress] : no respiratory distress  [No Accessory Muscle Use] : no accessory muscle use [Normal Rate] : normal rate  [Regular Rhythm] : with a regular rhythm [Normal S1, S2] : normal S1 and S2 [No Edema] : there was no peripheral edema [No Extremity Clubbing/Cyanosis] : no extremity clubbing/cyanosis [Soft] : abdomen soft [Non Tender] : non-tender [Non-distended] : non-distended [No Masses] : no abdominal mass palpated [Normal Bowel Sounds] : normal bowel sounds [Normal Anterior Cervical Nodes] : no anterior cervical lymphadenopathy [Grossly Normal Strength/Tone] : grossly normal strength/tone [No Focal Deficits] : no focal deficits [Normal Gait] : normal gait [Normal Affect] : the affect was normal [Alert and Oriented x3] : oriented to person, place, and time [Normal Insight/Judgement] : insight and judgment were intact [de-identified] : exp wheeze

## 2024-06-13 NOTE — REVIEW OF SYSTEMS
[Fever] : no fever [Chills] : no chills [Fatigue] : no fatigue [Discharge] : no discharge [Vision Problems] : vision problems [Earache] : earache [Hearing Loss] : hearing loss [Nosebleed] : no nosebleeds [Hoarseness] : hoarseness [Nasal Discharge] : nasal discharge [Sore Throat] : no sore throat [Postnasal Drip] : postnasal drip [Chest Pain] : no chest pain [Palpitations] : no palpitations [Leg Claudication] : no leg claudication [Lower Ext Edema] : no lower extremity edema [Orthopnea] : orthopnea [Paroxysmal Nocturnal Dyspnea] : no paroxysmal nocturnal dyspnea [Shortness Of Breath] : no shortness of breath [Wheezing] : wheezing [Cough] : cough [Dyspnea on Exertion] : dyspnea on exertion [Diarrhea] : diarrhea [Dysuria] : no dysuria [Hematuria] : no hematuria [Joint Pain] : no joint pain [Muscle Pain] : no muscle pain [Itching] : no itching [Skin Rash] : no skin rash [Headache] : no headache [Dizziness] : no dizziness [Fainting] : no fainting [Insomnia] : no insomnia [Easy Bruising] : no easy bruising [Swollen Glands] : no swollen glands

## 2024-06-13 NOTE — PLAN
[FreeTextEntry1] : 1. Start Azithromycin 500mg PO daily x 5 days. 2. Start Prednisone 20mg PO BID x 5 days with food. 3. Hycodan PRN for cough.  4. F/U if no improvement or worsening of symptoms.

## 2024-06-13 NOTE — HISTORY OF PRESENT ILLNESS
[FreeTextEntry8] : 79F w/ PMHx of Bullous emphysema, COPD, recurrent spontaneous pneumothorax s/p bilateral VATS, wedge resection, GERD, Hypothyroidism, DM2, & HLD presents to office today with c/o sinus congestion, bilateral ear pain, cough productive of minimal white mucous, wheezing, and davidson x 4 days. Significant other with similar presentation. No fever or chills.

## 2024-07-11 ENCOUNTER — NON-APPOINTMENT (OUTPATIENT)
Age: 79
End: 2024-07-11

## 2024-07-12 ENCOUNTER — APPOINTMENT (OUTPATIENT)
Dept: OTOLARYNGOLOGY | Facility: CLINIC | Age: 79
End: 2024-07-12
Payer: MEDICARE

## 2024-07-12 VITALS — WEIGHT: 178 LBS | HEIGHT: 66 IN | BODY MASS INDEX: 28.61 KG/M2

## 2024-07-12 DIAGNOSIS — H90.12 CONDUCTIVE HEARING LOSS, UNILATERAL, LEFT EAR, WITH UNRESTRICTED HEARING ON THE CONTRALATERAL SIDE: ICD-10-CM

## 2024-07-12 DIAGNOSIS — H65.22 CHRONIC SEROUS OTITIS MEDIA, LEFT EAR: ICD-10-CM

## 2024-07-12 DIAGNOSIS — H61.22 IMPACTED CERUMEN, LEFT EAR: ICD-10-CM

## 2024-07-12 DIAGNOSIS — J06.9 ACUTE UPPER RESPIRATORY INFECTION, UNSPECIFIED: ICD-10-CM

## 2024-07-12 PROCEDURE — 92557 COMPREHENSIVE HEARING TEST: CPT

## 2024-07-12 PROCEDURE — 99203 OFFICE O/P NEW LOW 30 MIN: CPT

## 2024-07-12 PROCEDURE — 92567 TYMPANOMETRY: CPT

## 2024-07-12 RX ORDER — PREDNISONE 10 MG/1
10 TABLET ORAL
Qty: 18 | Refills: 2 | Status: ACTIVE | COMMUNITY
Start: 2024-07-12 | End: 1900-01-01

## 2024-08-01 ENCOUNTER — APPOINTMENT (OUTPATIENT)
Dept: OTOLARYNGOLOGY | Facility: CLINIC | Age: 79
End: 2024-08-01
Payer: MEDICARE

## 2024-08-01 VITALS — HEIGHT: 66 IN | BODY MASS INDEX: 28.61 KG/M2 | WEIGHT: 178 LBS

## 2024-08-01 DIAGNOSIS — H65.22 CHRONIC SEROUS OTITIS MEDIA, LEFT EAR: ICD-10-CM

## 2024-08-01 PROCEDURE — 69433 CREATE EARDRUM OPENING: CPT | Mod: LT

## 2024-08-01 PROCEDURE — 99213 OFFICE O/P EST LOW 20 MIN: CPT | Mod: 25

## 2024-08-01 NOTE — PHYSICAL EXAM
[de-identified] : as tm dull [Midline] : trachea located in midline position [Normal] : no rashes

## 2024-08-01 NOTE — HISTORY OF PRESENT ILLNESS
[de-identified] : c/o left ear plugging  no co re hearing onset symptoms 5 weeks ago adriana jerez

## 2024-08-01 NOTE — PROCEDURE
[FreeTextEntry3] : With the patient in an upright position the operating microscope is utilized to visualize the left tympanic membrane which is dull in appearance.  Topical phenyl is applied to the posterior/inferior quadrant.  A small myringotomy incision is placed and a  large amount of  serous/mucoid fluid was drained.  A standard vent tube is then placed without difficulty.  The procedure was well tolerated. [FreeTextEntry2] : left harriet

## 2024-08-02 ENCOUNTER — APPOINTMENT (OUTPATIENT)
Dept: INTERNAL MEDICINE | Facility: CLINIC | Age: 79
End: 2024-08-02

## 2024-08-07 ENCOUNTER — APPOINTMENT (OUTPATIENT)
Dept: INTERNAL MEDICINE | Facility: CLINIC | Age: 79
End: 2024-08-07

## 2024-08-07 PROBLEM — L90.5 CICATRIX: Status: RESOLVED | Noted: 2024-06-12 | Resolved: 2024-08-07

## 2024-08-07 PROBLEM — J06.9 ACUTE URI: Status: RESOLVED | Noted: 2024-05-01 | Resolved: 2024-08-07

## 2024-08-07 PROBLEM — J43.9 HISTORY OF PULMONARY EMPHYSEMA: Status: RESOLVED | Noted: 2017-03-30 | Resolved: 2024-08-07

## 2024-08-07 PROBLEM — U07.1 COVID-19 VIRUS INFECTION: Status: RESOLVED | Noted: 2022-07-07 | Resolved: 2024-08-07

## 2024-08-07 PROBLEM — L91.8 SKIN TAGS, MULTIPLE ACQUIRED: Status: RESOLVED | Noted: 2024-03-27 | Resolved: 2024-08-07

## 2024-08-07 PROBLEM — M54.6 CHRONIC BILATERAL THORACIC BACK PAIN: Status: ACTIVE | Noted: 2024-08-07

## 2024-08-07 PROBLEM — Z87.2 HISTORY OF ACTINIC KERATOSIS: Status: RESOLVED | Noted: 2017-03-24 | Resolved: 2024-08-07

## 2024-08-07 PROBLEM — M17.0 BILATERAL PRIMARY OSTEOARTHRITIS OF KNEE: Status: RESOLVED | Noted: 2019-05-07 | Resolved: 2024-08-07

## 2024-08-07 PROBLEM — J93.83 SPONTANEOUS PNEUMOTHORAX: Status: RESOLVED | Noted: 2023-04-25 | Resolved: 2024-08-07

## 2024-08-07 PROBLEM — M54.50 CHRONIC BILATERAL LOW BACK PAIN: Status: ACTIVE | Noted: 2024-08-07

## 2024-08-07 PROBLEM — Z87.2 HISTORY OF SEBORRHEIC KERATOSIS: Status: RESOLVED | Noted: 2022-03-23 | Resolved: 2024-08-07

## 2024-08-07 PROBLEM — R82.90 ABNORMAL URINALYSIS: Status: RESOLVED | Noted: 2022-09-01 | Resolved: 2024-08-07

## 2024-08-07 PROBLEM — M79.2 NEURALGIA: Status: RESOLVED | Noted: 2023-08-09 | Resolved: 2024-08-07

## 2024-08-07 PROBLEM — H61.22 IMPACTED CERUMEN OF LEFT EAR: Status: RESOLVED | Noted: 2024-07-12 | Resolved: 2024-08-07

## 2024-08-07 PROBLEM — L53.9 ERYTHEMA: Status: RESOLVED | Noted: 2024-03-27 | Resolved: 2024-08-07

## 2024-08-07 PROCEDURE — 99214 OFFICE O/P EST MOD 30 MIN: CPT

## 2024-08-07 PROCEDURE — G2211 COMPLEX E/M VISIT ADD ON: CPT

## 2024-08-07 NOTE — ASSESSMENT
[FreeTextEntry1] : Ms. Leon presents for follow-up evaluation.  1.  Patient will continue on current medication regimen which has been reviewed and revised. 2.  Check CBC, CMP, TSH, lipid profile and urinalysis, folate/B12 and vitamin D. 3.  She will continue on metered-dose inhaler therapy. 4.  Follow-up in 6 months with complete pulmonary function test. 5.  She will continue to follow-up with endocrinology for management of her diabetes.

## 2024-08-07 NOTE — HISTORY OF PRESENT ILLNESS
[FreeTextEntry1] : Follow-up [de-identified] : Ms. Leon presents for a follow-up evaluation.  She is feeling well.  She continues to have lumbosacral back pain and midthoracic back pain.  She is scheduled for epidural injections at the end of the week.  She denies any chest pain, shortness of breath or palpitations.  She has a history of bullous lung disease and has had bilateral spontaneous pneumothoraces.  She also has a history of bullectomy's with pleurodesis.

## 2024-08-19 ENCOUNTER — NON-APPOINTMENT (OUTPATIENT)
Age: 79
End: 2024-08-19

## 2024-08-19 PROBLEM — R82.81 PYURIA: Status: ACTIVE | Noted: 2024-08-19 | Resolved: 2024-09-18

## 2024-08-26 DIAGNOSIS — N30.90 CYSTITIS, UNSPECIFIED W/OUT HEMATURIA: ICD-10-CM

## 2024-08-26 DIAGNOSIS — R82.81 PYURIA: ICD-10-CM

## 2024-08-26 LAB
APPEARANCE: CLEAR
BACTERIA UR CULT: ABNORMAL
BACTERIA: ABNORMAL /HPF
BILIRUBIN URINE: NEGATIVE
BLOOD URINE: NEGATIVE
CAST: 0 /LPF
COLOR: YELLOW
EPITHELIAL CELLS: 2 /HPF
GLUCOSE QUALITATIVE U: >=1000 MG/DL
KETONES URINE: NEGATIVE MG/DL
LEUKOCYTE ESTERASE URINE: ABNORMAL
MICROSCOPIC-UA: NORMAL
NITRITE URINE: POSITIVE
PH URINE: 6
PROTEIN URINE: NEGATIVE MG/DL
RED BLOOD CELLS URINE: 0 /HPF
SPECIFIC GRAVITY URINE: 1.02
UROBILINOGEN URINE: 0.2 MG/DL
WHITE BLOOD CELLS URINE: 89 /HPF

## 2024-08-26 RX ORDER — CEPHALEXIN 500 MG/1
500 CAPSULE ORAL
Qty: 20 | Refills: 0 | Status: ACTIVE | COMMUNITY
Start: 2024-08-26 | End: 1900-01-01

## 2024-09-03 ENCOUNTER — EMERGENCY (EMERGENCY)
Facility: HOSPITAL | Age: 79
LOS: 0 days | Discharge: HOME | End: 2024-09-03
Attending: STUDENT IN AN ORGANIZED HEALTH CARE EDUCATION/TRAINING PROGRAM
Payer: MEDICARE

## 2024-09-03 VITALS — HEIGHT: 65 IN | WEIGHT: 184.97 LBS

## 2024-09-03 VITALS
SYSTOLIC BLOOD PRESSURE: 135 MMHG | OXYGEN SATURATION: 93 % | TEMPERATURE: 99 F | HEART RATE: 103 BPM | DIASTOLIC BLOOD PRESSURE: 78 MMHG | RESPIRATION RATE: 16 BRPM

## 2024-09-03 DIAGNOSIS — Z98.890 OTHER SPECIFIED POSTPROCEDURAL STATES: Chronic | ICD-10-CM

## 2024-09-03 DIAGNOSIS — Z96.659 PRESENCE OF UNSPECIFIED ARTIFICIAL KNEE JOINT: Chronic | ICD-10-CM

## 2024-09-03 DIAGNOSIS — Z90.49 ACQUIRED ABSENCE OF OTHER SPECIFIED PARTS OF DIGESTIVE TRACT: Chronic | ICD-10-CM

## 2024-09-03 PROCEDURE — 99212 OFFICE O/P EST SF 10 MIN: CPT

## 2024-09-03 PROCEDURE — 99284 EMERGENCY DEPT VISIT MOD MDM: CPT | Mod: FS

## 2024-09-03 RX ORDER — CEPHALEXIN 500 MG
1 CAPSULE ORAL
Qty: 28 | Refills: 0
Start: 2024-09-03 | End: 2024-09-09

## 2024-09-03 NOTE — ED STATDOCS - PHYSICAL EXAMINATION
Constitutional: well appearing, NAD AAOx3  Eyes: EOMI, PERRL  Head: Normocephalic atraumatic  Mouth: no airway obstruction, posterior oropharynx clear without erythema or exudate  Neck: supple  Cardiac: regular rate and rhythm, no MRG  Resp: Lungs CTAB  GI: Abd s/nt/nd  MSK: laceration to left anterior shin with sutures in place. No discharge, induration, or fluctuance. Laceration still healing with surrounding erythema.   Neuro: CN2-12 intact, strength 5/5x4, sensation grossly intact  Skin: No rashes

## 2024-09-03 NOTE — ED STATDOCS - CLINICAL SUMMARY MEDICAL DECISION MAKING FREE TEXT BOX
Presentation concerning for laceration with surrounding cellulitis. No obvious abscess formation. pt well appearing and afebrile. Will d/c home with sutures in place and abx. Pt advised to follow up with PMD and to return to ED in 3 days for wound check. Presentation concerning for laceration with surrounding cellulitis. No obvious abscess formation. pt well appearing and afebrile. Will d/c home with sutures in place and PO abx. Pt advised to follow up with PMD and to return to ED in 2-3 days for wound check. Given strict return precautions and DC home in stable condition

## 2024-09-03 NOTE — ED STATDOCS - PATIENT PORTAL LINK FT
You can access the FollowMyHealth Patient Portal offered by [facility] by registering at the following website: [email or web address]. By joining [employer]’s FollowMyHealth portal, you will also be able to view your health information using other applications (apps) compatible with our system.

## 2024-09-03 NOTE — ED ADULT NURSE NOTE - OBJECTIVE STATEMENT
pt seen and evaluated for infected laceration by MD and determined safe for dc. pt educated to take antibiotics as prescribed and to return to a doctor in 3-4 days for suture removal. pt A&Ox4, well appearing, and ambulatory at baseline with no further complaints or discomforts reported at this time.

## 2024-09-03 NOTE — ED STATDOCS - PROGRESS NOTE DETAILS
79-year-old female with laceration to left lower leg sustained on 8/25 repaired in ED presents to the emergency department for suture removal.  Denies fevers or chills, but endorses discharge, redness, swelling from wound.  No other complaints.  Vital signs are stable.  PE demonstrates 8 cm laceration to anterior left lower leg with 14 sutures intact, not completely healed, surrounding erythema and swelling.  Recommend keeping sutures in and patient return in 3 to 4 days for removal, as well as starting at antibiotics as it appears infected.  Patient verbally understands and agrees to plan.  Strict return precautions to ED were discussed.  All questions and concerns addressed. - Mikala Vasquez PA-C

## 2024-09-03 NOTE — ED STATDOCS - NSFOLLOWUPINSTRUCTIONS_ED_ALL_ED_FT
Follow up here or with PCP in 3-4 days for removal of sutures. Take antibiotics as prescribed. Return to the ED for new or worsening symptoms.    Cellulitis    WHAT YOU NEED TO KNOW:    Cellulitis is a skin infection caused by bacteria. Cellulitis may go away on its own or you may need treatment. Your healthcare provider may draw a [place] around the outside edges of your cellulitis. If your cellulitis spreads, your healthcare provider will see it outside of the [place]. Cellulitis          DISCHARGE INSTRUCTIONS:    Call 911 if:     You have sudden trouble breathing or chest pain.        Return to the emergency department if:     Your wound gets larger and more painful.       You feel a crackling under your skin when you touch it.      You have purple dots or bumps on your skin, or you see bleeding under your skin.      You have new swelling and pain in your legs.      The red, warm, swollen area gets larger.      You see red streaks coming from the infected area.    Contact your healthcare provider if:     You have a fever.      Your fever or pain does not go away or gets worse.      The area does not get smaller after 2 days of antibiotics.      Your skin is flaking or peeling off.      You have questions or concerns about your condition or care.    Medicines:     Antibiotics help treat the bacterial infection.       NSAIDs, such as ibuprofen, help decrease swelling, pain, and fever. NSAIDs can cause stomach bleeding or kidney problems in certain people. If you take blood thinner medicine, always ask if NSAIDs are safe for you. Always read the medicine label and follow directions. Do not give these medicines to children under 6 months of age without direction from your child's healthcare provider.      Acetaminophen decreases pain and fever. It is available without a doctor's order. Ask how much to take and how often to take it. Follow directions. Read the labels of all other medicines you are using to see if they also contain acetaminophen, or ask your doctor or pharmacist. Acetaminophen can cause liver damage if not taken correctly. Do not use more than 4 grams (4,000 milligrams) total of acetaminophen in one day.       Take your medicine as directed. Contact your healthcare provider if you think your medicine is not helping or if you have side effects. Tell him or her if you are allergic to any medicine. Keep a list of the medicines, vitamins, and herbs you take. Include the amounts, and when and why you take them. Bring the list or the pill bottles to follow-up visits. Carry your medicine list with you in case of an emergency.    Self-care:     Elevate the area above the level of your heart as often as you can. This will help decrease swelling and pain. Prop the area on pillows or blankets to keep it elevated comfortably.       Clean the area daily until the wound scabs over. Gently wash the area with soap and water. Pat dry. Use dressings as directed.       Place cool or warm, wet cloths on the area as directed. Use clean cloths and clean water. Leave it on the area until the cloth is room temperature. Pat the area dry with a clean, dry cloth. The cloths may help decrease pain.     Prevent cellulitis:     Do not scratch bug bites or areas of injury. You increase your risk for cellulitis by scratching these areas.       Do not share personal items, such as towels, clothing, and razors.       Clean exercise equipment with germ-killing  before and after you use it.      Wash your hands often. Use soap and water. Wash your hands after you use the bathroom, change a child's diapers, or sneeze. Wash your hands before you prepare or eat food. Use lotion to prevent dry, cracked skin. Handwashing           Wear pressure stockings as directed. You may be told to wear the stockings if you have peripheral edema. The stockings improve blood flow and decrease swelling.      Treat athlete’s foot. This can help prevent the spread of a bacterial skin infection.    Follow up with your healthcare provider within 3 days, or as directed: Your healthcare provider will check if your cellulitis is getting better. You may need different medicine. Write down your questions so you remember to ask them during your visits.

## 2024-09-03 NOTE — ED STATDOCS - OBJECTIVE STATEMENT
78 y/o female with a PMHx of asthma, back pain, BCC, COPD exacerbation, cataracts, DDD, DM, diverticulosis, elevated pancreatic enzyme, emphysema lung, frequent UTI, gall stones, GERD, hypothyroid, leg swelling, OA, pulmonary emphysema, scar tissue, spinal stenosis, urinary urgency, varicose veins presents to the ED for suture removal. Pt was seen in ED on 8/25 s/p fall sustaining a laceration to her left leg. Laceration repaired at that time. Pt reports area has been red the last 2 days. Pt saw clear discharge from bottom. Pt presents today for suture removal. No other complaints at this time. 78 y/o female with a PMHx of asthma, BCC, COPD, DDD, DM, diverticulosis, gall stones, GERD, hypothyroid, leg swelling, OA presents to the ED for suture removal. Pt was seen in ED on 8/25 s/p fall sustaining a laceration to her left leg. Laceration repaired at that time. Pt reports area around the laceration has been red the last 2 days. Pt saw clear discharge from bottom. No bleeding or purulent discharge. No fever, chills, numbness tingling weakness, LLE pain. No other complaints at this time.

## 2024-09-04 DIAGNOSIS — R39.9 UNSPECIFIED SYMPTOMS AND SIGNS INVOLVING THE GENITOURINARY SYSTEM: ICD-10-CM

## 2024-09-04 PROBLEM — Z12.11 COLON CANCER SCREENING: Status: ACTIVE | Noted: 2024-09-04

## 2024-09-04 PROBLEM — Z12.4 CERVICAL CANCER SCREENING: Status: ACTIVE | Noted: 2024-09-04

## 2024-09-05 ENCOUNTER — APPOINTMENT (OUTPATIENT)
Dept: OTOLARYNGOLOGY | Facility: CLINIC | Age: 79
End: 2024-09-05
Payer: MEDICARE

## 2024-09-05 VITALS — HEIGHT: 66 IN | WEIGHT: 178 LBS | BODY MASS INDEX: 28.61 KG/M2

## 2024-09-05 DIAGNOSIS — H90.12 CONDUCTIVE HEARING LOSS, UNILATERAL, LEFT EAR, WITH UNRESTRICTED HEARING ON THE CONTRALATERAL SIDE: ICD-10-CM

## 2024-09-05 DIAGNOSIS — H65.22 CHRONIC SEROUS OTITIS MEDIA, LEFT EAR: ICD-10-CM

## 2024-09-05 PROCEDURE — 99213 OFFICE O/P EST LOW 20 MIN: CPT | Mod: 25

## 2024-09-05 PROCEDURE — 92504 EAR MICROSCOPY EXAMINATION: CPT

## 2024-09-05 NOTE — REVIEW OF SYSTEMS
[Patient Intake Form Reviewed] : Patient intake form was reviewed [Negative] : Ear [de-identified] : pt states ear seems fine, no discharge observed and pt states she can hear

## 2024-09-05 NOTE — PROCEDURE
[FreeTextEntry6] : Indication: Cannot adequately examine ear canal/tympanic membrane with otoscope. Findings forceps removal  as vent tube

## 2024-09-06 ENCOUNTER — EMERGENCY (EMERGENCY)
Facility: HOSPITAL | Age: 79
LOS: 0 days | Discharge: HOME | End: 2024-09-06
Attending: EMERGENCY MEDICINE
Payer: MEDICARE

## 2024-09-06 VITALS
RESPIRATION RATE: 18 BRPM | HEART RATE: 80 BPM | DIASTOLIC BLOOD PRESSURE: 76 MMHG | OXYGEN SATURATION: 94 % | WEIGHT: 189.6 LBS | TEMPERATURE: 98 F | SYSTOLIC BLOOD PRESSURE: 122 MMHG

## 2024-09-06 VITALS — HEIGHT: 65 IN

## 2024-09-06 DIAGNOSIS — S81.812D LACERATION WITHOUT FOREIGN BODY, LEFT LOWER LEG, SUBSEQUENT ENCOUNTER: ICD-10-CM

## 2024-09-06 DIAGNOSIS — Z98.890 OTHER SPECIFIED POSTPROCEDURAL STATES: Chronic | ICD-10-CM

## 2024-09-06 DIAGNOSIS — Z90.49 ACQUIRED ABSENCE OF OTHER SPECIFIED PARTS OF DIGESTIVE TRACT: Chronic | ICD-10-CM

## 2024-09-06 DIAGNOSIS — Z96.659 PRESENCE OF UNSPECIFIED ARTIFICIAL KNEE JOINT: Chronic | ICD-10-CM

## 2024-09-06 DIAGNOSIS — Z88.8 ALLERGY STATUS TO OTHER DRUGS, MEDICAMENTS AND BIOLOGICAL SUBSTANCES: ICD-10-CM

## 2024-09-06 PROCEDURE — L9995: CPT

## 2024-09-06 PROCEDURE — 99212 OFFICE O/P EST SF 10 MIN: CPT

## 2024-09-06 NOTE — ED STATDOCS - CLINICAL SUMMARY MEDICAL DECISION MAKING FREE TEXT BOX
79-year-old female presents emergency department for suture removal.  Patient had 14 sutures placed to left shin in [place] emergency department on August 25.  Patient has no acute complaints at this time.   sutures removed without complication clean dry intact no signs of infection will discharge home with outpatient follow-up with primary care provider

## 2024-09-06 NOTE — ED STATDOCS - OBJECTIVE STATEMENT
79-year-old female presents emergency department for suture removal.  Patient had 14 sutures placed to left shin in [place] emergency department on August 25.  Patient has no acute complaints at this time

## 2024-09-06 NOTE — ED STATDOCS - PATIENT PORTAL LINK FT
You can access the FollowMyHealth Patient Portal offered by [facility] by registering at the following website: [email or web address]. By joining [employer]’s FollowMyHealth portal, you will also be able to view your health information using other applications (apps) compatible with our system.

## 2024-09-06 NOTE — ED STATDOCS - PHYSICAL EXAMINATION
Gen: Alert, NAD, well appearing, not toxic  Head: NC, AT, PERRL, EOMI, normal lids/conjunctiva  Pulm: Bilateral BS, normal resp effort, no wheeze/stridor/retractions  CV: RRR, no M/R/G, +dist pulses  Skin: +14 sutures in place to L shin, CDI no signs of dehiscences   Neuro: AAOx3, no sensory/motor deficits, CN 2-12 intact

## 2024-09-06 NOTE — ED STATDOCS - NSFOLLOWUPINSTRUCTIONS_ED_ALL_ED_FT
Stitches Removal    WHAT YOU NEED TO KNOW:    Stitches are usually removed within 14 days, depending on the location of the wound. Your healthcare provider will tell you when to return to have your stitches removed. Your provider will use sterile forceps or tweezers to  the knot of each stitch. He or she will cut the stitch with scissors and pull the stitch out. You may feel a slight tug as the stitch comes out.    DISCHARGE INSTRUCTIONS:    Return to the emergency department if:     Your wound splits open or is starting to come apart.      You suddenly cannot move your injured joint.      You have sudden numbness around your wound.      You see red streaks coming from your wound.    Contact your healthcare provider if:     You have a fever and chills.      Your wound is red, warm, swollen, or leaking pus.      There is a bad smell coming from your wound.      You have increased pain in the wound area.      You have questions or concerns about your condition or care.    Care for the area after the stitches are removed:     Do not pull medical tape off. Your provider may place small strips of medical tape across your wound after the stitches have been removed. These strips will peel and fall of on their own. Do not pull them off.      Clean the area as directed. Carefully wash the area with soap and water. Pat the area dry with a clean towel. Check the area for signs of infection, such as redness, swelling, or pus. Also check that the wound is not coming apart.      Protect your wound. Your wound can swell, bleed, or split open if it is stretched or bumped. You may need to wear a bandage that supports your wound until it is completely healed.      Care for a scar. You may have a scar after the stitches are removed. Use sunblock if the area is exposed to the sun. Apply it every day after the stitches are removed. This will help prevent skin discoloration. Talk to your healthcare provider about medicines you can use to make the scar less visible. Some medicines are available without a prescription.    Follow up with your healthcare provider as directed: You may need to return in 3 to 5 days if the stitches are on your face. Stitches on your scalp need to be removed after 7 to 14 days. Stitches over joints may remain in place up to 14 days. Write down your questions so you remember to ask them during your visits.
PATIENT NEEDS ASSISTANCE TO LEAVE RESIDENCE:

## 2024-09-10 ENCOUNTER — APPOINTMENT (OUTPATIENT)
Dept: OBGYN | Facility: CLINIC | Age: 79
End: 2024-09-10

## 2024-09-10 DIAGNOSIS — Z12.4 ENCOUNTER FOR SCREENING FOR MALIGNANT NEOPLASM OF CERVIX: ICD-10-CM

## 2024-09-10 DIAGNOSIS — Z12.11 ENCOUNTER FOR SCREENING FOR MALIGNANT NEOPLASM OF COLON: ICD-10-CM

## 2024-09-11 ENCOUNTER — NON-APPOINTMENT (OUTPATIENT)
Age: 79
End: 2024-09-11

## 2024-09-23 ENCOUNTER — LABORATORY RESULT (OUTPATIENT)
Age: 79
End: 2024-09-23

## 2024-09-23 ENCOUNTER — APPOINTMENT (OUTPATIENT)
Dept: OBGYN | Facility: CLINIC | Age: 79
End: 2024-09-23
Payer: MEDICARE

## 2024-09-23 DIAGNOSIS — R82.998 OTHER ABNORMAL FINDINGS IN URINE: ICD-10-CM

## 2024-09-23 DIAGNOSIS — N95.2 POSTMENOPAUSAL ATROPHIC VAGINITIS: ICD-10-CM

## 2024-09-23 DIAGNOSIS — N39.0 URINARY TRACT INFECTION, SITE NOT SPECIFIED: ICD-10-CM

## 2024-09-23 LAB
BILIRUB UR QL STRIP: NORMAL
CLARITY UR: NORMAL
COLLECTION METHOD: NORMAL
GLUCOSE UR-MCNC: 1000
HCG UR QL: 0.2 EU/DL
HGB UR QL STRIP.AUTO: NORMAL
KETONES UR-MCNC: NORMAL
LEUKOCYTE ESTERASE UR QL STRIP: NORMAL
NITRITE UR QL STRIP: NORMAL
PH UR STRIP: 5.5
PROT UR STRIP-MCNC: NORMAL
SP GR UR STRIP: 1.02

## 2024-09-23 PROCEDURE — 99459 PELVIC EXAMINATION: CPT

## 2024-09-23 PROCEDURE — 81003 URINALYSIS AUTO W/O SCOPE: CPT | Mod: QW

## 2024-09-23 PROCEDURE — 99214 OFFICE O/P EST MOD 30 MIN: CPT

## 2024-09-23 RX ORDER — ESTRADIOL 0.1 MG/G
0.1 CREAM VAGINAL
Qty: 3 | Refills: 3 | Status: ACTIVE | COMMUNITY
Start: 2024-09-23 | End: 1900-01-01

## 2024-09-25 ENCOUNTER — NON-APPOINTMENT (OUTPATIENT)
Age: 79
End: 2024-09-25

## 2024-09-27 DIAGNOSIS — Z79.51 LONG TERM (CURRENT) USE OF INHALED STEROIDS: ICD-10-CM

## 2024-09-27 NOTE — PHYSICAL EXAM
[Chaperone Present] : A chaperone was present in the examining room during all aspects of the physical examination [07920] : A chaperone was present during the pelvic exam. [Normal] : cervix [Atrophy] : atrophy [No Bleeding] : there was no active vaginal bleeding [FreeTextEntry2] : Joanna SANCHEZ-RHONDA chaperoned during entire physical exam, [FreeTextEntry4] : moderate atrophy

## 2024-09-27 NOTE — CHIEF COMPLAINT
[Urgent Visit] : Urgent Visit [FreeTextEntry1] : Patient is an 80 yo female here today for frequent UTI. She states shes had frequent UTI since she turned 68. She has had UTI September 2023, Feb 2023 and Aug 2024.  She states her symptoms include frequency and odor. she denies dysuria.   she denies any symptoms today

## 2024-09-27 NOTE — PHYSICAL EXAM
[Chaperone Present] : A chaperone was present in the examining room during all aspects of the physical examination [29526] : A chaperone was present during the pelvic exam. [Normal] : cervix [Atrophy] : atrophy [No Bleeding] : there was no active vaginal bleeding [FreeTextEntry2] : Joanna SANCHEZ-HRONDA chaperoned during entire physical exam, [FreeTextEntry4] : moderate atrophy

## 2024-09-27 NOTE — CHIEF COMPLAINT
[Urgent Visit] : Urgent Visit [FreeTextEntry1] : Patient is an 78 yo female here today for frequent UTI. She states shes had frequent UTI since she turned 68. She has had UTI September 2023, Feb 2023 and Aug 2024.  She states her symptoms include frequency and odor. she denies dysuria.   she denies any symptoms today

## 2024-09-27 NOTE — DISCUSSION/SUMMARY
[FreeTextEntry1] : UA/UC sent today. will follow up with results. supportive care measures discussed in detail. cranberry pills daily. patient to start vaginal estrogen cream.  all of her questions were answered she is agreeable with plan.     I Joanna CARLTON am scribing for the presence of Dr. Moya the following sections HISTORY OF PRESENT ILLNESS, PAST MEDICAL/FAMILY/SOCIAL HISTORY; REVIEW OF SYSTEMS; VITAL SIGNS; PHYSICAL EXAM; DISPOSITION.    I personally performed the services described in the documentation, reviewed the documentation recorded by the scribe in my presence and it accurately and completely records my words and actions.

## 2024-09-29 ENCOUNTER — NON-APPOINTMENT (OUTPATIENT)
Age: 79
End: 2024-09-29

## 2024-09-30 RX ORDER — CIPROFLOXACIN HYDROCHLORIDE 250 MG/1
250 TABLET, FILM COATED ORAL
Qty: 6 | Refills: 0 | Status: ACTIVE | COMMUNITY
Start: 2024-09-30 | End: 1900-01-01

## 2024-09-30 NOTE — BRIEF OPERATIVE NOTE - ESTIMATED BLOOD LOSS
[facility]  ED Nurse Handoff Report    ED Chief complaint: Generalized Weakness      ED Diagnosis:   Final diagnoses:   Delirium   Acute UTI       Code Status: to be addressed by provider    Allergies:   Allergies   Allergen Reactions    Gabapentin        Patient Story: BIBA from assisted living where staff report increased weakness over last 2-3 days.  Focused Assessment:  Pt has history of dementia and cognitively is at baseline, however staff report she has been hallucinating, seeing bugs on her and people in her room that aren't there. She normally ambulates but is now needing a wheelchair. Staff also report she had an unwitnessed fall 1 week ago that she was not seen for. She is pleasant and has no complaints.    Treatments and/or interventions provided: IV, labs, EKG. UA, CT  Patient's response to treatments and/or interventions:   Labs Ordered and Resulted from Time of ED Arrival to Time of ED Departure   ROUTINE UA WITH MICROSCOPIC REFLEX TO CULTURE - Abnormal       Result Value    Color Urine Straw      Appearance Urine Slightly Cloudy (*)     Glucose Urine 1000 (*)     Bilirubin Urine Negative      Ketones Urine Negative      Specific Gravity Urine 1.010      Blood Urine Small (*)     pH Urine 6.0      Protein Albumin Urine 100 (*)     Urobilinogen Urine Normal      Nitrite Urine Negative      Leukocyte Esterase Urine Large (*)     Bacteria Urine Few (*)     WBC Clumps Urine Present (*)     Amorphous Crystals Urine Few (*)     RBC Urine 6 (*)     WBC Urine 127 (*)    COMPREHENSIVE METABOLIC PANEL - Abnormal    Sodium 135      Potassium 5.7 (*)     Carbon Dioxide (CO2) 19 (*)     Anion Gap 10      Urea Nitrogen 36.2 (*)     Creatinine 1.43 (*)     GFR Estimate 33 (*)     Calcium 7.0 (*)     Chloride 106      Glucose 277 (*)     Alkaline Phosphatase        AST        ALT        Protein Total 5.5 (*)     Albumin 2.7 (*)     Bilirubin Total 0.2     PROCALCITONIN - Normal    Procalcitonin 0.04      CBC WITH PLATELETS AND DIFFERENTIAL    WBC Count 6.2      RBC Count 4.23      Hemoglobin 12.0      Hematocrit 36.1      MCV 85      MCH 28.4      MCHC 33.2      RDW 12.5      Platelet Count 227      % Neutrophils 68      % Lymphocytes 21      % Monocytes 8      % Eosinophils 2      % Basophils 1      % Immature Granulocytes 0      NRBCs per 100 WBC 0      Absolute Neutrophils 4.2      Absolute Lymphocytes 1.3      Absolute Monocytes 0.5      Absolute Eosinophils 0.2      Absolute Basophils 0.0      Absolute Immature Granulocytes 0.0      Absolute NRBCs 0.0     URINE CULTURE     CT Head w/o Contrast   Final Result   IMPRESSION:   1.  No CT evidence for acute intracranial process.   2.  Brain atrophy and presumed chronic microvascular ischemic changes as above.            To be done/followed up on inpatient unit:  monitor    Does this patient have any cognitive concerns?: Baseline dementia    Activity level - Baseline/Home:  Independent  Activity Level - Current:   Stand with Assist and Stand with assist x2    Patient's Preferred language: [place]   Needed?: yes, but speaks broken english    Isolation: None  Infection: Not Applicable  Patient tested for COVID 19 prior to admission: NO  Bariatric?: No    Vital Signs:   Vitals:    09/30/24 1700 09/30/24 1800 09/30/24 1805 09/30/24 1837   BP:    (!) 151/86   Pulse:    81   Resp:       Temp:       TempSrc:       SpO2: 94% 96% 95%        Cardiac Rhythm:     Was the PSS-3 completed:   Yes  What interventions are required if any?               Family Comments: none present  OBS brochure/video discussed/provided to patient/family: N/A              Name of person given brochure if not patient:               Relationship to patient:     For the majority of the shift this patient's behavior was Green.   Behavioral interventions performed were .    ED NURSE PHONE NUMBER: [phone]          150

## 2024-10-23 ENCOUNTER — APPOINTMENT (OUTPATIENT)
Dept: DERMATOLOGY | Facility: CLINIC | Age: 79
End: 2024-10-23
Payer: COMMERCIAL

## 2024-10-23 DIAGNOSIS — L82.1 OTHER SEBORRHEIC KERATOSIS: ICD-10-CM

## 2024-10-23 DIAGNOSIS — Z86.018 PERSONAL HISTORY OF OTHER BENIGN NEOPLASM: ICD-10-CM

## 2024-10-23 DIAGNOSIS — L57.0 ACTINIC KERATOSIS: ICD-10-CM

## 2024-10-23 DIAGNOSIS — D18.01 HEMANGIOMA OF SKIN AND SUBCUTANEOUS TISSUE: ICD-10-CM

## 2024-10-23 DIAGNOSIS — D48.5 NEOPLASM OF UNCERTAIN BEHAVIOR OF SKIN: ICD-10-CM

## 2024-10-23 PROCEDURE — 11102 TANGNTL BX SKIN SINGLE LES: CPT

## 2024-10-23 PROCEDURE — 99203 OFFICE O/P NEW LOW 30 MIN: CPT | Mod: 25

## 2024-10-23 PROCEDURE — 99213 OFFICE O/P EST LOW 20 MIN: CPT | Mod: 25

## 2024-10-23 PROCEDURE — 17003 DESTRUCT PREMALG LES 2-14: CPT | Mod: 59

## 2024-10-23 PROCEDURE — 17000 DESTRUCT PREMALG LESION: CPT | Mod: 59

## 2024-10-28 ENCOUNTER — APPOINTMENT (OUTPATIENT)
Dept: NEUROLOGY | Facility: CLINIC | Age: 79
End: 2024-10-28
Payer: MEDICARE

## 2024-10-28 VITALS
DIASTOLIC BLOOD PRESSURE: 70 MMHG | TEMPERATURE: 98.2 F | WEIGHT: 155 LBS | BODY MASS INDEX: 24.91 KG/M2 | HEIGHT: 66 IN | SYSTOLIC BLOOD PRESSURE: 111 MMHG | HEART RATE: 83 BPM

## 2024-10-28 DIAGNOSIS — R41.3 OTHER AMNESIA: ICD-10-CM

## 2024-10-28 DIAGNOSIS — G62.9 POLYNEUROPATHY, UNSPECIFIED: ICD-10-CM

## 2024-10-28 PROCEDURE — G2211 COMPLEX E/M VISIT ADD ON: CPT

## 2024-10-28 PROCEDURE — 99214 OFFICE O/P EST MOD 30 MIN: CPT

## 2024-10-29 ENCOUNTER — RX RENEWAL (OUTPATIENT)
Age: 79
End: 2024-10-29

## 2024-10-29 ENCOUNTER — NON-APPOINTMENT (OUTPATIENT)
Age: 79
End: 2024-10-29

## 2024-10-30 LAB — CORE LAB BIOPSY: NORMAL

## 2024-11-05 NOTE — ED ADULT NURSE REASSESSMENT NOTE - NS ED NURSE REASSESS COMMENT FT1
Pt resting comfortably. daughter at bedside. provided with liquid diet. 4 = No assist / stand by assistance

## 2024-11-20 ENCOUNTER — RX RENEWAL (OUTPATIENT)
Age: 79
End: 2024-11-20

## 2024-11-26 PROBLEM — Z01.419 ENCOUNTER FOR ANNUAL ROUTINE GYNECOLOGICAL EXAMINATION: Status: ACTIVE | Noted: 2024-11-26

## 2024-12-03 ENCOUNTER — LABORATORY RESULT (OUTPATIENT)
Age: 79
End: 2024-12-03

## 2024-12-03 ENCOUNTER — APPOINTMENT (OUTPATIENT)
Dept: OBGYN | Facility: CLINIC | Age: 79
End: 2024-12-03
Payer: COMMERCIAL

## 2024-12-03 VITALS
SYSTOLIC BLOOD PRESSURE: 148 MMHG | HEIGHT: 65.5 IN | WEIGHT: 187 LBS | HEART RATE: 65 BPM | DIASTOLIC BLOOD PRESSURE: 69 MMHG | BODY MASS INDEX: 30.78 KG/M2

## 2024-12-03 DIAGNOSIS — Z00.00 ENCOUNTER FOR GENERAL ADULT MEDICAL EXAMINATION W/OUT ABNORMAL FINDINGS: ICD-10-CM

## 2024-12-03 DIAGNOSIS — Z12.11 ENCOUNTER FOR SCREENING FOR MALIGNANT NEOPLASM OF COLON: ICD-10-CM

## 2024-12-03 DIAGNOSIS — Z01.419 ENCOUNTER FOR GYNECOLOGICAL EXAMINATION (GENERAL) (ROUTINE) W/OUT ABNORMAL FINDINGS: ICD-10-CM

## 2024-12-03 DIAGNOSIS — N95.2 POSTMENOPAUSAL ATROPHIC VAGINITIS: ICD-10-CM

## 2024-12-03 DIAGNOSIS — Z13.820 ENCOUNTER FOR SCREENING FOR OSTEOPOROSIS: ICD-10-CM

## 2024-12-03 DIAGNOSIS — Z12.4 ENCOUNTER FOR SCREENING FOR MALIGNANT NEOPLASM OF CERVIX: ICD-10-CM

## 2024-12-03 LAB
BILIRUB UR QL STRIP: NEGATIVE
CLARITY UR: CLEAR
COLLECTION METHOD: NORMAL
GLUCOSE UR-MCNC: NORMAL
HCG UR QL: 0 EU/DL
HEMOGLOBIN: 12.5
HGB UR QL STRIP.AUTO: NEGATIVE
KETONES UR-MCNC: NEGATIVE
LEUKOCYTE ESTERASE UR QL STRIP: NEGATIVE
NITRITE UR QL STRIP: NEGATIVE
PH UR STRIP: 6
PROT UR STRIP-MCNC: NEGATIVE
SP GR UR STRIP: 1

## 2024-12-03 PROCEDURE — 81003 URINALYSIS AUTO W/O SCOPE: CPT | Mod: QW

## 2024-12-03 PROCEDURE — 82270 OCCULT BLOOD FECES: CPT

## 2024-12-03 PROCEDURE — G0101: CPT

## 2024-12-03 PROCEDURE — 85018 HEMOGLOBIN: CPT | Mod: QW

## 2024-12-11 LAB — CYTOLOGY CVX/VAG DOC THIN PREP: ABNORMAL

## 2024-12-12 ENCOUNTER — APPOINTMENT (OUTPATIENT)
Dept: DERMATOLOGY | Facility: CLINIC | Age: 79
End: 2024-12-12
Payer: MEDICARE

## 2024-12-12 DIAGNOSIS — C44.310 BASAL CELL CARCINOMA OF SKIN OF UNSPECIFIED PARTS OF FACE: ICD-10-CM

## 2024-12-12 PROCEDURE — 17282 DSTR MAL LS F/E/E/N/L/M1.1-2: CPT

## 2024-12-12 PROCEDURE — 0039D: CPT

## 2024-12-31 NOTE — PATIENT PROFILE ADULT. - PSH
Dental disorder  Dental surgery 12/2012, patient reports that upper portion is glued in at this time, plan is to have dental implants placed.  S/P bladder repair  bladder lift 2/2013  S/P knee surgery  arthroscopy bilateral 2007 and 2010  Skin cancer  to right temple 2012, removed, history of basal cell x 2 normal...

## 2025-01-06 NOTE — ED PROCEDURE NOTE - CPROC ED SUTURE EQUIP USED1
Anesthesia Pre Eval Note    Anesthesia ROS/Med Hx    Overall Review:  EKG was reviewed, Echo was reviewed and Stress test was reviewed     Anesthetic Complication History:    Patient does not have a history of anesthetic complications      Pulmonary Review:  Comments: EXAM:  XR CHEST PA AND LATERAL 2 VIEWS     EXAM DATE: 12/19/2024 2:12 PM     HISTORY: Aspiration pneumonia     COMPARISON: October 30, 2024     TECHNIQUE: 2 views     FINDINGS: Aortic arch calcification mildly. Heart size and pulmonary  vasculature appear normal. No pleural effusion or pneumothorax. Basilar  infiltrates are no longer discretely evident and some component of this  may've been related to overlying breast attenuation. Chronic biapical  pleural scarring.  Positive for pneumonia (aspiration pneumonia 10/2024)  Positive for sleep apnea - CPAP  The patient is a former smoker.     Neuro/Psych Review:       Positive for TIA  Positive for CVA - no residual deficits    Cardiovascular Review:   Exercise tolerance: good (>4 METS)  Positive for CAD  Positive for hypertension - well controlled  Positive for hyperlipidemia  Positive for DVT/PE    GI/HEPATIC/RENAL Review:     Positive for GERD  Positive for renal disease - nephrolithiasis    End/Other Review:    Positive for anemia  Positive for cancer  Positive for bleeding disorder (Factor V Leiden)  Additional Results:  EKG:  Encounter Date: 11/05/24  -Electrocardiogram 12-Lead:        Result                      Value                           Ventricular Rate EKG/M*     95                              Atrial Rate (BPM)           95                              ID-Interval (MSEC)          176                             QRS-Interval (MSEC)         72                              QT-Interval (MSEC)          358                             QTc                         450                             P Axis (Degrees)            72                              R Axis (Degrees)            -151                             T Axis (Degrees)            43                              REPORT TEXT                                             Sinus rhythm   with   premature atrial complexes   Poor early R wave progression   Nonspecific ST abnormality   Abnormal ECG   Confirmed by DANNIELLE LAM DO ([id number]) on 11/17/2024 8:11:48 PM       ALLERGIES:   -- Penicillins -- ANAPHYLAXIS   Last Labs        Component                Value               Date/Time                  WBC                      6.3                 11/05/2024 1606            RBC                      3.85 (L)            11/05/2024 1606            HGB                      11.0 (L)            11/05/2024 1606            HCT                      34.5 (L)            11/05/2024 1606            MCV                      89.6                11/05/2024 1606            MCH                      28.6                11/05/2024 1606            MCHC                     31.9 (L)            11/05/2024 1606            RDW-CV                   14.7                11/05/2024 1606            Sodium                   143                 12/19/2024 1522            Potassium                4.6                 12/19/2024 1522            Chloride                 105                 12/19/2024 1522            Carbon Dioxide           34 (H)              12/19/2024 1522            Glucose                  61 (L)              12/19/2024 1522            BUN                      18                  12/19/2024 1522            Creatinine               0.63                12/19/2024 1522            Glomerular Filtrati*     87                  12/19/2024 1522            Calcium                  8.9                 12/19/2024 1522            PLT                      279                 11/05/2024 1606        Past Medical History:  No date: Acute confusion  2015: Cerebral infarction (CMD)  No date: Coronary artery disease  No date: DVT (deep venous thrombosis)  (CMD)  No date: Dysphagia  No date:  Essential (primary) hypertension  No date: Factor 5 Leiden mutation, heterozygous  (CMD)  No date: Gastroesophageal reflux disease  No date: High cholesterol  06/18/2020: Impaired mobility and activities of daily living  No date: Kidney cysts  No date: Malignant neoplasm  (CMD)      Comment:  right arm, skin cancer  No date: ESTEFANY (obstructive sleep apnea)      Comment:  CPAP  No date: Squamous cell skin cancer  No date: Stroke  (CMD)  No date: TIA (transient ischemic attack)  Past Surgical History:  No date: Colonoscopy  No date: Hysterectomy   Prior to Admission medications :  Medication Effer-K 10 MEQ Effer Tab, Sig DISSOLVE 1 TABLET COMPLETELY IN 2 TO 3 OUNCES OF COLD OR ICE WATER AND DRINK ONCE DAILY., Start Date 11/1/24, End Date , Taking? Yes, Authorizing Provider Provider, Outside    Medication Acetaminophen Extra Strength 500 MG Tab, Sig , Start Date 9/27/24, End Date , Taking? Yes, Authorizing Provider Provider, Outside    Medication pantoprazole (PROTONIX) 40 MG tablet, Sig TAKE 1 TABLET BY MOUTH EVERY DAY, Start Date 8/19/24, End Date , Taking? Yes, Authorizing Provider Sade Salgado MD    Medication polyethylene glycol (MIRALAX) 17 g packet, Sig Take 17 g by mouth daily as needed (constipation). Stir and dissolve powder in any 4 to 8 ounces of beverage, then drink., Start Date , End Date , Taking? Yes, Authorizing Provider Provider, Outside    Medication atorvastatin (LIPITOR) 10 MG tablet, Sig Take 1 tablet by mouth every other day. Due for Lipid/labs, Start Date 1/16/24, End Date , Taking? Yes, Authorizing Provider Sade Salgado MD    Medication cyanocobalamin (CYANOCOBALAMIN) 1000 MCG tablet, Sig Take 1 tablet by mouth daily., Start Date 3/13/23, End Date , Taking? Yes, Authorizing Provider Sade Salgado MD    Medication Cholecalciferol 50 mcg (2,000 units) capsule, Sig Take 50 mcg by mouth daily., Start Date , End Date , Taking? Yes, Authorizing Provider Provider, Outside    Medication  benzonatate (TESSALON PERLES) 100 MG capsule, Sig Take 1 capsule by mouth 3 times daily as needed for Cough., Start Date 10/31/24, End Date , Taking? , Authorizing Provider Sade Salgado MD    Medication clopidogrel (PLAVIX) 75 MG tablet, Sig TAKE 1 TABLET BY MOUTH EVERY DAY, Start Date 10/7/24, End Date , Taking? , Authorizing Provider Sade Salgado MD    Medication lisinopril (ZESTRIL) 2.5 MG tablet, Sig Take 2.5 mg by mouth nightly., Start Date 24, End Date , Taking? , Authorizing Provider Sade Salgado MD    Medication urea (CARMOL 20) 20 % cream, Sig Apply 1 application. topically nightly. For feet, Start Date 23, End Date , Taking? , Authorizing Provider Sade Salgado MD    Medication aspirin 81 MG chewable tablet, Sig Chew 1 tablet by mouth daily. Do not start before May 15, 2021., Start Date 5/15/21, End Date , Taking? , Authorizing Provider Jaden Fung MD    Medication Denosumab (PROLIA SC), Sig Inject into the skin every 6 months., Start Date , End Date , Taking? , Authorizing Provider Provider, Outside         Patient Vitals for the past 24 hrs:   BP Temp Temp src Pulse Resp SpO2 Height Weight   25 1410 105/55 -- -- 84 20 95 % -- --   25 1400 95/50 -- -- 66 20 95 % -- --   25 1351 (!) 88/51 36.3 °C (97.3 °F) Temporal 70 20 95 % -- --   25 1242 137/65 36.8 °C (98.2 °F) Temporal 82 18 98 % 5' 5\" (1.651 m) 49 kg (108 lb)       Social history reviewed:  Social History     Tobacco Use   Smoking Status Former   • Current packs/day: 0.00   • Types: Cigarettes   • Quit date:    • Years since quittin.0   Smokeless Tobacco Never        E-Cigarette/Vaping Substances & Devices   • E-Cigarette/Vaping Use Never Used        Social History     Substance and Sexual Activity   Alcohol Use Yes    Comment: occasional         Relevant Problems   No relevant active problems       Physical Exam     Airway   Mallampati: II  TM Distance: >3 FB  Neck ROM:  Full  Neck: Non-tender and Able to place in sniff position    Cardiovascular  Cardiovascular exam normal  Cardio Rhythm: Regular  Cardio Rate: Normal    Head Assessment  Head assessment: Normocephalic    General Assessment  General Assessment: Alert and oriented and No acute distress    Dental Exam    Patient has:  Denied broken/chipped/loose teeth    Legend: C=Chipped  M=Missing  L=Loose B=Bridge Cr=[place] I=Implant    Pulmonary Exam    Breath sounds clear to auscultation:  Yes  Patient Demonstrates:  Non-labored Breathing      Anesthesia Plan:    ASA Status: 3  Anesthesia Type: MAC    Preferred Airway Type: Mask  Maintenance: TIVA  Patient does not have an implantable electronic device requiring post procedure programming.     Post-op Pain Management: Per Surgeon  Postoperative analgesia plan does NOT include opiods    Checklist  Reviewed: Allergies, Problem list, Medications, Past Med History, Anesthesia Record, NPO Status, DNR Status, Care Everywhere, Beta Blocker Status, Patient Summary, Lab Results, EKG, Consultations and Nursing Notes  Consent/Risks Discussed Statement:  The proposed anesthetic plan, including its risks and benefits, have been discussed with the Patient along with the risks and benefits of alternatives. Questions were encouraged and answered and the patient and/or representative understands and agrees to proceed.    I have discussed elements of the patient's history or examination, as noted above and/or as follows, that place the patient at higher risk of complications; age and sleep apnea.    I discussed with the patient (and/or patient's legal representative) the risks and benefits of the proposed anesthesia plan, MAC, which may include services performed by other anesthesia providers.    Alternative anesthesia plans, if available, were reviewed with the patient (and/or patient's legal representative). Discussion has been held with the patient (and/or patient's legal representative)  regarding risks of anesthesia, which include allergic reaction, anxiety, aspiration, dental injury, depressed breathing, intra-operative awareness, oral injury, vomiting and sore throat and emergent situations that may require change in anesthesia plan.    The patient (and/or patient's legal representative) has indicated understanding, his/her questions have been answered, and he/she wishes to proceed with the planned anesthetic.    Blood Products: Not Anticipated     gloves/forceps/suture scissors

## 2025-01-20 ENCOUNTER — APPOINTMENT (OUTPATIENT)
Dept: INTERNAL MEDICINE | Facility: CLINIC | Age: 80
End: 2025-01-20
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
## 2025-02-12 ENCOUNTER — APPOINTMENT (OUTPATIENT)
Dept: INTERNAL MEDICINE | Facility: CLINIC | Age: 80
End: 2025-02-12

## 2025-02-25 ENCOUNTER — RX RENEWAL (OUTPATIENT)
Age: 80
End: 2025-02-25

## 2025-04-01 ENCOUNTER — NON-APPOINTMENT (OUTPATIENT)
Age: 80
End: 2025-04-01

## 2025-04-01 ENCOUNTER — EMERGENCY (EMERGENCY)
Facility: HOSPITAL | Age: 80
LOS: 0 days | Discharge: HOME | End: 2025-04-02
Attending: EMERGENCY MEDICINE
Payer: MEDICARE

## 2025-04-01 VITALS — WEIGHT: 182.98 LBS | HEIGHT: 65 IN

## 2025-04-01 DIAGNOSIS — E11.9 TYPE 2 DIABETES MELLITUS WITHOUT COMPLICATIONS: ICD-10-CM

## 2025-04-01 DIAGNOSIS — K57.90 DIVERTICULOSIS OF INTESTINE, PART UNSPECIFIED, WITHOUT PERFORATION OR ABSCESS WITHOUT BLEEDING: ICD-10-CM

## 2025-04-01 DIAGNOSIS — M19.90 UNSPECIFIED OSTEOARTHRITIS, UNSPECIFIED SITE: ICD-10-CM

## 2025-04-01 DIAGNOSIS — Z98.890 OTHER SPECIFIED POSTPROCEDURAL STATES: Chronic | ICD-10-CM

## 2025-04-01 DIAGNOSIS — Z87.440 PERSONAL HISTORY OF URINARY (TRACT) INFECTIONS: ICD-10-CM

## 2025-04-01 DIAGNOSIS — M51.369 OTHER INTERVERTEBRAL DISC DEGENERATION, LUMBAR REGION WITHOUT MENTION OF LUMBAR BACK PAIN OR LOWER EXTREMITY PAIN: ICD-10-CM

## 2025-04-01 DIAGNOSIS — Z85.828 PERSONAL HISTORY OF OTHER MALIGNANT NEOPLASM OF SKIN: ICD-10-CM

## 2025-04-01 DIAGNOSIS — Z90.49 ACQUIRED ABSENCE OF OTHER SPECIFIED PARTS OF DIGESTIVE TRACT: Chronic | ICD-10-CM

## 2025-04-01 DIAGNOSIS — R06.02 SHORTNESS OF BREATH: ICD-10-CM

## 2025-04-01 DIAGNOSIS — R07.89 OTHER CHEST PAIN: ICD-10-CM

## 2025-04-01 DIAGNOSIS — Z79.51 LONG TERM (CURRENT) USE OF INHALED STEROIDS: ICD-10-CM

## 2025-04-01 DIAGNOSIS — J43.9 EMPHYSEMA, UNSPECIFIED: ICD-10-CM

## 2025-04-01 DIAGNOSIS — Z96.659 PRESENCE OF UNSPECIFIED ARTIFICIAL KNEE JOINT: Chronic | ICD-10-CM

## 2025-04-01 DIAGNOSIS — R06.00 DYSPNEA, UNSPECIFIED: ICD-10-CM

## 2025-04-01 DIAGNOSIS — I83.93 ASYMPTOMATIC VARICOSE VEINS OF BILATERAL LOWER EXTREMITIES: ICD-10-CM

## 2025-04-01 DIAGNOSIS — J44.89 OTHER SPECIFIED CHRONIC OBSTRUCTIVE PULMONARY DISEASE: ICD-10-CM

## 2025-04-01 LAB
ALBUMIN SERPL ELPH-MCNC: 3.6 G/DL — SIGNIFICANT CHANGE UP (ref 3.3–5)
ALP SERPL-CCNC: 71 U/L — SIGNIFICANT CHANGE UP (ref 40–120)
ALT FLD-CCNC: 19 U/L — SIGNIFICANT CHANGE UP (ref 12–78)
ANION GAP SERPL CALC-SCNC: 3 MMOL/L — LOW (ref 5–17)
APTT BLD: 28.2 SEC — SIGNIFICANT CHANGE UP (ref 24.5–35.6)
AST SERPL-CCNC: 27 U/L — SIGNIFICANT CHANGE UP (ref 15–37)
BASOPHILS # BLD AUTO: 0.09 K/UL — SIGNIFICANT CHANGE UP (ref 0–0.2)
BASOPHILS NFR BLD AUTO: 1.1 % — SIGNIFICANT CHANGE UP (ref 0–2)
BILIRUB SERPL-MCNC: 0.6 MG/DL — SIGNIFICANT CHANGE UP (ref 0.2–1.2)
BLD GP AB SCN SERPL QL: SIGNIFICANT CHANGE UP
BUN SERPL-MCNC: 20 MG/DL — SIGNIFICANT CHANGE UP (ref 7–23)
CALCIUM SERPL-MCNC: 9.3 MG/DL — SIGNIFICANT CHANGE UP (ref 8.5–10.1)
CHLORIDE SERPL-SCNC: 106 MMOL/L — SIGNIFICANT CHANGE UP (ref 96–108)
CO2 SERPL-SCNC: 29 MMOL/L — SIGNIFICANT CHANGE UP (ref 22–31)
CREAT SERPL-MCNC: 0.81 MG/DL — SIGNIFICANT CHANGE UP (ref 0.5–1.3)
EGFR: 73 ML/MIN/1.73M2 — SIGNIFICANT CHANGE UP
EGFR: 73 ML/MIN/1.73M2 — SIGNIFICANT CHANGE UP
EOSINOPHIL # BLD AUTO: 0.22 K/UL — SIGNIFICANT CHANGE UP (ref 0–0.5)
EOSINOPHIL NFR BLD AUTO: 2.7 % — SIGNIFICANT CHANGE UP (ref 0–6)
FLUAV AG NPH QL: SIGNIFICANT CHANGE UP
FLUBV AG NPH QL: SIGNIFICANT CHANGE UP
GLUCOSE SERPL-MCNC: 99 MG/DL — SIGNIFICANT CHANGE UP (ref 70–99)
HCT VFR BLD CALC: 41.3 % — SIGNIFICANT CHANGE UP (ref 34.5–45)
HGB BLD-MCNC: 13.1 G/DL — SIGNIFICANT CHANGE UP (ref 11.5–15.5)
IMM GRANULOCYTES # BLD AUTO: 0.02 K/UL — SIGNIFICANT CHANGE UP (ref 0–0.07)
IMM GRANULOCYTES NFR BLD AUTO: 0.2 % — SIGNIFICANT CHANGE UP (ref 0–0.9)
INR BLD: 0.98 RATIO — SIGNIFICANT CHANGE UP (ref 0.85–1.16)
LYMPHOCYTES # BLD AUTO: 3.6 K/UL — HIGH (ref 1–3.3)
LYMPHOCYTES NFR BLD AUTO: 43.4 % — SIGNIFICANT CHANGE UP (ref 13–44)
MCHC RBC-ENTMCNC: 27.5 PG — SIGNIFICANT CHANGE UP (ref 27–34)
MCHC RBC-ENTMCNC: 31.7 G/DL — LOW (ref 32–36)
MCV RBC AUTO: 86.6 FL — SIGNIFICANT CHANGE UP (ref 80–100)
MONOCYTES # BLD AUTO: 0.66 K/UL — SIGNIFICANT CHANGE UP (ref 0–0.9)
MONOCYTES NFR BLD AUTO: 8 % — SIGNIFICANT CHANGE UP (ref 2–14)
NEUTROPHILS # BLD AUTO: 3.7 K/UL — SIGNIFICANT CHANGE UP (ref 1.8–7.4)
NEUTROPHILS NFR BLD AUTO: 44.6 % — SIGNIFICANT CHANGE UP (ref 43–77)
NRBC # BLD AUTO: 0 K/UL — SIGNIFICANT CHANGE UP (ref 0–0)
NRBC # FLD: 0 K/UL — SIGNIFICANT CHANGE UP (ref 0–0)
NT-PROBNP SERPL-SCNC: 160 PG/ML — SIGNIFICANT CHANGE UP (ref 0–450)
PLATELET # BLD AUTO: 291 K/UL — SIGNIFICANT CHANGE UP (ref 150–400)
PMV BLD: 9.9 FL — SIGNIFICANT CHANGE UP (ref 7–13)
POTASSIUM SERPL-MCNC: 4.2 MMOL/L — SIGNIFICANT CHANGE UP (ref 3.5–5.3)
POTASSIUM SERPL-SCNC: 4.2 MMOL/L — SIGNIFICANT CHANGE UP (ref 3.5–5.3)
PROTHROM AB SERPL-ACNC: 11.6 SEC — SIGNIFICANT CHANGE UP (ref 9.9–13.4)
RBC # BLD: 4.77 M/UL — SIGNIFICANT CHANGE UP (ref 3.8–5.2)
RBC # FLD: 15.4 % — HIGH (ref 10.3–14.5)
RSV RNA NPH QL NAA+NON-PROBE: SIGNIFICANT CHANGE UP
SARS-COV-2 RNA SPEC QL NAA+PROBE: SIGNIFICANT CHANGE UP
SODIUM SERPL-SCNC: 138 MMOL/L — SIGNIFICANT CHANGE UP (ref 135–145)
SOURCE RESPIRATORY: SIGNIFICANT CHANGE UP
TROPONIN I, HIGH SENSITIVITY RESULT: 3.98 NG/L — SIGNIFICANT CHANGE UP
TROPONIN I, HIGH SENSITIVITY RESULT: 5.61 NG/L — SIGNIFICANT CHANGE UP
WBC # BLD: 8.29 K/UL — SIGNIFICANT CHANGE UP (ref 3.8–10.5)
WBC # FLD AUTO: 8.29 K/UL — SIGNIFICANT CHANGE UP (ref 3.8–10.5)

## 2025-04-01 PROCEDURE — 71046 X-RAY EXAM CHEST 2 VIEWS: CPT | Mod: 26

## 2025-04-01 PROCEDURE — G0378: CPT

## 2025-04-01 PROCEDURE — 86901 BLOOD TYPING SEROLOGIC RH(D): CPT

## 2025-04-01 PROCEDURE — 0241U: CPT

## 2025-04-01 PROCEDURE — 94640 AIRWAY INHALATION TREATMENT: CPT

## 2025-04-01 PROCEDURE — 93005 ELECTROCARDIOGRAM TRACING: CPT

## 2025-04-01 PROCEDURE — 76376 3D RENDER W/INTRP POSTPROCES: CPT

## 2025-04-01 PROCEDURE — 86900 BLOOD TYPING SEROLOGIC ABO: CPT

## 2025-04-01 PROCEDURE — 85025 COMPLETE CBC W/AUTO DIFF WBC: CPT

## 2025-04-01 PROCEDURE — 36415 COLL VENOUS BLD VENIPUNCTURE: CPT

## 2025-04-01 PROCEDURE — 93010 ELECTROCARDIOGRAM REPORT: CPT

## 2025-04-01 PROCEDURE — 83036 HEMOGLOBIN GLYCOSYLATED A1C: CPT

## 2025-04-01 PROCEDURE — 71046 X-RAY EXAM CHEST 2 VIEWS: CPT

## 2025-04-01 PROCEDURE — 85730 THROMBOPLASTIN TIME PARTIAL: CPT

## 2025-04-01 PROCEDURE — 71275 CT ANGIOGRAPHY CHEST: CPT | Mod: 26

## 2025-04-01 PROCEDURE — 86850 RBC ANTIBODY SCREEN: CPT

## 2025-04-01 PROCEDURE — 99285 EMERGENCY DEPT VISIT HI MDM: CPT | Mod: 25

## 2025-04-01 PROCEDURE — 99223 1ST HOSP IP/OBS HIGH 75: CPT

## 2025-04-01 PROCEDURE — 83880 ASSAY OF NATRIURETIC PEPTIDE: CPT

## 2025-04-01 PROCEDURE — 82962 GLUCOSE BLOOD TEST: CPT

## 2025-04-01 PROCEDURE — 71275 CT ANGIOGRAPHY CHEST: CPT

## 2025-04-01 PROCEDURE — 96374 THER/PROPH/DIAG INJ IV PUSH: CPT | Mod: XU

## 2025-04-01 PROCEDURE — 80061 LIPID PANEL: CPT

## 2025-04-01 PROCEDURE — 93306 TTE W/DOPPLER COMPLETE: CPT

## 2025-04-01 PROCEDURE — 80053 COMPREHEN METABOLIC PANEL: CPT

## 2025-04-01 PROCEDURE — 85610 PROTHROMBIN TIME: CPT

## 2025-04-01 PROCEDURE — 84484 ASSAY OF TROPONIN QUANT: CPT

## 2025-04-01 PROCEDURE — 99285 EMERGENCY DEPT VISIT HI MDM: CPT | Mod: FS

## 2025-04-01 RX ORDER — GABAPENTIN 400 MG/1
300 CAPSULE ORAL DAILY
Refills: 0 | Status: DISCONTINUED | OUTPATIENT
Start: 2025-04-01 | End: 2025-04-02

## 2025-04-01 RX ORDER — GABAPENTIN 400 MG/1
2 CAPSULE ORAL
Refills: 0 | DISCHARGE

## 2025-04-01 RX ORDER — ASPIRIN 325 MG
324 TABLET ORAL ONCE
Refills: 0 | Status: COMPLETED | OUTPATIENT
Start: 2025-04-01 | End: 2025-04-01

## 2025-04-01 RX ORDER — GABAPENTIN 400 MG/1
600 CAPSULE ORAL AT BEDTIME
Refills: 0 | Status: DISCONTINUED | OUTPATIENT
Start: 2025-04-01 | End: 2025-04-02

## 2025-04-01 RX ORDER — TIOTROPIUM BROMIDE INHALATION SPRAY 3.12 UG/1
2 SPRAY, METERED RESPIRATORY (INHALATION) DAILY
Refills: 0 | Status: DISCONTINUED | OUTPATIENT
Start: 2025-04-01 | End: 2025-04-02

## 2025-04-01 RX ORDER — LEVOTHYROXINE SODIUM 300 MCG
125 TABLET ORAL DAILY
Refills: 0 | Status: DISCONTINUED | OUTPATIENT
Start: 2025-04-01 | End: 2025-04-02

## 2025-04-01 RX ORDER — FUROSEMIDE 10 MG/ML
40 INJECTION INTRAMUSCULAR; INTRAVENOUS DAILY
Refills: 0 | Status: DISCONTINUED | OUTPATIENT
Start: 2025-04-01 | End: 2025-04-02

## 2025-04-01 RX ORDER — TIOTROPIUM BROMIDE INHALATION SPRAY 3.12 UG/1
1 SPRAY, METERED RESPIRATORY (INHALATION)
Refills: 0 | DISCHARGE

## 2025-04-01 RX ORDER — MELOXICAM 15 MG/1
1 TABLET ORAL
Refills: 0 | DISCHARGE

## 2025-04-01 RX ORDER — ESTRADIOL 25 MCG
1 TABLET VAGINAL
Refills: 0 | DISCHARGE

## 2025-04-01 RX ORDER — ROSUVASTATIN CALCIUM 20 MG/1
1 TABLET, FILM COATED ORAL
Refills: 0 | DISCHARGE

## 2025-04-01 RX ORDER — ALBUTEROL SULFATE 2.5 MG/3ML
2.5 VIAL, NEBULIZER (ML) INHALATION ONCE
Refills: 0 | Status: COMPLETED | OUTPATIENT
Start: 2025-04-01 | End: 2025-04-01

## 2025-04-01 RX ORDER — ROSUVASTATIN CALCIUM 20 MG/1
5 TABLET, FILM COATED ORAL AT BEDTIME
Refills: 0 | Status: DISCONTINUED | OUTPATIENT
Start: 2025-04-01 | End: 2025-04-02

## 2025-04-01 RX ORDER — BUDESONIDE AND FORMOTEROL FUMARATE DIHYDRATE 80; 4.5 UG/1; UG/1
2 AEROSOL RESPIRATORY (INHALATION)
Refills: 0 | DISCHARGE

## 2025-04-01 RX ORDER — CYANOCOBALAMIN 1000 UG/ML
1 INJECTION INTRAMUSCULAR; SUBCUTANEOUS
Refills: 0 | DISCHARGE

## 2025-04-01 RX ORDER — LEVOTHYROXINE SODIUM 300 MCG
1 TABLET ORAL
Refills: 0 | DISCHARGE

## 2025-04-01 RX ORDER — CYANOCOBALAMIN 1000 UG/ML
1000 INJECTION INTRAMUSCULAR; SUBCUTANEOUS DAILY
Refills: 0 | Status: DISCONTINUED | OUTPATIENT
Start: 2025-04-01 | End: 2025-04-02

## 2025-04-01 RX ORDER — ORAL SEMAGLUTIDE 14 MG/1
1 TABLET ORAL
Refills: 0 | DISCHARGE

## 2025-04-01 RX ADMIN — Medication 20 MILLIGRAM(S): at 16:27

## 2025-04-01 RX ADMIN — Medication 1000 MILLILITER(S): at 13:56

## 2025-04-01 RX ADMIN — Medication 2.5 MILLIGRAM(S): at 13:56

## 2025-04-01 RX ADMIN — Medication 324 MILLIGRAM(S): at 16:27

## 2025-04-01 NOTE — PATIENT PROFILE ADULT - FALL HARM RISK - HARM RISK INTERVENTIONS

## 2025-04-01 NOTE — ED STATDOCS - PHYSICAL EXAMINATION
Constitutional: NAD AAOx3  Eyes: EOMI, pupils equal  Head: Normocephalic atraumatic  Mouth: no airway obstruction  Cardiac: regular rate   Resp: Lungs CTAB  GI: Abd s/nt/nd  Neuro: CN2-12 intact  Skin: No rashes Patient is not pregnant (male or female)

## 2025-04-01 NOTE — ED ADULT NURSE NOTE - NSFALLHARMRISKINTERV_ED_ALL_ED

## 2025-04-01 NOTE — ED STATDOCS - PROGRESS NOTE DETAILS
79-year-old female with past medical history of COPD emphysema pneumothorax basal cell carcinoma diverticulosis hypothyroidism asthma and diabetes presents the ED complaining of having intermittent episodes of chest pain associated with shortness of breath.  Patient reported last night while eating dinner she felt pressure sensation to her chest with shortness of breath.  Will follow-up labs and CT imaging.  Will reevaluate patient.  Mimi Casas PA-C I discussed case wi pt's Cariologist, Dr. Kim at [place].  He believed pt had cardiac workup in 2021 that showed no significant disease.  Pt with Pulmonary issues in the past.  Fine with pt being admitted / Obs,  Can be seen by Dr. Segal, who pt has seen in  in the past.  Pt could benefit from Cardiac CTA / Nuclear imaging as inpt.  Pt placed in OBS.  Mimi Casas PA-C Labs showed Trop 3.98.  .  WBC 8.29.  Hgb 13.1 / Hct 41.3.  Normal electrolytes.  bun 20.  Cr 0.81.  LFTS WNL.  CTA Chest IMPRESSION: No evidence acute pulmonary emboli, pneumothorax, or other acute  intrathoracic pathology.  Reviewed pt's note from Dr. Dowd.  pt had episode of chest tightness and SOB in his office.  Pt had to sit down to recover.  I discussed case with pt.  She would like to stay in hospital for further evaluation and treatment.  PO ASA given.  IV Pepcid ordered as pt reports pain after eating.  Called Dr. Mack to discuss.  Pt with Heart score of 4 (Age, mod suspicious story, 1- 2 risk factors (Type 2 DM) )with chest pain.  I will reach out to Dr. vivar to discuss further.  On re-exam, pt without abd tenderness to palp.  Mimi Casas PA-C

## 2025-04-01 NOTE — ED ADULT NURSE NOTE - IS THE PATIENT ABLE TO BE SCREENED?
Problem: Risk for Impaired Skin Integrity  Goal: Tissue integrity - skin and mucous membranes  Structural intactness and normal physiological function of skin and  mucous membranes. Outcome: Ongoing  Refusing turns in bed. Low air loss mattress in place. Pharmaceutical powder and interdry in folds to prevent further breakdown.  mepilex on sacrum Yes

## 2025-04-01 NOTE — ED STATDOCS - SCRIBE NAME
Respiratory Update    Treatment modality: SVN  Frequency: DUO QID  Flutter QID    Pt tolerating current treatments well with no adverse reactions.     Grvoer Atttrista

## 2025-04-01 NOTE — H&P ADULT - HISTORY OF PRESENT ILLNESS
78 y/o F w/ PMH of L pneumothorax s/p VATS pleuradesis / RODERICK bullectomy, COPD, GERD, OA, BCC s/p removal, spinal stenosis, diverticulosis, DM, hypothyroidism, p/w chest pain. Patient states that on Monday night she went to have dinner with daughter-in-law, and mid way through dinner she developed sub-sternal chest pain, that was tightness in quality. Denies any radiation to arm / jaw / back. It was associated with SOB and spitting up. Patient states episode lasted about 25 mins. On Tuesday patient came back from showering and developed same CP again, and it lasted 10-15 mins. Patient called Dr. Dowd's office and was referred to ED for further evaluation. Patient denies any CP at this time.  Patient also states her  recently passed away.     PSH: VATS pleuradesis / RODERICK bullectomy, spinal fusion, cholecystectomy, cataract extraction    Social Hx: Tobacco - quit at 36 y/o, etoh - rarely, drugs - denies     Family Hx: Father - pancreatic cancer, mother - heart disease

## 2025-04-01 NOTE — ED ADULT NURSE NOTE - OBJECTIVE STATEMENT
Pt comes to the ED complaining of chest pain that started approx 1 hour prior to arrival in ED. Denies SOB. Pain is non-radiating. Hx of bilateral pneumothoraxes, back surgery. AO x 4. Ambulatory.

## 2025-04-01 NOTE — H&P ADULT - CONVERSATION DETAILS
Patient denies having HCP at this time. She denies having any limitations on resuscitation status for this admission

## 2025-04-01 NOTE — ED ADULT TRIAGE NOTE - CHIEF COMPLAINT QUOTE
Pt comes to the ED complaining of chest pain that started approx 1 hour prior to arrival in ED. Pt taken for a STAT EKG

## 2025-04-01 NOTE — H&P ADULT - ASSESSMENT
78 y/o F w/ PMH of L pneumothorax s/p VATS pleuradesis / RODERICK bullectomy, COPD, GERD, OA, BCC s/p removal, spinal stenosis, diverticulosis, DM, hypothyroidism, p/w chest pain    *CP / SOB - R/o ACS  -CT Chest reports that there's no PE / pneumothorax or other acute intrathoracic pathology. +Pleural-based nodule (f/u outpatient pulm)  -Troponin negative x 2   -ASA  -Cardio consult  -Tele montoring  -EKG  -Echo     *H/o COPD  -Duonebs PRN     *DM  -Humalog ISS  -Diabetic diet     *H/o GERD / OA / BCC / spinal stenosis / diverticulosis / hypothyroidism  -C/w home meds and f/u outpatient for further management    *DVT ppx  -SCDs     >75 mins required for admission  80 y/o F w/ PMH of L pneumothorax s/p VATS pleuradesis / RODERICK bullectomy, COPD, GERD, OA, BCC s/p removal, spinal stenosis, diverticulosis, DM, hypothyroidism, p/w chest pain    *CP / SOB - R/o ACS  -CT Chest reports that there's no PE / pneumothorax or other acute intrathoracic pathology. +Pleural-based nodule (f/u outpatient pulm)  -Troponin negative x 2   -ASA  -Cardio consult  -Tele monitoring  -EKG  -Echo     *H/o COPD  -Albuterol PRN     *DM  -Humalog ISS  -Diabetic diet     *H/o GERD / OA / BCC / spinal stenosis / diverticulosis / hypothyroidism  -C/w home meds and f/u outpatient for further management    *DVT ppx  -SCDs     >75 mins required for admission

## 2025-04-01 NOTE — PHARMACOTHERAPY INTERVENTION NOTE - COMMENTS
Medication reconciliation completed.  Reviewed Medication list and confirmed med allergies with patient; confirmed with Dr. First Medvaishali.

## 2025-04-01 NOTE — PATIENT PROFILE ADULT - FUNCTIONAL ASSESSMENT - BASIC MOBILITY 3.
Patient will transfer to and from bed Independently by 3rd session
4 = No assist / stand by assistance

## 2025-04-02 ENCOUNTER — TRANSCRIPTION ENCOUNTER (OUTPATIENT)
Age: 80
End: 2025-04-02

## 2025-04-02 ENCOUNTER — RESULT REVIEW (OUTPATIENT)
Age: 80
End: 2025-04-02

## 2025-04-02 VITALS
RESPIRATION RATE: 18 BRPM | TEMPERATURE: 98 F | DIASTOLIC BLOOD PRESSURE: 67 MMHG | OXYGEN SATURATION: 96 % | HEART RATE: 67 BPM | SYSTOLIC BLOOD PRESSURE: 131 MMHG

## 2025-04-02 LAB
A1C WITH ESTIMATED AVERAGE GLUCOSE RESULT: 6.3 % — HIGH (ref 4–5.6)
ALBUMIN SERPL ELPH-MCNC: 3 G/DL — LOW (ref 3.3–5)
ALP SERPL-CCNC: 67 U/L — SIGNIFICANT CHANGE UP (ref 40–120)
ALT FLD-CCNC: 18 U/L — SIGNIFICANT CHANGE UP (ref 12–78)
ANION GAP SERPL CALC-SCNC: 4 MMOL/L — LOW (ref 5–17)
APTT BLD: 28.7 SEC — SIGNIFICANT CHANGE UP (ref 24.5–35.6)
AST SERPL-CCNC: 20 U/L — SIGNIFICANT CHANGE UP (ref 15–37)
BASOPHILS # BLD AUTO: 0.07 K/UL — SIGNIFICANT CHANGE UP (ref 0–0.2)
BASOPHILS NFR BLD AUTO: 1 % — SIGNIFICANT CHANGE UP (ref 0–2)
BILIRUB SERPL-MCNC: 0.5 MG/DL — SIGNIFICANT CHANGE UP (ref 0.2–1.2)
BUN SERPL-MCNC: 17 MG/DL — SIGNIFICANT CHANGE UP (ref 7–23)
CALCIUM SERPL-MCNC: 8.8 MG/DL — SIGNIFICANT CHANGE UP (ref 8.5–10.1)
CHLORIDE SERPL-SCNC: 109 MMOL/L — HIGH (ref 96–108)
CHOLEST SERPL-MCNC: 126 MG/DL — SIGNIFICANT CHANGE UP
CO2 SERPL-SCNC: 25 MMOL/L — SIGNIFICANT CHANGE UP (ref 22–31)
CREAT SERPL-MCNC: 0.68 MG/DL — SIGNIFICANT CHANGE UP (ref 0.5–1.3)
EGFR: 87 ML/MIN/1.73M2 — SIGNIFICANT CHANGE UP
EGFR: 87 ML/MIN/1.73M2 — SIGNIFICANT CHANGE UP
EOSINOPHIL # BLD AUTO: 0.27 K/UL — SIGNIFICANT CHANGE UP (ref 0–0.5)
EOSINOPHIL NFR BLD AUTO: 3.9 % — SIGNIFICANT CHANGE UP (ref 0–6)
ESTIMATED AVERAGE GLUCOSE: 134 MG/DL — HIGH (ref 68–114)
GLUCOSE BLDC GLUCOMTR-MCNC: 107 MG/DL — HIGH (ref 70–99)
GLUCOSE SERPL-MCNC: 110 MG/DL — HIGH (ref 70–99)
HCT VFR BLD CALC: 38.8 % — SIGNIFICANT CHANGE UP (ref 34.5–45)
HDLC SERPL-MCNC: 55 MG/DL — SIGNIFICANT CHANGE UP
HGB BLD-MCNC: 12.2 G/DL — SIGNIFICANT CHANGE UP (ref 11.5–15.5)
IMM GRANULOCYTES # BLD AUTO: 0.02 K/UL — SIGNIFICANT CHANGE UP (ref 0–0.07)
IMM GRANULOCYTES NFR BLD AUTO: 0.3 % — SIGNIFICANT CHANGE UP (ref 0–0.9)
INR BLD: 1.02 RATIO — SIGNIFICANT CHANGE UP (ref 0.85–1.16)
LIPID PNL WITH DIRECT LDL SERPL: 53 MG/DL — SIGNIFICANT CHANGE UP
LYMPHOCYTES # BLD AUTO: 3.13 K/UL — SIGNIFICANT CHANGE UP (ref 1–3.3)
LYMPHOCYTES NFR BLD AUTO: 44.7 % — HIGH (ref 13–44)
MCV RBC AUTO: 87.8 FL — SIGNIFICANT CHANGE UP (ref 80–100)
MONOCYTES # BLD AUTO: 0.74 K/UL — SIGNIFICANT CHANGE UP (ref 0–0.9)
MONOCYTES NFR BLD AUTO: 10.6 % — SIGNIFICANT CHANGE UP (ref 2–14)
NEUTROPHILS NFR BLD AUTO: 39.5 % — LOW (ref 43–77)
NON HDL CHOLESTEROL: 71 MG/DL — SIGNIFICANT CHANGE UP
NRBC # BLD AUTO: 0 K/UL — SIGNIFICANT CHANGE UP (ref 0–0)
NRBC # FLD: 0 K/UL — SIGNIFICANT CHANGE UP (ref 0–0)
NRBC BLD AUTO-RTO: 0 /100 WBCS — SIGNIFICANT CHANGE UP (ref 0–0)
PLATELET # BLD AUTO: 275 K/UL — SIGNIFICANT CHANGE UP (ref 150–400)
PMV BLD: 9.9 FL — SIGNIFICANT CHANGE UP (ref 7–13)
POTASSIUM SERPL-MCNC: 3.8 MMOL/L — SIGNIFICANT CHANGE UP (ref 3.5–5.3)
POTASSIUM SERPL-SCNC: 3.8 MMOL/L — SIGNIFICANT CHANGE UP (ref 3.5–5.3)
PROT SERPL-MCNC: 6.8 GM/DL — SIGNIFICANT CHANGE UP (ref 6–8.3)
RBC # BLD: 4.42 M/UL — SIGNIFICANT CHANGE UP (ref 3.8–5.2)
RBC # FLD: 15.5 % — HIGH (ref 10.3–14.5)
SODIUM SERPL-SCNC: 138 MMOL/L — SIGNIFICANT CHANGE UP (ref 135–145)
TROPONIN I, HIGH SENSITIVITY RESULT: 6.97 NG/L — SIGNIFICANT CHANGE UP
WBC # BLD: 7.01 K/UL — SIGNIFICANT CHANGE UP (ref 3.8–10.5)
WBC # FLD AUTO: 7.01 K/UL — SIGNIFICANT CHANGE UP (ref 3.8–10.5)

## 2025-04-02 PROCEDURE — 99222 1ST HOSP IP/OBS MODERATE 55: CPT | Mod: FS

## 2025-04-02 PROCEDURE — 99239 HOSP IP/OBS DSCHRG MGMT >30: CPT

## 2025-04-02 PROCEDURE — 76376 3D RENDER W/INTRP POSTPROCES: CPT | Mod: 26

## 2025-04-02 PROCEDURE — 93010 ELECTROCARDIOGRAM REPORT: CPT

## 2025-04-02 PROCEDURE — 93306 TTE W/DOPPLER COMPLETE: CPT | Mod: 26

## 2025-04-02 RX ORDER — SODIUM CHLORIDE 9 G/1000ML
1000 INJECTION, SOLUTION INTRAVENOUS
Refills: 0 | Status: DISCONTINUED | OUTPATIENT
Start: 2025-04-02 | End: 2025-04-02

## 2025-04-02 RX ORDER — ALBUTEROL SULFATE 2.5 MG/3ML
2 VIAL, NEBULIZER (ML) INHALATION EVERY 6 HOURS
Refills: 0 | Status: DISCONTINUED | OUTPATIENT
Start: 2025-04-02 | End: 2025-04-02

## 2025-04-02 RX ORDER — DEXTROSE 50 % IN WATER 50 %
25 SYRINGE (ML) INTRAVENOUS ONCE
Refills: 0 | Status: DISCONTINUED | OUTPATIENT
Start: 2025-04-02 | End: 2025-04-02

## 2025-04-02 RX ORDER — DEXTROSE 50 % IN WATER 50 %
12.5 SYRINGE (ML) INTRAVENOUS ONCE
Refills: 0 | Status: DISCONTINUED | OUTPATIENT
Start: 2025-04-02 | End: 2025-04-02

## 2025-04-02 RX ORDER — DEXTROSE 50 % IN WATER 50 %
15 SYRINGE (ML) INTRAVENOUS ONCE
Refills: 0 | Status: DISCONTINUED | OUTPATIENT
Start: 2025-04-02 | End: 2025-04-02

## 2025-04-02 RX ORDER — INSULIN LISPRO 100 U/ML
INJECTION, SOLUTION INTRAVENOUS; SUBCUTANEOUS
Refills: 0 | Status: DISCONTINUED | OUTPATIENT
Start: 2025-04-02 | End: 2025-04-02

## 2025-04-02 RX ORDER — GLUCAGON 3 MG/1
1 POWDER NASAL ONCE
Refills: 0 | Status: DISCONTINUED | OUTPATIENT
Start: 2025-04-02 | End: 2025-04-02

## 2025-04-02 RX ORDER — ASPIRIN 325 MG
81 TABLET ORAL DAILY
Refills: 0 | Status: DISCONTINUED | OUTPATIENT
Start: 2025-04-02 | End: 2025-04-02

## 2025-04-02 RX ADMIN — Medication 125 MICROGRAM(S): at 05:39

## 2025-04-02 RX ADMIN — Medication 40 MILLIGRAM(S): at 05:39

## 2025-04-02 RX ADMIN — CYANOCOBALAMIN 1000 MICROGRAM(S): 1000 INJECTION INTRAMUSCULAR; SUBCUTANEOUS at 10:03

## 2025-04-02 RX ADMIN — Medication 500 MILLIGRAM(S): at 10:03

## 2025-04-02 RX ADMIN — Medication 81 MILLIGRAM(S): at 10:03

## 2025-04-02 RX ADMIN — TIOTROPIUM BROMIDE INHALATION SPRAY 2 PUFF(S): 3.12 SPRAY, METERED RESPIRATORY (INHALATION) at 09:40

## 2025-04-02 RX ADMIN — GABAPENTIN 300 MILLIGRAM(S): 400 CAPSULE ORAL at 10:03

## 2025-04-02 NOTE — CONSULT NOTE ADULT - NS ATTEND AMEND GEN_ALL_CORE FT
Pt seen and discussed with NP Williams    Atypical chest pain: ACS ruled out, CTA and echo unremarkable   stable for outpatient f/u with Dr. Kim

## 2025-04-02 NOTE — DISCHARGE NOTE PROVIDER - HOSPITAL COURSE
80 y/o F w/ PMH of L pneumothorax s/p VATS pleurodesis / RODERICK bullectomy, COPD, GERD, OA, BCC s/p removal, spinal stenosis, diverticulosis, DM, hypothyroidism, p/w chest pain. Patient states that on Monday night she went to have dinner with daughter-in-law, and mid way through dinner she developed sub-sternal chest pain, that was tightness in quality. Denies any radiation to arm / jaw / back. It was associated with SOB and spitting up. Patient states episode lasted about 25 mins. On Tuesday patient came back from showering and developed same CP again, and it lasted 10-15 mins. Patient called Dr. Dowd's office and was referred to ED for further evaluation. Patient denies any CP at this time.  Patient also states her  recently passed away."    Hospital Course: Pt admitted  with chest pain to rule out ACS. EKG with no significant change from prior. Trop neg x 3. TTE with normal EF, diastolic function. no significant valvular disease. mild pulm htn which is similar to prior echo in 2022. pt has no further chest pain. Cardio consulted.     #CP / SOB - ACS ruled out  cp and sob resolved on my encounter  -CTA Chest reports that there's no PE / pneumothorax or other acute intrathoracic pathology. +Pleural-based nodule (f/u outpatient pulm)  -Troponin negative x 3  -ASA - can DC baby aspirin   -Cardio consult - Discussed with Dr. Li - cleared for outpt f/u with Dr. Kim  -Tele monitoring - no events   -EKG - no significant changes   -Echo - EF 62% normal diastolic function, no significant valvular disease - mild aortic regurg - would not cause cp; mild pulm htn which is similar to prior echo in 2022    #H/o COPD  -Albuterol PRN     #DM  -Humalog ISS  -Diabetic diet     #H/o GERD / OA / BCC / spinal stenosis / diverticulosis / hypothyroidism  -C/w home meds and f/u outpatient for further management    #DVT ppx  -SCDs

## 2025-04-02 NOTE — CONSULT NOTE ADULT - ASSESSMENT
- Chest pain and SOB  .  pt. with atypical chest pain after eating dinner and after showering, no chest pain at present  . CTA chest - no PE, trops neg x 3, pro BNP wnl   . no chest pain  or SOB at present, EKG without ischemic changes, Echo with preserved LVEF, no acute findings  . cont. asa and statin       stable form cardiac standpoint, op follow up with her cardiologist - Dr. Kim, will sign off  80 y/o F w/ PMH of L pneumothorax s/p VATS pleuradesis / RODERICK bullectomy, COPD, GERD, OA, BCC s/p removal, spinal stenosis, diverticulosis, DM, hypothyroidism, p/w chest pain.    Atypical chest pain   .  pt. with atypical chest pain after eating dinner and after showering, no chest pain at present  . CTA chest - no PE, trops neg x 3, pro BNP wnl   . no chest pain  or SOB at present, EKG without ischemic changes, Echo with preserved LVEF, no acute findings  . cont. asa and statin   low suspicion for cardiac etiology     stable form cardiac standpoint, op follow up with her cardiologist - Dr. Kim, will sign off

## 2025-04-02 NOTE — DISCHARGE NOTE PROVIDER - NSDCCPCAREPLAN_GEN_ALL_CORE_FT
PRINCIPAL DISCHARGE DIAGNOSIS  Diagnosis: Chest pain  Assessment and Plan of Treatment: you were admitted for chest pain. your cardiac enzymes are normal. Your echocardiogram is also normal. it is advised you follow up with your cardiologist Dr. Kim as an outpatient. would recommend a stress test if not done recently

## 2025-04-02 NOTE — DISCHARGE NOTE PROVIDER - NSDCPNSUBOBJ_GEN_ALL_CORE
HOSPITALIST ATTENDING PROGRESS NOTE    Chart and meds reviewed.  Patient seen and examined.    CC: cp    Subjective: no cp or sob on my encounter; pt feels well; endorses stress from recent passing of     All other systems reviewed and found to be negative with the exception of what has been described above.    MEDICATIONS  (STANDING):  ascorbic acid 500 milliGRAM(s) Oral daily  aspirin  chewable 81 milliGRAM(s) Oral daily  cyanocobalamin 1000 MICROGram(s) Oral daily  dextrose 5%. 1000 milliLiter(s) (50 mL/Hr) IV Continuous <Continuous>  dextrose 5%. 1000 milliLiter(s) (100 mL/Hr) IV Continuous <Continuous>  dextrose 50% Injectable 25 Gram(s) IV Push once  dextrose 50% Injectable 12.5 Gram(s) IV Push once  gabapentin 300 milliGRAM(s) Oral daily  gabapentin 600 milliGRAM(s) Oral at bedtime  glucagon  Injectable 1 milliGRAM(s) IntraMuscular once  insulin lispro (ADMELOG) corrective regimen sliding scale   SubCutaneous three times a day before meals  levothyroxine 125 MICROGram(s) Oral daily  pantoprazole    Tablet 40 milliGRAM(s) Oral before breakfast  rosuvastatin 5 milliGRAM(s) Oral at bedtime  tiotropium 2.5 MICROgram(s) Inhaler 2 Puff(s) Inhalation daily    MEDICATIONS  (PRN):  albuterol    90 MICROgram(s) HFA Inhaler 2 Puff(s) Inhalation every 6 hours PRN Bronchospasm  dextrose Oral Gel 15 Gram(s) Oral once PRN Blood Glucose LESS THAN 70 milliGRAM(s)/deciliter  furosemide    Tablet 40 milliGRAM(s) Oral daily PRN swelling      PHYSICAL EXAM:  Gen: NAD  CV:  +S1, +S2, regular, no murmurs or rubs  RESP:   lungs clear to auscultation bilaterally, no wheezing, rales, rhonchi, good air entry bilaterally  GI:  abdomen soft, non-tender, non-distended, normal BS, no bruits, no abdominal masses, no palpable masses  :  not examined  MSK:   normal muscle tone, no atrophy, no rigidity, no contractions  EXT:  no edema, no calf pain, swelling or erythema  NEURO:  AAOX3, no focal neurological deficits, follows all commands, able to move extremities spontaneously    LABS:                            12.2   7.01  )-----------( 275      ( 02 Apr 2025 06:49 )             38.8     04-02    138  |  109[H]  |  17  ----------------------------<  110[H]  3.8   |  25  |  0.68    Ca    8.8      02 Apr 2025 06:49    TPro  6.8  /  Alb  3.0[L]  /  TBili  0.5  /  DBili  x   /  AST  20  /  ALT  18  /  AlkPhos  67  04-02        LIVER FUNCTIONS - ( 02 Apr 2025 06:49 )  Alb: 3.0 g/dL / Pro: 6.8 gm/dL / ALK PHOS: 67 U/L / ALT: 18 U/L / AST: 20 U/L / GGT: x           PT/INR - ( 02 Apr 2025 06:49 )   PT: 11.7 sec;   INR: 1.02 ratio         PTT - ( 02 Apr 2025 06:49 )  PTT:28.7 sec  Urinalysis Basic - ( 02 Apr 2025 06:49 )    Color: x / Appearance: x / SG: x / pH: x  Gluc: 110 mg/dL / Ketone: x  / Bili: x / Urobili: x   Blood: x / Protein: x / Nitrite: x   Leuk Esterase: x / RBC: x / WBC x   Sq Epi: x / Non Sq Epi: x / Bacteria: x

## 2025-04-02 NOTE — DISCHARGE NOTE PROVIDER - PROVIDER TOKENS
PROVIDER:[TOKEN:[[id number]],FOLLOWUP:[7-10 Days]],PROVIDER:[TOKEN:[[id number]],FOLLOWUP:[7-10 Days]]

## 2025-04-02 NOTE — DISCHARGE NOTE NURSING/CASE MANAGEMENT/SOCIAL WORK - PATIENT PORTAL LINK FT
You can access the FollowMyHealth Patient Portal offered by [facility] by registering at the following website: [email or web address]. By joining [facility]’s FollowMyHealth portal, you will also be able to view your health information using other applications (apps) compatible with our system.

## 2025-04-02 NOTE — CONSULT NOTE ADULT - SUBJECTIVE AND OBJECTIVE BOX
CHIEF COMPLAINT: Patient is a 79y old  Female who presents with a chief complaint of chest pain (2025 16:12)      HPI:  80 y/o F w/ PMH of L pneumothorax s/p VATS pleuradesis / RODERICK bullectomy, COPD, GERD, OA, BCC s/p removal, spinal stenosis, diverticulosis, DM, hypothyroidism, p/w chest pain. Patient states that on Monday night she went to have dinner with daughter-in-law, and mid way through dinner she developed sub-sternal chest pain, that was tightness in quality. Denies any radiation to arm / jaw / back. It was associated with SOB and spitting up. Patient states episode lasted about 25 mins. On Tuesday patient came back from showering and developed same CP again, and it lasted 10-15 mins. Patient called Dr. Dowd's office and was referred to ED for further evaluation. Patient denies any CP at this time.  Patient also states her  recently passed away.     PSH: VATS pleuradesis / RODERICK bullectomy, spinal fusion, cholecystectomy, cataract extraction    Social Hx: Tobacco - quit at 36 y/o, etoh - rarely, drugs - denies     Family Hx: Father - pancreatic cancer, mother - heart disease  (2025 23:39)      Cardiology consulted for chest pain/SOB. Her cardiologist is Dr. Kim ([facility]). Pt. denies any major cardiac problems, she sees Dr. Kim because she was her 's cardiologist.   She reports being stressed, anxious as   recently in Feb.      :  PAST MEDICAL & SURGICAL HISTORY:  Hypothyroidism  Diabetes mellitus  diet controlled  Back pain  Asthma  Elevated pancreatic enzyme  reports elevated lab 2013. Gastroenterologist aware  History of cataract  extracted from right and left  Varicose veins of both lower extremities  surgery  Diverticulosis of intestine without bleeding, unspecified intestinal tract location  Gall stones  gall bladder removed  Urinary urgency  Spinal stenosis of lumbar region  DDD (degenerative disc disease), lumbar  Basal cell carcinoma  removed from face  Pulmonary emphysema, unspecified emphysema type  2017 last exacerbation; never intubated  OA (osteoarthritis)  Frequent UTI  2-3x/year; had an episode 5 weeks ago was treated denies dysuria  GERD (gastroesophageal reflux disease)  Leg swelling  Emphysema lung  COVID-19 vaccine series completed  Moderna - 2nd dose 2021  Knee pain, left  Scar tissue  left knee S/P TKR  COPD exacerbation  S/P bladder repair  bladder lift 2013  Dental disorder  Dental surgery 2012, patient reports that upper portion is glued in at this time, plan is to have dental implants placed.  S/P knee surgery  arthroscopy bilateral  and   Skin cancer  to right temple , removed, history of basal cell x 2  H/O lumbosacral spine surgery  fusion   H/O umbilical hernia repair  19  S/P cholecystectomy    S/P colonoscopy  benign polyp  S/P knee replacement  right , left 2019    SOCIAL HISTORY:   Alcohol: Denied  Smoking: Nonsmoker  Drug Use: Denied  Marital Status:     FAMILY HISTORY: FAMILY HISTORY:  Family history of pancreatic cancer (Father)  Family history of heart disease (Mother)  Family history of stroke (Mother)    MEDICATIONS  (STANDING):  ascorbic acid 500 milliGRAM(s) Oral daily  aspirin  chewable 81 milliGRAM(s) Oral daily  cyanocobalamin 1000 MICROGram(s) Oral daily  dextrose 5%. 1000 milliLiter(s) (50 mL/Hr) IV Continuous <Continuous>  dextrose 5%. 1000 milliLiter(s) (100 mL/Hr) IV Continuous <Continuous>  dextrose 50% Injectable 25 Gram(s) IV Push once  dextrose 50% Injectable 12.5 Gram(s) IV Push once  gabapentin 300 milliGRAM(s) Oral daily  gabapentin 600 milliGRAM(s) Oral at bedtime  glucagon  Injectable 1 milliGRAM(s) IntraMuscular once  insulin lispro (ADMELOG) corrective regimen sliding scale   SubCutaneous three times a day before meals  levothyroxine 125 MICROGram(s) Oral daily  pantoprazole    Tablet 40 milliGRAM(s) Oral before breakfast  rosuvastatin 5 milliGRAM(s) Oral at bedtime  tiotropium 2.5 MICROgram(s) Inhaler 2 Puff(s) Inhalation daily    MEDICATIONS  (PRN):  albuterol    90 MICROgram(s) HFA Inhaler 2 Puff(s) Inhalation every 6 hours PRN Bronchospasm  dextrose Oral Gel 15 Gram(s) Oral once PRN Blood Glucose LESS THAN 70 milliGRAM(s)/deciliter  furosemide    Tablet 40 milliGRAM(s) Oral daily PRN swelling      Allergies    Breo Ellipta (Rash)    REVIEW OF SYSTEMS: All other review of systems is negative unless indicated above    VITAL SIGNS:   Vital Signs Last 24 Hrs  T(C): 36.5 (2025 15:58), Max: 36.8 (2025 20:05)  T(F): 97.7 (2025 15:58), Max: 98.2 (2025 20:05)  HR: 67 (2025 15:58) (65 - 83)  BP: 131/67 (2025 15:58) (113/54 - 132/75)  BP(mean): 76 (2025 10:01) (67 - 76)  RR: 18 (2025 15:58) (16 - 18)  SpO2: 96% (2025 15:58) (93% - 97%)    Parameters below as of 2025 15:58  Patient On (Oxygen Delivery Method): room air        I&O's Summary      PHYSICAL EXAM:  Constitutional: NAD, awake and alert  HEENT:  EOMI,  Pupils round, No oral cyanosis.  Pulmonary: Non-labored, breath sounds are clear bilaterally, No wheezing, rales or rhonchi  Cardiovascular: S1 and S2, regular rate and rhythm, no Murmurs, gallops or rubs  Gastrointestinal: Bowel Sounds present, soft, nontender.   Lymph: No peripheral edema. No cervical lymphadenopathy.  Neurological: Alert, no focal deficits  Skin: No rashes.  Psych:  Mood & affect appropriate    LABS:                        12.2   7.01  )-----------( 275      ( 2025 06:49 )             38.8                         13.1   8.29  )-----------( 291      ( 2025 13:58 )             41.3     2025 06:49    138    |  109    |  17     ----------------------------<  110    3.8     |  25     |  0.68   2025 13:58    138    |  106    |  20     ----------------------------<  99     4.2     |  29     |  0.81     Ca    8.8        2025 06:49  Ca    9.3        2025 13:58    TPro  6.8    /  Alb  3.0    /  TBili  0.5    /  DBili  x      /  AST  20     /  ALT  18     /  AlkPhos  67     2025 06:49  TPro  8.0    /  Alb  3.6    /  TBili  0.6    /  DBili  x      /  AST  27     /  ALT  19     /  AlkPhos  71     2025 13:58    PT/INR - ( 2025 06:49 )   PT: 11.7 sec;   INR: 1.02 ratio         PTT - ( 2025 06:49 )  PTT:28.7 sec    Radiology/EKG.TTE: reviewed     < from: TTE W or WO Ultrasound Enhancing Agent (25 @ 07:55) >     CONCLUSIONS:      1. Left ventricular cavity is normal in size. Left ventricular wall thickness is normal. Left ventricular systolic function is normal with an ejection fraction of 64 % by Bradley's method of disks 62 % by 3D with an ejection fraction visually estimated at 60 to 65 %.   2. Normal left ventricular diastolic function.   3. Normal right ventricular cavity size and normal right ventricular systolic function.   4. Normal left and right atrial size.   5. Mild mitral regurgitation.   6. Mild tricuspid regurgitation.   7. Estimated pulmonary artery systolic pressure is 38 mmHg, consistent with mild pulmonary hypertension.   8. There is moderate calcification of the mitral valve annulus.   9. Trileaflet aortic valve with normal systolic excursion. There is mild calcification of the aortic valve leaflets.  10. Mild aortic regurgitation.    ________________________________________________________________________________________    < end of copied text >    < from: 12 Lead ECG (25 @ 09:59) >  Diagnosis Line Sinus rhythm with sinus arrhythmia with 1st degree A-V block  Otherwise normal ECG  When compared with ECG of 2025 13:07,  Criteria for Anterior infarct are no longer Present  Confirmed by MD TRENTON, ELLIOTT ([id number]) on 2025 1:12:27 PM    < end of copied text >

## 2025-04-02 NOTE — DISCHARGE NOTE PROVIDER - CARE PROVIDERS DIRECT ADDRESSES
[email or web address]

## 2025-04-02 NOTE — DISCHARGE NOTE PROVIDER - NSDCMRMEDTOKEN_GEN_ALL_CORE_FT
ascorbic acid 500 mg oral tablet: 1 tab(s) orally once a day  Cranberry oral capsule: 2 cap(s) orally 2 times a day  Estrace Vaginal 0.1 mg/g vaginal cream: 1 application intravaginally 2 times a week  furosemide 40 mg oral tablet: 1 tab(s) orally once a day as needed for swelling  gabapentin 300 mg oral capsule: 1 cap(s) orally once a day (in the morning)  gabapentin 300 mg oral capsule: 2 cap(s) orally once a day (in the evening)  levothyroxine 125 mcg (0.125 mg) oral tablet: 1 tab(s) orally once a day  meloxicam 7.5 mg oral tablet: 1 tab(s) orally once a day (in the evening)  Ozempic 4 mg/3 mL (1 mg dose) subcutaneous solution: 1 milligram(s) subcutaneously once a week on Wednesdays  pantoprazole 40 mg oral delayed release tablet: 1 tab(s) orally once a day (in the evening)  rosuvastatin 5 mg oral tablet: 1 tab(s) orally once a day (at bedtime)  Spiriva HandiHaler 18 mcg inhalation capsule: 1 cap(s) inhaled once a day (at bedtime)  Symbicort 160 mcg-4.5 mcg/inh inhalation aerosol: 2 puff(s) inhaled 2 times a day  Synjardy XR 10 mg-1000 mg oral tablet, extended release: 1 tab(s) orally once a day  turmeric 500 mg oral capsule: 2 cap(s) orally once a day  Vitamin B12 1000 mcg oral tablet: 1 tab(s) orally once a day

## 2025-04-02 NOTE — DISCHARGE NOTE PROVIDER - CARE PROVIDER_API CALL
James Dowd Goodridge  Pulmonary Disease  [address]  [place] [address]  Phone: [phone]  Fax: [phone]  Follow Up Time: 7-10 Days    JOHN WANG  [address]  [place] [address]  Phone: [phone]  Fax: [phone]  Follow Up Time: 7-10 Days

## 2025-04-15 ENCOUNTER — APPOINTMENT (OUTPATIENT)
Dept: INTERNAL MEDICINE | Facility: CLINIC | Age: 80
End: 2025-04-15
Payer: MEDICARE

## 2025-04-15 ENCOUNTER — NON-APPOINTMENT (OUTPATIENT)
Age: 80
End: 2025-04-15

## 2025-04-15 VITALS
HEIGHT: 65.5 IN | TEMPERATURE: 98.3 F | WEIGHT: 190 LBS | SYSTOLIC BLOOD PRESSURE: 120 MMHG | RESPIRATION RATE: 15 BRPM | HEART RATE: 89 BPM | OXYGEN SATURATION: 96 % | DIASTOLIC BLOOD PRESSURE: 70 MMHG | BODY MASS INDEX: 31.27 KG/M2

## 2025-04-15 DIAGNOSIS — E03.9 HYPOTHYROIDISM, UNSPECIFIED: ICD-10-CM

## 2025-04-15 DIAGNOSIS — J43.9 EMPHYSEMA, UNSPECIFIED: ICD-10-CM

## 2025-04-15 DIAGNOSIS — R82.998 OTHER ABNORMAL FINDINGS IN URINE: ICD-10-CM

## 2025-04-15 DIAGNOSIS — Z12.4 ENCOUNTER FOR SCREENING FOR MALIGNANT NEOPLASM OF CERVIX: ICD-10-CM

## 2025-04-15 DIAGNOSIS — J93.83 OTHER PNEUMOTHORAX: ICD-10-CM

## 2025-04-15 DIAGNOSIS — Z79.51 LONG TERM (CURRENT) USE OF INHALED STEROIDS: ICD-10-CM

## 2025-04-15 DIAGNOSIS — Z13.820 ENCOUNTER FOR SCREENING FOR OSTEOPOROSIS: ICD-10-CM

## 2025-04-15 DIAGNOSIS — R39.9 UNSPECIFIED SYMPTOMS AND SIGNS INVOLVING THE GENITOURINARY SYSTEM: ICD-10-CM

## 2025-04-15 DIAGNOSIS — N30.90 CYSTITIS, UNSPECIFIED W/OUT HEMATURIA: ICD-10-CM

## 2025-04-15 DIAGNOSIS — J44.9 CHRONIC OBSTRUCTIVE PULMONARY DISEASE, UNSPECIFIED: ICD-10-CM

## 2025-04-15 DIAGNOSIS — E11.9 TYPE 2 DIABETES MELLITUS W/OUT COMPLICATIONS: ICD-10-CM

## 2025-04-15 DIAGNOSIS — H65.22 CHRONIC SEROUS OTITIS MEDIA, LEFT EAR: ICD-10-CM

## 2025-04-15 DIAGNOSIS — Z87.891 PERSONAL HISTORY OF NICOTINE DEPENDENCE: ICD-10-CM

## 2025-04-15 DIAGNOSIS — E78.5 HYPERLIPIDEMIA, UNSPECIFIED: ICD-10-CM

## 2025-04-15 DIAGNOSIS — N39.0 URINARY TRACT INFECTION, SITE NOT SPECIFIED: ICD-10-CM

## 2025-04-15 DIAGNOSIS — Z86.69 PERSONAL HISTORY OF OTHER DISEASES OF THE NERVOUS SYSTEM AND SENSE ORGANS: ICD-10-CM

## 2025-04-15 DIAGNOSIS — Z12.11 ENCOUNTER FOR SCREENING FOR MALIGNANT NEOPLASM OF COLON: ICD-10-CM

## 2025-04-15 DIAGNOSIS — Z87.2 PERSONAL HISTORY OF DISEASES OF THE SKIN AND SUBCUTANEOUS TISSUE: ICD-10-CM

## 2025-04-15 PROCEDURE — 94060 EVALUATION OF WHEEZING: CPT

## 2025-04-15 PROCEDURE — 99214 OFFICE O/P EST MOD 30 MIN: CPT | Mod: 25

## 2025-04-30 ENCOUNTER — APPOINTMENT (OUTPATIENT)
Dept: DERMATOLOGY | Facility: CLINIC | Age: 80
End: 2025-04-30
Payer: COMMERCIAL

## 2025-04-30 DIAGNOSIS — L82.1 OTHER SEBORRHEIC KERATOSIS: ICD-10-CM

## 2025-04-30 DIAGNOSIS — L81.4 OTHER MELANIN HYPERPIGMENTATION: ICD-10-CM

## 2025-04-30 DIAGNOSIS — L57.0 ACTINIC KERATOSIS: ICD-10-CM

## 2025-04-30 PROCEDURE — 99213 OFFICE O/P EST LOW 20 MIN: CPT | Mod: 25

## 2025-04-30 PROCEDURE — 0039D: CPT

## 2025-04-30 PROCEDURE — 17000 DESTRUCT PREMALG LESION: CPT

## 2025-04-30 PROCEDURE — 17003 DESTRUCT PREMALG LES 2-14: CPT

## 2025-05-07 DIAGNOSIS — Z13.820 ENCOUNTER FOR SCREENING FOR OSTEOPOROSIS: ICD-10-CM

## 2025-05-09 NOTE — H&P PST ADULT - FAMILY HISTORY
Father  Still living? No  Family history of pancreatic cancer, Age at diagnosis: Age Unknown     Mother  Still living? No  Family history of heart disease, Age at diagnosis: Age Unknown  Family history of stroke, Age at diagnosis: Age Unknown 2 = No assist / stand by assistance

## 2025-05-12 ENCOUNTER — APPOINTMENT (OUTPATIENT)
Dept: ORTHOPEDIC SURGERY | Facility: CLINIC | Age: 80
End: 2025-05-12
Payer: COMMERCIAL

## 2025-05-12 VITALS — HEIGHT: 65.5 IN | WEIGHT: 190 LBS | BODY MASS INDEX: 31.27 KG/M2

## 2025-05-12 DIAGNOSIS — L90.5 SCAR CONDITIONS AND FIBROSIS OF SKIN: ICD-10-CM

## 2025-05-12 DIAGNOSIS — Z96.652 PRESENCE OF LEFT ARTIFICIAL KNEE JOINT: ICD-10-CM

## 2025-05-12 PROCEDURE — 73560 X-RAY EXAM OF KNEE 1 OR 2: CPT | Mod: LT

## 2025-05-12 PROCEDURE — 99213 OFFICE O/P EST LOW 20 MIN: CPT

## 2025-05-12 PROCEDURE — 99203 OFFICE O/P NEW LOW 30 MIN: CPT

## 2025-05-14 PROBLEM — L90.5 SCAR TISSUE: Status: ACTIVE | Noted: 2021-07-12

## 2025-05-15 ENCOUNTER — NON-APPOINTMENT (OUTPATIENT)
Age: 80
End: 2025-05-15

## 2025-05-20 ENCOUNTER — RX RENEWAL (OUTPATIENT)
Age: 80
End: 2025-05-20

## 2025-06-04 ENCOUNTER — RX RENEWAL (OUTPATIENT)
Age: 80
End: 2025-06-04

## 2025-06-23 ENCOUNTER — APPOINTMENT (OUTPATIENT)
Dept: ORTHOPEDIC SURGERY | Facility: CLINIC | Age: 80
End: 2025-06-23

## 2025-06-23 VITALS
BODY MASS INDEX: 30.82 KG/M2 | DIASTOLIC BLOOD PRESSURE: 73 MMHG | SYSTOLIC BLOOD PRESSURE: 120 MMHG | HEIGHT: 65 IN | WEIGHT: 185 LBS | HEART RATE: 76 BPM

## 2025-06-23 PROBLEM — Z96.652 STATUS POST TOTAL LEFT KNEE REPLACEMENT: Status: RESOLVED | Noted: 2019-11-22 | Resolved: 2025-06-23

## 2025-06-23 PROCEDURE — 99213 OFFICE O/P EST LOW 20 MIN: CPT

## 2025-06-24 PROBLEM — Z96.652 STATUS POST TOTAL LEFT KNEE REPLACEMENT: Status: ACTIVE | Noted: 2025-06-23

## 2025-07-03 NOTE — PRE-OP CHECKLIST - LOOSE TEETH
See triage note.  Pt is alert and oriented x 4, RR even and unlabored, skin is warm and dry. Assesment completed and pt updated on plan of care.       Emergency Department Nursing Plan of Care       The Nursing Plan of Care is developed from the Nursing assessment and Emergency Department Attending provider initial evaluation.  The plan of care may be reviewed in the “ED Provider note”.    The Plan of Care was developed with the following considerations:   Patient / Family readiness to learn indicated by:verbalized understanding  Persons(s) to be included in education: patient  Barriers to Learning/Limitations:None    Signed     Sumit Rivera RN    7/2/2025   10:14 PM   
no
no

## 2025-08-08 DIAGNOSIS — B00.1 HERPESVIRAL VESICULAR DERMATITIS: ICD-10-CM

## 2025-08-08 RX ORDER — ACYCLOVIR 50 MG/G
5 CREAM TOPICAL
Qty: 1 | Refills: 2 | Status: ACTIVE | COMMUNITY
Start: 2025-08-08 | End: 1900-01-01

## 2025-08-18 ENCOUNTER — NON-APPOINTMENT (OUTPATIENT)
Age: 80
End: 2025-08-18

## 2025-08-20 ENCOUNTER — APPOINTMENT (OUTPATIENT)
Dept: ORTHOPEDIC SURGERY | Facility: CLINIC | Age: 80
End: 2025-08-20
Payer: COMMERCIAL

## 2025-08-20 DIAGNOSIS — Z96.652 PRESENCE OF LEFT ARTIFICIAL KNEE JOINT: ICD-10-CM

## 2025-08-20 PROCEDURE — 99213 OFFICE O/P EST LOW 20 MIN: CPT
